# Patient Record
Sex: MALE | Race: BLACK OR AFRICAN AMERICAN | Employment: OTHER | URBAN - METROPOLITAN AREA
[De-identification: names, ages, dates, MRNs, and addresses within clinical notes are randomized per-mention and may not be internally consistent; named-entity substitution may affect disease eponyms.]

---

## 2017-03-06 ENCOUNTER — ALLSCRIPTS OFFICE VISIT (OUTPATIENT)
Dept: OTHER | Facility: OTHER | Age: 82
End: 2017-03-06

## 2017-03-06 DIAGNOSIS — E11.9 TYPE 2 DIABETES MELLITUS WITHOUT COMPLICATIONS (HCC): ICD-10-CM

## 2017-05-24 ENCOUNTER — ALLSCRIPTS OFFICE VISIT (OUTPATIENT)
Dept: OTHER | Facility: OTHER | Age: 82
End: 2017-05-24

## 2017-05-24 LAB
BILIRUB UR QL STRIP: NORMAL
CLARITY UR: NORMAL
COLOR UR: NORMAL
GLUCOSE (HISTORICAL): 1000
HGB UR QL STRIP.AUTO: NORMAL
KETONES UR STRIP-MCNC: NORMAL MG/DL
LEUKOCYTE ESTERASE UR QL STRIP: 70
NITRITE UR QL STRIP: NORMAL
PH UR STRIP.AUTO: 5 [PH]
PROT UR STRIP-MCNC: 100 MG/DL
SP GR UR STRIP.AUTO: 1.01
UROBILINOGEN UR QL STRIP.AUTO: NORMAL

## 2017-06-06 ENCOUNTER — LAB CONVERSION - ENCOUNTER (OUTPATIENT)
Dept: OTHER | Facility: OTHER | Age: 82
End: 2017-06-06

## 2017-06-06 LAB
BUN SERPL-MCNC: 32 MG/DL (ref 7–25)
BUN/CREA RATIO (HISTORICAL): 15 (CALC) (ref 6–22)
CALCIUM SERPL-MCNC: 8.9 MG/DL (ref 8.6–10.3)
CHLORIDE SERPL-SCNC: 109 MMOL/L (ref 98–110)
CO2 SERPL-SCNC: 24 MMOL/L (ref 20–31)
CREAT SERPL-MCNC: 2.2 MG/DL (ref 0.7–1.11)
EGFR AFRICAN AMERICAN (HISTORICAL): 31 ML/MIN/1.73M2
EGFR-AMERICAN CALC (HISTORICAL): 27 ML/MIN/1.73M2
GLUCOSE (HISTORICAL): 123 MG/DL (ref 65–99)
HBA1C MFR BLD HPLC: 6.7 % OF TOTAL HGB
POTASSIUM SERPL-SCNC: 3.9 MMOL/L (ref 3.5–5.3)
SODIUM SERPL-SCNC: 139 MMOL/L (ref 135–146)

## 2017-06-08 ENCOUNTER — ALLSCRIPTS OFFICE VISIT (OUTPATIENT)
Dept: OTHER | Facility: OTHER | Age: 82
End: 2017-06-08

## 2017-06-08 DIAGNOSIS — E11.22 TYPE 2 DIABETES MELLITUS WITH DIABETIC CHRONIC KIDNEY DISEASE (HCC): ICD-10-CM

## 2017-06-08 DIAGNOSIS — R31.0 GROSS HEMATURIA: ICD-10-CM

## 2017-06-08 LAB
BILIRUB UR QL STRIP: NORMAL
CLARITY UR: NORMAL
COLOR UR: YELLOW
GLUCOSE (HISTORICAL): 1000
HGB UR QL STRIP.AUTO: NORMAL
KETONES UR STRIP-MCNC: NORMAL MG/DL
LEUKOCYTE ESTERASE UR QL STRIP: NORMAL
NITRITE UR QL STRIP: NORMAL
PH UR STRIP.AUTO: 5 [PH]
PROT UR STRIP-MCNC: 30 MG/DL
SP GR UR STRIP.AUTO: 1.01
UROBILINOGEN UR QL STRIP.AUTO: NORMAL

## 2017-06-16 ENCOUNTER — HOSPITAL ENCOUNTER (OUTPATIENT)
Dept: CT IMAGING | Facility: HOSPITAL | Age: 82
Discharge: HOME | End: 2017-06-16
Payer: MEDICARE

## 2017-06-16 DIAGNOSIS — R31.0 GROSS HEMATURIA: ICD-10-CM

## 2017-06-16 PROCEDURE — 74176 CT ABD & PELVIS W/O CONTRAST: CPT

## 2017-07-05 ENCOUNTER — LAB REQUISITION (OUTPATIENT)
Dept: LAB | Facility: HOSPITAL | Age: 82
End: 2017-07-05
Payer: MEDICARE

## 2017-07-05 ENCOUNTER — ALLSCRIPTS OFFICE VISIT (OUTPATIENT)
Dept: OTHER | Facility: OTHER | Age: 82
End: 2017-07-05

## 2017-07-05 DIAGNOSIS — R31.0 GROSS HEMATURIA: ICD-10-CM

## 2017-07-05 LAB
CLARITY UR: NORMAL
COLOR UR: YELLOW
GLUCOSE (HISTORICAL): NORMAL
HGB UR QL STRIP.AUTO: NORMAL
KETONES UR STRIP-MCNC: NORMAL MG/DL
LEUKOCYTE ESTERASE UR QL STRIP: NORMAL
NITRITE UR QL STRIP: NORMAL
PH UR STRIP.AUTO: 6 [PH]
PROT UR STRIP-MCNC: NORMAL MG/DL
SP GR UR STRIP.AUTO: 1.02

## 2017-07-05 PROCEDURE — 87086 URINE CULTURE/COLONY COUNT: CPT | Performed by: UROLOGY

## 2017-07-07 LAB — BACTERIA UR CULT: NORMAL

## 2017-08-08 ENCOUNTER — GENERIC CONVERSION - ENCOUNTER (OUTPATIENT)
Dept: OTHER | Facility: OTHER | Age: 82
End: 2017-08-08

## 2017-08-14 ENCOUNTER — GENERIC CONVERSION - ENCOUNTER (OUTPATIENT)
Dept: OTHER | Facility: OTHER | Age: 82
End: 2017-08-14

## 2017-08-23 ENCOUNTER — ALLSCRIPTS OFFICE VISIT (OUTPATIENT)
Dept: OTHER | Facility: OTHER | Age: 82
End: 2017-08-23

## 2017-08-23 DIAGNOSIS — N32.89 OTHER SPECIFIED DISORDERS OF BLADDER: ICD-10-CM

## 2017-08-23 DIAGNOSIS — E11.22 TYPE 2 DIABETES MELLITUS WITH DIABETIC CHRONIC KIDNEY DISEASE (HCC): ICD-10-CM

## 2017-08-23 DIAGNOSIS — E11.9 TYPE 2 DIABETES MELLITUS WITHOUT COMPLICATIONS (HCC): ICD-10-CM

## 2017-08-23 DIAGNOSIS — J45.20 MILD INTERMITTENT ASTHMA, UNCOMPLICATED: ICD-10-CM

## 2017-09-13 ENCOUNTER — GENERIC CONVERSION - ENCOUNTER (OUTPATIENT)
Dept: OTHER | Facility: OTHER | Age: 82
End: 2017-09-13

## 2017-09-14 ENCOUNTER — ALLSCRIPTS OFFICE VISIT (OUTPATIENT)
Dept: OTHER | Facility: OTHER | Age: 82
End: 2017-09-14

## 2017-09-14 ENCOUNTER — APPOINTMENT (OUTPATIENT)
Dept: LAB | Facility: HOSPITAL | Age: 82
End: 2017-09-14
Attending: UROLOGY
Payer: MEDICARE

## 2017-09-14 DIAGNOSIS — N32.89 OTHER SPECIFIED DISORDERS OF BLADDER: ICD-10-CM

## 2017-09-14 LAB
BILIRUB UR QL STRIP: NORMAL
CLARITY UR: NORMAL
COLOR UR: YELLOW
GLUCOSE (HISTORICAL): 500
HGB UR QL STRIP.AUTO: NORMAL
KETONES UR STRIP-MCNC: NORMAL MG/DL
LEUKOCYTE ESTERASE UR QL STRIP: NORMAL
NITRITE UR QL STRIP: NORMAL
PH UR STRIP.AUTO: 5 [PH]
PROT UR STRIP-MCNC: NORMAL MG/DL
SP GR UR STRIP.AUTO: 1.01
UROBILINOGEN UR QL STRIP.AUTO: NORMAL

## 2017-09-14 PROCEDURE — 87086 URINE CULTURE/COLONY COUNT: CPT

## 2017-09-16 LAB — BACTERIA UR CULT: NORMAL

## 2017-10-13 ENCOUNTER — ANESTHESIA EVENT (OUTPATIENT)
Dept: PERIOP | Facility: AMBULARY SURGERY CENTER | Age: 82
End: 2017-10-13
Payer: MEDICARE

## 2017-10-18 ENCOUNTER — ALLSCRIPTS OFFICE VISIT (OUTPATIENT)
Dept: OTHER | Facility: OTHER | Age: 82
End: 2017-10-18

## 2017-10-19 ENCOUNTER — TRANSCRIBE ORDERS (OUTPATIENT)
Dept: LAB | Facility: CLINIC | Age: 82
End: 2017-10-19

## 2017-10-19 ENCOUNTER — APPOINTMENT (OUTPATIENT)
Dept: LAB | Facility: CLINIC | Age: 82
End: 2017-10-19
Payer: MEDICARE

## 2017-10-19 ENCOUNTER — OFFICE VISIT (OUTPATIENT)
Dept: LAB | Facility: CLINIC | Age: 82
End: 2017-10-19
Payer: MEDICARE

## 2017-10-19 DIAGNOSIS — N32.89 NONTRAUMATIC RUPTURE OF BLADDER: Primary | ICD-10-CM

## 2017-10-19 DIAGNOSIS — E11.9 TYPE 2 DIABETES MELLITUS WITHOUT COMPLICATIONS (HCC): ICD-10-CM

## 2017-10-19 DIAGNOSIS — N32.89 NONTRAUMATIC RUPTURE OF BLADDER: ICD-10-CM

## 2017-10-19 DIAGNOSIS — R39.9 UNSPECIFIED SYMPTOMS AND SIGNS INVOLVING THE GENITOURINARY SYSTEM: ICD-10-CM

## 2017-10-19 LAB
ANION GAP SERPL CALCULATED.3IONS-SCNC: 8 MMOL/L (ref 4–13)
APTT PPP: 26 SECONDS (ref 23–35)
ATRIAL RATE: 64 BPM
BASOPHILS # BLD AUTO: 0.02 THOUSANDS/ΜL (ref 0–0.1)
BASOPHILS NFR BLD AUTO: 0 % (ref 0–1)
BUN SERPL-MCNC: 33 MG/DL (ref 5–25)
CALCIUM SERPL-MCNC: 8.8 MG/DL (ref 8.3–10.1)
CHLORIDE SERPL-SCNC: 102 MMOL/L (ref 100–108)
CO2 SERPL-SCNC: 27 MMOL/L (ref 21–32)
CREAT SERPL-MCNC: 2.16 MG/DL (ref 0.6–1.3)
CREAT UR-MCNC: 57.1 MG/DL
EOSINOPHIL # BLD AUTO: 0.34 THOUSAND/ΜL (ref 0–0.61)
EOSINOPHIL NFR BLD AUTO: 5 % (ref 0–6)
ERYTHROCYTE [DISTWIDTH] IN BLOOD BY AUTOMATED COUNT: 12.6 % (ref 11.6–15.1)
GFR SERPL CREATININE-BSD FRML MDRD: 32 ML/MIN/1.73SQ M
GLUCOSE SERPL-MCNC: 212 MG/DL (ref 65–140)
HCT VFR BLD AUTO: 35.5 % (ref 36.5–49.3)
HGB BLD-MCNC: 11.4 G/DL (ref 12–17)
INR PPP: 0.97 (ref 0.86–1.16)
LYMPHOCYTES # BLD AUTO: 1.45 THOUSANDS/ΜL (ref 0.6–4.47)
LYMPHOCYTES NFR BLD AUTO: 19 % (ref 14–44)
MCH RBC QN AUTO: 30.3 PG (ref 26.8–34.3)
MCHC RBC AUTO-ENTMCNC: 32.1 G/DL (ref 31.4–37.4)
MCV RBC AUTO: 94 FL (ref 82–98)
MICROALBUMIN UR-MCNC: 50.8 MG/L (ref 0–20)
MICROALBUMIN/CREAT 24H UR: 89 MG/G CREATININE (ref 0–30)
MONOCYTES # BLD AUTO: 0.79 THOUSAND/ΜL (ref 0.17–1.22)
MONOCYTES NFR BLD AUTO: 11 % (ref 4–12)
NEUTROPHILS # BLD AUTO: 4.93 THOUSANDS/ΜL (ref 1.85–7.62)
NEUTS SEG NFR BLD AUTO: 65 % (ref 43–75)
P AXIS: 33 DEGREES
PLATELET # BLD AUTO: 180 THOUSANDS/UL (ref 149–390)
PMV BLD AUTO: 10.9 FL (ref 8.9–12.7)
POTASSIUM SERPL-SCNC: 4.4 MMOL/L (ref 3.5–5.3)
PR INTERVAL: 142 MS
PROTHROMBIN TIME: 13.2 SECONDS (ref 12.1–14.4)
QRS AXIS: -5 DEGREES
QRSD INTERVAL: 80 MS
QT INTERVAL: 410 MS
QTC INTERVAL: 422 MS
RBC # BLD AUTO: 3.76 MILLION/UL (ref 3.88–5.62)
SODIUM SERPL-SCNC: 137 MMOL/L (ref 136–145)
T WAVE AXIS: 19 DEGREES
VENTRICULAR RATE: 64 BPM
WBC # BLD AUTO: 7.53 THOUSAND/UL (ref 4.31–10.16)

## 2017-10-19 PROCEDURE — 93005 ELECTROCARDIOGRAM TRACING: CPT

## 2017-10-19 PROCEDURE — 85730 THROMBOPLASTIN TIME PARTIAL: CPT

## 2017-10-19 PROCEDURE — 36415 COLL VENOUS BLD VENIPUNCTURE: CPT

## 2017-10-19 PROCEDURE — 85025 COMPLETE CBC W/AUTO DIFF WBC: CPT

## 2017-10-19 PROCEDURE — 85610 PROTHROMBIN TIME: CPT

## 2017-10-19 PROCEDURE — 87086 URINE CULTURE/COLONY COUNT: CPT

## 2017-10-19 PROCEDURE — 82570 ASSAY OF URINE CREATININE: CPT

## 2017-10-19 PROCEDURE — 80048 BASIC METABOLIC PNL TOTAL CA: CPT

## 2017-10-19 PROCEDURE — 82043 UR ALBUMIN QUANTITATIVE: CPT

## 2017-10-19 NOTE — PROGRESS NOTES
Assessment  1  Preoperative clearance (V72 84) (Z01 818)   2  Bladder mass (596 89) (N32 89)   3  Diabetes mellitus, type II (250 00) (E11 9)   4  Chronic renal failure, stage 3 (moderate) (585 3) (N18 3)   5  Asthma, mild intermittent (493 90) (J45 20)   6  Benign essential hypertension (401 1) (I10)    Plan  Benign essential hypertension    · AmLODIPine Besylate 10 MG Oral Tablet; take 1 tablet by mouth  daily as  directed  Diabetes mellitus, type II    · (1) MICROALBUMIN CREATININE RATIO, RANDOM URINE; Status:Active; Requested  for:19Oct2017;     Discussion/Summary  Surgical Clearance: He is at a LOW risk from a cardiovascular standpoint at this time without any additional cardiac testing  Reevaluation needed, if he should present with symptoms prior to surgery/procedure  Comments:  keep Pt hydrated and monitor BS, NO METFORMIN  Possible side effects of new medications were reviewed with the patient/guardian today  The treatment plan was reviewed with the patient/guardian  The patient/guardian understands and agrees with the treatment plan      Chief Complaint  pre-op - having procedure done by Dr Efrain Solis of [facility] on 10/25/2017      History of Present Illness  Pre-Op Visit (Brief): The patient is being seen for a preoperative visit  The procedure is a(n) bladder biopey and resection scheduled for 10-25 with Efrain Solis  The indication for surgery is bladder mass  Surgical Risk Assessment:   Prior Anesthesia: He had prior anesthesia  Pertinent Past Medical History: diabetes,-- asthma-- and-- renal disease, but-- does not use insulin-- and-- no obesity  Exercise Capacity: able to walk four blocks without symptoms-- and-- able to walk two flights of stairs without symptoms  Lifestyle Factors: denies tobacco use and denies illegal drug use  Symptoms: no symptoms  Pertinent Family History: no pertinent family history     Living Situation: home is secure and supportive and no post-op concerns with his living situation  Asthma (Follow-Up): The patient is being seen for a routine clinic follow-up of asthma  The patient's long-term asthma pattern has been classified as intermittent  The patient states he has been doing well with his asthma control since the last visit  He has no comorbid illnesses  He has no significant interval events  Asthma Control: Well controlled  Interval Symptoms: The patient is currently asymptomatic  Home Monitoring: The patient is not checking peak flow at home  Normal rescue inhaler use  Interval Triggers: cold weather-- and-- upper respiratory tract infection  Medications: a short acting beta agonist agent, a long acting beta agonist agent and an inhaled steroid-- the patient is adherent with his medication regimen  -- He denies medication side effects  Disease Management: the patient is doing well with his asthma goals  The patient is due for influenza virus vaccine  Hypertension (Follow-Up): The patient presents for follow-up of essential hypertension  The patient states he has been doing well with his blood pressure control since the last visit  Comorbid Illnesses: nephropathy  Symptoms: The patient is currently asymptomatic  Home monitoring: The patient checks his blood pressure regularly  Blood pressure control has been good  Medications: the patient is adherent with his medication regimen  -- He denies medication side effects  Medication(s): a beta blocking agent-- and-- a calcium channel blocker  Disease Management: the patient is doing well with his blood pressure goals  Diabetes Type II (Follow-Up): The patient states he has been doing well with his Type II Diabetes control since the last visit  Comorbid Illnesses: hypertension-- and-- hyperlipidemia  Complications: nephropathy  He has no significant interval events  Symptoms: The patient is currently asymptomatic  Home monitoring: The patient checks his blood sugars regularly  -- Glycemic control has been good  -- the patient reports no symptomatic hypoglycemic episodes  Medications: Medication(s): Sulfonylurea-- and-- Aspirin  Review of Systems    Constitutional: No fever or chills, feels well, no tiredness, no recent weight gain or weight loss  Eyes: No complaints of eye pain, no red eyes, no discharge from eyes, no itchy eyes  ENT: no complaints of earache, no hearing loss, no nosebleeds, no nasal discharge, no sore throat, no hoarseness  Cardiovascular: as noted in HPI  Respiratory: as noted in HPI  Gastrointestinal: No complaints of abdominal pain, no constipation, no nausea or vomiting, no diarrhea or bloody stools  Genitourinary: as noted in HPI  Musculoskeletal: No complaints of arthralgia, no myalgias, no joint swelling or stiffness, no limb pain or swelling  Integumentary: No complaints of skin rash or skin lesions, no itching, no skin wound, no dry skin  Neurological: No compliants of headache, no confusion, no convulsions, no numbness or tingling, no dizziness or fainting, no limb weakness, no difficulty walking  Psychiatric: Is not suicidal, no sleep disturbances, no anxiety or depression, no change in personality, no emotional problems  Endocrine: as noted in HPI  Hematologic/Lymphatic: No complaints of swollen glands, no swollen glands in the neck, does not bleed easily, no easy bruising  Active Problems  1  Asthma, mild intermittent (493 90) (J45 20)   2  Benign essential hypertension (401 1) (I10)   3  Benign hypertensive kidney disease, stage 1 through stage 4 or unspecified chronic   kidney disease (403 10) (I12 9)   4  Bladder mass (596 89) (N32 89)   5  Chronic renal failure, stage 3 (moderate) (585 3) (N18 3)   6  Depression screening (V79 0) (Z13 89)   7  Diabetes mellitus, type II (250 00) (E11 9)   8  Encounter for screening for malignant neoplasm of colon (V76 51) (Z12 11)   9  Glaucoma (365 9) (H40 9)   10  Gross hematuria (599 71) (R31 0)   11  Hyperlipidemia (272 4) (E78 5)   12  Mild vitamin D deficiency (268 9) (E55 9)   13  Organic impotence (607 84) (N52 9)   14  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,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)    Past Medical History   · Flu vaccine need (V04 81) (Z23)    The active problems and past medical history were reviewed and updated today  Surgical History   · History of Cataract Surgery   · History of Diagnostic Cystoscopy    Family History  Mother    · Family history of diabetes mellitus (V18 0) (Z83 3)  Father    · Family history of Old age    Social History   · Former smoker (V15 82) (O21 694)   · No alcohol use  The social history was reviewed and updated today  The social history was reviewed and is unchanged  Current Meds   1  Advair Diskus 100-50 MCG/DOSE Inhalation Aerosol Powder Breath Activated; Inhale 1   puff every 12  hours; Therapy: 18Oxy2435 to (52-28-62-17)  Requested for: 19Amg4432; Last   Rx:85Njw9154 Ordered   2  AmLODIPine Besylate 10 MG Oral Tablet; take 1 tablet by mouth  daily as directed; Therapy: 21GKV1999 to (Evaluate:69Vhs1436)  Requested for: 33Mwg6521; Last   Rx:76Ybt3039 Ordered   3  Brimonidine Tartrate 0 2 % Ophthalmic Solution; Therapy: 67Krv8067 to Recorded   4  Dorzolamide HCl - 2 % Ophthalmic Solution; Therapy: 64Ynq6556 to Recorded   5  Glimepiride 2 MG Oral Tablet; TAKE 1 TABLET DAILY AS DIRECTED; Therapy: 42Yic7289 to (Evaluate:00Sbg1989)  Requested for: 70Zzb2022; Last   Rx:53Wfd6130 Ordered   6  Lotemax 0 5 % Ophthalmic Suspension; Therapy: 22NCJ0876 to (Last Rx:06Jan2012)  Requested for: 28HXM7241 Ordered   7  Lumigan 0 01 % Ophthalmic Solution; Therapy: 69QFR5805 to Recorded   8  Meloxicam 7 5 MG Oral Tablet Recorded   9  Metoprolol Succinate ER 50 MG Oral Tablet Extended Release 24 Hour; TAKE 1 TABLET   DAILY; Therapy: 79VRV2821 to (Evaluate:28Vpp2565)  Requested for: 16Lkj6341; Last   Rx:50Qgq9473 Ordered   10   OneTouch Ultra Blue In Vitro Strip; TEST ONCE DAILY; Therapy: 26Apr2012 to (Evaluate:27Apr2018)  Requested for: 75PRW8360; Last    Rx:51Wtc0767 Ordered   11  Simvastatin 40 MG Oral Tablet; TAKE 1 TABLET DAILY; Therapy: 41BGQ1835 to (Evaluate:03Mar2018)  Requested for: 57EAE0857; Last    Rx:08Mar2017 Ordered   12  Vitamin D 1000 UNIT CAPS; Take 1 capsule twice daily; Therapy: 02Qya7192 to (Last Arby Hobbs)  Requested for: 44Vqt6888 Ordered    The medication list was reviewed and updated today  Allergies  1  ACE Inhibitors   2  Penicillins    Vitals   Recorded: 03NFD7284 03:37PM   Temperature 98 6 F, Tympanic   Heart Rate 76   Systolic 893   Diastolic 70   Height 5 ft 5 in   Weight 150 lb    BMI Calculated 24 96   BSA Calculated 1 75     Physical Exam    Constitutional   General appearance: No acute distress, well appearing and well nourished  Head and Face   Head and face: Normal     Palpation of the face and sinuses: No sinus tenderness  Ears, Nose, Mouth, and Throat   External inspection of ears and nose: Normal     Otoscopic examination: Tympanic membranes translucent with normal light reflex  Canals patent without erythema  Nasal mucosa, septum, and turbinates: Normal without edema or erythema  Oropharynx: Normal with no erythema, edema, exudate or lesions  Neck   Neck: Supple, symmetric, trachea midline, no masses  Thyroid: Normal, no thyromegaly  Pulmonary   Respiratory effort: No increased work of breathing or signs of respiratory distress  Auscultation of lungs: Clear to auscultation  Cardiovascular   Auscultation of heart: Normal rate and rhythm, normal S1 and S2, no murmurs  Carotid pulses: 2+ bilaterally  Pedal pulses: 2+ bilaterally  Examination of extremities for edema and/or varicosities: Normal     Abdomen   Abdomen: Non-tender, no masses  Liver and spleen: No hepatomegaly or splenomegaly  Examination for hernias: No hernias appreciated      Musculoskeletal Gait and station: Normal     Skin   Skin and subcutaneous tissue: Normal without rashes or lesions  Neurologic   Cranial nerves: Cranial nerves 2-12 intact  Cortical function: Normal mental status  Coordination: Normal finger to nose and heel to shin  Psychiatric   Judgment and insight: Normal     Orientation to person, place and time: Normal     Recent and remote memory: Intact  Mood and affect: Normal        End of Encounter Meds  1  Advair Diskus 100-50 MCG/DOSE Inhalation Aerosol Powder Breath Activated; Inhale 1   puff every 12  hours; Therapy: 49Rom3183 to ([id number])  Requested for: 84Rdj5502; Last   Rx:79Bst6355 Ordered  2  AmLODIPine Besylate 10 MG Oral Tablet; take 1 tablet by mouth  daily as directed; Therapy: 32SSP1481 to (Evaluate:41Umr4742)  Requested for: 64FQK0519; Last   Rx:18Oct2017 Ordered  3  Metoprolol Succinate ER 50 MG Oral Tablet Extended Release 24 Hour (Toprol XL);   TAKE 1 TABLET DAILY; Therapy: 95FEA7022 to (Evaluate:62Trx6537)  Requested for: 53Rnn0274; Last   Rx:82Gwe5669 Ordered  4  Simvastatin 40 MG Oral Tablet; TAKE 1 TABLET DAILY; Therapy: 58KKC1253 to (Evaluate:03Mar2018)  Requested for: 90TNT2385; Last   Rx:08Mar2017 Ordered  5  Vitamin D 1000 UNIT CAPS; Take 1 capsule twice daily; Therapy: 89Pcy4953 to (Last Grahams Alder)  Requested for: 28Bez6107 Ordered  6  Glimepiride 2 MG Oral Tablet (Amaryl); TAKE 1 TABLET DAILY AS DIRECTED; Therapy: 58Hxf9551 to (Evaluate:55Ife5875)  Requested for: 91Nja3632; Last   Rx:80Noj3811 Ordered   7  OneTouch Ultra Blue In Vitro Strip; TEST ONCE DAILY; Therapy: 26Apr2012 to (Evaluate:27Apr2018)  Requested for: 96CEA5607; Last   Rx:26Lyi8435 Ordered  8  Brimonidine Tartrate 0 2 % Ophthalmic Solution; Therapy: 08Aug2014 to Recorded   9  Dorzolamide HCl - 2 % Ophthalmic Solution; Therapy: 25Hhn1896 to Recorded   10  Lotemax 0 5 % Ophthalmic Suspension;     Therapy: 30UJO6850 to (Last Rx:06Jan2012)  Requested for: 07IFK7752 Ordered   11  Lumigan 0 01 % Ophthalmic Solution; Therapy: 45DOW3084 to Recorded   12  Meloxicam 7 5 MG Oral Tablet Recorded    Future Appointments    Date/Time Provider Specialty Site   11/14/2017 01:30 PM GEE Urena  Internal Medicine [facility] [place]   10/25/2017 10:00 AM GEE Fuentes  Urology [address] [place] [address] [place]   11/07/2017 10:30 AM GEE Fuentes   Urology [facility]     Signatures   Electronically signed by : GEE Turner ; Oct 18 2017  9:08PM EST                       (Author)

## 2017-10-20 LAB — BACTERIA UR CULT: NORMAL

## 2017-10-25 ENCOUNTER — HOSPITAL ENCOUNTER (OUTPATIENT)
Facility: AMBULARY SURGERY CENTER | Age: 82
Setting detail: OUTPATIENT SURGERY
Discharge: HOME | End: 2017-10-25
Attending: UROLOGY | Admitting: UROLOGY
Payer: MEDICARE

## 2017-10-25 ENCOUNTER — ANESTHESIA (OUTPATIENT)
Dept: PERIOP | Facility: AMBULARY SURGERY CENTER | Age: 82
End: 2017-10-25
Payer: MEDICARE

## 2017-10-25 VITALS
DIASTOLIC BLOOD PRESSURE: 78 MMHG | OXYGEN SATURATION: 95 % | TEMPERATURE: 97.8 F | HEIGHT: 65 IN | SYSTOLIC BLOOD PRESSURE: 160 MMHG | HEART RATE: 68 BPM | BODY MASS INDEX: 24.66 KG/M2 | WEIGHT: 148 LBS | RESPIRATION RATE: 16 BRPM

## 2017-10-25 DIAGNOSIS — N32.89 BLADDER MASS: ICD-10-CM

## 2017-10-25 PROCEDURE — 88305 TISSUE EXAM BY PATHOLOGIST: CPT | Performed by: UROLOGY

## 2017-10-25 PROCEDURE — 82948 REAGENT STRIP/BLOOD GLUCOSE: CPT

## 2017-10-25 PROCEDURE — C1769 GUIDE WIRE: HCPCS | Performed by: UROLOGY

## 2017-10-25 PROCEDURE — 88307 TISSUE EXAM BY PATHOLOGIST: CPT | Performed by: UROLOGY

## 2017-10-25 RX ORDER — FENTANYL CITRATE/PF 50 MCG/ML
25 SYRINGE (ML) INJECTION
Status: COMPLETED | OUTPATIENT
Start: 2017-10-25 | End: 2017-10-25

## 2017-10-25 RX ORDER — SODIUM CHLORIDE, SODIUM LACTATE, POTASSIUM CHLORIDE, CALCIUM CHLORIDE 600; 310; 30; 20 MG/100ML; MG/100ML; MG/100ML; MG/100ML
100 INJECTION, SOLUTION INTRAVENOUS CONTINUOUS
Status: CANCELLED | OUTPATIENT
Start: 2017-10-25

## 2017-10-25 RX ORDER — SODIUM CHLORIDE, SODIUM LACTATE, POTASSIUM CHLORIDE, CALCIUM CHLORIDE 600; 310; 30; 20 MG/100ML; MG/100ML; MG/100ML; MG/100ML
INJECTION, SOLUTION INTRAVENOUS CONTINUOUS PRN
Status: DISCONTINUED | OUTPATIENT
Start: 2017-10-25 | End: 2017-10-25 | Stop reason: SURG

## 2017-10-25 RX ORDER — ONDANSETRON 2 MG/ML
4 INJECTION INTRAMUSCULAR; INTRAVENOUS ONCE AS NEEDED
Status: DISCONTINUED | OUTPATIENT
Start: 2017-10-25 | End: 2017-10-25 | Stop reason: HOSPADM

## 2017-10-25 RX ORDER — CIPROFLOXACIN 500 MG/1
500 TABLET, FILM COATED ORAL 2 TIMES DAILY
Qty: 6 TABLET | Refills: 0 | Status: SHIPPED | OUTPATIENT
Start: 2017-10-25 | End: 2017-10-28

## 2017-10-25 RX ORDER — CIPROFLOXACIN 2 MG/ML
400 INJECTION, SOLUTION INTRAVENOUS ONCE
Status: COMPLETED | OUTPATIENT
Start: 2017-10-25 | End: 2017-10-25

## 2017-10-25 RX ORDER — PROPOFOL 10 MG/ML
INJECTION, EMULSION INTRAVENOUS AS NEEDED
Status: DISCONTINUED | OUTPATIENT
Start: 2017-10-25 | End: 2017-10-25 | Stop reason: SURG

## 2017-10-25 RX ORDER — TRAMADOL HYDROCHLORIDE 50 MG/1
50 TABLET ORAL EVERY 6 HOURS PRN
Qty: 20 TABLET | Refills: 0 | Status: SHIPPED | OUTPATIENT
Start: 2017-10-25 | End: 2017-11-04

## 2017-10-25 RX ORDER — FENTANYL CITRATE 50 UG/ML
INJECTION, SOLUTION INTRAMUSCULAR; INTRAVENOUS AS NEEDED
Status: DISCONTINUED | OUTPATIENT
Start: 2017-10-25 | End: 2017-10-25 | Stop reason: SURG

## 2017-10-25 RX ORDER — ONDANSETRON 2 MG/ML
INJECTION INTRAMUSCULAR; INTRAVENOUS AS NEEDED
Status: DISCONTINUED | OUTPATIENT
Start: 2017-10-25 | End: 2017-10-25 | Stop reason: SURG

## 2017-10-25 RX ORDER — PHENAZOPYRIDINE HYDROCHLORIDE 100 MG/1
100 TABLET, FILM COATED ORAL 3 TIMES DAILY PRN
Qty: 20 TABLET | Refills: 0 | Status: SHIPPED | OUTPATIENT
Start: 2017-10-25 | End: 2018-02-15

## 2017-10-25 RX ORDER — LIDOCAINE HYDROCHLORIDE 10 MG/ML
INJECTION, SOLUTION INFILTRATION; PERINEURAL AS NEEDED
Status: DISCONTINUED | OUTPATIENT
Start: 2017-10-25 | End: 2017-10-25 | Stop reason: SURG

## 2017-10-25 RX ORDER — HYDROCODONE BITARTRATE AND ACETAMINOPHEN 5; 325 MG/1; MG/1
1-2 TABLET ORAL EVERY 4 HOURS PRN
Status: DISCONTINUED | OUTPATIENT
Start: 2017-10-25 | End: 2017-10-25 | Stop reason: HOSPADM

## 2017-10-25 RX ADMIN — FENTANYL CITRATE 25 MCG: 50 INJECTION INTRAMUSCULAR; INTRAVENOUS at 12:31

## 2017-10-25 RX ADMIN — FENTANYL CITRATE 25 MCG: 50 INJECTION, SOLUTION INTRAMUSCULAR; INTRAVENOUS at 11:47

## 2017-10-25 RX ADMIN — FENTANYL CITRATE 25 MCG: 50 INJECTION, SOLUTION INTRAMUSCULAR; INTRAVENOUS at 11:31

## 2017-10-25 RX ADMIN — PROPOFOL 150 MG: 10 INJECTION, EMULSION INTRAVENOUS at 11:29

## 2017-10-25 RX ADMIN — SODIUM CHLORIDE, SODIUM LACTATE, POTASSIUM CHLORIDE, AND CALCIUM CHLORIDE: .6; .31; .03; .02 INJECTION, SOLUTION INTRAVENOUS at 11:17

## 2017-10-25 RX ADMIN — FENTANYL CITRATE 25 MCG: 50 INJECTION INTRAMUSCULAR; INTRAVENOUS at 12:35

## 2017-10-25 RX ADMIN — FENTANYL CITRATE 25 MCG: 50 INJECTION INTRAMUSCULAR; INTRAVENOUS at 12:27

## 2017-10-25 RX ADMIN — FENTANYL CITRATE 25 MCG: 50 INJECTION, SOLUTION INTRAMUSCULAR; INTRAVENOUS at 11:35

## 2017-10-25 RX ADMIN — LIDOCAINE HYDROCHLORIDE 50 MG: 10 INJECTION, SOLUTION INFILTRATION; PERINEURAL at 11:29

## 2017-10-25 RX ADMIN — CIPROFLOXACIN: 2 INJECTION INTRAVENOUS at 11:33

## 2017-10-25 RX ADMIN — FENTANYL CITRATE 25 MCG: 50 INJECTION INTRAMUSCULAR; INTRAVENOUS at 12:24

## 2017-10-25 RX ADMIN — FENTANYL CITRATE 25 MCG: 50 INJECTION, SOLUTION INTRAMUSCULAR; INTRAVENOUS at 11:40

## 2017-10-25 RX ADMIN — ONDANSETRON 4 MG: 2 INJECTION INTRAMUSCULAR; INTRAVENOUS at 11:35

## 2017-10-25 NOTE — DISCHARGE INSTRUCTIONS
CYSTOSCOPY, TURBT, PROSTATE BIOPSY, BLADDER BIOPSY   DISCHARGE INSTRUCTIONS    Care after your surgery:  1  You may have burning or red colored urine the first 24 hours  Your burning should  stop in 24 hours and your urine should clear in 24-48 hours  2  You should drink more fluids unless Dr Shayna Metcalf advises you not to    3  You should return to normal activities when your urine is clear again  4  You may have a small amount of red drainage from your rectum if you had a prostate  biopsy performed  This should stop in 24 hours  5  Take pain medication as prescribed by your doctor  Call Dr Shayna Metcalf if you have any of the followin  You can not urinate or you have active or persistent bleeding, clots, back pain, or  pain when you urinate  2  You have chills, fever above 101 degrees, or feel sick  3  You have persistent nausea or vomiting, persistent dizziness, or lightheadedness  4  Call Dr Shayna Metcalf if you have any questions or concerns about your procedure at:  [phone]      Follow-up with Dr Shayna Metcalf in 1-2 weeks

## 2017-10-25 NOTE — PROGRESS NOTES
DrViviann Rising Nicholas Rabon called back  Irrigated with 60cc NS and had 100cc return clear urine which easily flushed

## 2017-10-25 NOTE — ANESTHESIA PREPROCEDURE EVALUATION
Review of Systems/Medical History  Patient summary reviewed  Chart reviewed      Cardiovascular  EKG reviewed, Hypertension controlled,   Comment: EKG SR PLUS OCCASIONAL PVC,  Pulmonary  Asthma: well controlled/ stable Last rescue: today , ,        GI/Hepatic    No GERD ,        Negative  ROS        Endo/Other  Diabetes ,      GYN       Hematology  Negative hematology ROS      Musculoskeletal  Negative musculoskeletal ROS        Neurology  Negative neurology ROS      Psychology   Negative psychology ROS            Physical Exam    Airway    Mallampati score: II  TM Distance: >3 FB  Neck ROM: full     Dental   upper dentures,     Cardiovascular  Cardiovascular exam normal    Pulmonary  Pulmonary exam normal     Other Findings        Anesthesia Plan  ASA Score- 2       Anesthesia Type- general with ASA Monitors  Additional Monitors:   Airway Plan: LMA  Induction- intravenous  Informed Consent- Anesthetic plan and risks discussed with patient

## 2017-10-25 NOTE — OP NOTE
OPERATIVE REPORT  PATIENT NAME: Kaushal Nicole    :  1934  MRN: [id number]  Pt Location: [facility] OR ROOM 06    SURGERY DATE: 10/25/2017    Surgeon(s) and Role:     Ricardo Gleason MD - Primary    Preop Diagnosis:  Bladder mass [N32 89]    Post-Op Diagnosis Codes: * Bladder mass [N32 89]    Procedure(s) (LRB):  CYSTOSCOPY; BLADDER BIOPSY WITH FULGERATION (N/A)  TUR BLADDER TUMOR (N/A)  INSTILLATION OF MITOMYCIN C (N/A)    Specimen(s):  ID Type Source Tests Collected by Time Destination   1 : bladder tumor Tissue Urinary Bladder TISSUE EXAM Jacquelin Faye MD 10/25/2017 1147    2 : bladder tumor deep Tissue Urinary Bladder TISSUE EXAM Jacquelin Faye MD 10/25/2017 1148    3 : bladder biopies Tissue Urinary Bladder TISSUE EXAM Jacquelin Faye MD 10/25/2017 1149        Estimated Blood Loss:   Minimal    Drains:       Anesthesia Type:   General    Operative Indications:  Bladder mass [N32 89]      Operative Findings:  3 cm bladder mass just above right ureteral orifice    Complications:   None    Procedure and Technique:  Informed consent was obtained the patient was brought to the operating room  Preoperative antibiotics were given  Bilateral sequential compressive devices were placed in the lower extremities for DVT prophylaxis  A timeout was performed to identify the patient and surgery to be performed  The patient was placed under general anesthesia prepped and draped in standard sterile fashion in dorsal lithotomy position  A 22 Thai rigid cystoscope was inserted per urethra into the bladder  A diagnostic cystoscopy was performed that showed a 3 cm bladder mass above the right ureteral orifice  Using loop electrocautery the bladder mass was resected and sent off as bladder tumor  Using a cold cup biopsy forceps biopsies of the base of the tumor were sent as bladder tumor deep  Electrocautery was used to obtain hemostasis    Next using cold cup biopsies random biopsies of the bladder were taken and sent off as bladder biopsies  Again electrocautery was used to obtain hemostasis at the sites  The bladder was inspected under low pressure conditions for any possible bleeding  The resectoscope was then removed and an 25 Swedish Mcgee catheter was placed  40 mg of mitomycin-C in 40 mL of normal saline were instilled into the bladder and the catheter was clamped  The patient was awoke and taken to the postanesthesia care unit stable condition     I was present for the entire procedure    Patient Disposition:  PACU     SIGNATURE: Ruslan Morton MD  DATE: October 25, 2017  TIME: 12:00 PM

## 2017-10-27 ENCOUNTER — ALLSCRIPTS OFFICE VISIT (OUTPATIENT)
Dept: OTHER | Facility: OTHER | Age: 82
End: 2017-10-27

## 2017-10-27 LAB — GLUCOSE SERPL-MCNC: 158 MG/DL (ref 65–140)

## 2017-11-07 ENCOUNTER — ALLSCRIPTS OFFICE VISIT (OUTPATIENT)
Dept: OTHER | Facility: OTHER | Age: 82
End: 2017-11-07

## 2017-11-08 NOTE — PROGRESS NOTES
Assessment  1  Bladder mass (882 22) (N32 89)    Discussion/Summary  Discussion Summary:   68-year-old male with low-grade bladder cancer  reviewed the pathology report with the patient  I was happy to report that his cancer was noninvasive and low-grade  We discussed that he does not need any further treatment  He will need to have continued surveillance cystoscopies  He will follow up in 3 months for his 1st cystoscopy  Counseling Documentation With Imm: total time of encounter was 25 minutes-- and-- 20 minutes was spent counseling  Chief Complaint  Chief Complaint Free Text Note Form: Patient presents for s/p Bladd biopsy; TURBT (10/25/17)      History of Present Illness  HPI: 68-year-old male presents for follow-up after transurethral resection of bladder tumor  He did well after the procedure and has no new complaints  Review of Systems  Complete-Male Urology:   Constitutional: No fever or chills, feels well, no tiredness, no recent weight gain or weight loss  Respiratory: No complaints of shortness of breath, no wheezing, no cough, no SOB on exertion, no orthopnea or PND  Cardiovascular: No complaints of slow heart rate, no fast heart rate, no chest pain, no palpitations, no leg claudication, no lower extremity  Gastrointestinal: No complaints of abdominal pain, no constipation, no nausea or vomiting, no diarrhea or bloody stools  Genitourinary: Empty sensation-- and-- stream quality good, but-- no dysuria,-- no urinary hesitancy,-- no hematuria,-- no incontinence-- and-- no feelings of urinary urgency--    The patient presents with complaints of nocturia (1-2x)  Musculoskeletal: No complaints of arthralgia, no myalgias, no joint swelling or stiffness, no limb pain or swelling  Integumentary: No complaints of skin rash or skin lesions, no itching, no skin wound, no dry skin     Hematologic/Lymphatic: No complaints of swollen glands, no swollen glands in the neck, does not bleed easily, no easy bruising  Neurological: No compliants of headache, no confusion, no convulsions, no numbness or tingling, no dizziness or fainting, no limb weakness, no difficulty walking  ROS Reviewed:   ROS reviewed  Active Problems  1  Acute urinary retention (788 29) (R33 8)   2  Asthma, mild intermittent (493 90) (J45 20)   3  Benign essential hypertension (401 1) (I10)   4  Benign hypertensive kidney disease, stage 1 through stage 4 or unspecified chronic   kidney disease (403 10) (I12 9)   5  Bladder mass (596 89) (N32 89)   6  Chronic renal failure, stage 3 (moderate) (585 3) (N18 3)   7  Depression screening (V79 0) (Z13 89)   8  Diabetes mellitus, type II (250 00) (E11 9)   9  Encounter for screening for malignant neoplasm of colon (V76 51) (Z12 11)   10  Glaucoma (365 9) (H40 9)   11  Gross hematuria (599 71) (R31 0)   12  Hyperlipidemia (272 4) (E78 5)   13  Mild vitamin D deficiency (268 9) (E55 9)   14  Organic impotence (607 84) (N52 9)   15  Preoperative clearance (V72 84) (Z01 818)   16  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,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)   17  UTI symptoms (788 99) (R39 9)    Past Medical History  1  Flu vaccine need (V04 81) (Z23)  Active Problems And Past Medical History Reviewed: The active problems and past medical history were reviewed and updated today  Surgical History  1  History of Cataract Surgery   2  History of Diagnostic Cystoscopy  Surgical History Reviewed: The surgical history was reviewed and updated today  Family History  Mother    1  Family history of diabetes mellitus (V18 0) (Z83 3)  Father    2  Family history of Old age  Family History Reviewed: The family history was reviewed and updated today  Social History   · Former smoker (E52 35) (F80 239)   · No alcohol use  Social History Reviewed: The social history was reviewed and updated today  Current Meds   1   Advair Diskus 100-50 MCG/DOSE Inhalation Aerosol Powder Breath Activated; Inhale 1   puff every 12  hours; Therapy: 58Mhp4768 to (Annabella Antony)  Requested for: 24Nbx5868; Last   Rx:94Qnz1881 Ordered   2  AmLODIPine Besylate 10 MG Oral Tablet; take 1 tablet by mouth  daily as directed; Therapy: 92WYM0027 to (Evaluate:24Rpx0726)  Requested for: 73VWS3811; Last   Rx:88Ivi6393 Ordered   3  Brimonidine Tartrate 0 2 % Ophthalmic Solution; Therapy: 64Kkw0563 to Recorded   4  Dorzolamide HCl - 2 % Ophthalmic Solution; Therapy: 83Nok9882 to Recorded   5  Glimepiride 2 MG Oral Tablet; TAKE 1 TABLET DAILY AS DIRECTED; Therapy: 05Ixy6190 to (Evaluate:97Ekj9003)  Requested for: 12Igr0172; Last   Rx:02Mkh9769 Ordered   6  Lotemax 0 5 % Ophthalmic Suspension; Therapy: 11KDN7916 to (Last Rx:2012)  Requested for: 06SFM0476 Ordered   7  Lumigan 0 01 % Ophthalmic Solution; Therapy: 73PQR1777 to Recorded   8  Meloxicam 7 5 MG Oral Tablet Recorded   9  Metoprolol Succinate ER 50 MG Oral Tablet Extended Release 24 Hour; TAKE 1 TABLET   DAILY; Therapy: 79ZIZ4512 to (Evaluate:09Pys0218)  Requested for: 87Rfq2140; Last   Rx:28Kqj3710 Ordered   10  OneTouch Ultra Blue In Vitro Strip; TEST ONCE DAILY; Therapy: 12Bxw1202 to (Evaluate:86Sfp1403)  Requested for: 54UFG2542; Last    Rx:59Sxa2981 Ordered   11  Simvastatin 40 MG Oral Tablet; TAKE 1 TABLET DAILY; Therapy: 06IGY8952 to (Evaluate:2018)  Requested for: 74UZQ0820; Last    Rx:2017 Ordered   12  Tamsulosin HCl - 0 4 MG Oral Capsule; TAKE 1 CAPSULE Bedtime; Therapy: 16JIH4242 to (Last R31FYW0800)  Requested for: 2017 Ordered   13  Vitamin D 1000 UNIT CAPS; Take 1 capsule twice daily; Therapy: 61Tvh2870 to (Last Consuello Mettle)  Requested for: 93Ciy8347 Ordered  Medication List Reviewed: The medication list was reviewed and updated today  Allergies  1  ACE Inhibitors   2   Penicillins    Vitals  Vital Signs    Recorded: 04YWT0154 10:42AM   Heart Rate 80   Systolic 933   Diastolic 68   Height 5 ft 5 in   Weight 145 lb 8 oz   BMI Calculated 24 21   BSA Calculated 1 73     Physical Exam    Constitutional   General appearance: No acute distress, well appearing and well nourished  Pulmonary   Respiratory effort: No increased work of breathing or signs of respiratory distress  Auscultation of lungs: Clear to auscultation  Cardiovascular   Auscultation of heart: Normal rate and rhythm, normal S1 and S2, without murmurs  Examination of extremities for edema and/or varicosities: Normal     Abdomen   Abdomen: Non-tender, no masses  Liver and spleen: No hepatomegaly or splenomegaly  Musculoskeletal   Gait and station: Normal     Skin   Skin and subcutaneous tissue: Normal without rashes or lesions  Additional Exam:  Neuro exam nonfocal       Results/Data  (1) TISSUE EXAM 25Oct2017 11:47AM Dick Aponte     Test Name Result Flag Reference   LAB AP CASE REPORT (Report)     Surgical Pathology Report             Case: [id number]                   Authorizing Provider: Garrison Quispe MD     Collected:      10/25/2017 1147        Ordering Location:   [facility]    Received:      10/26/2017 [facility]                            Pathologist:      Veronica Quiñones MD                              Specimens:  A) - Urinary Bladder, bladder tumor                                  B) - Urinary Bladder, bladder tumor deep                                C) - Urinary Bladder, bladder biopies   LAB AP FINAL DIAGNOSIS (Report)     A  Bladder tumor:    - Papillary urothelial carcinoma, low grade, non-invasive     - Detrusor muscle present; negative for maliganncy  B  Bladder tumor, deep:    - Rare microscopic, detached, and cauterized fragments of low grade   papillary urothelial carcinoma  - Portions of unremarkable detrusor muscle; negative for malignancy      C  Bladder, biopsies:    - Unremarkable urothelium; negative for malgnancy  - Portion of unremarkable detrusor muscle; negative for malignancy  Electronically signed by Richa Singh MD on 11/2/2017 at 11:28 AM   LAB AP SURGICAL ADDITIONAL INFORMATION (Report)     All controls performed with the immunohistochemical stains reported above   reacted appropriately  These tests were developed and their performance   characteristics determined by [facility] or   [address] [place] [address]  They may not be cleared or approved by the [place]  Food and Drug Administration  The FDA has determined that such clearance   or approval is not necessary  These tests are used for clinical purposes  They should not be regarded as investigational or for research  This   laboratory has been approved by [place] [address], designated as a high-complexity   laboratory and is qualified to perform these tests  - Interpretation performed at [facility]   LAB AP GROSS DESCRIPTION (Report)     A  The specimen is received in formalin, labeled with the patient's name   and hospital number, and is designated bladder tumor  The specimen   consists of multiple tan-pink irregularly shaped soft tissue fragments   measuring in loose aggregate 2 5 x 1 5 x 0 6 cm  Entirely submitted in 2   cassettes  B  The specimen is received in formalin, labeled with the patient's name   and hospital number, and is designated bladder tumor deep  The specimen   consists of 2 tan soft tissue fragments measuring 0 5 and 0 7 cm in   greatest dimension  Entirely submitted in one cassette  C  The specimen is received in formalin, labeled with the patient's name   and hospital number, and is designated bladder biopsies  The specimen   consists of 4 tan soft tissue fragments ranging from 0 2-0 3 cm in   greatest dimension  Entirely submitted in one cassette      Note: The estimated total formalin fixation time based upon information provided by the submitting clinician and the standard processing schedule   is less than 72 hours  AEK     Future Appointments    Date/Time Provider Specialty Site   11/14/2017 01:30 PM GEE Cardoso  Internal Medicine [facility] INTERNAL MEDICINE [place]   02/15/2018 10:30 AM GEE Jasso   Urology [address]     Signatures   Electronically signed by : GEE Bond ; Nov 7 2017 12:25PM EST                       (Author)

## 2017-11-14 ENCOUNTER — GENERIC CONVERSION - ENCOUNTER (OUTPATIENT)
Dept: OTHER | Facility: OTHER | Age: 82
End: 2017-11-14

## 2018-01-12 VITALS
BODY MASS INDEX: 25.49 KG/M2 | HEART RATE: 84 BPM | SYSTOLIC BLOOD PRESSURE: 120 MMHG | TEMPERATURE: 96.6 F | HEIGHT: 65 IN | DIASTOLIC BLOOD PRESSURE: 64 MMHG | WEIGHT: 153 LBS

## 2018-01-12 VITALS
WEIGHT: 150 LBS | SYSTOLIC BLOOD PRESSURE: 132 MMHG | DIASTOLIC BLOOD PRESSURE: 70 MMHG | BODY MASS INDEX: 24.99 KG/M2 | HEIGHT: 65 IN | TEMPERATURE: 98.6 F | HEART RATE: 76 BPM

## 2018-01-13 VITALS
HEIGHT: 65 IN | BODY MASS INDEX: 24.83 KG/M2 | HEART RATE: 68 BPM | TEMPERATURE: 97.7 F | SYSTOLIC BLOOD PRESSURE: 138 MMHG | DIASTOLIC BLOOD PRESSURE: 70 MMHG | WEIGHT: 149 LBS

## 2018-01-13 VITALS
TEMPERATURE: 97.3 F | HEART RATE: 69 BPM | OXYGEN SATURATION: 97 % | SYSTOLIC BLOOD PRESSURE: 126 MMHG | DIASTOLIC BLOOD PRESSURE: 76 MMHG | WEIGHT: 153.38 LBS | BODY MASS INDEX: 25.52 KG/M2

## 2018-01-13 VITALS
WEIGHT: 156 LBS | HEIGHT: 65 IN | SYSTOLIC BLOOD PRESSURE: 122 MMHG | DIASTOLIC BLOOD PRESSURE: 82 MMHG | BODY MASS INDEX: 25.99 KG/M2

## 2018-01-14 VITALS
WEIGHT: 149.38 LBS | HEART RATE: 72 BPM | SYSTOLIC BLOOD PRESSURE: 130 MMHG | DIASTOLIC BLOOD PRESSURE: 90 MMHG | BODY MASS INDEX: 24.89 KG/M2 | HEIGHT: 65 IN

## 2018-01-14 VITALS
BODY MASS INDEX: 24.24 KG/M2 | DIASTOLIC BLOOD PRESSURE: 68 MMHG | HEIGHT: 65 IN | HEART RATE: 80 BPM | SYSTOLIC BLOOD PRESSURE: 112 MMHG | WEIGHT: 145.5 LBS

## 2018-01-14 VITALS
DIASTOLIC BLOOD PRESSURE: 70 MMHG | SYSTOLIC BLOOD PRESSURE: 128 MMHG | HEART RATE: 72 BPM | HEIGHT: 65 IN | TEMPERATURE: 97.2 F | WEIGHT: 152.5 LBS | BODY MASS INDEX: 25.41 KG/M2

## 2018-01-16 NOTE — CONSULTS
Chief Complaint  pre-op - having procedure done by Dr Ata Holt of [facility] on 10/25/2017      History of Present Illness  Pre-Op Visit (Brief): The patient is being seen for a preoperative visit  The procedure is a(n) bladder biopey and resection scheduled for 10-25 with Ata Holt  The indication for surgery is bladder mass  Surgical Risk Assessment:   Prior Anesthesia: He had prior anesthesia  Pertinent Past Medical History: diabetes, asthma and renal disease, but does not use insulin and no obesity  Exercise Capacity: able to walk four blocks without symptoms and able to walk two flights of stairs without symptoms  Lifestyle Factors: denies tobacco use and denies illegal drug use  Symptoms: no symptoms  Pertinent Family History: no pertinent family history  Living Situation: home is secure and supportive and no post-op concerns with his living situation  Asthma (Follow-Up): The patient is being seen for a routine clinic follow-up of asthma  The patient's long-term asthma pattern has been classified as intermittent  The patient states he has been doing well with his asthma control since the last visit  He has no comorbid illnesses  He has no significant interval events  Asthma Control: Well controlled  Interval Symptoms: The patient is currently asymptomatic  Home Monitoring: The patient is not checking peak flow at home  Normal rescue inhaler use  Interval Triggers: cold weather and upper respiratory tract infection  Medications: a short acting beta agonist agent, a long acting beta agonist agent and an inhaled steroid the patient is adherent with his medication regimen  He denies medication side effects  Disease Management: the patient is doing well with his asthma goals  The patient is due for influenza virus vaccine  Hypertension (Follow-Up): The patient presents for follow-up of essential hypertension   The patient states he has been doing well with his blood pressure control since the last visit  Comorbid Illnesses: nephropathy  Symptoms: The patient is currently asymptomatic  Home monitoring: The patient checks his blood pressure regularly  Blood pressure control has been good  Medications: the patient is adherent with his medication regimen  He denies medication side effects  Medication(s): a beta blocking agent and a calcium channel blocker  Disease Management: the patient is doing well with his blood pressure goals  Diabetes Type II (Follow-Up): The patient states he has been doing well with his Type II Diabetes control since the last visit  Comorbid Illnesses: hypertension and hyperlipidemia  Complications: nephropathy  He has no significant interval events  Symptoms: The patient is currently asymptomatic  Home monitoring: The patient checks his blood sugars regularly  Glycemic control has been good  the patient reports no symptomatic hypoglycemic episodes  Medications: Medication(s): Sulfonylurea and Aspirin  Review of Systems    Constitutional: No fever or chills, feels well, no tiredness, no recent weight gain or weight loss  Eyes: No complaints of eye pain, no red eyes, no discharge from eyes, no itchy eyes  ENT: no complaints of earache, no hearing loss, no nosebleeds, no nasal discharge, no sore throat, no hoarseness  Cardiovascular: as noted in HPI  Respiratory: as noted in HPI  Gastrointestinal: No complaints of abdominal pain, no constipation, no nausea or vomiting, no diarrhea or bloody stools  Genitourinary: as noted in HPI  Musculoskeletal: No complaints of arthralgia, no myalgias, no joint swelling or stiffness, no limb pain or swelling  Integumentary: No complaints of skin rash or skin lesions, no itching, no skin wound, no dry skin  Neurological: No compliants of headache, no confusion, no convulsions, no numbness or tingling, no dizziness or fainting, no limb weakness, no difficulty walking     Psychiatric: Is not suicidal, no sleep disturbances, no anxiety or depression, no change in personality, no emotional problems  Endocrine: as noted in HPI  Hematologic/Lymphatic: No complaints of swollen glands, no swollen glands in the neck, does not bleed easily, no easy bruising  Active Problems    1  Asthma, mild intermittent (493 90) (J45 20)   2  Benign essential hypertension (401 1) (I10)   3  Benign hypertensive kidney disease, stage 1 through stage 4 or unspecified chronic   kidney disease (403 10) (I12 9)   4  Bladder mass (596 89) (N32 89)   5  Chronic renal failure, stage 3 (moderate) (585 3) (N18 3)   6  Depression screening (V79 0) (Z13 89)   7  Diabetes mellitus, type II (250 00) (E11 9)   8  Encounter for screening for malignant neoplasm of colon (V76 51) (Z12 11)   9  Glaucoma (365 9) (H40 9)   10  Gross hematuria (599 71) (R31 0)   11  Hyperlipidemia (272 4) (E78 5)   12  Mild vitamin D deficiency (268 9) (E55 9)   13  Organic impotence (607 84) (N52 9)   14  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,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)    Past Medical History    · Flu vaccine need (V04 81) (Z23)    The active problems and past medical history were reviewed and updated today  Surgical History    · History of Cataract Surgery   · History of Diagnostic Cystoscopy    Family History    · Family history of diabetes mellitus (V18 0) (Z83 3)    · Family history of Old age    Social History    · Former smoker (V15 82) (G59 501)   · No alcohol use  The social history was reviewed and updated today  The social history was reviewed and is unchanged  Current Meds   1  Advair Diskus 100-50 MCG/DOSE Inhalation Aerosol Powder Breath Activated; Inhale 1   puff every 12  hours; Therapy: 69Lny7086 to ([id number])  Requested for: 93Cnc7844; Last   Rx:03Ulu7011 Ordered   2  AmLODIPine Besylate 10 MG Oral Tablet; take 1 tablet by mouth  daily as directed;    Therapy: 12RWR8203 to (Evaluate:30Vju8526)  Requested for: 38VHE1190; Last   Rx:69Fld4676 Ordered   3  Brimonidine Tartrate 0 2 % Ophthalmic Solution; Therapy: 54Mzj3995 to Recorded   4  Dorzolamide HCl - 2 % Ophthalmic Solution; Therapy: 43Nyj9174 to Recorded   5  Glimepiride 2 MG Oral Tablet; TAKE 1 TABLET DAILY AS DIRECTED; Therapy: 84Znm8330 to (Evaluate:17Hfy4694)  Requested for: 11Fhd7328; Last   Rx:35Jbt1422 Ordered   6  Lotemax 0 5 % Ophthalmic Suspension; Therapy: 78DIP7763 to (Last Rx:06Jan2012)  Requested for: 96SFH6122 Ordered   7  Lumigan 0 01 % Ophthalmic Solution; Therapy: 66ZGM9636 to Recorded   8  Meloxicam 7 5 MG Oral Tablet Recorded   9  Metoprolol Succinate ER 50 MG Oral Tablet Extended Release 24 Hour; TAKE 1 TABLET   DAILY; Therapy: 84YBL2049 to (Evaluate:76Wow5146)  Requested for: 05Jpw5999; Last   Rx:75Neh7009 Ordered   10  OneTouch Ultra Blue In Vitro Strip; TEST ONCE DAILY; Therapy: 65Quz8780 to (Evaluate:09Knd9227)  Requested for: 31HLV0572; Last    Rx:50Dek2718 Ordered   11  Simvastatin 40 MG Oral Tablet; TAKE 1 TABLET DAILY; Therapy: 05QVC7347 to (Evaluate:03Mar2018)  Requested for: 28EUK6734; Last    Rx:08Mar2017 Ordered   12  Vitamin D 1000 UNIT CAPS; Take 1 capsule twice daily; Therapy: 03Lrn5640 to (Last [facility])  Requested for: 79Xhz8683 Ordered    The medication list was reviewed and updated today  Allergies    1  ACE Inhibitors   2  Penicillins    Vitals  Signs    Temperature: 98 6 F, Tympanic  Heart Rate: 76  Systolic: 096  Diastolic: 70  Height: 5 ft 5 in  Weight: 150 lb   BMI Calculated: 24 96  BSA Calculated: 1 75    Physical Exam    Constitutional   General appearance: No acute distress, well appearing and well nourished  Head and Face   Head and face: Normal     Palpation of the face and sinuses: No sinus tenderness  Ears, Nose, Mouth, and Throat   External inspection of ears and nose: Normal     Otoscopic examination: Tympanic membranes translucent with normal light reflex   Canals patent without erythema  Nasal mucosa, septum, and turbinates: Normal without edema or erythema  Oropharynx: Normal with no erythema, edema, exudate or lesions  Neck   Neck: Supple, symmetric, trachea midline, no masses  Thyroid: Normal, no thyromegaly  Pulmonary   Respiratory effort: No increased work of breathing or signs of respiratory distress  Auscultation of lungs: Clear to auscultation  Cardiovascular   Auscultation of heart: Normal rate and rhythm, normal S1 and S2, no murmurs  Carotid pulses: 2+ bilaterally  Pedal pulses: 2+ bilaterally  Examination of extremities for edema and/or varicosities: Normal     Abdomen   Abdomen: Non-tender, no masses  Liver and spleen: No hepatomegaly or splenomegaly  Examination for hernias: No hernias appreciated  Musculoskeletal   Gait and station: Normal     Skin   Skin and subcutaneous tissue: Normal without rashes or lesions  Neurologic   Cranial nerves: Cranial nerves 2-12 intact  Cortical function: Normal mental status  Coordination: Normal finger to nose and heel to shin  Psychiatric   Judgment and insight: Normal     Orientation to person, place and time: Normal     Recent and remote memory: Intact  Mood and affect: Normal        Assessment    1  Preoperative clearance (V72 84) (Z01 818)   2  Bladder mass (596 89) (N32 89)   3  Diabetes mellitus, type II (250 00) (E11 9)   4  Chronic renal failure, stage 3 (moderate) (585 3) (N18 3)   5  Asthma, mild intermittent (493 90) (J45 20)   6  Benign essential hypertension (401 1) (I10)    Plan  Benign essential hypertension    · AmLODIPine Besylate 10 MG Oral Tablet; take 1 tablet by mouth  daily as  directed  Diabetes mellitus, type II    · (1) MICROALBUMIN CREATININE RATIO, RANDOM URINE; Status:Active; Requested  for:19Oct2017;     Discussion/Summary  Surgical Clearance: He is at a LOW risk from a cardiovascular standpoint at this time without any additional cardiac testing  Reevaluation needed, if he should present with symptoms prior to surgery/procedure  Comments:  keep Pt hydrated and monitor BS, NO METFORMIN  Possible side effects of new medications were reviewed with the patient/guardian today  The treatment plan was reviewed with the patient/guardian  The patient/guardian understands and agrees with the treatment plan      End of Encounter Meds    1  Advair Diskus 100-50 MCG/DOSE Inhalation Aerosol Powder Breath Activated; Inhale 1   puff every 12  hours; Therapy: 60Ihc4964 to (Gwen Desir)  Requested for: 32Tux4736; Last   Rx:28Whz9944 Ordered    2  AmLODIPine Besylate 10 MG Oral Tablet; take 1 tablet by mouth  daily as directed; Therapy: 18PDO2505 to (Evaluate:26Kfn8259)  Requested for: 73HHQ4903; Last   Rx:45Oql3562 Ordered    3  Metoprolol Succinate ER 50 MG Oral Tablet Extended Release 24 Hour (Toprol XL); TAKE   1 TABLET DAILY; Therapy: 21AYA1524 to (Evaluate:53Vlc2798)  Requested for: 09Qyu0551; Last   Rx:88Xnu5555 Ordered    4  Simvastatin 40 MG Oral Tablet; TAKE 1 TABLET DAILY; Therapy: 66UWQ2295 to (Evaluate:03Mar2018)  Requested for: 68ZML0726; Last   Rx:08Mar2017 Ordered    5  Vitamin D 1000 UNIT CAPS; Take 1 capsule twice daily; Therapy: 44Imc6661 to (Last Vanessa Tesfaye)  Requested for: 78Rbu0953 Ordered    6  Glimepiride 2 MG Oral Tablet (Amaryl); TAKE 1 TABLET DAILY AS DIRECTED; Therapy: 13Vmb3178 to (Evaluate:27Mlp6374)  Requested for: 59Yow0166; Last   Rx:88Emv7349 Ordered   7  OneTouch Ultra Blue In Vitro Strip; TEST ONCE DAILY; Therapy: 16Amu7510 to (Evaluate:42Luw6183)  Requested for: 47NUM2544; Last   Rx:94Pig2007 Ordered    8  Brimonidine Tartrate 0 2 % Ophthalmic Solution; Therapy: 56Aan7849 to Recorded   9  Dorzolamide HCl - 2 % Ophthalmic Solution; Therapy: 45Bjx6752 to Recorded   10  Lotemax 0 5 % Ophthalmic Suspension; Therapy: 23IFR3411 to (Last Rx:06Jan2012)  Requested for: 96ECG7998 Ordered   11   Lumigan 0 01 % Ophthalmic Solution; Therapy: 63XWV4773 to Recorded   12   Meloxicam 7 5 MG Oral Tablet Recorded    Signatures   Electronically signed by : GEE Alatorre ; Oct 18 2017  9:08PM EST                       (Author)

## 2018-01-17 NOTE — PROGRESS NOTES
Chief Complaint  Patient here for Mcgee removal in the AM, and plan to return in PM for PVR  s/p TURP with Dr Kathleen Gutierrez    1  Acute urinary retention (788 29) (R33 8)   2  Asthma, mild intermittent (493 90) (J45 20)   3  Benign essential hypertension (401 1) (I10)   4  Benign hypertensive kidney disease, stage 1 through stage 4 or unspecified chronic   kidney disease (403 10) (I12 9)   5  Bladder mass (596 89) (N32 89)   6  Chronic renal failure, stage 3 (moderate) (585 3) (N18 3)   7  Depression screening (V79 0) (Z13 89)   8  Diabetes mellitus, type II (250 00) (E11 9)   9  Encounter for screening for malignant neoplasm of colon (V76 51) (Z12 11)   10  Glaucoma (365 9) (H40 9)   11  Gross hematuria (599 71) (R31 0)   12  Hyperlipidemia (272 4) (E78 5)   13  Mild vitamin D deficiency (268 9) (E55 9)   14  Organic impotence (607 84) (N52 9)   15  Preoperative clearance (V72 84) (Z01 818)   16  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,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)    Current Meds   1  Advair Diskus 100-50 MCG/DOSE Inhalation Aerosol Powder Breath Activated; Inhale 1   puff every 12  hours; Therapy: 94Zhl7529 to (655 098 108)  Requested for: 95Eyl0874; Last   Rx:58Cxp0310 Ordered   2  AmLODIPine Besylate 10 MG Oral Tablet; take 1 tablet by mouth  daily as directed; Therapy: 13DHB4143 to (Evaluate:31Pjx5557)  Requested for: 02RRP0603; Last   Rx:06Aoh0476 Ordered   3  Brimonidine Tartrate 0 2 % Ophthalmic Solution; Therapy: 47Ohx8088 to Recorded   4  Dorzolamide HCl - 2 % Ophthalmic Solution; Therapy: 86Ius0151 to Recorded   5  Glimepiride 2 MG Oral Tablet; TAKE 1 TABLET DAILY AS DIRECTED; Therapy: 80Sap9510 to (Evaluate:45Dyj7440)  Requested for: 75Rsx9985; Last   Rx:99Hio1690 Ordered   6  Lotemax 0 5 % Ophthalmic Suspension; Therapy: 71ZHL7079 to (Last Rx:06Jan2012)  Requested for: 37ZZD3014 Ordered   7  Lumigan 0 01 % Ophthalmic Solution;    Therapy: 10IZY9648 to Recorded   8  Meloxicam 7 5 MG Oral Tablet Recorded   9  Metoprolol Succinate ER 50 MG Oral Tablet Extended Release 24 Hour; TAKE 1 TABLET   DAILY; Therapy: 12UQN4211 to (Evaluate:58Knx8743)  Requested for: 99Zjh9107; Last   Rx:54Iyq6995 Ordered   10  OneTouch Ultra Blue In Vitro Strip; TEST ONCE DAILY; Therapy: 74Oqa0461 to (Evaluate:27Apr2018)  Requested for: 62IUE7533; Last    Rx:66Jys2980 Ordered   11  Simvastatin 40 MG Oral Tablet; TAKE 1 TABLET DAILY; Therapy: 35DKY2207 to (Evaluate:03Mar2018)  Requested for: 17UPI3292; Last    Rx:08Mar2017 Ordered   12  Tamsulosin HCl - 0 4 MG Oral Capsule; TAKE 1 CAPSULE Bedtime; Therapy: 71NRW6917 to (Last BH:24NDT8081)  Requested for: 26Oct2017 Ordered   13  Vitamin D 1000 UNIT CAPS; Take 1 capsule twice daily; Therapy: 60Zjb6501 to (Last Rx:65Bew8483)  Requested for: 94Cto3802 Ordered    Allergies    1  ACE Inhibitors   2  Penicillins    Procedure    Procedure: Mcgee Cath Removal   Mcgee catheter was removed without incident after the deflation of an intact balloon  Patient tolerated the procedure well  Leg bag had clear yellow urine  Procedure: Bladder Ultrasound Post Void Residual    Equipment And Procedure: The patient voided 100 ml dark yellow urine ml Patient states he voided at home 3 times for a total of approx 180 cc  U/S Findings: US of bladder demonstrated 43 61 mL urine after patient had voided 100 mL of urine  Patient states he is not having any difficulty voiding, although reports it "burns a little bit"  Advised patient to hydrate preferably with water to aid in flushing tract  Urine was darker yellow showing patient did not hydrate well  Assessment    1  Acute urinary retention (746 05) (R33 8)    Plan  Acute urinary retention    · Follow-up as previously scheduled Evaluation and Treatment  Follow-up  Status:  Complete - Retrospective By Protocol Authorization  Done: 05VUZ8015 03:07PM   Ordered;  For: Acute urinary retention; Ordered By: Sylvia Wright Performed:  Due: 10DUX9569    Discussion/Summary    Advised patient to hydrate aggressively with water as this will aid in flushing the urinary tract and decreasing amount of burning on urination  Patient is aware to f/u with Dr Amy Winkler on 11/7/17 for appt  He also understands to call office with any questions or problems in the meantime  Future Appointments    Date/Time Provider Specialty Site   11/14/2017 01:30 PM GEE Barr  Internal Medicine [address] INTERNAL MEDICINE [place]   11/07/2017 10:30 AM GEE Davis   Urology [address]     Signatures   Electronically signed by : Lorie Do RN; Oct 27 2017  3:09PM EST                       (Author)    Electronically signed by : GEE Fuentes ; Oct 27 2017  4:27PM EST

## 2018-01-22 VITALS
BODY MASS INDEX: 24.2 KG/M2 | WEIGHT: 145.25 LBS | SYSTOLIC BLOOD PRESSURE: 100 MMHG | RESPIRATION RATE: 16 BRPM | TEMPERATURE: 98.4 F | HEART RATE: 77 BPM | HEIGHT: 65 IN | DIASTOLIC BLOOD PRESSURE: 52 MMHG | OXYGEN SATURATION: 97 %

## 2018-01-24 ENCOUNTER — TELEPHONE (OUTPATIENT)
Dept: INTERNAL MEDICINE CLINIC | Age: 83
End: 2018-01-24

## 2018-01-24 ENCOUNTER — OFFICE VISIT (OUTPATIENT)
Dept: INTERNAL MEDICINE CLINIC | Age: 83
End: 2018-01-24
Payer: MEDICARE

## 2018-01-24 VITALS
RESPIRATION RATE: 16 BRPM | BODY MASS INDEX: 24.53 KG/M2 | SYSTOLIC BLOOD PRESSURE: 148 MMHG | DIASTOLIC BLOOD PRESSURE: 78 MMHG | OXYGEN SATURATION: 96 % | HEART RATE: 59 BPM | WEIGHT: 147.2 LBS | TEMPERATURE: 97.8 F | HEIGHT: 65 IN

## 2018-01-24 DIAGNOSIS — H93.8X3 BLOCKED EAR, BILATERAL: Primary | ICD-10-CM

## 2018-01-24 DIAGNOSIS — E11.9 TYPE 2 DIABETES MELLITUS WITHOUT COMPLICATION, UNSPECIFIED LONG TERM INSULIN USE STATUS: Primary | ICD-10-CM

## 2018-01-24 PROBLEM — J45.20 ASTHMA, MILD INTERMITTENT: Status: ACTIVE | Noted: 2017-08-23

## 2018-01-24 PROBLEM — N18.30 CHRONIC RENAL FAILURE, STAGE 3 (MODERATE) (HCC): Status: ACTIVE | Noted: 2017-06-08

## 2018-01-24 PROBLEM — N32.89 BLADDER MASS: Status: ACTIVE | Noted: 2017-07-05

## 2018-01-24 PROCEDURE — 99213 OFFICE O/P EST LOW 20 MIN: CPT | Performed by: INTERNAL MEDICINE

## 2018-01-24 RX ORDER — BIOTIN 1 MG
1 TABLET ORAL 2 TIMES DAILY
COMMUNITY
Start: 2014-08-25 | End: 2018-01-24

## 2018-01-24 RX ORDER — AMLODIPINE BESYLATE 10 MG/1
1 TABLET ORAL DAILY
COMMUNITY
Start: 2012-02-03 | End: 2018-01-24

## 2018-01-24 RX ORDER — BRIMONIDINE TARTRATE 2 MG/ML
SOLUTION/ DROPS OPHTHALMIC
COMMUNITY
Start: 2014-08-08 | End: 2018-01-24

## 2018-01-24 RX ORDER — METOPROLOL SUCCINATE 50 MG/1
1 TABLET, EXTENDED RELEASE ORAL DAILY
COMMUNITY
Start: 2014-01-10 | End: 2020-09-22 | Stop reason: SDUPTHER

## 2018-01-24 RX ORDER — TAMSULOSIN HYDROCHLORIDE 0.4 MG/1
1 CAPSULE ORAL
COMMUNITY
Start: 2017-10-25

## 2018-01-24 RX ORDER — SIMVASTATIN 40 MG
1 TABLET ORAL DAILY
COMMUNITY
Start: 2012-07-10 | End: 2022-02-21

## 2018-01-24 RX ORDER — PREDNISONE 10 MG/1
TABLET ORAL
Qty: 20 TABLET | Refills: 0 | Status: SHIPPED | OUTPATIENT
Start: 2018-01-24 | End: 2018-02-15

## 2018-01-24 RX ORDER — LOTEPREDNOL ETABONATE 5 MG/ML
SUSPENSION/ DROPS OPHTHALMIC
COMMUNITY
Start: 2012-01-06

## 2018-01-24 RX ORDER — DORZOLAMIDE HCL 20 MG/ML
SOLUTION/ DROPS OPHTHALMIC
COMMUNITY
Start: 2014-08-08 | End: 2018-01-24

## 2018-01-24 RX ORDER — MELOXICAM 7.5 MG/1
TABLET ORAL
COMMUNITY
End: 2018-03-15

## 2018-01-24 RX ORDER — GLIMEPIRIDE 2 MG/1
1 TABLET ORAL
COMMUNITY
Start: 2014-08-25 | End: 2018-09-13 | Stop reason: SDUPTHER

## 2018-01-25 NOTE — PROGRESS NOTES
Assessment/Plan:    No problem-specific Assessment & Plan notes found for this encounter  Diagnoses and all orders for this visit:    Blocked ear, bilateral  -     predniSONE 10 mg tablet; 1 tab QID x 2 days the 1 tab BID x 2 days the 1 BID x 2 days the 1 podaily  -     Ambulatory Referral to Otolaryngology; Future    Other orders  -     Discontinue: brimonidine tartrate 0 2 % ophthalmic solution; Apply to eye  -     glimepiride (AMARYL) 2 mg tablet; Take by mouth  -     loteprednol etabonate (LOTEMAX) 0 5 % ophthalmic suspension; Apply to eye  -     meloxicam (MOBIC) 7 5 mg tablet; Take by mouth  -     metoprolol succinate (TOPROL-XL) 50 mg 24 hr tablet; Take 1 tablet by mouth daily  -     glucose blood (ONE TOUCH ULTRA TEST) test strip; by In Vitro route daily  -     tamsulosin (FLOMAX) 0 4 mg; Take 1 capsule by mouth  -     Discontinue: Cholecalciferol (VITAMIN D3) 1000 units CAPS; Take 1 capsule by mouth 2 (two) times a day  -     Linagliptin (TRADJENTA) 5 MG TABS; Take by mouth  -     Discontinue: fluticasone-salmeterol (ADVAIR DISKUS) 100-50 mcg/dose; Inhale  -     Discontinue: amLODIPine (NORVASC) 10 mg tablet; Take 1 tablet by mouth daily  -     Discontinue: dorzolamide (TRUSOPT) 2 % ophthalmic solution; Apply to eye  -     bimatoprost (LUMIGAN) 0 01 % ophthalmic drops; Apply to eye  -     simvastatin (ZOCOR) 40 mg tablet; Take 1 tablet by mouth daily          Subjective:      Patient ID: Kati Almendarez is a 80 y o  male  Patient complains of blocked ears have been much worse over the last several weeks  He thinks he has wax in his ears  It is of note that the patient has been hard of hearing for the last several visits  But he is describing a sudden change  He denies any pain fever discharge sinus congestion  No vertigo or tinnitus  His right ear is worse than his left            Review of Systems   Constitutional: Negative  Negative for chills, fatigue, fever and unexpected weight change  HENT: Negative for congestion, ear pain, hearing loss, postnasal drip, sinus pressure, sore throat, tinnitus, trouble swallowing and voice change  Eyes: Negative for visual disturbance  Respiratory: Negative for cough, chest tightness, shortness of breath and wheezing  Cardiovascular: Negative for chest pain, palpitations and leg swelling  Gastrointestinal: Negative for abdominal distention, abdominal pain, anal bleeding, blood in stool, constipation, diarrhea and nausea  Endocrine: Negative for cold intolerance, polydipsia, polyphagia and polyuria  Genitourinary: Negative for dysuria, flank pain, frequency, hematuria and urgency  Musculoskeletal: Negative for arthralgias, back pain, gait problem, joint swelling, myalgias and neck pain  Skin: Negative for rash  Allergic/Immunologic: Negative for immunocompromised state  Neurological: Negative for dizziness, syncope, facial asymmetry, weakness, light-headedness, numbness and headaches  Hematological: Negative for adenopathy  Psychiatric/Behavioral: Negative for confusion, sleep disturbance and suicidal ideas  Objective:     Physical Exam   Constitutional: He is oriented to person, place, and time  He appears well-developed and well-nourished  HENT:   Head: Normocephalic  Right Ear: External ear normal    Left Ear: External ear normal    Nose: Nose normal    Mouth/Throat: Oropharynx is clear and moist  No oropharyngeal exudate  Tympanic membranes appear intact and there is no wax  Patient is hard of hearing   Eyes: Conjunctivae and EOM are normal  Pupils are equal, round, and reactive to light  Neck: Normal range of motion  Cardiovascular: Normal rate  Pulmonary/Chest: Effort normal and breath sounds normal    Musculoskeletal: Normal range of motion  Neurological: He is alert and oriented to person, place, and time

## 2018-01-25 NOTE — PATIENT INSTRUCTIONS
Patient has sudden increase in hard of hearing despite no trauma will treat with a course of steroids and sent him to [facility]

## 2018-02-15 ENCOUNTER — OFFICE VISIT (OUTPATIENT)
Dept: INTERNAL MEDICINE CLINIC | Age: 83
End: 2018-02-15
Payer: MEDICARE

## 2018-02-15 ENCOUNTER — PROCEDURE VISIT (OUTPATIENT)
Dept: UROLOGY | Facility: AMBULATORY SURGERY CENTER | Age: 83
End: 2018-02-15
Payer: MEDICARE

## 2018-02-15 VITALS
WEIGHT: 147.8 LBS | BODY MASS INDEX: 23.75 KG/M2 | SYSTOLIC BLOOD PRESSURE: 160 MMHG | HEART RATE: 80 BPM | HEIGHT: 66 IN | DIASTOLIC BLOOD PRESSURE: 90 MMHG

## 2018-02-15 VITALS
WEIGHT: 148 LBS | OXYGEN SATURATION: 96 % | HEART RATE: 96 BPM | TEMPERATURE: 98.2 F | RESPIRATION RATE: 16 BRPM | SYSTOLIC BLOOD PRESSURE: 135 MMHG | DIASTOLIC BLOOD PRESSURE: 80 MMHG | HEIGHT: 65 IN | BODY MASS INDEX: 24.66 KG/M2

## 2018-02-15 DIAGNOSIS — I12.9 BENIGN HYPERTENSIVE KIDNEY DISEASE WITHOUT CHRONIC KIDNEY DISEASE: ICD-10-CM

## 2018-02-15 DIAGNOSIS — N18.30 TYPE 2 DIABETES MELLITUS WITH STAGE 3 CHRONIC KIDNEY DISEASE, WITHOUT LONG-TERM CURRENT USE OF INSULIN (HCC): ICD-10-CM

## 2018-02-15 DIAGNOSIS — C67.9 MALIGNANT NEOPLASM OF URINARY BLADDER, UNSPECIFIED SITE (HCC): Primary | ICD-10-CM

## 2018-02-15 DIAGNOSIS — E11.22 TYPE 2 DIABETES MELLITUS WITH STAGE 3 CHRONIC KIDNEY DISEASE, WITHOUT LONG-TERM CURRENT USE OF INSULIN (HCC): ICD-10-CM

## 2018-02-15 DIAGNOSIS — E11.21 TYPE 2 DIABETES MELLITUS WITH DIABETIC NEPHROPATHY, WITHOUT LONG-TERM CURRENT USE OF INSULIN (HCC): Primary | ICD-10-CM

## 2018-02-15 DIAGNOSIS — I10 BENIGN ESSENTIAL HYPERTENSION: ICD-10-CM

## 2018-02-15 DIAGNOSIS — N18.30 CHRONIC RENAL FAILURE, STAGE 3 (MODERATE) (HCC): ICD-10-CM

## 2018-02-15 DIAGNOSIS — N32.89 BLADDER MASS: ICD-10-CM

## 2018-02-15 LAB
SL AMB  POCT GLUCOSE, UA: NORMAL
SL AMB LEUKOCYTE ESTERASE,UA: NORMAL
SL AMB POCT BILIRUBIN,UA: NORMAL
SL AMB POCT BLOOD,UA: NORMAL
SL AMB POCT CLARITY,UA: CLEAR
SL AMB POCT COLOR,UA: YELLOW
SL AMB POCT KETONES,UA: NORMAL
SL AMB POCT NITRITE,UA: NORMAL
SL AMB POCT PH,UA: 7
SL AMB POCT SPECIFIC GRAVITY,UA: 1.03
SL AMB POCT URINE PROTEIN: NORMAL
SL AMB POCT UROBILINOGEN: NORMAL

## 2018-02-15 PROCEDURE — 52000 CYSTOURETHROSCOPY: CPT | Performed by: UROLOGY

## 2018-02-15 PROCEDURE — 81002 URINALYSIS NONAUTO W/O SCOPE: CPT | Performed by: UROLOGY

## 2018-02-15 PROCEDURE — 99214 OFFICE O/P EST MOD 30 MIN: CPT | Performed by: INTERNAL MEDICINE

## 2018-02-15 NOTE — PATIENT INSTRUCTIONS

## 2018-02-15 NOTE — PROGRESS NOTES
Assessment/Plan:    Diabetes mellitus, type II ([facility] [address] )  Patient has chronic diabetes usually well controlled until recent use of steroids and diagnosis of bladder mass  Will recheck blood work now that things have calmed down  He also is taking metformin despite the fact is been told repeatedly not to and he was not taking the to [address]    Benign essential hypertension  Patient has a long history of hypertension that has been difficult to control over the years  He also has not always been the most compliant with his medicines  For instance he is not taking the metoprolol now  He will be recheck for both this and is diabetes in 1 month after blood work    Chronic renal failure, stage 3 (moderate)  Patient is chronic renal failure stage 3 probably mostly on the basis of hypertension  He is due to have it rechecked  I have also had again had a conversation about his medications with it    Bladder mass  Last fall he had gross hematuria and subsequent CT scan showed a bladder mass  He had recent resection of same and it luckily was not cancer    Asthma, mild intermittent  Patient generally has well controlled asthma but did have a recent flare for the winter requiring steroids       Diagnoses and all orders for this visit:    Type 2 diabetes mellitus with diabetic nephropathy, without long-term current use of insulin ([facility] )  -     Comprehensive metabolic panel; Future  -     Lipid panel; Future  -     Hemoglobin A1c; Future  -     Linagliptin (TRADJENTA) 5 MG TABS; Take 5 mg by mouth daily    Chronic renal failure, stage 3 (moderate)    Benign hypertensive kidney disease without chronic kidney disease    Benign essential hypertension    Bladder mass    Type 2 diabetes mellitus with stage 3 chronic kidney disease, without long-term current use of insulin ([facility])          Subjective:      Patient ID: Michael Pascual is a 80 y o  male  Patient is here for recheck of his chronic conditions    Zeroth his diabetes is well controlled type 2 on oral agents  Recently however due to the use of steroids and acute illness his control is been less than perfect  He also had appears to be taking his medications different then I realize he is not taking Tradjenta question why any is back taking metformin  Unfortunately his renal function is declining and currently his creatinine is around 2  His wife states he eats too much carbs  He does not have neuropathy or retinopathy  Patient has had rather difficult to control hypertension through the years partially 2 his not taking his meds correctly  Again he is not taking metoprolol but he is taking amlodipine his renal insufficiency is felt to be related to his hypertension rather than the diabetes  And lastly he does have mild intermittent asthma did have a Re flare needing prednisone but is back to his baseline without shortness of breath cough edema PND  He does not use inhalers on a regular basis      Diabetes   Pertinent negatives for hypoglycemia include no confusion, dizziness or headaches  Pertinent negatives for diabetes include no chest pain, no fatigue, no polydipsia, no polyphagia, no polyuria and no weakness  Hypertension   Pertinent negatives include no chest pain, headaches, neck pain, palpitations or shortness of breath  Review of Systems   Constitutional: Negative for chills, fatigue, fever and unexpected weight change  HENT: Positive for hearing loss  Negative for congestion, ear pain, postnasal drip, sinus pressure, sore throat, trouble swallowing and voice change  Eyes: Negative for visual disturbance  Respiratory: Negative for cough, chest tightness, shortness of breath and wheezing  Cardiovascular: Negative for chest pain, palpitations and leg swelling  Gastrointestinal: Negative for abdominal distention, abdominal pain, anal bleeding, blood in stool, constipation, diarrhea and nausea     Endocrine: Negative for cold intolerance, polydipsia, polyphagia and polyuria  Genitourinary: Negative for dysuria, flank pain, frequency, hematuria and urgency  Musculoskeletal: Negative for arthralgias, back pain, gait problem, joint swelling, myalgias and neck pain  Skin: Negative for rash  Allergic/Immunologic: Negative for immunocompromised state  Neurological: Negative for dizziness, syncope, facial asymmetry, weakness, light-headedness, numbness and headaches  Hematological: Negative for adenopathy  Psychiatric/Behavioral: Negative  Negative for confusion, sleep disturbance and suicidal ideas  Objective:    Vitals:    02/15/18 1500   BP: 135/80   Pulse:    Resp:    Temp:    SpO2:         Physical Exam   Constitutional: He appears well-developed and well-nourished  Appears younger than stated age   HENT:   Head: Normocephalic and atraumatic  Eyes: Conjunctivae and EOM are normal  Pupils are equal, round, and reactive to light  Neck: Normal range of motion  Neck supple  No JVD present  No thyromegaly present  Cardiovascular: Normal rate and regular rhythm  Pulses:       Dorsalis pedis pulses are 2+ on the right side, and 2+ on the left side  Pulmonary/Chest: Effort normal and breath sounds normal    Abdominal: Soft  Bowel sounds are normal    Musculoskeletal: Normal range of motion  Feet:   Right Foot:   Skin Integrity: Negative for ulcer, skin breakdown, erythema, warmth, callus or dry skin  Left Foot:   Skin Integrity: Negative for ulcer, skin breakdown, erythema, warmth, callus or dry skin  Lymphadenopathy:     He has no cervical adenopathy  Neurological: He is alert  Skin: Skin is warm  Psychiatric: He has a normal mood and affect  His behavior is normal  Judgment and thought content normal      Patient's shoes and socks removed  Right Foot/Ankle   Right Foot Inspection  Skin Exam: skin normal and skin intact no dry skin, no warmth, no callus, no erythema, no maceration, no abnormal color, no pre-ulcer, no ulcer and no callus                            Sensory   Vibration: intact    Monofilament testing: intact  Vascular    The right DP pulse is 2+  Left Foot/Ankle  Left Foot Inspection  Skin Exam: skin normal and skin intactno dry skin, no warmth, no erythema, no maceration, normal color, no pre-ulcer, no ulcer and no callus                                         Sensory   Vibration: intact    Monofilament: intact  Vascular    The left DP pulse is 2+     Assign Risk Category:  No deformity present; ;        Risk: 0

## 2018-02-15 NOTE — ASSESSMENT & PLAN NOTE
Last fall he had gross hematuria and subsequent CT scan showed a bladder mass    He had recent resection of same and it luckily was not cancer

## 2018-02-15 NOTE — ASSESSMENT & PLAN NOTE
Patient has chronic diabetes usually well controlled until recent use of steroids and diagnosis of bladder mass  Will recheck blood work now that things have calmed down    He also is taking metformin despite the fact is been told repeatedly not to and he was not taking the to [place]

## 2018-02-15 NOTE — PROGRESS NOTES
Written Consent Obtained      Indications for Procedure:   low-grade bladder cancer     Physical Exam     Constitutional   General appearance: No acute distress, well appearing and well nourished  Pulmonary   Respiratory effort: No increased work of breathing or signs of respiratory distress  Cardiovascular   Examination of extremities for edema and/or varicosities: Normal     Abdomen   Abdomen: Non-tender, no masses  Liver and spleen: No hepatomegaly or splenomegaly  Genitourinary   Normal phallus and meatus   Musculoskeletal   Gait and station: Normal     Skin   Skin and subcutaneous tissue: Normal without rashes or lesions  Lymphatic   Palpation of lymph nodes in groin: No lymphadenopathy  Additional Exam: Neuro exam nonfocal   The flexible cystoscope was introduced into the urethra and advanced into the bladder  URETHRA:  Normal,  without strictures or lesions  PROSTATE:  Moderate bilobar obstruction  TRIGONE & UOs:   Normal anatomy with efflux of clear urine  No mucosal lesions or subtrigonal masses  BLADDER MUCOSA:  Normal, without neoplasms or other lesions  DETRUSOR: Normal capacity, without flaccidity, without excessive compliance, without trabeculation or diverticula, without uninhibited bladder contractions on fillings  RETROFLEXED SCOPE VIEW: Normal bladder neck    Plan:  Cystoscopy does not demonstrate any evidence of malignancy  He will follow up in 3 months for repeat surveillance cystoscopy

## 2018-02-15 NOTE — ASSESSMENT & PLAN NOTE
Patient has a long history of hypertension that has been difficult to control over the years  He also has not always been the most compliant with his medicines  For instance he is not taking the metoprolol now    He will be recheck for both this and is diabetes in 1 month after blood work

## 2018-02-15 NOTE — ASSESSMENT & PLAN NOTE
Patient is chronic renal failure stage 3 probably mostly on the basis of hypertension  He is due to have it rechecked    I have also had again had a conversation about his medications with it

## 2018-02-16 NOTE — PATIENT INSTRUCTIONS
Chronic Kidney Disease Diet   WHAT YOU NEED TO KNOW:   What is a chronic kidney disease diet? A chronic kidney disease diet limits protein, phosphorus, sodium, and potassium  Liquids may also need to be limited in later stages of chronic kidney disease  This diet can help slow down the rate of damage to your kidneys  Your diet may change over time as your health condition changes  You may also need to make other diet changes if you have other health problems, such as diabetes  What kind of diet changes are needed? There are 5 stages of chronic kidney disease  The diet changes you need to make are based on your stage of kidney disease  Work with your dietitian or healthcare provider to plan meals that are right for you  You may need any of the following:  · Limit protein  in all stages of kidney disease  Limit the portion sizes of protein you eat to limit the amount of work your kidneys have to do  Foods that are high in protein are meat, poultry (chicken and turkey), fish, eggs, and dairy (milk, cheese, yogurt)  Your healthcare provider will tell you how much protein to eat each day  · Limit sodium  if you have high blood pressure  Limit your sodium intake to less than 2,300 milligrams (mg) each day  Ask your dietitian or healthcare provider how much sodium you can have each day  The amount of sodium you should have depends on your stage of kidney disease  Table salt, canned foods, soups, salted snacks, and processed meats, like deli meats and sausage, are high in sodium  · Limit the amount of phosphorus  you eat  Your kidneys cannot get rid of extra phosphorus that builds up in your blood  This may cause your bones to lose calcium and weaken  Foods that are high in phosphorus are dairy products, beans, peas, nuts, and whole grains  Phosphorus is also found in cocoa, beer, and cola drinks  Your healthcare provider will tell you how much phosphorus you can have each day      · Limit potassium  if your potassium blood levels are too high  Your dietitian or healthcare provider will tell you if you need to limit potassium  Potassium is found in fruits and vegetables  · Limit liquids  as directed  Your healthcare provider may recommend that you limit liquids in stages 4 and 5 of kidney disease  If your body retains fluids, you will have swelling and fluid may build up in your lungs  This can cause other health problems, such as shortness of breath  What foods can I include? Your dietitian will tell you how many servings you can have from each of the food groups below  The approximate amount of these nutrients is listed next to each food group  Read the food label to find the exact amount  · Bread, cereal, and grains: These foods contain about 80 calories, 2 grams (g) of protein, 150 mg of sodium, 50 mg of potassium, and 30 mg of phosphorus  ¨ 1 slice (1 ounce) of bread ([place], [place], raisin, light rye, or sourdough white), small dinner roll, or 6-inch tortilla    ¨ ½ of a hamburger bun, hot dog bun, or English muffin or ¼ of a bagel    ¨ 1 cup of unsweetened cereal or ½ cup of cooked cereal, such as cream of wheat    ¨ ? cup of cooked pasta (noodles, macaroni, or spaghetti) or rice    ¨ 4 (2-inch) unsalted crackers or 3 squares of bryon crackers    ¨ 3 cups of air-popped, unsalted popcorn    ¨ ¾ ounce of unsalted pretzels    · Vegetables:  A serving of these foods contains about 30 calories, 2 g of protein, 50 mg of sodium, and 50 mg of phosphorus       ¨ Low potassium (less than 150 mg):      ¨ ½ cup cooked green beans, cabbage, cauliflower, beets, or corn    ¨ 1 cup raw cucumber, endive, alfalfa sprouts, cabbage, cauliflower, or watercress    ¨ 1 cup of all types of lettuce    ¨ ¼ cup cooked or ½ cup raw mushrooms or onions    ¨ 1 cup cooked eggplant    ¨ Medium potassium (150 to 250 mg):      ¨ 1 cup raw broccoli, celery, or zucchini    ¨ ½ cup cooked broccoli, celery, green peas, summer squash, zucchini, or peppers    ¨ 1 cup cooked kale or turnips    · Fruits:  A serving of these foods contains about 60 calories, 0 g protein, 0 mg sodium, and 150 mg of phosphorus  Each serving is ½ cup, unless another amount is given  ¨ Low potassium (less than 150 mg): ¨ Apple juice, applesauce, or 1 small apple    ¨ Blueberries    ¨ Cranberries or cranberry juice cocktail    ¨ Fresh or canned pears (light syrup or packed in water)    ¨ Grapes or grape juice    ¨ Canned peaches (light syrup or packed in water)    ¨ Pineapple or strawberries    ¨ 1 tangerine     ¨ Watermelon    ¨ Medium potassium (150 to 250 mg):      ¨ Fresh peaches or pears    ¨ Cherries    ¨ Cantaloupe, april, or papaya    ¨ Grapefruit or grapefruit juice    · Meat, poultry, and fish: These foods have about 75 calories, 7 g of protein, an average of 65 mg of sodium, 115 mg of potassium, and 70 mg of phosphorus  Do not use salt to prepare these foods  ¨ 1 ounce of cooked beef, pork, or poultry    ¨ 1 ounce of any fresh or frozen fish, lobster, shrimp, crab, clams, tuna, unsalted canned salmon, or unsalted sardines    · Other protein foods: These foods have about 90 calories, 7 g of protein, an average of 100 mg of sodium, 100 mg of potassium, and 120 mg of phosphorus  ¨ 1 large whole egg or ¼ cup of low-cholesterol egg substitute    ¨ 1 ounce of cheese    ¨ ¼ cup of cottage cheese or tofu    ¨ 1 ounce of unsalted nuts or 2 tablespoons of peanut butter    · Fats: These foods have very little protein and about 45 calories, 55 milligrams of sodium, 10 milligrams of potassium, and 5 milligrams of phosphorus  Include healthy fats, such as unsaturated fats, which are listed below      ¨ 1 teaspoon margarine or mayonnaise     ¨ 1 teaspoon oil (safflower, sunflower, corn, soybean, olive, peanut, canola)    ¨ 1 tablespoon oil-based salad dressing (such as [place]) or 2 tablespoons mayonnaise-based salad dressing (such as ranch)  What foods should I limit or avoid? · Starches: The following foods have higher amounts of sodium, potassium, or phosphorus  ¨ Biscuits, muffins, pancakes, and waffles     ¨ Cake and cornbread from boxed mixes    ¨ Oatmeal and whole-wheat cereals    ¨ Salted pretzel sticks or rings and sandwich cookies    · Meat and protein foods: The following are high in sodium and phosphorus  ¨ Deli-style meat, such as roast beef, ham, and turkey    ¨ Canned salmon and sardines    ¨ Processed cheese, such as American cheese and cheese spreads    ¨ Smoked or cured meat, such as corned beef, alvarez, ham, hot dogs, and sausage    · Legumes: These foods have about 90 calories, 6 g of protein, less than 10 mg of sodium, 250 mg of potassium, and 100 mg of phosphorus  ¨ ? cup of black beans, red kidney beans, black-eye peas, garbanzos, and lentils    ¨ ¼ cup of green or mature soybeans    · Dairy: The following foods have about 8 g of protein, an average of 120 mg of sodium, 350 mg of potassium, and 220 mg of phosphorus  ¨ 1 cup of milk (fat-free, low-fat, whole, buttermilk, or chocolate milk)    ¨ 1 cup of low-fat plain or sugar-free yogurt or ice cream    ¨ ½ cup of pudding or custard    ¨ Nondairy milk substitutes: These foods have 75 calories, 1 gram of protein, and an average of 40 mg of sodium, 60 mg of potassium, and 60 mg of phosphorus  A serving is ½ cup of almond, rice, or soy milk, or nondairy creamer  · Vegetables: The following vegetables are high in potassium  Each serving has more than 250 mg of potassium  A serving is ½ cup, unless another amount is given  ¨ Artichoke or ¼ of a medium avocado    ¨ [place] sprouts, beets, chard, azar or mustard greens    ¨ Potatoes, sweet potatoes, pumpkin, and yams    ¨ ¾ cup of okra    ¨ Raw tomatoes and low-sodium tomato juice, or tomato sauce    ¨ Winter squash, cooked asparagus, and cooked spinach    · Fruit:  The following fruits are high in potassium   Each serving has more than 250 mg of potassium  ¨ 3 fresh apricots    ¨ 1 small nectarine (2 inches across)    ¨ 1 small orange and ½ cup of orange juice    ¨ ¼ cup of dates     ¨ ? of a small honeydew melon    ¨ 1 six-inch banana     ¨ ½ cup of prune juice or prunes and kiwifruit    · Fats:  Limit unhealthy fats, such as saturated fats, which are listed below  ¨ Butter, lard, cream cheese, whipped cream, and sour cream    ¨ Powdered coffee creamer    · Other: The following foods are high in sodium  ¨ Frozen dinners, soups, and fast foods, such as hamburgers and pizza (see the food label for serving sizes)    ¨ Table salt and seasoned salts, such as onion or garlic salt    ¨ Barbecue sauce, ketchup, mustard, soy sauce, steak sauce, and teriyaki sauce  When should I contact my healthcare provider? · You are gaining or losing weight very quickly  · You have shortness of breath  · You have nausea and are vomiting  · You feel very weak and tired  · You are having trouble following the chronic kidney disease diet  CARE AGREEMENT:   You have the right to help plan your care  Discuss treatment options with your caregivers to decide what care you want to receive  You always have the right to refuse treatment  The above information is an  only  It is not intended as medical advice for individual conditions or treatments  Talk to your doctor, nurse or pharmacist before following any medical regimen to see if it is safe and effective for you  © 2017 [employer] Information is for End User's use only and may not be sold, redistributed or otherwise used for commercial purposes  All illustrations and images included in CareNotes® are the copyrighted property of [employer]  or [employer]

## 2018-02-16 NOTE — ASSESSMENT & PLAN NOTE
Patient generally has well controlled asthma but did have a recent flare for the winter requiring steroids

## 2018-02-20 DIAGNOSIS — E11.21 TYPE 2 DIABETES MELLITUS WITH DIABETIC NEPHROPATHY, WITHOUT LONG-TERM CURRENT USE OF INSULIN (HCC): ICD-10-CM

## 2018-03-07 LAB
ALBUMIN SERPL-MCNC: 3.8 G/DL (ref 3.6–5.1)
ALBUMIN/GLOB SERPL: 1.2 (CALC) (ref 1–2.5)
ALP SERPL-CCNC: 69 U/L (ref 40–115)
ALT SERPL-CCNC: 12 U/L (ref 9–46)
AST SERPL-CCNC: 16 U/L (ref 10–35)
BILIRUB SERPL-MCNC: 0.4 MG/DL (ref 0.2–1.2)
BUN SERPL-MCNC: 44 MG/DL (ref 7–25)
BUN/CREAT SERPL: 19 (CALC) (ref 6–22)
CALCIUM SERPL-MCNC: 9.5 MG/DL (ref 8.6–10.3)
CHLORIDE SERPL-SCNC: 104 MMOL/L (ref 98–110)
CHOLEST SERPL-MCNC: 182 MG/DL
CHOLEST/HDLC SERPL: 3.3 (CALC)
CO2 SERPL-SCNC: 24 MMOL/L (ref 20–31)
CREAT SERPL-MCNC: 2.34 MG/DL (ref 0.7–1.11)
GLOBULIN SER CALC-MCNC: 3.1 G/DL (CALC) (ref 1.9–3.7)
GLUCOSE SERPL-MCNC: 220 MG/DL (ref 65–99)
HBA1C MFR BLD: 7 % OF TOTAL HGB
HDLC SERPL-MCNC: 56 MG/DL
LDLC SERPL CALC-MCNC: 110 MG/DL (CALC)
NONHDLC SERPL-MCNC: 126 MG/DL (CALC)
POTASSIUM SERPL-SCNC: 4.6 MMOL/L (ref 3.5–5.3)
PROT SERPL-MCNC: 6.9 G/DL (ref 6.1–8.1)
SL AMB EGFR AFRICAN AMERICAN: 29 ML/MIN/1.73M2
SL AMB EGFR NON AFRICAN AMERICAN: 25 ML/MIN/1.73M2
SODIUM SERPL-SCNC: 137 MMOL/L (ref 135–146)
TRIGL SERPL-MCNC: 70 MG/DL

## 2018-03-15 ENCOUNTER — OFFICE VISIT (OUTPATIENT)
Dept: INTERNAL MEDICINE CLINIC | Age: 83
End: 2018-03-15
Payer: MEDICARE

## 2018-03-15 VITALS
WEIGHT: 150.6 LBS | HEIGHT: 65 IN | HEART RATE: 72 BPM | DIASTOLIC BLOOD PRESSURE: 72 MMHG | TEMPERATURE: 97.8 F | SYSTOLIC BLOOD PRESSURE: 138 MMHG | BODY MASS INDEX: 25.09 KG/M2 | OXYGEN SATURATION: 96 %

## 2018-03-15 DIAGNOSIS — E11.22 TYPE 2 DIABETES MELLITUS WITH STAGE 3 CHRONIC KIDNEY DISEASE, WITHOUT LONG-TERM CURRENT USE OF INSULIN (HCC): ICD-10-CM

## 2018-03-15 DIAGNOSIS — I10 BENIGN ESSENTIAL HYPERTENSION: ICD-10-CM

## 2018-03-15 DIAGNOSIS — N18.30 TYPE 2 DIABETES MELLITUS WITH STAGE 3 CHRONIC KIDNEY DISEASE, WITHOUT LONG-TERM CURRENT USE OF INSULIN (HCC): ICD-10-CM

## 2018-03-15 DIAGNOSIS — E11.21 TYPE 2 DIABETES MELLITUS WITH DIABETIC NEPHROPATHY, WITHOUT LONG-TERM CURRENT USE OF INSULIN (HCC): Primary | ICD-10-CM

## 2018-03-15 DIAGNOSIS — M17.10 ARTHRITIS OF KNEE: ICD-10-CM

## 2018-03-15 DIAGNOSIS — E78.5 HYPERLIPIDEMIA, UNSPECIFIED HYPERLIPIDEMIA TYPE: ICD-10-CM

## 2018-03-15 PROBLEM — M17.0 PRIMARY OSTEOARTHRITIS OF BOTH KNEES: Status: ACTIVE | Noted: 2018-03-15

## 2018-03-15 PROCEDURE — 99214 OFFICE O/P EST MOD 30 MIN: CPT | Performed by: INTERNAL MEDICINE

## 2018-03-15 RX ORDER — LIDOCAINE 40 MG/G
CREAM TOPICAL AS NEEDED
Qty: 30 G | Refills: 0 | Status: SHIPPED | OUTPATIENT
Start: 2018-03-15

## 2018-03-15 NOTE — PROGRESS NOTES
Assessment/Plan:    Type 2 diabetes mellitus with diabetic chronic kidney disease ([facility] [address] )  He has had long-term diabetes for many years and over the past 1-2 years has had increasing creatinine levels  He finally stopped his metformin approximately 2 weeks ago but continued with his [facility] I believe he finally understands the need to stop both of these medications and we will recheck his kidney function in a month  On the other hand is A1c see was 7 0 which at his age is adequate    Benign essential hypertension  His blood pressure is well controlled present    Hyperlipidemia  He takes simvastatin for hyperlipidemia and last lipid profile showed LDL of 110  Primary osteoarthritis of both knees  He arthritis of both knees which she was taking meloxicam have stressed the importance of stopping this and have asked him to try Aspercreme with lidocaine instead       Diagnoses and all orders for this visit:    Type 2 diabetes mellitus with diabetic nephropathy, without long-term current use of insulin ([facility] )  -     Basic metabolic panel; Future    Benign essential hypertension    Hyperlipidemia, unspecified hyperlipidemia type    Arthritis of knee  -     lidocaine (LMX) 4 % cream; Apply topically as needed for mild pain    Type 2 diabetes mellitus with stage 3 chronic kidney disease, without long-term current use of insulin ([facility])          Subjective:      Patient ID: Joy Oswald is a 80 y o  male  Patient is here for a checkup of his chronic problems and 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 and diabetes  Compliance has been a major issue  He is not to take his metformin, Mobic and he did stop his Ace previously  Diabetes   He presents for his follow-up diabetic visit  He has type 2 diabetes mellitus  His disease course has been stable  There are no hypoglycemic associated symptoms  Pertinent negatives for hypoglycemia include no confusion, dizziness or headaches   There are no diabetic associated symptoms  Pertinent negatives for diabetes include no chest pain, no fatigue, no polydipsia, no polyphagia, no polyuria and no weakness  Symptoms are stable  Diabetic complications include nephropathy  Risk factors for coronary artery disease include diabetes mellitus, dyslipidemia, hypertension and male sex  Current diabetic treatment includes diet and oral agent (dual therapy)  Compliance with diabetes treatment: He finally stopped metformin  His weight is stable  He is following a generally healthy diet  Meal planning includes avoidance of concentrated sweets  He has not had a previous visit with a dietitian  He rarely participates in exercise  There is no change in his home blood glucose trend  An ACE inhibitor/angiotensin II receptor blocker is contraindicated (Due to elevated creatinine)  He sees a podiatrist Eye exam is current  Hypertension   This is a chronic problem  The current episode started more than 1 year ago  The problem is controlled  Pertinent negatives include no chest pain, headaches, neck pain, palpitations or shortness of breath  (None) Risk factors for coronary artery disease include diabetes mellitus, dyslipidemia and male gender  Past treatments include beta blockers  The current treatment provides moderate improvement  Compliance problems include diet (He frequently takes his meds wrong)  Hypertensive end-organ damage includes kidney disease  Knee Pain    Incident onset: Chronic  There was no injury mechanism  The pain is present in the left knee and right knee  The quality of the pain is described as aching  The pain is moderate  The pain has been fluctuating since onset  Pertinent negatives include no inability to bear weight, loss of motion or numbness  The symptoms are aggravated by weight bearing  He has tried ice, heat, acetaminophen, NSAIDs and rest for the symptoms  The treatment provided moderate relief             Review of Systems   Constitutional: Negative for chills, fatigue, fever and unexpected weight change  HENT: Negative for congestion, ear pain, hearing loss, postnasal drip, sinus pressure, sore throat, trouble swallowing and voice change  Eyes: Negative for visual disturbance  Respiratory: Negative for cough, chest tightness, shortness of breath and wheezing  Cardiovascular: Negative for chest pain, palpitations and leg swelling  Gastrointestinal: Negative for abdominal distention, abdominal pain, anal bleeding, blood in stool, constipation, diarrhea and nausea  Endocrine: Negative for cold intolerance, polydipsia, polyphagia and polyuria  Genitourinary: Negative for dysuria, flank pain, frequency, hematuria and urgency  Musculoskeletal: Negative for arthralgias, back pain, gait problem, joint swelling, myalgias and neck pain  Knee pain   Skin: Negative for rash  Allergic/Immunologic: Negative for immunocompromised state  Neurological: Negative for dizziness, syncope, facial asymmetry, weakness, light-headedness, numbness and headaches  Hematological: Negative for adenopathy  Psychiatric/Behavioral: Negative for confusion, sleep disturbance and suicidal ideas  Objective:      /72 (BP Location: Left arm, Patient Position: Sitting)   Pulse 72   Temp 97 8 °F (36 6 °C) (Tympanic)   Ht 5' 4 5" (1 638 m)   Wt 68 3 kg (150 lb 9 6 oz)   SpO2 96%   BMI 25 45 kg/m²          Physical Exam   Constitutional: He is oriented to person, place, and time  He appears well-developed and well-nourished  No distress  Looks younger than his stated age   HENT:   Right Ear: External ear normal    Left Ear: External ear normal    Nose: Nose normal    Mouth/Throat: Oropharynx is clear and moist  No oropharyngeal exudate  Eyes: EOM are normal  Pupils are equal, round, and reactive to light  Neck: Normal range of motion  Neck supple  No JVD present  No thyromegaly present     Cardiovascular: Normal rate, regular rhythm, normal heart sounds and intact distal pulses  Exam reveals no gallop  No murmur heard  Pulmonary/Chest: Effort normal and breath sounds normal  No respiratory distress  He has no wheezes  He has no rales  Abdominal: Soft  Bowel sounds are normal  He exhibits no distension and no mass  There is no tenderness  Musculoskeletal: Normal range of motion  He exhibits no tenderness  Crepitation both knees   Lymphadenopathy:     He has no cervical adenopathy  Neurological: He is alert and oriented to person, place, and time  No cranial nerve deficit  Coordination normal    Skin: No rash noted  Psychiatric: He has a normal mood and affect  His behavior is normal  Judgment and thought content normal    Vitals reviewed

## 2018-03-15 NOTE — PATIENT INSTRUCTIONS
Chronic Kidney Disease   AMBULATORY CARE:   Chronic kidney disease (CKD)  is the gradual and permanent loss of kidney function  Normally, the kidneys remove fluid, chemicals, and waste from your blood  These wastes are turned into urine by your kidneys  CKD may worsen over time and lead to kidney failure  Common symptoms include the following:   · Changes in how often you need to urinate    · Swelling in your arms, legs, or feet    · Shortness of breath    · Fatigue or weakness    · Bad or bitter taste in your mouth    · Nausea, vomiting, or loss of appetite  Seek care immediately if:   · You are confused and very drowsy  · You have a seizure  · You have shortness of breath  Contact your healthcare provider if:   · You suddenly gain or lose more weight than your healthcare provider has told you is okay  · You have itchy skin or a rash  · You urinate more or less than you normally do  · You have blood in your urine  · You have nausea and repeated vomiting  · You have fatigue or muscle weakness  · You have hiccups that will not stop  · You have questions or concerns about your condition or care  Treatment for CKD:  Medicines may be given to decrease blood pressure and get rid of extra fluid  You may also receive medicine to manage health conditions that may occur with CKD  Dialysis is a treatment to remove chemicals and waste from your blood when your kidneys can no longer do this  Surgery may be needed to create an arteriovenous fistula (AVF) in your arm or insert a catheter into your abdomen so that you can receive dialysis  A kidney transplant may be done if your CKD becomes severe  Manage CKD:   · Maintain a healthy weight  Ask your healthcare provider how much you should weigh  Ask him to help you create a weight loss plan if you are overweight  · Exercise 30 to 60 minutes a day, 4 to 7 times a week, or as directed  Ask about the best exercise plan for you   Regular exercise can help you manage CKD, high blood pressure, and diabetes  · Follow your healthcare provider's advice about what to eat and drink  He may tell you to eat food low in sodium (salt), potassium, phosphorus, or protein  You may need to see a dietitian if you need help planning meals  Ask how much liquid to drink each day and which liquids are best for you  · Limit alcohol  Ask how much alcohol is safe for you to drink  A drink of alcohol is 12 ounces of beer, 5 ounces of wine, or 1½ ounces of liquor  · Do not smoke  Nicotine and other chemicals in cigarettes and cigars can cause lung and kidney damage  Ask your healthcare provider for information if you currently smoke and need help to quit  E-cigarettes or smokeless tobacco still contain nicotine  Talk to your healthcare provider before you use these products  · Ask your healthcare provider if you need vaccines  Infections such as pneumonia, influenza, and hepatitis can be more harmful or more likely to occur in a person who has CKD  Vaccines reduce your risk of infection with these viruses  Follow up with your healthcare provider as directed:  Write down your questions so you remember to ask them during your visits  © 2017 [employer] Information is for End User's use only and may not be sold, redistributed or otherwise used for commercial purposes  All illustrations and images included in CareNotes® are the copyrighted property of [employer]  or [employer]  The above information is an  only  It is not intended as medical advice for individual conditions or treatments  Talk to your doctor, nurse or pharmacist before following any medical regimen to see if it is safe and effective for you

## 2018-03-16 NOTE — ASSESSMENT & PLAN NOTE
He has had long-term diabetes for many years and over the past 1-2 years has had increasing creatinine levels  He finally stopped his metformin approximately 2 weeks ago but continued with his [facility] I believe he finally understands the need to stop both of these medications and we will recheck his kidney function in a month    On the other hand is A1c see was 7 0 which at his age is adequate

## 2018-03-16 NOTE — ASSESSMENT & PLAN NOTE
He arthritis of both knees which she was taking meloxicam have stressed the importance of stopping this and have asked him to try Aspercreme with lidocaine instead

## 2018-04-24 LAB
LEFT EYE DIABETIC RETINOPATHY: NORMAL
RIGHT EYE DIABETIC RETINOPATHY: NORMAL
SEVERITY (EYE EXAM): NORMAL

## 2018-04-26 DIAGNOSIS — I10 ESSENTIAL HYPERTENSION: Primary | ICD-10-CM

## 2018-04-26 RX ORDER — AMLODIPINE BESYLATE 10 MG/1
10 TABLET ORAL DAILY
Qty: 30 TABLET | Refills: 5 | Status: SHIPPED | OUTPATIENT
Start: 2018-04-26 | End: 2018-05-24 | Stop reason: SDUPTHER

## 2018-05-15 ENCOUNTER — OFFICE VISIT (OUTPATIENT)
Dept: OBGYN CLINIC | Facility: HOSPITAL | Age: 83
End: 2018-05-15
Payer: MEDICARE

## 2018-05-15 VITALS
HEART RATE: 70 BPM | WEIGHT: 149 LBS | HEIGHT: 64 IN | RESPIRATION RATE: 18 BRPM | BODY MASS INDEX: 25.44 KG/M2 | SYSTOLIC BLOOD PRESSURE: 138 MMHG | DIASTOLIC BLOOD PRESSURE: 70 MMHG

## 2018-05-15 DIAGNOSIS — M76.31 ILIOTIBIAL BAND SYNDROME OF BOTH SIDES: Primary | ICD-10-CM

## 2018-05-15 DIAGNOSIS — M25.562 ACUTE PAIN OF LEFT KNEE: ICD-10-CM

## 2018-05-15 DIAGNOSIS — M76.32 ILIOTIBIAL BAND SYNDROME OF BOTH SIDES: Primary | ICD-10-CM

## 2018-05-15 PROCEDURE — 99203 OFFICE O/P NEW LOW 30 MIN: CPT | Performed by: ORTHOPAEDIC SURGERY

## 2018-05-15 PROCEDURE — 20610 DRAIN/INJ JOINT/BURSA W/O US: CPT | Performed by: ORTHOPAEDIC SURGERY

## 2018-05-15 RX ORDER — METHYLPREDNISOLONE ACETATE 40 MG/ML
2 INJECTION, SUSPENSION INTRA-ARTICULAR; INTRALESIONAL; INTRAMUSCULAR; SOFT TISSUE
Status: COMPLETED | OUTPATIENT
Start: 2018-05-15 | End: 2018-05-15

## 2018-05-15 RX ORDER — LIDOCAINE HYDROCHLORIDE 10 MG/ML
2 INJECTION, SOLUTION INFILTRATION; PERINEURAL
Status: COMPLETED | OUTPATIENT
Start: 2018-05-15 | End: 2018-05-15

## 2018-05-15 RX ADMIN — LIDOCAINE HYDROCHLORIDE 2 ML: 10 INJECTION, SOLUTION INFILTRATION; PERINEURAL at 11:53

## 2018-05-15 RX ADMIN — LIDOCAINE HYDROCHLORIDE 2 ML: 10 INJECTION, SOLUTION INFILTRATION; PERINEURAL at 12:03

## 2018-05-15 RX ADMIN — METHYLPREDNISOLONE ACETATE 2 ML: 40 INJECTION, SUSPENSION INTRA-ARTICULAR; INTRALESIONAL; INTRAMUSCULAR; SOFT TISSUE at 11:53

## 2018-05-15 RX ADMIN — METHYLPREDNISOLONE ACETATE 2 ML: 40 INJECTION, SUSPENSION INTRA-ARTICULAR; INTRALESIONAL; INTRAMUSCULAR; SOFT TISSUE at 12:03

## 2018-05-15 NOTE — PROGRESS NOTES
Orthopedics          Celina Mcardle 80 y o  male MRN: [id number]      Chief Complaint:   bilateral lateral thigh pain    HPI:   80 y o male complaining of bilateral thigh pain  Patient complains of lateral aspect thigh pain mostly over the distal aspect of the thigh  Patient states that he has been inactive for long time secondary to weather but now that he is doing more around the house and outside, he started to have these pains  Denies any groin pain or specific knee pain  States that he did receive injections many years ago and it did help his symptoms  Denies any numbness tingling fevers chills                Review Of Systems:   · Skin: Normal  · Neuro: See HPI  · Musculoskeletal: See HPI  · 14 point review of systems negative except as stated above     Past Medical History:   Past Medical History:   Diagnosis Date    Asthma     Cataracts, bilateral     Diabetes mellitus ([facility] [address] )     Hypertension        Past Surgical History:   Past Surgical History:   Procedure Laterality Date    CATARACT EXTRACTION Bilateral 07/15/2012    COLONOSCOPY      CYSTOSCOPY  01/15/2018    Last assessed 9/14/17, diagnostic    HERNIA REPAIR      INSTILLATION MYTOMYCIN N/A 10/25/2017    Procedure: INSTILLATION OF MITOMYCIN C;  Surgeon: Florence Pacheco MD;  [facility];  Service: Urology    [place] CYSTOURETHROSCOPY,FULGUR <0 5 CM LESN N/A 10/25/2017    Procedure: Rannie Hard; BLADDER BIOPSY WITH Sylvia Henriquez;  Surgeon: Florence Pacheco MD;  [facility];  Service: Urology    TONSILLECTOMY      TRANSURETHRAL RESECTION OF BLADDER TUMOR N/A 10/25/2017    Procedure: TUR BLADDER TUMOR;  Surgeon: Florence Pacheco MD;  [facility];  Service: Urology    WISDOM TOOTH EXTRACTION         Family History:  Family history reviewed and non-contributory  Family History   Problem Relation Age of Onset    Diabetes Mother        Social History:  Social History     Social History    Marital status: /Civil Union     Spouse name: N/A    Number of children: N/A    Years of education: N/A     Social History Main Topics    Smoking status: Former Smoker     Quit date: 1965    Smokeless tobacco: Never Used    Alcohol use No      Comment: quit 23 years ago    Drug use: No    Sexual activity: Not Asked     Other Topics Concern    None     Social History Narrative    None       Allergies:    Allergies   Allergen Reactions    Ace Inhibitors      Other reaction(s): hyperkalemia    Penicillins GI Intolerance       Labs:    0  Lab Value Date/Time   HCT 35 5 (L) 10/19/2017 1036   HGB 11 4 (L) 10/19/2017 1036   INR 0 97 10/19/2017 1036   WBC 7 53 10/19/2017 1036       Meds:    Current Outpatient Prescriptions:     amLODIPine (NORVASC) 10 mg tablet, Take 1 tablet (10 mg total) by mouth daily, Disp: 30 tablet, Rfl: 5    aspirin 325 mg tablet, Take 325 mg by mouth daily, Disp: , Rfl:     bimatoprost (LUMIGAN) 0 01 % ophthalmic drops, Apply to eye, Disp: , Rfl:     Cholecalciferol (VITAMIN D3) 1000 units CAPS, Take by mouth, Disp: , Rfl:     cyanocobalamin (VITAMIN B-12) 500 mcg tablet, Take 500 mcg by mouth daily, Disp: , Rfl:     fluticasone-salmeterol (ADVAIR DISKUS) 100-50 mcg/dose, Inhale, Disp: , Rfl:     glimepiride (AMARYL) 2 mg tablet, Take by mouth, Disp: , Rfl:     glucose blood (ONE TOUCH ULTRA TEST) test strip, by In Vitro route daily, Disp: , Rfl:     lidocaine (LMX) 4 % cream, Apply topically as needed for mild pain, Disp: 30 g, Rfl: 0    Linagliptin (TRADJENTA) 5 MG TABS, Take 5 mg by mouth daily, Disp: 90 tablet, Rfl: 0    loteprednol etabonate (LOTEMAX) 0 5 % ophthalmic suspension, Apply to eye, Disp: , Rfl:     metoprolol succinate (TOPROL-XL) 50 mg 24 hr tablet, Take 1 tablet by mouth daily, Disp: , Rfl:     simvastatin (ZOCOR) 40 mg tablet, Take 1 tablet by mouth daily, Disp: , Rfl:     tamsulosin (FLOMAX) 0 4 mg, Take 1 capsule by mouth, Disp: , Rfl:     timolol (TIMOPTIC) 0 5 % ophthalmic solution, Administer 1 drop to both eyes 2 (two) times a day, Disp: , Rfl:       Physical Exam:     General Appearance:    Alert, cooperative, no distress, appears stated age   Head:    Normocephalic, without obvious abnormality, atraumatic   Eyes:    conjunctiva/corneas clear, both eyes        Nose:   Nares normal, septum midline, no drainage    Throat:   Lips normal; teeth and gums normal   Neck:    symmetrical, trachea midline, ;     thyroid:  no enlargement/   Back:     Symmetric, no curvature, ROM normal   Lungs:   No audible wheezing or labored breathing   Chest Wall:    No tenderness or deformity    Heart:    Regular rate and rhythm               Pulses:   2+ and symmetric all extremities   Skin:   Skin color, texture, turgor normal, no rashes or lesions   Neurologic:   normal strength, sensation and reflexes     throughout       Musculoskeletal: bilateral lower extremity  · No abrasions, no open wounds  · Tenderness palpation over the distal ITB and region but no tenderness palpation over the Gerdy's tubercle bilaterally  · No tenderness palpation over the greater trochanteric region  · Negative Stinchfield test  · No medial lateral joint line tenderness of the knee  · Neurologically and vascularly intact distally    Radiology:   I personally reviewed the films       _*_*_*_*_*_*_*_*_*_*_*_*_*_*_*_*_*_*_*_*_*_*_*_*_*_*_*_*_*_*_*_*_*_*_*_*_*_*_*_*_*    Assessment:  83 y o male with bilateral id tibial band syndrome    Plan:   · Weight bearing as tolerated  bilateral lower extremity  · I did administered bilateral distal iliotibial band steroid injections  · Will contact patient in 2 months to assess progress or lack thereof    · Discussed treatment plan with patient and he is in agreement treatment plan    Thank you    Max Muniz MD

## 2018-05-15 NOTE — PROGRESS NOTES
Large joint arthrocentesis  Date/Time: 5/15/2018 11:53 AM  Consent given by: patient  Supporting Documentation  Indications: pain   Procedure Details  Location: right distal ITB  Needle size: 22 G  Approach: lateral  Medications administered: 2 mL lidocaine 1 %; 2 mL methylPREDNISolone acetate 40 mg/mL      Large joint arthrocentesis  Date/Time: 5/15/2018 12:03 PM  Consent given by: patient  Supporting Documentation  Indications: pain   Procedure Details  Location: knee - Knee joint: left ITB syndrome    Needle size: 22 G  Approach: lateral  Medications administered: 2 mL lidocaine 1 %; 2 mL methylPREDNISolone acetate 40 mg/mL

## 2018-05-24 DIAGNOSIS — M19.90 ARTHRITIS: Primary | ICD-10-CM

## 2018-05-24 DIAGNOSIS — I10 ESSENTIAL HYPERTENSION: ICD-10-CM

## 2018-05-24 DIAGNOSIS — E11.21 TYPE 2 DIABETES MELLITUS WITH DIABETIC NEPHROPATHY, WITHOUT LONG-TERM CURRENT USE OF INSULIN (HCC): ICD-10-CM

## 2018-05-24 RX ORDER — MELOXICAM 15 MG/1
TABLET ORAL
Qty: 90 TABLET | Refills: 3 | Status: SHIPPED | OUTPATIENT
Start: 2018-05-24 | End: 2018-06-07

## 2018-05-24 RX ORDER — LINAGLIPTIN 5 MG/1
TABLET, FILM COATED ORAL
Qty: 90 TABLET | Refills: 0 | Status: SHIPPED | OUTPATIENT
Start: 2018-05-24 | End: 2018-09-13

## 2018-05-24 RX ORDER — AMLODIPINE BESYLATE 10 MG/1
TABLET ORAL
Qty: 90 TABLET | Refills: 3 | Status: SHIPPED | OUTPATIENT
Start: 2018-05-24 | End: 2018-06-04 | Stop reason: SDUPTHER

## 2018-05-24 RX ORDER — AMLODIPINE BESYLATE 10 MG/1
10 TABLET ORAL DAILY
Qty: 30 TABLET | Refills: 3 | Status: SHIPPED | OUTPATIENT
Start: 2018-05-24 | End: 2018-06-04 | Stop reason: SDUPTHER

## 2018-05-31 ENCOUNTER — PROCEDURE VISIT (OUTPATIENT)
Dept: UROLOGY | Facility: AMBULATORY SURGERY CENTER | Age: 83
End: 2018-05-31
Payer: MEDICARE

## 2018-05-31 VITALS
SYSTOLIC BLOOD PRESSURE: 140 MMHG | DIASTOLIC BLOOD PRESSURE: 80 MMHG | BODY MASS INDEX: 24.86 KG/M2 | HEIGHT: 65 IN | WEIGHT: 149.2 LBS

## 2018-05-31 DIAGNOSIS — C67.9 MALIGNANT NEOPLASM OF URINARY BLADDER, UNSPECIFIED SITE (HCC): Primary | ICD-10-CM

## 2018-05-31 LAB
SL AMB  POCT GLUCOSE, UA: 250
SL AMB LEUKOCYTE ESTERASE,UA: NORMAL
SL AMB POCT BILIRUBIN,UA: NORMAL
SL AMB POCT BLOOD,UA: NORMAL
SL AMB POCT CLARITY,UA: NORMAL
SL AMB POCT COLOR,UA: YELLOW
SL AMB POCT KETONES,UA: NORMAL
SL AMB POCT NITRITE,UA: NORMAL
SL AMB POCT PH,UA: 5
SL AMB POCT SPECIFIC GRAVITY,UA: 1.01
SL AMB POCT URINE PROTEIN: NORMAL
SL AMB POCT UROBILINOGEN: NORMAL

## 2018-05-31 PROCEDURE — 81002 URINALYSIS NONAUTO W/O SCOPE: CPT | Performed by: UROLOGY

## 2018-05-31 PROCEDURE — 52000 CYSTOURETHROSCOPY: CPT | Performed by: UROLOGY

## 2018-05-31 NOTE — PROGRESS NOTES
Written Consent Obtained      Indications for Procedure:   history of low grade bladder cancer      Physical Exam     Constitutional   General appearance: No acute distress, well appearing and well nourished  Pulmonary   Respiratory effort: No increased work of breathing or signs of respiratory distress  Cardiovascular   Examination of extremities for edema and/or varicosities: Normal     Abdomen   Abdomen: Non-tender, no masses  Liver and spleen: No hepatomegaly or splenomegaly  Genitourinary   Normal phallus and meatus   Musculoskeletal   Gait and station: Normal     Skin   Skin and subcutaneous tissue: Normal without rashes or lesions  Lymphatic   Palpation of lymph nodes in groin: No lymphadenopathy  Additional Exam: Neuro exam nonfocal   The flexible cystoscope was introduced into the urethra and advanced into the bladder  URETHRA:  Normal,  without strictures or lesions  PROSTATE:  Moderate bilobar obstruction  TRIGONE & UOs:   Normal anatomy with efflux of clear urine  No mucosal lesions or subtrigonal masses  BLADDER MUCOSA:  Normal, without neoplasms or other lesions  DETRUSOR: Normal capacity, without flaccidity, without excessive compliance, without trabeculation or diverticula, without uninhibited bladder contractions on fillings  RETROFLEXED SCOPE VIEW: Normal bladder neck    Plan:  Cystoscopy was negative today  He will follow up in 3 months with repeat cystoscopy

## 2018-06-04 DIAGNOSIS — I10 ESSENTIAL HYPERTENSION: ICD-10-CM

## 2018-06-04 RX ORDER — AMLODIPINE BESYLATE 10 MG/1
10 TABLET ORAL DAILY
Qty: 90 TABLET | Refills: 1 | Status: SHIPPED | OUTPATIENT
Start: 2018-06-04 | End: 2019-03-07 | Stop reason: SDUPTHER

## 2018-06-04 RX ORDER — AMLODIPINE BESYLATE 10 MG/1
10 TABLET ORAL DAILY
Qty: 90 TABLET | Refills: 0 | Status: SHIPPED | OUTPATIENT
Start: 2018-06-04 | End: 2018-06-07

## 2018-06-06 LAB
BUN SERPL-MCNC: 45 MG/DL (ref 7–25)
BUN/CREAT SERPL: 17 (CALC) (ref 6–22)
CALCIUM SERPL-MCNC: 9 MG/DL (ref 8.6–10.3)
CHLORIDE SERPL-SCNC: 108 MMOL/L (ref 98–110)
CO2 SERPL-SCNC: 23 MMOL/L (ref 20–31)
CREAT SERPL-MCNC: 2.62 MG/DL (ref 0.7–1.11)
GLUCOSE SERPL-MCNC: 196 MG/DL (ref 65–99)
POTASSIUM SERPL-SCNC: 4.4 MMOL/L (ref 3.5–5.3)
SL AMB EGFR AFRICAN AMERICAN: 25 ML/MIN/1.73M2
SL AMB EGFR NON AFRICAN AMERICAN: 22 ML/MIN/1.73M2
SODIUM SERPL-SCNC: 139 MMOL/L (ref 135–146)

## 2018-06-07 ENCOUNTER — OFFICE VISIT (OUTPATIENT)
Dept: INTERNAL MEDICINE CLINIC | Age: 83
End: 2018-06-07
Payer: MEDICARE

## 2018-06-07 VITALS
HEART RATE: 72 BPM | DIASTOLIC BLOOD PRESSURE: 62 MMHG | TEMPERATURE: 98.2 F | HEIGHT: 64 IN | OXYGEN SATURATION: 98 % | WEIGHT: 147.4 LBS | BODY MASS INDEX: 25.16 KG/M2 | SYSTOLIC BLOOD PRESSURE: 104 MMHG

## 2018-06-07 DIAGNOSIS — M19.90 ARTHRITIS: ICD-10-CM

## 2018-06-07 DIAGNOSIS — E11.22 TYPE 2 DIABETES MELLITUS WITH STAGE 3 CHRONIC KIDNEY DISEASE, WITHOUT LONG-TERM CURRENT USE OF INSULIN (HCC): ICD-10-CM

## 2018-06-07 DIAGNOSIS — J45.20 MILD INTERMITTENT ASTHMA WITHOUT COMPLICATION: Primary | ICD-10-CM

## 2018-06-07 DIAGNOSIS — N18.30 TYPE 2 DIABETES MELLITUS WITH STAGE 3 CHRONIC KIDNEY DISEASE, WITHOUT LONG-TERM CURRENT USE OF INSULIN (HCC): ICD-10-CM

## 2018-06-07 DIAGNOSIS — M17.0 PRIMARY OSTEOARTHRITIS OF BOTH KNEES: ICD-10-CM

## 2018-06-07 DIAGNOSIS — I10 BENIGN ESSENTIAL HYPERTENSION: ICD-10-CM

## 2018-06-07 PROCEDURE — 99214 OFFICE O/P EST MOD 30 MIN: CPT | Performed by: INTERNAL MEDICINE

## 2018-06-07 RX ORDER — TRAMADOL HYDROCHLORIDE 50 MG/1
50 TABLET ORAL 2 TIMES DAILY PRN
Qty: 60 TABLET | Refills: 1 | Status: SHIPPED | OUTPATIENT
Start: 2018-06-07 | End: 2019-07-02

## 2018-06-07 NOTE — PATIENT INSTRUCTIONS
Diabetic Kidney Disease, Ambulatory Care   GENERAL INFORMATION:   Diabetic kidney disease  (DKD) is the gradual and permanent loss of kidney function  This occurs because of kidney damage caused by high blood sugar levels  Normally, the kidneys remove fluid, chemicals, and waste from your blood  These wastes are turned into urine by your kidneys  When you have DKD, your kidneys do not function properly  DKD may worsen over time and lead to kidney failure  Common signs and symptoms of DKD include the following: Your signs and symptoms will depend on how well your kidneys work  You may not have any symptoms, or you may have any of the following:  · Changes in how often you need to urinate    · Ankle and leg swelling    · Fatigue or weakness    · Itching    · Muscle cramps    · Nausea, vomiting, or loss of appetite  Seek immediate care for the following symptoms:   · Unusually fast heartbeat    · Confusion and drowsiness    · Seizure    · Sudden chest pain or shortness of breath  The goals of treatment for diabetic kidney disease  are to control your symptoms and prevent your DKD from getting worse  You may need any of the following:  · Medicines  may be given to decrease blood pressure and get rid of extra fluid  Blood pressure medicines can help to slow down the loss of kidney function  · Dialysis  is a treatment that may be needed to remove chemicals and waste from your blood when your kidneys can no longer do this  · Surgery  may be needed to create an arteriovenous fistula (AVF) in your arm or insert a catheter into your abdomen or chest  This is done so you can receive dialysis  · A kidney transplant  may be done if your DKD becomes severe  Manage diabetic kidney disease:   · Control your blood sugar levels  If you use insulin or take diabetes medicine, take these as directed  Follow the meal and exercise plan recommended by your healthcare provider   Check your blood sugar levels every day, as often as your healthcare provider has recommended  Your healthcare provider may want you to have your A1c checked every 3 to 6 months  Most people should keep their A1c at or below 7%  · Limit the amount of protein in your diet  Your healthcare provider may recommend that you limit the amount of protein you eat  You may need to make other diet changes over time if your DKD gets worse  Work with your dietitian to develop a meal plan that is right for you  · Control your blood pressure  Decrease sodium (salt) in your diet to help control your blood pressure  Weight loss and regular physical activity can also help to decrease your blood pressure  Other things you can do to help decrease blood pressure include avoiding alcohol and quitting smoking, if you smoke  · Talk to your healthcare provider about over-the-counter (OTC) medicines you should avoid  Some OTC medicines, such as ibuprofen, can damage your kidneys  Follow up with your healthcare provider as directed:  Write down your questions so you remember to ask them during your visits  CARE AGREEMENT:   You have the right to help plan your care  Learn about your health condition and how it may be treated  Discuss treatment options with your caregivers to decide what care you want to receive  You always have the right to refuse treatment  The above information is an  only  It is not intended as medical advice for individual conditions or treatments  Talk to your doctor, nurse or pharmacist before following any medical regimen to see if it is safe and effective for you  © 2014 [address] is for End User's use only and may not be sold, redistributed or otherwise used for commercial purposes  All illustrations and images included in CareNotes® are the copyrighted property of [employer]  or [employer]

## 2018-06-07 NOTE — ASSESSMENT & PLAN NOTE
Patient has longstanding diabetes type 2 with nephropathy  His creatinine is a fluctuated over the years but now are in the mid 2s    Admits to taking nonsteroidals due to bad knee pain and difficulty walking but was instructed to absolutely stop the now and drink more water

## 2018-06-07 NOTE — ASSESSMENT & PLAN NOTE
His biggest complaint is bilateral knee pain  He has been seen by Ortho and had injections of same    Will give him some tramadol for relief of pain

## 2018-06-07 NOTE — PROGRESS NOTES
Assessment/Plan:    Type 2 diabetes mellitus with diabetic chronic kidney disease ([facility] [address] )  Patient has longstanding diabetes type 2 with nephropathy  His creatinine is a fluctuated over the years but now are in the mid 2s  Admits to taking nonsteroidals due to bad knee pain and difficulty walking but was instructed to absolutely stop the now and drink more water    Asthma, mild intermittent  Patient has had no symptoms of his asthma recently but does have Advair at home for flare    Benign essential hypertension  Blood pressure at present is extremely well controlled even off and Ace  He currently only takes a beta-blocker    Primary osteoarthritis of both knees  His biggest complaint is bilateral knee pain  He has been seen by Ortho and had injections of same  Will give him some tramadol for relief of pain       Diagnoses and all orders for this visit:    Mild intermittent asthma without complication    Benign essential hypertension    Type 2 diabetes mellitus with stage 3 chronic kidney disease, without long-term current use of insulin ([facility])  -     Hemoglobin A1C; Future  -     Basic metabolic panel; Future    Primary osteoarthritis of both knees  -     traMADol (ULTRAM) 50 mg tablet; Take 1 tablet (50 mg total) by mouth 2 (two) times a day as needed for moderate pain    Arthritis          Subjective:      Patient ID: Patricia Lucero is a 80 y o  male  Patient has had longstanding type 2 diabetes and difficult to control blood pressure resulting in chronic renal disease  He finally is off medicines that are nephrotoxin except for nonsteroidals    He would not complain about anything if his knees in hurt  He is seen Ortho and had them injected but still complains of difficulty walking in his yd  Review of Systems   Constitutional: Negative for chills, fatigue, fever and unexpected weight change     HENT: Negative for congestion, ear pain, hearing loss, postnasal drip, sinus pressure, sore throat, trouble swallowing and voice change  Eyes: Negative for visual disturbance  Respiratory: Negative for cough, chest tightness, shortness of breath and wheezing  Cardiovascular: Negative for chest pain, palpitations and leg swelling  Gastrointestinal: Negative for abdominal distention, abdominal pain, anal bleeding, blood in stool, constipation, diarrhea and nausea  Endocrine: Negative for cold intolerance, polydipsia, polyphagia and polyuria  Genitourinary: Negative for dysuria, flank pain, frequency, hematuria and urgency  Musculoskeletal: Negative for arthralgias, back pain, gait problem, joint swelling, myalgias and neck pain  Knee pain   Skin: Negative for rash  Allergic/Immunologic: Negative for immunocompromised state  Neurological: Negative for dizziness, syncope, facial asymmetry, weakness, light-headedness, numbness and headaches  Hematological: Negative for adenopathy  Psychiatric/Behavioral: Negative for confusion, sleep disturbance and suicidal ideas  Objective:      /62 (BP Location: Left arm, Patient Position: Sitting)   Pulse 72   Temp 98 2 °F (36 8 °C) (Tympanic)   Ht 5' 4" (1 626 m)   Wt 66 9 kg (147 lb 6 4 oz)   SpO2 98%   BMI 25 30 kg/m²          Physical Exam   Constitutional: He is oriented to person, place, and time  He appears well-developed and well-nourished  No distress  Looks younger than his stated age   HENT:   Right Ear: External ear normal    Left Ear: External ear normal    Nose: Nose normal    Mouth/Throat: Oropharynx is clear and moist  No oropharyngeal exudate  Eyes: EOM are normal  Pupils are equal, round, and reactive to light  Neck: Normal range of motion  Neck supple  No JVD present  No thyromegaly present  Cardiovascular: Normal rate, regular rhythm, normal heart sounds and intact distal pulses  Exam reveals no gallop  No murmur heard    Pulmonary/Chest: Effort normal and breath sounds normal  No respiratory distress  He has no wheezes  He has no rales  Abdominal: Soft  Bowel sounds are normal  He exhibits no distension and no mass  There is no tenderness  Musculoskeletal: Normal range of motion  He exhibits no tenderness  Lymphadenopathy:     He has no cervical adenopathy  Neurological: He is alert and oriented to person, place, and time  No cranial nerve deficit  Coordination normal    Skin: No rash noted  Psychiatric: He has a normal mood and affect  His behavior is normal  Judgment and thought content normal    Vitals reviewed

## 2018-06-07 NOTE — ASSESSMENT & PLAN NOTE
Blood pressure at present is extremely well controlled even off and Ace    He currently only takes a beta-blocker

## 2018-06-29 DIAGNOSIS — R53.83 FATIGUE, UNSPECIFIED TYPE: Primary | ICD-10-CM

## 2018-07-13 LAB
BASOPHILS # BLD AUTO: 32 CELLS/UL (ref 0–200)
BASOPHILS NFR BLD AUTO: 0.4 %
EOSINOPHIL # BLD AUTO: 170 CELLS/UL (ref 15–500)
EOSINOPHIL NFR BLD AUTO: 2.1 %
ERYTHROCYTE [DISTWIDTH] IN BLOOD BY AUTOMATED COUNT: 11.8 % (ref 11–15)
HCT VFR BLD AUTO: 32.8 % (ref 38.5–50)
HGB BLD-MCNC: 10.4 G/DL (ref 13.2–17.1)
LYMPHOCYTES # BLD AUTO: 1118 CELLS/UL (ref 850–3900)
LYMPHOCYTES NFR BLD AUTO: 13.8 %
MCH RBC QN AUTO: 30.9 PG (ref 27–33)
MCHC RBC AUTO-ENTMCNC: 31.7 G/DL (ref 32–36)
MCV RBC AUTO: 97.3 FL (ref 80–100)
MONOCYTES # BLD AUTO: 826 CELLS/UL (ref 200–950)
MONOCYTES NFR BLD AUTO: 10.2 %
NEUTROPHILS # BLD AUTO: 5954 CELLS/UL (ref 1500–7800)
NEUTROPHILS NFR BLD AUTO: 73.5 %
PLATELET # BLD AUTO: 192 THOUSAND/UL (ref 140–400)
PMV BLD REES-ECKER: 10.6 FL (ref 7.5–12.5)
RBC # BLD AUTO: 3.37 MILLION/UL (ref 4.2–5.8)
WBC # BLD AUTO: 8.1 THOUSAND/UL (ref 3.8–10.8)

## 2018-08-17 ENCOUNTER — OFFICE VISIT (OUTPATIENT)
Dept: INTERNAL MEDICINE CLINIC | Age: 83
End: 2018-08-17
Payer: MEDICARE

## 2018-08-17 VITALS
WEIGHT: 144.4 LBS | SYSTOLIC BLOOD PRESSURE: 100 MMHG | BODY MASS INDEX: 24.65 KG/M2 | OXYGEN SATURATION: 99 % | TEMPERATURE: 95.4 F | DIASTOLIC BLOOD PRESSURE: 60 MMHG | HEART RATE: 75 BPM | HEIGHT: 64 IN

## 2018-08-17 DIAGNOSIS — D64.9 ANEMIA, UNSPECIFIED TYPE: ICD-10-CM

## 2018-08-17 DIAGNOSIS — N32.89 BLADDER MASS: ICD-10-CM

## 2018-08-17 DIAGNOSIS — N18.30 TYPE 2 DIABETES MELLITUS WITH STAGE 3 CHRONIC KIDNEY DISEASE, WITHOUT LONG-TERM CURRENT USE OF INSULIN (HCC): Primary | ICD-10-CM

## 2018-08-17 DIAGNOSIS — R63.4 WEIGHT LOSS: ICD-10-CM

## 2018-08-17 DIAGNOSIS — E11.22 TYPE 2 DIABETES MELLITUS WITH STAGE 3 CHRONIC KIDNEY DISEASE, WITHOUT LONG-TERM CURRENT USE OF INSULIN (HCC): Primary | ICD-10-CM

## 2018-08-17 PROCEDURE — 99214 OFFICE O/P EST MOD 30 MIN: CPT | Performed by: INTERNAL MEDICINE

## 2018-08-17 NOTE — ASSESSMENT & PLAN NOTE
After his bladder surgery he was anemic with a hemoglobin of 10 4  Will recheck if it has resolved    He did reportedly have a normal colonoscopy approximately 9 years ago

## 2018-08-17 NOTE — ASSESSMENT & PLAN NOTE
Patient has had a slow but steady weight loss over the last several visits  His wife states he is not eating as much and he states he is just not hungry    Will do a full blood panel along with his CT

## 2018-08-17 NOTE — ASSESSMENT & PLAN NOTE
He had his bladder mass removed earlier this year and is due for recheck of his CT per his wife so in view of his weight loss will reorder a CT without contrast

## 2018-08-17 NOTE — PROGRESS NOTES
Assessment/Plan:    Type 2 diabetes mellitus with [facility]  Lab Results   Component Value Date    HGBA1C 7 0 (H) 03/06/2018       No results for input(s): POCGLU in the last 72 hours  Blood Sugar Average: Last 72 hrs:   patient's creatinine clearance has been slowly decreasing over the last 2 years while his blood sugar and blood pressure has been remaining about same  Will recheck will probably need to see renal   He has finally stopped all his nephrotoxin drugs  Hopefully his numbers are better    Bladder mass  He had his bladder mass removed earlier this year and is due for recheck of his CT per his wife so in view of his weight loss will reorder a CT without contrast    Weight loss  Patient has had a slow but steady weight loss over the last several visits  His wife states he is not eating as much and he states he is just not hungry  Will do a full blood panel along with his CT    Anemia  After his bladder surgery he was anemic with a hemoglobin of 10 4  Will recheck if it has resolved  He did reportedly have a normal colonoscopy approximately 9 years ago       Diagnoses and all orders for this visit:    Type 2 diabetes mellitus with stage 3 chronic kidney disease, without long-term current use of insulin ([facility] )  -     Comprehensive metabolic panel; Future  -     Hemoglobin A1C; Future    Bladder mass  -     CT abdomen pelvis wo contrast; Future    Anemia, unspecified type  -     CBC and differential; Future  -     Ferritin; Future  -     Vitamin B12; Future  -     Iron Saturation %; Future    Weight loss  -     T4, free; Future          Subjective:      Patient ID: Estuardo Peterson is a 80 y o  male  Patient is here because of ongoing slow weight loss  He states he has no appetite but no nausea vomiting or change in bowel movements  He does have multiple medical problems that could be playing a role including his diabetes, chronic renal disease and recent bladder mass  His diabetes however has appeared stable  His renal disease is slowly gotten worse and his bladder mass reportedly was not that concerning  He was anemic after his procedure which was new but he had no symptoms GI wise  Review of Systems   Constitutional: Positive for appetite change and unexpected weight change  Negative for chills, fatigue and fever  HENT: Negative for congestion, ear pain, hearing loss, postnasal drip, sinus pressure, sore throat, trouble swallowing and voice change  Eyes: Negative for visual disturbance  Respiratory: Negative for cough, chest tightness, shortness of breath and wheezing  Cardiovascular: Negative for chest pain, palpitations and leg swelling  Gastrointestinal: Negative for abdominal distention, abdominal pain, anal bleeding, blood in stool, constipation, diarrhea and nausea  Decreased appetite   Endocrine: Negative for cold intolerance, polydipsia, polyphagia and polyuria  Genitourinary: Negative for dysuria, flank pain, frequency, hematuria and urgency  Musculoskeletal: Negative for arthralgias, back pain, gait problem, joint swelling, myalgias and neck pain  Skin: Negative for rash  Allergic/Immunologic: Negative for immunocompromised state  Neurological: Negative for dizziness, syncope, facial asymmetry, weakness, light-headedness, numbness and headaches  Hematological: Negative for adenopathy  Does not bruise/bleed easily  Psychiatric/Behavioral: Negative for confusion, sleep disturbance and suicidal ideas  Nervous/anxious: Not recently  Objective:      /60 (BP Location: Left arm, Patient Position: Sitting)   Pulse 75   Temp (!) 95 4 °F (35 2 °C) (Tympanic)   Ht 5' 4" (1 626 m)   Wt 65 5 kg (144 lb 6 4 oz)   SpO2 99%   BMI 24 79 kg/m²          Physical Exam   Constitutional: He is oriented to person, place, and time  He appears well-developed and well-nourished  No distress     HENT:   Right Ear: External ear normal    Left Ear: External ear normal    Nose: Nose normal    Mouth/Throat: Oropharynx is clear and moist  No oropharyngeal exudate  Eyes: EOM are normal  Pupils are equal, round, and reactive to light  Neck: Normal range of motion  Neck supple  No JVD present  No thyromegaly present  Cardiovascular: Normal rate, regular rhythm, normal heart sounds and intact distal pulses  Exam reveals no gallop  No murmur heard  Pulmonary/Chest: Effort normal and breath sounds normal  No respiratory distress  He has no wheezes  He has no rales  Abdominal: Soft  Bowel sounds are normal  He exhibits no distension and no mass  There is no tenderness  Musculoskeletal: Normal range of motion  He exhibits no tenderness  Lymphadenopathy:     He has no cervical adenopathy  Neurological: He is alert and oriented to person, place, and time  No cranial nerve deficit  Coordination normal    Skin: No rash noted  Psychiatric: He has a normal mood and affect  His behavior is normal  Judgment and thought content normal    Vitals reviewed

## 2018-08-17 NOTE — PATIENT INSTRUCTIONS
Anorexia in Older Adults   AMBULATORY CARE:   Anorexia  is a loss of appetite, decreased food intake, or both  Your appetite naturally decreases as you get older  You also get full faster than you used to  This occurs because your body needs less energy  Other body changes can also lead to a decreased appetite  Even though some appetite loss is normal, you still need to get enough calories and nutrients to keep you healthy  You can start to lose too much weight if you do not eat as much food as your body needs  Unwanted weight loss can cause health problems, or worsen health problems you already have  You can also become dehydrated if you do not drink enough liquid  Contact your healthcare provider if:   · You are losing weight  · You feel depressed, confused, tired, irritable, and you do not feel like eating  · You have signs of dehydration  Examples include dark yellow urine, dry mouth and lips, dry skin, fast heartbeat, and urinating less than usual     · You have questions or concerns about your condition or care  How to eat healthy and get enough nutrients:   · Choose healthy foods  Eat a variety of fruits, vegetables, whole grains, low-fat dairy foods, lean meats, and other protein foods  Limit foods high in fat, sugar, and salt  Limit or avoid alcohol as directed  Work with a dietitian to help you plan your meals if you need to follow a special diet  A dietitian can also teach you how to modify foods if you have trouble chewing or swallowing  · Snack on healthy foods between meals  if you only eat a small amount during meals  Snacks provide extra healthy nutrients and calories between meals  Examples include fruit, cheese, and whole grain crackers  · Drink liquids as directed  to avoid dehydration  Drink liquids between meals if they cause you to get full too quickly during meals  Ask how much liquid to drink each day and which liquids are best for you       · Use herbs, spices, and flavor enhancers to add flavor to foods  Avoid using herbs and spice blends that also contain sodium  Ask your healthcare provider or dietitian about flavor enhancers  Flavor enhancers with ham, natural alvarez, and roast beef flavors can also be sprinkled on food to add flavor  · Share meals with others as often as you can  Eating with others may help you to eat better during meal time  Ask family members, neighbors, or friends to join you for lunch  There are also senior centers where you can meet people, and share meals with them  · Ask family and friends for help  with shopping or preparing foods  Ask for a ride to the grocery store, if needed  How to work with your healthcare provider to stay healthy:   · Tell your healthcare provider about any illnesses or medicines that have decreased your appetite  He may be able to change your medicines  Your healthcare provider may also be able to prescribe medicines that can increase your appetite  · Ask your healthcare provider about nutrition supplements  you can have between meals  Nutrition supplements can provide extra calories and nutrients if you are not getting enough through food  Nutrition supplements are available in liquids, puddings, bars, and soups  · Talk to your healthcare provider about safe physical activities you can do  Physical activity may help to increase your appetite  It can also help to strengthen your muscles and bones  · Ask your healthcare provider about programs that can help you buy food or provide meals  There are programs that provide financial assistance for food if you have a low income  There are also programs in some areas that may be able to deliver healthy prepared meals to your home  © 2017 [employer] Information is for End User's use only and may not be sold, redistributed or otherwise used for commercial purposes   All illustrations and images included in CareNotes® are the copyrighted property of [employer]  or [employer]  The above information is an  only  It is not intended as medical advice for individual conditions or treatments  Talk to your doctor, nurse or pharmacist before following any medical regimen to see if it is safe and effective for you

## 2018-08-17 NOTE — ASSESSMENT & PLAN NOTE
Lab Results   Component Value Date    HGBA1C 7 0 (H) 03/06/2018       No results for input(s): POCGLU in the last 72 hours  Blood Sugar Average: Last 72 hrs:   patient's creatinine clearance has been slowly decreasing over the last 2 years while his blood sugar and blood pressure has been remaining about same  Will recheck will probably need to see renal   He has finally stopped all his nephrotoxin drugs    Hopefully his numbers are better

## 2018-08-27 ENCOUNTER — HOSPITAL ENCOUNTER (OUTPATIENT)
Dept: CT IMAGING | Facility: HOSPITAL | Age: 83
Discharge: HOME | End: 2018-08-27
Payer: MEDICARE

## 2018-08-27 ENCOUNTER — APPOINTMENT (OUTPATIENT)
Dept: LAB | Facility: CLINIC | Age: 83
End: 2018-08-27
Payer: MEDICARE

## 2018-08-27 DIAGNOSIS — D64.9 ANEMIA, UNSPECIFIED TYPE: ICD-10-CM

## 2018-08-27 DIAGNOSIS — E11.22 TYPE 2 DIABETES MELLITUS WITH STAGE 3 CHRONIC KIDNEY DISEASE, WITHOUT LONG-TERM CURRENT USE OF INSULIN (HCC): ICD-10-CM

## 2018-08-27 DIAGNOSIS — R63.4 WEIGHT LOSS: ICD-10-CM

## 2018-08-27 DIAGNOSIS — N18.30 TYPE 2 DIABETES MELLITUS WITH STAGE 3 CHRONIC KIDNEY DISEASE, WITHOUT LONG-TERM CURRENT USE OF INSULIN (HCC): ICD-10-CM

## 2018-08-27 DIAGNOSIS — N32.89 BLADDER MASS: ICD-10-CM

## 2018-08-27 LAB
ALBUMIN SERPL BCP-MCNC: 3.6 G/DL (ref 3.5–5)
ALP SERPL-CCNC: 59 U/L (ref 46–116)
ALT SERPL W P-5'-P-CCNC: 22 U/L (ref 12–78)
ANION GAP SERPL CALCULATED.3IONS-SCNC: 11 MMOL/L (ref 4–13)
AST SERPL W P-5'-P-CCNC: 17 U/L (ref 5–45)
BASOPHILS # BLD AUTO: 0.03 THOUSANDS/ΜL (ref 0–0.1)
BASOPHILS NFR BLD AUTO: 0 % (ref 0–1)
BILIRUB SERPL-MCNC: 0.5 MG/DL (ref 0.2–1)
BUN SERPL-MCNC: 35 MG/DL (ref 5–25)
CALCIUM SERPL-MCNC: 9.1 MG/DL (ref 8.3–10.1)
CHLORIDE SERPL-SCNC: 105 MMOL/L (ref 100–108)
CO2 SERPL-SCNC: 24 MMOL/L (ref 21–32)
CREAT SERPL-MCNC: 2.47 MG/DL (ref 0.6–1.3)
EOSINOPHIL # BLD AUTO: 0.2 THOUSAND/ΜL (ref 0–0.61)
EOSINOPHIL NFR BLD AUTO: 2 % (ref 0–6)
ERYTHROCYTE [DISTWIDTH] IN BLOOD BY AUTOMATED COUNT: 13.2 % (ref 11.6–15.1)
FERRITIN SERPL-MCNC: 409 NG/ML (ref 8–388)
GFR SERPL CREATININE-BSD FRML MDRD: 27 ML/MIN/1.73SQ M
GLUCOSE SERPL-MCNC: 181 MG/DL (ref 65–140)
HCT VFR BLD AUTO: 31.1 % (ref 36.5–49.3)
HGB BLD-MCNC: 10 G/DL (ref 12–17)
IMM GRANULOCYTES # BLD AUTO: 0.02 THOUSAND/UL (ref 0–0.2)
IMM GRANULOCYTES NFR BLD AUTO: 0 % (ref 0–2)
IRON SATN MFR SERPL: 28 %
IRON SERPL-MCNC: 73 UG/DL (ref 65–175)
LYMPHOCYTES # BLD AUTO: 1.51 THOUSANDS/ΜL (ref 0.6–4.47)
LYMPHOCYTES NFR BLD AUTO: 16 % (ref 14–44)
MCH RBC QN AUTO: 31.1 PG (ref 26.8–34.3)
MCHC RBC AUTO-ENTMCNC: 32.2 G/DL (ref 31.4–37.4)
MCV RBC AUTO: 97 FL (ref 82–98)
MONOCYTES # BLD AUTO: 0.79 THOUSAND/ΜL (ref 0.17–1.22)
MONOCYTES NFR BLD AUTO: 9 % (ref 4–12)
NEUTROPHILS # BLD AUTO: 6.66 THOUSANDS/ΜL (ref 1.85–7.62)
NEUTS SEG NFR BLD AUTO: 73 % (ref 43–75)
NRBC BLD AUTO-RTO: 0 /100 WBCS
PLATELET # BLD AUTO: 174 THOUSANDS/UL (ref 149–390)
PMV BLD AUTO: 10.5 FL (ref 8.9–12.7)
POTASSIUM SERPL-SCNC: 4.8 MMOL/L (ref 3.5–5.3)
PROT SERPL-MCNC: 7.8 G/DL (ref 6.4–8.2)
RBC # BLD AUTO: 3.22 MILLION/UL (ref 3.88–5.62)
SODIUM SERPL-SCNC: 140 MMOL/L (ref 136–145)
T4 FREE SERPL-MCNC: 0.93 NG/DL (ref 0.76–1.46)
TIBC SERPL-MCNC: 264 UG/DL (ref 250–450)
VIT B12 SERPL-MCNC: 1200 PG/ML (ref 100–900)
WBC # BLD AUTO: 9.21 THOUSAND/UL (ref 4.31–10.16)

## 2018-08-27 PROCEDURE — 82728 ASSAY OF FERRITIN: CPT

## 2018-08-27 PROCEDURE — 80053 COMPREHEN METABOLIC PANEL: CPT

## 2018-08-27 PROCEDURE — 36415 COLL VENOUS BLD VENIPUNCTURE: CPT

## 2018-08-27 PROCEDURE — 85025 COMPLETE CBC W/AUTO DIFF WBC: CPT

## 2018-08-27 PROCEDURE — 84439 ASSAY OF FREE THYROXINE: CPT

## 2018-08-27 PROCEDURE — 83540 ASSAY OF IRON: CPT

## 2018-08-27 PROCEDURE — 82607 VITAMIN B-12: CPT

## 2018-08-27 PROCEDURE — 83036 HEMOGLOBIN GLYCOSYLATED A1C: CPT

## 2018-08-27 PROCEDURE — 83550 IRON BINDING TEST: CPT

## 2018-08-27 PROCEDURE — 74176 CT ABD & PELVIS W/O CONTRAST: CPT

## 2018-08-28 LAB
EST. AVERAGE GLUCOSE BLD GHB EST-MCNC: 163 MG/DL
HBA1C MFR BLD: 7.3 % (ref 4.2–6.3)

## 2018-09-06 ENCOUNTER — PROCEDURE VISIT (OUTPATIENT)
Dept: UROLOGY | Facility: AMBULATORY SURGERY CENTER | Age: 83
End: 2018-09-06
Payer: MEDICARE

## 2018-09-06 VITALS
HEART RATE: 80 BPM | BODY MASS INDEX: 24.41 KG/M2 | DIASTOLIC BLOOD PRESSURE: 62 MMHG | WEIGHT: 143 LBS | HEIGHT: 64 IN | SYSTOLIC BLOOD PRESSURE: 126 MMHG

## 2018-09-06 DIAGNOSIS — C67.9 MALIGNANT NEOPLASM OF URINARY BLADDER, UNSPECIFIED SITE (HCC): Primary | ICD-10-CM

## 2018-09-06 LAB
SL AMB  POCT GLUCOSE, UA: 2000
SL AMB LEUKOCYTE ESTERASE,UA: NORMAL
SL AMB POCT BILIRUBIN,UA: NORMAL
SL AMB POCT BLOOD,UA: NORMAL
SL AMB POCT CLARITY,UA: CLEAR
SL AMB POCT COLOR,UA: YELLOW
SL AMB POCT KETONES,UA: NORMAL
SL AMB POCT NITRITE,UA: NORMAL
SL AMB POCT PH,UA: 6
SL AMB POCT SPECIFIC GRAVITY,UA: 1.02
SL AMB POCT URINE PROTEIN: NORMAL
SL AMB POCT UROBILINOGEN: 0.2

## 2018-09-06 PROCEDURE — 81002 URINALYSIS NONAUTO W/O SCOPE: CPT | Performed by: UROLOGY

## 2018-09-06 PROCEDURE — 52000 CYSTOURETHROSCOPY: CPT | Performed by: UROLOGY

## 2018-09-06 NOTE — PROGRESS NOTES
Cystoscopy  Date/Time: 9/6/2018 10:48 AM  Performed by: Rodrigo Duque  Authorized by: Rodrigo Duque     Procedure details: cystoscopy      Written Consent Obtained      Indications for Procedure:   history of low-grade bladder cancer     Physical Exam     Constitutional   General appearance: No acute distress, well appearing and well nourished  Pulmonary   Respiratory effort: No increased work of breathing or signs of respiratory distress  Cardiovascular   Examination of extremities for edema and/or varicosities: Normal     Abdomen   Abdomen: Non-tender, no masses  Liver and spleen: No hepatomegaly or splenomegaly  Genitourinary   Normal phallus and meatus   Musculoskeletal   Gait and station: Normal     Skin   Skin and subcutaneous tissue: Normal without rashes or lesions  Lymphatic   Palpation of lymph nodes in groin: No lymphadenopathy  Additional Exam: Neuro exam nonfocal   The flexible cystoscope was introduced into the urethra and advanced into the bladder  URETHRA:  Normal,  without strictures or lesions  PROSTATE:  Moderate to severe bilobar obstruction  TRIGONE & UOs:   Normal anatomy with efflux of clear urine  No mucosal lesions or subtrigonal masses  BLADDER MUCOSA:  Normal, without neoplasms or other lesions  DETRUSOR: Normal capacity, without flaccidity, without excessive compliance, without trabeculation or diverticula, without uninhibited bladder contractions on fillings  RETROFLEXED SCOPE VIEW: Normal bladder neck    Plan:  Cystoscopy was negative  We will plan on repeat cysto in 3 months  At that point he will be a year out and we can proceed with 6 month cystoscopy schedule

## 2018-09-13 ENCOUNTER — OFFICE VISIT (OUTPATIENT)
Dept: INTERNAL MEDICINE CLINIC | Age: 83
End: 2018-09-13
Payer: MEDICARE

## 2018-09-13 VITALS
OXYGEN SATURATION: 96 % | BODY MASS INDEX: 24.24 KG/M2 | SYSTOLIC BLOOD PRESSURE: 138 MMHG | HEIGHT: 64 IN | TEMPERATURE: 97.3 F | WEIGHT: 142 LBS | DIASTOLIC BLOOD PRESSURE: 60 MMHG | HEART RATE: 80 BPM

## 2018-09-13 DIAGNOSIS — Z91.81 RISK FOR FALLS: ICD-10-CM

## 2018-09-13 DIAGNOSIS — N18.4 ANEMIA IN STAGE 4 CHRONIC KIDNEY DISEASE (HCC): ICD-10-CM

## 2018-09-13 DIAGNOSIS — D63.1 ANEMIA IN STAGE 4 CHRONIC KIDNEY DISEASE (HCC): ICD-10-CM

## 2018-09-13 DIAGNOSIS — Z00.00 MEDICARE ANNUAL WELLNESS VISIT, SUBSEQUENT: ICD-10-CM

## 2018-09-13 DIAGNOSIS — E11.22 TYPE 2 DIABETES MELLITUS WITH STAGE 3 CHRONIC KIDNEY DISEASE, WITHOUT LONG-TERM CURRENT USE OF INSULIN (HCC): Primary | ICD-10-CM

## 2018-09-13 DIAGNOSIS — N18.30 TYPE 2 DIABETES MELLITUS WITH STAGE 3 CHRONIC KIDNEY DISEASE, WITHOUT LONG-TERM CURRENT USE OF INSULIN (HCC): Primary | ICD-10-CM

## 2018-09-13 PROCEDURE — G0439 PPPS, SUBSEQ VISIT: HCPCS | Performed by: INTERNAL MEDICINE

## 2018-09-13 RX ORDER — GLIMEPIRIDE 1 MG/1
1 TABLET ORAL
Qty: 30 TABLET | Refills: 3 | Status: SHIPPED | OUTPATIENT
Start: 2018-09-13 | End: 2018-11-05 | Stop reason: SDUPTHER

## 2018-09-13 NOTE — PROGRESS NOTES
Assessment and Plan:    Problem List Items Addressed This Visit     Type 2 diabetes mellitus with diabetic chronic kidney disease ([facility] ) - Primary    Relevant Medications    glimepiride (AMARYL) 1 mg tablet    Other Relevant Orders    Basic metabolic panel    Anemia    Relevant Orders    Hemoglobin and hematocrit, blood    Retic Count      Other Visit Diagnoses     Medicare annual wellness visit, subsequent        Risk for falls            Health Maintenance Due   Topic Date Due    DTaP,Tdap,and Td Vaccines (1 - Tdap) 12/07/1955    Fall Risk  12/07/1999    DM Eye Exam  08/08/2018    INFLUENZA VACCINE  09/01/2018         HPI:  Genet Dc is a 80 y o  male here for his Subsequent Wellness Visit      Patient Active Problem List   Diagnosis    Asthma, mild intermittent    Benign essential hypertension    Bladder mass    Diabetes mellitus, type II ([facility] )    Glaucoma    Hyperlipidemia    Mild vitamin D deficiency    Organic impotence    Type 2 diabetes mellitus with diabetic chronic kidney disease ([facility] )    Primary osteoarthritis of both knees    Iliotibial band syndrome of both sides    Weight loss    Anemia     Past Medical History:   Diagnosis Date    Asthma     Cataracts, bilateral     Diabetes mellitus ([facility] )     Hypertension      Past Surgical History:   Procedure Laterality Date    CATARACT EXTRACTION Bilateral 07/15/2012    COLONOSCOPY      CYSTOSCOPY  01/15/2018    Last assessed 9/14/17, diagnostic    HERNIA REPAIR      INSTILLATION MYTOMYCIN N/A 10/25/2017    Procedure: INSTILLATION OF MITOMYCIN C;  Surgeon: Cathie Neri MD;  [facility];  Service: Urology    VT CYSTOURETHROSCOPY,FULGUR <0 5 CM LESN N/A 10/25/2017    Procedure: Fam Serrano; BLADDER BIOPSY WITH Jonathan Cabrera;  Surgeon: Cathie Neri MD;  [facility];  Service: Urology    TONSILLECTOMY      TRANSURETHRAL RESECTION OF BLADDER TUMOR N/A 10/25/2017    Procedure: TUR BLADDER TUMOR;  Surgeon: Sanjay Espinoza Aixa Frye MD;  [facility];  Service: Urology    WISDOM TOOTH EXTRACTION       Family History   Problem Relation Age of Onset    Diabetes Mother      History   Smoking Status    Former Smoker    Quit date: 1965   Smokeless Tobacco    Never Used     History   Alcohol Use No     Comment: quit 23 years ago      History   Drug Use No       Current Outpatient Prescriptions   Medication Sig Dispense Refill    amLODIPine (NORVASC) 10 mg tablet Take 1 tablet (10 mg total) by mouth daily 90 tablet 1    aspirin 325 mg tablet Take 325 mg by mouth daily      bimatoprost (LUMIGAN) 0 01 % ophthalmic drops Apply to eye      Cholecalciferol (VITAMIN D3) 1000 units CAPS Take by mouth      cyanocobalamin (VITAMIN B-12) 500 mcg tablet Take 500 mcg by mouth daily      fluticasone-salmeterol (ADVAIR DISKUS) 100-50 mcg/dose Inhale      glimepiride (AMARYL) 1 mg tablet Take 1 tablet (1 mg total) by mouth daily with breakfast 30 tablet 3    glucose blood (ONE TOUCH ULTRA TEST) test strip by In Vitro route daily      lidocaine (LMX) 4 % cream Apply topically as needed for mild pain 30 g 0    loteprednol etabonate (LOTEMAX) 0 5 % ophthalmic suspension Apply to eye      metoprolol succinate (TOPROL-XL) 50 mg 24 hr tablet Take 1 tablet by mouth daily      simvastatin (ZOCOR) 40 mg tablet Take 1 tablet by mouth daily      tamsulosin (FLOMAX) 0 4 mg Take 1 capsule by mouth      timolol (TIMOPTIC) 0 5 % ophthalmic solution Administer 1 drop to both eyes 2 (two) times a day      traMADol (ULTRAM) 50 mg tablet Take 1 tablet (50 mg total) by mouth 2 (two) times a day as needed for moderate pain 60 tablet 1     No current facility-administered medications for this visit        Allergies   Allergen Reactions    Ace Inhibitors      Other reaction(s): hyperkalemia    Penicillins GI Intolerance     Immunization History   Administered Date(s) Administered    Influenza Split High Dose Preservative Free IM 10/15/2012, 09/26/2013, 11/06/2014, 10/20/2015, 11/07/2016, 11/14/2017    Pneumococcal Conjugate 13-Valent 11/07/2016    Pneumococcal Polysaccharide PPV23 11/14/2017       Patient Care Team:  Jersey Hubbard MD as PCP - General  MD David Reeder MD    Medicare Screening Tests and Risk Assessments:  Alyx Monzon is here for his Subsequent Wellness visit  Last Medicare Wellness visit information reviewed, patient interviewed and updates made to the record today  Health Risk Assessment:  Patient rates overall health as fair  Patient feels that their physical health rating is Same  Eyesight was rated as Same  Hearing was rated as Same  Patient feels that their emotional and mental health rating is Same  Pain experienced by patient in the last 7 days has been None  Patient states that he has experienced weight loss or gain in last 6 months  (Additional comments: No obvious cause of wt loss bu did have bladder tumor and chronic kidney disease)    Emotional/Mental Health:  Patient has been feeling nervous/anxious  PHQ-9 Depression Screening:    Frequency of the following problems over the past two weeks:      1  Little interest or pleasure in doing things: 0 - not at all      2  Feeling down, depressed, or hopeless: 0 - not at all  PHQ-2 Score: 0          Broken Bones/Falls: Fall Risk Assessment:    In the past year, patient has experienced: No history of falling in past year          Bladder/Bowel:  Patient has not leaked urine accidently in the last six months  Immunizations:  Patient has had a flu vaccination within the last year  Patient has received a pneumonia shot  Patient has not received a shingles shot  Patient has not received tetanus/diphtheria shot  Home Safety:  Patient does not have trouble with stairs inside or outside of their home  Patient currently reports that there are no safety hazards present in home, working smoke alarms, working carbon monoxide detectors        Preventative Screenings:   prostate cancer screen performed, no colon cancer screen completed, cholesterol screen completed, glaucoma eye exam completed,     Nutrition:  Current diet: Diabetic and No Added Salt with servings of the following:    Medications:  Patient is not currently taking any over-the-counter supplements  Patient is able to manage medications  Lifestyle Choices:  Patient reports no tobacco use  Patient has not smoked or used tobacco in the past   Patient reports no alcohol use  Patient drives a vehicle  Patient wears seat belt  Current level of exercise of physical activity described by patient as: walks  Activities of Daily Living:  Can get out of bed by his or her self, able to dress self, able to make own meals, able to do own shopping, able to bathe self, can do own laundry/housekeeping, can manage own money, pay bills and track expenses    Previous Hospitalizations:  Hospitalization or ED visit in past 12 months  Additional Comments: Bladder mass    Advanced Directives:  Patient has decided on a power of   Patient has spoken to designated power of   Patient has not completed advanced directive  Social Support: Family , friend s and Jainism    Preventative Screening/Counseling:      Cardiovascular:      General: Screening Current          Diabetes:      General: Screening Current          Colorectal Cancer:      General: Screening Not Indicated          Prostate Cancer:      General: Screening Current          Osteoporosis:      General: Risks and Benefits Discussed          AAA:      General: Risks and Benefits Discussed          Glaucoma:      General: Screening Current          HIV:      General: Screening Not Indicated          Hepatitis C:      General: Screening Not Indicated        Advanced Directives:   Patient has no living will for healthcare, does not have durable POA for healthcare, patient does not have an advanced directive   Information on ACP and/or AD provided  End of life assessment reviewed with patient  Provider does not agree with end of life deisions  Immunizations:      Influenza: Influenza Due Today      Pneumococcal: Lifetime Vaccine Completed      TDAP: Risks & Benefits Discussed      Other Preventative Counseling (Non-Medicare):   Fall Prevention, Increase physical activity and Nutrition Counseling

## 2018-09-13 NOTE — PATIENT INSTRUCTIONS
Obesity   AMBULATORY CARE:   Obesity  is when your body mass index (BMI) is greater than 30  Your healthcare provider will use your height and weight to measure your BMI  The risks of obesity include  many health problems, such as injuries or physical disability  You may need tests to check for the following:  · Diabetes     · High blood pressure or high cholesterol     · Heart disease     · Gallbladder or liver disease     · Cancer of the colon, breast, prostate, liver, or kidney     · Sleep apnea     · Arthritis or gout  Seek care immediately if:   · You have a severe headache, confusion, or difficulty speaking  · You have weakness on one side of your body  · You have chest pain, sweating, or shortness of breath  Contact your healthcare provider if:   · You have symptoms of gallbladder or liver disease, such as pain in your upper abdomen  · You have knee or hip pain and discomfort while walking  · You have symptoms of diabetes, such as intense hunger and thirst, and frequent urination  · You have symptoms of sleep apnea, such as snoring or daytime sleepiness  · You have questions or concerns about your condition or care  Treatment for obesity  focuses on helping you lose weight to improve your health  Even a small decrease in BMI can reduce the risk for many health problems  Your healthcare provider will help you set a weight-loss goal   · Lifestyle changes  are the first step in treating obesity  These include making healthy food choices and getting regular physical activity  Your healthcare provider may suggest a weight-loss program that involves coaching, education, and therapy  · Medicine  may help you lose weight when it is used with a healthy diet and physical activity  · Surgery  can help you lose weight if you are very obese and have other health problems  There are several types of weight-loss surgery  Ask your healthcare provider for more information    Be successful losing weight:   · Set small, realistic goals  An example of a small goal is to walk for 20 minutes 5 days a week  Anther goal is to lose 5% of your body weight  · Tell friends, family members, and coworkers about your goals  and ask for their support  Ask a friend to lose weight with you, or join a weight-loss support group  · Identify foods or triggers that may cause you to overeat , and find ways to avoid them  Remove tempting high-calorie foods from your home and workplace  Place a bowl of fresh fruit on your kitchen counter  If stress causes you to eat, then find other ways to cope with stress  · Keep a diary to track what you eat and drink  Also write down how many minutes of physical activity you do each day  Weigh yourself once a week and record it in your diary  Eating changes: You will need to eat 500 to 1,000 fewer calories each day than you currently eat to lose 1 to 2 pounds a week  The following changes will help you cut calories:  · Eat smaller portions  Use small plates, no larger than 9 inches in diameter  Fill your plate half full of fruits and vegetables  Measure your food using measuring cups until you know what a serving size looks like  · Eat 3 meals and 1 or 2 snacks each day  Plan your meals in advance  Elyce [employer] and eat at home most of the time  Eat slowly  · Eat fruits and vegetables at every meal   They are low in calories and high in fiber, which makes you feel full  Do not add butter, margarine, or cream sauce to vegetables  Use herbs to season steamed vegetables  · Eat less fat and fewer fried foods  Eat more baked or grilled chicken and fish  These protein sources are lower in calories and fat than red meat  Limit fast food  Dress your salads with olive oil and vinegar instead of bottled dressing  · Limit the amount of sugar you eat  Do not drink sugary beverages  Limit alcohol  Activity changes:  Physical activity is good for your body in many ways   It helps you burn calories and build strong muscles  It decreases stress and depression, and improves your mood  It can also help you sleep better  Talk to your healthcare provider before you begin an exercise program   · Exercise for at least 30 minutes 5 days a week  Start slowly  Set aside time each day for physical activity that you enjoy and that is convenient for you  It is best to do both weight training and an activity that increases your heart rate, such as walking, bicycling, or swimming  · Find ways to be more active  Do yard work and housecleaning  Walk up the stairs instead of using elevators  Spend your leisure time going to events that require walking, such as outdoor festivals or fairs  This extra physical activity can help you lose weight and keep it off  Follow up with your healthcare provider as directed: You may need to meet with a dietitian  Write down your questions so you remember to ask them during your visits  © 2017 [address] [employer] Information is for End User's use only and may not be sold, redistributed or otherwise used for commercial purposes  All illustrations and images included in CareNotes® are the copyrighted property of [employer]  or Lit Baldwin  The above information is an  only  It is not intended as medical advice for individual conditions or treatments  Talk to your doctor, nurse or pharmacist before following any medical regimen to see if it is safe and effective for you  Urinary Incontinence   WHAT YOU NEED TO KNOW:   What is urinary incontinence? Urinary incontinence (UI) is when you lose control of your bladder  What causes UI? UI occurs because your bladder cannot store or empty urine properly  The following are the most common types of UI:  · Stress incontinence  is when you leak urine due to increased bladder pressure  This may happen when you cough, sneeze, or exercise       · Urge incontinence  is when you feel the need to urinate right away and leak urine accidentally  · Mixed incontinence  is when you have both stress and urge UI  What are the signs and symptoms of UI?   · You feel like your bladder does not empty completely when you urinate  · You urinate often and need to urinate immediately  · You leak urine when you sleep, or you wake up with the urge to urinate  · You leak urine when you cough, sneeze, exercise, or laugh  How is UI diagnosed? Your healthcare provider will ask how often you leak urine and whether you have stress or urge symptoms  Tell him which medicines you take, how often you urinate, and how much liquid you drink each day  You may need any of the following tests:  · Urine tests  may show infection or kidney function  · A pelvic exam  may be done to check for blockages  A pelvic exam will also show if your bladder, uterus, or other organs have moved out of place  · An x-ray, ultrasound, or CT  may show problems with parts of your urinary system  You may be given contrast liquid to help your organs show up better in the pictures  Tell the healthcare provider if you have ever had an allergic reaction to contrast liquid  Do not enter the MRI room with anything metal  Metal can cause serious injury  Tell the healthcare provider if you have any metal in or on your body  · A bladder scan  will show how much urine is left in your bladder after you urinate  You will be asked to urinate and then healthcare providers will use a small ultrasound machine to check the urine left in your bladder  · Cystometry  is used to check the function of your urinary system  Your healthcare provider checks the pressure in your bladder while filling it with fluid  Your bladder pressure may also be tested when your bladder is full and while you urinate  How is UI treated? · Medicines  can help strengthen your bladder control      · Electrical stimulation  is used to send a small amount of electrical energy to your pelvic floor muscles  This helps control your bladder function  Electrodes may be placed outside your body or in your rectum  For women, the electrodes may be placed in the vagina  · A bulking agent  may be injected into the wall of your urethra to make it thicker  This helps keep your urethra closed and decreases urine leakage  · Devices  such as a clamp, pessary, or tampon may help stop urine leaks  Ask your healthcare provider for more information about these and other devices  · Surgery  may be needed if other treatments do not work  Several types of surgery can help improve your bladder control  Ask your healthcare provider for more information about the surgery you may need  How can I manage my symptoms? · Do pelvic muscle exercises often  Your pelvic muscles help you stop urinating  Squeeze these muscles tight for 5 seconds, then relax for 5 seconds  Gradually work up to squeezing for 10 seconds  Do 3 sets of 15 repetitions a day, or as directed  This will help strengthen your pelvic muscles and improve bladder control  · A catheter  may be used to help empty your bladder  A catheter is a tiny, plastic tube that is put into your bladder to drain your urine  Your healthcare provider may tell you to use a catheter to prevent your bladder from getting too full and leaking urine  · Keep a UI record  Write down how often you leak urine and how much you leak  Make a note of what you were doing when you leaked urine  · Train your bladder  Go to the bathroom at set times, such as every 2 hours, even if you do not feel the urge to go  You can also try to hold your urine when you feel the urge to go  For example, hold your urine for 5 minutes when you feel the urge to go  As that becomes easier, hold your urine for 10 minutes  · Drink liquids as directed  Ask your healthcare provider how much liquid to drink each day and which liquids are best for you   You may need to limit the amount of liquid you drink to help control your urine leakage  Limit or do not have drinks that contain caffeine or alcohol  Do not drink any liquid right before you go to bed  · Prevent constipation  Eat a variety of high-fiber foods  Good examples are high-fiber cereals, beans, vegetables, and whole-grain breads  Prune juice may help make your bowel movement softer  Walking is the best way to trigger your intestines to have a bowel movement  · Exercise regularly and maintain a healthy weight  Ask your healthcare provider how much you should weigh and about the best exercise plan for you  Weight loss and exercise will decrease pressure on your bladder and help you control your leakage  Ask him to help you create a weight loss plan if you are overweight  When should I seek immediate care? · You have severe pain  · You are confused or cannot think clearly  When should I contact my healthcare provider? · You have a fever  · You see blood in your urine  · You have pain when you urinate  · You have new or worse pain, even after treatment  · Your mouth feels dry or you have vision changes  · Your urine is cloudy or smells bad  · You have questions or concerns about your condition or care  CARE AGREEMENT:   You have the right to help plan your care  Learn about your health condition and how it may be treated  Discuss treatment options with your caregivers to decide what care you want to receive  You always have the right to refuse treatment  The above information is an  only  It is not intended as medical advice for individual conditions or treatments  Talk to your doctor, nurse or pharmacist before following any medical regimen to see if it is safe and effective for you  © 2017 [address] [employer] Information is for End User's use only and may not be sold, redistributed or otherwise used for commercial purposes   All illustrations and images included in CareNotes® are the copyrighted property of [employer]  or Lit Baldwin  Cigarette Smoking and Your Health   AMBULATORY CARE:   Risks to your health if you smoke:  Nicotine and other chemicals found in tobacco damage every cell in your body  Even if you are a light smoker, you have an increased risk for cancer, heart disease, and lung disease  If you are pregnant or have diabetes, smoking increases your risk for complications  Benefits to your health if you stop smoking:   · You decrease respiratory symptoms such as coughing, wheezing, and shortness of breath  · You reduce your risk for cancers of the lung, mouth, throat, kidney, bladder, pancreas, stomach, and cervix  If you already have cancer, you increase the benefits of chemotherapy  You also reduce your risk for cancer returning or a second cancer from developing  · You reduce your risk for heart disease, blood clots, heart attack, and stroke  · You reduce your risk for lung infections, and diseases such as pneumonia, asthma, chronic bronchitis, and emphysema  · Your circulation improves  More oxygen can be delivered to your body  If you have diabetes, you lower your risk for complications, such as kidney, artery, and eye diseases  You also lower your risk for nerve damage  Nerve damage can lead to amputations, poor vision, and blindness  · You improve your body's ability to heal and to fight infections  Benefits to the health of others if you stop smoking:  Tobacco is harmful to nonsmokers who breathe in your secondhand smoke  The following are ways the health of others around you may improve when you stop smoking:  · You lower the risks for lung cancer and heart disease in nonsmoking adults  · If you are pregnant, you lower the risk for miscarriage, early delivery, low birth weight, and stillbirth  You also lower your baby's risk for SIDS, obesity, developmental delay, and neurobehavioral problems, such as ADHD  · If you have children, you lower their risk for ear infections, colds, pneumonia, bronchitis, and asthma  For more information and support to stop smoking:   · Smokefree  gov  Phone: 9- 124 - 177-7208  Web Address: www smokefrYamsafer  Follow up with your healthcare provider as directed:  Write down your questions so you remember to ask them during your visits  © 2017 [address] [employer] Information is for End User's use only and may not be sold, redistributed or otherwise used for commercial purposes  All illustrations and images included in CareNotes® are the copyrighted property of [employer]  or Lit Baldwin  The above information is an  only  It is not intended as medical advice for individual conditions or treatments  Talk to your doctor, nurse or pharmacist before following any medical regimen to see if it is safe and effective for you  Fall Prevention   WHAT YOU NEED TO KNOW:   What is fall prevention? Fall prevention includes ways to make your home and other areas safer  It also includes ways you can move more carefully to prevent a fall  What increases my risk for falls? · Lack of vitamin D    · Not getting enough sleep each night    · Trouble walking or keeping your balance, or foot problems    · Health conditions that cause changes in your blood pressure, vision, or muscle strength and coordination    · Medicines that make you dizzy, weak, or sleepy    · Problems seeing clearly    · Shoes that have high heels or are not supportive    · Tripping hazards, such as items left on the floor, no handrails on the stairs, or broken steps  How can I help protect myself from falls? · Stand or sit up slowly  This may help you keep your balance and prevent falls  If you need to get up during the night, sit up first  Be sure you are fully awake before you stand  Turn on the light before you start walking  Go slowly in case you are still sleepy   Make sure you will not trip over any pets sleeping in the bedroom  · Use assistive devices as directed  Your healthcare provider may suggest that you use a cane or walker to help you keep your balance  You may need to have grab bars put in your bathroom near the toilet or in the shower  · Wear shoes that fit well and have soles that   Wear shoes both inside and outside  Use slippers with good   Do not wear shoes with high heels  · Wear a personal alarm  This is a device that allows you to call 911 if you fall and need help  Ask your healthcare provider for more information  · Stay active  Exercise can help strengthen your muscles and improve your balance  Your healthcare provider may recommend water aerobics or walking  He or she may also recommend physical therapy to improve your coordination  Never start an exercise program without talking to your healthcare provider first      · Manage medical conditions  Keep all appointments with your healthcare providers  Visit your eye doctor as directed  How can I make my home safer? · Add items to prevent falls in the bathroom  Put nonslip strips on your bath or shower floor to prevent you from slipping  Use a bath mat if you do not have carpet in the bathroom  This will prevent you from falling when you step out of the bath or shower  Use a shower seat so you do not need to stand while you shower  Sit on the toilet or a chair in your bathroom to dry yourself and put on clothing  This will prevent you from losing your balance from drying or dressing yourself while you are standing  · Keep paths clear  Remove books, shoes, and other objects from walkways and stairs  Place cords for telephones and lamps out of the way so that you do not need to walk over them  Tape them down if you cannot move them  Remove small rugs  If you cannot remove a rug, secure it with double-sided tape  This will prevent you from tripping  · Install bright lights in your home  Use night lights to help light paths to the bathroom or kitchen  Always turn on the light before you start walking  · Keep items you use often on shelves within reach  Do not use a step stool to help you reach an item  · Paint or place reflective tape on the edges of your stairs  This will help you see the stairs better  Call 911 or have someone else call if:   · You have fallen and are unconscious  · You have fallen and cannot move part of your body  Contact your healthcare provider if:   · You have fallen and have pain or a headache  · You have questions or concerns about your condition or care  CARE AGREEMENT:   You have the right to help plan your care  Learn about your health condition and how it may be treated  Discuss treatment options with your caregivers to decide what care you want to receive  You always have the right to refuse treatment  The above information is an  only  It is not intended as medical advice for individual conditions or treatments  Talk to your doctor, nurse or pharmacist before following any medical regimen to see if it is safe and effective for you  © 2017 [address] [facility] Information is for End User's use only and may not be sold, redistributed or otherwise used for commercial purposes  All illustrations and images included in CareNotes® are the copyrighted property of [employer]  or Lit Baldwin  Advance Directives   WHAT YOU NEED TO KNOW:   What are advance directives? Advance directives are legal documents that state your wishes and plans for medical care  These plans are made ahead of time in case you lose your ability to make decisions for yourself  Advance directives can apply to any medical decision, such as the treatments you want, and if you want to donate organs  What are the types of advance directives? There are many types of advance directives, and each state has rules about how to use them   You may choose a combination of any of the following:  · Living will: This is a written record of the treatment you want  You can also choose which treatments you do not want, which to limit, and which to stop at a certain time  This includes surgery, medicine, IV fluid, and tube feedings  · Durable power of  for healthcare [facility]): This is a written record that states who you want to make healthcare choices for you when you are unable to make them for yourself  This person, called a proxy, is usually a family member or a friend  You may choose more than 1 proxy  · Do not resuscitate (DNR) order:  A DNR order is used in case your heart stops beating or you stop breathing  It is a request not to have certain forms of treatment, such as CPR  A DNR order may be included in other types of advance directives  · Medical directive: This covers the care that you want if you are in a coma, near death, or unable to make decisions for yourself  You can list the treatments you want for each condition  Treatment may include pain medicine, surgery, blood transfusions, dialysis, IV or tube feedings, and a ventilator (breathing machine)  · Values history: This document has questions about your views, beliefs, and how you feel and think about life  This information can help others choose the care that you would choose  Why are advance directives important? An advance directive helps you control your care  Although spoken wishes may be used, it is better to have your wishes written down  Spoken wishes can be misunderstood, or not followed  Treatments may be given even if you do not want them  An advance directive may make it easier for your family to make difficult choices about your care  How do I decide what to put in my advance directives? · Make informed decisions:  Make sure you fully understand treatments or care you may receive   Think about the benefits and problems your decisions could cause for you or your family  Talk to healthcare providers if you have concerns or questions before you write down your wishes  You may also want to talk with your Protestant or , or a   Check your state laws to make sure that what you put in your advance directive is legal      · Sign all forms:  Sign and date your advance directive when you have finished  You may also need 2 witnesses to sign the forms  Witnesses cannot be your doctor or his staff, your spouse, heirs or beneficiaries, people you owe money to, or your chosen proxy  Talk to your family, proxy, and healthcare providers about your advance directive  Give each person a copy, and keep one for yourself in a place you can get to easily  Do not keep it hidden or locked away  · Review and revise your plans: You can revise your advance directive at any time, as long as you are able to make decisions  Review your plan every year, and when there are changes in your life, or your health  When you make changes, let your family, proxy, and healthcare providers know  Give each a new copy  Where can I find more information? · [employer]  [address] , [address]  Phone: 3- 487 - [phone]  Phone: 0- [phone] - [phone]  Web Address: http://www  aafp org  · [address] [facility])  [address] [facility], [address]  Phone: [phone]  Phone: [phone]  Web Address: Fatoumata mcmahon  [facility] AGREEMENT:   You have the right to help plan your care  To help with this plan, you must learn about your health condition and treatment options  You must also learn about advance directives and how they are used  Work with your healthcare providers to decide what care will be used to treat you  You always have the right to refuse treatment  The above information is an  only   It is not intended as medical advice for individual conditions or treatments  Talk to your doctor, nurse or pharmacist before following any medical regimen to see if it is safe and effective for you  © 2017 [facility] Information is for End User's use only and may not be sold, redistributed or otherwise used for commercial purposes  All illustrations and images included in CareNotes® are the copyrighted property of [employer] M [employer]  or Lit Baldwin

## 2018-09-13 NOTE — PROGRESS NOTES
Assessment/Plan:    No problem-specific Assessment & Plan notes found for this encounter  {Assess/PlanSmartLinks:51191}      Subjective:      Patient ID: Estuardo Peterson is a 80 y o  male      HPI    {Common ambulatory SmartLinks:27235}    Review of Systems      Objective:      /60 (BP Location: Left arm, Patient Position: Sitting)   Pulse 80   Temp (!) 97 3 °F (36 3 °C) (Tympanic)   Ht 5' 4" (1 626 m)   Wt 64 4 kg (142 lb)   SpO2 96%   BMI 24 37 kg/m²          Physical Exam      Assessment and Plan:    Problem List Items Addressed This Visit     Type 2 diabetes mellitus with diabetic chronic kidney disease ([facility] ) - Primary    Relevant Medications    glimepiride (AMARYL) 1 mg tablet    Other Relevant Orders    Basic metabolic panel    Anemia    Relevant Orders    Hemoglobin and hematocrit, blood    Retic Count      Other Visit Diagnoses     Medicare annual wellness visit, subsequent        Risk for falls            Health Maintenance Due   Topic Date Due    DTaP,Tdap,and Td Vaccines (1 - Tdap) 12/07/1955    Fall Risk  12/07/1999    DM Eye Exam  08/08/2018    INFLUENZA VACCINE  09/01/2018         HPI:  Estuardo Peterson is a 80 y o  male here for his {AWV Welcome/Initial/Subsequent:25297}    Patient Active Problem List   Diagnosis    Asthma, mild intermittent    Benign essential hypertension    Bladder mass    Diabetes mellitus, type II ([address] )    Glaucoma    Hyperlipidemia    Mild vitamin D deficiency    Organic impotence    Type 2 diabetes mellitus with diabetic chronic kidney disease ([facility] )    Primary osteoarthritis of both knees    Iliotibial band syndrome of both sides    Weight loss    Anemia     Past Medical History:   Diagnosis Date    Asthma     Cataracts, bilateral     Diabetes mellitus ([address] )     Hypertension      Past Surgical History:   Procedure Laterality Date    CATARACT EXTRACTION Bilateral 07/15/2012    COLONOSCOPY      CYSTOSCOPY  01/15/2018    Last assessed 9/14/17, diagnostic    HERNIA REPAIR      INSTILLATION MYTOMYCIN N/A 10/25/2017    Procedure: INSTILLATION OF MITOMYCIN C;  Surgeon: Jarvis Hay MD;  [facility];  Service: Urology    OR CYSTOURETHROSCOPY,FULGUR <0 5 CM LESN N/A 10/25/2017    Procedure: Ulanda Course; BLADDER BIOPSY WITH Kayode Jacinto;  Surgeon: Jarvis Hay MD;  [facility];  Service: Urology    TONSILLECTOMY      TRANSURETHRAL RESECTION OF BLADDER TUMOR N/A 10/25/2017    Procedure: TUR BLADDER TUMOR;  Surgeon: Jarvis Hay MD;  [facility];  Service: Urology    WISDOM TOOTH EXTRACTION       Family History   Problem Relation Age of Onset    Diabetes Mother      History   Smoking Status    Former Smoker    Quit date: 1965   Smokeless Tobacco    Never Used     History   Alcohol Use No     Comment: quit 23 years ago      History   Drug Use No       Current Outpatient Prescriptions   Medication Sig Dispense Refill    amLODIPine (NORVASC) 10 mg tablet Take 1 tablet (10 mg total) by mouth daily 90 tablet 1    aspirin 325 mg tablet Take 325 mg by mouth daily      bimatoprost (LUMIGAN) 0 01 % ophthalmic drops Apply to eye      Cholecalciferol (VITAMIN D3) 1000 units CAPS Take by mouth      cyanocobalamin (VITAMIN B-12) 500 mcg tablet Take 500 mcg by mouth daily      fluticasone-salmeterol (ADVAIR DISKUS) 100-50 mcg/dose Inhale      glimepiride (AMARYL) 1 mg tablet Take 1 tablet (1 mg total) by mouth daily with breakfast 30 tablet 3    glucose blood (ONE TOUCH ULTRA TEST) test strip by In Vitro route daily      lidocaine (LMX) 4 % cream Apply topically as needed for mild pain 30 g 0    loteprednol etabonate (LOTEMAX) 0 5 % ophthalmic suspension Apply to eye      metoprolol succinate (TOPROL-XL) 50 mg 24 hr tablet Take 1 tablet by mouth daily      simvastatin (ZOCOR) 40 mg tablet Take 1 tablet by mouth daily      tamsulosin (FLOMAX) 0 4 mg Take 1 capsule by mouth      timolol (TIMOPTIC) 0 5 % ophthalmic solution Administer 1 drop to both eyes 2 (two) times a day      traMADol (ULTRAM) 50 mg tablet Take 1 tablet (50 mg total) by mouth 2 (two) times a day as needed for moderate pain 60 tablet 1     No current facility-administered medications for this visit        Allergies   Allergen Reactions    Ace Inhibitors      Other reaction(s): hyperkalemia    Penicillins GI Intolerance     Immunization History   Administered Date(s) Administered    Influenza Split High Dose Preservative Free IM 10/15/2012, 09/26/2013, 11/06/2014, 10/20/2015, 11/07/2016, 11/14/2017    Pneumococcal Conjugate 13-Valent 11/07/2016    Pneumococcal Polysaccharide PPV23 11/14/2017       Patient Care Team:  Sukumar Brown MD as PCP - General  Noble Reding, MD Candance Netter, MD    Medicare Screening Tests and Risk Assessments:  Medicare Annual Wellness Visit

## 2018-09-14 NOTE — ASSESSMENT & PLAN NOTE
Patient is an kidney disease probably on the basis of his diabetes and the fact that his blood pressure has been difficult to control to most the life

## 2018-09-14 NOTE — ASSESSMENT & PLAN NOTE
Continues with slow weight loss despite good appetite at home    His thyroid level was normal at last check

## 2018-09-14 NOTE — ASSESSMENT & PLAN NOTE
Lab Results   Component Value Date    HGBA1C 7 3 (H) 08/27/2018       No results for input(s): POCGLU in the last 72 hours  Blood Sugar Average: Last 72 hrs:   patient's blood sugar is still adequately controlled but slowly his A1c is increased  He also really wants to gone up cheaper than Tradjenta and neck cannot take metformin due to renal insufficiency    Will try Amaryl 1 mg daily

## 2018-09-14 NOTE — ASSESSMENT & PLAN NOTE
He also has worsening anemia with a hemoglobin now at 9 4 normocytic normal indices  I  Believe it is probably either due to his stage IV renal disease versus myelodysplasia  Both he and his wife discussed how to  Proceed  He does not want to see a specialist and is not interested in extensive workup at present  His wife does have probable myelodysplasia so he understands fully    I will continue to monitor his blood work closely and he is all all nephrotoxin drugs finally

## 2018-09-14 NOTE — PROGRESS NOTES
Assessment/Plan:    Type 2 diabetes mellitus with diabetic chronic kidney disease ([place] [address] )  Lab Results   Component Value Date    HGBA1C 7 3 (H) 08/27/2018       No results for input(s): POCGLU in the last 72 hours  Blood Sugar Average: Last 72 hrs:   patient's blood sugar is still adequately controlled but slowly his A1c is increased  He also really wants to gone up cheaper than Tradjenta and neck cannot take metformin due to renal insufficiency  Will try Amaryl 1 mg daily    Benign essential hypertension   His blood pressure is well controlled with Norvasc and beta-blocker    Weight loss   Continues with slow weight loss despite good appetite at home  His thyroid level was normal at last check    Anemia   He also has worsening anemia with a hemoglobin now at 9 4 normocytic normal indices  I  Believe it is probably either due to his stage IV renal disease versus myelodysplasia  Both he and his wife discussed how to  Proceed  He does not want to see a specialist and is not interested in extensive workup at present  His wife does have probable myelodysplasia so he understands fully  I will continue to monitor his blood work closely and he is all all nephrotoxin drugs finally    Chronic renal impairment, stage 4 (severe) ([place] [address] )   Patient is an kidney disease probably on the basis of his diabetes and the fact that his blood pressure has been difficult to control to most the life       Diagnoses and all orders for this visit:    Type 2 diabetes mellitus with stage 3 chronic kidney disease, without long-term current use of insulin ([place] [address] )  -     Basic metabolic panel; Future  -     glimepiride (AMARYL) 1 mg tablet; Take 1 tablet (1 mg total) by mouth daily with breakfast    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)  -     Hemoglobin and hematocrit, blood; Future  -     Retic Count;  Future    Medicare annual wellness visit, subsequent    Risk for falls          Subjective:      Patient ID: Karyn Koehler is a 80 y o  male  He has multiple chronic medical diseases but feels fine  1  His type 2 diabetes which has been present for years and mostly controlled and he is no obvious complications from same  He even has minimal micro albuminuria    He has however also had longstanding hypertension which is not always been controlled partially due to compliance issues  He does not have chest pain shortness of breath claudication or headaches and has in the past had cardiac workup    Subsequently he does has stage IV renal disease with creatinine in the mid 2s  He is finally off nephrotoxin drugs and his numbers have improved slightly  Next he has developed normochromic normocytic anemia  Has had colonoscopy in the past and has normal iron studies    Finally he is slowly losing weight over the past year  His wife states that he is is active is ever and is eating as much as ever            Review of Systems   Constitutional: Negative for chills, fatigue, fever and unexpected weight change  HENT: Negative for congestion, ear pain, hearing loss, postnasal drip, sinus pressure, sore throat, trouble swallowing and voice change  Eyes: Negative for visual disturbance  Respiratory: Negative for cough, chest tightness, shortness of breath and wheezing  Cardiovascular: Negative for chest pain, palpitations and leg swelling  Gastrointestinal: Negative for abdominal distention, abdominal pain, anal bleeding, blood in stool, constipation, diarrhea and nausea  Endocrine: Negative for cold intolerance, polydipsia, polyphagia and polyuria  Genitourinary: Negative for dysuria, flank pain, frequency, hematuria and urgency  Musculoskeletal: Negative for arthralgias, back pain, gait problem, joint swelling, myalgias and neck pain  Skin: Negative for rash  Allergic/Immunologic: Negative for immunocompromised state     Neurological: Negative for dizziness, syncope, facial asymmetry, weakness, light-headedness, numbness and headaches  Hematological: Negative for adenopathy  Psychiatric/Behavioral: Negative for confusion, sleep disturbance and suicidal ideas  Objective:      /60 (BP Location: Left arm, Patient Position: Sitting)   Pulse 80   Temp (!) 97 3 °F (36 3 °C) (Tympanic)   Ht 5' 4" (1 626 m)   Wt 64 4 kg (142 lb)   SpO2 96%   BMI 24 37 kg/m²          Physical Exam   Constitutional: He is oriented to person, place, and time  He appears well-developed and well-nourished  No distress  HENT:   Right Ear: External ear normal    Left Ear: External ear normal    Nose: Nose normal    Mouth/Throat: Oropharynx is clear and moist  No oropharyngeal exudate  Eyes: EOM are normal  Pupils are equal, round, and reactive to light  Neck: Normal range of motion  Neck supple  No JVD present  No thyromegaly present  Cardiovascular: Normal rate, regular rhythm, normal heart sounds and intact distal pulses  Exam reveals no gallop  No murmur heard  Pulmonary/Chest: Effort normal and breath sounds normal  No respiratory distress  He has no wheezes  He has no rales  Abdominal: Soft  Bowel sounds are normal  He exhibits no distension and no mass  There is no tenderness  Musculoskeletal: Normal range of motion  He exhibits no tenderness  Lymphadenopathy:     He has no cervical adenopathy  Neurological: He is alert and oriented to person, place, and time  No cranial nerve deficit  Coordination normal    Skin: No rash noted  Psychiatric: He has a normal mood and affect  His behavior is normal  Judgment and thought content normal    Vitals reviewed

## 2018-11-05 DIAGNOSIS — N18.30 TYPE 2 DIABETES MELLITUS WITH STAGE 3 CHRONIC KIDNEY DISEASE, WITHOUT LONG-TERM CURRENT USE OF INSULIN (HCC): ICD-10-CM

## 2018-11-05 DIAGNOSIS — E11.22 TYPE 2 DIABETES MELLITUS WITH STAGE 3 CHRONIC KIDNEY DISEASE, WITHOUT LONG-TERM CURRENT USE OF INSULIN (HCC): ICD-10-CM

## 2018-11-05 RX ORDER — GLIMEPIRIDE 1 MG/1
1 TABLET ORAL
Qty: 90 TABLET | Refills: 1 | Status: SHIPPED | OUTPATIENT
Start: 2018-11-05 | End: 2019-10-28 | Stop reason: SDUPTHER

## 2018-11-28 LAB
BUN SERPL-MCNC: 31 MG/DL (ref 7–25)
BUN/CREAT SERPL: 16 (CALC) (ref 6–22)
CALCIUM SERPL-MCNC: 9.2 MG/DL (ref 8.6–10.3)
CHLORIDE SERPL-SCNC: 107 MMOL/L (ref 98–110)
CO2 SERPL-SCNC: 25 MMOL/L (ref 20–32)
CREAT SERPL-MCNC: 1.95 MG/DL (ref 0.7–1.11)
GLUCOSE SERPL-MCNC: 124 MG/DL (ref 65–99)
HCT VFR BLD AUTO: 31.2 % (ref 38.5–50)
HGB BLD-MCNC: 10.2 G/DL (ref 13.2–17.1)
POTASSIUM SERPL-SCNC: 4 MMOL/L (ref 3.5–5.3)
RETICS #: NORMAL CELLS/UL (ref 25000–90000)
RETICS/RBC NFR AUTO: 1.1 %
SL AMB EGFR AFRICAN AMERICAN: 36 ML/MIN/1.73M2
SL AMB EGFR NON AFRICAN AMERICAN: 31 ML/MIN/1.73M2
SODIUM SERPL-SCNC: 140 MMOL/L (ref 135–146)

## 2018-12-10 ENCOUNTER — OFFICE VISIT (OUTPATIENT)
Dept: INTERNAL MEDICINE CLINIC | Age: 83
End: 2018-12-10
Payer: MEDICARE

## 2018-12-10 VITALS
DIASTOLIC BLOOD PRESSURE: 60 MMHG | SYSTOLIC BLOOD PRESSURE: 122 MMHG | HEART RATE: 77 BPM | WEIGHT: 141 LBS | OXYGEN SATURATION: 98 % | BODY MASS INDEX: 24.07 KG/M2 | TEMPERATURE: 97.3 F | HEIGHT: 64 IN

## 2018-12-10 DIAGNOSIS — E78.5 HYPERLIPIDEMIA, UNSPECIFIED HYPERLIPIDEMIA TYPE: ICD-10-CM

## 2018-12-10 DIAGNOSIS — D64.9 ANEMIA, UNSPECIFIED TYPE: ICD-10-CM

## 2018-12-10 DIAGNOSIS — N18.30 TYPE 2 DIABETES MELLITUS WITH STAGE 3 CHRONIC KIDNEY DISEASE, WITHOUT LONG-TERM CURRENT USE OF INSULIN (HCC): Primary | ICD-10-CM

## 2018-12-10 DIAGNOSIS — E11.22 TYPE 2 DIABETES MELLITUS WITH STAGE 3 CHRONIC KIDNEY DISEASE, WITHOUT LONG-TERM CURRENT USE OF INSULIN (HCC): Primary | ICD-10-CM

## 2018-12-10 PROBLEM — N18.4 ANEMIA DUE TO STAGE 4 CHRONIC KIDNEY DISEASE (HCC): Status: ACTIVE | Noted: 2018-08-17

## 2018-12-10 PROBLEM — D63.1 ANEMIA DUE TO STAGE 4 CHRONIC KIDNEY DISEASE (HCC): Status: ACTIVE | Noted: 2018-08-17

## 2018-12-10 PROCEDURE — 99214 OFFICE O/P EST MOD 30 MIN: CPT | Performed by: INTERNAL MEDICINE

## 2018-12-11 NOTE — ASSESSMENT & PLAN NOTE
Lab Results   Component Value Date    HGBA1C 7 3 (H) 08/27/2018       No results for input(s): POCGLU in the last 72 hours  Blood Sugar Average: Last 72 hrs:   states his fasting blood sugars been lying mid to low 100s without hypoglycemia  He is due for an A1c  After being off Ace inhibitors metformin and diuretics    His creatinine has significantly declined and is now under to

## 2018-12-11 NOTE — PROGRESS NOTES
Assessment/Plan:    Type 2 diabetes mellitus with diabetic chronic kidney disease ([facility] )  Lab Results   Component Value Date    HGBA1C 7 3 (H) 08/27/2018       No results for input(s): POCGLU in the last 72 hours  Blood Sugar Average: Last 72 hrs:   states his fasting blood sugars been lying mid to low 100s without hypoglycemia  He is due for an A1c  After being off Ace inhibitors metformin and diuretics  His creatinine has significantly declined and is now under to    Benign essential hypertension  His blood pressure is also remained under control on metoprolol and Norvasc    Primary osteoarthritis of both knees  He does continue on office nonsteroidals and complains of joint pain but is managing with Tylenol    Weight loss  His weight appears to have stabilized    Anemia  Significant workup of his anemia with blood work is filled to show a cause for it and appears to be of chronic disease probably on the basis of his chronic renal disease  It appears relatively stable       Diagnoses and all orders for this visit:    Type 2 diabetes mellitus with stage 3 chronic kidney disease, without long-term current use of insulin ([facility])  -     Basic metabolic panel; Future  -     Hemoglobin A1C; Future  -     glucose blood (ONETOUCH VERIO) test strip; CHECK BLOOD SUGAR TWICE DAILY    DX CODE E11 9    Anemia, unspecified type  -     CBC and differential; Future    Hyperlipidemia, unspecified hyperlipidemia type  -     Lipid panel; Future          Subjective:      Patient ID: Teresa Varma is a 80 y o  male  Despite is numerous medical problems he appears to be doing well and has no new complaints  He continues to complain of his knees and has seen Ortho over the past year and come off nonsteroidals due to kidney function  His diabetes remains relatively stable on oral agents but no metformin or Ace  And his blood pressure which over the years has been difficult to control is now stable              Review of Systems Constitutional: Negative for chills, fatigue, fever and unexpected weight change  HENT: Negative for congestion, ear pain, hearing loss, postnasal drip, sinus pressure, sore throat, trouble swallowing and voice change  Eyes: Negative for visual disturbance  Respiratory: Negative for cough, chest tightness, shortness of breath and wheezing  Cardiovascular: Negative for chest pain, palpitations and leg swelling  Gastrointestinal: Negative for abdominal distention, abdominal pain, anal bleeding, blood in stool, constipation, diarrhea and nausea  Endocrine: Negative for cold intolerance, polydipsia, polyphagia and polyuria  Genitourinary: Negative for dysuria, flank pain, frequency, hematuria and urgency  Musculoskeletal: Negative for arthralgias, back pain, gait problem, joint swelling, myalgias and neck pain  Knee pain   Skin: Negative for rash  Allergic/Immunologic: Negative for immunocompromised state  Neurological: Negative for dizziness, syncope, facial asymmetry, weakness, light-headedness, numbness and headaches  Hematological: Negative for adenopathy  Psychiatric/Behavioral: Negative for confusion, sleep disturbance and suicidal ideas  Objective:      /60 (BP Location: Left arm, Patient Position: Sitting)   Pulse 77   Temp (!) 97 3 °F (36 3 °C) (Tympanic)   Ht 5' 4" (1 626 m)   Wt 64 kg (141 lb)   SpO2 98%   BMI 24 20 kg/m²          Physical Exam   Constitutional: He is oriented to person, place, and time  He appears well-developed and well-nourished  No distress  HENT:   Right Ear: External ear normal    Left Ear: External ear normal    Nose: Nose normal    Mouth/Throat: Oropharynx is clear and moist  No oropharyngeal exudate  Eyes: Pupils are equal, round, and reactive to light  EOM are normal    Neck: Normal range of motion  Neck supple  No JVD present  No thyromegaly present     Cardiovascular: Normal rate, regular rhythm, normal heart sounds and intact distal pulses  Exam reveals no gallop  No murmur heard  Pulmonary/Chest: Effort normal and breath sounds normal  No respiratory distress  He has no wheezes  He has no rales  Abdominal: Soft  Bowel sounds are normal  He exhibits no distension and no mass  There is no tenderness  Musculoskeletal: Normal range of motion  He exhibits no edema or tenderness  Crepitation knees   Lymphadenopathy:     He has no cervical adenopathy  Neurological: He is alert and oriented to person, place, and time  No cranial nerve deficit  Coordination normal    Skin: No rash noted  Psychiatric: He has a normal mood and affect   His behavior is normal  Judgment and thought content normal

## 2018-12-11 NOTE — PATIENT INSTRUCTIONS
Diabetic Kidney Disease   AMBULATORY CARE:   Diabetic kidney disease  (DKD) is the gradual and permanent loss of kidney function  This occurs because of kidney damage caused by high blood sugar levels  Normally, the kidneys remove fluid, chemicals, and waste from your blood  These wastes are turned into urine by your kidneys  When you have DKD, your kidneys do not function properly  DKD may worsen over time and lead to kidney failure  Common signs and symptoms of DKD include the following: Your signs and symptoms will depend on how well your kidneys work  You may not have any symptoms, or you may have any of the following:  · Changes in how often you need to urinate    · Ankle and leg swelling    · Fatigue or weakness    · Itching    · Muscle cramps    · Nausea, vomiting, or loss of appetite  Call 911 for any of the following:   · You have a seizure  · You have sudden chest pain or shortness of breath  Seek care immediately:   · Your heart is beating faster than normal for you  · You are confused and very drowsy  Contact your healthcare provider if:   · You suddenly gain or lose more weight than your healthcare provider has told you is okay  · You have itchy skin or a rash  · You have nausea and repeated vomiting  · You have fatigue or muscle weakness  · You have an increased need to urinate, burning or pain when you urinate, blood in your urine, or strong odor to your urine  · You have questions or concerns about your condition or care  The goals of treatment for diabetic kidney disease  are to control your symptoms and prevent your DKD from getting worse  You may need any of the following:  · Medicines  may be given to decrease blood pressure and get rid of extra fluid  Blood pressure medicines can help to slow down the loss of kidney function       · Dialysis  is a treatment that may be needed to remove chemicals and waste from your blood when your kidneys can no longer do this     · Surgery  may be needed to create an arteriovenous fistula (AVF) in your arm or insert a catheter into your abdomen or chest  This is done so you can receive dialysis  · A kidney transplant  may be done if your DKD becomes severe  Manage diabetic kidney disease:   · Control your blood sugar levels  If you use insulin or take diabetes medicine, take these as directed  Follow the meal and exercise plan recommended by your healthcare provider  Check your blood sugar levels every day, as often as your healthcare provider has recommended  Your healthcare provider may want you to have your A1c checked every 3 to 6 months  Most people should keep their A1c at or below 7%  · Follow your meal plan as directed  You may need to eat only a certain amount of protein at each meal  Work with your dietitian to develop a meal plan that is right for you  · Control your blood pressure  Decrease sodium (salt) in your diet to help control your blood pressure  Weight loss and regular physical activity can also help to decrease your blood pressure  Other things you can do to help decrease blood pressure include avoiding alcohol and quitting smoking, if you smoke  · Talk to your healthcare provider about over-the-counter (OTC) medicines you should avoid  Some OTC medicines, such as ibuprofen, can damage your kidneys  Follow up with your healthcare provider as directed:  Write down your questions so you remember to ask them during your visits  © 2017 [employer] Information is for End User's use only and may not be sold, redistributed or otherwise used for commercial purposes  All illustrations and images included in CareNotes® are the copyrighted property of [employer]  or [employer]  The above information is an  only  It is not intended as medical advice for individual conditions or treatments   Talk to your doctor, nurse or pharmacist before following any medical regimen to see if it is safe and effective for you

## 2018-12-11 NOTE — ASSESSMENT & PLAN NOTE
Significant workup of his anemia with blood work is filled to show a cause for it and appears to be of chronic disease probably on the basis of his chronic renal disease    It appears relatively stable

## 2018-12-13 ENCOUNTER — PROCEDURE VISIT (OUTPATIENT)
Dept: UROLOGY | Facility: AMBULATORY SURGERY CENTER | Age: 83
End: 2018-12-13
Payer: MEDICARE

## 2018-12-13 VITALS
BODY MASS INDEX: 24.38 KG/M2 | WEIGHT: 142.8 LBS | SYSTOLIC BLOOD PRESSURE: 130 MMHG | HEART RATE: 76 BPM | DIASTOLIC BLOOD PRESSURE: 82 MMHG | HEIGHT: 64 IN

## 2018-12-13 DIAGNOSIS — C67.9 MALIGNANT NEOPLASM OF URINARY BLADDER, UNSPECIFIED SITE (HCC): Primary | ICD-10-CM

## 2018-12-13 PROCEDURE — 52000 CYSTOURETHROSCOPY: CPT | Performed by: UROLOGY

## 2018-12-13 NOTE — PROGRESS NOTES
Cystoscopy  Date/Time: 12/13/2018 11:25 AM  Performed by: Serenity Lester  Authorized by: Serenity Lester     Procedure details: cystoscopy        Written Consent Obtained      Indications for Procedure:   history of low-grade bladder cancer     Physical Exam     Constitutional   General appearance: No acute distress, well appearing and well nourished     Pulmonary   Respiratory effort: No increased work of breathing or signs of respiratory distress     Cardiovascular   Examination of extremities for edema and/or varicosities: Normal     Abdomen   Abdomen: Non-tender, no masses     Liver and spleen: No hepatomegaly or splenomegaly     Genitourinary   Normal phallus and meatus   Musculoskeletal   Gait and station: Normal     Skin   Skin and subcutaneous tissue: Normal without rashes or lesions     Lymphatic   Palpation of lymph nodes in groin: No lymphadenopathy     Additional Exam: Neuro exam nonfocal   The flexible cystoscope was introduced into the urethra and advanced into the bladder      URETHRA:  Normal,  without strictures or lesions  PROSTATE:  Moderate to severe bilobar obstruction  TRIGONE & UOs:   Normal anatomy with efflux of clear urine  No mucosal lesions or subtrigonal masses  BLADDER MUCOSA:  Normal, without neoplasms or other lesions  DETRUSOR: Normal capacity, without flaccidity, without excessive compliance, moderate trabeculation without diverticula, without uninhibited bladder contractions on fillings  RETROFLEXED SCOPE VIEW: Normal bladder neck     Plan:  Cystoscopy was negative  We will plan on repeat cysto in 6 months

## 2019-02-22 DIAGNOSIS — E78.2 MIXED HYPERLIPIDEMIA: Primary | ICD-10-CM

## 2019-03-05 LAB
LEFT EYE DIABETIC RETINOPATHY: NORMAL
RIGHT EYE DIABETIC RETINOPATHY: NORMAL

## 2019-03-06 LAB
BASOPHILS # BLD AUTO: 46 CELLS/UL (ref 0–200)
BASOPHILS NFR BLD AUTO: 0.6 %
BUN SERPL-MCNC: 35 MG/DL (ref 7–25)
BUN/CREAT SERPL: 17 (CALC) (ref 6–22)
CALCIUM SERPL-MCNC: 9.2 MG/DL (ref 8.6–10.3)
CHLORIDE SERPL-SCNC: 108 MMOL/L (ref 98–110)
CHOLEST SERPL-MCNC: 176 MG/DL
CHOLEST/HDLC SERPL: 3.6 (CALC)
CO2 SERPL-SCNC: 24 MMOL/L (ref 20–32)
CREAT SERPL-MCNC: 2.08 MG/DL (ref 0.7–1.11)
EOSINOPHIL # BLD AUTO: 300 CELLS/UL (ref 15–500)
EOSINOPHIL NFR BLD AUTO: 3.9 %
ERYTHROCYTE [DISTWIDTH] IN BLOOD BY AUTOMATED COUNT: 12.2 % (ref 11–15)
GLUCOSE SERPL-MCNC: 140 MG/DL (ref 65–99)
HBA1C MFR BLD: 6.6 % OF TOTAL HGB
HCT VFR BLD AUTO: 32.1 % (ref 38.5–50)
HDLC SERPL-MCNC: 49 MG/DL
HGB BLD-MCNC: 10.5 G/DL (ref 13.2–17.1)
LDLC SERPL CALC-MCNC: 112 MG/DL (CALC)
LYMPHOCYTES # BLD AUTO: 1509 CELLS/UL (ref 850–3900)
LYMPHOCYTES NFR BLD AUTO: 19.6 %
MCH RBC QN AUTO: 31.2 PG (ref 27–33)
MCHC RBC AUTO-ENTMCNC: 32.7 G/DL (ref 32–36)
MCV RBC AUTO: 95.3 FL (ref 80–100)
MONOCYTES # BLD AUTO: 578 CELLS/UL (ref 200–950)
MONOCYTES NFR BLD AUTO: 7.5 %
NEUTROPHILS # BLD AUTO: 5267 CELLS/UL (ref 1500–7800)
NEUTROPHILS NFR BLD AUTO: 68.4 %
NONHDLC SERPL-MCNC: 127 MG/DL (CALC)
PLATELET # BLD AUTO: 181 THOUSAND/UL (ref 140–400)
PMV BLD REES-ECKER: 10.9 FL (ref 7.5–12.5)
POTASSIUM SERPL-SCNC: 4.5 MMOL/L (ref 3.5–5.3)
RBC # BLD AUTO: 3.37 MILLION/UL (ref 4.2–5.8)
RETICS #: NORMAL CELLS/UL (ref 25000–90000)
RETICS/RBC NFR AUTO: 1.3 %
SL AMB EGFR AFRICAN AMERICAN: 33 ML/MIN/1.73M2
SL AMB EGFR NON AFRICAN AMERICAN: 28 ML/MIN/1.73M2
SODIUM SERPL-SCNC: 140 MMOL/L (ref 135–146)
TRIGL SERPL-MCNC: 66 MG/DL
WBC # BLD AUTO: 7.7 THOUSAND/UL (ref 3.8–10.8)

## 2019-03-07 ENCOUNTER — OFFICE VISIT (OUTPATIENT)
Dept: INTERNAL MEDICINE CLINIC | Age: 84
End: 2019-03-07
Payer: MEDICARE

## 2019-03-07 VITALS
HEART RATE: 64 BPM | DIASTOLIC BLOOD PRESSURE: 80 MMHG | BODY MASS INDEX: 25 KG/M2 | WEIGHT: 146.4 LBS | OXYGEN SATURATION: 98 % | SYSTOLIC BLOOD PRESSURE: 135 MMHG | TEMPERATURE: 97.8 F | HEIGHT: 64 IN

## 2019-03-07 DIAGNOSIS — I10 ESSENTIAL HYPERTENSION: ICD-10-CM

## 2019-03-07 DIAGNOSIS — H54.62 VISION LOSS OF LEFT EYE: Primary | ICD-10-CM

## 2019-03-07 DIAGNOSIS — N18.4 ANEMIA DUE TO STAGE 4 CHRONIC KIDNEY DISEASE (HCC): ICD-10-CM

## 2019-03-07 DIAGNOSIS — N18.4 TYPE 2 DIABETES MELLITUS WITH STAGE 4 CHRONIC KIDNEY DISEASE, WITHOUT LONG-TERM CURRENT USE OF INSULIN (HCC): ICD-10-CM

## 2019-03-07 DIAGNOSIS — C67.9 MALIGNANT NEOPLASM OF URINARY BLADDER, UNSPECIFIED SITE (HCC): ICD-10-CM

## 2019-03-07 DIAGNOSIS — E11.22 TYPE 2 DIABETES MELLITUS WITH STAGE 4 CHRONIC KIDNEY DISEASE, WITHOUT LONG-TERM CURRENT USE OF INSULIN (HCC): ICD-10-CM

## 2019-03-07 DIAGNOSIS — H34.8122 RETINAL VEIN OCCLUSION OF LEFT EYE, UNSPECIFIED RETINAL VEIN: ICD-10-CM

## 2019-03-07 DIAGNOSIS — D63.1 ANEMIA DUE TO STAGE 4 CHRONIC KIDNEY DISEASE (HCC): ICD-10-CM

## 2019-03-07 PROBLEM — N32.89 BLADDER MASS: Status: RESOLVED | Noted: 2017-07-05 | Resolved: 2019-03-07

## 2019-03-07 PROCEDURE — 99214 OFFICE O/P EST MOD 30 MIN: CPT | Performed by: INTERNAL MEDICINE

## 2019-03-07 RX ORDER — AMLODIPINE BESYLATE 10 MG/1
10 TABLET ORAL DAILY
Qty: 90 TABLET | Refills: 1 | Status: SHIPPED | OUTPATIENT
Start: 2019-03-07 | End: 2020-04-17 | Stop reason: SDUPTHER

## 2019-03-07 NOTE — ASSESSMENT & PLAN NOTE
Patient was felt by Urology to have a benign mass of his bladder and no significant treatment was done

## 2019-03-07 NOTE — ASSESSMENT & PLAN NOTE
His blood pressure recently has been well controlled with combination of amlodipine 10 and metoprolol 50

## 2019-03-07 NOTE — ASSESSMENT & PLAN NOTE
Lab Results   Component Value Date    HGBA1C 6 6 (H) 03/05/2019       No results for input(s): POCGLU in the last 72 hours      Blood Sugar Average: Last 72 hrs:   patient actually has fairly well controlled diabetes unfortunately has developed a stage 3-4 kidney disease due to that and hypertension that was not usually well controlled

## 2019-03-07 NOTE — PATIENT INSTRUCTIONS
Blurred Vision   AMBULATORY CARE:   Blurred vision  is when you cannot see fine details  You may have blurred vision if you are nearsighted or farsighted and you need glasses  Common causes include the following:   · Scratch or open sore on your cornea    · Contact with a chemical    · A foreign object    · An infection    · Medical conditions, such as diabetes, cataracts, or glaucoma    · Trauma, such as a concussion  Seek care immediately if:   · You have weakness in an arm or leg, difficulty speaking or seeing, and a severe headache  · You have a fever, eye pain, or discharge  · You have a sudden loss of vision  Contact your healthcare provider if:   · Your blurred vision gets worse  · Your blurred vision is worse in the morning  · You have a sudden headache or eye pain  · Your eye has swelling, redness, or discharge  · You see floaters, flashes of light, fine dots, or cobweb shapes  · You have questions or concerns about your condition or care  Manage your blurred vision:  Your healthcare provider may ask you to do any of the following:  · Use artificial tears  to keep your eye moist or to soothe your irritated eye  · Apply a cool compress  to decrease any swelling or pain  Wet a clean washcloth with cool water and place it on your eye  Use the cool compress as often as directed  · Wear an eye patch as directed  to protect your eye  Follow up with your healthcare provider as directed: You may need other eye exams and medicines  Write down your questions so you remember to ask them during your visits  © 2017 [employer]  Information is for End User's use only and may not be sold, redistributed or otherwise used for commercial purposes  All illustrations and images included in CareNotes® are the copyrighted property of [employer]  or [employer]  The above information is an  only   It is not intended as medical advice for individual conditions or treatments  Talk to your doctor, nurse or pharmacist before following any medical regimen to see if it is safe and effective for you

## 2019-03-07 NOTE — PROGRESS NOTES
Assessment/Plan:    Type 2 diabetes mellitus with diabetic chronic kidney disease ([facility] )  Lab Results   Component Value Date    HGBA1C 6 6 (H) 03/05/2019       No results for input(s): POCGLU in the last 72 hours  Blood Sugar Average: Last 72 hrs:   patient actually has fairly well controlled diabetes unfortunately has developed a stage 3-4 kidney disease due to that and hypertension that was not usually well controlled    Essential hypertension  His blood pressure recently has been well controlled with combination of amlodipine 10 and metoprolol 50    Retinal vein occlusion of left eye  He recently saw his ophthalmologist for sudden loss of vision about a week ago was found to have a retinal vein occlusion with some macular edema     She requested that I check carotid Dopplers and a CT for evidence of strokes  Stage 4 chronic renal impairment associated with type 2 diabetes mellitus ([facility] )  Patient has chronic significant renal disease so I will not use contrast for any x-ray studies       Diagnoses and all orders for this visit:    Vision loss of left eye  -     VAS carotid complete study; Future  -     CT head wo contrast; Future    Type 2 diabetes mellitus with stage 4 chronic kidney disease, without long-term current use of insulin (HCC)  -     CT head wo contrast; Future    Malignant neoplasm of urinary bladder, unspecified site [facility])    Essential hypertension  -     amLODIPine (NORVASC) 10 mg tablet; Take 1 tablet (10 mg total) by mouth daily    Retinal vein occlusion of left eye, unspecified retinal vein    Anemia due to stage 4 chronic kidney disease (HCC)          Subjective:      Patient ID: Pb Huber is a 80 y o  male  Patient is here at the request of his ophthalmologist to some a week ago for sudden loss of vision in his left eye  On exam she found a central vein occlusion? And felt he should be checked for evidence of cerebral vascular disease    He continues to have a marked decrease in vision of that eye  His risk factors for cerebral vascular to disease do include hypertension, diabetes but no all hyperlipidemia   Family history is basically unknown  He does not have any evidence of weakness gait disorder, speech difficulty, facial droop or numbness  He does have significant renal disease and should not really use any dye studies  Review of Systems   Constitutional: Negative for chills, fatigue, fever and unexpected weight change  HENT: Negative for congestion, ear pain, hearing loss, postnasal drip, sinus pressure, sore throat, trouble swallowing and voice change  Eyes: Positive for visual disturbance  Respiratory: Negative for cough, chest tightness, shortness of breath and wheezing  Cardiovascular: Positive for leg swelling (Mild intermittent)  Negative for chest pain and palpitations  Gastrointestinal: Negative for abdominal distention, abdominal pain, anal bleeding, blood in stool, constipation, diarrhea and nausea  Endocrine: Negative for cold intolerance, polydipsia, polyphagia and polyuria  Genitourinary: Positive for hematuria  Negative for dysuria, flank pain, frequency and urgency  Musculoskeletal: Negative for arthralgias, back pain, gait problem, joint swelling, myalgias and neck pain  Skin: Negative for rash  Allergic/Immunologic: Negative for immunocompromised state  Neurological: Positive for numbness  Negative for dizziness, syncope, facial asymmetry, weakness, light-headedness and headaches  Hematological: Negative for adenopathy  Psychiatric/Behavioral: Negative for confusion, sleep disturbance and suicidal ideas  Objective:      /80   Pulse 64   Temp 97 8 °F (36 6 °C) (Tympanic)   Ht 5' 4" (1 626 m)   Wt 66 4 kg (146 lb 6 4 oz)   SpO2 98%   BMI 25 13 kg/m²          Physical Exam   Constitutional: He is oriented to person, place, and time  He appears well-developed and well-nourished  No distress     HENT:   Right Ear: External ear normal    Left Ear: External ear normal    Nose: Nose normal    Mouth/Throat: Oropharynx is clear and moist  No oropharyngeal exudate  Eyes: Pupils are equal, round, and reactive to light  EOM are normal    Neck: Normal range of motion  Neck supple  No JVD present  No thyromegaly present  Cardiovascular: Normal rate, regular rhythm, normal heart sounds and intact distal pulses  Exam reveals no gallop  Pulses are weak pulses  No murmur heard  Pulses:       Dorsalis pedis pulses are 0 on the right side, and 0 on the left side  Pulmonary/Chest: Effort normal and breath sounds normal  No respiratory distress  He has no wheezes  He has no rales  Abdominal: Soft  Bowel sounds are normal  He exhibits no distension and no mass  There is no tenderness  Musculoskeletal: Normal range of motion  He exhibits edema  He exhibits no tenderness  Feet:   Right Foot:   Skin Integrity: Negative for ulcer, skin breakdown, erythema, warmth, callus or dry skin  Left Foot:   Skin Integrity: Negative for ulcer, skin breakdown, erythema, warmth, callus or dry skin  Lymphadenopathy:     He has no cervical adenopathy  Neurological: He is alert and oriented to person, place, and time  A sensory deficit (Blood in stocking) is present  No cranial nerve deficit  Coordination normal    Skin: No rash noted  Psychiatric: He has a normal mood and affect  His behavior is normal  Judgment and thought content normal      Patient's shoes and socks removed  Right Foot/Ankle   Right Foot Inspection  Skin Exam: skin normal and skin intact no dry skin, no warmth, no callus, no erythema, no maceration, no abnormal color, no pre-ulcer, no ulcer and no callus                          Toe Exam: ROM and strength within normal limits  Sensory   Vibration: diminished    Monofilament testing: diminished  Vascular    The right DP pulse is 0       Left Foot/Ankle  Left Foot Inspection  Skin Exam: skin normal and skin intactno dry skin, no warmth, no erythema, no maceration, normal color, no pre-ulcer, no ulcer and no callus                         Toe Exam: ROM and strength within normal limits                   Sensory   Vibration: diminished    Monofilament: diminished  Vascular    The left DP pulse is 0  Assign Risk Category:  No deformity present;  Loss of protective sensation; Weak pulses       Risk: 3

## 2019-03-07 NOTE — ASSESSMENT & PLAN NOTE
He recently saw his ophthalmologist for sudden loss of vision about a week ago was found to have a retinal vein occlusion with some macular edema     She requested that I check carotid Dopplers and a CT for evidence of strokes

## 2019-03-13 LAB
LEFT EYE DIABETIC RETINOPATHY: NORMAL
RIGHT EYE DIABETIC RETINOPATHY: NORMAL

## 2019-03-23 ENCOUNTER — HOSPITAL ENCOUNTER (OUTPATIENT)
Dept: CT IMAGING | Facility: HOSPITAL | Age: 84
Discharge: HOME | End: 2019-03-23
Payer: MEDICARE

## 2019-03-23 DIAGNOSIS — H54.62 VISION LOSS OF LEFT EYE: ICD-10-CM

## 2019-03-23 DIAGNOSIS — E11.22 TYPE 2 DIABETES MELLITUS WITH STAGE 4 CHRONIC KIDNEY DISEASE, WITHOUT LONG-TERM CURRENT USE OF INSULIN (HCC): ICD-10-CM

## 2019-03-23 DIAGNOSIS — N18.4 TYPE 2 DIABETES MELLITUS WITH STAGE 4 CHRONIC KIDNEY DISEASE, WITHOUT LONG-TERM CURRENT USE OF INSULIN (HCC): ICD-10-CM

## 2019-03-23 PROCEDURE — 70450 CT HEAD/BRAIN W/O DYE: CPT

## 2019-04-11 ENCOUNTER — OFFICE VISIT (OUTPATIENT)
Dept: INTERNAL MEDICINE CLINIC | Age: 84
End: 2019-04-11
Payer: MEDICARE

## 2019-04-11 VITALS
SYSTOLIC BLOOD PRESSURE: 110 MMHG | TEMPERATURE: 98.7 F | DIASTOLIC BLOOD PRESSURE: 50 MMHG | HEIGHT: 65 IN | HEART RATE: 64 BPM | BODY MASS INDEX: 24.09 KG/M2 | WEIGHT: 144.6 LBS

## 2019-04-11 DIAGNOSIS — D63.1 ANEMIA IN STAGE 4 CHRONIC KIDNEY DISEASE (HCC): ICD-10-CM

## 2019-04-11 DIAGNOSIS — H40.9 GLAUCOMA OF BOTH EYES, UNSPECIFIED GLAUCOMA TYPE: ICD-10-CM

## 2019-04-11 DIAGNOSIS — H34.8122 RETINAL VEIN OCCLUSION OF LEFT EYE, UNSPECIFIED RETINAL VEIN: Primary | ICD-10-CM

## 2019-04-11 DIAGNOSIS — N18.4 ANEMIA IN STAGE 4 CHRONIC KIDNEY DISEASE (HCC): ICD-10-CM

## 2019-04-11 DIAGNOSIS — H54.62 VISION LOSS OF LEFT EYE: ICD-10-CM

## 2019-04-11 PROCEDURE — 99214 OFFICE O/P EST MOD 30 MIN: CPT | Performed by: INTERNAL MEDICINE

## 2019-04-12 ENCOUNTER — HOSPITAL ENCOUNTER (OUTPATIENT)
Dept: NON INVASIVE DIAGNOSTICS | Facility: CLINIC | Age: 84
Discharge: HOME | End: 2019-04-12
Payer: MEDICARE

## 2019-04-12 DIAGNOSIS — H54.62 VISION LOSS OF LEFT EYE: ICD-10-CM

## 2019-04-12 PROBLEM — H26.9 CATARACTS, BILATERAL: Status: RESOLVED | Noted: 2019-04-12 | Resolved: 2019-04-12

## 2019-04-12 PROCEDURE — 93880 EXTRACRANIAL BILAT STUDY: CPT

## 2019-04-12 PROCEDURE — 93880 EXTRACRANIAL BILAT STUDY: CPT | Performed by: SURGERY

## 2019-04-16 LAB
LEFT EYE DIABETIC RETINOPATHY: NORMAL
RIGHT EYE DIABETIC RETINOPATHY: NORMAL

## 2019-04-23 LAB
LEFT EYE DIABETIC RETINOPATHY: NORMAL
RIGHT EYE DIABETIC RETINOPATHY: NORMAL

## 2019-05-31 LAB
LEFT EYE DIABETIC RETINOPATHY: NORMAL
RIGHT EYE DIABETIC RETINOPATHY: NORMAL

## 2019-06-13 ENCOUNTER — TELEPHONE (OUTPATIENT)
Dept: UROLOGY | Facility: AMBULATORY SURGERY CENTER | Age: 84
End: 2019-06-13

## 2019-07-02 ENCOUNTER — HOSPITAL ENCOUNTER (OUTPATIENT)
Dept: RADIOLOGY | Facility: HOSPITAL | Age: 84
Discharge: HOME | End: 2019-07-02
Attending: ORTHOPAEDIC SURGERY
Payer: MEDICARE

## 2019-07-02 ENCOUNTER — OFFICE VISIT (OUTPATIENT)
Dept: OBGYN CLINIC | Facility: HOSPITAL | Age: 84
End: 2019-07-02
Payer: MEDICARE

## 2019-07-02 ENCOUNTER — HOSPITAL ENCOUNTER (OUTPATIENT)
Dept: RADIOLOGY | Facility: HOSPITAL | Age: 84
Discharge: HOME | End: 2019-07-02
Payer: MEDICARE

## 2019-07-02 VITALS
SYSTOLIC BLOOD PRESSURE: 119 MMHG | HEIGHT: 64 IN | WEIGHT: 143.2 LBS | DIASTOLIC BLOOD PRESSURE: 68 MMHG | HEART RATE: 62 BPM | BODY MASS INDEX: 24.45 KG/M2

## 2019-07-02 DIAGNOSIS — M54.5 LOW BACK PAIN, UNSPECIFIED BACK PAIN LATERALITY, UNSPECIFIED CHRONICITY, WITH SCIATICA PRESENCE UNSPECIFIED: ICD-10-CM

## 2019-07-02 DIAGNOSIS — M48.061 SPINAL STENOSIS OF LUMBAR REGION, UNSPECIFIED WHETHER NEUROGENIC CLAUDICATION PRESENT: Primary | ICD-10-CM

## 2019-07-02 DIAGNOSIS — M25.552 LEFT HIP PAIN: ICD-10-CM

## 2019-07-02 DIAGNOSIS — R26.9 ABNORMAL GAIT: ICD-10-CM

## 2019-07-02 DIAGNOSIS — R29.898 WEAKNESS OF RIGHT LEG: ICD-10-CM

## 2019-07-02 DIAGNOSIS — M76.32 ILIOTIBIAL BAND SYNDROME OF BOTH SIDES: ICD-10-CM

## 2019-07-02 DIAGNOSIS — M76.31 ILIOTIBIAL BAND SYNDROME OF BOTH SIDES: ICD-10-CM

## 2019-07-02 PROCEDURE — 72100 X-RAY EXAM L-S SPINE 2/3 VWS: CPT

## 2019-07-02 PROCEDURE — 73502 X-RAY EXAM HIP UNI 2-3 VIEWS: CPT

## 2019-07-02 PROCEDURE — 99213 OFFICE O/P EST LOW 20 MIN: CPT | Performed by: ORTHOPAEDIC SURGERY

## 2019-07-02 NOTE — PROGRESS NOTES
Assessment:    Right lower extremity weakness    Plan:     Weight Bearing  as Tolerated   MRI lumbar spine to rule out neurologic pathology as reason for right leg weakness  Patient was told of needing surgical intervention in the past   Patient to follow up with Dr Jessica Pastrana after MRI   Follow-up p r n  The above stated was discussed in layman's terms and the patient expressed understanding  All questions were answered to the patient's satisfaction  Subjective:   Hodan Whittaker is a 80 y o  male who presents left hip pain  Patient was treated in the past for bilateral ITB band syndrome and had bilateral IT steroid injection on 05/15/2018 and states he has no pain  Patient states he is having left hip pain  He notes he walks with a limp, left leg feels weak  and drags the right leg when walking    Patient also states he his having low back pain  Denies any recent falls or trauma  Patient notes was treated in the past for low back pain and had  lumbar jb with some relief in the past  Denies any numbness or tingling radiating down the legs       Review of systems negative unless otherwise specified in HPI    Past Medical History:   Diagnosis Date    Cataracts, bilateral        Past Surgical History:   Procedure Laterality Date    CATARACT EXTRACTION Bilateral 07/15/2012    COLONOSCOPY      CYSTOSCOPY  01/15/2018    Last assessed 9/14/17, diagnostic    HERNIA REPAIR      INSTILLATION MYTOMYCIN N/A 10/25/2017    Procedure: INSTILLATION OF MITOMYCIN C;  Surgeon: Rui Jung MD;  Location: [facility];  Service: Urology    UT CYSTOURETHROSCOPY,FULGUR <0 5 CM LESN N/A 10/25/2017    Procedure: Vinie Dancer; BLADDER BIOPSY WITH Arely Del Angel;  Surgeon: Rui Jung MD;  Location: [facility];  Service: Urology    TONSILLECTOMY      TRANSURETHRAL RESECTION OF BLADDER TUMOR N/A 10/25/2017    Procedure: TUR BLADDER TUMOR;  Surgeon: Rui Jung MD;  [facility]: [facility];  Service: Urology    WISDOM TOOTH EXTRACTION         Family History   Problem Relation Age of Onset    Diabetes Mother        Social History     Occupational History    Not on file   Tobacco Use    Smoking status: Former Smoker     Last attempt to quit: 1965     Years since quittin 5    Smokeless tobacco: Never Used   Substance and Sexual Activity    Alcohol use: No     Comment: quit 23 years ago    Drug use: No    Sexual activity: Not on file         Current Outpatient Medications:     amLODIPine (NORVASC) 10 mg tablet, Take 1 tablet (10 mg total) by mouth daily, Disp: 90 tablet, Rfl: 1    aspirin 325 mg tablet, Take 325 mg by mouth daily, Disp: , Rfl:     bimatoprost (LUMIGAN) 0 01 % ophthalmic drops, Apply to eye, Disp: , Rfl:     Cholecalciferol (VITAMIN D3) 1000 units CAPS, Take by mouth, Disp: , Rfl:     cyanocobalamin (VITAMIN B-12) 500 mcg tablet, Take 500 mcg by mouth daily, Disp: , Rfl:     fluticasone-salmeterol (ADVAIR DISKUS) 100-50 mcg/dose, Inhale, Disp: , Rfl:     glimepiride (AMARYL) 1 mg tablet, Take 1 tablet (1 mg total) by mouth daily with breakfast, Disp: 90 tablet, Rfl: 1    glucose blood (ONETOUCH VERIO) test strip, CHECK BLOOD SUGAR TWICE DAILY    DX CODE E11 9, Disp: 100 each, Rfl: 6    lidocaine (LMX) 4 % cream, Apply topically as needed for mild pain, Disp: 30 g, Rfl: 0    loteprednol etabonate (LOTEMAX) 0 5 % ophthalmic suspension, Apply to eye, Disp: , Rfl:     metoprolol succinate (TOPROL-XL) 50 mg 24 hr tablet, Take 1 tablet by mouth daily, Disp: , Rfl:     simvastatin (ZOCOR) 40 mg tablet, Take 1 tablet by mouth daily, Disp: , Rfl:     tamsulosin (FLOMAX) 0 4 mg, Take 1 capsule by mouth, Disp: , Rfl:     timolol (TIMOPTIC) 0 5 % ophthalmic solution, Administer 1 drop to both eyes 2 (two) times a day, Disp: , Rfl:     Allergies   Allergen Reactions    Ace Inhibitors      Other reaction(s): hyperkalemia    Penicillins GI Intolerance            Vitals:    19 1620 BP: 119/68   Pulse: 62       Objective:            Physical Exam  · General: Awake, Alert, Oriented  · Eyes: Pupils equal, round and reactive to light  · Heart: regular rate and rhythm  · Lungs: No audible wheezing  · Abdomen: soft                    Ortho Exam  Right LE  No lacerations, no abrasions,no open wounds   No erythema   Strength 4-/5 on hip flexors, extensors, abductors  Knee extensions and flexion 4-/5  Hip flexor 4-/5  Sensation intact over SPN/DPN  Brisk capillary refill    Left LE  No lacerations, no abrasions, no open wounds   No erythema   Strength 5/5  Knee flexion and extension 5/5   Hip flexor 5/5  Neurovascularly Intact Distally     Diagnostics, reviewed and taken today if performed as documented: The attending physician has personally reviewed the pertinent films in PACS and interpretation is as follows:  XR lumbar spine: severe DDD involving Lumbosacral regions    Procedures, if performed today:    Procedures    None performed      Scribe Attestation    I,:   Almanzar Jonny am acting as a scribe while in the presence of the attending physician :        I,:   Johana Farris MD personally performed the services described in this documentation    as scribed in my presence :              Portions of the record may have been created with voice recognition software  Occasional wrong word or "sound a like" substitutions may have occurred due to the inherent limitations of voice recognition software  Read the chart carefully and recognize, using context, where substitutions have occurred

## 2019-07-23 ENCOUNTER — PROCEDURE VISIT (OUTPATIENT)
Dept: UROLOGY | Facility: HOSPITAL | Age: 84
End: 2019-07-23
Payer: MEDICARE

## 2019-07-23 VITALS
DIASTOLIC BLOOD PRESSURE: 68 MMHG | HEIGHT: 64 IN | WEIGHT: 142 LBS | HEART RATE: 72 BPM | BODY MASS INDEX: 24.24 KG/M2 | SYSTOLIC BLOOD PRESSURE: 120 MMHG

## 2019-07-23 DIAGNOSIS — C67.9 MALIGNANT NEOPLASM OF URINARY BLADDER, UNSPECIFIED SITE (HCC): Primary | ICD-10-CM

## 2019-07-23 LAB
SL AMB  POCT GLUCOSE, UA: 500
SL AMB LEUKOCYTE ESTERASE,UA: 0
SL AMB POCT BILIRUBIN,UA: 0
SL AMB POCT BLOOD,UA: 0
SL AMB POCT CLARITY,UA: CLEAR
SL AMB POCT COLOR,UA: YELLOW
SL AMB POCT KETONES,UA: 0
SL AMB POCT NITRITE,UA: 0
SL AMB POCT PH,UA: 6.5
SL AMB POCT SPECIFIC GRAVITY,UA: 1.02
SL AMB POCT URINE PROTEIN: 0
SL AMB POCT UROBILINOGEN: 0

## 2019-07-23 PROCEDURE — 52000 CYSTOURETHROSCOPY: CPT | Performed by: UROLOGY

## 2019-07-23 PROCEDURE — 81002 URINALYSIS NONAUTO W/O SCOPE: CPT | Performed by: UROLOGY

## 2019-07-23 NOTE — PROGRESS NOTES
Cystoscopy  Date/Time: 7/23/2019 11:38 AM  Performed by: Satinder Salomon MD  Authorized by: Satinder Salomon MD     Procedure details: cystoscopy        Written Consent Obtained      Indications for Procedure:   history of low-grade bladder cancer     Physical Exam     Constitutional   General appearance: No acute distress, well appearing and well nourished     Pulmonary   Respiratory effort: No increased work of breathing or signs of respiratory distress     Cardiovascular   Examination of extremities for edema and/or varicosities: Normal     Abdomen   Abdomen: Non-tender, no masses     Liver and spleen: No hepatomegaly or splenomegaly     Genitourinary   Normal phallus and meatus   Musculoskeletal   Gait and station: Normal     Skin   Skin and subcutaneous tissue: Normal without rashes or lesions     Lymphatic   Palpation of lymph nodes in groin: No lymphadenopathy     Additional Exam: Neuro exam nonfocal   The flexible cystoscope was introduced into the urethra and advanced into the bladder      URETHRA:  Normal,  without strictures or lesions  PROSTATE:  Moderate to severe bilobar obstruction  TRIGONE & UOs:   Normal anatomy with efflux of clear urine   No mucosal lesions or subtrigonal masses  BLADDER MUCOSA:  Normal, without neoplasms or other lesions  DETRUSOR: Normal capacity, without flaccidity, without excessive compliance, moderate trabeculation without diverticula, without uninhibited bladder contractions on fillings  RETROFLEXED SCOPE VIEW: Normal bladder neck     Plan:  Cystoscopy was negative   We will plan on repeat cysto in 6 months 
28-Apr-2019 21:19

## 2019-08-07 LAB
LEFT EYE DIABETIC RETINOPATHY: NORMAL
RIGHT EYE DIABETIC RETINOPATHY: NORMAL

## 2019-08-12 ENCOUNTER — OFFICE VISIT (OUTPATIENT)
Dept: OBGYN CLINIC | Facility: HOSPITAL | Age: 84
End: 2019-08-12
Payer: MEDICARE

## 2019-08-12 VITALS
BODY MASS INDEX: 24.24 KG/M2 | HEIGHT: 64 IN | SYSTOLIC BLOOD PRESSURE: 134 MMHG | HEART RATE: 76 BPM | DIASTOLIC BLOOD PRESSURE: 74 MMHG | WEIGHT: 142 LBS

## 2019-08-12 DIAGNOSIS — G95.19 NEUROGENIC CLAUDICATION (HCC): ICD-10-CM

## 2019-08-12 DIAGNOSIS — M54.5 LOW BACK PAIN, UNSPECIFIED BACK PAIN LATERALITY, UNSPECIFIED CHRONICITY, WITH SCIATICA PRESENCE UNSPECIFIED: Primary | ICD-10-CM

## 2019-08-12 DIAGNOSIS — R29.898 WEAKNESS OF RIGHT LEG: ICD-10-CM

## 2019-08-12 PROBLEM — M54.50 LOW BACK PAIN: Status: ACTIVE | Noted: 2019-08-12

## 2019-08-12 PROBLEM — R29.818 NEUROGENIC CLAUDICATION: Status: ACTIVE | Noted: 2019-08-12

## 2019-08-12 PROCEDURE — 99213 OFFICE O/P EST LOW 20 MIN: CPT | Performed by: ORTHOPAEDIC SURGERY

## 2019-08-12 NOTE — ASSESSMENT & PLAN NOTE
Patient presents for evaluation of lower back and bilateral leg pains and with subjective weakness  He reports symptoms are worse with standing and walking  He feels the need to sit down when he stands and walks after short periods  He states his right lower extremity feels weaker than the left  Does not report incontinence of bowel or bladder  He is scheduled to receive an MRI of the lumbar spine in the very near future  He has been diagnosed with multilevel lumbar spondylosis  On physical exam he appears stated age  He does ambulate very slowly without assistive devices  He is motor and sensory intact L2-S1 bilaterally  Sensation is grossly intact  Negative straight leg raise  Imaging demonstrates advanced spondylotic changes throughout the entire lumbosacral spine with mild loss of lordosis    Assessment and plan    Symptoms consistent with neurogenic claudication  MRI lumbar spine is already ordered    Will follow up thereafter for re-evaluation and review of the MRI

## 2019-08-12 NOTE — PROGRESS NOTES
Assessment/Plan:    Neurogenic claudication  Patient presents for evaluation of lower back and bilateral leg pains and with subjective weakness  He reports symptoms are worse with standing and walking  He feels the need to sit down when he stands and walks after short periods  He states his right lower extremity feels weaker than the left  Does not report incontinence of bowel or bladder  He is scheduled to receive an MRI of the lumbar spine in the very near future  He has been diagnosed with multilevel lumbar spondylosis  On physical exam he appears stated age  He does ambulate very slowly without assistive devices  He is motor and sensory intact L2-S1 bilaterally  Sensation is grossly intact  Negative straight leg raise  Imaging demonstrates advanced spondylotic changes throughout the entire lumbosacral spine with mild loss of lordosis    Assessment and plan    Symptoms consistent with neurogenic claudication  MRI lumbar spine is already ordered  Will follow up thereafter for re-evaluation and review of the MRI        · Chronic low back pain with leg weakness  · Patient to attain lumbar MRI  · Follow up after lumbar MRI        Problem List Items Addressed This Visit        Other    Neurogenic claudication     Patient presents for evaluation of lower back and bilateral leg pains and with subjective weakness  He reports symptoms are worse with standing and walking  He feels the need to sit down when he stands and walks after short periods  He states his right lower extremity feels weaker than the left  Does not report incontinence of bowel or bladder  He is scheduled to receive an MRI of the lumbar spine in the very near future  He has been diagnosed with multilevel lumbar spondylosis  On physical exam he appears stated age  He does ambulate very slowly without assistive devices  He is motor and sensory intact L2-S1 bilaterally    Sensation is grossly intact  Negative straight leg raise     Imaging demonstrates advanced spondylotic changes throughout the entire lumbosacral spine with mild loss of lordosis    Assessment and plan    Symptoms consistent with neurogenic claudication  MRI lumbar spine is already ordered  Will follow up thereafter for re-evaluation and review of the MRI         Low back pain - Primary      Other Visit Diagnoses     Weakness of right leg                Subjective:      Patient ID: Gladys Alexander is a 80 y o  male  HPI   The patient presents for initial evaluation of bilateral leg weakness  He is here from Dr Nicholas and had been seen for left hip pain now resolved  He has had symptoms for about one year with no injury  Today he complains occasional low back pain with constant leg weakness  He rates his back pain at 0/10 and 5/10 at its worse  Any activity aggravates the low back  He does use tylenol with some benefit  He does wear a compression belt with benefit  He did have lumbar injections 10 years ago  He denies past lumbar surgery  He denies recent bowel or bladder changes  The following portions of the patient's history were reviewed and updated as appropriate: allergies, current medications, past family history, past medical history, past social history, past surgical history and problem list     Review of Systems   Constitutional: Negative for chills, fever and unexpected weight change  HENT: Negative for hearing loss, nosebleeds and sore throat  Eyes: Negative for pain, redness and visual disturbance  Respiratory: Negative for cough, shortness of breath and wheezing  Cardiovascular: Negative for chest pain, palpitations and leg swelling  Gastrointestinal: Negative for abdominal pain, nausea and vomiting  Endocrine: Negative for polydipsia and polyuria  Genitourinary: Negative for difficulty urinating and hematuria  Skin: Negative for rash and wound     Psychiatric/Behavioral: Negative for decreased concentration and suicidal ideas  The patient is not nervous/anxious  Objective:      /74   Pulse 76   Ht 5' 4" (1 626 m)   Wt 64 4 kg (142 lb)   BMI 24 37 kg/m²          Physical Exam   Constitutional: He is oriented to person, place, and time  He appears well-developed and well-nourished  HENT:   Right Ear: External ear normal    Left Ear: External ear normal    Nose: Nose normal    Eyes: Conjunctivae are normal    Neck: Normal range of motion  Pulmonary/Chest: Effort normal    Neurological: He is alert and oriented to person, place, and time  Skin: Skin is warm and dry  Psychiatric: He has a normal mood and affect  His behavior is normal  Judgment and thought content normal        Patient ambulates without assistance  Tender to palpation : none  Modified straight leg raise negative bilaterally  Strength L2-S1 5/5 bilaterally   Sensation L2-S1 intact bilaterally       Imaging:  Lumbar x-ray 7/2/19:  Moderate degenertive changes        Scribe Attestation    I,:   Selene Mata am acting as a scribe while in the presence of the attending physician :        I,:   Serafin Davis MD personally performed the services described in this documentation    as scribed in my presence :

## 2019-08-24 ENCOUNTER — HOSPITAL ENCOUNTER (OUTPATIENT)
Dept: MRI IMAGING | Facility: HOSPITAL | Age: 84
Discharge: HOME | End: 2019-08-24
Attending: ORTHOPAEDIC SURGERY
Payer: MEDICARE

## 2019-08-24 DIAGNOSIS — R29.898 WEAKNESS OF RIGHT LEG: ICD-10-CM

## 2019-08-24 DIAGNOSIS — M54.5 LOW BACK PAIN, UNSPECIFIED BACK PAIN LATERALITY, UNSPECIFIED CHRONICITY, WITH SCIATICA PRESENCE UNSPECIFIED: ICD-10-CM

## 2019-08-24 DIAGNOSIS — M48.061 SPINAL STENOSIS OF LUMBAR REGION, UNSPECIFIED WHETHER NEUROGENIC CLAUDICATION PRESENT: ICD-10-CM

## 2019-08-24 PROCEDURE — 72148 MRI LUMBAR SPINE W/O DYE: CPT

## 2019-08-26 ENCOUNTER — OFFICE VISIT (OUTPATIENT)
Dept: OBGYN CLINIC | Facility: HOSPITAL | Age: 84
End: 2019-08-26
Payer: MEDICARE

## 2019-08-26 VITALS
HEIGHT: 64 IN | WEIGHT: 141 LBS | SYSTOLIC BLOOD PRESSURE: 117 MMHG | BODY MASS INDEX: 24.07 KG/M2 | HEART RATE: 76 BPM | DIASTOLIC BLOOD PRESSURE: 67 MMHG

## 2019-08-26 DIAGNOSIS — M48.061 SPINAL STENOSIS OF LUMBAR REGION, UNSPECIFIED WHETHER NEUROGENIC CLAUDICATION PRESENT: Primary | ICD-10-CM

## 2019-08-26 DIAGNOSIS — M51.36 DDD (DEGENERATIVE DISC DISEASE), LUMBAR: ICD-10-CM

## 2019-08-26 DIAGNOSIS — G95.19 NEUROGENIC CLAUDICATION (HCC): ICD-10-CM

## 2019-08-26 PROCEDURE — 99213 OFFICE O/P EST LOW 20 MIN: CPT | Performed by: ORTHOPAEDIC SURGERY

## 2019-08-26 NOTE — PROGRESS NOTES
Assessment/Plan:    Multilevel lumbar DDD and stenosis  · Referral to pain management provided for lumbar epidural steroid injection  · Start physical therapy following injection  · Follow up 6 weeks       Problem List Items Addressed This Visit        Other    Neurogenic claudication     Patient presents for follow-up regarding lower back leg pains and weakness particularly with ambulation  He did receive the MRI of the lumbar spine and is here to review the study  On exam he is neurologically unchanged from L2-S1  Negative modified straight leg raise bilaterally  Sensation is grossly intact to light touch  Lumbar MRI is reviewed and this demonstrates multilevel spinal stenosis moderate to severe in nature from L1-S1  Assessment and plan    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  I recommend consideration for epidural steroid injections to help alleviate signs and symptoms consistent with neurogenic claudication  Surgical intervention may be fraught with complications given age and comorbidities  Other Visit Diagnoses     Spinal stenosis of lumbar region, unspecified whether neurogenic claudication present    -  Primary    Relevant Orders    Ambulatory referral to Pain Management    Ambulatory referral to Physical Therapy    DDD (degenerative disc disease), lumbar        Relevant Orders    Ambulatory referral to Pain Management    Ambulatory referral to Physical Therapy            Subjective:      Patient ID: Patricia Lucero is a 80 y o  male  HPI  Patient presents to the office for follow up of bilateral leg weakness  He is here today to review the results of his lumbar MRI  He reports continued weakness in his bilateral legs  His symptoms worsen with activities including ambulation and standing  He denies any pain, numbness or tingling  He does not take any medication for his symptoms  He is accompanied by his wife today in the office       The following portions of the patient's history were reviewed and updated as appropriate: current medications, past family history, past medical history, past social history, past surgical history and problem list     Review of Systems   Constitutional: Negative for fever and unexpected weight change  HENT: Negative for hearing loss, nosebleeds and sore throat  Eyes: Negative for pain, redness and visual disturbance  Respiratory: Negative for cough, shortness of breath and wheezing  Cardiovascular: Negative for chest pain, palpitations and leg swelling  Gastrointestinal: Negative for abdominal pain, nausea and vomiting  Endocrine: Negative for polydipsia and polyuria  Genitourinary: Negative for dysuria and hematuria  Skin: Negative for rash and wound  Neurological: Positive for weakness  Negative for dizziness and headaches  Psychiatric/Behavioral: Negative for agitation and suicidal ideas  Objective:      /67   Pulse 76   Ht 5' 4" (1 626 m)   Wt 64 kg (141 lb)   BMI 24 20 kg/m²          Physical Exam   Constitutional: He is oriented to person, place, and time  He appears well-developed and well-nourished  HENT:   Head: Normocephalic and atraumatic  Eyes: Pupils are equal, round, and reactive to light  Neck: Neck supple  Cardiovascular: Intact distal pulses  Pulmonary/Chest: Breath sounds normal    Neurological: He is alert and oriented to person, place, and time  Skin: Skin is warm and dry  Psychiatric: He has a normal mood and affect   His behavior is normal        Patient ambulates without assistance  Non-tender to palpation   Modified straight leg raise negative bilaterally  Strength L2-S1 5/5 bilaterally  Sensation L2-S1 intact bilaterally    Imaging  MRI of lumbar spine 08/24/2019 was reviewed and shows multilevel lumbar DDD and stenosis    Scribe Attestation    I,:   Irineo Seals am acting as a scribe while in the presence of the attending physician :        I,:   Charmayne Novel Cesar Matt MD personally performed the services described in this documentation    as scribed in my presence :

## 2019-08-26 NOTE — ASSESSMENT & PLAN NOTE
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

## 2019-09-17 ENCOUNTER — OFFICE VISIT (OUTPATIENT)
Dept: INTERNAL MEDICINE CLINIC | Age: 84
End: 2019-09-17
Payer: MEDICARE

## 2019-09-17 VITALS
HEART RATE: 64 BPM | WEIGHT: 143.4 LBS | SYSTOLIC BLOOD PRESSURE: 104 MMHG | OXYGEN SATURATION: 94 % | DIASTOLIC BLOOD PRESSURE: 50 MMHG | TEMPERATURE: 97.8 F | HEIGHT: 64 IN | BODY MASS INDEX: 24.48 KG/M2

## 2019-09-17 DIAGNOSIS — N18.4 TYPE 2 DIABETES MELLITUS WITH STAGE 4 CHRONIC KIDNEY DISEASE, WITHOUT LONG-TERM CURRENT USE OF INSULIN (HCC): ICD-10-CM

## 2019-09-17 DIAGNOSIS — Z23 NEED FOR IMMUNIZATION AGAINST INFLUENZA: ICD-10-CM

## 2019-09-17 DIAGNOSIS — M54.5 LOW BACK PAIN, UNSPECIFIED BACK PAIN LATERALITY, UNSPECIFIED CHRONICITY, WITH SCIATICA PRESENCE UNSPECIFIED: ICD-10-CM

## 2019-09-17 DIAGNOSIS — I10 ESSENTIAL HYPERTENSION: ICD-10-CM

## 2019-09-17 DIAGNOSIS — E11.21 TYPE 2 DIABETES MELLITUS WITH DIABETIC NEPHROPATHY, WITHOUT LONG-TERM CURRENT USE OF INSULIN (HCC): ICD-10-CM

## 2019-09-17 DIAGNOSIS — Z00.00 MEDICARE ANNUAL WELLNESS VISIT, SUBSEQUENT: Primary | ICD-10-CM

## 2019-09-17 DIAGNOSIS — E11.22 TYPE 2 DIABETES MELLITUS WITH STAGE 4 CHRONIC KIDNEY DISEASE, WITHOUT LONG-TERM CURRENT USE OF INSULIN (HCC): ICD-10-CM

## 2019-09-17 DIAGNOSIS — C67.9 MALIGNANT NEOPLASM OF URINARY BLADDER, UNSPECIFIED SITE (HCC): ICD-10-CM

## 2019-09-17 DIAGNOSIS — R63.4 WEIGHT LOSS: ICD-10-CM

## 2019-09-17 PROCEDURE — G0439 PPPS, SUBSEQ VISIT: HCPCS | Performed by: INTERNAL MEDICINE

## 2019-09-17 PROCEDURE — 90662 IIV NO PRSV INCREASED AG IM: CPT

## 2019-09-17 PROCEDURE — 99214 OFFICE O/P EST MOD 30 MIN: CPT | Performed by: INTERNAL MEDICINE

## 2019-09-17 PROCEDURE — G0008 ADMIN INFLUENZA VIRUS VAC: HCPCS

## 2019-09-17 NOTE — ASSESSMENT & PLAN NOTE
He has seen orthopedist for low back pain and is now been referred to Pain Management for possible injections

## 2019-09-17 NOTE — PROGRESS NOTES
Assessment/Plan:    Diabetes mellitus, type II ([facility] )  Lab Results   Component Value Date    HGBA1C 6 6 (H) 03/05/2019       No results for input(s): POCGLU in the last 72 hours  Blood Sugar Average: Last 72 hrs:   he states his fasting blood sugars at home running in the low 100s in general and his wife monitors his food intake  He will go and get blood work in the near future    Essential hypertension  His blood pressure continues to be read main well controlled with metoprol ol  He has no obvious symptoms of cardiovascular disease    Low back pain  He has seen orthopedist for low back pain and is now been referred to Pain Management for possible injections  Type 2 diabetes mellitus with diabetic chronic kidney disease ([place] [address] )  Lab Results   Component Value Date    HGBA1C 6 6 (H) 03/05/2019       No results for input(s): POCGLU in the last 72 hours  Blood Sugar Average: Last 72 hrs:   despite the fact that he is both diabetic and hypertensive renal disease his kidney function has been stable over the past year    He is not anxious to pursue this at present    Weight loss  His weight loss has stabilized over the past 9 months    Malignant neoplasm of urinary bladder ([facility] )  He continues to be monitored by [facility] for his bladder growth       Diagnoses and all orders for this visit:    Medicare annual wellness visit, subsequent    Type 2 diabetes mellitus with diabetic nephropathy, without long-term current use of insulin ([facility] )    Essential hypertension    Low back pain, unspecified back pain laterality, unspecified chronicity, with sciatica presence unspecified    Malignant neoplasm of urinary bladder, unspecified site [facility])    Type 2 diabetes mellitus with stage 4 chronic kidney disease, without long-term current use of insulin ([facility] )    Need for immunization against influenza  -     influenza vaccine, [id number], high-dose, PF 0 5 mL (FLUZONE HIGH-DOSE)    Weight loss          Subjective: Patient ID: Campos Bell is a 80 y o  male  Patient complains of very few things  At present his only complaint is chronic low back pain for which she has seen Ortho and now is being referred to pain management  And interferes with his ability to walk distances  Otherwise he feels well  He has no chest pain shortness of breath hypoglycemic episodes edema or indigestion  He and his wife both remain quite active        Review of Systems   Constitutional: Negative for chills, fatigue, fever and unexpected weight change  HENT: Negative for congestion, ear pain, hearing loss, postnasal drip, sinus pressure, sore throat, trouble swallowing and voice change  Eyes: Positive for visual disturbance (Chronic loss of vision left eye)  Respiratory: Negative for cough, chest tightness, shortness of breath and wheezing  Cardiovascular: Negative for chest pain, palpitations and leg swelling  Gastrointestinal: Negative for abdominal distention, abdominal pain, anal bleeding, blood in stool, constipation, diarrhea and nausea  Endocrine: Negative for cold intolerance, polydipsia, polyphagia and polyuria  Genitourinary: Negative for dysuria, flank pain, frequency, hematuria and urgency  Musculoskeletal: Positive for back pain  Negative for arthralgias, gait problem, joint swelling, myalgias and neck pain  Skin: Negative for rash  Allergic/Immunologic: Negative for immunocompromised state  Neurological: Negative for dizziness, syncope, facial asymmetry, weakness, light-headedness, numbness and headaches  Hematological: Negative for adenopathy  Psychiatric/Behavioral: Negative for confusion, sleep disturbance and suicidal ideas           Objective:      /50 (BP Location: Left arm, Patient Position: Sitting)   Pulse 64   Temp 97 8 °F (36 6 °C) (Tympanic)   Ht 5' 4" (1 626 m)   Wt 65 kg (143 lb 6 4 oz)   SpO2 94%   BMI 24 61 kg/m²          Physical Exam   Constitutional: He is oriented to person, place, and time  He appears well-developed and well-nourished  No distress  HENT:   Right Ear: External ear normal    Left Ear: External ear normal    Nose: Nose normal    Mouth/Throat: Oropharynx is clear and moist  No oropharyngeal exudate  Eyes: Pupils are equal, round, and reactive to light  EOM are normal    Neck: Normal range of motion  Neck supple  No JVD present  No thyromegaly present  Cardiovascular: Normal rate, regular rhythm, normal heart sounds and intact distal pulses  Exam reveals no gallop  Pulses are no weak pulses  No murmur heard  Pulses:       Dorsalis pedis pulses are 1+ on the left side  Posterior tibial pulses are 1+ on the left side  Pulmonary/Chest: Effort normal and breath sounds normal  No respiratory distress  He has no wheezes  He has no rales  Abdominal: Soft  Bowel sounds are normal  He exhibits no distension and no mass  There is no tenderness  Musculoskeletal: Normal range of motion  He exhibits no tenderness  Feet:   Right Foot:   Skin Integrity: Negative for ulcer, skin breakdown, erythema, warmth, callus or dry skin  Left Foot:   Skin Integrity: Negative for ulcer, skin breakdown, erythema, warmth, callus or dry skin  Lymphadenopathy:     He has no cervical adenopathy  Neurological: He is alert and oriented to person, place, and time  No cranial nerve deficit  Coordination normal    Skin: No rash noted  Psychiatric: He has a normal mood and affect   His behavior is normal  Judgment and thought content normal      Right Foot/Ankle   Right Foot Inspection  Skin Exam: skin normal and skin intact no dry skin, no warmth, no callus, no erythema, no maceration, no abnormal color, no pre-ulcer, no ulcer and no callus                          Toe Exam: ROM and strength within normal limits  Sensory   Vibration: diminished  Proprioception: intact   Monofilament testing: intact  Vascular  Capillary refills: < 3 seconds      Left Foot/Ankle  Left Foot Inspection  Skin Exam: skin normal and skin intactno dry skin, no warmth, no erythema, no maceration, normal color, no pre-ulcer, no ulcer and no callus                         Toe Exam: ROM and strength within normal limits                   Sensory   Vibration: diminished  Proprioception: intact  Monofilament: intact  Vascular  Capillary refills: < 3 seconds  The left DP pulse is 1+  The left PT pulse is 1+  Assign Risk Category:  No deformity present; Loss of protective sensation;  No weak pulses       Risk: 1

## 2019-09-17 NOTE — ASSESSMENT & PLAN NOTE
Lab Results   Component Value Date    HGBA1C 6 6 (H) 03/05/2019       No results for input(s): POCGLU in the last 72 hours  Blood Sugar Average: Last 72 hrs:   despite the fact that he is both diabetic and hypertensive renal disease his kidney function has been stable over the past year    He is not anxious to pursue this at present

## 2019-09-17 NOTE — PATIENT INSTRUCTIONS
Medicare Preventive Visit Patient Instructions  Thank you for completing your Welcome to Medicare Visit or Medicare Annual Wellness Visit today  Your next wellness visit will be due in one year (9/17/2020)  The screening/preventive services that you may require over the next 5-10 years are detailed below  Some tests may not apply to you based off risk factors and/or age  Screening tests ordered at today's visit but not completed yet may show as past due  Also, please note that scanned in results may not display below  Preventive Screenings:  Service Recommendations Previous Testing/Comments   Colorectal Cancer Screening  · Colonoscopy    · Fecal Occult Blood Test (FOBT)/Fecal Immunochemical Test (FIT)  · Fecal DNA/Cologuard Test  · Flexible Sigmoidoscopy Age: 54-65 years old   Colonoscopy: every 10 years (May be performed more frequently if at higher risk)  OR  FOBT/FIT: every 1 year  OR  Cologuard: every 3 years  OR  Sigmoidoscopy: every 5 years  Screening may be recommended earlier than age 48 if at higher risk for colorectal cancer  Also, an individualized decision between you and your healthcare provider will decide whether screening between the ages of 74-80 would be appropriate   Colonoscopy: Not on file  FOBT/FIT: Not on file  Cologuard: Not on file  Sigmoidoscopy: Not on file         Prostate Cancer Screening Individualized decision between patient and health care provider in men between ages of 53-78   Medicare will cover every 12 months beginning on the day after your 50th birthday PSA: No results in last 5 years          Hepatitis C Screening Once for adults born between [facility]  More frequently in patients at high risk for Hepatitis C Hep C Antibody: Not on file       Diabetes Screening 1-2 times per year if you're at risk for diabetes or have pre-diabetes Fasting glucose: No results in last 5 years   A1C: 6 6 % of total Hgb       Cholesterol Screening Once every 5 years if you don't have a lipid disorder  May order more often based on risk factors  Lipid panel: 03/05/2019          Other Preventive Screenings Covered by Medicare:  1  Abdominal Aortic Aneurysm (AAA) Screening: covered once if your at risk  You're considered to be at risk if you have a family history of AAA or a male between the age of 73-68 who smoking at least 100 cigarettes in your lifetime  2  Lung Cancer Screening: covers low dose CT scan once per year if you meet all of the following conditions: (1) Age 50-69; (2) No signs or symptoms of lung cancer; (3) Current smoker or have quit smoking within the last 15 years; (4) You have a tobacco smoking history of at least 30 pack years (packs per day x number of years you smoked); (5) You get a written order from a healthcare provider  3  Glaucoma Screening: covered annually if you're considered high risk: (1) You have diabetes OR (2) Family history of glaucoma OR (3)  aged 48 and older OR (3)  American aged 72 and older  3  Osteoporosis Screening: covered every 2 years if you meet one of the following conditions: (1) Have a vertebral abnormality; (2) On glucocorticoid therapy for more than 3 months; (3) Have primary hyperparathyroidism; (4) On osteoporosis medications and need to assess response to drug therapy  5  HIV Screening: covered annually if you're between the age of 12-76  Also covered annually if you are younger than 13 and older than 72 with risk factors for HIV infection  For pregnant patients, it is covered up to 3 times per pregnancy      Immunizations:  Immunization Recommendations   Influenza Vaccine Annual influenza vaccination during flu season is recommended for all persons aged >= 6 months who do not have contraindications   Pneumococcal Vaccine (Prevnar and Pneumovax)  * Prevnar = PCV13  * Pneumovax = PPSV23 Adults 25-60 years old: 1-3 doses may be recommended based on certain risk factors  Adults 72 years old: Prevnar (PCV13) vaccine recommended followed by Pneumovax (PPSV23) vaccine  If already received PPSV23 since turning 65, then PCV13 recommended at least one year after PPSV23 dose  Hepatitis B Vaccine 3 dose series if at intermediate or high risk (ex: diabetes, end stage renal disease, liver disease)   Tetanus (Td) Vaccine - COST NOT COVERED BY MEDICARE PART B Following completion of primary series, a booster dose should be given every 10 years to maintain immunity against tetanus  Td may also be given as tetanus wound prophylaxis  Tdap Vaccine - COST NOT COVERED BY MEDICARE PART B Recommended at least once for all adults  For pregnant patients, recommended with each pregnancy  Shingles Vaccine (Shingrix) - COST NOT COVERED BY MEDICARE PART B  2 shot series recommended in those aged 48 and above     Health Maintenance Due:  There are no preventive care reminders to display for this patient  Immunizations Due:      Topic Date Due    HEPATITIS B VACCINES (1 of 3 - Risk 3-dose series) 12/07/1953    DTaP,Tdap,and Td Vaccines (1 - Tdap) 12/07/1955    INFLUENZA VACCINE  07/01/2019     Advance Directives   What are advance directives? Advance directives are legal documents that state your wishes and plans for medical care  These plans are made ahead of time in case you lose your ability to make decisions for yourself  Advance directives can apply to any medical decision, such as the treatments you want, and if you want to donate organs  What are the types of advance directives? There are many types of advance directives, and each state has rules about how to use them  You may choose a combination of any of the following:  · Living will: This is a written record of the treatment you want  You can also choose which treatments you do not want, which to limit, and which to stop at a certain time  This includes surgery, medicine, IV fluid, and tube feedings  · Durable power of  for healthcare [facility]):   This is a written record that states who you want to make healthcare choices for you when you are unable to make them for yourself  This person, called a proxy, is usually a family member or a friend  You may choose more than 1 proxy  · Do not resuscitate (DNR) order:  A DNR order is used in case your heart stops beating or you stop breathing  It is a request not to have certain forms of treatment, such as CPR  A DNR order may be included in other types of advance directives  · Medical directive: This covers the care that you want if you are in a coma, near death, or unable to make decisions for yourself  You can list the treatments you want for each condition  Treatment may include pain medicine, surgery, blood transfusions, dialysis, IV or tube feedings, and a ventilator (breathing machine)  · Values history: This document has questions about your views, beliefs, and how you feel and think about life  This information can help others choose the care that you would choose  Why are advance directives important? An advance directive helps you control your care  Although spoken wishes may be used, it is better to have your wishes written down  Spoken wishes can be misunderstood, or not followed  Treatments may be given even if you do not want them  An advance directive may make it easier for your family to make difficult choices about your care  © Copyright [employer] 2018 Information is for End User's use only and may not be sold, redistributed or otherwise used for commercial purposes   All illustrations and images included in CareNotes® are the copyrighted property of [employer]  or [address] [place] [address] [place]

## 2019-09-17 NOTE — PROGRESS NOTES
Assessment and Plan:     Problem List Items Addressed This Visit     None      Visit Diagnoses     Medicare annual wellness visit, subsequent    -  Primary           Preventive health issues were discussed with patient, and age appropriate screening tests were ordered as noted in patient's After Visit Summary  Personalized health advice and appropriate referrals for health education or preventive services given if needed, as noted in patient's After Visit Summary       History of Present Illness:     Patient presents for Medicare Annual Wellness visit    Patient Care Team:  Ap Gutierrez MD as PCP - General  MD Ruslan Reina MD     Problem List:     Patient Active Problem List   Diagnosis    Asthma, mild intermittent    Essential hypertension    [facility] mellitus[facility] II [facility] [address] [facility]    Glaucoma    Hyperlipidemia    Mild vitamin D deficiency    Organic impotence    Type 2 diabetes mellitus with diabetic chronic kidney disease ([facility] [address] [facility]    Primary osteoarthritis of both knees    Iliotibial band syndrome of both sides    Weight loss    Anemia due to stage 4 chronic kidney disease ([facility] [address] [facility]    Retinal vein occlusion of left eye    Malignant neoplasm of urinary bladder ([facility] [address] [facility]    Vision loss of left eye    Neurogenic claudication    Low back pain      Past Medical and Surgical History:     Past Medical History:   Diagnosis Date    Cataracts, bilateral      Past Surgical History:   Procedure Laterality Date    CATARACT EXTRACTION Bilateral 07/15/2012    COLONOSCOPY      CYSTOSCOPY  01/15/2018    Last assessed 9/14/17, diagnostic    HERNIA REPAIR      INSTILLATION MYTOMYCIN N/A 10/25/2017    Procedure: INSTILLATION OF MITOMYCIN C;  Surgeon: Ruslan Morton MD;  [facility];  Service: Urology    IN Cirilo Leep <0 5 CM LESN N/A 10/25/2017    Procedure: Omer Carcamo; BLADDER BIOPSY WITH Rosie Hoffman;  Surgeon: Ruslan Morton MD;  [facility];  Service: Urology   [facility] TONSILLECTOMY      TRANSURETHRAL RESECTION OF BLADDER TUMOR N/A 10/25/2017    Procedure: TUR BLADDER TUMOR;  Surgeon: Cathie Neri MD;  [facility];  Service: Urology    WISDOM TOOTH EXTRACTION        Family History:     Family History   Problem Relation Age of Onset    Diabetes Mother       Social History:     Social History     Socioeconomic History    Marital status: /Civil Union     Spouse name: None    Number of children: None    Years of education: None    Highest education level: None   Occupational History    None   Social Needs    Financial resource strain: None    Food insecurity:     Worry: None     Inability: None    Transportation needs:     Medical: None     Non-medical: None   Tobacco Use    Smoking status: Former Smoker     Last attempt to quit: 1965     Years since quittin 7    Smokeless tobacco: Never Used   Substance and Sexual Activity    Alcohol use: No     Comment: quit 23 years ago    Drug use: No    Sexual activity: None   Lifestyle    Physical activity:     Days per week: None     Minutes per session: None    Stress: None   Relationships    Social connections:     Talks on phone: None     Gets together: None     Attends Christianity service: None     Active member of club or organization: None     Attends meetings of clubs or organizations: None     Relationship status: None    Intimate partner violence:     Fear of current or ex partner: None     Emotionally abused: None     Physically abused: None     Forced sexual activity: None   Other Topics Concern    None   Social History Narrative    None       Medications and Allergies:     Current Outpatient Medications   Medication Sig Dispense Refill    amLODIPine (NORVASC) 10 mg tablet Take 1 tablet (10 mg total) by mouth daily 90 tablet 1    aspirin 325 mg tablet Take 325 mg by mouth daily      bimatoprost (LUMIGAN) 0 01 % ophthalmic drops Apply to eye      Cholecalciferol (VITAMIN D3) 1000 units CAPS Take by mouth      cyanocobalamin (VITAMIN B-12) 500 mcg tablet Take 500 mcg by mouth daily      fluticasone-salmeterol (ADVAIR DISKUS) 100-50 mcg/dose Inhale      glimepiride (AMARYL) 1 mg tablet Take 1 tablet (1 mg total) by mouth daily with breakfast 90 tablet 1    glucose blood (ONETOUCH VERIO) test strip CHECK BLOOD SUGAR TWICE DAILY    DX CODE E11 9 100 each 6    lidocaine (LMX) 4 % cream Apply topically as needed for mild pain 30 g 0    loteprednol etabonate (LOTEMAX) 0 5 % ophthalmic suspension Apply to eye      metoprolol succinate (TOPROL-XL) 50 mg 24 hr tablet Take 1 tablet by mouth daily      simvastatin (ZOCOR) 40 mg tablet Take 1 tablet by mouth daily      tamsulosin (FLOMAX) 0 4 mg Take 1 capsule by mouth      timolol (TIMOPTIC) 0 5 % ophthalmic solution Administer 1 drop to both eyes 2 (two) times a day       No current facility-administered medications for this visit  Allergies   Allergen Reactions    Ace Inhibitors      Other reaction(s): hyperkalemia    Penicillins GI Intolerance      Immunizations:     Immunization History   Administered Date(s) Administered    INFLUENZA 10/27/2018    Influenza Split High Dose Preservative Free IM 10/15/2012, 09/26/2013, 11/06/2014, 10/20/2015, 11/07/2016, 11/14/2017    Pneumococcal Conjugate 13-Valent 11/07/2016    Pneumococcal Polysaccharide PPV23 11/28/2007, 11/14/2017      Health Maintenance: There are no preventive care reminders to display for this patient  Topic Date Due    HEPATITIS B VACCINES (1 of 3 - Risk 3-dose series) 12/07/1953    DTaP,Tdap,and Td Vaccines (1 - Tdap) 12/07/1955    INFLUENZA VACCINE  07/01/2019      Medicare Health Risk Assessment:     /50 (BP Location: Left arm, Patient Position: Sitting)   Pulse 64   Temp 97 8 °F (36 6 °C) (Tympanic)   Ht 5' 4" (1 626 m)   Wt 65 kg (143 lb 6 4 oz)   SpO2 94%   BMI 24 61 kg/m²      Nelia Borges is here for his Subsequent Wellness visit   Last Medicare Wellness visit information reviewed, patient interviewed and updates made to the record today  Health Risk Assessment:   Patient rates overall health as good  Patient feels that their physical health rating is slightly worse  Eyesight was rated as slightly worse  Hearing was rated as same  Patient feels that their emotional and mental health rating is same  Pain experienced in the last 7 days has been some  Patient's pain rating has been 5/10  Patient states that he has experienced no weight loss or gain in last 6 months  Depression Screening:   PHQ-2 Score: 0      Fall Risk Screening: In the past year, patient has experienced: no history of falling in past year      Home Safety:  Patient does not have trouble with stairs inside or outside of their home  Patient has working smoke alarms and has working carbon monoxide detector  Home safety hazards include: none  Nutrition:   Current diet is Low Carb, No Added Salt and Low Cholesterol  Medications:   Patient is not currently taking any over-the-counter supplements  Patient is able to manage medications  Activities of Daily Living (ADLs)/Instrumental Activities of Daily Living (IADLs):   Walk and transfer into and out of bed and chair?: Yes  Dress and groom yourself?: Yes    Bathe or shower yourself?: Yes    Feed yourself?  Yes  Do your laundry/housekeeping?: Yes  Manage your money, pay your bills and track your expenses?: Yes  Make your own meals?: Yes    Do your own shopping?: Yes    Previous Hospitalizations:   Any hospitalizations or ED visits within the last 12 months?: No      Advance Care Planning:   Living will: No    Durable POA for healthcare: No    Advanced directive: No    Advanced directive counseling given: Yes      Cognitive Screening:   Provider or family/friend/caregiver concerned regarding cognition?: No    PREVENTIVE SCREENINGS      Cardiovascular Screening:    General: Screening Not Indicated and History Lipid Disorder Diabetes Screening:     General: Screening Not Indicated and History Diabetes      Colorectal Cancer Screening:     General: Screening Not Indicated      Prostate Cancer Screening:    General: Screening Not Indicated      Osteoporosis Screening:    General: Screening Not Indicated      Abdominal Aortic Aneurysm (AAA) Screening:    Risk factors include: tobacco use        Lung Cancer Screening:     General: Screening Not Indicated      Hepatitis C Screening:    General: Screening Not Indicated    Other Counseling Topics:   Regular weightbearing exercise and calcium and vitamin D intake         Maycol Kwong MD

## 2019-09-17 NOTE — ASSESSMENT & PLAN NOTE
His blood pressure continues to be read main well controlled with metoprol ol    He has no obvious symptoms of cardiovascular disease

## 2019-09-17 NOTE — ASSESSMENT & PLAN NOTE
Lab Results   Component Value Date    HGBA1C 6 6 (H) 03/05/2019       No results for input(s): POCGLU in the last 72 hours  Blood Sugar Average: Last 72 hrs:   he states his fasting blood sugars at home running in the low 100s in general and his wife monitors his food intake    He will go and get blood work in the near future

## 2019-09-24 ENCOUNTER — CONSULT (OUTPATIENT)
Dept: PAIN MEDICINE | Facility: CLINIC | Age: 84
End: 2019-09-24
Payer: MEDICARE

## 2019-09-24 VITALS
SYSTOLIC BLOOD PRESSURE: 126 MMHG | BODY MASS INDEX: 24.75 KG/M2 | HEART RATE: 68 BPM | WEIGHT: 145 LBS | DIASTOLIC BLOOD PRESSURE: 64 MMHG | RESPIRATION RATE: 16 BRPM | HEIGHT: 64 IN

## 2019-09-24 DIAGNOSIS — M51.16 LUMBAR DISC DISEASE WITH RADICULOPATHY: Primary | ICD-10-CM

## 2019-09-24 DIAGNOSIS — M48.061 SPINAL STENOSIS OF LUMBAR REGION, UNSPECIFIED WHETHER NEUROGENIC CLAUDICATION PRESENT: ICD-10-CM

## 2019-09-24 DIAGNOSIS — M51.36 DDD (DEGENERATIVE DISC DISEASE), LUMBAR: ICD-10-CM

## 2019-09-24 PROCEDURE — 99204 OFFICE O/P NEW MOD 45 MIN: CPT | Performed by: PHYSICAL MEDICINE & REHABILITATION

## 2019-09-24 NOTE — PROGRESS NOTES
Assessment  1  Lumbar disc disease with radiculopathy    2  Spinal stenosis of lumbar region, unspecified whether neurogenic claudication present    3  DDD (degenerative disc disease), lumbar        Plan    Mr Diane Barnhart is a pleasant 77-year-old male with significant past medical history of chronic low back pain unrelieved with conservative measures presents with both clinical and diagnostic evidence of lumbar radiculopathy and degenerative disc disease  He is already enrolled in will be participated in physical therapy  At this time we will   1  Plan for right and left-sided L5-S1 transforaminal epidural steroid injections    2  Advised to continue with physical therapy and home exercise regimen  3  Risks benefits and procedure were discussed at length with patient and he wishes to proceed  4  Patient is aware he needs to hold his anticoagulants      My impressions and treatment recommendations were discussed in detail with the patient who verbalized understanding and had no further questions  Discharge instructions were provided  I personally saw and examined the patient and I agree with the above discussed plan of care  Orders Placed This Encounter   Procedures    FL spine and pain procedure     Standing Status:   Future     Standing Expiration Date:   9/24/2023     Order Specific Question:   Reason for Exam:     Answer:   Right and left sided L5-S1 TFESI     Order Specific Question:   Anticoagulant hold needed? Answer:   Hold ASA         History of Present Illness    Nasim Nielsen is a 80 y o  male presents to [facility]'s spinal pain associated with complaints of low back pain  He denies any significant inciting event or specific injury  Reports that the pain has been present for several months, reports to be moderate to severe and rated 5/10  This pain is interfering with his daily activities  Describes the pain as pressure-like and worse in the afternoon    Occurs intermittently 30-60% of the time   Admits to intermittent lower extremity weakness  Pain is worse with standing, bending, walking  Pain is improved with prior and lying down  Pain is mildly improved with the assistance of naproxen and Tylenol  He has not had any previous history of injections  He will be participating with physical therapy  I have personally reviewed and/or updated the patient's past medical history, past surgical history, family history, social history, current medications, allergies, and vital signs today  Review of Systems   Constitutional: Positive for chills  Negative for fever and unexpected weight change  HENT: Negative for trouble swallowing  Eyes: Positive for visual disturbance  Respiratory: Positive for shortness of breath  Negative for wheezing  Cardiovascular: Negative for chest pain and palpitations  Gastrointestinal: Negative for constipation, diarrhea, nausea and vomiting  Endocrine: Negative for cold intolerance, heat intolerance and polydipsia  Genitourinary: Negative for difficulty urinating and frequency  Musculoskeletal: Positive for myalgias  Negative for arthralgias, gait problem and joint swelling  Skin: Negative for rash  Neurological: Negative for dizziness, seizures, syncope, weakness and headaches  Hematological: Does not bruise/bleed easily  Psychiatric/Behavioral: Negative for dysphoric mood  All other systems reviewed and are negative        Patient Active Problem List   Diagnosis    Asthma, mild intermittent    Essential hypertension    Diabetes mellitus, type II ([facility] [address] [facility]    Glaucoma    Hyperlipidemia    Mild vitamin D deficiency    Organic impotence    Type 2 diabetes mellitus with diabetic chronic kidney disease ([facility] [address] [facility]    Primary osteoarthritis of both knees    Iliotibial band syndrome of both sides    Weight loss    Anemia due to stage 4 chronic kidney disease (HCC)    Retinal vein occlusion of left eye    Malignant neoplasm of urinary bladder ([address]    Vision loss of left eye    Neurogenic claudication    Low back pain       Past Medical History:   Diagnosis Date    Cataracts, bilateral        Past Surgical History:   Procedure Laterality Date    CATARACT EXTRACTION Bilateral 07/15/2012    COLONOSCOPY      CYSTOSCOPY  01/15/2018    Last assessed 17, diagnostic    HERNIA REPAIR      INSTILLATION MYTOMYCIN N/A 10/25/2017    Procedure: INSTILLATION OF MITOMYCIN C;  Surgeon: Wily Butt MD;  [facility];  Service: Urology    GA CYSTOURETHROSCOPY,FULGUR <0 5 CM LESN N/A 10/25/2017    Procedure: Arron Cordon; BLADDER BIOPSY WITH Juan Jose Wilkinson;  Surgeon: Wily Butt MD;  [facility];  Service: Urology    TONSILLECTOMY      TRANSURETHRAL RESECTION OF BLADDER TUMOR N/A 10/25/2017    Procedure: TUR BLADDER TUMOR;  Surgeon: Wily Butt MD;  [facility];  Service: Urology    WISDOM TOOTH EXTRACTION         Family History   Problem Relation Age of Onset    Diabetes Mother        Social History     Occupational History    Not on file   Tobacco Use    Smoking status: Former Smoker     Last attempt to quit: 1965     Years since quittin 7    Smokeless tobacco: Never Used   Substance and Sexual Activity    Alcohol use: No     Comment: quit 23 years ago    Drug use: No    Sexual activity: Not on file       Current Outpatient Medications on File Prior to Visit   Medication Sig    amLODIPine (NORVASC) 10 mg tablet Take 1 tablet (10 mg total) by mouth daily    aspirin 325 mg tablet Take 325 mg by mouth daily    bimatoprost (LUMIGAN) 0 01 % ophthalmic drops Apply to eye    Cholecalciferol (VITAMIN D3) 1000 units CAPS Take by mouth    cyanocobalamin (VITAMIN B-12) 500 mcg tablet Take 500 mcg by mouth daily    fluticasone-salmeterol (ADVAIR DISKUS) 100-50 mcg/dose Inhale    glimepiride (AMARYL) 1 mg tablet Take 1 tablet (1 mg total) by mouth daily with breakfast    glucose blood (ONETOUCH VERIO) test strip CHECK BLOOD SUGAR TWICE DAILY    DX CODE E11 9    loteprednol etabonate (LOTEMAX) 0 5 % ophthalmic suspension Apply to eye    timolol (TIMOPTIC) 0 5 % ophthalmic solution Administer 1 drop to both eyes 2 (two) times a day    lidocaine (LMX) 4 % cream Apply topically as needed for mild pain (Patient not taking: Reported on 9/24/2019)    metoprolol succinate (TOPROL-XL) 50 mg 24 hr tablet Take 1 tablet by mouth daily    simvastatin (ZOCOR) 40 mg tablet Take 1 tablet by mouth daily    tamsulosin (FLOMAX) 0 4 mg Take 1 capsule by mouth     No current facility-administered medications on file prior to visit  Allergies   Allergen Reactions    Ace Inhibitors      Other reaction(s): hyperkalemia    Penicillins GI Intolerance       Physical Exam    /64   Pulse 68   Resp 16   Ht 5' 4" (1 626 m)   Wt 65 8 kg (145 lb)   BMI 24 89 kg/m²   General: Well-developed, well-nourished individual in no acute distress  Mental: Appropriate mood and affect  Grossly oriented with coherent speech and thought processing  Neuro:  Cranial nerves: Cranial nerve function is grossly intact bilaterally  Strength: Bilateral lower extremity strength is normal and symmetric  No atrophy or tone abnormalities noted  Reflexes: Bilateral lower extremity muscle stretch reflexes are symmetrically hyporeflexic  No ankle clonus is noted  Sensation: No loss of sensation is noted  SLR/Foraminal Compression Maneuvers: Straight leg raising in the   supine position is  positive for radicular pain bilaterally  Gait:  Gait/gross motor: Gait is normal  Station is forward flexed posture  Musculoskeletal:  Palpation: Inspection and palpation of the spine and extremities are remarkable for tenderness to palpation of the bilateral low back paraspinals and glutes  Spine:  Limited active and passive range of motion worse with flexion  No gross axial skeletal deformities      Skin: Skin inspection grossly negative for erythema, breakdown, or concerning lesions in affected area  Lymph: No lymphadenopathy is appreciated in the involved extremity  Vessels: No lower extremity edema  Lungs: Breathing is comfortable and regular  No dyspnea noted during examination  Eyes: Visual field grossly intact to confrontation  No redness appreciated  ENT: No craniofacial deformities or asymmetry  No neck masses appreciated  Imaging Lumbar MRI 8/24/19: LUMBAR DISC SPACES:  Disc desiccation and loss of disc height at every level      L1-L2:  Moderate diffuse annular bulging  There is a small superiorly extruded central zone disc herniation  Mild to moderate canal stenosis with distortion of the thecal sac  Moderate bilateral foraminal narrowing, left greater than right      L2-L3:  There is marked disc desiccation and loss of disc height with extensive diffuse annular bulging  Left lateral subluxation of L2 in relation to L1 and L3  Endplate changes are more pronounced within the right extraforaminal zone  Mild facet   degenerative change and ligamentum flavum thickening  There is moderate canal stenosis with significant distortion of the thecal sac  Moderate left but severe right foraminal narrowing without clear compression of the exiting nerve      L3-L4:  Mild diffuse annular bulging and endplate hypertrophic change  Mild ligamentum flavum thickening  There is moderate canal stenosis with moderately severe left foraminal narrowing      L4-L5:  Diffuse annular bulging with asymmetric bulge extending from the central zone to the extraforaminal zone  Asymmetric endplate hypertrophic changes noted on the left with left greater than right facet arthropathy and ligamentum flavum thickening  Moderately severe canal stenosis and left foraminal narrowing      L5-S1:  Diffuse annular bulging with central disc protrusion and circumferential endplate hypertrophic change  Bilateral facet arthropathy    There is moderate canal stenosis and severe bilateral foraminal narrowing      IMPRESSION:     Mild scoliosis with diffuse lumbar spondylitic degenerative change  Annular bulges with disc herniations and endplate/facet hypertrophic changes are present  Multilevel moderate to severe canal stenosis  Canal stenosis is most pronounced at the L4-5   level    There is also significant canal stenosis at L1-2, L2-3 and L5-S1      There is severe foraminal narrowing noted on the right at L2-3, on the left at L4-5 and bilaterally at L5-S1

## 2019-09-24 NOTE — PATIENT INSTRUCTIONS
Lumbar Radiculopathy   WHAT YOU NEED TO KNOW:   What is lumbar radiculopathy? Lumbar radiculopathy is a painful condition that happens when a nerve in your lumbar spine (lower back) is pinched or irritated  Nerves control feeling and movement in your body  What causes lumbar radiculopathy? You may get a pinched nerve in your lumbar spine if you have disc damage  Discs are natural, spongy cushions between your vertebrae (back bones) that allow your spine to move  Your discs may move out of place and pinch the nerve in your spine  Your risk for a pinched nerve and lumbar radiculopathy increases if:  · You smoke  · You have diabetes, a spinal infection, or a growth in your spine  · You are overweight  · You are male  · You are elderly  What are the signs and symptoms of lumbar radiculopathy? You may have any of the following:  · Pain that moves from your lower back to your buttocks, groin, and the back of your leg  The pain is often felt below your knee  Your pain may worsen when you cough, sneeze, stand, or sit  · Numbness, weakness, or tingling in your back or legs  How is lumbar radiculopathy diagnosed? Your healthcare provider will examine you and ask about your family history of back and leg pain  He may also test you for weakness, numbness, or tingling in your back, buttocks, and legs  Your healthcare provider may ask you to lie on your back and lift your leg to locate your pain  You may have any of the following:  · Blood tests: You may need blood taken to give caregivers information about how your body is working  The blood may be taken from your hand, arm, or IV  · Magnetic resonance imaging (MRI): An MRI machine is used to take a picture of your lower back  Your healthcare provider will use this picture to check for problems and changes in your back bones, nerves, and discs  You will need to lie still during this test  The MRI machine contains a very powerful magnet    Never enter the MRI room with any metal objects  This can cause serious injury  Tell your healthcare provider if you have any metal implants in your body  · X-ray: An x-ray is a picture of your lower back  A lumbar x-ray may show signs of infection or other problems with your spine  · An electromyography (EMG)  test measures the electrical activity of your muscles at rest and with movement  · Computed tomography (CT) scan: A special x-ray machine uses a computer to take pictures of your lower back  It may be used to look at your bones, discs, and nerves  You may be given dye in your IV to help improve the pictures  Tell your healthcare provider if you are allergic to shellfish, or have other allergies or medical conditions  People who are allergic to iodine or shellfish (lobster, crab, or shrimp) may be allergic to some dyes  How is lumbar radiculopathy treated? Treatment of lumbar radiculopathy may reduce pain and swelling, and improve your ability to walk and do your normal activities  Ask your healthcare provider for more information about these and other treatments for lumbar radiculopathy:  · Medicines:     ¨ NSAIDs , such as ibuprofen, help decrease swelling, pain, and fever  This medicine is available with or without a doctor's order  NSAIDs can cause stomach bleeding or kidney problems in certain people  If you take blood thinner medicine, always ask your healthcare provider if NSAIDs are safe for you  Always read the medicine label and follow directions  ¨ Muscle relaxers  help decrease pain and muscle spasms  ¨ Opioids: This is a strong medicine given to reduce severe pain  It is also called narcotic pain medicine  Take this medicine exactly as directed by your healthcare provider  ¨ Oral steroids: Steroids may be given to reduce swelling and pain  ¨ Steroid injections: Healthcare providers may give you steroid medicine through a needle (shot) into your lumbar spine   This may help decrease your nerve pain and swelling  You may need more than 1 injection if your symptoms do not improve after the first treatment  · Physical therapy: Your healthcare provider may suggest physical therapy  Your physical therapist may teach you certain exercises to improve posture (the way you stand and sit), flexibility, and strength in your lower back  He may also teach you how to remain safely active and avoid further injury  Follow the exercise plan given to you by your physical therapist     · Transcutaneous electrical nerve stimulation: This treatment, called TENS, stimulates your nerves and may decrease your pain  Wires are attached to pads  The pads are attached to your skin  The wires send a mild current through your nerves  · Surgery: You may need surgery to relieve your pinched nerve if your condition has not improved within 4 to 6 weeks  You may also need it if you have lumbar radiculopathy more than once  What are the risks of treatment for lumbar radiculopathy? · Without treatment, your pain may worsen  The pinched and swollen nerve may lead to problems when you walk or move  In severe cases, you may lose control of your urine or bowel movements  Bedrest can make your symptoms worse  · Pain medicines can cause vomiting, upset stomach, constipation (large, hard bowel movements that are difficult to pass), or kidney or liver problems  Opioid medicine may be addictive (hard to quit taking once you start)  Oral steroids and steroid injections may have side effects, such as facial redness, fluid retention (water weight gain), and mood changes  Steroid injections may be painful and can cause severe headaches, infections, allergic reactions, or nerve damage  Surgery risks include delayed problems with healing, spinal or bladder infection, damage to the spinal cord or other nerves, and ongoing pain  In rare cases, you could become paralyzed (not able to move your arms or legs)    How can I care for myself when I have lumbar radiculopathy? · Stay active: It is best to be active when you have lumbar radiculopathy  Your healthcare provider may tell you to take walks to ease yourself back into your daily routine  Avoid long periods of bed rest  Bed rest could worsen your symptoms  Do not move in ways that increase your pain  Ask your healthcare provider for more information about the best ways to stay active  · Use ice or heat packs:  Use ice or heat packs on the sore area of your body to decrease the pain and swelling  Put ice in a plastic bag covered with a towel on your low back  Cover heated items with a towel to avoid burns  Use ice and heat as directed  · Avoid heavy lifting: Your condition may worsen if you lift heavy things  Avoid lifting if possible  · Maintain a healthy weight:  Excess body weight may strain your back  Talk with your healthcare provider about ways to lose excess weight if you are overweight  When should I contact my healthcare provider? · Your pain does not improve within 1 to 3 weeks after treatment  · Your pain and weakness keep you from your normal activities at work, home, or school  · You lose more than 10 pounds in 6 months without trying  · You become depressed or sad because of the pain  · You have questions or concerns about your condition or care  When should I seek immediate care or call 911? · You have a fever greater than 100 4°F for longer than 2 days  · You have new, severe back or leg pain, or your pain spreads to both legs  · You have any new signs of numbness or weakness, especially in your lower back, legs, arms, or genital area  · You have new trouble controlling your urine and bowel movements  · You do not feel like your bladder empties when you urinate  CARE AGREEMENT:   You have the right to help plan your care  Learn about your health condition and how it may be treated   Discuss treatment options with your caregivers to decide what care you want to receive  You always have the right to refuse treatment  The above information is an  only  It is not intended as medical advice for individual conditions or treatments  Talk to your doctor, nurse or pharmacist before following any medical regimen to see if it is safe and effective for you  © 2017 [employer] Information is for End User's use only and may not be sold, redistributed or otherwise used for commercial purposes  All illustrations and images included in CareNotes® are the copyrighted property of [employer]  or [employer]

## 2019-10-02 ENCOUNTER — HOSPITAL ENCOUNTER (OUTPATIENT)
Dept: RADIOLOGY | Facility: CLINIC | Age: 84
Discharge: HOME | End: 2019-10-02
Attending: PHYSICAL MEDICINE & REHABILITATION
Payer: MEDICARE

## 2019-10-02 VITALS
DIASTOLIC BLOOD PRESSURE: 83 MMHG | HEART RATE: 71 BPM | RESPIRATION RATE: 18 BRPM | TEMPERATURE: 97.8 F | SYSTOLIC BLOOD PRESSURE: 166 MMHG | OXYGEN SATURATION: 96 %

## 2019-10-02 DIAGNOSIS — M51.16 LUMBAR DISC DISEASE WITH RADICULOPATHY: ICD-10-CM

## 2019-10-02 DIAGNOSIS — M48.061 SPINAL STENOSIS OF LUMBAR REGION, UNSPECIFIED WHETHER NEUROGENIC CLAUDICATION PRESENT: ICD-10-CM

## 2019-10-02 DIAGNOSIS — M51.36 DDD (DEGENERATIVE DISC DISEASE), LUMBAR: ICD-10-CM

## 2019-10-02 PROCEDURE — 64483 NJX AA&/STRD TFRM EPI L/S 1: CPT | Performed by: PHYSICAL MEDICINE & REHABILITATION

## 2019-10-02 RX ORDER — PAPAVERINE HCL 150 MG
10 CAPSULE, EXTENDED RELEASE ORAL ONCE
Status: COMPLETED | OUTPATIENT
Start: 2019-10-02 | End: 2019-10-02

## 2019-10-02 RX ORDER — BUPIVACAINE HCL/PF 2.5 MG/ML
10 VIAL (ML) INJECTION ONCE
Status: COMPLETED | OUTPATIENT
Start: 2019-10-02 | End: 2019-10-02

## 2019-10-02 RX ORDER — PAPAVERINE HCL 150 MG
20 CAPSULE, EXTENDED RELEASE ORAL ONCE
Status: DISCONTINUED | OUTPATIENT
Start: 2019-10-02 | End: 2019-10-02

## 2019-10-02 RX ORDER — 0.9 % SODIUM CHLORIDE 0.9 %
10 VIAL (ML) INJECTION ONCE
Status: COMPLETED | OUTPATIENT
Start: 2019-10-02 | End: 2019-10-02

## 2019-10-02 RX ADMIN — BUPIVACAINE HYDROCHLORIDE 2 ML: 2.5 INJECTION, SOLUTION EPIDURAL; INFILTRATION; INTRACAUDAL at 11:17

## 2019-10-02 RX ADMIN — Medication 10 MG: at 11:17

## 2019-10-02 RX ADMIN — Medication 4 ML: at 11:06

## 2019-10-02 RX ADMIN — SODIUM CHLORIDE 4 ML: 9 INJECTION, SOLUTION INTRAMUSCULAR; INTRAVENOUS; SUBCUTANEOUS at 11:06

## 2019-10-02 RX ADMIN — IOHEXOL 3 ML: 300 INJECTION, SOLUTION INTRAVENOUS at 11:17

## 2019-10-02 NOTE — DISCHARGE INSTR - LAB
Epidural Steroid Injection   WHAT YOU NEED TO KNOW:   An epidural steroid injection (ANA) is a procedure to inject steroid medicine into the epidural space  The epidural space is between your spinal cord and vertebrae  Steroids reduce inflammation and fluid buildup in your spine that may be causing pain  You may be given pain medicine along with the steroids  ACTIVITY  · Do not drive or operate machinery today  · No strenuous activity today - bending, lifting, etc   · You may resume normal activites starting tomorrow - start slowly and as tolerated  · You may shower today, but no tub baths or hot tubs  · You may have numbness for several hours from the local anesthetic  Please use caution and common sense, especially with weight-bearing activities  CARE OF THE INJECTION SITE  · If you have soreness or pain, apply ice to the area today (20 minutes on/20 minutes off)  · Starting tomorrow, you may use warm, moist heat or ice if needed  · You may have an increase or change in your discomfort for 36-48 hours after your treatment  · Apply ice and continue with any pain medication you have been prescribed  · Notify [facility] if you have any of the following: redness, drainage, swelling, headache, stiff neck or fever above 100°F     SPECIAL INSTRUCTIONS  · Our office will contact you in approximately 7 days for a progress report  MEDICATIONS  · Continue to take all routine medications  · Our office may have instructed you to hold some medications  If you have a problem specifically related to your procedure, please call our office at [phone]  Problems not related to your procedure should be directed to your primary care physician

## 2019-10-02 NOTE — H&P
History of Present Illness:  The patient is a 80 y o  male who presents with complaints of bilateral low back pain    Patient Active Problem List   Diagnosis    Asthma, mild intermittent    Essential hypertension    Diabetes mellitus, type II ([address] )    Glaucoma    Hyperlipidemia    Mild vitamin D deficiency    Organic impotence    Type 2 diabetes mellitus with diabetic chronic kidney disease (HCC)    Primary osteoarthritis of both knees    Iliotibial band syndrome of both sides    Weight loss    Anemia due to stage 4 chronic kidney disease ([address] )    Retinal vein occlusion of left eye    Malignant neoplasm of urinary bladder (HCC)    Vision loss of left eye    Neurogenic claudication    Low back pain       Past Medical History:   Diagnosis Date    Cataracts, bilateral        Past Surgical History:   Procedure Laterality Date    CATARACT EXTRACTION Bilateral 07/15/2012    COLONOSCOPY      CYSTOSCOPY  01/15/2018    Last assessed 9/14/17, diagnostic    HERNIA REPAIR      INSTILLATION MYTOMYCIN N/A 10/25/2017    Procedure: INSTILLATION OF MITOMYCIN C;  Surgeon: Sherry Figueroa MD;  Location: [facility];  Service: Urology    LA CYSTOURETHROSCOPY,FULGUR <0 5 CM LESN N/A 10/25/2017    Procedure: Leroy Crew; BLADDER BIOPSY WITH Taniya Nelson;  Surgeon: Sherry Figueroa MD;  Location: [facility];  Service: Urology    TONSILLECTOMY      TRANSURETHRAL RESECTION OF BLADDER TUMOR N/A 10/25/2017    Procedure: TUR BLADDER TUMOR;  Surgeon: Sherry Figueroa MD;  [facility]: [facility];  Service: Urology    WISDOM TOOTH EXTRACTION           Current Outpatient Medications:     amLODIPine (NORVASC) 10 mg tablet, Take 1 tablet (10 mg total) by mouth daily, Disp: 90 tablet, Rfl: 1    aspirin 325 mg tablet, Take 325 mg by mouth daily, Disp: , Rfl:     bimatoprost (LUMIGAN) 0 01 % ophthalmic drops, Apply to eye, Disp: , Rfl:     Cholecalciferol (VITAMIN D3) 1000 units CAPS, Take by mouth, Disp: , Rfl:    cyanocobalamin (VITAMIN B-12) 500 mcg tablet, Take 500 mcg by mouth daily, Disp: , Rfl:     fluticasone-salmeterol (ADVAIR DISKUS) 100-50 mcg/dose, Inhale, Disp: , Rfl:     glimepiride (AMARYL) 1 mg tablet, Take 1 tablet (1 mg total) by mouth daily with breakfast, Disp: 90 tablet, Rfl: 1    glucose blood (ONETOUCH VERIO) test strip, CHECK BLOOD SUGAR TWICE DAILY    DX CODE E11 9, Disp: 100 each, Rfl: 6    lidocaine (LMX) 4 % cream, Apply topically as needed for mild pain (Patient not taking: Reported on 9/24/2019), Disp: 30 g, Rfl: 0    loteprednol etabonate (LOTEMAX) 0 5 % ophthalmic suspension, Apply to eye, Disp: , Rfl:     metoprolol succinate (TOPROL-XL) 50 mg 24 hr tablet, Take 1 tablet by mouth daily, Disp: , Rfl:     simvastatin (ZOCOR) 40 mg tablet, Take 1 tablet by mouth daily, Disp: , Rfl:     tamsulosin (FLOMAX) 0 4 mg, Take 1 capsule by mouth, Disp: , Rfl:     timolol (TIMOPTIC) 0 5 % ophthalmic solution, Administer 1 drop to both eyes 2 (two) times a day, Disp: , Rfl:     Current Facility-Administered Medications:     bupivacaine (PF) (MARCAINE) 0 25 % injection 10 mL, 10 mL, Epidural, Once, Bettylou Cowden, DO    dexamethasone (PF) (DECADRON) injection 20 mg, 20 mg, Epidural, Once, Lolita Mcginnis, DO    iohexol (OMNIPAQUE) 300 mg/mL injection 50 mL, 50 mL, Epidural, Once, Bettylou Cowden, DO    lidocaine (PF) (XYLOCAINE-MPF) 2 % injection 5 mL, 5 mL, Infiltration, Once, Bettylou Cowden, DO    sodium chloride (PF) 0 9 % injection 10 mL, 10 mL, Infiltration, Once, Bettylou Cowden, DO    Allergies   Allergen Reactions    Ace Inhibitors      Other reaction(s): hyperkalemia    Penicillins GI Intolerance       Physical Exam:   Vitals:    10/02/19 1043   BP: 159/69   Pulse: 73   Resp: 18   Temp: 97 8 °F (36 6 °C)   SpO2: 96%     General: Awake, Alert, Oriented x 3, Mood and affect appropriate  Respiratory: Respirations even and unlabored  Cardiovascular: Peripheral pulses intact; no edema  Musculoskeletal Exam:  Bilateral lumbar paraspinal tenderness    ASA Score:  2    Patient/Chart Verification  Patient ID Verified: Verbal  ID Band Applied: No  Consents Confirmed: Procedural, To be obtained in the Pre-Procedure area  H&P( within 30 days) Verified: To be obtained in the Pre-Procedure area  Interval H&P(within 24 hr) Complete (required for Outpatients and Surgery Admit only): To be obtained in the Pre-Procedure area  Allergies Reviewed: Yes  Anticoag/NSAID held?: NA  Currently on antibiotics?: No    Assessment:   1  Spinal stenosis of lumbar region, unspecified whether neurogenic claudication present    2  DDD (degenerative disc disease), lumbar    3   Lumbar disc disease with radiculopathy        Plan: Right and left sided L5-S1 TFESI

## 2019-10-09 ENCOUNTER — TELEPHONE (OUTPATIENT)
Dept: PAIN MEDICINE | Facility: CLINIC | Age: 84
End: 2019-10-09

## 2019-10-21 ENCOUNTER — OFFICE VISIT (OUTPATIENT)
Dept: OBGYN CLINIC | Facility: HOSPITAL | Age: 84
End: 2019-10-21
Payer: MEDICARE

## 2019-10-21 VITALS
HEIGHT: 64 IN | SYSTOLIC BLOOD PRESSURE: 159 MMHG | WEIGHT: 145 LBS | BODY MASS INDEX: 24.75 KG/M2 | HEART RATE: 56 BPM | DIASTOLIC BLOOD PRESSURE: 71 MMHG

## 2019-10-21 DIAGNOSIS — M48.061 SPINAL STENOSIS OF LUMBAR REGION, UNSPECIFIED WHETHER NEUROGENIC CLAUDICATION PRESENT: Primary | ICD-10-CM

## 2019-10-21 DIAGNOSIS — M76.32 ILIOTIBIAL BAND SYNDROME OF BOTH SIDES: ICD-10-CM

## 2019-10-21 DIAGNOSIS — R26.9 ABNORMAL GAIT: ICD-10-CM

## 2019-10-21 DIAGNOSIS — M51.36 DDD (DEGENERATIVE DISC DISEASE), LUMBAR: ICD-10-CM

## 2019-10-21 DIAGNOSIS — M76.31 ILIOTIBIAL BAND SYNDROME OF BOTH SIDES: ICD-10-CM

## 2019-10-21 PROCEDURE — 99213 OFFICE O/P EST LOW 20 MIN: CPT | Performed by: ORTHOPAEDIC SURGERY

## 2019-10-21 NOTE — PROGRESS NOTES
Assessment/Plan:    No problem-specific Assessment & Plan notes found for this encounter  · Chronic low back pain with right leg pain  · S/p right L5 and S1 ANA with Dr Cheek with benefit  · Continue physical therapy  · Follow up PRN       Problem List Items Addressed This Visit        Musculoskeletal and Integument    Iliotibial band syndrome of both sides    Relevant Orders    Ambulatory referral to Physical Therapy    DDD (degenerative disc disease), lumbar    Relevant Orders    Ambulatory referral to Physical Therapy       Other    Spinal stenosis of lumbar region - Primary    Relevant Orders    Ambulatory referral to Physical Therapy      Other Visit Diagnoses     Abnormal gait        Relevant Orders    Ambulatory referral to Physical Therapy            Subjective:      Patient ID: Radha Simmons is a 80 y o  male  HPI   The patient presents for follow up of low back and right leg pain  He is s/p Right L5 and S1 epidural with Dr Cheek, 10/2/19, with benefit  Today he complains of occasional low back pain with right leg pain  He rates his pain at 0/10 and greater while in bed or doing excess activity  The following portions of the patient's history were reviewed and updated as appropriate: allergies, current medications, past family history, past medical history, past social history, past surgical history and problem list     Review of Systems   Constitutional: Negative for chills, fever and unexpected weight change  HENT: Negative for hearing loss, nosebleeds and sore throat  Eyes: Negative for pain, redness and visual disturbance  Respiratory: Negative for cough, shortness of breath and wheezing  Cardiovascular: Negative for chest pain, palpitations and leg swelling  Gastrointestinal: Negative for abdominal pain, nausea and vomiting  Endocrine: Negative for polydipsia and polyuria  Genitourinary: Negative for difficulty urinating and hematuria  Skin: Negative for rash and wound  Psychiatric/Behavioral: Negative for decreased concentration and suicidal ideas  The patient is not nervous/anxious  Objective:      /71   Pulse 56   Ht 5' 4" (1 626 m)   Wt 65 8 kg (145 lb)   BMI 24 89 kg/m²          Physical Exam   Constitutional: He is oriented to person, place, and time  He appears well-developed and well-nourished  HENT:   Right Ear: External ear normal    Left Ear: External ear normal    Nose: Nose normal    Eyes: Conjunctivae are normal    Neck: Normal range of motion  Pulmonary/Chest: Effort normal    Neurological: He is alert and oriented to person, place, and time  Skin: Skin is warm and dry  Psychiatric: He has a normal mood and affect   His behavior is normal  Judgment and thought content normal        Patient ambulates without assistance  Tender to palpation over lumbosacral junction  Modified straight leg raise negative bilaterally  Strength L2-S1 5/5 bilaterally   Sensation L2-S1 intact bilaterally       Scribe Attestation    I,:   Johanna Pratt am acting as a scribe while in the presence of the attending physician :        I,:   Lisandra Sprague MD personally performed the services described in this documentation    as scribed in my presence :

## 2019-10-28 DIAGNOSIS — N18.30 TYPE 2 DIABETES MELLITUS WITH STAGE 3 CHRONIC KIDNEY DISEASE, WITHOUT LONG-TERM CURRENT USE OF INSULIN (HCC): ICD-10-CM

## 2019-10-28 DIAGNOSIS — E11.22 TYPE 2 DIABETES MELLITUS WITH STAGE 3 CHRONIC KIDNEY DISEASE, WITHOUT LONG-TERM CURRENT USE OF INSULIN (HCC): ICD-10-CM

## 2019-10-28 RX ORDER — GLIMEPIRIDE 1 MG/1
1 TABLET ORAL
Qty: 90 TABLET | Refills: 1 | Status: SHIPPED | OUTPATIENT
Start: 2019-10-28 | End: 2020-07-21 | Stop reason: SDUPTHER

## 2019-11-04 ENCOUNTER — EVALUATION (OUTPATIENT)
Dept: PHYSICAL THERAPY | Facility: REHABILITATION | Age: 84
End: 2019-11-04
Payer: MEDICARE

## 2019-11-04 DIAGNOSIS — M48.061 SPINAL STENOSIS OF LUMBAR REGION, UNSPECIFIED WHETHER NEUROGENIC CLAUDICATION PRESENT: Primary | ICD-10-CM

## 2019-11-04 DIAGNOSIS — R26.9 ABNORMALITY OF GAIT: ICD-10-CM

## 2019-11-04 DIAGNOSIS — M51.36 DEGENERATION OF LUMBAR INTERVERTEBRAL DISC: ICD-10-CM

## 2019-11-04 PROCEDURE — 97162 PT EVAL MOD COMPLEX 30 MIN: CPT | Performed by: PHYSICAL THERAPIST

## 2019-11-04 PROCEDURE — 97110 THERAPEUTIC EXERCISES: CPT | Performed by: PHYSICAL THERAPIST

## 2019-11-04 NOTE — PROGRESS NOTES
PT Evaluation     Today's date: 2019  Patient name: Esperanza Paz  : 1934  MRN: [id number]  Referring provider: Charbel Schultz MD  Dx:   Encounter Diagnosis     ICD-10-CM    1  Spinal stenosis of lumbar region, unspecified whether neurogenic claudication present M48 061    2  Degeneration of lumbar intervertebral disc M51 36    3  Abnormality of gait R26 9        Start Time: 1115  Stop Time: 1210  Total time in clinic (min): 55 minutes    Assessment  Assessment details: Esperanza Paz is a 80 y o  male present with:   Spinal stenosis of lumbar region, unspecified whether neurogenic claudication present  (primary encounter diagnosis)  Degeneration of lumbar intervertebral disc  Abnormality of gait    Toni Sanchez has the above listed impairments and will benefit from skilled Physical Therapist management to improve deficits to return to prior level of function     Impairments: abnormal muscle firing, abnormal or restricted ROM, activity intolerance, impaired physical strength, lacks appropriate home exercise program and pain with function  Understanding of Dx/Px/POC: good   Prognosis: good    Goals  Impairment Goals  - Decrease pain 0/10  - Improve ROM to 50 degrees lumbar spine rotation  - Increase strength to 5/5 throughout    Functional Goals  - Return to Prior Level of Function  - Increase Functional Status Measure to: 48  - Patient will be independent with HEP  -Patient will be able to return to activities of daily living without pain    Plan  Patient would benefit from: skilled PT  Planned therapy interventions: joint mobilization, manual therapy, patient education, postural training, activity modification, abdominal trunk stabilization, body mechanics training, flexibility, functional ROM exercises, graded exercise, home exercise program, neuromuscular re-education, strengthening, stretching, therapeutic activities and therapeutic exercise  Frequency: 2x week  Duration in weeks: 8  Treatment plan discussed with: patient        Subjective Evaluation    History of Present Illness  Mechanism of injury: Teddy Jones reports bilateral low back pain as well as bilateral leg pain "they feel weak when I stand a while"  Teddy Jones also notes bilateral lateral thigh pain when sitting for "too long" which goes away after a few minutes of standing  Notes his calves feel tired and heavy when ambulating for sometime requiring him to sit down or leaning on his shopping cart  Pain  Current pain ratin  At worst pain rating: 10  Location: low back  Quality: dull ache and throbbing  Aggravating factors: walking, standing, sitting and stair climbing  Progression: no change    Social Support    Employment status: not working  Treatments  Current treatment: physical therapy  Patient Goals  Patient goals for therapy: decreased pain, improved balance, increased motion, increased strength, return to sport/leisure activities and independence with ADLs/IADLs          Objective     Concurrent Complaints  Positive for night pain and disturbed sleep  Negative for bladder dysfunction, bowel dysfunction, saddle (S4) numbness and history of trauma    Palpation   Left   Tenderness of the erector spinae  Tenderness     Left Hip   No tenderness in the greater trochanter  Right Hip   No tenderness in the greater trochanter  Active Range of Motion     Lumbar   Flexion:  WFL  Left lateral flexion:  with pain Restriction level: moderate  Right lateral flexion:  WFL  Left rotation:  with pain Restriction level: moderate  Right rotation:  [facility]    Strength/Myotome Testing     Left Hip   Planes of Motion   Flexion: 3+  Abduction: 3    Right Hip   Planes of Motion   Flexion: 3+  Abduction: 3    Left Knee   Flexion: 4-  Extension: 4    Right Knee   Flexion: 4-  Extension: 4    Left Ankle/Foot   Dorsiflexion: 4  Plantar flexion: 4-    Right Ankle/Foot   Dorsiflexion: 4  Plantar flexion: 4-    Tests     Lumbar     Left   Negative passive SLR  Right   Negative passive SLR  Left Hip   Positive Ely's (115) and NIMA    90/90 SLR: Positive  Right Hip   Positive Ely's (115) and NIMA    90/90 SLR: Positive             Precautions:  Cataracts, HTN     Daily Treatment Diary      Manual 11/04/19                                       Exercise Diary                     bike nv                   ADIM* 10x5"                   ADIM marches                    ADIM heel taps                    clamshells nv            Bridges w PPT nv                   Ball Rolls lumbar spine flx* 10x5"x2                   LTR* nv            Low Rows w ppt nv                   TG nv                                                          Goblet squat w ppt                    [employer] [place] w ppt                                        [employer] walks w PPT                                                                                                                                                                                             Modalities                                                 *=on hep

## 2019-11-07 ENCOUNTER — OFFICE VISIT (OUTPATIENT)
Dept: PHYSICAL THERAPY | Facility: REHABILITATION | Age: 84
End: 2019-11-07
Payer: MEDICARE

## 2019-11-07 DIAGNOSIS — M48.061 SPINAL STENOSIS OF LUMBAR REGION, UNSPECIFIED WHETHER NEUROGENIC CLAUDICATION PRESENT: Primary | ICD-10-CM

## 2019-11-07 DIAGNOSIS — M51.36 DEGENERATION OF LUMBAR INTERVERTEBRAL DISC: ICD-10-CM

## 2019-11-07 DIAGNOSIS — R26.9 ABNORMALITY OF GAIT: ICD-10-CM

## 2019-11-07 PROCEDURE — 97530 THERAPEUTIC ACTIVITIES: CPT | Performed by: PHYSICAL THERAPIST

## 2019-11-07 PROCEDURE — 97110 THERAPEUTIC EXERCISES: CPT | Performed by: PHYSICAL THERAPIST

## 2019-11-07 PROCEDURE — 97140 MANUAL THERAPY 1/> REGIONS: CPT | Performed by: PHYSICAL THERAPIST

## 2019-11-07 NOTE — PROGRESS NOTES
Daily Note     Today's date: 2019  Patient name: Oscar Crocker  : 1934  MRN: [id number]  Referring provider: Bailey Nieto MD  Dx:   Encounter Diagnosis     ICD-10-CM    1  Spinal stenosis of lumbar region, unspecified whether neurogenic claudication present M48 061    2  Degeneration of lumbar intervertebral disc M51 36    3  Abnormality of gait R26 9        Start Time: 09  Stop Time: 1015  Total time in clinic (min): 45 minutes    Subjective: Fidel Jack reports that his back feels okay today  Objective: See treatment diary below      Assessment: Tolerated treatment well  Patient demonstrated fatigue post treatment, exhibited good technique with therapeutic exercises and would benefit from continued PT  Jeffry required cuing throughout for proper form as well as resumption of exercises and keeping him on task  Noted fatigue at end of session within his lower extremities  Plan: Continue per plan of care           Precautions:  Cataracts, HTN     Daily Treatment Diary      Manual 19             8'                        Exercise Diary                     bike nv 5' lvl 1                  ADIM* 10x5"                   ADIM marches  2x10                  ADIM heel taps                    clamshells nv 3x10           Bridges w PPT nv 3x10                  Ball Rolls lumbar spine flx* 10x5"x2 10x5"x2                  LTR* nv 10x5"           Low Rows w ppt nv                   TG nv                                                          Goblet squat w ppt                    Pallof Press w ppt                                        Win Bates walks w PPT                                                                                                                                                                                             Modalities                                                 *=on hep

## 2019-11-11 ENCOUNTER — OFFICE VISIT (OUTPATIENT)
Dept: PHYSICAL THERAPY | Facility: REHABILITATION | Age: 84
End: 2019-11-11
Payer: MEDICARE

## 2019-11-11 DIAGNOSIS — R26.9 ABNORMALITY OF GAIT: ICD-10-CM

## 2019-11-11 DIAGNOSIS — M48.061 SPINAL STENOSIS OF LUMBAR REGION, UNSPECIFIED WHETHER NEUROGENIC CLAUDICATION PRESENT: Primary | ICD-10-CM

## 2019-11-11 DIAGNOSIS — M51.36 DEGENERATION OF LUMBAR INTERVERTEBRAL DISC: ICD-10-CM

## 2019-11-11 PROCEDURE — 97530 THERAPEUTIC ACTIVITIES: CPT

## 2019-11-11 PROCEDURE — 97112 NEUROMUSCULAR REEDUCATION: CPT

## 2019-11-11 PROCEDURE — 97110 THERAPEUTIC EXERCISES: CPT

## 2019-11-11 NOTE — PROGRESS NOTES
Daily Note     Today's date: 2019  Patient name: Robert Turner  : 1934  MRN: [id number]  Referring provider: Martin Bustamante MD  Dx:   Encounter Diagnosis     ICD-10-CM    1  Spinal stenosis of lumbar region, unspecified whether neurogenic claudication present M48 061    2  Degeneration of lumbar intervertebral disc M51 36    3  Abnormality of gait R26 9        Start Time: 1100  Stop Time: 1142  Total time in clinic (min): 42 minutes    Subjective: Patient reports back as "alright" prior to session today, reporting no complaints after previous session  Objective: See treatment diary below       Assessment: Tolerated treatment fair  Patient demonstrated fatigue post treatment and would benefit from continued PT Consistent cues required for proper form as well as proper number of reps for all TE  No pain reported with any TE performed, evident fatigue  Plan: Continue per plan of care  Progress treatment as tolerated         Precautions:  Cataracts, HTN     Daily Treatment Diary      Manual 19            8'                        Exercise Diary                     bike nv 5' lvl 1 5' lvl 1                 ADIM* 10x5"  10x5''                 ADIM marches  2x10 2x10                 ADIM heel taps                    clamshells nv 3x10 3x12          Bridges w PPT nv 3x10 3x10                 Ball Rolls lumbar spine flx* 10x5"x2 10x5"x2 10x5''x2                 LTR* nv 10x5" 10x5''          Low Rows w ppt nv  OTB 2x10                 TG nv                                                          Goblet squat w ppt                    Pallof Press w ppt                                        Alhaji Lety walks w PPT                                                                                                                                                                                             Modalities                                                 *=on hep

## 2019-11-14 ENCOUNTER — OFFICE VISIT (OUTPATIENT)
Dept: PHYSICAL THERAPY | Facility: REHABILITATION | Age: 84
End: 2019-11-14
Payer: MEDICARE

## 2019-11-14 DIAGNOSIS — M48.061 SPINAL STENOSIS OF LUMBAR REGION, UNSPECIFIED WHETHER NEUROGENIC CLAUDICATION PRESENT: Primary | ICD-10-CM

## 2019-11-14 DIAGNOSIS — R26.9 ABNORMALITY OF GAIT: ICD-10-CM

## 2019-11-14 DIAGNOSIS — M51.36 DEGENERATION OF LUMBAR INTERVERTEBRAL DISC: ICD-10-CM

## 2019-11-14 PROCEDURE — 97110 THERAPEUTIC EXERCISES: CPT | Performed by: PHYSICAL THERAPIST

## 2019-11-14 PROCEDURE — 97530 THERAPEUTIC ACTIVITIES: CPT | Performed by: PHYSICAL THERAPIST

## 2019-11-14 PROCEDURE — 97112 NEUROMUSCULAR REEDUCATION: CPT | Performed by: PHYSICAL THERAPIST

## 2019-11-18 ENCOUNTER — OFFICE VISIT (OUTPATIENT)
Dept: PHYSICAL THERAPY | Facility: REHABILITATION | Age: 84
End: 2019-11-18
Payer: MEDICARE

## 2019-11-18 DIAGNOSIS — M48.061 SPINAL STENOSIS OF LUMBAR REGION, UNSPECIFIED WHETHER NEUROGENIC CLAUDICATION PRESENT: Primary | ICD-10-CM

## 2019-11-18 DIAGNOSIS — M51.36 DEGENERATION OF LUMBAR INTERVERTEBRAL DISC: ICD-10-CM

## 2019-11-18 DIAGNOSIS — R26.9 ABNORMALITY OF GAIT: ICD-10-CM

## 2019-11-18 PROCEDURE — 97530 THERAPEUTIC ACTIVITIES: CPT

## 2019-11-18 PROCEDURE — 97110 THERAPEUTIC EXERCISES: CPT

## 2019-11-18 PROCEDURE — 97112 NEUROMUSCULAR REEDUCATION: CPT

## 2019-11-18 NOTE — PROGRESS NOTES
Daily Note     Today's date: 2019  Patient name: Vickie Wilkerson  : 1934  MRN: [id number]  Referring provider: Christine Coker MD  Dx:   Encounter Diagnosis     ICD-10-CM    1  Spinal stenosis of lumbar region, unspecified whether neurogenic claudication present M48 061    2  Degeneration of lumbar intervertebral disc M51 36    3  Abnormality of gait R26 9        Start Time: 1030  Stop Time: 1115  Total time in clinic (min): 45 minutes    Subjective: Patient reports no complaints prior to session today stating "back feels alright, feels better "       Objective: See treatment diary below      Assessment: Tolerated treatment well  Patient demonstrated fatigue post treatment, exhibited good technique with therapeutic exercises and would benefit from continued PT Improvements noted this session with proper counting of reps  Patient reports less stiffness at end of session today  Plan: Continue per plan of care  Progress treatment as tolerated         Precautions:  Cataracts, HTN     Daily Treatment Diary      Manual 19        8'                    Exercise Diary                 bike nv 5' lvl 1 5' lvl 1   5' lvl 1  5' lvl 1         ADIM* 10x5"  10x5''             ADIM marches  2x10 2x10   2x10  2x10         ADIM heel taps                clamshells nv 3x10 3x12 3x8 PTB 3x10 PTB      Bridges w PPT nv 3x10 3x10   3x10  3x12          Ball Rolls lumbar spine flx* 10x5"x2 10x5"x2 10x5''x2   2x10x5"  2x10x5''         LTR* nv 10x5" 10x5'' 10x5"x2 10x5''x2       Low Rows w ppt nv  OTB 2x10   OTB 3x10  GTB 3x10         TG nv     3x10 L 15  L18 3x10                                          Goblet squat w ppt                Pallof Press w ppt                                Irine Hausen walks w PPT                                                                                                                                                             Modalities *=on hep

## 2019-11-21 ENCOUNTER — APPOINTMENT (OUTPATIENT)
Dept: PHYSICAL THERAPY | Facility: REHABILITATION | Age: 84
End: 2019-11-21
Payer: MEDICARE

## 2019-11-26 ENCOUNTER — OFFICE VISIT (OUTPATIENT)
Dept: PHYSICAL THERAPY | Facility: REHABILITATION | Age: 84
End: 2019-11-26
Payer: MEDICARE

## 2019-11-26 DIAGNOSIS — M51.36 DEGENERATION OF LUMBAR INTERVERTEBRAL DISC: ICD-10-CM

## 2019-11-26 DIAGNOSIS — R26.9 ABNORMALITY OF GAIT: ICD-10-CM

## 2019-11-26 DIAGNOSIS — M48.061 SPINAL STENOSIS OF LUMBAR REGION, UNSPECIFIED WHETHER NEUROGENIC CLAUDICATION PRESENT: Primary | ICD-10-CM

## 2019-11-26 PROCEDURE — 97110 THERAPEUTIC EXERCISES: CPT

## 2019-11-26 PROCEDURE — 97112 NEUROMUSCULAR REEDUCATION: CPT

## 2019-11-26 NOTE — PROGRESS NOTES
Daily Note     Today's date: 2019  Patient name: Doris King  : 1934  MRN: [id number]  Referring provider: Kailash Priest MD  Dx:   Encounter Diagnosis     ICD-10-CM    1  Spinal stenosis of lumbar region, unspecified whether neurogenic claudication present M48 061    2  Degeneration of lumbar intervertebral disc M51 36    3  Abnormality of gait R26 9        Start Time: 0800          Subjective:  Patient reports minimal back discomfort prior to session today  Objective: See treatment diary below      Assessment: Tolerated treatment well  Patient demonstrated fatigue post treatment, exhibited good technique with therapeutic exercises and would benefit from continued PT Improvements noted with proper counting of reps  Plan: Continue per plan of care  Progress treatment as tolerated         Precautions:  Cataracts, HTN     Daily Treatment Diary      Manual 19       8'                    Exercise Diary                 bike nv 5' lvl 1 5' lvl 1   5' lvl 1   5' lvl 1  5' lvl 1       ADIM* 10x5"  10x5''             ADIM marches  2x10 2x10   2x10   2x10 2x10       ADIM heel taps                clamshells nv 3x10 3x12 3x8 PTB 3x10 PTB 3x10 OTB     Bridges w PPT nv 3x10 3x10   3x10   3x12   3x12       Ball Rolls lumbar spine flx* 10x5"x2 10x5"x2 10x5''x2   2x10x5"   2x10x5''  2x10x5''       LTR* nv 10x5" 10x5'' 10x5"x2 10x5''x2  10x5''x2     Low Rows w ppt nv  OTB 2x10   OTB 3x10   GTB 3x10  GTB 3x10       TG nv     3x10 L 15   L18 3x10  L18 3x10                                        Goblet squat w ppt                Pallof Press w ppt                                Aurelia Julito walks w PPT                                                                                                                                                             Modalities                                         *=on hep

## 2019-12-03 ENCOUNTER — OFFICE VISIT (OUTPATIENT)
Dept: PHYSICAL THERAPY | Facility: REHABILITATION | Age: 84
End: 2019-12-03
Payer: MEDICARE

## 2019-12-03 DIAGNOSIS — R26.9 ABNORMALITY OF GAIT: ICD-10-CM

## 2019-12-03 DIAGNOSIS — M48.061 SPINAL STENOSIS OF LUMBAR REGION, UNSPECIFIED WHETHER NEUROGENIC CLAUDICATION PRESENT: Primary | ICD-10-CM

## 2019-12-03 DIAGNOSIS — M51.36 DEGENERATION OF LUMBAR INTERVERTEBRAL DISC: ICD-10-CM

## 2019-12-03 PROCEDURE — 97110 THERAPEUTIC EXERCISES: CPT | Performed by: PHYSICAL THERAPIST

## 2019-12-03 PROCEDURE — 97530 THERAPEUTIC ACTIVITIES: CPT | Performed by: PHYSICAL THERAPIST

## 2019-12-03 NOTE — PROGRESS NOTES
Daily Note     Today's date: 12/3/2019  Patient name: Angel Abraham  : 1934  MRN: [id number]  Referring provider: Kranthi Weaver MD  Dx:   Encounter Diagnosis     ICD-10-CM    1  Spinal stenosis of lumbar region, unspecified whether neurogenic claudication present M48 061    2  Degeneration of lumbar intervertebral disc M51 36    3  Abnormality of gait R26 9        Start Time: 1010  Stop Time: 1105  Total time in clinic (min): 55 minutes    Subjective: Karen Hudson reports that his  Back feels "good "      Objective: See treatment diary below      Assessment: Tolerated treatment well  Patient demonstrated fatigue post treatment, exhibited good technique with therapeutic exercises and would benefit from continued PT  Karen Hudson reported feeling tired with in his legs during the bike in a seated position as well as when walking on the treadmill, this points to a vascular claudication causation versus neurogenic, he did report after walking, some SOB which pt took vitals seated  63bpm, 98% O2 sat, /80  Will assess these during the ambulation NV  Plan: Continue per plan of care           Precautions:  Cataracts, HTN     Daily Treatment Diary      Manual 11/04/19  11/7 11/11 11/14 11/18 11/26 12/3        8'                        Exercise Diary                   bike nv 5' lvl 1 5' lvl 1   5' lvl 1   5' lvl 1   5' lvl 1  5' lvl 1       ADIM* 10x5"  10x5''               ADIM marches  2x10 2x10   2x10   2x10 2x10  2x10       ADIM heel taps                  clamshells nv 3x10 3x12 3x8 PTB 3x10 PTB 3x10 OTB 3x12 OTB      Bridges w PPT nv 3x10 3x10   3x10   3x12    3x12  3x12       Ball Rolls lumbar spine flx* 10x5"x2 10x5"x2 10x5''x2  2x10x5"  2x10x5''  2x10x5''  2x10x5"       LTR* nv 10x5" 10x5'' 10x5"x2 10x5''x2  10x5''x2 10x5"x2      Low Rows w ppt nv  OTB 2x10   OTB 3x10   GTB 3x10   GTB 3x10  nv       TG nv     3x10 L 15   L18 3x10   L18 3x10  L 20 3x12       treadmil ambulation       4' 0 5 mph Goblet squat w ppt                  Pallof Press w ppt                                    Win Bates walks w PPT                                                                                                                                                                             Modalities                                             *=on hep

## 2019-12-05 ENCOUNTER — OFFICE VISIT (OUTPATIENT)
Dept: PHYSICAL THERAPY | Facility: REHABILITATION | Age: 84
End: 2019-12-05
Payer: MEDICARE

## 2019-12-05 DIAGNOSIS — M51.36 DEGENERATION OF LUMBAR INTERVERTEBRAL DISC: ICD-10-CM

## 2019-12-05 DIAGNOSIS — R26.9 ABNORMALITY OF GAIT: ICD-10-CM

## 2019-12-05 DIAGNOSIS — M48.061 SPINAL STENOSIS OF LUMBAR REGION, UNSPECIFIED WHETHER NEUROGENIC CLAUDICATION PRESENT: Primary | ICD-10-CM

## 2019-12-05 PROCEDURE — 97110 THERAPEUTIC EXERCISES: CPT | Performed by: PHYSICAL THERAPIST

## 2019-12-05 PROCEDURE — 97530 THERAPEUTIC ACTIVITIES: CPT | Performed by: PHYSICAL THERAPIST

## 2019-12-05 NOTE — PROGRESS NOTES
Daily Note     Today's date: 2019  Patient name: Doris King  : 1934  MRN: [id number]  Referring provider: Kailash Priest MD  Dx:   Encounter Diagnosis     ICD-10-CM    1  Spinal stenosis of lumbar region, unspecified whether neurogenic claudication present M48 061    2  Degeneration of lumbar intervertebral disc M51 36    3  Abnormality of gait R26 9        Start Time: 1032  Stop Time: 1115  Total time in clinic (min): 43 minutes    Subjective: Jovita Blair reports that his back feels pretty good today  Objective: See treatment diary below      Assessment: Tolerated treatment well  Patient demonstrated fatigue post treatment, exhibited good technique with therapeutic exercises and would benefit from continued PT  Patient able to increase ambulation time without pain or increased fatigue  Plan: Continue per plan of care           Precautions:  Cataracts, HTN     Daily Treatment Diary      Manual 11/04/19  11/7 11/11 11/14 11/18 11/26 12/3 12/5       8'                        Exercise Diary                   bike nv 5' lvl 1 5' lvl 1   5' lvl 1   5' lvl 1   5' lvl 1   5' lvl 1  5' lvl 1     ADIM* 10x5"  10x5''               ADIM marches  2x10 2x10   2x10   2x10 2x10   2x10  nv     ADIM heel taps                  SLR flx        3x12     clamshells nv 3x10 3x12 3x8 PTB 3x10 PTB 3x10 OTB 3x12 OTB 3x12 OTB     Bridges w PPT nv 3x10 3x10   3x10   3x12    3x12   3x12  3x15     Ball Rolls lumbar spine flx* 10x5"x2 10x5"x2 10x5''x2  2x10x5"  2x10x5''  2x10x5''   2x10x5"       LTR* nv 10x5" 10x5'' 10x5"x2 10x5''x2  10x5''x2 10x5"x2 10x5"x2     Low Rows w ppt nv  OTB 2x10   OTB 3x10   GTB 3x10   GTB 3x10   nv  GTB 3x10     TG nv     3x10 L 15   L18 3x10   L18 3x10   L 20 3x12  L 20 3x12     treadmil ambulation       4' 0 5 mph 0 5mph                                                         Goblet squat w ppt                  Pallof Press w ppt                                    Aurelia Julito walks w PPT Modalities                                             *=on hep

## 2019-12-10 ENCOUNTER — OFFICE VISIT (OUTPATIENT)
Dept: PHYSICAL THERAPY | Facility: REHABILITATION | Age: 84
End: 2019-12-10
Payer: MEDICARE

## 2019-12-10 DIAGNOSIS — M51.36 DEGENERATION OF LUMBAR INTERVERTEBRAL DISC: ICD-10-CM

## 2019-12-10 DIAGNOSIS — M48.061 SPINAL STENOSIS OF LUMBAR REGION, UNSPECIFIED WHETHER NEUROGENIC CLAUDICATION PRESENT: Primary | ICD-10-CM

## 2019-12-10 DIAGNOSIS — R26.9 ABNORMALITY OF GAIT: ICD-10-CM

## 2019-12-10 PROCEDURE — 97112 NEUROMUSCULAR REEDUCATION: CPT

## 2019-12-10 PROCEDURE — 97530 THERAPEUTIC ACTIVITIES: CPT

## 2019-12-10 PROCEDURE — 97110 THERAPEUTIC EXERCISES: CPT

## 2019-12-10 NOTE — PROGRESS NOTES
Daily Note     Today's date: 12/10/2019  Patient name: Florentin English  : 1934  MRN: [id number]  Referring provider: Josee Lopez MD  Dx:   Encounter Diagnosis     ICD-10-CM    1  Spinal stenosis of lumbar region, unspecified whether neurogenic claudication present M48 061    2  Degeneration of lumbar intervertebral disc M51 36    3  Abnormality of gait R26 9        Start Time: 1025  Stop Time: 1115  Total time in clinic (min): 50 minutes    Subjective: Patient reports feeling "okay" prior to session today, denies any back pain stating "feels okay today "       Objective: See treatment diary below      Assessment: Tolerated treatment well  Patient demonstrated fatigue post treatment, exhibited good technique with therapeutic exercises and would benefit from continued PT Increased difficulty noted with RLE vs LLE with completion of SLR flexion  Trialed sit to stands this session from chair with foam pad under hips and step ups with patient tolerating well, no pain reported and overall good balance noted  Plan: Continue per plan of care  Progress treatment as tolerated         Precautions:  Cataracts, HTN     Daily Treatment Diary      Manual 11/04/19  11/7 11/11 11/14 11/18 11/26 12/3 12/5 12/10      8'                        Exercise Diary                   bike nv 5' lvl 1 5' lvl 1   5' lvl 1   5' lvl 1   5' lvl 1   5' lvl 1   5' lvl 1 5' lvl 1    ADIM* 10x5"  10x5''               ADIM marches  2x10 2x10   2x10   2x10 2x10   2x10   nv 2x10    ADIM heel taps                  SLR flx        3x12 3x12    clamshells nv 3x10 3x12 3x8 PTB 3x10 PTB 3x10 OTB 3x12 OTB 3x12 OTB 3x15 OTB    Bridges w PPT nv 3x10 3x10   3x10   3x12    3x12   3x12   3x15 3x15     Ball Rolls lumbar spine flx* 10x5"x2 10x5"x2 10x5''x2  2x10x5"  2x10x5''  2x10x5''   2x10x5"   2x10x5''    LTR* nv 10x5" 10x5'' 10x5"x2 10x5''x2  10x5''x2 10x5"x2 10x5"x2 10x5''x2    Low Rows w ppt nv  OTB 2x10   OTB 3x10   GTB 3x10   GTB 3x10   nv GTB 3x10 GTB 3x12    TG nv     3x10 L 15   L18 3x10   L18 3x10   L 20 3x12   L 20 3x12     treadmil ambulation       4' 0 5 mph 0 5mph nv    Sit to stands         3x6 foam under hips    Step ups/lateral step ups         4'' 10 ea                              Goblet squat w ppt                  Pallof Press w ppt                                    Dorrene Oxford walks w PPT                                                                                                                                                                             Modalities                                             *=on hep

## 2019-12-12 ENCOUNTER — OFFICE VISIT (OUTPATIENT)
Dept: PHYSICAL THERAPY | Facility: REHABILITATION | Age: 84
End: 2019-12-12
Payer: MEDICARE

## 2019-12-12 DIAGNOSIS — R26.9 ABNORMALITY OF GAIT: ICD-10-CM

## 2019-12-12 DIAGNOSIS — M51.36 DEGENERATION OF LUMBAR INTERVERTEBRAL DISC: ICD-10-CM

## 2019-12-12 DIAGNOSIS — M48.061 SPINAL STENOSIS OF LUMBAR REGION, UNSPECIFIED WHETHER NEUROGENIC CLAUDICATION PRESENT: Primary | ICD-10-CM

## 2019-12-12 PROCEDURE — 97112 NEUROMUSCULAR REEDUCATION: CPT

## 2019-12-12 PROCEDURE — 97530 THERAPEUTIC ACTIVITIES: CPT

## 2019-12-12 PROCEDURE — 97110 THERAPEUTIC EXERCISES: CPT

## 2019-12-12 NOTE — PROGRESS NOTES
Daily Note     Today's date: 2019  Patient name: Hannah Simmons  : 1934  MRN: [id number]  Referring provider: Keyanna Reed MD  Dx:   Encounter Diagnosis     ICD-10-CM    1  Spinal stenosis of lumbar region, unspecified whether neurogenic claudication present M48 061    2  Degeneration of lumbar intervertebral disc M51 36    3  Abnormality of gait R26 9          Subjective: Patient reports stiffness present in low back upon arrival  Denies lumbar pain upon arrival        Objective: See treatment diary below      Assessment: Tolerated treatment well  Pt continues to demonstrate Increased difficulty noted with RLE vs LLE with completion of SLR flexion  Pt able to increase reps with sit to stands, no UE support required  Good tolerance to all other TE, no complaints of increased lumbar sx's noted  Fatigue present post session with improved lumbar stiffness  Pt would benefit from continued PT  Plan: Continue per plan of care  Progress treatment as tolerated       Pt 1:1 with PTA JW 10:30-11:15     Precautions:  Cataracts, HTN     Daily Treatment Diary      Manual 11/04/19  11/7 11/11 11/14 11/18 11/26 12/3 12/5 12/10 12/12     8'                        Exercise Diary                   bike nv 5' lvl 1 5' lvl 1   5' lvl 1   5' lvl 1   5' lvl 1   5' lvl 1   5' lvl 1 5' lvl 14 5' lvl 1   ADIM* 10x5"  10x5''               ADIM marches  2x10 2x10   2x10   2x10 2x10   2x10   nv 2x10 2x10   ADIM heel taps                  SLR flx        3x12 3x12 3x12   clamshells nv 3x10 3x12 3x8 PTB 3x10 PTB 3x10 OTB 3x12 OTB 3x12 OTB 3x15 OTB 3x15  OTB   Bridges w PPT nv 3x10 3x10   3x10   3x12    3x12   3x12   3x15 3x15  3x15   Ball Rolls lumbar spine flx* 10x5"x2 10x5"x2 10x5''x2  2x10x5"  2x10x5''  2x10x5''   2x10x5"   2x10x5'' 2x10x5"     LTR* nv 10x5" 10x5'' 10x5"x2 10x5''x2  10x5''x2 10x5"x2 10x5"x2 10x5''x2 10x5"x2   Low Rows w ppt nv  OTB 2x10   OTB 3x10   GTB 3x10   GTB 3x10   nv   GTB 3x10 GTB 3x12 GTB  3x12   TG nv     3x10 L 15   L18 3x10   L18 3x10   L 20 3x12   L 20 3x12 L 20   3x12 L 20  3x12   treadmil ambulation       4' 0 5 mph 0 5mph nv 0 5 mph 4'   Sit to stands         3x6 foam under hips 3x8 foam under hips   Step ups/lateral step ups         4'' 10 ea 4" x10 ea                             Goblet squat w ppt                  Pallof Press w ppt                                    Creta Amend walks w PPT                                                                                                                                                                             Modalities                                             *=on hep

## 2019-12-17 ENCOUNTER — EVALUATION (OUTPATIENT)
Dept: PHYSICAL THERAPY | Facility: REHABILITATION | Age: 84
End: 2019-12-17
Payer: MEDICARE

## 2019-12-17 DIAGNOSIS — R26.9 ABNORMALITY OF GAIT: ICD-10-CM

## 2019-12-17 DIAGNOSIS — M48.061 SPINAL STENOSIS OF LUMBAR REGION, UNSPECIFIED WHETHER NEUROGENIC CLAUDICATION PRESENT: Primary | ICD-10-CM

## 2019-12-17 DIAGNOSIS — M51.36 DEGENERATION OF LUMBAR INTERVERTEBRAL DISC: ICD-10-CM

## 2019-12-17 PROCEDURE — 97530 THERAPEUTIC ACTIVITIES: CPT | Performed by: PHYSICAL THERAPIST

## 2019-12-17 PROCEDURE — 97110 THERAPEUTIC EXERCISES: CPT | Performed by: PHYSICAL THERAPIST

## 2019-12-17 PROCEDURE — 97112 NEUROMUSCULAR REEDUCATION: CPT | Performed by: PHYSICAL THERAPIST

## 2019-12-17 NOTE — PROGRESS NOTES
PT Re-Evaluation     Today's date: 2019  Patient name: Juan Luis Gray  : 1934  MRN: [id number]  Referring provider: Gladys Gillis MD  Dx:   Encounter Diagnosis     ICD-10-CM    1  Spinal stenosis of lumbar region, unspecified whether neurogenic claudication present M48 061    2  Degeneration of lumbar intervertebral disc M51 36    3  Abnormality of gait R26 9        Start Time: 1030  Stop Time: 1145  Total time in clinic (min): 75 minutes    Assessment  Assessment details: Juan Luis Gray has been compliant with attending PT and home exercise program since initial eval   James Vanegas  has made improvements in objective data since initial eval but is still limited compared to prior level of function  James Vanegas continues with above listed impairments and would benefit from additional skilled PT to address these deficits to return to prior level of function  Danias greatest limitation continues to be within his ability to ambulate extended distances and leg weakness, we will continue to progress this for an additional 6 weeks, at which time he will be transitioned to independent HEP     Impairments: abnormal muscle firing, abnormal or restricted ROM, activity intolerance, impaired physical strength, lacks appropriate home exercise program and pain with function    Symptom irritability: lowUnderstanding of Dx/Px/POC: good   Prognosis: good    Goals  Impairment Goals  - Decrease pain 0/10 partially met  - Improve ROM to 50 degrees lumbar spine rotation progressing  - Increase strength to 5/5 throughout progressing    Functional Goals  - Return to Prior Level of Function progressing  - Increase Functional Status Measure to: 50 met  - Patient will be independent with HEP progressing  -Patient will be able to return to activities of daily living without pain progressing    Plan  Patient would benefit from: skilled PT  Planned therapy interventions: joint mobilization, manual therapy, patient education, postural training, activity modification, abdominal trunk stabilization, body mechanics training, flexibility, functional ROM exercises, graded exercise, home exercise program, neuromuscular re-education, strengthening, stretching, therapeutic activities and therapeutic exercise  Frequency: 2x week  Duration in weeks: 6  Plan of Care expiration date: 2020  Treatment plan discussed with: patient        Subjective Evaluation    History of Present Illness  Mechanism of injury: Toni Sanchez has been seen for total of 10 visits for outpatient physical therapy  Patient rates overall improvement since beginning PT 90%  Patient's global rating of change is " Somewhat better (3) " Patient reports improvements with "the pain is much better " Patient reports most difficulty with sitting down for extended periods, "my legs are still weak too but they come back after I sit down"      Pain  Current pain ratin  At worst pain ratin  Location: low back  Quality: dull ache and throbbing  Aggravating factors: walking, standing, sitting and stair climbing  Progression: no change    Social Support    Employment status: not working  Treatments  Current treatment: physical therapy  Patient Goals  Patient goals for therapy: decreased pain, improved balance, increased motion, increased strength, return to sport/leisure activities and independence with ADLs/IADLs          Objective     Concurrent Complaints  Positive for night pain  Negative for disturbed sleep, bladder dysfunction, bowel dysfunction, saddle (S4) numbness and history of trauma    Palpation   Left   Tenderness of the erector spinae  Tenderness     Left Hip   No tenderness in the greater trochanter  Right Hip   No tenderness in the greater trochanter       Active Range of Motion     Lumbar   Flexion:  WFL  Left lateral flexion:  with pain Restriction level: moderate  Right lateral flexion:  WFL  Left rotation:  with pain Restriction level: moderate  Right rotation: [facility]    Strength/Myotome Testing     Left Hip   Planes of Motion   Flexion: 4  Abduction: 4    Right Hip   Planes of Motion   Flexion: 4  Abduction: 3+    Left Knee   Flexion: 4-  Extension: 4    Right Knee   Flexion: 4-  Extension: 4    Left Ankle/Foot   Dorsiflexion: 4  Plantar flexion: 4-    Right Ankle/Foot   Dorsiflexion: 4  Plantar flexion: 4-    Tests     Lumbar     Left   Negative passive SLR  Right   Negative passive SLR  Left Hip   Positive Ely's (115) and NIMA    90/90 SLR: Positive  Hip flexion: 90  SLR: Knee extension: 45  Right Hip   Positive Ely's (115) and NIMA    90/90 SLR: Positive  Hip flexion: 90     SLR: Knee extension: 60            Precautions:  Cataracts, HTN     Daily Treatment Diary      Manual 12/17  11/11 11/14 11/18 11/26 12/3 12/5 12/10 12/12                             Exercise Diary                   bike 5' lvl 1  5' lvl 1   5' lvl 1   5' lvl 1   5' lvl 1   5' lvl 1   5' lvl 1 5' lvl 14 5' lvl 1   ADIM*   10x5''               ADIM marches np  2x10   2x10   2x10 2x10   2x10   nv 2x10 2x10   ADIM heel taps                  SLR flx* 3x15       3x12 3x12 3x12   SLR abd* nv            Clamshells* 3x15 GTB  3x12 3x8 PTB 3x10 PTB 3x10 OTB 3x12 OTB 3x12 OTB 3x15 OTB 3x15  OTB   Bridges w PPT 3x15  3x10   3x10   3x12    3x12   3x12   3x15 3x15  3x15   Ball Rolls lumbar spine flx*   10x5''x2  2x10x5"  2x10x5''  2x10x5''   2x10x5"   2x10x5'' 2x10x5"     LTR*   10x5'' 10x5"x2 10x5''x2  10x5''x2 10x5"x2 10x5"x2 10x5''x2 10x5"x2   Low Rows w ppt GTB 3x12  OTB 2x10   OTB 3x10   GTB 3x10   GTB 3x10   nv   GTB 3x10 GTB 3x12 GTB  3x12   TG L20 3x12     3x10 L 15   L18 3x10   L18 3x10   L 20 3x12   L 20 3x12 L 20   3x12 L 20  3x12   treadmil ambulation 0 5 mph 4'      4' 0 5 mph 0 5mph nv 0 5 mph 4'   Sit to stands 3x8 foam under hips        3x6 foam under hips 3x8 foam under hips   Step ups/lateral step ups 4" x10ea        4'' 10 ea 4" x10 ea                             Goblet squat w ppt                  Pallof Press w ppt                                    Zeus Ortiz walks w PPT                                                                                                                                                               Modalities                                         *=on hep

## 2019-12-19 ENCOUNTER — OFFICE VISIT (OUTPATIENT)
Dept: PHYSICAL THERAPY | Facility: REHABILITATION | Age: 84
End: 2019-12-19
Payer: MEDICARE

## 2019-12-19 DIAGNOSIS — R26.9 ABNORMALITY OF GAIT: ICD-10-CM

## 2019-12-19 DIAGNOSIS — M51.36 DEGENERATION OF LUMBAR INTERVERTEBRAL DISC: ICD-10-CM

## 2019-12-19 DIAGNOSIS — M48.061 SPINAL STENOSIS OF LUMBAR REGION, UNSPECIFIED WHETHER NEUROGENIC CLAUDICATION PRESENT: Primary | ICD-10-CM

## 2019-12-19 PROCEDURE — 97112 NEUROMUSCULAR REEDUCATION: CPT

## 2019-12-19 PROCEDURE — 97110 THERAPEUTIC EXERCISES: CPT

## 2019-12-19 NOTE — PROGRESS NOTES
Daily Note     Today's date: 2019  Patient name: Genevia Homans  : 1934  MRN: [id number]  Referring provider: Clyde Kent MD  Dx:   Encounter Diagnosis     ICD-10-CM    1  Spinal stenosis of lumbar region, unspecified whether neurogenic claudication present M48 061    2  Degeneration of lumbar intervertebral disc M51 36    3  Abnormality of gait R26 9        Start Time: 1030  Stop Time: 1120  Total time in clinic (min): 50 minutes    Subjective: Patient reports some back stiffness prior to session today stating "it's alright, little stiff around the hips " Patient reports no complaints after previous session  Objective: See treatment diary below      Assessment: Tolerated treatment well  Patient demonstrated fatigue post treatment, exhibited good technique with therapeutic exercises and would benefit from continued PT SOB noted with step ups/later step ups, cues required for rest breaks when needed  Plan: Continue per plan of care  Progress treatment as tolerated         Precautions:  Cataracts, HTN     Daily Treatment Diary      Manual 12/17 12/19    11/26 12/3 12/5 12/10 12/12                             Exercise Diary                 bike 5' lvl 1       5' lvl 1   5' lvl 1   5' lvl 1 5' lvl 14 5' lvl 1   ADIM*                ADIM marches np     2x10   2x10   nv 2x10 2x10   ADIM heel taps                SLR flx* 3x15 3x15      3x12 3x12 3x12   SLR abd* nv 2x6           Clamshells* 3x15 GTB 3x15 GTB    3x10 OTB 3x12 OTB 3x12 OTB 3x15 OTB 3x15  OTB   Bridges w PPT 3x15 3x15       3x12   3x12   3x15 3x15  3x15   Ball Rolls lumbar spine flx*       2x10x5''   2x10x5"   2x10x5'' 2x10x5"     LTR*  10x5''    10x5''x2 10x5"x2 10x5"x2 10x5''x2 10x5"x2   Low Rows w ppt GTB 3x12 GTB 3x15      GTB 3x10   nv   GTB 3x10 GTB 3x12 GTB  3x12   TG L20 3x12 nv      L18 3x10   L 20 3x12   L 20 3x12 L 20   3x12 L 20  3x12   treadmil ambulation 0 5 mph 4' 0 5mph 5'     4' 0 5 mph 0 5mph nv 0 5 mph 4' Sit to stands 3x8 foam under hips 3x8 foam under hips       3x6 foam under hips 3x8 foam under hips   Step ups/lateral step ups 4" x10ea 4'' 15 ea       4'' 10 ea 4" x10 ea                             Goblet squat w ppt                  Pallof Press w ppt                                    Angela Beltran walks w PPT                                                                                                                                                               Modalities                                         *=on hep

## 2019-12-24 ENCOUNTER — OFFICE VISIT (OUTPATIENT)
Dept: PHYSICAL THERAPY | Facility: REHABILITATION | Age: 84
End: 2019-12-24
Payer: MEDICARE

## 2019-12-24 DIAGNOSIS — R26.9 ABNORMALITY OF GAIT: ICD-10-CM

## 2019-12-24 DIAGNOSIS — M51.36 DEGENERATION OF LUMBAR INTERVERTEBRAL DISC: ICD-10-CM

## 2019-12-24 DIAGNOSIS — M48.061 SPINAL STENOSIS OF LUMBAR REGION, UNSPECIFIED WHETHER NEUROGENIC CLAUDICATION PRESENT: Primary | ICD-10-CM

## 2019-12-24 PROCEDURE — 97110 THERAPEUTIC EXERCISES: CPT

## 2019-12-24 PROCEDURE — 97112 NEUROMUSCULAR REEDUCATION: CPT

## 2019-12-24 NOTE — PROGRESS NOTES
Daily Note     Today's date: 2019  Patient name: Justen Gavin  : 1934  MRN: [id number]  Referring provider: Karina Moore MD  Dx:   Encounter Diagnosis     ICD-10-CM    1  Spinal stenosis of lumbar region, unspecified whether neurogenic claudication present M48 061    2  Degeneration of lumbar intervertebral disc M51 36    3  Abnormality of gait R26 9        Start Time: 1000  Stop Time: 1045  Total time in clinic (min): 45 minutes    Subjective: Patient reports back as "okay" prior to session today stating "feeling okay " He reports no complaints after previous session  Objective: See treatment diary below      Assessment: Tolerated treatment well  Patient demonstrated fatigue post treatment, exhibited good technique with therapeutic exercises and would benefit from continued PT Patient able to tolerate increased weight/added reps for SLR flexion, no pain reported evident fatigue  Cues required for proper form with SLR abduction, patient able to correct once cues given  Plan: Continue per plan of care  Progress treatment as tolerated         Precautions:  Cataracts, HTN     Daily Treatment Diary      Manual 12/17 12/19 12/24   11/26 12/3 12/5 12/10 12/12                             Exercise Diary                 bike 5' lvl 1  5' lvl 1     5' lvl 1   5' lvl 1   5' lvl 1 5' lvl 14 5' lvl 1   ADIM*                ADIM marches np     2x10   2x10   nv 2x10 2x10   ADIM heel taps                SLR flx* 3x15 3x15 3x10 1#     3x12 3x12 3x12   SLR abd* nv 2x6 3x8 bw          Clamshells* 3x15 GTB 3x15 GTB 3x10 BTB   3x10 OTB 3x12 OTB 3x12 OTB 3x15 OTB 3x15  OTB   Bridges w PPT 3x15 3x15  3x15 Add tb/weight    3x12   3x12   3x15 3x15  3x15   Ball Rolls lumbar spine flx*       2x10x5''   2x10x5"   2x10x5'' 2x10x5"     LTR*  10x5'' 10x5''   10x5''x2 10x5"x2 10x5"x2 10x5''x2 10x5"x2   Low Rows w ppt GTB 3x12 GTB 3x15 GTB 3x15     GTB 3x10   nv   GTB 3x10 GTB 3x12 GTB  3x12   TG L20 3x12 nv L18 3x10   L 20 3x12   L 20 3x12 L 20   3x12 L 20  3x12   treadmil ambulation 0 5 mph 4' 0 5mph 5' nv    4' 0 5 mph 0 5mph nv 0 5 mph 4'   Sit to stands 3x8 foam under hips 3x8 foam under hips 3x10 foam under hips      3x6 foam under hips 3x8 foam under hips   Step ups/lateral step ups 4" x10ea 4'' 15 ea 4'' 20 ea      4'' 10 ea 4" x10 ea                             Goblet squat w ppt                  Pallof Press w ppt                                    Orbie Nab walks w PPT                                                                                                                                                               Modalities                                         *=on hep

## 2019-12-27 ENCOUNTER — OFFICE VISIT (OUTPATIENT)
Dept: PHYSICAL THERAPY | Facility: REHABILITATION | Age: 84
End: 2019-12-27
Payer: MEDICARE

## 2019-12-27 DIAGNOSIS — M48.061 SPINAL STENOSIS OF LUMBAR REGION, UNSPECIFIED WHETHER NEUROGENIC CLAUDICATION PRESENT: Primary | ICD-10-CM

## 2019-12-27 DIAGNOSIS — M51.36 DEGENERATION OF LUMBAR INTERVERTEBRAL DISC: ICD-10-CM

## 2019-12-27 DIAGNOSIS — R26.9 ABNORMALITY OF GAIT: ICD-10-CM

## 2019-12-27 PROCEDURE — 97110 THERAPEUTIC EXERCISES: CPT | Performed by: PHYSICAL THERAPIST

## 2019-12-27 PROCEDURE — 97530 THERAPEUTIC ACTIVITIES: CPT | Performed by: PHYSICAL THERAPIST

## 2019-12-27 NOTE — PROGRESS NOTES
Daily Note     Today's date: 2019  Patient name: Avel Cooney  : 1934  MRN: [id number]  Referring provider: Gayle Grubbs MD  Dx:   Encounter Diagnosis     ICD-10-CM    1  Spinal stenosis of lumbar region, unspecified whether neurogenic claudication present M48 061    2  Degeneration of lumbar intervertebral disc M51 36    3  Abnormality of gait R26 9        Start Time: 1000  Stop Time: 1050  Total time in clinic (min): 50 minutes    Subjective: Luwana Grief reports "back is okay" upon arrival to therapy today  Objective: See treatment diary below      Assessment: Tolerated treatment fair  Patient demonstrated fatigue post treatment and would benefit from continued PT  Patient able to perform sit to stands with no foam under hips today with fair tolerance, no pain reported  Progressed to TB with bridges with fair tolerance, progressed in resistance and repetitions with fair tolerance, evident fatigue  Plan: Continue per plan of care  Progress treatment as tolerated             Precautions:  Cataracts, HTN     Daily Treatment Diary      Manual 12/17 12/19 12/24 12/27  11/26 12/3 12/5 12/10 12/12                             Exercise Diary                 bike 5' lvl 1  5' lvl 1 5' L1    5' lvl 1   5' lvl 1   5' lvl 1 5' lvl 14 5' lvl 1   ADIM*                ADIM marches np     2x10   2x10   nv 2x10 2x10   ADIM heel taps                SLR flx* 3x15 3x15 3x10 1# 3x10 1#    3x12 3x12 3x12   SLR abd* nv 2x6 3x8 bw 3x10 ea         Clamshells* 3x15 GTB 3x15 GTB 3x10 BTB 3x12 ea  3x10 OTB 3x12 OTB 3x12 OTB 3x15 OTB 3x15  OTB   Bridges w PPT 3x15 3x15  3x15 OTB 3x10    3x12   3x12   3x15 3x15  3x15   Ball Rolls lumbar spine flx*       2x10x5''   2x10x5"   2x10x5'' 2x10x5"     LTR*  10x5'' 10x5'' :10x5  10x5''x2 10x5"x2 10x5"x2 10x5''x2 10x5"x2   Low Rows w ppt GTB 3x12 GTB 3x15 GTB 3x15 BTB 3x10    GTB 3x10   nv   GTB 3x10 GTB 3x12 GTB  3x12   TG L20 3x12 nv      L18 3x10   L 20 3x12   L 20 3x12 L 20   3x12 L 20  3x12   treadmil ambulation 0 5 mph 4' 0 5mph 5' nv 0 5 mph 5'    4' 0 5 mph 0 5mph nv 0 5 mph 4'   Sit to stands 3x8 foam under hips 3x8 foam under hips 3x10 foam under hips 3x12 no foam      3x6 foam under hips 3x8 foam under hips   Step ups/lateral step ups 4" x10ea 4'' 15 ea 4'' 20 ea 4" 20 ea     4'' 10 ea 4" x10 ea                             Goblet squat w ppt                  Pallof Press w ppt                                    Jose Pinch walks w PPT                                                                                                                                                               Modalities                                         *=on hep

## 2019-12-31 ENCOUNTER — OFFICE VISIT (OUTPATIENT)
Dept: PHYSICAL THERAPY | Facility: REHABILITATION | Age: 84
End: 2019-12-31
Payer: MEDICARE

## 2019-12-31 DIAGNOSIS — M51.36 DEGENERATION OF LUMBAR INTERVERTEBRAL DISC: ICD-10-CM

## 2019-12-31 DIAGNOSIS — M48.061 SPINAL STENOSIS OF LUMBAR REGION, UNSPECIFIED WHETHER NEUROGENIC CLAUDICATION PRESENT: Primary | ICD-10-CM

## 2019-12-31 DIAGNOSIS — R26.9 ABNORMALITY OF GAIT: ICD-10-CM

## 2019-12-31 PROCEDURE — 97112 NEUROMUSCULAR REEDUCATION: CPT

## 2019-12-31 PROCEDURE — 97530 THERAPEUTIC ACTIVITIES: CPT

## 2019-12-31 PROCEDURE — 97110 THERAPEUTIC EXERCISES: CPT

## 2019-12-31 NOTE — PROGRESS NOTES
Daily Note     Today's date: 2019  Patient name: Hannah Gillespie  : 1934  MRN: [id number]  Referring provider: Jenifer Vieyra MD  Dx:   Encounter Diagnosis     ICD-10-CM    1  Spinal stenosis of lumbar region, unspecified whether neurogenic claudication present M48 061    2  Degeneration of lumbar intervertebral disc M51 36    3  Abnormality of gait R26 9        Start Time: 955  Stop Time: 1050  Total time in clinic (min): 55 minutes    Subjective: Patient reports back as "alright" prior to session today and reports no complaints after previous session  Objective: See treatment diary below      Assessment: Tolerated treatment well  Patient demonstrated fatigue post treatment, exhibited good technique with therapeutic exercises and would benefit from continued PT Improvements noted with SLR abduction, less assistance from therapist required  Trialed paloff press this session with patient tolerating well, cues for proper form required  Plan: Continue per plan of care  Progress treatment as tolerated             Precautions:  Cataracts, HTN     Daily Treatment Diary      Manual 12/17 12/19 12/24 12/27 12/31   12/5 12/10 12/12                             Exercise Diary               bike 5' lvl 1  5' lvl 1 5' L1 5' L1     5' lvl 1 5' lvl 14 5' lvl 1   ADIM*                ADIM marches np         nv 2x10 2x10   ADIM heel taps              SLR flx* 3x15 3x15 3x10 1# 3x10 1# 3x12 1#   3x12 3x12 3x12   SLR abd* nv 2x6 3x8 bw 3x10 ea 3x12 ea        Clamshells* 3x15 GTB 3x15 GTB 3x10 BTB 3x12 ea nv   3x12 OTB 3x15 OTB 3x15  OTB   Bridges w PPT 3x15 3x15  3x15 OTB 3x10 OTB 3x12     3x15 3x15  3x15   Ball Rolls lumbar spine flx*          2x10x5'' 2x10x5"     LTR*  10x5'' 10x5'' :10x5 :10x5   10x5"x2 10x5''x2 10x5"x2   Low Rows w ppt GTB 3x12 GTB 3x15 GTB 3x15 BTB 3x10 BTB 3x12     GTB 3x10 GTB 3x12 GTB  3x12   TG L20 3x12 nv        L 20 3x12 L 20   3x12 L 20  3x12   treadmil ambulation 0 5 mph 4' 0 5mph 5' nv 0 5 mph 5'  0 5 mph 5'   0 5mph nv 0 5 mph 4'   Sit to stands 3x8 foam under hips 3x8 foam under hips 3x10 foam under hips 3x12 no foam  3x12     3x6 foam under hips 3x8 foam under hips   Step ups/lateral step ups 4" x10ea 4'' 15 ea 4'' 20 ea 4" 20 ea 4'' 30 ea    4'' 10 ea 4" x10 ea                             Goblet squat w ppt                  Pallof Press w ppt       OTB 2x10 ea                             Reid walks w PPT                                                                                                                                                               Modalities                                         *=on hep

## 2020-01-02 ENCOUNTER — OFFICE VISIT (OUTPATIENT)
Dept: PHYSICAL THERAPY | Facility: REHABILITATION | Age: 85
End: 2020-01-02
Payer: MEDICARE

## 2020-01-02 DIAGNOSIS — M48.061 SPINAL STENOSIS OF LUMBAR REGION, UNSPECIFIED WHETHER NEUROGENIC CLAUDICATION PRESENT: Primary | ICD-10-CM

## 2020-01-02 DIAGNOSIS — M51.36 DEGENERATION OF LUMBAR INTERVERTEBRAL DISC: ICD-10-CM

## 2020-01-02 DIAGNOSIS — R26.9 ABNORMALITY OF GAIT: ICD-10-CM

## 2020-01-02 PROCEDURE — 97110 THERAPEUTIC EXERCISES: CPT

## 2020-01-02 PROCEDURE — 97112 NEUROMUSCULAR REEDUCATION: CPT

## 2020-01-02 NOTE — PROGRESS NOTES
Daily Note     Today's date: 2020  Patient name: Kareen Cruz  : 1934  MRN: [id number]  Referring provider: Lyla Gerardo MD  Dx:   Encounter Diagnosis     ICD-10-CM    1  Spinal stenosis of lumbar region, unspecified whether neurogenic claudication present M48 061    2  Degeneration of lumbar intervertebral disc M51 36    3  Abnormality of gait R26 9        Start Time: 1030  Stop Time: 1115  Total time in clinic (min): 45 minutes    Subjective: Patient reports no back pain prior to session today stating "the legs just feel tired sometimes " He reports no complaints after previous session  Objective: See treatment diary below      Assessment: Tolerated treatment well  Patient demonstrated fatigue post treatment, exhibited good technique with therapeutic exercises and would benefit from continued PT Increased fatigue noted this session with steps and sit to stands, rest breaks required  Plan: Continue per plan of care  Progress treatment as tolerated             Precautions:  Cataracts, HTN     Daily Treatment Diary      Manual                                  Exercise Diary              bike 5' lvl 1  5' lvl 1 5' L1 5' L1 5' lvl 1       ADIM*               ADIM marches np            ADIM heel taps             SLR flx* 3x15 3x15 3x10 1# 3x10 1# 3x12 1# 3x15 1#       SLR abd* nv 2x6 3x8 bw 3x10 ea 3x12 ea 3x12 ea       Clamshells* 3x15 GTB 3x15 GTB 3x10 BTB 3x12 ea nv        Bridges w PPT 3x15 3x15  3x15 OTB 3x10 OTB 3x12 OTB 3x15 [employer] lumbar spine flx*             LTR*  10x5'' 10x5'' :10x5 :10x5 :10x5       Low Rows w ppt GTB 3x12 GTB 3x15 GTB 3x15 BTB 3x10 BTB 3x12 BTB 3x12       TG L20 3x12 nv           treadmil ambulation 0 5 mph 4' 0 5mph 5' nv 0 5 mph 5'  0 5 mph 5' nv       Sit to stands 3x8 foam under hips 3x8 foam under hips 3x10 foam under hips 3x12 no foam  3x12  3x12       Step ups/lateral step ups 4" x10ea 4'' 15 ea 4'' 20 ea 4" 20 ea 4'' 30 ea 4'' 30 ea                                 Goblet squat w ppt                  Pallof Press w ppt        OTB 2x10 ea  OTB 2x10 ea                           Reid walks w PPT                                                                                                                                                               Modalities                                         *=on hep

## 2020-01-06 ENCOUNTER — OFFICE VISIT (OUTPATIENT)
Dept: PHYSICAL THERAPY | Facility: REHABILITATION | Age: 85
End: 2020-01-06
Payer: MEDICARE

## 2020-01-06 DIAGNOSIS — M51.36 DEGENERATION OF LUMBAR INTERVERTEBRAL DISC: ICD-10-CM

## 2020-01-06 DIAGNOSIS — R26.9 ABNORMALITY OF GAIT: ICD-10-CM

## 2020-01-06 DIAGNOSIS — M48.061 SPINAL STENOSIS OF LUMBAR REGION, UNSPECIFIED WHETHER NEUROGENIC CLAUDICATION PRESENT: Primary | ICD-10-CM

## 2020-01-06 PROCEDURE — 97530 THERAPEUTIC ACTIVITIES: CPT | Performed by: PHYSICAL THERAPIST

## 2020-01-06 PROCEDURE — 97110 THERAPEUTIC EXERCISES: CPT | Performed by: PHYSICAL THERAPIST

## 2020-01-06 NOTE — PROGRESS NOTES
Daily Note     Today's date: 2020  Patient name: Esperanza Paz  : 1934  MRN: [id number]  Referring provider: Charbel Schultz MD  Dx:   Encounter Diagnosis     ICD-10-CM    1  Spinal stenosis of lumbar region, unspecified whether neurogenic claudication present M48 061    2  Degeneration of lumbar intervertebral disc M51 36    3  Abnormality of gait R26 9        Start Time: 1000  Stop Time: 1050  Total time in clinic (min): 50 minutes    Subjective: Toni Sanchez reports that his back feels a little stiff today  Objective: See treatment diary below      Assessment: Tolerated treatment well  Patient demonstrated fatigue post treatment, exhibited good technique with therapeutic exercises and would benefit from continued PT  Tolerated increased ambulation distance on treadmill without issue  Plan: Continue per plan of care               Precautions:  Cataracts, HTN     Daily Treatment Diary      Manual                                 Exercise Diary              bike 5' lvl 1  5' lvl 1 5' L1 5' L1 5' lvl 1 5' L 1      ADIM*               ADIM marches np            ADIM heel taps             SLR flx* 3x15 3x15 3x10 1# 3x10 1# 3x12 1# 3x15 1# 3x12 1 5#      SLR abd* nv 2x6 3x8 bw 3x10 ea 3x12 ea 3x12 ea 3x12 ea      Clamshells* 3x15 GTB 3x15 GTB 3x10 BTB 3x12 ea nv  nv      Bridges w PPT 3x15 3x15  3x15 OTB 3x10 OTB 3x12 OTB 3x15 GTB 3x12      Ball Rolls lumbar spine flx*             LTR*  10x5'' 10x5'' :10x5 :10x5 :10x5 10x5"      Low Rows w ppt GTB 3x12 GTB 3x15 GTB 3x15 BTB 3x10 BTB 3x12 BTB 3x12 BTB 3x12      TG L20 3x12 nv           treadmil ambulation 0 5 mph 4' 0 5mph 5' nv 0 5 mph 5'  0 5 mph 5' nv 6' 0 5 mph      Sit to stands 3x8 foam under hips 3x8 foam under hips 3x10 foam under hips 3x12 no foam  3x12  3x12 3x12  Inc rep     Step ups/lateral step ups 4" x10ea 4'' 15 ea 4'' 20 ea 4" 20 ea 4'' 30 ea 4'' 30 ea 4" 30ea 4" 30ea Goblet squat w ppt                  Pallof Press w ppt        OTB 2x10 ea   OTB 2x10 ea  np                         Reid walks w PPT                                                                                                                                                               Modalities                                         *=on hep

## 2020-01-09 ENCOUNTER — OFFICE VISIT (OUTPATIENT)
Dept: PHYSICAL THERAPY | Facility: REHABILITATION | Age: 85
End: 2020-01-09
Payer: MEDICARE

## 2020-01-09 DIAGNOSIS — M51.36 DEGENERATION OF LUMBAR INTERVERTEBRAL DISC: ICD-10-CM

## 2020-01-09 DIAGNOSIS — M48.061 SPINAL STENOSIS OF LUMBAR REGION, UNSPECIFIED WHETHER NEUROGENIC CLAUDICATION PRESENT: Primary | ICD-10-CM

## 2020-01-09 DIAGNOSIS — R26.9 ABNORMALITY OF GAIT: ICD-10-CM

## 2020-01-09 PROCEDURE — 97110 THERAPEUTIC EXERCISES: CPT

## 2020-01-09 PROCEDURE — 97112 NEUROMUSCULAR REEDUCATION: CPT

## 2020-01-09 NOTE — PROGRESS NOTES
Daily Note     Today's date: 2020  Patient name: Tam Boucher  : 1934  MRN: [id number]  Referring provider: Babak Oviedo MD  Dx:   Encounter Diagnosis     ICD-10-CM    1  Spinal stenosis of lumbar region, unspecified whether neurogenic claudication present M48 061    2  Degeneration of lumbar intervertebral disc M51 36    3  Abnormality of gait R26 9        Start Time: 1000  Stop Time: 1050  Total time in clinic (min): 50 minutes    Subjective: Patient reports "little stiff" prior to session today  Objective: See treatment diary below      Assessment: Tolerated treatment well  Patient demonstrated fatigue post treatment, exhibited good technique with therapeutic exercises and would benefit from continued PT cues continued to be required for proper form with SLR abduction, increased difficulty noted this session with right side vs left side  Trialed increased step height today with patient tolerating well, no pain reported, evident fatigue  Plan: Continue per plan of care  Progress treatment as tolerated             Precautions:  Cataracts, HTN     Daily Treatment Diary      Manual                                Exercise Diary              bike 5' lvl 1  5' lvl 1 5' L1 5' L1 5' lvl 1 5' L 1 5' lvl 1     ADIM*               ADIM marches np            ADIM heel taps             SLR flx* 3x15 3x15 3x10 1# 3x10 1# 3x12 1# 3x15 1# 3x12 1 5# 3x12 1 5#      SLR abd* nv 2x6 3x8 bw 3x10 ea 3x12 ea 3x12 ea 3x12 ea 3x15 ea     Clamshells* 3x15 GTB 3x15 GTB 3x10 BTB 3x12 ea nv  nv GTB 3x15     Bridges w PPT 3x15 3x15  3x15 OTB 3x10 OTB 3x12 OTB 3x15 GTB 3x12 GTB 3x15      Ball Rolls lumbar spine flx*             LTR*  10x5'' 10x5'' :10x5 :10x5 :10x5 10x5" 10x5''     Low Rows w ppt GTB 3x12 GTB 3x15 GTB 3x15 BTB 3x10 BTB 3x12 BTB 3x12 BTB 3x12 BTB 3x15      TG L20 3x12 nv           treadmil ambulation 0 5 mph 4' 0 5mph 5' nv 0 5 mph 5'  0 5 mph 5' nv 6' 0 5 mph 5' 0 6 mph      Sit to stands 3x8 foam under hips 3x8 foam under hips 3x10 foam under hips 3x12 no foam  3x12  3x12 3x12  3x15      Step ups/lateral step ups 4" x10ea 4'' 15 ea 4'' 20 ea 4" 20 ea 4'' 30 ea 4'' 30 ea 4" 30ea 6'' 20 ea                               Goblet squat w ppt                  Pallof Press w ppt        OTB 2x10 ea   OTB 2x10 ea   np                         Reid walks w PPT                                                                                                                                                               Modalities                                         *=on hep

## 2020-01-14 ENCOUNTER — OFFICE VISIT (OUTPATIENT)
Dept: PHYSICAL THERAPY | Facility: REHABILITATION | Age: 85
End: 2020-01-14
Payer: MEDICARE

## 2020-01-14 DIAGNOSIS — M48.061 SPINAL STENOSIS OF LUMBAR REGION, UNSPECIFIED WHETHER NEUROGENIC CLAUDICATION PRESENT: Primary | ICD-10-CM

## 2020-01-14 DIAGNOSIS — M51.36 DEGENERATION OF LUMBAR INTERVERTEBRAL DISC: ICD-10-CM

## 2020-01-14 DIAGNOSIS — R26.9 ABNORMALITY OF GAIT: ICD-10-CM

## 2020-01-14 PROCEDURE — 97110 THERAPEUTIC EXERCISES: CPT | Performed by: PHYSICAL THERAPIST

## 2020-01-14 PROCEDURE — 97110 THERAPEUTIC EXERCISES: CPT

## 2020-01-14 PROCEDURE — 97112 NEUROMUSCULAR REEDUCATION: CPT

## 2020-01-14 NOTE — PROGRESS NOTES
Daily Note     Today's date: 2020  Patient name: Tam Boucher  : 1934  MRN: [id number]  Referring provider: Babak Oviedo MD  Dx:   Encounter Diagnosis     ICD-10-CM    1  Spinal stenosis of lumbar region, unspecified whether neurogenic claudication present M48 061    2  Degeneration of lumbar intervertebral disc M51 36    3  Abnormality of gait R26 9        Start Time: 1020  Stop Time: 1110  Total time in clinic (min): 50 minutes    Subjective: Patient reports feeling alright, little stiff" prior to session today  He reports no complaints after previous session  Objective: See treatment diary below      Assessment: Tolerated treatment well  Patient demonstrated fatigue post treatment, exhibited good technique with therapeutic exercises and would benefit from continued PT      Plan: Continue per plan of care  Potential discharge next visit  Progress treatment as tolerated             Precautions:  Cataracts, HTN     Daily Treatment Diary      Manual                               Exercise Diary              bike 5' lvl 1  5' lvl 1 5' L1 5' L1 5' lvl 1 5' L 1 5' lvl 1 5' lvl 1    ADIM*               ADIM marches np            ADIM heel taps             SLR flx* 3x15 3x15 3x10 1# 3x10 1# 3x12 1# 3x15 1# 3x12 1 5# 3x12 1 5#  3x12 1 5#    SLR abd* nv 2x6 3x8 bw 3x10 ea 3x12 ea 3x12 ea 3x12 ea 3x15 ea 3x15 ea    Clamshells* 3x15 GTB 3x15 GTB 3x10 BTB 3x12 ea nv  nv GTB 3x15 GTB 3x15    Bridges w PPT 3x15 3x15  3x15 OTB 3x10 OTB 3x12 OTB 3x15 GTB 3x12 GTB 3x15  GTB 3x15    Ball Rolls lumbar spine flx*         2x10x5''    LTR*  10x5'' 10x5'' :10x5 :10x5 :10x5 10x5" 10x5'' 10x5"    Low Rows w ppt GTB 3x12 GTB 3x15 GTB 3x15 BTB 3x10 BTB 3x12 BTB 3x12 BTB 3x12 BTB 3x15  BTB 3x15    TG L20 3x12 nv           treadmil ambulation 0 5 mph 4' 0 5mph 5' nv 0 5 mph 5'  0 5 mph 5' nv 6' 0 5 mph 5' 0 6 mph  5' 0 6mph    Sit to stands 3x8 foam under hips 3x8 foam under hips 3x10 foam under hips 3x12 no foam  3x12  3x12 3x12  3x15      Step ups/lateral step ups 4" x10ea 4'' 15 ea 4'' 20 ea 4" 20 ea 4'' 30 ea 4'' 30 ea 4" 30ea 6'' 20 ea np                              Goblet squat w ppt                  Pallof Press w ppt        OTB 2x10 ea   OTB 2x10 ea   np                         Reid walks w PPT                   Seated hamstring stretch               :30x3 ea                                                                                                                             Modalities                                         *=on hep

## 2020-01-17 ENCOUNTER — EVALUATION (OUTPATIENT)
Dept: PHYSICAL THERAPY | Facility: REHABILITATION | Age: 85
End: 2020-01-17
Payer: MEDICARE

## 2020-01-17 DIAGNOSIS — R26.9 ABNORMALITY OF GAIT: ICD-10-CM

## 2020-01-17 DIAGNOSIS — M51.36 DEGENERATION OF LUMBAR INTERVERTEBRAL DISC: ICD-10-CM

## 2020-01-17 DIAGNOSIS — M48.061 SPINAL STENOSIS OF LUMBAR REGION, UNSPECIFIED WHETHER NEUROGENIC CLAUDICATION PRESENT: Primary | ICD-10-CM

## 2020-01-17 PROCEDURE — 97112 NEUROMUSCULAR REEDUCATION: CPT | Performed by: PHYSICAL THERAPIST

## 2020-01-17 PROCEDURE — 97110 THERAPEUTIC EXERCISES: CPT | Performed by: PHYSICAL THERAPIST

## 2020-01-17 NOTE — PROGRESS NOTES
PT Re-Evaluation  and PT Discharge    Today's date: 2020  Patient name: Zina Mota  : 1934  MRN: [id number]  Referring provider: Ary Toure MD  Dx:   Encounter Diagnosis     ICD-10-CM    1  Spinal stenosis of lumbar region, unspecified whether neurogenic claudication present M48 061    2  Degeneration of lumbar intervertebral disc M51 36    3  Abnormality of gait R26 9        Start Time: 1025  Stop Time: 1115  Total time in clinic (min): 50 minutes    Assessment  Assessment details: Zina Mota has been compliant with attending PT and home exercise program since initial eval   Devonraad Ovalle  has made improvements in objective data since initial evalulation and has achieved all goals  Patient reports having returned to 100% of their prior level or function  Patient provided with updated Home Exercise Program, all questions answered, verbalized understanding and agreement to plan of care   Thus it was mutually decided to discontinue this episode of care and transition to Home Exercise Program    Impairments: activity intolerance    Symptom irritability: lowUnderstanding of Dx/Px/POC: good   Prognosis: good    Goals  Impairment Goals  - Decrease pain 0/10 partially met  - Improve ROM to 50 degrees lumbar spine rotation progressing  - Increase strength to 5/5 throughout progressing    Functional Goals  - Return to Prior Level of Function progressing  - Increase Functional Status Measure to: 50 met  - Patient will be independent with HEP progressing  -Patient will be able to return to activities of daily living without pain progressing    Plan  Patient would benefit from: skilled PT  Planned therapy interventions: joint mobilization, manual therapy, patient education, postural training, activity modification, abdominal trunk stabilization, body mechanics training, flexibility, functional ROM exercises, graded exercise, home exercise program, neuromuscular re-education, strengthening, stretching, therapeutic activities and therapeutic exercise  Frequency: 2x week  Duration in weeks: 6  Plan of Care expiration date: 2020  Treatment plan discussed with: patient        Subjective Evaluation    History of Present Illness  Mechanism of injury: Cira Fernandez has been seen for total of 19 visits for outpatient physical therapy  Patient rates overall improvement since beginning %  Patient's global rating of change is " A great deal better (6) " Patient reports improvements with strength, walking endurance and pain  Patient reports most difficulty with "my legs get tired when I'm walking about a block"  Cira Fernandez has been seen for total of 10 visits for outpatient physical therapy  Patient rates overall improvement since beginning PT 90%  Patient's global rating of change is " Somewhat better (3) " Patient reports improvements with "the pain is much better " Patient reports most difficulty with sitting down for extended periods, "my legs are still weak too but they come back after I sit down"      Pain  Current pain ratin  At worst pain ratin  Location: low back  Quality: dull ache and throbbing  Aggravating factors: walking, standing, sitting and stair climbing  Progression: no change    Social Support    Employment status: not working  Treatments  Current treatment: physical therapy  Patient Goals  Patient goals for therapy: decreased pain, improved balance, increased motion, increased strength, return to sport/leisure activities and independence with ADLs/IADLs          Objective     Concurrent Complaints  Negative for night pain, disturbed sleep, bladder dysfunction, bowel dysfunction, saddle (S4) numbness and history of trauma    Palpation   Left   Tenderness of the erector spinae  Tenderness     Left Hip   No tenderness in the greater trochanter  Right Hip   No tenderness in the greater trochanter       Active Range of Motion     Lumbar   Flexion:  WFL  Left lateral flexion:  Restriction level: minimal  Right lateral flexion:  Restriction level: minimal  Left rotation:  Restriction level: minimal  Right rotation:  Restriction level: minimal    Strength/Myotome Testing     Left Hip   Planes of Motion   Flexion: 4+  Abduction: 4-    Right Hip   Planes of Motion   Flexion: 4-  Abduction: 4    Left Knee   Flexion: 4-  Extension: 4    Right Knee   Flexion: 4-  Extension: 4    Left Ankle/Foot   Dorsiflexion: 4  Plantar flexion: 5    Right Ankle/Foot   Dorsiflexion: 4  Plantar flexion: 5    Tests     Lumbar     Left   Negative passive SLR  Right   Negative passive SLR  Left Hip   Positive Ely's (115) and NIMA    90/90 SLR: Positive  Hip flexion: 90  SLR: Knee extension: 45  Right Hip   Positive Ely's (115) and NIMA    90/90 SLR: Positive  Hip flexion: 90  SLR: Knee extension: 60

## 2020-01-17 NOTE — PROGRESS NOTES
Daily Note     Today's date: 2020  Patient name: Ruslan Freeman  : 1934  MRN: [id number]  Referring provider: Aisha Alan MD  Dx:   Encounter Diagnosis     ICD-10-CM    1  Spinal stenosis of lumbar region, unspecified whether neurogenic claudication present M48 061    2  Degeneration of lumbar intervertebral disc M51 36    3  Abnormality of gait R26 9        Start Time: 1025  Stop Time: 1115  Total time in clinic (min): 50 minutes    Subjective: Ivette Mccann reports "doing alright" upon arrival to therapy today  Objective: See treatment diary below      Assessment: Tolerated treatment fair  Patient demonstrated fatigue post treatment and would benefit from continued PT  Patient demonstrating good technique with all exercises performed today, reviewed HEP with verbalized understanding, patient issued BTB       Plan: Patient discharged, see re-evaluation             Precautions:  Cataracts, HTN     Daily Treatment Diary      Manual                              Exercise Diary              bike 5' lvl 1  5' lvl 1 5' L1 5' L1 5' lvl 1 5' L 1 5' lvl 1 5' lvl 1 5' L1   ADIM*               ADIM marches np            ADIM heel taps             SLR flx* 3x15 3x15 3x10 1# 3x10 1# 3x12 1# 3x15 1# 3x12 1 5# 3x12 1 5#  3x12 1 5# 3x10 1 5#   SLR abd* nv 2x6 3x8 bw 3x10 ea 3x12 ea 3x12 ea 3x12 ea 3x15 ea 3x15 ea    Clamshells* 3x15 GTB 3x15 GTB 3x10 BTB 3x12 ea nv  nv GTB 3x15 GTB 3x15 GTB 3x10   Bridges w PPT 3x15 3x15  3x15 OTB 3x10 OTB 3x12 OTB 3x15 GTB 3x12 GTB 3x15  GTB 3x15 GTB 3x10   Ball Rolls lumbar spine flx*         2x10x5''    LTR*  10x5'' 10x5'' :10x5 :10x5 :10x5 10x5" 10x5'' 10x5" :05x10   Low Rows w ppt GTB 3x12 GTB 3x15 GTB 3x15 BTB 3x10 BTB 3x12 BTB 3x12 BTB 3x12 BTB 3x15  BTB 3x15    TG L20 3x12 nv           treadmil ambulation 0 5 mph 4' 0 5mph 5' nv 0 5 mph 5'  0 5 mph 5' nv 6' 0 5 mph 5' 0 6 mph  5' 0 6mph 6'   0 6 mph   Sit to stands 3x8 foam under hips 3x8 foam under hips 3x10 foam under hips 3x12 no foam  3x12  3x12 3x12  3x15      Step ups/lateral step ups 4" x10ea 4'' 15 ea 4'' 20 ea 4" 20 ea 4'' 30 ea 4'' 30 ea 4" 30ea 6'' 20 ea np                              Goblet squat w ppt                  Pallof Press w ppt        OTB 2x10 ea   OTB 2x10 ea   np                         Reid walks w PPT                    Seated hamstring stretch               :30x3 ea :30x3 ea                                                                                                                            Modalities                                         *=on hep

## 2020-01-30 ENCOUNTER — PROCEDURE VISIT (OUTPATIENT)
Dept: UROLOGY | Facility: AMBULATORY SURGERY CENTER | Age: 85
End: 2020-01-30
Payer: MEDICARE

## 2020-01-30 VITALS
WEIGHT: 145 LBS | BODY MASS INDEX: 24.75 KG/M2 | HEIGHT: 64 IN | SYSTOLIC BLOOD PRESSURE: 132 MMHG | DIASTOLIC BLOOD PRESSURE: 72 MMHG | HEART RATE: 82 BPM

## 2020-01-30 DIAGNOSIS — C67.9 MALIGNANT NEOPLASM OF URINARY BLADDER, UNSPECIFIED SITE (HCC): Primary | ICD-10-CM

## 2020-01-30 LAB
SL AMB  POCT GLUCOSE, UA: 250
SL AMB LEUKOCYTE ESTERASE,UA: NORMAL
SL AMB POCT BILIRUBIN,UA: NORMAL
SL AMB POCT BLOOD,UA: NORMAL
SL AMB POCT CLARITY,UA: CLEAR
SL AMB POCT COLOR,UA: YELLOW
SL AMB POCT KETONES,UA: NORMAL
SL AMB POCT NITRITE,UA: NORMAL
SL AMB POCT PH,UA: 6.5
SL AMB POCT SPECIFIC GRAVITY,UA: 1.01
SL AMB POCT URINE PROTEIN: NORMAL
SL AMB POCT UROBILINOGEN: 0.2

## 2020-01-30 PROCEDURE — 52000 CYSTOURETHROSCOPY: CPT | Performed by: UROLOGY

## 2020-01-30 PROCEDURE — 81002 URINALYSIS NONAUTO W/O SCOPE: CPT | Performed by: UROLOGY

## 2020-01-30 PROCEDURE — 88112 CYTOPATH CELL ENHANCE TECH: CPT | Performed by: PATHOLOGY

## 2020-01-30 NOTE — PROGRESS NOTES
Cystoscopy  Date/Time: 1/30/2020 11:21 AM  Performed by: Nupur Durham MD  Authorized by: Nupur Durham MD     Procedure details: cystoscopy        Written Consent Obtained      Indications for Procedure:   history of low-grade bladder cancer     Physical Exam     Constitutional   General appearance: No acute distress, well appearing and well nourished     Pulmonary   Respiratory effort: No increased work of breathing or signs of respiratory distress     Cardiovascular   Examination of extremities for edema and/or varicosities: Normal     Abdomen   Abdomen: Non-tender, no masses     Liver and spleen: No hepatomegaly or splenomegaly     Genitourinary   Normal phallus and meatus   Musculoskeletal   Gait and station: Normal     Skin   Skin and subcutaneous tissue: Normal without rashes or lesions     Lymphatic   Palpation of lymph nodes in groin: No lymphadenopathy     Additional Exam: Neuro exam nonfocal   The flexible cystoscope was introduced into the urethra and advanced into the bladder      URETHRA:  Normal,  without strictures or lesions  PROSTATE:  Moderate to severe bilobar obstruction  TRIGONE & UOs:   Normal anatomy with efflux of clear urine   No mucosal lesions or subtrigonal masses  BLADDER MUCOSA:  Normal, without neoplasms or other lesions  DETRUSOR: Normal capacity, without flaccidity, without excessive compliance, moderate trabeculation without diverticula, without uninhibited bladder contractions on fillings  RETROFLEXED SCOPE VIEW: Normal bladder neck     Plan:  Cystoscopy was negative   We will plan on repeat cysto in 6 months

## 2020-03-20 DIAGNOSIS — N18.30 TYPE 2 DIABETES MELLITUS WITH STAGE 3 CHRONIC KIDNEY DISEASE, WITHOUT LONG-TERM CURRENT USE OF INSULIN (HCC): ICD-10-CM

## 2020-03-20 DIAGNOSIS — E11.22 TYPE 2 DIABETES MELLITUS WITH STAGE 3 CHRONIC KIDNEY DISEASE, WITHOUT LONG-TERM CURRENT USE OF INSULIN (HCC): ICD-10-CM

## 2020-04-17 DIAGNOSIS — I10 ESSENTIAL HYPERTENSION: ICD-10-CM

## 2020-04-17 RX ORDER — AMLODIPINE BESYLATE 10 MG/1
10 TABLET ORAL DAILY
Qty: 90 TABLET | Refills: 0 | Status: SHIPPED | OUTPATIENT
Start: 2020-04-17 | End: 2020-07-21 | Stop reason: SDUPTHER

## 2020-05-27 DIAGNOSIS — J45.20 MILD INTERMITTENT ASTHMA WITHOUT COMPLICATION: Primary | ICD-10-CM

## 2020-07-21 DIAGNOSIS — N18.30 TYPE 2 DIABETES MELLITUS WITH STAGE 3 CHRONIC KIDNEY DISEASE, WITHOUT LONG-TERM CURRENT USE OF INSULIN (HCC): ICD-10-CM

## 2020-07-21 DIAGNOSIS — E11.22 TYPE 2 DIABETES MELLITUS WITH STAGE 3 CHRONIC KIDNEY DISEASE, WITHOUT LONG-TERM CURRENT USE OF INSULIN (HCC): ICD-10-CM

## 2020-07-21 DIAGNOSIS — I10 ESSENTIAL HYPERTENSION: ICD-10-CM

## 2020-07-21 RX ORDER — AMLODIPINE BESYLATE 10 MG/1
10 TABLET ORAL DAILY
Qty: 90 TABLET | Refills: 0 | Status: SHIPPED | OUTPATIENT
Start: 2020-07-21 | End: 2021-06-07 | Stop reason: SDUPTHER

## 2020-07-21 RX ORDER — GLIMEPIRIDE 1 MG/1
1 TABLET ORAL
Qty: 90 TABLET | Refills: 1 | Status: SHIPPED | OUTPATIENT
Start: 2020-07-21 | End: 2022-02-21

## 2020-07-30 ENCOUNTER — PROCEDURE VISIT (OUTPATIENT)
Dept: UROLOGY | Facility: AMBULATORY SURGERY CENTER | Age: 85
End: 2020-07-30
Payer: MEDICARE

## 2020-07-30 VITALS
SYSTOLIC BLOOD PRESSURE: 158 MMHG | WEIGHT: 145 LBS | DIASTOLIC BLOOD PRESSURE: 80 MMHG | TEMPERATURE: 98 F | BODY MASS INDEX: 24.75 KG/M2 | HEIGHT: 64 IN | HEART RATE: 69 BPM

## 2020-07-30 DIAGNOSIS — C67.9 MALIGNANT NEOPLASM OF URINARY BLADDER, UNSPECIFIED SITE (HCC): Primary | ICD-10-CM

## 2020-07-30 LAB
SL AMB  POCT GLUCOSE, UA: 250
SL AMB LEUKOCYTE ESTERASE,UA: NORMAL
SL AMB POCT BILIRUBIN,UA: NORMAL
SL AMB POCT BLOOD,UA: NORMAL
SL AMB POCT CLARITY,UA: CLEAR
SL AMB POCT COLOR,UA: YELLOW
SL AMB POCT KETONES,UA: NORMAL
SL AMB POCT NITRITE,UA: NORMAL
SL AMB POCT PH,UA: 6
SL AMB POCT SPECIFIC GRAVITY,UA: 1.01
SL AMB POCT URINE PROTEIN: NORMAL
SL AMB POCT UROBILINOGEN: 0.2

## 2020-07-30 PROCEDURE — 52000 CYSTOURETHROSCOPY: CPT | Performed by: UROLOGY

## 2020-07-30 PROCEDURE — 81002 URINALYSIS NONAUTO W/O SCOPE: CPT | Performed by: UROLOGY

## 2020-07-30 NOTE — PROGRESS NOTES
Cystoscopy  Date/Time: 7/30/2020 11:37 AM  Performed by: Abdirahman Yun MD  Authorized by: Abdirahman Yun MD     Procedure details: cystoscopy      Written Consent Obtained      Indications for Procedure:   history of low-grade bladder cancer     Physical Exam     Constitutional   General appearance: No acute distress, well appearing and well nourished     Pulmonary   Respiratory effort: No increased work of breathing or signs of respiratory distress     Cardiovascular   Examination of extremities for edema and/or varicosities: Normal     Abdomen   Abdomen: Non-tender, no masses     Liver and spleen: No hepatomegaly or splenomegaly     Genitourinary   Normal phallus and meatus   Musculoskeletal   Gait and station: Normal     Skin   Skin and subcutaneous tissue: Normal without rashes or lesions     Lymphatic   Palpation of lymph nodes in groin: No lymphadenopathy     Additional Exam: Neuro exam nonfocal   The flexible cystoscope was introduced into the urethra and advanced into the bladder      URETHRA:  Normal,  without strictures or lesions  PROSTATE:  Moderate to severe bilobar obstruction  TRIGONE & UOs:   Normal anatomy with efflux of clear urine   No mucosal lesions or subtrigonal masses  BLADDER MUCOSA:  Normal, without neoplasms or other lesions  DETRUSOR: Normal capacity, without flaccidity, without excessive compliance, moderate trabeculation without diverticula, without uninhibited bladder contractions on fillings  RETROFLEXED SCOPE VIEW: Normal bladder neck     Plan:  Cystoscopy was negative   We will plan on repeat cysto in 6 months

## 2020-07-31 DIAGNOSIS — N18.30 TYPE 2 DIABETES MELLITUS WITH STAGE 3 CHRONIC KIDNEY DISEASE, WITHOUT LONG-TERM CURRENT USE OF INSULIN (HCC): ICD-10-CM

## 2020-07-31 DIAGNOSIS — E11.22 TYPE 2 DIABETES MELLITUS WITH STAGE 3 CHRONIC KIDNEY DISEASE, WITHOUT LONG-TERM CURRENT USE OF INSULIN (HCC): ICD-10-CM

## 2020-08-14 DIAGNOSIS — E11.22 TYPE 2 DIABETES MELLITUS WITH STAGE 4 CHRONIC KIDNEY DISEASE, WITHOUT LONG-TERM CURRENT USE OF INSULIN (HCC): Primary | ICD-10-CM

## 2020-08-14 DIAGNOSIS — C67.9 MALIGNANT NEOPLASM OF URINARY BLADDER, UNSPECIFIED SITE (HCC): ICD-10-CM

## 2020-08-14 DIAGNOSIS — N18.4 ANEMIA DUE TO STAGE 4 CHRONIC KIDNEY DISEASE (HCC): ICD-10-CM

## 2020-08-14 DIAGNOSIS — I10 ESSENTIAL HYPERTENSION: ICD-10-CM

## 2020-08-14 DIAGNOSIS — D63.1 ANEMIA DUE TO STAGE 4 CHRONIC KIDNEY DISEASE (HCC): ICD-10-CM

## 2020-08-14 DIAGNOSIS — N18.4 TYPE 2 DIABETES MELLITUS WITH STAGE 4 CHRONIC KIDNEY DISEASE, WITHOUT LONG-TERM CURRENT USE OF INSULIN (HCC): Primary | ICD-10-CM

## 2020-09-18 LAB
ALBUMIN SERPL-MCNC: 3.8 G/DL (ref 3.6–5.1)
ALBUMIN/GLOB SERPL: 1.2 (CALC) (ref 1–2.5)
ALP SERPL-CCNC: 63 U/L (ref 35–144)
ALT SERPL-CCNC: 11 U/L (ref 9–46)
AST SERPL-CCNC: 15 U/L (ref 10–35)
BASOPHILS # BLD AUTO: 70 CELLS/UL (ref 0–200)
BASOPHILS NFR BLD AUTO: 0.8 %
BILIRUB SERPL-MCNC: 0.5 MG/DL (ref 0.2–1.2)
BUN SERPL-MCNC: 36 MG/DL (ref 7–25)
BUN/CREAT SERPL: 15 (CALC) (ref 6–22)
CALCIUM SERPL-MCNC: 9.1 MG/DL (ref 8.6–10.3)
CHLORIDE SERPL-SCNC: 107 MMOL/L (ref 98–110)
CHOLEST SERPL-MCNC: 189 MG/DL
CHOLEST/HDLC SERPL: 4 (CALC)
CO2 SERPL-SCNC: 25 MMOL/L (ref 20–32)
CREAT SERPL-MCNC: 2.42 MG/DL (ref 0.7–1.11)
EOSINOPHIL # BLD AUTO: 422 CELLS/UL (ref 15–500)
EOSINOPHIL NFR BLD AUTO: 4.8 %
ERYTHROCYTE [DISTWIDTH] IN BLOOD BY AUTOMATED COUNT: 11.8 % (ref 11–15)
GLOBULIN SER CALC-MCNC: 3.2 G/DL (CALC) (ref 1.9–3.7)
GLUCOSE SERPL-MCNC: 137 MG/DL (ref 65–99)
HBA1C MFR BLD: 6.8 % OF TOTAL HGB
HCT VFR BLD AUTO: 33.1 % (ref 38.5–50)
HDLC SERPL-MCNC: 47 MG/DL
HGB BLD-MCNC: 10.5 G/DL (ref 13.2–17.1)
LDLC SERPL CALC-MCNC: 127 MG/DL (CALC)
LYMPHOCYTES # BLD AUTO: 1857 CELLS/UL (ref 850–3900)
LYMPHOCYTES NFR BLD AUTO: 21.1 %
MCH RBC QN AUTO: 30.8 PG (ref 27–33)
MCHC RBC AUTO-ENTMCNC: 31.7 G/DL (ref 32–36)
MCV RBC AUTO: 97.1 FL (ref 80–100)
MONOCYTES # BLD AUTO: 827 CELLS/UL (ref 200–950)
MONOCYTES NFR BLD AUTO: 9.4 %
NEUTROPHILS # BLD AUTO: 5623 CELLS/UL (ref 1500–7800)
NEUTROPHILS NFR BLD AUTO: 63.9 %
NONHDLC SERPL-MCNC: 142 MG/DL (CALC)
PLATELET # BLD AUTO: 177 THOUSAND/UL (ref 140–400)
PMV BLD REES-ECKER: 11.2 FL (ref 7.5–12.5)
POTASSIUM SERPL-SCNC: 4.7 MMOL/L (ref 3.5–5.3)
PROT SERPL-MCNC: 7 G/DL (ref 6.1–8.1)
PSA SERPL-MCNC: 0.4 NG/ML
RBC # BLD AUTO: 3.41 MILLION/UL (ref 4.2–5.8)
SL AMB EGFR AFRICAN AMERICAN: 27 ML/MIN/1.73M2
SL AMB EGFR NON AFRICAN AMERICAN: 23 ML/MIN/1.73M2
SODIUM SERPL-SCNC: 138 MMOL/L (ref 135–146)
TRIGL SERPL-MCNC: 62 MG/DL
WBC # BLD AUTO: 8.8 THOUSAND/UL (ref 3.8–10.8)

## 2020-09-22 ENCOUNTER — OFFICE VISIT (OUTPATIENT)
Dept: INTERNAL MEDICINE CLINIC | Age: 85
End: 2020-09-22
Payer: MEDICARE

## 2020-09-22 VITALS
HEIGHT: 64 IN | TEMPERATURE: 97.7 F | WEIGHT: 144 LBS | BODY MASS INDEX: 24.59 KG/M2 | DIASTOLIC BLOOD PRESSURE: 78 MMHG | HEART RATE: 72 BPM | OXYGEN SATURATION: 96 % | SYSTOLIC BLOOD PRESSURE: 146 MMHG

## 2020-09-22 DIAGNOSIS — C67.9 MALIGNANT NEOPLASM OF URINARY BLADDER, UNSPECIFIED SITE (HCC): ICD-10-CM

## 2020-09-22 DIAGNOSIS — Z23 ENCOUNTER FOR IMMUNIZATION: Primary | ICD-10-CM

## 2020-09-22 DIAGNOSIS — N18.4 ANEMIA DUE TO STAGE 4 CHRONIC KIDNEY DISEASE (HCC): ICD-10-CM

## 2020-09-22 DIAGNOSIS — E11.22 TYPE 2 DIABETES MELLITUS WITH STAGE 4 CHRONIC KIDNEY DISEASE, WITHOUT LONG-TERM CURRENT USE OF INSULIN (HCC): ICD-10-CM

## 2020-09-22 DIAGNOSIS — H40.9 GLAUCOMA OF BOTH EYES, UNSPECIFIED GLAUCOMA TYPE: ICD-10-CM

## 2020-09-22 DIAGNOSIS — Z00.00 MEDICARE ANNUAL WELLNESS VISIT, SUBSEQUENT: ICD-10-CM

## 2020-09-22 DIAGNOSIS — H54.62 VISION LOSS OF LEFT EYE: ICD-10-CM

## 2020-09-22 DIAGNOSIS — E78.5 HYPERLIPIDEMIA, UNSPECIFIED HYPERLIPIDEMIA TYPE: ICD-10-CM

## 2020-09-22 DIAGNOSIS — J45.20 MILD INTERMITTENT ASTHMA WITHOUT COMPLICATION: ICD-10-CM

## 2020-09-22 DIAGNOSIS — D63.1 ANEMIA DUE TO STAGE 4 CHRONIC KIDNEY DISEASE (HCC): ICD-10-CM

## 2020-09-22 DIAGNOSIS — N18.4 TYPE 2 DIABETES MELLITUS WITH STAGE 4 CHRONIC KIDNEY DISEASE, WITHOUT LONG-TERM CURRENT USE OF INSULIN (HCC): ICD-10-CM

## 2020-09-22 DIAGNOSIS — H34.8122 RETINAL VEIN OCCLUSION OF LEFT EYE, UNSPECIFIED RETINAL VEIN: ICD-10-CM

## 2020-09-22 DIAGNOSIS — I10 ESSENTIAL HYPERTENSION: ICD-10-CM

## 2020-09-22 PROCEDURE — G0439 PPPS, SUBSEQ VISIT: HCPCS | Performed by: INTERNAL MEDICINE

## 2020-09-22 PROCEDURE — G0008 ADMIN INFLUENZA VIRUS VAC: HCPCS | Performed by: INTERNAL MEDICINE

## 2020-09-22 PROCEDURE — 99214 OFFICE O/P EST MOD 30 MIN: CPT | Performed by: INTERNAL MEDICINE

## 2020-09-22 PROCEDURE — 90662 IIV NO PRSV INCREASED AG IM: CPT | Performed by: INTERNAL MEDICINE

## 2020-09-22 RX ORDER — METOPROLOL SUCCINATE 50 MG/1
50 TABLET, EXTENDED RELEASE ORAL DAILY
Qty: 30 TABLET | Refills: 1 | Status: SHIPPED | OUTPATIENT
Start: 2020-09-22 | End: 2022-02-21

## 2020-09-22 RX ORDER — DORZOLAMIDE HYDROCHLORIDE AND TIMOLOL MALEATE PRESERVATIVE FREE 20; 5 MG/ML; MG/ML
SOLUTION/ DROPS OPHTHALMIC
COMMUNITY
Start: 2020-07-23

## 2020-09-22 NOTE — PATIENT INSTRUCTIONS
Medicare Preventive Visit Patient Instructions  Thank you for completing your Welcome to Medicare Visit or Medicare Annual Wellness Visit today  Your next wellness visit will be due in one year (9/22/2021)  The screening/preventive services that you may require over the next 5-10 years are detailed below  Some tests may not apply to you based off risk factors and/or age  Screening tests ordered at today's visit but not completed yet may show as past due  Also, please note that scanned in results may not display below  Preventive Screenings:  Service Recommendations Previous Testing/Comments   Colorectal Cancer Screening  · Colonoscopy    · Fecal Occult Blood Test (FOBT)/Fecal Immunochemical Test (FIT)  · Fecal DNA/Cologuard Test  · Flexible Sigmoidoscopy Age: 54-65 years old   Colonoscopy: every 10 years (May be performed more frequently if at higher risk)  OR  FOBT/FIT: every 1 year  OR  Cologuard: every 3 years  OR  Sigmoidoscopy: every 5 years  Screening may be recommended earlier than age 48 if at higher risk for colorectal cancer  Also, an individualized decision between you and your healthcare provider will decide whether screening between the ages of 74-80 would be appropriate  Colonoscopy: Not on file  FOBT/FIT: Not on file  Cologuard: Not on file  Sigmoidoscopy: Not on file         Prostate Cancer Screening Individualized decision between patient and health care provider in men between ages of 53-78   Medicare will cover every 12 months beginning on the day after your 50th birthday PSA: 0 4 ng/mL          Hepatitis C Screening Once for adults born between 1945 and 1965  More frequently in patients at high risk for Hepatitis C Hep C Antibody: Not on file       Diabetes Screening 1-2 times per year if you're at risk for diabetes or have pre-diabetes Fasting glucose: No results in last 5 years   A1C: 6 8 % of total Hgb       Cholesterol Screening Once every 5 years if you don't have a lipid disorder   May order more often based on risk factors  Lipid panel: 09/17/2020          Other Preventive Screenings Covered by Medicare:  1  Abdominal Aortic Aneurysm (AAA) Screening: covered once if your at risk  You're considered to be at risk if you have a family history of AAA or a male between the age of 73-68 who smoking at least 100 cigarettes in your lifetime  2  Lung Cancer Screening: covers low dose CT scan once per year if you meet all of the following conditions: (1) Age 50-69; (2) No signs or symptoms of lung cancer; (3) Current smoker or have quit smoking within the last 15 years; (4) You have a tobacco smoking history of at least 30 pack years (packs per day x number of years you smoked); (5) You get a written order from a healthcare provider  3  Glaucoma Screening: covered annually if you're considered high risk: (1) You have diabetes OR (2) Family history of glaucoma OR (3)  aged 48 and older OR (3)  American aged 72 and older  3  Osteoporosis Screening: covered every 2 years if you meet one of the following conditions: (1) Have a vertebral abnormality; (2) On glucocorticoid therapy for more than 3 months; (3) Have primary hyperparathyroidism; (4) On osteoporosis medications and need to assess response to drug therapy  5  HIV Screening: covered annually if you're between the age of 12-76  Also covered annually if you are younger than 13 and older than 72 with risk factors for HIV infection  For pregnant patients, it is covered up to 3 times per pregnancy      Immunizations:  Immunization Recommendations   Influenza Vaccine Annual influenza vaccination during flu season is recommended for all persons aged >= 6 months who do not have contraindications   Pneumococcal Vaccine (Prevnar and Pneumovax)  * Prevnar = PCV13  * Pneumovax = PPSV23 Adults 25-60 years old: 1-3 doses may be recommended based on certain risk factors  Adults 72 years old: Prevnar (PCV13) vaccine recommended followed by Pneumovax (PPSV23) vaccine  If already received PPSV23 since turning 65, then PCV13 recommended at least one year after PPSV23 dose  Hepatitis B Vaccine 3 dose series if at intermediate or high risk (ex: diabetes, end stage renal disease, liver disease)   Tetanus (Td) Vaccine - COST NOT COVERED BY MEDICARE PART B Following completion of primary series, a booster dose should be given every 10 years to maintain immunity against tetanus  Td may also be given as tetanus wound prophylaxis  Tdap Vaccine - COST NOT COVERED BY MEDICARE PART B Recommended at least once for all adults  For pregnant patients, recommended with each pregnancy  Shingles Vaccine (Shingrix) - COST NOT COVERED BY MEDICARE PART B  2 shot series recommended in those aged 48 and above     Health Maintenance Due:  There are no preventive care reminders to display for this patient  Immunizations Due:      Topic Date Due    DTaP,Tdap,and Td Vaccines (1 - Tdap) 12/07/1955    Influenza Vaccine  07/01/2020     Advance Directives   What are advance directives? Advance directives are legal documents that state your wishes and plans for medical care  These plans are made ahead of time in case you lose your ability to make decisions for yourself  Advance directives can apply to any medical decision, such as the treatments you want, and if you want to donate organs  What are the types of advance directives? There are many types of advance directives, and each state has rules about how to use them  You may choose a combination of any of the following:  · Living will: This is a written record of the treatment you want  You can also choose which treatments you do not want, which to limit, and which to stop at a certain time  This includes surgery, medicine, IV fluid, and tube feedings  · Durable power of  for healthcare [place] [facility]):   This is a written record that states who you want to make healthcare choices for you when you are unable to make them for yourself  This person, called a proxy, is usually a family member or a friend  You may choose more than 1 proxy  · Do not resuscitate (DNR) order:  A DNR order is used in case your heart stops beating or you stop breathing  It is a request not to have certain forms of treatment, such as CPR  A DNR order may be included in other types of advance directives  · Medical directive: This covers the care that you want if you are in a coma, near death, or unable to make decisions for yourself  You can list the treatments you want for each condition  Treatment may include pain medicine, surgery, blood transfusions, dialysis, IV or tube feedings, and a ventilator (breathing machine)  · Values history: This document has questions about your views, beliefs, and how you feel and think about life  This information can help others choose the care that you would choose  Why are advance directives important? An advance directive helps you control your care  Although spoken wishes may be used, it is better to have your wishes written down  Spoken wishes can be misunderstood, or not followed  Treatments may be given even if you do not want them  An advance directive may make it easier for your family to make difficult choices about your care  © Copyright [employer] 2018 Information is for End User's use only and may not be sold, redistributed or otherwise used for commercial purposes  All illustrations and images included in CareNotes® are the copyrighted property of [employer]  or [facility] Preventive Visit Patient Instructions  Thank you for completing your Welcome to Medicare Visit or Medicare Annual Wellness Visit today  Your next wellness visit will be due in one year (9/22/2021)  The screening/preventive services that you may require over the next 5-10 years are detailed below  Some tests may not apply to you based off risk factors and/or age   Screening tests ordered at today's visit but not completed yet may show as past due  Also, please note that scanned in results may not display below  Preventive Screenings:  Service Recommendations Previous Testing/Comments   Colorectal Cancer Screening  · Colonoscopy    · Fecal Occult Blood Test (FOBT)/Fecal Immunochemical Test (FIT)  · Fecal DNA/Cologuard Test  · Flexible Sigmoidoscopy Age: 54-65 years old   Colonoscopy: every 10 years (May be performed more frequently if at higher risk)  OR  FOBT/FIT: every 1 year  OR  Cologuard: every 3 years  OR  Sigmoidoscopy: every 5 years  Screening may be recommended earlier than age 48 if at higher risk for colorectal cancer  Also, an individualized decision between you and your healthcare provider will decide whether screening between the ages of 74-80 would be appropriate  Colonoscopy: Not on file  FOBT/FIT: Not on file  Cologuard: Not on file  Sigmoidoscopy: Not on file         Prostate Cancer Screening Individualized decision between patient and health care provider in men between ages of 53-78   Medicare will cover every 12 months beginning on the day after your 50th birthday PSA: 0 4 ng/mL          Hepatitis C Screening Once for adults born between 1945 and 1965  More frequently in patients at high risk for Hepatitis C Hep C Antibody: Not on file       Diabetes Screening 1-2 times per year if you're at risk for diabetes or have pre-diabetes Fasting glucose: No results in last 5 years   A1C: 6 8 % of total Hgb       Cholesterol Screening Once every 5 years if you don't have a lipid disorder  May order more often based on risk factors  Lipid panel: 09/17/2020          Other Preventive Screenings Covered by Medicare:  6  Abdominal Aortic Aneurysm (AAA) Screening: covered once if your at risk  You're considered to be at risk if you have a family history of AAA or a male between the age of 73-68 who smoking at least 100 cigarettes in your lifetime    7  Lung Cancer Screening: covers low dose CT scan once per year if you meet all of the following conditions: (1) Age 50-69; (2) No signs or symptoms of lung cancer; (3) Current smoker or have quit smoking within the last 15 years; (4) You have a tobacco smoking history of at least 30 pack years (packs per day x number of years you smoked); (5) You get a written order from a healthcare provider  8  Glaucoma Screening: covered annually if you're considered high risk: (1) You have diabetes OR (2) Family history of glaucoma OR (3)  aged 48 and older OR (3)  American aged 72 and older  5  Osteoporosis Screening: covered every 2 years if you meet one of the following conditions: (1) Have a vertebral abnormality; (2) On glucocorticoid therapy for more than 3 months; (3) Have primary hyperparathyroidism; (4) On osteoporosis medications and need to assess response to drug therapy  10  HIV Screening: covered annually if you're between the age of 12-76  Also covered annually if you are younger than 13 and older than 72 with risk factors for HIV infection  For pregnant patients, it is covered up to 3 times per pregnancy  Immunizations:  Immunization Recommendations   Influenza Vaccine Annual influenza vaccination during flu season is recommended for all persons aged >= 6 months who do not have contraindications   Pneumococcal Vaccine (Prevnar and Pneumovax)  * Prevnar = PCV13  * Pneumovax = PPSV23 Adults 25-60 years old: 1-3 doses may be recommended based on certain risk factors  Adults 72 years old: Prevnar (PCV13) vaccine recommended followed by Pneumovax (PPSV23) vaccine  If already received PPSV23 since turning 65, then PCV13 recommended at least one year after PPSV23 dose     Hepatitis B Vaccine 3 dose series if at intermediate or high risk (ex: diabetes, end stage renal disease, liver disease)   Tetanus (Td) Vaccine - COST NOT COVERED BY MEDICARE PART B Following completion of primary series, a booster dose should be given every 10 years to maintain immunity against tetanus  Td may also be given as tetanus wound prophylaxis  Tdap Vaccine - COST NOT COVERED BY MEDICARE PART B Recommended at least once for all adults  For pregnant patients, recommended with each pregnancy  Shingles Vaccine (Shingrix) - COST NOT COVERED BY MEDICARE PART B  2 shot series recommended in those aged 48 and above     Health Maintenance Due:  There are no preventive care reminders to display for this patient  Immunizations Due:      Topic Date Due    DTaP,Tdap,and Td Vaccines (1 - Tdap) 12/07/1955    Influenza Vaccine  07/01/2020     Advance Directives   What are advance directives? Advance directives are legal documents that state your wishes and plans for medical care  These plans are made ahead of time in case you lose your ability to make decisions for yourself  Advance directives can apply to any medical decision, such as the treatments you want, and if you want to donate organs  What are the types of advance directives? There are many types of advance directives, and each state has rules about how to use them  You may choose a combination of any of the following:  · Living will: This is a written record of the treatment you want  You can also choose which treatments you do not want, which to limit, and which to stop at a certain time  This includes surgery, medicine, IV fluid, and tube feedings  · Durable power of  for healthcare [place] [facility]): This is a written record that states who you want to make healthcare choices for you when you are unable to make them for yourself  This person, called a proxy, is usually a family member or a friend  You may choose more than 1 proxy  · Do not resuscitate (DNR) order:  A DNR order is used in case your heart stops beating or you stop breathing  It is a request not to have certain forms of treatment, such as CPR  A DNR order may be included in other types of advance directives  · Medical directive:   This covers the care that you want if you are in a coma, near death, or unable to make decisions for yourself  You can list the treatments you want for each condition  Treatment may include pain medicine, surgery, blood transfusions, dialysis, IV or tube feedings, and a ventilator (breathing machine)  · Values history: This document has questions about your views, beliefs, and how you feel and think about life  This information can help others choose the care that you would choose  Why are advance directives important? An advance directive helps you control your care  Although spoken wishes may be used, it is better to have your wishes written down  Spoken wishes can be misunderstood, or not followed  Treatments may be given even if you do not want them  An advance directive may make it easier for your family to make difficult choices about your care  © Copyright [employer] 2018 Information is for End User's use only and may not be sold, redistributed or otherwise used for commercial purposes   All illustrations and images included in CareNotes® are the copyrighted property of [employer]  or [address] [place] [address]

## 2020-09-22 NOTE — PROGRESS NOTES
Assessment and Plan:     Problem List Items Addressed This Visit     None      Visit Diagnoses     Encounter for immunization    -  Primary    Relevant Orders    influenza vaccine, high-dose, PF 0 7 mL (FLUZONE HIGH-DOSE) (Completed)    Medicare annual wellness visit, subsequent               Preventive health issues were discussed with patient, and age appropriate screening tests were ordered as noted in patient's After Visit Summary  Personalized health advice and appropriate referrals for health education or preventive services given if needed, as noted in patient's After Visit Summary       History of Present Illness:     Patient presents for Medicare Annual Wellness visit    Patient Care Team:  Delmy Dale MD as PCP - General  MD Gustavo Dick MD     Problem List:     Patient Active Problem List   Diagnosis    Asthma, mild intermittent    Essential hypertension    Diabetes mellitus, type II ([facility] [address] [facility]    Glaucoma    Hyperlipidemia    Mild vitamin D deficiency    Organic impotence    Type 2 diabetes mellitus with diabetic chronic kidney disease ([facility] [address] )    Primary osteoarthritis of both knees    Iliotibial band syndrome of both sides    Weight loss    Anemia due to stage 4 chronic kidney disease ([address] )    Retinal vein occlusion of left eye    Malignant neoplasm of urinary bladder ([facility] [address] )    Vision loss of left eye    Neurogenic claudication    Low back pain    Spinal stenosis of lumbar region    DDD (degenerative disc disease), lumbar    Lumbar disc disease with radiculopathy      Past Medical and Surgical History:     Past Medical History:   Diagnosis Date    Cataracts, bilateral      Past Surgical History:   Procedure Laterality Date    CATARACT EXTRACTION Bilateral 07/15/2012    COLONOSCOPY      CYSTOSCOPY  01/15/2018    Last assessed 9/14/17, diagnostic    HERNIA REPAIR      INSTILLATION MYTOMYCIN N/A 10/25/2017    Procedure: INSTILLATION OF MITOMYCIN C;  Surgeon: Royce Stevens Maryam Jenkins MD;  [facility];  Service: Urology    FL CYSTOURETHROSCOPY,FULGUR <0 5 CM LESN N/A 10/25/2017    Procedure: Evelio Bobbysanto; [address] [place]  Eliseo [place];  Surgeon: Yolanda Adams MD;  [facility];  Service: Urology    TONSILLECTOMY      TRANSURETHRAL RESECTION OF BLADDER TUMOR N/A 10/25/2017    Procedure: TUR BLADDER TUMOR;  Surgeon: Yolanda Adams MD;  [facility];  Service: Urology    WISDOM TOOTH EXTRACTION        Family History:     Family History   Problem Relation Age of Onset    Diabetes Mother       Social History:        Social History     Socioeconomic History    Marital status: /Civil Union     Spouse name: None    Number of children: None    Years of education: None    Highest education level: None   Occupational History    None   Social Needs    Financial resource strain: None    Food insecurity     Worry: None     Inability: None    Transportation needs     Medical: None     Non-medical: None   Tobacco Use    Smoking status: Former Smoker     Last attempt to quit: 1965     Years since quittin 7    Smokeless tobacco: Never Used   Substance and Sexual Activity    Alcohol use: No     Comment: quit 23 years ago    Drug use: No    Sexual activity: None   Lifestyle    Physical activity     Days per week: None     Minutes per session: None    Stress: None   Relationships    Social connections     Talks on phone: None     Gets together: None     Attends [employer] service: None     Active member of club or organization: None     Attends meetings of clubs or organizations: None     Relationship status: None    Intimate partner violence     Fear of current or ex partner: None     Emotionally abused: None     Physically abused: None     Forced sexual activity: None   Other Topics Concern    None   Social History Narrative    None      Medications and Allergies:     Current Outpatient Medications   Medication Sig Dispense Refill    amLODIPine (NORVASC) 10 mg tablet Take 1 tablet (10 mg total) by mouth daily 90 tablet 0    aspirin 325 mg tablet Take 325 mg by mouth daily      bimatoprost (LUMIGAN) 0 01 % ophthalmic drops Apply to eye      Cholecalciferol (VITAMIN D3) 1000 units CAPS Take by mouth      cyanocobalamin (VITAMIN B-12) 500 mcg tablet Take 500 mcg by mouth daily      Dorzolamide HCl-Timolol Mal PF 2-0 5 % SOLN INSTILL 1 DROP INTO EACH EYE TWICE DAILY      fluticasone-salmeterol (Advair Diskus) 100-50 mcg/dose inhaler Inhale 1 puff 2 (two) times a day 1 Inhaler 3    glimepiride (AMARYL) 1 mg tablet Take 1 tablet (1 mg total) by mouth daily with breakfast 90 tablet 1    glucose blood (OneTouch Verio) test strip CHECK BLOOD SUGAR TWICE DAILY    DX CODE E11 9 100 each 6    lidocaine (LMX) 4 % cream Apply topically as needed for mild pain 30 g 0    loteprednol etabonate (LOTEMAX) 0 5 % ophthalmic suspension Apply to eye      metoprolol succinate (TOPROL-XL) 50 mg 24 hr tablet Take 1 tablet by mouth daily      simvastatin (ZOCOR) 40 mg tablet Take 1 tablet by mouth daily      tamsulosin (FLOMAX) 0 4 mg Take 1 capsule by mouth      timolol (TIMOPTIC) 0 5 % ophthalmic solution Administer 1 drop to both eyes 2 (two) times a day       No current facility-administered medications for this visit  Allergies   Allergen Reactions    Ace Inhibitors      Other reaction(s): hyperkalemia    Penicillins GI Intolerance      Immunizations:     Immunization History   Administered Date(s) Administered    INFLUENZA 10/27/2018    Influenza Split High Dose Preservative Free IM 10/15/2012, 09/26/2013, 11/06/2014, 10/20/2015, 11/07/2016, 11/14/2017    Influenza, high dose seasonal 0 7 mL 09/17/2019, 09/22/2020    Pneumococcal Conjugate 13-Valent 11/07/2016    Pneumococcal Polysaccharide PPV23 11/28/2007, 11/14/2017      Health Maintenance: There are no preventive care reminders to display for this patient        Topic Date Due    DTaP,Tdap,and Td Vaccines (1 - Tdap) 12/07/1955    Influenza Vaccine  07/01/2020      Medicare Health Risk Assessment:     /78 (BP Location: Left arm, Patient Position: Sitting)   Pulse 72   Temp 97 7 °F (36 5 °C) (Tympanic)   Ht 5' 4" (1 626 m)   Wt 65 3 kg (144 lb)   SpO2 96%   BMI 24 72 kg/m²      Luli Seaman is here for his Subsequent Wellness visit  Health Risk Assessment:   Patient rates overall health as fair  Patient feels that their physical health rating is same  Eyesight was rated as slightly worse  Hearing was rated as slightly worse  Patient feels that their emotional and mental health rating is same  Pain experienced in the last 7 days has been none  Patient states that he has experienced no weight loss or gain in last 6 months  Depression Screening:   PHQ-2 Score: 0      Fall Risk Screening: In the past year, patient has experienced: no history of falling in past year      Home Safety:  Patient does not have trouble with stairs inside or outside of their home  Patient has working smoke alarms and has working carbon monoxide detector  Home safety hazards include: none  Nutrition:   Current diet is Low Carb and No Added Salt  Medications:   Patient is currently taking over-the-counter supplements  OTC medications include: see medication list  Patient is able to manage medications  Activities of Daily Living (ADLs)/Instrumental Activities of Daily Living (IADLs):   Walk and transfer into and out of bed and chair?: Yes  Dress and groom yourself?: Yes    Bathe or shower yourself?: Yes    Feed yourself? Yes  Do your laundry/housekeeping?: Yes  Manage your money, pay your bills and track your expenses?: Yes  Make your own meals?: Yes    Do your own shopping?: Yes    Previous Hospitalizations:   Any hospitalizations or ED visits within the last 12 months?: No      Advance Care Planning:   Living will: Yes    Advanced directive: Yes    Advanced directive counseling given:  Yes End of Life Decisions reviewed with patient: Yes    Provider agrees with end of life decisions: Yes      Cognitive Screening:   Provider or family/friend/caregiver concerned regarding cognition?: No    PREVENTIVE SCREENINGS      Cardiovascular Screening:    General: Screening Not Indicated and History Lipid Disorder      Diabetes Screening:     General: Screening Not Indicated and History Diabetes      Colorectal Cancer Screening:     General: Screening Not Indicated      Prostate Cancer Screening:    General: Screening Not Indicated      Osteoporosis Screening:    General: Screening Not Indicated      Abdominal Aortic Aneurysm (AAA) Screening:    Risk factors include: tobacco use        Lung Cancer Screening:     General: Screening Not Indicated      Hepatitis C Screening:    General: Patient Declines    Other Counseling Topics:   Regular weightbearing exercise and calcium and vitamin D intake         Mike Villaseñor MD

## 2020-09-22 NOTE — ASSESSMENT & PLAN NOTE
He continues to have marked vision loss in his left eye due to retinal vein occlusion that was thoroughly worked up    He currently takes medications for glaucoma

## 2020-09-22 NOTE — PROGRESS NOTES
Assessment/Plan:    Type 2 diabetes mellitus with diabetic chronic kidney disease ([facility] 75 )    Lab Results   Component Value Date    HGBA1C 6 8 (H) 09/17/2020   He continues to have good control of his diabetes with diet and Amaryl 1 mg    Asthma, mild intermittent  He continues to have no exacerbations of his asthma but did get a flu shot today    Essential hypertension  His blood pressure is not adequately controlled and I believe he is not taking both of his blood pressure medicines  I encouraged him in front of his wife that he needs to go back on his metoprolol and Norvasc    Malignant neoplasm of urinary bladder ([facility] 75 )  He is to follow with Urology for his bladder cancer    Vision loss of left eye  He continues to have marked vision loss in his left eye due to retinal vein occlusion that was thoroughly worked up  He currently takes medications for glaucoma    Hyperlipidemia  He continues to have hyperlipidemia with a high LDL but is not interested in taking more medicines  He is currently on simvastatin 40 mg  He has had no obvious cardiac issues    Glaucoma  He continues to follow with Ophthalmology for his glaucoma and uses 2 drops    Anemia due to stage 4 chronic kidney disease ([place] [address] )  His CBC remained stable but abnormal       Diagnoses and all orders for this visit:    Encounter for immunization  -     influenza vaccine, high-dose, PF 0 7 mL (FLUZONE HIGH-DOSE)    Medicare annual wellness visit, subsequent    Type 2 diabetes mellitus with stage 4 chronic kidney disease, without long-term current use of insulin ([place] [address] )    Retinal vein occlusion of left eye, unspecified retinal vein    Essential hypertension  -     metoprolol succinate (TOPROL-XL) 50 mg 24 hr tablet;  Take 1 tablet (50 mg total) by mouth daily    Vision loss of left eye    Hyperlipidemia, unspecified hyperlipidemia type    Glaucoma of both eyes, unspecified glaucoma type    Anemia due to stage 4 chronic kidney disease (HCC)    Malignant neoplasm of urinary bladder, unspecified site ([facility] )    Mild intermittent asthma without complication    Other orders  -     Dorzolamide HCl-Timolol Mal PF 2-0 5 % SOLN; INSTILL 1 DROP INTO EACH EYE TWICE DAILY          Subjective:      Patient ID: Amish Ortiz is a 80 y o  male  Diabetes   He presents for his follow-up diabetic visit  He has type 2 diabetes mellitus  No MedicAlert identification noted  His disease course has been stable  There are no hypoglycemic associated symptoms  Pertinent negatives for hypoglycemia include no confusion, dizziness or headaches  Associated symptoms include visual change  Pertinent negatives for diabetes include no chest pain, no fatigue, no polydipsia, no polyphagia, no polyuria and no weakness  Symptoms are stable  Diabetic complications include nephropathy and retinopathy  Risk factors for coronary artery disease include diabetes mellitus, dyslipidemia, family history, hypertension, male sex, sedentary lifestyle and tobacco exposure  Current diabetic treatment includes diet and oral agent (monotherapy)  His weight is stable  He is following a low salt, low fat/cholesterol and diabetic diet  Meal planning includes avoidance of concentrated sweets  He has not had a previous visit with a dietitian  He rarely participates in exercise  There is no change in his home blood glucose trend  An ACE inhibitor/angiotensin II receptor blocker is contraindicated  He does not see a podiatrist Eye exam is current  Review of Systems   Constitutional: Negative for chills, fatigue, fever and unexpected weight change  HENT: Negative for congestion, ear pain, hearing loss, postnasal drip, sinus pressure, sore throat, trouble swallowing and voice change  Eyes: Negative for visual disturbance  Respiratory: Negative for cough, chest tightness, shortness of breath and wheezing  Cardiovascular: Negative for chest pain, palpitations and leg swelling     Gastrointestinal: Negative for abdominal distention, abdominal pain, anal bleeding, blood in stool, constipation, diarrhea and nausea  Endocrine: Negative for cold intolerance, polydipsia, polyphagia and polyuria  Genitourinary: Negative for dysuria, flank pain, frequency, hematuria and urgency  Musculoskeletal: Negative for arthralgias, back pain, gait problem, joint swelling, myalgias and neck pain  Skin: Negative for rash  Allergic/Immunologic: Negative for immunocompromised state  Neurological: Negative for dizziness, syncope, facial asymmetry, weakness, light-headedness, numbness and headaches  Hematological: Negative for adenopathy  Psychiatric/Behavioral: Negative for confusion, sleep disturbance and suicidal ideas  Objective:      /78 (BP Location: Left arm, Patient Position: Sitting)   Pulse 72   Temp 97 7 °F (36 5 °C) (Tympanic)   Ht 5' 4" (1 626 m)   Wt 65 3 kg (144 lb)   SpO2 96%   BMI 24 72 kg/m²          Physical Exam  Constitutional:       General: He is not in acute distress  Appearance: He is well-developed  HENT:      Right Ear: External ear normal       Left Ear: External ear normal       Nose: Nose normal       Mouth/Throat:      Pharynx: No oropharyngeal exudate  Eyes:      Pupils: Pupils are equal, round, and reactive to light  Neck:      Musculoskeletal: Normal range of motion and neck supple  Thyroid: No thyromegaly  Vascular: No JVD  Cardiovascular:      Rate and Rhythm: Normal rate and regular rhythm  Pulses: no weak pulses          Dorsalis pedis pulses are 1+ on the right side and 1+ on the left side  Heart sounds: Normal heart sounds  No murmur  No gallop  Pulmonary:      Effort: Pulmonary effort is normal  No respiratory distress  Breath sounds: Normal breath sounds  No wheezing or rales  Abdominal:      General: Bowel sounds are normal  There is no distension  Palpations: Abdomen is soft  There is no mass  Tenderness:  There is no abdominal tenderness  Musculoskeletal: Normal range of motion  General: No tenderness  Feet:      Right foot:      Skin integrity: No ulcer, skin breakdown, erythema, warmth, callus or dry skin  Left foot:      Skin integrity: No ulcer, skin breakdown, erythema, warmth, callus or dry skin  Lymphadenopathy:      Cervical: No cervical adenopathy  Skin:     Findings: No rash  Neurological:      Mental Status: He is alert and oriented to person, place, and time  Cranial Nerves: No cranial nerve deficit  Coordination: Coordination normal    Psychiatric:         Behavior: Behavior normal          Thought Content: Thought content normal          Judgment: Judgment normal        Patient's shoes and socks removed  Right Foot/Ankle   Right Foot Inspection  Skin Exam: skin normal and skin intact no dry skin, no warmth, no callus, no erythema, no maceration, no abnormal color, no pre-ulcer, no ulcer and no callus                          Toe Exam: ROM and strength within normal limits  Sensory   Vibration: intact    Monofilament testing: intact  Vascular  Capillary refills: < 3 seconds  The right DP pulse is 1+  Left Foot/Ankle  Left Foot Inspection  Skin Exam: skin normal and skin intactno dry skin, no warmth, no erythema, no maceration, normal color, no pre-ulcer, no ulcer and no callus                         Toe Exam: ROM and strength within normal limits                   Sensory   Vibration: intact  Proprioception: intact  Monofilament: intact  Vascular  Capillary refills: < 3 seconds  The left DP pulse is 1+  Assign Risk Category:  No deformity present; No loss of protective sensation;  No weak pulses       Risk: 0

## 2020-09-22 NOTE — ASSESSMENT & PLAN NOTE
His blood pressure is not adequately controlled and I believe he is not taking both of his blood pressure medicines    I encouraged him in front of his wife that he needs to go back on his metoprolol and Norvasc

## 2020-09-22 NOTE — ASSESSMENT & PLAN NOTE
Lab Results   Component Value Date    HGBA1C 6 8 (H) 09/17/2020   He continues to have good control of his diabetes with diet and Amaryl 1 mg

## 2020-09-22 NOTE — ASSESSMENT & PLAN NOTE
He continues to have hyperlipidemia with a high LDL but is not interested in taking more medicines  He is currently on simvastatin 40 mg    He has had no obvious cardiac issues

## 2020-12-22 DIAGNOSIS — Z20.822 ENCOUNTER FOR LABORATORY TESTING FOR COVID-19 VIRUS: Primary | ICD-10-CM

## 2020-12-23 DIAGNOSIS — Z20.822 ENCOUNTER FOR LABORATORY TESTING FOR COVID-19 VIRUS: ICD-10-CM

## 2020-12-23 PROCEDURE — 87637 SARSCOV2&INF A&B&RSV AMP PRB: CPT | Performed by: INTERNAL MEDICINE

## 2020-12-24 LAB
FLUAV RNA NPH QL NAA+PROBE: NOT DETECTED
FLUBV RNA NPH QL NAA+PROBE: NOT DETECTED
RSV RNA NPH QL NAA+PROBE: NOT DETECTED
SARS-COV-2 RNA NPH QL NAA+PROBE: NOT DETECTED

## 2021-01-18 ENCOUNTER — OFFICE VISIT (OUTPATIENT)
Dept: INTERNAL MEDICINE CLINIC | Age: 86
End: 2021-01-18
Payer: MEDICARE

## 2021-01-18 VITALS
HEART RATE: 60 BPM | WEIGHT: 146.2 LBS | HEIGHT: 64 IN | DIASTOLIC BLOOD PRESSURE: 62 MMHG | SYSTOLIC BLOOD PRESSURE: 132 MMHG | OXYGEN SATURATION: 96 % | TEMPERATURE: 97.6 F | BODY MASS INDEX: 24.96 KG/M2

## 2021-01-18 DIAGNOSIS — E11.22 TYPE 2 DIABETES MELLITUS WITH STAGE 4 CHRONIC KIDNEY DISEASE, WITHOUT LONG-TERM CURRENT USE OF INSULIN (HCC): Primary | ICD-10-CM

## 2021-01-18 DIAGNOSIS — H91.90 HEARING LOSS, UNSPECIFIED HEARING LOSS TYPE, UNSPECIFIED LATERALITY: Primary | ICD-10-CM

## 2021-01-18 DIAGNOSIS — C67.9 MALIGNANT NEOPLASM OF URINARY BLADDER, UNSPECIFIED SITE (HCC): ICD-10-CM

## 2021-01-18 DIAGNOSIS — N18.4 TYPE 2 DIABETES MELLITUS WITH STAGE 4 CHRONIC KIDNEY DISEASE, WITHOUT LONG-TERM CURRENT USE OF INSULIN (HCC): Primary | ICD-10-CM

## 2021-01-18 DIAGNOSIS — N18.30 TYPE 2 DIABETES MELLITUS WITH STAGE 3 CHRONIC KIDNEY DISEASE, WITHOUT LONG-TERM CURRENT USE OF INSULIN (HCC): ICD-10-CM

## 2021-01-18 DIAGNOSIS — I10 ESSENTIAL HYPERTENSION: ICD-10-CM

## 2021-01-18 DIAGNOSIS — E11.22 TYPE 2 DIABETES MELLITUS WITH STAGE 3 CHRONIC KIDNEY DISEASE, WITHOUT LONG-TERM CURRENT USE OF INSULIN (HCC): ICD-10-CM

## 2021-01-18 DIAGNOSIS — R63.4 WEIGHT LOSS: ICD-10-CM

## 2021-01-18 PROCEDURE — 99214 OFFICE O/P EST MOD 30 MIN: CPT | Performed by: INTERNAL MEDICINE

## 2021-01-18 RX ORDER — BLOOD SUGAR DIAGNOSTIC
STRIP MISCELLANEOUS
Qty: 100 EACH | Refills: 6 | Status: SHIPPED | OUTPATIENT
Start: 2021-01-18 | End: 2021-01-25

## 2021-01-18 RX ORDER — BRIMONIDINE TARTRATE 2 MG/ML
SOLUTION/ DROPS OPHTHALMIC
COMMUNITY
Start: 2020-10-20

## 2021-01-18 NOTE — ASSESSMENT & PLAN NOTE
Lab Results   Component Value Date    HGBA1C 6 8 (H) 09/17/2020   He continues to have well-controlled diabetes but with stage III renal disease    But he remains relatively stable since I got him to finally stop all his nephrotoxin drugs

## 2021-01-18 NOTE — PROGRESS NOTES
Assessment/Plan:    Type 2 diabetes mellitus with diabetic chronic kidney disease ([facility] )    Lab Results   Component Value Date    HGBA1C 6 8 (H) 09/17/2020   He continues to have well-controlled diabetes but with stage III renal disease  But he remains relatively stable since I got him to finally stop all his nephrotoxin drugs    Essential hypertension  His blood pressure also is under good control with amlodipine 10 and with out obvious complaints of cardiovascular disease and no edema    Malignant neoplasm of urinary bladder ([facility] 75 )  He has follow with Urology in the past for his bladder tumor but has not really followed since COVID    Weight loss  He no longer is losing weight and appears to have stabilized and even had some mild weight gain    Anemia due to stage 4 chronic kidney disease ([facility] 75 )  He continues to be mildly anemic but stable       Diagnoses and all orders for this visit:    Type 2 diabetes mellitus with stage 4 chronic kidney disease, without long-term current use of insulin ([facility])  -     Hemoglobin A1C; Future    Essential hypertension    Weight loss    Malignant neoplasm of urinary bladder, unspecified site [facility])  -     Comprehensive metabolic panel; Future    Type 2 diabetes mellitus with stage 3 chronic kidney disease, without long-term current use of insulin ([facility])  -     glucose blood (OneTouch Verio) test strip; CHECK BLOOD SUGAR TWICE DAILY    DX CODE E11 [id number]    Other orders  -     brimonidine tartrate 0 2 % ophthalmic solution; INSTILL 1 DROP INTO EACH EYE THREE TIMES DAILY          Subjective:      Patient ID: Thad Diallo is a 80 y o  male  Diabetes  He presents for his follow-up diabetic visit  He has type 2 diabetes mellitus  His disease course has been stable  There are no hypoglycemic associated symptoms  Pertinent negatives for hypoglycemia include no confusion, dizziness or headaches  Associated symptoms include blurred vision   Pertinent negatives for diabetes include no chest pain, no fatigue, no foot ulcerations, no polydipsia, no polyphagia, no polyuria and no weakness  Symptoms are stable  Diabetic complications include impotence, nephropathy and retinopathy  Risk factors for coronary artery disease include diabetes mellitus, dyslipidemia, hypertension, male sex, sedentary lifestyle and family history  Current diabetic treatment includes diet and oral agent (monotherapy)  He is compliant with treatment most of the time  His weight is stable  He is following a diabetic and low salt diet  Meal planning includes avoidance of concentrated sweets  He has had a previous visit with a dietitian  He rarely participates in exercise  There is no change in his home blood glucose trend  His breakfast blood glucose range is generally 110-130 mg/dl  An ACE inhibitor/angiotensin II receptor blocker is contraindicated  He does not see a podiatrist Eye exam is current  Review of Systems   Constitutional: Negative for chills, fatigue, fever and unexpected weight change  HENT: Negative for congestion, ear pain, hearing loss, postnasal drip, sinus pressure, sore throat, trouble swallowing and voice change  Eyes: Positive for blurred vision and visual disturbance  Respiratory: Negative for cough, chest tightness, shortness of breath and wheezing  Cardiovascular: Negative for chest pain, palpitations and leg swelling  Gastrointestinal: Negative for abdominal distention, abdominal pain, anal bleeding, blood in stool, constipation, diarrhea and nausea  Endocrine: Negative for cold intolerance, polydipsia, polyphagia and polyuria  Genitourinary: Positive for impotence  Negative for dysuria, flank pain, frequency, hematuria and urgency  Musculoskeletal: Positive for arthralgias, back pain and gait problem  Negative for joint swelling, myalgias and neck pain  Skin: Negative for rash  Allergic/Immunologic: Negative for immunocompromised state     Neurological: Negative for dizziness, syncope, facial asymmetry, weakness, light-headedness, numbness and headaches  Hematological: Negative for adenopathy  Psychiatric/Behavioral: Negative for confusion, sleep disturbance and suicidal ideas  Objective:      /62 (BP Location: Left arm, Patient Position: Sitting, Cuff Size: Standard)   Pulse 60   Temp 97 6 °F (36 4 °C) (Tympanic)   Ht 5' 4" (1 626 m)   Wt 66 3 kg (146 lb 3 2 oz)   SpO2 96%   BMI 25 10 kg/m²          Physical Exam  Vitals signs reviewed  Constitutional:       General: He is not in acute distress  Appearance: He is well-developed  HENT:      Right Ear: External ear normal       Left Ear: External ear normal       Nose: Nose normal       Mouth/Throat:      Pharynx: No oropharyngeal exudate  Eyes:      Pupils: Pupils are equal, round, and reactive to light  Neck:      Musculoskeletal: Normal range of motion and neck supple  Thyroid: No thyromegaly  Vascular: No JVD  Cardiovascular:      Rate and Rhythm: Normal rate and regular rhythm  Heart sounds: Normal heart sounds  No murmur  No gallop  Pulmonary:      Effort: Pulmonary effort is normal  No respiratory distress  Breath sounds: Normal breath sounds  No wheezing or rales  Abdominal:      General: Bowel sounds are normal  There is no distension  Palpations: Abdomen is soft  There is no mass  Tenderness: There is no abdominal tenderness  Musculoskeletal: Normal range of motion  General: No tenderness  Lymphadenopathy:      Cervical: No cervical adenopathy  Skin:     Findings: No rash  Neurological:      Mental Status: He is alert and oriented to person, place, and time  Cranial Nerves: No cranial nerve deficit  Coordination: Coordination normal       Gait: Gait abnormal (Mildly unstable)  Psychiatric:         Behavior: Behavior normal          Thought Content:  Thought content normal          Judgment: Judgment normal

## 2021-01-18 NOTE — ASSESSMENT & PLAN NOTE
His blood pressure also is under good control with amlodipine 10 and with out obvious complaints of cardiovascular disease and no edema

## 2021-01-18 NOTE — ASSESSMENT & PLAN NOTE
He has follow with Urology in the past for his bladder tumor but has not really followed since COVID

## 2021-01-18 NOTE — PATIENT INSTRUCTIONS
Diabetic Kidney Disease   AMBULATORY CARE:   Diabetic kidney disease  (DKD) is the gradual and permanent loss of kidney function  This occurs because of kidney damage caused by high blood sugar levels  Normally, the kidneys remove fluid, chemicals, and waste from your blood  These wastes are turned into urine by your kidneys  When you have DKD, your kidneys do not function properly  DKD may worsen over time and lead to kidney failure  Common signs and symptoms of DKD include the following: Your signs and symptoms will depend on how well your kidneys work  You may not have any symptoms, or you may have any of the following:  · Changes in how often you need to urinate    · Ankle and leg swelling    · Fatigue or weakness    · Itching    · Muscle cramps    · Nausea, vomiting, or loss of appetite    Call 911 for any of the following:   · You have a seizure  · You have sudden chest pain or shortness of breath  Seek care immediately:   · Your heart is beating faster than normal for you  · You are confused and very drowsy  Call your care team provider if:   · You suddenly gain or lose more weight than your care team provider has told you is okay  · You have itchy skin or a rash  · You have nausea and repeated vomiting  · You have fatigue or muscle weakness  · You have an increased need to urinate, burning or pain when you urinate, blood in your urine, or strong odor to your urine  · You have questions or concerns about your condition or care  The goals of treatment  are to control your symptoms and prevent your DKD from getting worse  You may need any of the following:  · Medicines  may be given to decrease blood pressure and get rid of extra fluid  Blood pressure medicines can help to slow down the loss of kidney function  · Dialysis  is a treatment that may be needed to remove chemicals and waste from your blood when your kidneys can no longer do this      · Surgery  may be needed to create an arteriovenous fistula (AVF) in your arm or insert a catheter into your abdomen or chest  This is done so you can receive dialysis  · A kidney transplant  may be done if your DKD becomes severe  Manage DKD:   · Control your blood sugar levels  If you use insulin or take diabetes medicine, take these as directed  Follow the meal and activity plan recommended by your care team provider  Check your blood sugar levels every day, as often as your care team provider has recommended  Your care team provider may want you to have your A1c checked every 3 to 6 months  Most people should keep their A1c at or below 7%  · Follow your meal plan as directed  You may need to eat only a certain amount of protein at each meal  You may also need to track your sodium (salt) and potassium intake  Work with your dietitian to develop a meal plan that is right for you  · Check your blood pressure as directed  High blood pressure can damage the blood vessels in your kidneys  This prevents your kidneys from working correctly and increases your risk for DKD  A normal blood pressure is 119/79 or lower  Talk to your care team provider about your blood pressure goals  Together you can create a plan to lower your blood pressure if needed and keep it in a healthy range  The plan may include lifestyle changes or medicines to lower your blood pressure  · Talk to your care team provider about over-the-counter (OTC) medicines you should avoid  Some OTC medicines, such as ibuprofen, can damage your kidneys  Follow up with your care team provider as directed: You will need to return for tests to monitor your kidney function  You may also be referred to a kidney specialist  Write down your questions so you remember to ask them during your visits  © Copyright [address] Information is for End User's use only and may not be sold, redistributed or otherwise used for commercial purposes   All illustrations and images included in CareNotes® are the copyrighted property of A [employer] M [employer]  or [employer]  The above information is an  only  It is not intended as medical advice for individual conditions or treatments  Talk to your doctor, nurse or pharmacist before following any medical regimen to see if it is safe and effective for you

## 2021-01-25 DIAGNOSIS — N18.30 TYPE 2 DIABETES MELLITUS WITH STAGE 3 CHRONIC KIDNEY DISEASE, WITHOUT LONG-TERM CURRENT USE OF INSULIN (HCC): ICD-10-CM

## 2021-01-25 DIAGNOSIS — E11.22 TYPE 2 DIABETES MELLITUS WITH STAGE 3 CHRONIC KIDNEY DISEASE, WITHOUT LONG-TERM CURRENT USE OF INSULIN (HCC): ICD-10-CM

## 2021-01-25 RX ORDER — BLOOD SUGAR DIAGNOSTIC
STRIP MISCELLANEOUS
Qty: 100 EACH | Refills: 6 | Status: SHIPPED | OUTPATIENT
Start: 2021-01-25 | End: 2021-01-25 | Stop reason: SDUPTHER

## 2021-01-25 RX ORDER — BLOOD SUGAR DIAGNOSTIC
STRIP MISCELLANEOUS
Qty: 50 EACH | Refills: 6 | Status: SHIPPED | OUTPATIENT
Start: 2021-01-25 | End: 2021-06-07 | Stop reason: SDUPTHER

## 2021-01-27 LAB
LEFT EYE DIABETIC RETINOPATHY: NORMAL
RIGHT EYE DIABETIC RETINOPATHY: NORMAL

## 2021-02-13 ENCOUNTER — IMMUNIZATIONS (OUTPATIENT)
Dept: FAMILY MEDICINE CLINIC | Facility: HOSPITAL | Age: 86
End: 2021-02-13

## 2021-02-13 DIAGNOSIS — Z23 ENCOUNTER FOR IMMUNIZATION: Primary | ICD-10-CM

## 2021-02-13 PROCEDURE — 91300 SARS-COV-2 / COVID-19 MRNA VACCINE (PFIZER-BIONTECH) 30 MCG: CPT

## 2021-02-13 PROCEDURE — 0001A SARS-COV-2 / COVID-19 MRNA VACCINE (PFIZER-BIONTECH) 30 MCG: CPT

## 2021-03-29 ENCOUNTER — IMMUNIZATIONS (OUTPATIENT)
Dept: FAMILY MEDICINE CLINIC | Facility: HOSPITAL | Age: 86
End: 2021-03-29

## 2021-03-29 DIAGNOSIS — Z23 ENCOUNTER FOR IMMUNIZATION: Primary | ICD-10-CM

## 2021-03-29 PROCEDURE — 0002A SARS-COV-2 / COVID-19 MRNA VACCINE (PFIZER-BIONTECH) 30 MCG: CPT

## 2021-03-29 PROCEDURE — 91300 SARS-COV-2 / COVID-19 MRNA VACCINE (PFIZER-BIONTECH) 30 MCG: CPT

## 2021-04-07 LAB
LEFT EYE DIABETIC RETINOPATHY: NORMAL
RIGHT EYE DIABETIC RETINOPATHY: NORMAL

## 2021-04-27 ENCOUNTER — PROCEDURE VISIT (OUTPATIENT)
Dept: UROLOGY | Facility: AMBULATORY SURGERY CENTER | Age: 86
End: 2021-04-27
Payer: MEDICARE

## 2021-04-27 VITALS
HEIGHT: 64 IN | BODY MASS INDEX: 25.1 KG/M2 | DIASTOLIC BLOOD PRESSURE: 78 MMHG | HEART RATE: 69 BPM | WEIGHT: 147 LBS | SYSTOLIC BLOOD PRESSURE: 132 MMHG

## 2021-04-27 DIAGNOSIS — C67.9 MALIGNANT NEOPLASM OF URINARY BLADDER, UNSPECIFIED SITE (HCC): Primary | ICD-10-CM

## 2021-04-27 LAB
SL AMB  POCT GLUCOSE, UA: 500
SL AMB LEUKOCYTE ESTERASE,UA: NORMAL
SL AMB POCT BILIRUBIN,UA: NORMAL
SL AMB POCT BLOOD,UA: NORMAL
SL AMB POCT CLARITY,UA: CLEAR
SL AMB POCT COLOR,UA: YELLOW
SL AMB POCT KETONES,UA: NORMAL
SL AMB POCT NITRITE,UA: NORMAL
SL AMB POCT PH,UA: NORMAL
SL AMB POCT SPECIFIC GRAVITY,UA: 1.01
SL AMB POCT URINE PROTEIN: NORMAL
SL AMB POCT UROBILINOGEN: 0.2

## 2021-04-27 PROCEDURE — 81002 URINALYSIS NONAUTO W/O SCOPE: CPT | Performed by: UROLOGY

## 2021-04-27 PROCEDURE — 52000 CYSTOURETHROSCOPY: CPT | Performed by: UROLOGY

## 2021-04-27 NOTE — PROGRESS NOTES
Cystoscopy     Date/Time 4/27/2021 11:24 AM     Performed by  Mike Roland MD     Authorized by Mike Roland MD            Petrina Lombard is an 51-year-old male with history of low-grade superficial bladder cancer previously known to Dr Bladimir Dean  His only documented bladder tumors from October 2017 which reveals low-grade noninvasive bladder cancer  Cytology was not performed prior to today's office visit  He denies any gross hematuria  His wife is present with him in the office today  His last upper tract imaging was a CT scan of the abdomen and pelvis without contrast from October 2018  He denies any lower urinary tract symptoms  A PSA from September 2020 is 0 4  Male cystoscopy procedure note:    Risk and benefits of flexible cystoscopy were discussed  Informed consent was obtained  The patient was placed in the supine position  His genitalia was prepped and draped in a sterile fashion  Viscous lidocaine jelly was instilled into the urethra and flexible cystoscopy was then performed  The urethra, prostatic urethra, and bladder were all thoroughly inspected there was no evidence of mucosal abnormalities or lesions  Both ureteral orifices were visualized with clear efflux of urine  The right ureteral orifice was widely patulous and appears to have previously been resected  Retroflexion was normal  Overall this was a negative cystoscopy   Without evidence of recurrent urothelial carcinoma the bladder  Impression: 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

## 2021-05-25 ENCOUNTER — OFFICE VISIT (OUTPATIENT)
Dept: INTERNAL MEDICINE CLINIC | Age: 86
End: 2021-05-25
Payer: MEDICARE

## 2021-05-25 VITALS
OXYGEN SATURATION: 97 % | TEMPERATURE: 97.6 F | HEART RATE: 68 BPM | WEIGHT: 143.2 LBS | DIASTOLIC BLOOD PRESSURE: 68 MMHG | SYSTOLIC BLOOD PRESSURE: 118 MMHG | BODY MASS INDEX: 24.45 KG/M2 | HEIGHT: 64 IN

## 2021-05-25 DIAGNOSIS — N18.4 TYPE 2 DIABETES MELLITUS WITH STAGE 4 CHRONIC KIDNEY DISEASE, WITHOUT LONG-TERM CURRENT USE OF INSULIN (HCC): Primary | ICD-10-CM

## 2021-05-25 DIAGNOSIS — R06.02 SOB (SHORTNESS OF BREATH) ON EXERTION: ICD-10-CM

## 2021-05-25 DIAGNOSIS — Z13.220 SCREENING, LIPID: ICD-10-CM

## 2021-05-25 DIAGNOSIS — I10 ESSENTIAL HYPERTENSION: ICD-10-CM

## 2021-05-25 DIAGNOSIS — Z12.5 SCREENING FOR PROSTATE CANCER: ICD-10-CM

## 2021-05-25 DIAGNOSIS — E11.22 TYPE 2 DIABETES MELLITUS WITH STAGE 4 CHRONIC KIDNEY DISEASE, WITHOUT LONG-TERM CURRENT USE OF INSULIN (HCC): Primary | ICD-10-CM

## 2021-05-25 LAB — SL AMB POCT HEMOGLOBIN AIC: 7.6 (ref ?–6.5)

## 2021-05-25 PROCEDURE — 83036 HEMOGLOBIN GLYCOSYLATED A1C: CPT | Performed by: INTERNAL MEDICINE

## 2021-05-25 PROCEDURE — 99214 OFFICE O/P EST MOD 30 MIN: CPT | Performed by: INTERNAL MEDICINE

## 2021-05-25 PROCEDURE — 93000 ELECTROCARDIOGRAM COMPLETE: CPT | Performed by: INTERNAL MEDICINE

## 2021-05-25 NOTE — ASSESSMENT & PLAN NOTE
His blood sugar is stable but he still has not gone for repeat studies for his kidney disease since I have gotten him off of all of his nephrotoxic drugs I again reprinted them and encouraged him to go for them  Lab Results   Component Value Date    HGBA1C 7 6 (A) 05/25/2021

## 2021-05-26 NOTE — PATIENT INSTRUCTIONS
Chronic Kidney Disease Diet   WHAT YOU NEED TO KNOW:   What is a chronic kidney disease (CKD) diet? A CKD diet limits protein, phosphorus, sodium, and potassium  Liquids may also need to be limited in later stages of CKD  This diet can help slow down the rate of damage to your kidneys  Your diet may change over time as your health condition changes  You may also need to make other diet changes if you have other health problems, such as diabetes  What kind of diet changes are needed? There are 5 stages of CKD  The diet changes you need to make are based on your stage of kidney disease  Work with your dietitian or healthcare provider to plan meals that are right for you  You may need any of the following:  · Limit protein  in all stages of kidney disease  Limit the portion sizes of protein you eat to limit the amount of work your kidneys have to do  Foods that are high in protein are meat, poultry (chicken and turkey), fish, eggs, and dairy (milk, cheese, yogurt)  Your healthcare provider will tell you how much protein to eat each day  · Limit sodium  if you have high blood pressure  Limit your sodium intake to less than 2,300 milligrams (mg) each day  Ask your dietitian or healthcare provider how much sodium you can have each day  The amount of sodium you should have depends on your stage of kidney disease  Table salt, canned foods, soups, salted snacks, and processed meats, like deli meats and sausage, are high in sodium  · Limit the amount of phosphorus  you eat  Your kidneys cannot get rid of extra phosphorus that builds up in your blood  This may cause your bones to lose calcium and weaken  Foods that are high in phosphorus are dairy products, beans, peas, nuts, and whole grains  Phosphorus is also found in cocoa, beer, and cola drinks  Your healthcare provider will tell you how much phosphorus you can have each day  · Limit potassium  if your potassium blood levels are too high   Your dietitian or healthcare provider will tell you if you need to limit potassium  Potassium is found in fruits and vegetables  · Limit liquids  as directed  Your healthcare provider may recommend that you limit liquids in stages 4 and 5 of kidney disease  If your body retains fluids, you will have swelling and fluid may build up in your lungs  This can cause other health problems, such as shortness of breath  What foods can I include? Your dietitian will tell you how many servings you can have from each of the food groups below  The approximate amount of these nutrients is listed next to each food group  Read the food label to find the exact amount  · Bread, cereal, and grains: These foods contain about 80 calories, 2 grams (g) of protein, 150 mg of sodium, 50 mg of potassium, and 30 mg of phosphorus  ? 1 slice (1 ounce) of bread ([place], [place], raisin, light rye, or sourdough white), small dinner roll, or 6-inch tortilla    ? ½ of a hamburger bun, hot dog bun, or English muffin or ¼ of a bagel    ? 1 cup of unsweetened cereal or ½ cup of cooked cereal, such as cream of wheat    ? ? cup of cooked pasta (noodles, macaroni, or spaghetti) or rice    ? 4 (2-inch) unsalted crackers or 3 squares of bryon crackers    ? 3 cups of air-popped, unsalted popcorn    ? ¾ ounce of unsalted pretzels    · Vegetables:  A serving of these foods contains about 30 calories, 2 g of protein, 50 mg of sodium, and 50 mg of phosphorus  ? Low potassium (less than 150 mg):      ? ½ cup cooked green beans, cabbage, cauliflower, beets, or corn    ? 1 cup raw cucumber, endive, alfalfa sprouts, cabbage, cauliflower, or watercress    ? 1 cup of all types of lettuce    ? ¼ cup cooked or ½ cup raw mushrooms or onions    ? 1 cup cooked eggplant    ? Medium potassium (150 to 250 mg):      ? 1 cup raw broccoli, celery, or zucchini    ? ½ cup cooked broccoli, celery, green peas, summer squash, zucchini, or peppers    ?  1 cup cooked kale or turnips    · Fruits:  A serving of these foods contains about 60 calories, 0 g protein, 0 mg sodium, and 150 mg of phosphorus  Each serving is ½ cup, unless another amount is given  ? Low potassium (less than 150 mg): ? Apple juice, applesauce, or 1 small apple    ? Blueberries    ? Cranberries or cranberry juice cocktail    ? Fresh or canned pears (light syrup or packed in water)    ? Grapes or grape juice    ? Canned peaches (light syrup or packed in water)    ? Pineapple or strawberries    ? 1 tangerine     ? Watermelon    ? Medium potassium (150 to 250 mg):      ? Fresh peaches or pears    ? Cherries    ? Cantaloupe, april, or papaya    ? Grapefruit or grapefruit juice    · Meat, poultry, and fish: These foods have about 75 calories, 7 g of protein, an average of 65 mg of sodium, 115 mg of potassium, and 70 mg of phosphorus  Do not use salt to prepare these foods  ? 1 ounce of cooked beef, pork, or poultry    ? 1 ounce of any fresh or frozen fish, lobster, shrimp, crab, clams, tuna, unsalted canned salmon, or unsalted sardines    · Other protein foods: These foods have about 90 calories, 7 g of protein, an average of 100 mg of sodium, 100 mg of potassium, and 120 mg of phosphorus  ? 1 large whole egg or ¼ cup of low-cholesterol egg substitute    ? 1 ounce of cheese    ? ¼ cup of cottage cheese or tofu    ? 1 ounce of unsalted nuts or 2 tablespoons of peanut butter    · Fats: These foods have very little protein and about 45 calories, 55 mg of sodium, 10 mg of potassium, and 5 mg of phosphorus  Include healthy fats, such as unsaturated fats, which are listed below  ? 1 teaspoon margarine or mayonnaise     ? 1 teaspoon oil (safflower, sunflower, corn, soybean, olive, peanut, canola)    ? 1 tablespoon oil-based salad dressing (such as [place]) or 2 tablespoons mayonnaise-based salad dressing (such as ranch)    What foods should I limit or avoid? · Starches:   The following foods have higher amounts of sodium, potassium, or phosphorus  ? Biscuits, muffins, pancakes, and waffles     ? Cake and cornbread from boxed mixes    ? Oatmeal and whole-wheat cereals    ? Salted pretzel sticks or rings and sandwich cookies    · Meat and protein foods: The following are high in sodium and phosphorus  ? Deli-style meat, such as roast beef, ham, and turkey    ? Canned salmon and sardines    ? Processed cheese, such as American cheese and cheese spreads    ? Smoked or cured meat, such as corned beef, alvarez, ham, hot dogs, and sausage    · Legumes: These foods have about 90 calories, 6 g of protein, less than 10 mg of sodium, 250 mg of potassium, and 100 mg of phosphorus  ? ? cup of black beans, red kidney beans, black-eye peas, garbanzos, and lentils    ? ¼ cup of green or mature soybeans    · Dairy: The following foods have about 8 g of protein, an average of 120 mg of sodium, 350 mg of potassium, and 220 mg of phosphorus  ? 1 cup of milk (fat-free, low-fat, whole, buttermilk, or chocolate milk)    ? 1 cup of low-fat plain or sugar-free yogurt or ice cream    ? ½ cup of pudding or custard    ? Nondairy milk substitutes: These foods have 75 calories, 1 gram of protein, and an average of 40 mg of sodium, 60 mg of potassium, and 60 mg of phosphorus  A serving is ½ cup of almond, rice, or soy milk, or nondairy creamer  · Vegetables: The following vegetables are high in potassium  Each serving has more than 250 mg of potassium  A serving is ½ cup, unless another amount is given  ? Artichoke or ¼ of a medium avocado    ? [place] sprouts, beets, chard, azar or mustard greens    ? Potatoes, sweet potatoes, pumpkin, and yams    ? ¾ cup of okra    ? Raw tomatoes and low-sodium tomato juice, or tomato sauce    ? Winter squash, cooked asparagus, and cooked spinach    · Fruit:  The following fruits are high in potassium  Each serving has more than 250 mg of potassium  ? 3 fresh apricots    ?  1 small nectarine (2 inches across)    ? 1 small orange and ½ cup of orange juice    ? ¼ cup of dates     ? ? of a small honeydew melon    ? 1 six-inch banana     ? ½ cup of prune juice or prunes and kiwifruit    · Fats:  Limit unhealthy fats, such as saturated fats, which are listed below  ? Butter, lard, cream cheese, whipped cream, and sour cream    ? Powdered coffee creamer    · Other: The following foods are high in sodium  ? Frozen dinners, soups, and fast foods, such as hamburgers and pizza (see the food label for serving sizes)    ? Table salt and seasoned salts, such as onion or garlic salt    ? Barbecue sauce, ketchup, mustard, soy sauce, steak sauce, and teriyaki sauce    When should I contact my healthcare provider? · You are gaining or losing weight very quickly  · You have shortness of breath  · You have nausea and are vomiting  · You feel very weak and tired  · You are having trouble following the CKD diet  CARE AGREEMENT:   You have the right to help plan your care  Discuss treatment options with your healthcare provider to decide what care you want to receive  You always have the right to refuse treatment  The above information is an  only  It is not intended as medical advice for individual conditions or treatments  Talk to your doctor, nurse or pharmacist before following any medical regimen to see if it is safe and effective for you  © Copyright [address] Information is for End User's use only and may not be sold, redistributed or otherwise used for commercial purposes   All illustrations and images included in CareNotes® are the copyrighted property of A D A M , [employer]  or [address] [place] [address]

## 2021-05-26 NOTE — PROGRESS NOTES
Assessment/Plan:    Type 2 diabetes mellitus with diabetic chronic kidney disease ([facility] [address] )   His blood sugar is stable but he still has not gone for repeat studies for his kidney disease since I have gotten him off of all of his nephrotoxic drugs I again reprinted them and encouraged him to go for them  Lab Results   Component Value Date    HGBA1C 7 6 (A) 05/25/2021       Essential hypertension   His blood pressure however has been under great control  Malignant neoplasm of urinary bladder (HCC)   He continues to follow with urology    Weight loss   At present he only has minimal fluctuation in his weight  Hyperlipidemia    He continues to take simvastatin but also has not gone for repeat studies on it    Glaucoma   He is overdue go with his eye doctor due to COVID       Diagnoses and all orders for this visit:    Type 2 diabetes mellitus with stage 4 chronic kidney disease, without long-term current use of [facility]  -     POCT hemoglobin A1c  -     Ambulatory referral to Podiatry; Future    Essential hypertension  -     POCT ECG    SOB (shortness of breath) on exertion  -     CBC and differential; Future  -     XR chest pa & lateral; Future  -     POCT ECG    Screening, lipid  -     Lipid panel; Future    Screening for prostate cancer  -     PSA, Total Screen; Future          Subjective:      Patient ID: Rashawn Muro is a 80 y o  male  Patient is behind on his follow-up for most of his problems due to [place]  He did have an COVID earlier this year and has fully recovered  He actually complains of very little and is compliant with his medications  Usually is so much so that I can hardly get him to stop medicine  His biggest complaint is low back pain but at present he is not interested in interventions  Review of Systems   Constitutional: Positive for fatigue  Negative for chills, fever and unexpected weight change     HENT: Negative for congestion, ear pain, hearing loss, postnasal drip, sinus pressure, sore throat, trouble swallowing and voice change  Eyes: Negative for visual disturbance  Respiratory: Negative for cough, chest tightness, shortness of breath and wheezing  Cardiovascular: Negative for chest pain, palpitations and leg swelling  Gastrointestinal: Negative for abdominal distention, abdominal pain, anal bleeding, blood in stool, constipation, diarrhea and nausea  Endocrine: Negative for cold intolerance, polydipsia, polyphagia and polyuria  Genitourinary: Negative for dysuria, flank pain, frequency, hematuria and urgency  Musculoskeletal: Positive for back pain  Negative for arthralgias, gait problem, joint swelling, myalgias and neck pain  Skin: Negative for rash  Allergic/Immunologic: Negative for immunocompromised state  Neurological: Negative for dizziness, syncope, facial asymmetry, weakness, light-headedness, numbness and headaches  Hematological: Negative for adenopathy  Psychiatric/Behavioral: Negative for confusion, sleep disturbance and suicidal ideas  Objective:      /68 (BP Location: Left arm, Patient Position: Sitting, Cuff Size: Standard)   Pulse 68   Temp 97 6 °F (36 4 °C)   Ht 5' 4" (1 626 m)   Wt 65 kg (143 lb 3 2 oz)   SpO2 97%   BMI 24 58 kg/m²          Physical Exam  Vitals signs reviewed  Constitutional:       General: He is not in acute distress  Appearance: Normal appearance  He is well-developed  HENT:      Right Ear: External ear normal       Left Ear: External ear normal       Nose: Nose normal       Mouth/Throat:      Pharynx: No oropharyngeal exudate  Eyes:      Extraocular Movements: Extraocular movements intact  Conjunctiva/sclera: Conjunctivae normal       Pupils: Pupils are equal, round, and reactive to light  Neck:      Musculoskeletal: Normal range of motion and neck supple  Thyroid: No thyromegaly  Vascular: No JVD     Cardiovascular:      Rate and Rhythm: Normal rate and regular rhythm  Heart sounds: Normal heart sounds  No murmur  No gallop  Pulmonary:      Effort: Pulmonary effort is normal  No respiratory distress  Breath sounds: Normal breath sounds  No wheezing or rales  Abdominal:      General: Bowel sounds are normal  There is no distension  Palpations: Abdomen is soft  There is no mass  Tenderness: There is no abdominal tenderness  Musculoskeletal: Normal range of motion  General: No swelling, tenderness or deformity  Right lower leg: No edema  Left lower leg: No edema  Lymphadenopathy:      Cervical: No cervical adenopathy  Skin:     Findings: No rash  Neurological:      General: No focal deficit present  Mental Status: He is alert and oriented to person, place, and time  Mental status is at baseline  Cranial Nerves: No cranial nerve deficit  Coordination: Coordination normal       Gait: Gait normal    Psychiatric:         Mood and Affect: Mood normal          Behavior: Behavior normal          Thought Content:  Thought content normal          Judgment: Judgment normal

## 2021-06-07 DIAGNOSIS — N18.30 TYPE 2 DIABETES MELLITUS WITH STAGE 3 CHRONIC KIDNEY DISEASE, WITHOUT LONG-TERM CURRENT USE OF INSULIN (HCC): ICD-10-CM

## 2021-06-07 DIAGNOSIS — I10 ESSENTIAL HYPERTENSION: ICD-10-CM

## 2021-06-07 DIAGNOSIS — E11.22 TYPE 2 DIABETES MELLITUS WITH STAGE 3 CHRONIC KIDNEY DISEASE, WITHOUT LONG-TERM CURRENT USE OF INSULIN (HCC): ICD-10-CM

## 2021-06-07 RX ORDER — BLOOD SUGAR DIAGNOSTIC
STRIP MISCELLANEOUS
Qty: 50 EACH | Refills: 6 | Status: SHIPPED | OUTPATIENT
Start: 2021-06-07

## 2021-06-07 RX ORDER — AMLODIPINE BESYLATE 10 MG/1
10 TABLET ORAL DAILY
Qty: 90 TABLET | Refills: 1 | Status: SHIPPED | OUTPATIENT
Start: 2021-06-07 | End: 2021-11-19

## 2021-09-28 ENCOUNTER — OFFICE VISIT (OUTPATIENT)
Dept: INTERNAL MEDICINE CLINIC | Age: 86
End: 2021-09-28
Payer: MEDICARE

## 2021-09-28 VITALS
DIASTOLIC BLOOD PRESSURE: 64 MMHG | WEIGHT: 144.4 LBS | HEIGHT: 64 IN | TEMPERATURE: 97.2 F | SYSTOLIC BLOOD PRESSURE: 118 MMHG | OXYGEN SATURATION: 100 % | HEART RATE: 68 BPM | BODY MASS INDEX: 24.65 KG/M2

## 2021-09-28 DIAGNOSIS — D63.1 ANEMIA DUE TO STAGE 4 CHRONIC KIDNEY DISEASE (HCC): ICD-10-CM

## 2021-09-28 DIAGNOSIS — Z23 NEED FOR IMMUNIZATION AGAINST INFLUENZA: Primary | ICD-10-CM

## 2021-09-28 DIAGNOSIS — Z12.5 SCREENING FOR PROSTATE CANCER: ICD-10-CM

## 2021-09-28 DIAGNOSIS — Z00.00 MEDICARE ANNUAL WELLNESS VISIT, SUBSEQUENT: ICD-10-CM

## 2021-09-28 DIAGNOSIS — D64.9 ANEMIA, UNSPECIFIED TYPE: ICD-10-CM

## 2021-09-28 DIAGNOSIS — N18.4 TYPE 2 DIABETES MELLITUS WITH STAGE 4 CHRONIC KIDNEY DISEASE, WITHOUT LONG-TERM CURRENT USE OF INSULIN (HCC): ICD-10-CM

## 2021-09-28 DIAGNOSIS — N18.4 ANEMIA DUE TO STAGE 4 CHRONIC KIDNEY DISEASE (HCC): ICD-10-CM

## 2021-09-28 DIAGNOSIS — E11.22 TYPE 2 DIABETES MELLITUS WITH STAGE 4 CHRONIC KIDNEY DISEASE, WITHOUT LONG-TERM CURRENT USE OF INSULIN (HCC): ICD-10-CM

## 2021-09-28 DIAGNOSIS — H40.9 GLAUCOMA OF BOTH EYES, UNSPECIFIED GLAUCOMA TYPE: ICD-10-CM

## 2021-09-28 DIAGNOSIS — E78.5 HYPERLIPIDEMIA, UNSPECIFIED HYPERLIPIDEMIA TYPE: ICD-10-CM

## 2021-09-28 LAB
ALBUMIN SERPL-MCNC: 3.7 G/DL (ref 3.6–5.1)
ALBUMIN/GLOB SERPL: 1.1 (CALC) (ref 1–2.5)
ALP SERPL-CCNC: 56 U/L (ref 35–144)
ALT SERPL-CCNC: 9 U/L (ref 9–46)
AST SERPL-CCNC: 16 U/L (ref 10–35)
BILIRUB SERPL-MCNC: 0.6 MG/DL (ref 0.2–1.2)
BUN SERPL-MCNC: 41 MG/DL (ref 7–25)
BUN/CREAT SERPL: 16 (CALC) (ref 6–22)
CALCIUM SERPL-MCNC: 9.2 MG/DL (ref 8.6–10.3)
CHLORIDE SERPL-SCNC: 105 MMOL/L (ref 98–110)
CO2 SERPL-SCNC: 26 MMOL/L (ref 20–32)
CREAT SERPL-MCNC: 2.56 MG/DL (ref 0.7–1.11)
GLOBULIN SER CALC-MCNC: 3.4 G/DL (CALC) (ref 1.9–3.7)
GLUCOSE SERPL-MCNC: 156 MG/DL (ref 65–99)
HBA1C MFR BLD: 6.8 % OF TOTAL HGB
POTASSIUM SERPL-SCNC: 4.3 MMOL/L (ref 3.5–5.3)
PROT SERPL-MCNC: 7.1 G/DL (ref 6.1–8.1)
SL AMB EGFR AFRICAN AMERICAN: 25 ML/MIN/1.73M2
SL AMB EGFR NON AFRICAN AMERICAN: 22 ML/MIN/1.73M2
SODIUM SERPL-SCNC: 137 MMOL/L (ref 135–146)

## 2021-09-28 PROCEDURE — 99214 OFFICE O/P EST MOD 30 MIN: CPT | Performed by: INTERNAL MEDICINE

## 2021-09-28 PROCEDURE — G0439 PPPS, SUBSEQ VISIT: HCPCS | Performed by: INTERNAL MEDICINE

## 2021-09-28 PROCEDURE — G0008 ADMIN INFLUENZA VIRUS VAC: HCPCS

## 2021-09-28 PROCEDURE — 90662 IIV NO PRSV INCREASED AG IM: CPT

## 2021-09-28 PROCEDURE — 1123F ACP DISCUSS/DSCN MKR DOCD: CPT | Performed by: INTERNAL MEDICINE

## 2021-09-28 NOTE — ASSESSMENT & PLAN NOTE
He had a CBC order to recheck his anemia but he went to a [facility] which did not pick it up    I gave him new blood work slip to take to [facility]

## 2021-09-28 NOTE — PROGRESS NOTES
Assessment/Plan:    Diabetes mellitus, type II ([place] [address] )    Lab Results   Component Value Date    HGBA1C 6 8 (H) 09/27/2021    he has great control of his diabetes with only Amaryl 1 mg a day    Type 2 diabetes mellitus with diabetic chronic kidney disease ([facility] )    Lab Results   Component Value Date    HGBA1C 6 8 (H) 09/27/2021    her his kidney disease seems to be getting worse so will refer him to Nephrology for an opinion    Asthma, mild intermittent   He currently takes no medications for asthma    Anemia due to stage 4 chronic kidney disease ([place] [address] )   He had a CBC order to recheck his anemia but he went to a [facility] which did not pick it up  I gave him new blood work slip to take to [facility]    Weight loss   His weight appears to have stabilized    Vision loss of left eye   He continues to have no vision in his left eye but follows with ophthalmology was due to        Diagnoses and all orders for this visit:    Need for immunization against influenza  -     influenza vaccine, high-dose, PF 0 7 mL (FLUZONE HIGH-DOSE)    Type 2 diabetes mellitus with stage 4 chronic kidney disease, without long-term current use of insulin ([place] [address] )  -     Ambulatory referral to Nephrology; Future    Anemia, unspecified type  -     CBC and differential; Future  -     Iron Panel (Includes Ferritin, Iron Sat%, Iron, and TIBC); Future    Screening for prostate cancer  -     PSA, Total Screen; Future    Anemia due to stage 4 chronic kidney disease (HCC)    Glaucoma of both eyes, unspecified glaucoma type    Hyperlipidemia, unspecified hyperlipidemia type  -     Lipid panel; Future    Medicare annual wellness visit, subsequent          Subjective:      Patient ID: Oneida Shea is a 80 y o  male  Patient is hard of hearing and has diffuse arthritis so does not exercise much  However his weight is stable and he does not complain about much    Diabetes  He presents for his follow-up diabetic visit  He has type 2 diabetes mellitus   His Positive for frequency  Negative for dysuria, flank pain, hematuria and urgency  Musculoskeletal: Positive for arthralgias and back pain  Negative for gait problem, joint swelling, myalgias and neck pain  Skin: Negative for color change and rash  Allergic/Immunologic: Negative for immunocompromised state  Neurological: Negative for dizziness, seizures, syncope, facial asymmetry, weakness, light-headedness, numbness and headaches  Hematological: Negative for adenopathy  Psychiatric/Behavioral: Negative for confusion, sleep disturbance and suicidal ideas  All other systems reviewed and are negative  Objective:      /64 (BP Location: Left arm, Patient Position: Sitting, Cuff Size: Standard)   Pulse 68   Temp (!) 97 2 °F (36 2 °C)   Ht 5' 4" (1 626 m)   Wt 65 5 kg (144 lb 6 4 oz)   SpO2 100%   BMI 24 79 kg/m²          Physical Exam  Constitutional:       General: He is not in acute distress  Appearance: He is well-developed  HENT:      Right Ear: External ear normal       Left Ear: External ear normal       Ears:      Comments: Hard of hearing     Nose: Nose normal       Mouth/Throat:      Pharynx: No oropharyngeal exudate  Eyes:      Pupils: Pupils are equal, round, and reactive to light  Neck:      Thyroid: No thyromegaly  Vascular: No JVD  Cardiovascular:      Rate and Rhythm: Normal rate and regular rhythm  Pulses: Pulses are weak  Dorsalis pedis pulses are 1+ on the right side and 1+ on the left side  Heart sounds: Normal heart sounds  No murmur heard  No gallop  Pulmonary:      Effort: Pulmonary effort is normal  No respiratory distress  Breath sounds: Normal breath sounds  No wheezing or rales  Abdominal:      General: Bowel sounds are normal  There is no distension  Palpations: Abdomen is soft  There is no mass  Tenderness: There is no abdominal tenderness  Musculoskeletal:         General: No tenderness   Normal range of motion  Cervical back: Normal range of motion and neck supple  Right lower leg: No edema  Left lower leg: No edema  Feet:      Right foot:      Skin integrity: No ulcer, skin breakdown, erythema, warmth, callus or dry skin  Left foot:      Skin integrity: No ulcer, skin breakdown, erythema, warmth, callus or dry skin  Lymphadenopathy:      Cervical: No cervical adenopathy  Skin:     General: Skin is warm and dry  Findings: No rash  Neurological:      Mental Status: He is alert and oriented to person, place, and time  Cranial Nerves: No cranial nerve deficit  Sensory: No sensory deficit  Motor: No weakness  Coordination: Coordination normal       Gait: Gait normal    Psychiatric:         Behavior: Behavior normal          Thought Content: Thought content normal          Judgment: Judgment normal        Patient's shoes and socks removed  Right Foot/Ankle   Right Foot Inspection  Skin Exam: skin normal and skin intact no dry skin, no warmth, no callus, no erythema, no maceration, no abnormal color, no pre-ulcer, no ulcer and no callus                          Toe Exam: ROM and strength within normal limits  Sensory   Vibration: intact    Monofilament testing: intact  Vascular  Capillary refills: < 3 seconds  The right DP pulse is 1+  Left Foot/Ankle  Left Foot Inspection  Skin Exam: skin normal and skin intactno dry skin, no warmth, no erythema, no maceration, normal color, no pre-ulcer, no ulcer and no callus                         Toe Exam: ROM and strength within normal limits                   Sensory   Vibration: intact    Monofilament: intact  Vascular  Capillary refills: < 3 seconds  The left DP pulse is 1+  Assign Risk Category:  No deformity present;  No loss of protective sensation; Weak pulses       Risk: 0

## 2021-09-28 NOTE — ASSESSMENT & PLAN NOTE
Lab Results   Component Value Date    HGBA1C 6 8 (H) 09/27/2021    her his kidney disease seems to be getting worse so will refer him to Nephrology for an opinion

## 2021-09-28 NOTE — ASSESSMENT & PLAN NOTE
He continues to have no vision in his left eye but follows with ophthalmology was due to Subjective   Patient ID: Yung is a 16 month old male who is accompanied by:mother     Chief Complaint   Patient presents with   • Cough     WET/ WHEEZING/SINCE 11/22/19. tAKING Albuterol every 4 hrs. poorr appetit       16 month old  Still coughing  Low energy  Less appetite  Drinking and voiding less  Treated with albuterol for bronchiolitis a few weeks ago. Still using inhaler Q4  Using Tylenol with temp relief.      Review of Systems   Constitutional: Negative for fever.   HENT: Positive for congestion and rhinorrhea.    Eyes: Negative.    Respiratory: Positive for cough.    Cardiovascular: Negative.    Gastrointestinal: Positive for diarrhea. Negative for vomiting.   Skin: Negative.        Yung has a past medical history of Acute suppurative otitis media of both ears without spontaneous rupture of tympanic membranes (6/3/2019), Cough (6/24/2019), Fever in pediatric patient (6/24/2019), Fussiness in infant (6/3/2019), Viral pharyngitis (6/3/2019), and Viral URI (6/3/2019).  Yung has Acute suppurative otitis media of both ears without spontaneous rupture of tympanic membranes; Viral URI; Cough; and Bronchiolitis on their problem list.  Yung has no past surgical history on file.  His family history includes Asthma in his paternal grandmother; COPD in his paternal grandmother; Depression in his maternal grandmother, paternal grandfather, and paternal grandmother; Hypertension in his paternal grandfather and paternal grandmother.  Yung reports that he has never smoked. He has never used smokeless tobacco.  Yung has a current medication list which includes the following prescription(s): amoxicillin, albuterol, spacer/aero-holding chambers, and ibuprofen.  Yung   Current Outpatient Medications   Medication Sig Dispense Refill   • amoxicillin (AMOXIL) 400 MG/5ML suspension Take 7.1 mLs by mouth 2 times daily for 10 days. 150 mL 0   • albuterol 108 (90 Base) MCG/ACT inhaler Inhale 2 puffs into the lungs every 4 hours as needed  for Shortness of Breath or Wheezing (or persistent cough). Use with spacer device 1 Inhaler 3   • Spacer/Aero-Holding Chambers (AEROCHAMBER) Use with HFA inhaler device 1 each 3   • IBUPROFEN PO Take 1.875 mLs by mouth every 4 to 6 hours as needed.       No current facility-administered medications for this visit.      Yung has No Known Allergies.    Objective   Vitals:Temp 97.8 °F (36.6 °C) (Temporal)   Wt 12.5 kg (27 lb 10.7 oz)   Physical Exam  Vitals signs and nursing note reviewed.   Constitutional:       General: He is active.      Appearance: Normal appearance. He is well-developed and normal weight.   HENT:      Head: Atraumatic.      Right Ear: Ear canal and external ear normal. Tympanic membrane is erythematous and bulging (purulent fluid R>L).      Left Ear: Ear canal and external ear normal. Tympanic membrane is erythematous and bulging (purulent fluid R>L).      Nose: Congestion (bilaterally) and rhinorrhea (clear) present.      Mouth/Throat:      Mouth: Mucous membranes are moist.      Pharynx: Oropharynx is clear.   Eyes:      General: Red reflex is present bilaterally.      Extraocular Movements: Extraocular movements intact.      Conjunctiva/sclera: Conjunctivae normal.      Pupils: Pupils are equal, round, and reactive to light.   Neck:      Musculoskeletal: Normal range of motion and neck supple.   Cardiovascular:      Rate and Rhythm: Normal rate and regular rhythm.      Pulses: Normal pulses.      Heart sounds: Normal heart sounds, S1 normal and S2 normal.   Pulmonary:      Effort: Pulmonary effort is normal. No respiratory distress, nasal flaring or retractions.      Breath sounds: Normal breath sounds. No stridor. No wheezing, rhonchi or rales.   Abdominal:      General: Bowel sounds are normal.      Palpations: Abdomen is soft.   Skin:     General: Skin is warm and dry.      Capillary Refill: Capillary refill takes less than 2 seconds.   Neurological:      Mental Status: He is alert.        Assessment   Problem List Items Addressed This Visit        Otologic    Acute suppurative otitis media of both ears without spontaneous rupture of tympanic membranes - Primary    Relevant Medications    amoxicillin (AMOXIL) 400 MG/5ML suspension       Respiratory    Viral URI    Cough        RTC in 2-3 weeks to recheck ears and for second dose of flu vaccine.     Instructed to call if problem worsens or does not improve within the next 24 hours otherwise follow-up in 3 weeks

## 2021-09-28 NOTE — PROGRESS NOTES
Assessment and Plan:     Problem List Items Addressed This Visit        Endocrine    Diabetes mellitus, type II ([facility] [address] )    Relevant Orders    Ambulatory referral to Nephrology       Other    Glaucoma    Hyperlipidemia    Relevant Orders    Lipid panel    Anemia due to stage 4 chronic kidney disease ([facility] [address] )      Other Visit Diagnoses     Need for immunization against influenza    -  Primary    Relevant Orders    influenza vaccine, high-dose, PF 0 7 mL (FLUZONE HIGH-DOSE) (Completed)    Anemia, unspecified type        Relevant Orders    CBC and differential    Iron Panel (Includes Ferritin, Iron Sat%, Iron, and TIBC)    Screening for prostate cancer        Relevant Orders    PSA, Total Screen    Medicare annual wellness visit, subsequent              Depression Screening and Follow-up Plan:   Patient was screened for depression during today's encounter  They screened negative with a PHQ-2 score of 0  Preventive health issues were discussed with patient, and age appropriate screening tests were ordered as noted in patient's After Visit Summary  Personalized health advice and appropriate referrals for health education or preventive services given if needed, as noted in patient's After Visit Summary       History of Present Illness:     Patient presents for Medicare Annual Wellness visit    Patient Care Team:  Viv Garcia MD as PCP - General  MD Jarvis Ring MD     Problem List:     Patient Active Problem List   Diagnosis    Asthma, mild intermittent    Essential hypertension    Diabetes mellitus, type II ([facility] [address] )    Glaucoma    Hyperlipidemia    Mild vitamin D deficiency    Organic impotence    Type 2 diabetes mellitus with diabetic chronic kidney disease ([facility] [address] )    Primary osteoarthritis of both knees    Iliotibial band syndrome of both sides    Weight loss    Anemia due to stage 4 chronic kidney disease ([facility] [address] )    Retinal vein occlusion of left eye    Malignant neoplasm of urinary bladder [facility])    Vision loss of left eye    Neurogenic claudication    Low back pain    Spinal stenosis of lumbar region    DDD (degenerative disc disease), lumbar    Lumbar disc disease with radiculopathy      Past Medical and Surgical History:     Past Medical History:   Diagnosis Date    Cataracts, bilateral      Past Surgical History:   Procedure Laterality Date    CATARACT EXTRACTION Bilateral 07/15/2012    COLONOSCOPY      CYSTOSCOPY  01/15/2018    Last assessed 17, diagnostic    HERNIA REPAIR      INSTILLATION MYTOMYCIN N/A 10/25/2017    Procedure: INSTILLATION OF MITOMYCIN C;  Surgeon: Herman Santana MD;  [facility];  Service: Urology    ME CYSTOURETHROSCOPY,FULGUR <0 5 CM LESN N/A 10/25/2017    Procedure: Pasquale Dannie; BLADDER BIOPSY WITH Amanda Henson;  Surgeon: Herman Santana MD;  [facility];  Service: Urology    TONSILLECTOMY      TRANSURETHRAL RESECTION OF BLADDER TUMOR N/A 10/25/2017    Procedure: TUR BLADDER TUMOR;  Surgeon: Herman Santana MD;  [facility];  Service: Urology    WISDOM TOOTH EXTRACTION        Family History:     Family History   Problem Relation Age of Onset    Diabetes Mother       Social History:     Social History     Socioeconomic History    Marital status: /Civil Union     Spouse name: None    Number of children: None    Years of education: None    Highest education level: None   Occupational History    None   Tobacco Use    Smoking status: Former Smoker     Quit date: 1965     Years since quittin 7    Smokeless tobacco: Never Used   Substance and Sexual Activity    Alcohol use: No     Alcohol/week: 0 0 standard drinks     Comment: quit 23 years ago    Drug use: No    Sexual activity: None   Other Topics Concern    None   Social History Narrative    None     Social Determinants of Health     Financial Resource Strain:     Difficulty of Paying Living Expenses:    Food Insecurity:     Worried About Running Out of Food in the Last Year:    [address] in the Last Year:    Transportation Needs:     Lack of Transportation (Medical):      Lack of Transportation (Non-Medical):    Physical Activity:     Days of Exercise per Week:     Minutes of Exercise per Session:    Stress:     Feeling of Stress :    Social Connections:     Frequency of Communication with Friends and Family:     Frequency of Social Gatherings with Friends and Family:     Attends Judaism Services:     Active Member of Clubs or Organizations:     Attends Club or Organization Meetings:     Marital Status:    Intimate Partner Violence:     Fear of Current or Ex-Partner:     Emotionally Abused:     Physically Abused:     Sexually Abused:       Medications and Allergies:     Current Outpatient Medications   Medication Sig Dispense Refill    amLODIPine (NORVASC) 10 mg tablet Take 1 tablet (10 mg total) by mouth daily 90 tablet 1    aspirin 325 mg tablet Take 325 mg by mouth daily      bimatoprost (LUMIGAN) 0 01 % ophthalmic drops Apply to eye      brimonidine tartrate 0 2 % ophthalmic solution INSTILL 1 DROP INTO EACH EYE THREE TIMES DAILY      Cholecalciferol (VITAMIN D3) 1000 units CAPS Take by mouth      cyanocobalamin (VITAMIN B-12) 500 mcg tablet Take 500 mcg by mouth daily      Dorzolamide HCl-Timolol Mal PF 2-0 5 % SOLN INSTILL 1 DROP INTO EACH EYE TWICE DAILY      fluticasone-salmeterol (Advair Diskus) 100-50 mcg/dose inhaler Inhale 1 puff 2 (two) times a day 1 Inhaler 3    glimepiride (AMARYL) 1 mg tablet Take 1 tablet (1 mg total) by mouth daily with breakfast 90 tablet 1    glucose blood (OneTouch Verio) test strip USE  STRIP TO CHECK GLUCOSE ONCE DAILY  DX: E11 9 50 each 6    lidocaine (LMX) 4 % cream Apply topically as needed for mild pain 30 g 0    loteprednol etabonate (LOTEMAX) 0 5 % ophthalmic suspension Apply to eye      metoprolol succinate (TOPROL-XL) 50 mg 24 hr tablet Take 1 tablet (50 mg total) by mouth daily 30 tablet 1    simvastatin (ZOCOR) 40 mg tablet Take 1 tablet by mouth daily      tamsulosin (FLOMAX) 0 4 mg Take 1 capsule by mouth      timolol (TIMOPTIC) 0 5 % ophthalmic solution Administer 1 drop to both eyes 2 (two) times a day       No current facility-administered medications for this visit  Allergies   Allergen Reactions    Ace Inhibitors Cough     Other reaction(s): hyperkalemia    Penicillins GI Intolerance      Immunizations:     Immunization History   Administered Date(s) Administered    INFLUENZA 10/27/2018    Influenza Split High Dose Preservative Free IM 10/15/2012, 09/26/2013, 11/06/2014, 10/20/2015, 11/07/2016, 11/14/2017    Influenza, high dose seasonal 0 7 mL 09/17/2019, 09/22/2020, 09/28/2021    Pneumococcal Conjugate 13-Valent 11/07/2016    Pneumococcal Polysaccharide PPV23 11/28/2007, 11/14/2017    SARS-CoV-2 / COVID-19 mRNA IM ([facility]) 02/13/2021, 03/29/2021      Health Maintenance: There are no preventive care reminders to display for this patient  Topic Date Due    DTaP,Tdap,and Td Vaccines (1 - Tdap) Never done      Medicare Health Risk Assessment:     /64 (BP Location: Left arm, Patient Position: Sitting, Cuff Size: Standard)   Pulse 68   Temp (!) 97 2 °F (36 2 °C)   Ht 5' 4" (1 626 m)   Wt 65 5 kg (144 lb 6 4 oz)   SpO2 100%   BMI 24 79 kg/m²      Deb Spring is here for his Subsequent Wellness visit  Last Medicare Wellness visit information reviewed, patient interviewed and updates made to the record today  Health Risk Assessment:   Patient rates overall health as fair  Patient feels that their physical health rating is slightly worse  Patient is satisfied with their life  Eyesight was rated as same  Hearing was rated as same  Patient feels that their emotional and mental health rating is same  Patients states they are never, rarely angry  Patient states they are sometimes unusually tired/fatigued   Pain experienced in the last 7 days has been some  Patient's pain rating has been 3/10  Patient states that he has experienced no weight loss or gain in last 6 months  Depression Screening:   PHQ-2 Score: 0      Fall Risk Screening: In the past year, patient has experienced: no history of falling in past year      Home Safety:  Patient does not have trouble with stairs inside or outside of their home  Patient has working smoke alarms and has working carbon monoxide detector  Home safety hazards include: none  Nutrition:   Current diet is Diabetic and No Added Salt  Medications:   Patient is currently taking over-the-counter supplements  OTC medications include: see medication list  Patient is able to manage medications       Activities of Daily Living (ADLs)/Instrumental Activities of Daily Living (IADLs):   Walk and transfer into and out of bed and chair?: Yes  Dress and groom yourself?: Yes    Bathe or shower yourself?: Yes    Do your laundry/housekeeping?: Yes  Manage your money, pay your bills and track your expenses?: Yes  Make your own meals?: Yes    Do your own shopping?: Yes    Previous Hospitalizations:   Any hospitalizations or ED visits within the last 12 months?: No      Advance Care Planning:   Living will: No    Advanced directive: No    Advanced directive counseling given: Yes      Cognitive Screening:   Provider or family/friend/caregiver concerned regarding cognition?: No    PREVENTIVE SCREENINGS      Cardiovascular Screening:    General: Screening Not Indicated and History Lipid Disorder      Diabetes Screening:     General: Screening Not Indicated and History Diabetes      Colorectal Cancer Screening:     General: Screening Not Indicated      Prostate Cancer Screening:    General: Screening Not Indicated      Osteoporosis Screening:    General: Patient Declines      Abdominal Aortic Aneurysm (AAA) Screening:    Risk factors include: tobacco use        Lung Cancer Screening:     General: Screening Not Indicated Hepatitis C Screening:    General: Patient Declines    Screening, Brief Intervention, and Referral to Treatment (SBIRT)    Screening    Typical number of drinks in a week: 0    Single Item Drug Screening:  How often have you used an illegal drug (including marijuana) or a prescription medication for non-medical reasons in the past year? never    Single Item Drug Screen Score: 0  Interpretation: Negative screen for possible drug use disorder    Other Counseling Topics:   Regular weightbearing exercise and calcium and vitamin D intake         Ap Gutierrez MD

## 2021-09-28 NOTE — ASSESSMENT & PLAN NOTE
Lab Results   Component Value Date    HGBA1C 6 8 (H) 09/27/2021    he has great control of his diabetes with only Amaryl 1 mg a day

## 2021-09-28 NOTE — PATIENT INSTRUCTIONS
Medicare Preventive Visit Patient Instructions  Thank you for completing your Welcome to Medicare Visit or Medicare Annual Wellness Visit today  Your next wellness visit will be due in one year (9/29/2022)  The screening/preventive services that you may require over the next 5-10 years are detailed below  Some tests may not apply to you based off risk factors and/or age  Screening tests ordered at today's visit but not completed yet may show as past due  Also, please note that scanned in results may not display below  Preventive Screenings:  Service Recommendations Previous Testing/Comments   Colorectal Cancer Screening  · Colonoscopy    · Fecal Occult Blood Test (FOBT)/Fecal Immunochemical Test (FIT)  · Fecal DNA/Cologuard Test  · Flexible Sigmoidoscopy Age: 54-65 years old   Colonoscopy: every 10 years (May be performed more frequently if at higher risk)  OR  FOBT/FIT: every 1 year  OR  Cologuard: every 3 years  OR  Sigmoidoscopy: every 5 years  Screening may be recommended earlier than age 48 if at higher risk for colorectal cancer  Also, an individualized decision between you and your healthcare provider will decide whether screening between the ages of 74-80 would be appropriate  Colonoscopy: Not on file  FOBT/FIT: Not on file  Cologuard: Not on file  Sigmoidoscopy: Not on file          Prostate Cancer Screening Individualized decision between patient and health care provider in men between ages of 53-78   Medicare will cover every 12 months beginning on the day after your 50th birthday PSA: 0 4 ng/mL           Hepatitis C Screening Once for adults born between 1945 and 1965  More frequently in patients at high risk for Hepatitis C Hep C Antibody: Not on file        Diabetes Screening 1-2 times per year if you're at risk for diabetes or have pre-diabetes Fasting glucose: No results in last 5 years   A1C: 6 8 % of total Hgb        Cholesterol Screening Once every 5 years if you don't have a lipid disorder  May order more often based on risk factors  Lipid panel: 09/17/2020           Other Preventive Screenings Covered by Medicare:  1  Abdominal Aortic Aneurysm (AAA) Screening: covered once if your at risk  You're considered to be at risk if you have a family history of AAA or a male between the age of 73-68 who smoking at least 100 cigarettes in your lifetime  2  Lung Cancer Screening: covers low dose CT scan once per year if you meet all of the following conditions: (1) Age 50-69; (2) No signs or symptoms of lung cancer; (3) Current smoker or have quit smoking within the last 15 years; (4) You have a tobacco smoking history of at least 30 pack years (packs per day x number of years you smoked); (5) You get a written order from a healthcare provider  3  Glaucoma Screening: covered annually if you're considered high risk: (1) You have diabetes OR (2) Family history of glaucoma OR (3)  aged 48 and older OR (3)  American aged 72 and older  3  Osteoporosis Screening: covered every 2 years if you meet one of the following conditions: (1) Have a vertebral abnormality; (2) On glucocorticoid therapy for more than 3 months; (3) Have primary hyperparathyroidism; (4) On osteoporosis medications and need to assess response to drug therapy  5  HIV Screening: covered annually if you're between the age of 12-76  Also covered annually if you are younger than 13 and older than 72 with risk factors for HIV infection  For pregnant patients, it is covered up to 3 times per pregnancy      Immunizations:  Immunization Recommendations   Influenza Vaccine Annual influenza vaccination during flu season is recommended for all persons aged >= 6 months who do not have contraindications   Pneumococcal Vaccine (Prevnar and Pneumovax)  * Prevnar = PCV13  * Pneumovax = PPSV23 Adults 25-60 years old: 1-3 doses may be recommended based on certain risk factors  Adults 72 years old: Prevnar (PCV13) vaccine recommended followed by Pneumovax (PPSV23) vaccine  If already received PPSV23 since turning 65, then PCV13 recommended at least one year after PPSV23 dose  Hepatitis B Vaccine 3 dose series if at intermediate or high risk (ex: diabetes, end stage renal disease, liver disease)   Tetanus (Td) Vaccine - COST NOT COVERED BY MEDICARE PART B Following completion of primary series, a booster dose should be given every 10 years to maintain immunity against tetanus  Td may also be given as tetanus wound prophylaxis  Tdap Vaccine - COST NOT COVERED BY MEDICARE PART B Recommended at least once for all adults  For pregnant patients, recommended with each pregnancy  Shingles Vaccine (Shingrix) - COST NOT COVERED BY MEDICARE PART B  2 shot series recommended in those aged 48 and above     Health Maintenance Due:  There are no preventive care reminders to display for this patient  Immunizations Due:      Topic Date Due    DTaP,Tdap,and Td Vaccines (1 - Tdap) Never done     Advance Directives   What are advance directives? Advance directives are legal documents that state your wishes and plans for medical care  These plans are made ahead of time in case you lose your ability to make decisions for yourself  Advance directives can apply to any medical decision, such as the treatments you want, and if you want to donate organs  What are the types of advance directives? There are many types of advance directives, and each state has rules about how to use them  You may choose a combination of any of the following:  · Living will: This is a written record of the treatment you want  You can also choose which treatments you do not want, which to limit, and which to stop at a certain time  This includes surgery, medicine, IV fluid, and tube feedings  · Durable power of  for healthcare [place] [facility]): This is a written record that states who you want to make healthcare choices for you when you are unable to make them for yourself   This person, called a proxy, is usually a family member or a friend  You may choose more than 1 proxy  · Do not resuscitate (DNR) order:  A DNR order is used in case your heart stops beating or you stop breathing  It is a request not to have certain forms of treatment, such as CPR  A DNR order may be included in other types of advance directives  · Medical directive: This covers the care that you want if you are in a coma, near death, or unable to make decisions for yourself  You can list the treatments you want for each condition  Treatment may include pain medicine, surgery, blood transfusions, dialysis, IV or tube feedings, and a ventilator (breathing machine)  · Values history: This document has questions about your views, beliefs, and how you feel and think about life  This information can help others choose the care that you would choose  Why are advance directives important? An advance directive helps you control your care  Although spoken wishes may be used, it is better to have your wishes written down  Spoken wishes can be misunderstood, or not followed  Treatments may be given even if you do not want them  An advance directive may make it easier for your family to make difficult choices about your care  © Copyright [employer] 2018 Information is for End User's use only and may not be sold, redistributed or otherwise used for commercial purposes  All illustrations and images included in CareNotes® are the copyrighted property of [employer] , [employer]  or [facility] Preventive Visit Patient Instructions  Thank you for completing your Welcome to Medicare Visit or Medicare Annual Wellness Visit today  Your next wellness visit will be due in one year (9/29/2022)  The screening/preventive services that you may require over the next 5-10 years are detailed below  Some tests may not apply to you based off risk factors and/or age   Screening tests ordered at today's visit but not completed yet may show as past due  Also, please note that scanned in results may not display below  Preventive Screenings:  Service Recommendations Previous Testing/Comments   Colorectal Cancer Screening  · Colonoscopy    · Fecal Occult Blood Test (FOBT)/Fecal Immunochemical Test (FIT)  · Fecal DNA/Cologuard Test  · Flexible Sigmoidoscopy Age: 54-65 years old   Colonoscopy: every 10 years (May be performed more frequently if at higher risk)  OR  FOBT/FIT: every 1 year  OR  Cologuard: every 3 years  OR  Sigmoidoscopy: every 5 years  Screening may be recommended earlier than age 48 if at higher risk for colorectal cancer  Also, an individualized decision between you and your healthcare provider will decide whether screening between the ages of 74-80 would be appropriate  Colonoscopy: Not on file  FOBT/FIT: Not on file  Cologuard: Not on file  Sigmoidoscopy: Not on file          Prostate Cancer Screening Individualized decision between patient and health care provider in men between ages of 53-78   Medicare will cover every 12 months beginning on the day after your 50th birthday PSA: 0 4 ng/mL           Hepatitis C Screening Once for adults born between 1945 and 1965  More frequently in patients at high risk for Hepatitis C Hep C Antibody: Not on file        Diabetes Screening 1-2 times per year if you're at risk for diabetes or have pre-diabetes Fasting glucose: No results in last 5 years   A1C: 6 8 % of total Hgb        Cholesterol Screening Once every 5 years if you don't have a lipid disorder  May order more often based on risk factors  Lipid panel: 09/17/2020           Other Preventive Screenings Covered by Medicare:  6  Abdominal Aortic Aneurysm (AAA) Screening: covered once if your at risk  You're considered to be at risk if you have a family history of AAA or a male between the age of 73-68 who smoking at least 100 cigarettes in your lifetime    7  Lung Cancer Screening: covers low dose CT scan once per year if you meet all of the following conditions: (1) Age 50-69; (2) No signs or symptoms of lung cancer; (3) Current smoker or have quit smoking within the last 15 years; (4) You have a tobacco smoking history of at least 30 pack years (packs per day x number of years you smoked); (5) You get a written order from a healthcare provider  8  Glaucoma Screening: covered annually if you're considered high risk: (1) You have diabetes OR (2) Family history of glaucoma OR (3)  aged 48 and older OR (3)  American aged 72 and older  5  Osteoporosis Screening: covered every 2 years if you meet one of the following conditions: (1) Have a vertebral abnormality; (2) On glucocorticoid therapy for more than 3 months; (3) Have primary hyperparathyroidism; (4) On osteoporosis medications and need to assess response to drug therapy  10  HIV Screening: covered annually if you're between the age of 12-76  Also covered annually if you are younger than 13 and older than 72 with risk factors for HIV infection  For pregnant patients, it is covered up to 3 times per pregnancy  Immunizations:  Immunization Recommendations   Influenza Vaccine Annual influenza vaccination during flu season is recommended for all persons aged >= 6 months who do not have contraindications   Pneumococcal Vaccine (Prevnar and Pneumovax)  * Prevnar = PCV13  * Pneumovax = PPSV23 Adults 25-60 years old: 1-3 doses may be recommended based on certain risk factors  Adults 72 years old: Prevnar (PCV13) vaccine recommended followed by Pneumovax (PPSV23) vaccine  If already received PPSV23 since turning 65, then PCV13 recommended at least one year after PPSV23 dose  Hepatitis B Vaccine 3 dose series if at intermediate or high risk (ex: diabetes, end stage renal disease, liver disease)   Tetanus (Td) Vaccine - COST NOT COVERED BY MEDICARE PART B Following completion of primary series, a booster dose should be given every 10 years to maintain immunity against tetanus  Td may also be given as tetanus wound prophylaxis  Tdap Vaccine - COST NOT COVERED BY MEDICARE PART B Recommended at least once for all adults  For pregnant patients, recommended with each pregnancy  Shingles Vaccine (Shingrix) - COST NOT COVERED BY MEDICARE PART B  2 shot series recommended in those aged 48 and above     Health Maintenance Due:  There are no preventive care reminders to display for this patient  Immunizations Due:      Topic Date Due    DTaP,Tdap,and Td Vaccines (1 - Tdap) Never done     Advance Directives   What are advance directives? Advance directives are legal documents that state your wishes and plans for medical care  These plans are made ahead of time in case you lose your ability to make decisions for yourself  Advance directives can apply to any medical decision, such as the treatments you want, and if you want to donate organs  What are the types of advance directives? There are many types of advance directives, and each state has rules about how to use them  You may choose a combination of any of the following:  · Living will: This is a written record of the treatment you want  You can also choose which treatments you do not want, which to limit, and which to stop at a certain time  This includes surgery, medicine, IV fluid, and tube feedings  · Durable power of  for healthcare [place] [facility]): This is a written record that states who you want to make healthcare choices for you when you are unable to make them for yourself  This person, called a proxy, is usually a family member or a friend  You may choose more than 1 proxy  · Do not resuscitate (DNR) order:  A DNR order is used in case your heart stops beating or you stop breathing  It is a request not to have certain forms of treatment, such as CPR  A DNR order may be included in other types of advance directives  · Medical directive:   This covers the care that you want if you are in a coma, near death, or unable to make decisions for yourself  You can list the treatments you want for each condition  Treatment may include pain medicine, surgery, blood transfusions, dialysis, IV or tube feedings, and a ventilator (breathing machine)  · Values history: This document has questions about your views, beliefs, and how you feel and think about life  This information can help others choose the care that you would choose  Why are advance directives important? An advance directive helps you control your care  Although spoken wishes may be used, it is better to have your wishes written down  Spoken wishes can be misunderstood, or not followed  Treatments may be given even if you do not want them  An advance directive may make it easier for your family to make difficult choices about your care  © Copyright [employer] 2018 Information is for End User's use only and may not be sold, redistributed or otherwise used for commercial purposes   All illustrations and images included in CareNotes® are the copyrighted property of [employer]  or [address] [place] [address]

## 2021-09-30 ENCOUNTER — TELEPHONE (OUTPATIENT)
Dept: ADMINISTRATIVE | Facility: OTHER | Age: 86
End: 2021-09-30

## 2021-10-12 LAB
BASOPHILS # BLD AUTO: 43 CELLS/UL (ref 0–200)
BASOPHILS NFR BLD AUTO: 0.6 %
CHOLEST SERPL-MCNC: 193 MG/DL
CHOLEST/HDLC SERPL: 3.6 (CALC)
EOSINOPHIL # BLD AUTO: 331 CELLS/UL (ref 15–500)
EOSINOPHIL NFR BLD AUTO: 4.6 %
ERYTHROCYTE [DISTWIDTH] IN BLOOD BY AUTOMATED COUNT: 11.6 % (ref 11–15)
FERRITIN SERPL-MCNC: 373 NG/ML (ref 24–380)
HCT VFR BLD AUTO: 31.3 % (ref 38.5–50)
HDLC SERPL-MCNC: 53 MG/DL
HGB BLD-MCNC: 10.2 G/DL (ref 13.2–17.1)
IRON SATN MFR SERPL: 32 % (CALC) (ref 20–48)
IRON SERPL-MCNC: 76 MCG/DL (ref 50–180)
LDLC SERPL CALC-MCNC: 125 MG/DL (CALC)
LYMPHOCYTES # BLD AUTO: 1793 CELLS/UL (ref 850–3900)
LYMPHOCYTES NFR BLD AUTO: 24.9 %
MCH RBC QN AUTO: 31.5 PG (ref 27–33)
MCHC RBC AUTO-ENTMCNC: 32.6 G/DL (ref 32–36)
MCV RBC AUTO: 96.6 FL (ref 80–100)
MONOCYTES # BLD AUTO: 626 CELLS/UL (ref 200–950)
MONOCYTES NFR BLD AUTO: 8.7 %
NEUTROPHILS # BLD AUTO: 4406 CELLS/UL (ref 1500–7800)
NEUTROPHILS NFR BLD AUTO: 61.2 %
NONHDLC SERPL-MCNC: 140 MG/DL (CALC)
PLATELET # BLD AUTO: 187 THOUSAND/UL (ref 140–400)
PMV BLD REES-ECKER: 10.9 FL (ref 7.5–12.5)
PSA SERPL-MCNC: 0.36 NG/ML
RBC # BLD AUTO: 3.24 MILLION/UL (ref 4.2–5.8)
TIBC SERPL-MCNC: 238 MCG/DL (CALC) (ref 250–425)
TRIGL SERPL-MCNC: 63 MG/DL
WBC # BLD AUTO: 7.2 THOUSAND/UL (ref 3.8–10.8)

## 2021-11-09 PROBLEM — I12.9 PARENCHYMAL RENAL HYPERTENSION: Status: ACTIVE | Noted: 2021-11-09

## 2021-11-19 ENCOUNTER — CONSULT (OUTPATIENT)
Dept: NEPHROLOGY | Facility: CLINIC | Age: 86
End: 2021-11-19
Payer: MEDICARE

## 2021-11-19 VITALS — WEIGHT: 147 LBS | BODY MASS INDEX: 25.1 KG/M2 | HEIGHT: 64 IN

## 2021-11-19 DIAGNOSIS — N18.4 STAGE 4 CHRONIC KIDNEY DISEASE (HCC): ICD-10-CM

## 2021-11-19 DIAGNOSIS — I12.9 PARENCHYMAL RENAL HYPERTENSION, STAGE 1 THROUGH STAGE 4 OR UNSPECIFIED CHRONIC KIDNEY DISEASE: Primary | ICD-10-CM

## 2021-11-19 DIAGNOSIS — N18.4 TYPE 2 DIABETES MELLITUS WITH STAGE 4 CHRONIC KIDNEY DISEASE, WITHOUT LONG-TERM CURRENT USE OF INSULIN (HCC): ICD-10-CM

## 2021-11-19 DIAGNOSIS — E11.22 TYPE 2 DIABETES MELLITUS WITH STAGE 1 CHRONIC KIDNEY DISEASE, UNSPECIFIED WHETHER LONG TERM INSULIN USE (HCC): ICD-10-CM

## 2021-11-19 DIAGNOSIS — E78.5 DYSLIPIDEMIA: ICD-10-CM

## 2021-11-19 DIAGNOSIS — E11.22 TYPE 2 DIABETES MELLITUS WITH STAGE 4 CHRONIC KIDNEY DISEASE, WITHOUT LONG-TERM CURRENT USE OF INSULIN (HCC): ICD-10-CM

## 2021-11-19 DIAGNOSIS — N18.4 ANEMIA IN STAGE 4 CHRONIC KIDNEY DISEASE (HCC): ICD-10-CM

## 2021-11-19 DIAGNOSIS — N18.1 TYPE 2 DIABETES MELLITUS WITH STAGE 1 CHRONIC KIDNEY DISEASE, UNSPECIFIED WHETHER LONG TERM INSULIN USE (HCC): ICD-10-CM

## 2021-11-19 DIAGNOSIS — D63.1 ANEMIA IN STAGE 4 CHRONIC KIDNEY DISEASE (HCC): ICD-10-CM

## 2021-11-19 PROCEDURE — 99204 OFFICE O/P NEW MOD 45 MIN: CPT | Performed by: INTERNAL MEDICINE

## 2021-11-19 RX ORDER — AMLODIPINE BESYLATE 5 MG/1
5 TABLET ORAL DAILY
Qty: 30 TABLET | Refills: 5 | Status: SHIPPED | OUTPATIENT
Start: 2021-11-19 | End: 2022-07-21

## 2021-11-19 RX ORDER — EAR PLUGS
EACH OTIC (EAR) DAILY
COMMUNITY

## 2021-11-26 ENCOUNTER — HOSPITAL ENCOUNTER (OUTPATIENT)
Dept: ULTRASOUND IMAGING | Facility: HOSPITAL | Age: 86
Discharge: HOME | End: 2021-11-26
Attending: INTERNAL MEDICINE
Payer: MEDICARE

## 2021-11-26 DIAGNOSIS — N18.4 ANEMIA IN STAGE 4 CHRONIC KIDNEY DISEASE (HCC): ICD-10-CM

## 2021-11-26 DIAGNOSIS — E11.22 TYPE 2 DIABETES MELLITUS WITH STAGE 1 CHRONIC KIDNEY DISEASE, UNSPECIFIED WHETHER LONG TERM INSULIN USE (HCC): ICD-10-CM

## 2021-11-26 DIAGNOSIS — N18.4 TYPE 2 DIABETES MELLITUS WITH STAGE 4 CHRONIC KIDNEY DISEASE, WITHOUT LONG-TERM CURRENT USE OF INSULIN (HCC): ICD-10-CM

## 2021-11-26 DIAGNOSIS — D63.1 ANEMIA IN STAGE 4 CHRONIC KIDNEY DISEASE (HCC): ICD-10-CM

## 2021-11-26 DIAGNOSIS — E11.22 TYPE 2 DIABETES MELLITUS WITH STAGE 4 CHRONIC KIDNEY DISEASE, WITHOUT LONG-TERM CURRENT USE OF INSULIN (HCC): ICD-10-CM

## 2021-11-26 DIAGNOSIS — N18.4 STAGE 4 CHRONIC KIDNEY DISEASE (HCC): ICD-10-CM

## 2021-11-26 DIAGNOSIS — N18.1 TYPE 2 DIABETES MELLITUS WITH STAGE 1 CHRONIC KIDNEY DISEASE, UNSPECIFIED WHETHER LONG TERM INSULIN USE (HCC): ICD-10-CM

## 2021-11-26 DIAGNOSIS — E78.5 DYSLIPIDEMIA: ICD-10-CM

## 2021-11-26 DIAGNOSIS — I12.9 PARENCHYMAL RENAL HYPERTENSION, STAGE 1 THROUGH STAGE 4 OR UNSPECIFIED CHRONIC KIDNEY DISEASE: ICD-10-CM

## 2021-11-26 PROCEDURE — 76770 US EXAM ABDO BACK WALL COMP: CPT

## 2021-11-29 ENCOUNTER — TELEPHONE (OUTPATIENT)
Dept: NEPHROLOGY | Facility: CLINIC | Age: 86
End: 2021-11-29

## 2021-12-01 ENCOUNTER — TELEPHONE (OUTPATIENT)
Dept: NEPHROLOGY | Facility: CLINIC | Age: 86
End: 2021-12-01

## 2021-12-03 LAB
25(OH)D3 SERPL-MCNC: 56 NG/ML (ref 30–100)
ALBUMIN SERPL ELPH-MCNC: 4 G/DL (ref 3.8–4.8)
ALBUMIN SERPL-MCNC: 3.9 G/DL (ref 3.6–5.1)
ALBUMIN/GLOB SERPL: 1.1 (CALC) (ref 1–2.5)
ALDOST SERPL-MCNC: 4 NG/DL
ALP SERPL-CCNC: 55 U/L (ref 35–144)
ALPHA1 GLOB SERPL ELPH-MCNC: 0.3 G/DL (ref 0.2–0.3)
ALPHA2 GLOB SERPL ELPH-MCNC: 0.8 G/DL (ref 0.5–0.9)
ALT SERPL-CCNC: 11 U/L (ref 9–46)
APPEARANCE UR: CLEAR
AST SERPL-CCNC: 15 U/L (ref 10–35)
BACTERIA UR QL AUTO: ABNORMAL /HPF
BASOPHILS # BLD AUTO: 60 CELLS/UL (ref 0–200)
BASOPHILS NFR BLD AUTO: 0.7 %
BETA1 GLOB SERPL ELPH-MCNC: 0.4 G/DL (ref 0.4–0.6)
BETA2 GLOB SERPL ELPH-MCNC: 0.4 G/DL (ref 0.2–0.5)
BILIRUB SERPL-MCNC: 0.6 MG/DL (ref 0.2–1.2)
BILIRUB UR QL STRIP: NEGATIVE
BUN SERPL-MCNC: 34 MG/DL (ref 7–25)
BUN/CREAT SERPL: 16 (CALC) (ref 6–22)
CALCIUM SERPL-MCNC: 9.4 MG/DL (ref 8.6–10.3)
CALCIUM SERPL-MCNC: 9.4 MG/DL (ref 8.6–10.3)
CHLORIDE SERPL-SCNC: 107 MMOL/L (ref 98–110)
CHOLEST SERPL-MCNC: 185 MG/DL
CHOLEST/HDLC SERPL: 3.6 (CALC)
CO2 SERPL-SCNC: 24 MMOL/L (ref 20–32)
COLOR UR: YELLOW
CREAT SERPL-MCNC: 2.14 MG/DL (ref 0.7–1.11)
CREAT UR-MCNC: 23 MG/DL (ref 20–320)
EOSINOPHIL # BLD AUTO: 493 CELLS/UL (ref 15–500)
EOSINOPHIL NFR BLD AUTO: 5.8 %
ERYTHROCYTE [DISTWIDTH] IN BLOOD BY AUTOMATED COUNT: 11.6 % (ref 11–15)
FERRITIN SERPL-MCNC: 360 NG/ML (ref 24–380)
GAMMA GLOB SERPL ELPH-MCNC: 1.6 G/DL (ref 0.8–1.7)
GLOBULIN SER CALC-MCNC: 3.6 G/DL (CALC) (ref 1.9–3.7)
GLUCOSE SERPL-MCNC: 102 MG/DL (ref 65–99)
GLUCOSE UR QL STRIP: ABNORMAL
HCT VFR BLD AUTO: 32.3 % (ref 38.5–50)
HDLC SERPL-MCNC: 51 MG/DL
HGB BLD-MCNC: 10.7 G/DL (ref 13.2–17.1)
HGB UR QL STRIP: NEGATIVE
HYALINE CASTS #/AREA URNS LPF: ABNORMAL /LPF
IGA SERPL-MCNC: 407 MG/DL (ref 70–320)
IGG SERPL-MCNC: 1434 MG/DL (ref 600–1540)
IGM SERPL-MCNC: 527 MG/DL (ref 50–300)
IRON SATN MFR SERPL: 39 % (CALC) (ref 20–48)
IRON SERPL-MCNC: 91 MCG/DL (ref 50–180)
KAPPA LC FREE SER-MCNC: 62.9 MG/L (ref 3.3–19.4)
KAPPA LC FREE/LAMBDA FREE SER: 1.17 {RATIO} (ref 0.26–1.65)
KETONES UR QL STRIP: NEGATIVE
LAMBDA LC FREE SERPL-MCNC: 53.6 MG/L (ref 5.7–26.3)
LDLC SERPL CALC-MCNC: 115 MG/DL (CALC)
LEUKOCYTE ESTERASE UR QL STRIP: NEGATIVE
LYMPHOCYTES # BLD AUTO: 1292 CELLS/UL (ref 850–3900)
LYMPHOCYTES NFR BLD AUTO: 15.2 %
MAGNESIUM SERPL-MCNC: 2.2 MG/DL (ref 1.5–2.5)
MCH RBC QN AUTO: 31.1 PG (ref 27–33)
MCHC RBC AUTO-ENTMCNC: 33.1 G/DL (ref 32–36)
MCV RBC AUTO: 93.9 FL (ref 80–100)
MONOCYTES # BLD AUTO: 842 CELLS/UL (ref 200–950)
MONOCYTES NFR BLD AUTO: 9.9 %
NEUTROPHILS # BLD AUTO: 5814 CELLS/UL (ref 1500–7800)
NEUTROPHILS NFR BLD AUTO: 68.4 %
NITRITE UR QL STRIP: NEGATIVE
NONHDLC SERPL-MCNC: 134 MG/DL (CALC)
PH UR STRIP: 7 [PH] (ref 5–8)
PHOSPHATE SERPL-MCNC: 3.1 MG/DL (ref 2.1–4.3)
PLATELET # BLD AUTO: 183 THOUSAND/UL (ref 140–400)
PMV BLD REES-ECKER: 11.1 FL (ref 7.5–12.5)
POTASSIUM SERPL-SCNC: 4 MMOL/L (ref 3.5–5.3)
PROT PATTERN SERPL ELPH-IMP: NORMAL
PROT SERPL-MCNC: 7.5 G/DL (ref 6.1–8.1)
PROT SERPL-MCNC: 7.5 G/DL (ref 6.1–8.1)
PROT UR QL STRIP: NEGATIVE
PROT UR-MCNC: 15 MG/DL (ref 5–25)
PROT/CREAT UR: 0.65 MG/MG CREAT (ref 0.02–0.13)
PROT/CREAT UR: 652 MG/G CREAT (ref 22–128)
PTH-INTACT SERPL-MCNC: 53 PG/ML (ref 14–64)
RBC # BLD AUTO: 3.44 MILLION/UL (ref 4.2–5.8)
RBC #/AREA URNS HPF: ABNORMAL /HPF
RENIN PLAS-CCNC: 0.35 NG/ML/H (ref 0.25–5.82)
SL AMB EGFR AFRICAN AMERICAN: 31 ML/MIN/1.73M2
SL AMB EGFR NON AFRICAN AMERICAN: 27 ML/MIN/1.73M2
SODIUM SERPL-SCNC: 139 MMOL/L (ref 135–146)
SP GR UR STRIP: 1.01 (ref 1–1.03)
SQUAMOUS #/AREA URNS HPF: ABNORMAL /HPF
TIBC SERPL-MCNC: 235 MCG/DL (CALC) (ref 250–425)
TRIGL SERPL-MCNC: 89 MG/DL
TSH SERPL-ACNC: 1.79 MIU/L (ref 0.4–4.5)
WBC # BLD AUTO: 8.5 THOUSAND/UL (ref 3.8–10.8)
WBC #/AREA URNS HPF: ABNORMAL /HPF

## 2021-12-06 ENCOUNTER — TELEPHONE (OUTPATIENT)
Dept: NEPHROLOGY | Facility: CLINIC | Age: 86
End: 2021-12-06

## 2021-12-06 DIAGNOSIS — I12.9 PARENCHYMAL RENAL HYPERTENSION, STAGE 1 THROUGH STAGE 4 OR UNSPECIFIED CHRONIC KIDNEY DISEASE: Primary | ICD-10-CM

## 2021-12-06 DIAGNOSIS — D63.1 ANEMIA IN STAGE 4 CHRONIC KIDNEY DISEASE (HCC): ICD-10-CM

## 2021-12-06 DIAGNOSIS — N18.4 ANEMIA IN STAGE 4 CHRONIC KIDNEY DISEASE (HCC): ICD-10-CM

## 2021-12-06 DIAGNOSIS — N18.4 STAGE 4 CHRONIC KIDNEY DISEASE (HCC): ICD-10-CM

## 2021-12-06 DIAGNOSIS — I10 ESSENTIAL HYPERTENSION: ICD-10-CM

## 2021-12-10 ENCOUNTER — TELEPHONE (OUTPATIENT)
Dept: NEPHROLOGY | Facility: CLINIC | Age: 86
End: 2021-12-10

## 2021-12-11 ENCOUNTER — IMMUNIZATIONS (OUTPATIENT)
Dept: FAMILY MEDICINE CLINIC | Facility: HOSPITAL | Age: 86
End: 2021-12-11

## 2021-12-11 DIAGNOSIS — Z23 ENCOUNTER FOR IMMUNIZATION: Primary | ICD-10-CM

## 2021-12-11 PROCEDURE — 0001A COVID-19 PFIZER VACC 0.3 ML: CPT

## 2021-12-11 PROCEDURE — 91300 COVID-19 PFIZER VACC 0.3 ML: CPT

## 2021-12-30 LAB
IGA SERPL-MCNC: 399 MG/DL (ref 70–320)
IGG SERPL-MCNC: 1379 MG/DL (ref 600–1540)
IGM SERPL-MCNC: 504 MG/DL (ref 50–300)
INTERPRETATION UR IFE-IMP: NORMAL

## 2022-02-14 ENCOUNTER — TELEPHONE (OUTPATIENT)
Dept: NEPHROLOGY | Facility: CLINIC | Age: 87
End: 2022-02-14

## 2022-02-14 DIAGNOSIS — N18.4 STAGE 4 CHRONIC KIDNEY DISEASE (HCC): Primary | ICD-10-CM

## 2022-02-14 NOTE — TELEPHONE ENCOUNTER
BARB for patient reminding him to go for fasting blood work and do his blood pressure readings for his appt on 2/21

## 2022-02-15 PROBLEM — E11.21 DIABETIC NEPHROPATHY ASSOCIATED WITH TYPE 2 DIABETES MELLITUS (HCC): Status: ACTIVE | Noted: 2022-02-15

## 2022-02-15 PROBLEM — N18.4 STAGE 4 CHRONIC KIDNEY DISEASE (HCC): Status: ACTIVE | Noted: 2022-02-15

## 2022-02-15 NOTE — PROGRESS NOTES
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  Of great concern is he is very noncompliant with medications and diet    · Baseline creatinine:  2 5-2 6  · Current creatinine:  2 08 actually below typical value  · Urine protein creatinine ratio:  0 40 g at goal  · UA:  No proteinuria and no hematuria  · Renal ultrasound:  11/26/2021:  Small echogenic kidneys no hydronephrosis:  7 3 x 7 2  Recommendations:  · Treat hypertension-please see below  · Treat dyslipidemia-please see below  · Maintain proteinuria less than 1 g or as low as possible  · Avoid nephrotoxic agents such as NSAIDs, and proton pump inhibitors if possible; patient counseled as such    2   Volume:  Euvolemic    3  Hypertension:       Current blood pressure averages:       None at this time    · Goal blood pressure:  Less than 130/80 given CKD    Recommendations:  · Push nonmedical regimen including weight loss, isotonic exercise and a low sodium diet  Patient has been counseled the such  · Can be very noncompliant  Workup:  · Aldosterone/renin:  Negative  · Thyroid profile:  · Renal workup as outlined above  · Hold on renal artery duplex unless cannot control blood pressure  · MedicationChanges today:    · No changes as patient seems to be under reasonably good control  Unfortunately he can not take:  Readings at this time    4  Electrolytes:   All normal    5  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:  · Calcium/magnesium/phosphorus:  · All normal  · PTH intact:  63 at goal  · Vitamin-D:  Will obtain next visit    6  Dyslipidemia:  · Goal LDL:  Less than 100  · Current lipid profile:  /HDL 56/triglycerides 58  Recommendations:    Low-cholesterol/low-fat diet / weight loss as appropriate and isotonic exercise    Medication changes today: To consider statin therapy    7  Anemia:  Most likely from CKD   Current hemoglobin:  10 7 at usual level   Iron studies:  Iron saturation and ferritin both acceptable   Multiple myeloma evaluation:  Faint band in IgM and lambda:  Probably reactive/inflammatory recommend follow-up in 3-6 months from December 27, 2021:  I will obtain in 3 months   Stool for occult blood study:   Last colonoscopy some years ago:  Encouraged formal GI evaluation:  I will refer to GI this time he is agreeable    8  Other problems:  · Diabetes mellitus type 2 per primary medical physician  · Dyslipidemia  · 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  · DDD of lumbar spine  · Osteoarthritis  · Bladder cancer  · Asthma  · Noncompliance with medication           GI HEALTH MAINTENANCE: LAST COLONOSCOPY:  Many years ago needs follow-up at this time if agreeable given anemia:  He is agreeable  PATIENT INSTRUCTIONS:    Patient Instructions   1  Medication changes today:   Please begin atorvastatin 10 mg daily for your cholesterol  Please call if you developed any body aches or joint pains  2  Please go for non fasting  lab work 6 weeks after making the above medication change      3  Please take 1 week a blood pressure readings  at this time using your wrist blood pressure cuff     AS FOLLOWS  MORNING AND EVENING, SITTING  as follows:  · TAKE THE MORNING READINGS BEFORE ANY MEDICATIONS AND WHEN YOU ARE RELAXED FOR SEVERAL MINUTES  · TAKE THE EVENING READINGS:  BETWEEN 7-10 P M ; PRIOR TO ANY MEDICATIONS; AT LEAST IN OUR  FROM DINNER; AND CERTAINLY AFTER RELAXING FOR A FEW MINUTES  · PLEASE INCLUDE HEART RATE WITH YOUR BLOOD PRESSURE READINGS  · When taking standing readings, keep your arm supported at heart level and not dangling  · Make sure you are sitting with your back supported and feet on the ground and do not cross your legs or feet  · Make sure you have not taken any coffee or caffeine products or exercised or smoke cigarettes at least 30 minutes before taking your blood pressure  Then please mail these readings into the office    4  Follow-up in 4  months:  But please go for lab work in 3 months   Please bring in 1 week a blood pressure readings morning evening, sitting and standing is outlined above   PLEASE BRING AN YOUR BLOOD PRESSURE MACHINE TO CORRELATE WITH THE OFFICE MACHINE AT THIS NEXT SCHEDULED VISIT       5  General instructions:   AVOID SALT BUT NOT ADDING AN READING LABELS TO MAKE SURE THERE IS LOW-SALT IN THE FOOD THAT YOU ARE EATING  o Goal is less than 2 g of sodium intake or less than 5 g of sodium chloride intake per day     Avoid nonsteroidal anti-inflammatory drugs such as Naprosyn, ibuprofen, Aleve, Advil, Celebrex, Meloxicam (Mobic) etc   You can use Tylenol as needed if you do not have any liver condition to be concerned about     Avoid medications such as Sudafed or decongestants and antihistamines that contained the D component which is the decongestant  You can take antihistamines without the decongestant or D component   Try to avoid medications such as pantoprazole or  Protonix/Nexium or Esomeprazole)/Prilosec or omeprazole/Prevacid or lansoprazole/AcipHex or Rabeprazole  If you are able to, use Pepcid as this is safer for your kidneys   Try to stay as active as possible     Please do not drink more than 2 glasses of alcohol/wine on a daily basis as this may contribute to your high blood pressure  6  Please make an appointment to see the gastroenterologist we will help to set this up for you to obtain a colonoscopy given your anemia           Subjective: There has been no hospitalizations or acute illnesses since last visit    The patient overall is feeling well  No fevers, chills, or cough or colds  Good appetite and good energy  No hematuria, dysuria, voiding symptoms or foamy urine  No gastrointestinal symptoms  No cardiovascular symptoms including swelling of the legs  No headaches, dizziness or lightheadedness  Blood pressure medications:   Amlodipine 5 mg daily      ROS:  See HPI, otherwise review of systems as completely reviewed with the patient are negative    Past Medical History:   Diagnosis Date    Cataracts, bilateral      Past Surgical History:   Procedure Laterality Date    CATARACT EXTRACTION Bilateral 07/15/2012    COLONOSCOPY      CYSTOSCOPY  01/15/2018    Last assessed 9/14/17, diagnostic    HERNIA REPAIR      INSTILLATION MYTOMYCIN N/A 10/25/2017    Procedure: INSTILLATION OF MITOMYCIN C;  Surgeon: Ramos Bill MD;  Location: [facility];  Service: Urology    FL CYSTOURETHROSCOPY,FULGUR <0 5 CM LESN N/A 10/25/2017    Procedure: Dhara Lime; BLADDER BIOPSY WITH Treasure Peeling;  Surgeon: Ramos Bill MD;  Location: [facility];  Service: Urology    TONSILLECTOMY      TRANSURETHRAL RESECTION OF BLADDER TUMOR N/A 10/25/2017    Procedure: TUR BLADDER TUMOR;  Surgeon: Ramos Bill MD;  Location: [facility];  Service: Urology    WISDOM TOOTH EXTRACTION       Family History   Problem Relation Age of Onset    Diabetes Mother       reports that he quit smoking about 57 years ago  He has never used smokeless tobacco  He reports that he does not drink alcohol and does not use drugs      I COMPLETELY REVIEWED THE PAST MEDICAL HISTORY/PAST SURGICAL HISTORY/SOCIAL HISTORY/FAMILY HISTORY/AND MEDICATIONS  AND UPDATED ALL    Objective:     Vitals:   BP sitting on left:  136/70 with a heart rate 72 and regular  BP standing on left:  146/70 with a heart rate of 80 and regular    Weight (last 2 days)     Date/Time Weight    02/21/22 1258 64 4 (142)        Wt Readings from Last 3 Encounters:   02/21/22 64 4 kg (142 lb) 11/19/21 66 7 kg (147 lb)   09/28/21 65 5 kg (144 lb 6 4 oz)       Body mass index is 24 37 kg/m²      Physical Exam: General:  No acute distress  Skin:  No acute rash  Eyes:  No scleral icterus, noninjected, no discharge from eyes  ENT:  Moist mucous membranes  Neck:  Supple, no jugular venous distention, trachea is midline, no lymphadenopathy and no thyromegaly  Back   No CVAT  Chest:  Clear to auscultation and percussion, good respiratory effort  CVS:  Regular rate and rhythm without a rub, or gallops or murmurs  Abdomen:  Soft and nontender with normal bowel sounds  Extremities:  No cyanosis and no edema, no arthritic changes, normal range of motion  Neuro:  Grossly intact  Psych:  Alert, oriented x3 and appropriate      Medications:    Current Outpatient Medications:     amLODIPine (NORVASC) 5 mg tablet, Take 1 tablet (5 mg total) by mouth daily, Disp: 30 tablet, Rfl: 5    aspirin 325 mg tablet, Take 325 mg by mouth daily, Disp: , Rfl:     brimonidine tartrate 0 2 % ophthalmic solution, INSTILL 1 DROP INTO EACH EYE THREE TIMES DAILY, Disp: , Rfl:     Cholecalciferol (VITAMIN D3) 1000 units CAPS, Take by mouth in the morning  , Disp: , Rfl:     Cyanocobalamin (Vitamin B-12) 1000 MCG/15ML LIQD, Take by mouth daily, Disp: , Rfl:     Dorzolamide HCl-Timolol Mal PF 2-0 5 % SOLN, INSTILL 1 DROP INTO EACH EYE TWICE DAILY, Disp: , Rfl:     fluticasone-salmeterol (Advair Diskus) 100-50 mcg/dose inhaler, Inhale 1 puff 2 (two) times a day (Patient taking differently: Inhale 1 puff if needed  ), Disp: 1 Inhaler, Rfl: 3    atorvastatin (LIPITOR) 10 mg tablet, Take 1 tablet (10 mg total) by mouth daily, Disp: 30 tablet, Rfl: 5    bimatoprost (LUMIGAN) 0 01 % ophthalmic drops, Apply to eye (Patient not taking: Reported on 11/19/2021 ), Disp: , Rfl:     cyanocobalamin (VITAMIN B-12) 500 mcg tablet, Take 500 mcg by mouth daily (Patient not taking: Reported on 11/19/2021 ), Disp: , Rfl:     glucose blood (OneTouch Verio) test strip, USE  STRIP TO CHECK GLUCOSE ONCE DAILY  DX: E11 9, Disp: 50 each, Rfl: 6    lidocaine (LMX) 4 % cream, Apply topically as needed for mild pain (Patient not taking: Reported on 11/19/2021 ), Disp: 30 g, Rfl: 0    loteprednol etabonate (LOTEMAX) 0 5 % ophthalmic suspension, Apply to eye (Patient not taking: Reported on 11/19/2021 ), Disp: , Rfl:     tamsulosin (FLOMAX) 0 4 mg, Take 1 capsule by mouth (Patient not taking: Reported on 11/19/2021 ), Disp: , Rfl:     timolol (TIMOPTIC) 0 5 % ophthalmic solution, Administer 1 drop to both eyes 2 (two) times a day (Patient not taking: Reported on 11/19/2021 ), Disp: , Rfl:     Lab, Imaging and other studies: I have personally reviewed pertinent labs    Laboratory Results:  Results for orders placed or performed in visit on 02/18/22   Lipid Panel with Direct LDL reflex   Result Value Ref Range    Total Cholesterol 191 <200 mg/dL    HDL 56 > OR = 40 mg/dL    Triglycerides 58 <150 mg/dL    LDL Calculated 121 (H) mg/dL (calc)    Chol HDLC Ratio 3 4 <5 0 (calc)    Non-HDL Cholesterol 135 (H) <130 mg/dL (calc)   PTH, Intact and Calcium   Result Value Ref Range    PTH, Intact 63 14 - 64 pg/mL    Calcium 9 3 8 6 - 10 3 mg/dL   Magnesium   Result Value Ref Range    Magnesium, Serum 2 3 1 5 - 2 5 mg/dL   Phosphorus   Result Value Ref Range    Phosphorus, Serum 3 1 2 1 - 4 3 mg/dL   TIBC   Result Value Ref Range    Iron, Serum 96 50 - 180 mcg/dL    Total Iron Binding Capacity (TIBC) 253 250 - 425 mcg/dL (calc)    Iron Saturation 38 20 - 48 % (calc)   Comprehensive metabolic panel   Result Value Ref Range    Glucose, Random 134 (H) 65 - 99 mg/dL    BUN 33 (H) 7 - 25 mg/dL    Creatinine 2 08 (H) 0 70 - 1 11 mg/dL    eGFR Non  28 (L) > OR = 60 mL/min/1 73m2    eGFR  32 (L) > OR = 60 mL/min/1 73m2    SL AMB BUN/CREATININE RATIO 16 6 - 22 (calc)    Sodium 140 135 - 146 mmol/L    Potassium 4 1 3 5 - 5 3 mmol/L    Chloride 108 98 - 110 mmol/L    CO2 24 20 - 32 mmol/L    Calcium 9 3 8 6 - 10 3 mg/dL    Protein, Total 7 8 6 1 - 8 1 g/dL    Albumin 4 1 3 6 - 5 1 g/dL    Globulin 3 7 1 9 - 3 7 g/dL (calc)    Albumin/Globulin Ratio 1 1 1 0 - 2 5 (calc)    TOTAL BILIRUBIN 0 7 0 2 - 1 2 mg/dL    Alkaline Phosphatase 58 35 - 144 U/L    AST 18 10 - 35 U/L    ALT 11 9 - 46 U/L   CBC and differential   Result Value Ref Range    White Blood Cell Count 7 9 3 8 - 10 8 Thousand/uL    Red Blood Cell Count 3 37 (L) 4 20 - 5 80 Million/uL    Hemoglobin 10 7 (L) 13 2 - 17 1 g/dL    HCT 32 7 (L) 38 5 - 50 0 %    MCV 97 0 80 0 - 100 0 fL    MCH 31 8 27 0 - 33 0 pg    MCHC 32 7 32 0 - 36 0 g/dL    RDW 11 8 11 0 - 15 0 %    Platelet Count 812 563 - 400 Thousand/uL    SL AMB MPV 11 3 7 5 - 12 5 fL    Neutrophils (Absolute) 5,514 1,500 - 7,800 cells/uL    Lymphocytes (Absolute) 1,406 850 - 3,900 cells/uL    Monocytes (Absolute) 648 200 - 950 cells/uL    Eosinophils (Absolute) 292 15 - 500 cells/uL    Basophils ABS 40 0 - 200 cells/uL    Neutrophils 69 8 %    Lymphocytes 17 8 %    Monocytes 8 2 %    Eosinophils 3 7 %    Basophils PCT 0 5 %    Always Message     Ferritin   Result Value Ref Range    Ferritin 378 24 - 380 ng/mL   Protein, Total w/Creat, Random Urine   Result Value Ref Range    Creatinine, Urine 95 20 - 320 mg/dL    Protein/Creat Ratio 400 (H) 22 - 128 mg/g creat    Protein/Creat Ratio 0 400 (H) 0 022 - 0 128 mg/mg creat    Total Protein, Urine 38 (H) 5 - 25 mg/dL       Results from last 7 days   Lab Units 02/18/22  1342   WHITE BLOOD CELL COUNT  Thousand/uL 7 9   HEMOGLOBIN  g/dL 10 7*   HEMATOCRIT  % 32 7*   PLATELETS  Thousand/uL 183   POTASSIUM mmol/L 4 1   CHLORIDE mmol/L 108   CO2 mmol/L 24   BUN mg/dL 33*   CREATININE mg/dL 2 08*   CALCIUM mg/dL 9 3  9 3   MAGNESIUM mg/dL 2 3         Radiology review:   chest X-ray    Ultrasound      Portions of the record may have been created with voice recognition software    Occasional wrong word or "sound a like" substitutions may have occurred due to the inherent limitations of voice recognition software  Read the chart carefully and recognize, using context, where substitutions have occurred

## 2022-02-16 NOTE — TELEPHONE ENCOUNTER
Farrah Figueroa MD  P Nephrology [facility] [place] [facility] Clinical  The patient needs lab work as ordered prior to the appointment   Please add the following labs to the ones that I did order already   SPEP/UPEP/light chain ratio at this time   Thank you

## 2022-02-18 ENCOUNTER — TELEPHONE (OUTPATIENT)
Dept: NEPHROLOGY | Facility: CLINIC | Age: 87
End: 2022-02-18

## 2022-02-18 NOTE — TELEPHONE ENCOUNTER
COVID Screening   When was screening done? During Appt Reminder Call    Does patient have any symptoms? no    Was patient told to let us know if they develop symptoms? yes    Patient voiced understanding   Appointment Confirmation   Person confirmed appointment with  If not patient, name of the person Spouse   Date and time of appointment 2/21 1:00     Patient acknowledged and will be at appointment?  yes   Did you advise the patient to bring their current medications for verification? (including any OTC) yes    Additional Information

## 2022-02-19 LAB
ALBUMIN SERPL-MCNC: 4.1 G/DL (ref 3.6–5.1)
ALBUMIN/GLOB SERPL: 1.1 (CALC) (ref 1–2.5)
ALP SERPL-CCNC: 58 U/L (ref 35–144)
ALT SERPL-CCNC: 11 U/L (ref 9–46)
AST SERPL-CCNC: 18 U/L (ref 10–35)
BASOPHILS # BLD AUTO: 40 CELLS/UL (ref 0–200)
BASOPHILS NFR BLD AUTO: 0.5 %
BILIRUB SERPL-MCNC: 0.7 MG/DL (ref 0.2–1.2)
BUN SERPL-MCNC: 33 MG/DL (ref 7–25)
BUN/CREAT SERPL: 16 (CALC) (ref 6–22)
CALCIUM SERPL-MCNC: 9.3 MG/DL (ref 8.6–10.3)
CALCIUM SERPL-MCNC: 9.3 MG/DL (ref 8.6–10.3)
CHLORIDE SERPL-SCNC: 108 MMOL/L (ref 98–110)
CHOLEST SERPL-MCNC: 191 MG/DL
CHOLEST/HDLC SERPL: 3.4 (CALC)
CO2 SERPL-SCNC: 24 MMOL/L (ref 20–32)
CREAT SERPL-MCNC: 2.08 MG/DL (ref 0.7–1.11)
CREAT UR-MCNC: 95 MG/DL (ref 20–320)
EOSINOPHIL # BLD AUTO: 292 CELLS/UL (ref 15–500)
EOSINOPHIL NFR BLD AUTO: 3.7 %
ERYTHROCYTE [DISTWIDTH] IN BLOOD BY AUTOMATED COUNT: 11.8 % (ref 11–15)
FERRITIN SERPL-MCNC: 378 NG/ML (ref 24–380)
GLOBULIN SER CALC-MCNC: 3.7 G/DL (CALC) (ref 1.9–3.7)
GLUCOSE SERPL-MCNC: 134 MG/DL (ref 65–99)
HCT VFR BLD AUTO: 32.7 % (ref 38.5–50)
HDLC SERPL-MCNC: 56 MG/DL
HGB BLD-MCNC: 10.7 G/DL (ref 13.2–17.1)
IRON SATN MFR SERPL: 38 % (CALC) (ref 20–48)
IRON SERPL-MCNC: 96 MCG/DL (ref 50–180)
LDLC SERPL CALC-MCNC: 121 MG/DL (CALC)
LYMPHOCYTES # BLD AUTO: 1406 CELLS/UL (ref 850–3900)
LYMPHOCYTES NFR BLD AUTO: 17.8 %
MAGNESIUM SERPL-MCNC: 2.3 MG/DL (ref 1.5–2.5)
MCH RBC QN AUTO: 31.8 PG (ref 27–33)
MCHC RBC AUTO-ENTMCNC: 32.7 G/DL (ref 32–36)
MCV RBC AUTO: 97 FL (ref 80–100)
MONOCYTES # BLD AUTO: 648 CELLS/UL (ref 200–950)
MONOCYTES NFR BLD AUTO: 8.2 %
NEUTROPHILS # BLD AUTO: 5514 CELLS/UL (ref 1500–7800)
NEUTROPHILS NFR BLD AUTO: 69.8 %
NONHDLC SERPL-MCNC: 135 MG/DL (CALC)
PHOSPHATE SERPL-MCNC: 3.1 MG/DL (ref 2.1–4.3)
PLATELET # BLD AUTO: 183 THOUSAND/UL (ref 140–400)
PMV BLD REES-ECKER: 11.3 FL (ref 7.5–12.5)
POTASSIUM SERPL-SCNC: 4.1 MMOL/L (ref 3.5–5.3)
PROT SERPL-MCNC: 7.8 G/DL (ref 6.1–8.1)
PROT UR-MCNC: 38 MG/DL (ref 5–25)
PROT/CREAT UR: 0.4 MG/MG CREAT (ref 0.02–0.13)
PROT/CREAT UR: 400 MG/G CREAT (ref 22–128)
PTH-INTACT SERPL-MCNC: 63 PG/ML (ref 14–64)
RBC # BLD AUTO: 3.37 MILLION/UL (ref 4.2–5.8)
SL AMB EGFR AFRICAN AMERICAN: 32 ML/MIN/1.73M2
SL AMB EGFR NON AFRICAN AMERICAN: 28 ML/MIN/1.73M2
SODIUM SERPL-SCNC: 140 MMOL/L (ref 135–146)
TIBC SERPL-MCNC: 253 MCG/DL (CALC) (ref 250–425)
TRIGL SERPL-MCNC: 58 MG/DL
WBC # BLD AUTO: 7.9 THOUSAND/UL (ref 3.8–10.8)

## 2022-02-21 ENCOUNTER — OFFICE VISIT (OUTPATIENT)
Dept: NEPHROLOGY | Facility: CLINIC | Age: 87
End: 2022-02-21
Payer: MEDICARE

## 2022-02-21 VITALS — HEIGHT: 64 IN | WEIGHT: 142 LBS | BODY MASS INDEX: 24.24 KG/M2

## 2022-02-21 DIAGNOSIS — D63.1 ANEMIA IN STAGE 4 CHRONIC KIDNEY DISEASE (HCC): ICD-10-CM

## 2022-02-21 DIAGNOSIS — E78.5 DYSLIPIDEMIA: ICD-10-CM

## 2022-02-21 DIAGNOSIS — I12.9 PARENCHYMAL RENAL HYPERTENSION, STAGE 1 THROUGH STAGE 4 OR UNSPECIFIED CHRONIC KIDNEY DISEASE: Primary | ICD-10-CM

## 2022-02-21 DIAGNOSIS — N18.4 STAGE 4 CHRONIC KIDNEY DISEASE (HCC): ICD-10-CM

## 2022-02-21 DIAGNOSIS — E55.9 MILD VITAMIN D DEFICIENCY: ICD-10-CM

## 2022-02-21 DIAGNOSIS — E11.21 DIABETIC NEPHROPATHY ASSOCIATED WITH TYPE 2 DIABETES MELLITUS (HCC): ICD-10-CM

## 2022-02-21 DIAGNOSIS — N18.4 ANEMIA IN STAGE 4 CHRONIC KIDNEY DISEASE (HCC): ICD-10-CM

## 2022-02-21 PROCEDURE — 99214 OFFICE O/P EST MOD 30 MIN: CPT | Performed by: INTERNAL MEDICINE

## 2022-02-21 RX ORDER — ATORVASTATIN CALCIUM 10 MG/1
10 TABLET, FILM COATED ORAL DAILY
Qty: 30 TABLET | Refills: 5 | Status: SHIPPED | OUTPATIENT
Start: 2022-02-21 | End: 2022-04-08 | Stop reason: SDUPTHER

## 2022-02-21 NOTE — PATIENT INSTRUCTIONS
1  Medication changes today:   Please begin atorvastatin 10 mg daily for your cholesterol  Please call if you developed any body aches or joint pains  2  Please go for non fasting  lab work 6 weeks after making the above medication change  3  Please take 1 week a blood pressure readings  at this time using your wrist blood pressure cuff     AS FOLLOWS  MORNING AND EVENING, SITTING  as follows:  · TAKE THE MORNING READINGS BEFORE ANY MEDICATIONS AND WHEN YOU ARE RELAXED FOR SEVERAL MINUTES  · TAKE THE EVENING READINGS:  BETWEEN 7-10 P M ; PRIOR TO ANY MEDICATIONS; AT LEAST IN OUR  FROM DINNER; AND CERTAINLY AFTER RELAXING FOR A FEW MINUTES  · PLEASE INCLUDE HEART RATE WITH YOUR BLOOD PRESSURE READINGS  · When taking standing readings, keep your arm supported at heart level and not dangling  · Make sure you are sitting with your back supported and feet on the ground and do not cross your legs or feet  · Make sure you have not taken any coffee or caffeine products or exercised or smoke cigarettes at least 30 minutes before taking your blood pressure  Then please mail these readings into the office    4  Follow-up in 4  months:  But please go for lab work in 3 months   Please bring in 1 week a blood pressure readings morning evening, sitting and standing is outlined above   PLEASE BRING AN YOUR BLOOD PRESSURE MACHINE TO CORRELATE WITH THE OFFICE MACHINE AT THIS NEXT SCHEDULED VISIT       5   General instructions:   AVOID SALT BUT NOT ADDING AN READING LABELS TO MAKE SURE THERE IS LOW-SALT IN THE FOOD THAT YOU ARE EATING  o Goal is less than 2 g of sodium intake or less than 5 g of sodium chloride intake per day     Avoid nonsteroidal anti-inflammatory drugs such as Naprosyn, ibuprofen, Aleve, Advil, Celebrex, Meloxicam (Mobic) etc   You can use Tylenol as needed if you do not have any liver condition to be concerned about     Avoid medications such as Sudafed or decongestants and antihistamines that contained the D component which is the decongestant  You can take antihistamines without the decongestant or D component   Try to avoid medications such as pantoprazole or  Protonix/Nexium or Esomeprazole)/Prilosec or omeprazole/Prevacid or lansoprazole/AcipHex or Rabeprazole  If you are able to, use Pepcid as this is safer for your kidneys   Try to stay as active as possible     Please do not drink more than 2 glasses of alcohol/wine on a daily basis as this may contribute to your high blood pressure        6  Please make an appointment to see the gastroenterologist we will help to set this up for you to obtain a colonoscopy given your anemia

## 2022-02-21 NOTE — LETTER
February 21, 2022     Dena Goodman MD  [facility]    Patient: Chato Land   YOB: 1934   Date of Visit: 2/21/2022       Dear Dr Ruby Alexander: Thank you for referring Anita Leigh to me for evaluation  Below are my notes for this consultation  If you have questions, please do not hesitate to call me  I look forward to following your patient along with you  Sincerely,        Farrah Figueroa MD        CC: No Recipients  Farrah Figueroa MD  2/21/2022  1:31 PM  Sign when Signing Visit  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  Of great concern is he is very noncompliant with medications and diet    · Baseline creatinine:  2 5-2 6  · Current creatinine:  2 08 actually below typical value  · Urine protein creatinine ratio:  0 40 g at goal  · UA:  No proteinuria and no hematuria  · Renal ultrasound:  11/26/2021:  Small echogenic kidneys no hydronephrosis:  7 3 x 7 2  Recommendations:  · Treat hypertension-please see below  · Treat dyslipidemia-please see below  · Maintain proteinuria less than 1 g or as low as possible  · Avoid nephrotoxic agents such as NSAIDs, and proton pump inhibitors if possible; patient counseled as such    2   Volume:  Euvolemic    3  Hypertension:       Current blood pressure averages:       None at this time    · Goal blood pressure:  Less than 130/80 given CKD    Recommendations:  · Push nonmedical regimen including weight loss, isotonic exercise and a low sodium diet  Patient has been counseled the such    · Can be very noncompliant  Workup:  · Aldosterone/renin:  Negative  · Thyroid profile:  · Renal workup as outlined above  · Hold on renal artery duplex unless cannot control blood pressure  · MedicationChanges today:    · No changes as patient seems to be under reasonably good control  Unfortunately he can not take:  Readings at this time    4  Electrolytes:   All normal    5  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:  · Calcium/magnesium/phosphorus:  · All normal  · PTH intact:  63 at goal  · Vitamin-D:  Will obtain next visit    6  Dyslipidemia:  · Goal LDL:  Less than 100  · Current lipid profile:  /HDL 56/triglycerides 58  Recommendations:    Low-cholesterol/low-fat diet / weight loss as appropriate and isotonic exercise    Medication changes today: To consider statin therapy    7  Anemia:  Most likely from CKD   Current hemoglobin:  10 7 at usual level   Iron studies:  Iron saturation and ferritin both acceptable   Multiple myeloma evaluation:  Faint band in IgM and lambda:  Probably reactive/inflammatory recommend follow-up in 3-6 months from December 27, 2021:  I will obtain in 3 months   Stool for occult blood study:   Last colonoscopy some years ago:  Encouraged formal GI evaluation:  I will refer to GI this time he is agreeable    8  Other problems:  · Diabetes mellitus type 2 per primary medical physician  · Dyslipidemia  · 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  · DDD of lumbar spine  · Osteoarthritis  · Bladder cancer  · Asthma  · Noncompliance with medication           GI HEALTH MAINTENANCE: LAST COLONOSCOPY:  Many years ago needs follow-up at this time if agreeable given anemia:  He is agreeable  PATIENT INSTRUCTIONS:    Patient Instructions   1  Medication changes today:   Please begin atorvastatin 10 mg daily for your cholesterol  Please call if you developed any body aches or joint pains      2  Please go for non fasting  lab work 6 weeks after making the above medication change  3  Please take 1 week a blood pressure readings  at this time using your wrist blood pressure cuff     AS FOLLOWS  MORNING AND EVENING, SITTING  as follows:  · TAKE THE MORNING READINGS BEFORE ANY MEDICATIONS AND WHEN YOU ARE RELAXED FOR SEVERAL MINUTES  · TAKE THE EVENING READINGS:  BETWEEN 7-10 P M ; PRIOR TO ANY MEDICATIONS; AT LEAST IN OUR  FROM DINNER; AND CERTAINLY AFTER RELAXING FOR A FEW MINUTES  · PLEASE INCLUDE HEART RATE WITH YOUR BLOOD PRESSURE READINGS  · When taking standing readings, keep your arm supported at heart level and not dangling  · Make sure you are sitting with your back supported and feet on the ground and do not cross your legs or feet  · Make sure you have not taken any coffee or caffeine products or exercised or smoke cigarettes at least 30 minutes before taking your blood pressure  Then please mail these readings into the office    4  Follow-up in 4  months:  But please go for lab work in 3 months   Please bring in 1 week a blood pressure readings morning evening, sitting and standing is outlined above   PLEASE BRING AN YOUR BLOOD PRESSURE MACHINE TO CORRELATE WITH THE OFFICE MACHINE AT THIS NEXT SCHEDULED VISIT       5  General instructions:   AVOID SALT BUT NOT ADDING AN READING LABELS TO MAKE SURE THERE IS LOW-SALT IN THE FOOD THAT YOU ARE EATING  o Goal is less than 2 g of sodium intake or less than 5 g of sodium chloride intake per day     Avoid nonsteroidal anti-inflammatory drugs such as Naprosyn, ibuprofen, Aleve, Advil, Celebrex, Meloxicam (Mobic) etc   You can use Tylenol as needed if you do not have any liver condition to be concerned about     Avoid medications such as Sudafed or decongestants and antihistamines that contained the D component which is the decongestant  You can take antihistamines without the decongestant or D component       Try to avoid medications such as pantoprazole or  Protonix/Nexium or Esomeprazole)/Prilosec or omeprazole/Prevacid or lansoprazole/AcipHex or Rabeprazole  If you are able to, use Pepcid as this is safer for your kidneys   Try to stay as active as possible     Please do not drink more than 2 glasses of alcohol/wine on a daily basis as this may contribute to your high blood pressure  6  Please make an appointment to see the gastroenterologist we will help to set this up for you to obtain a colonoscopy given your anemia           Subjective: There has been no hospitalizations or acute illnesses since last visit  The patient overall is feeling well  No fevers, chills, or cough or colds  Good appetite and good energy  No hematuria, dysuria, voiding symptoms or foamy urine  No gastrointestinal symptoms  No cardiovascular symptoms including swelling of the legs  No headaches, dizziness or lightheadedness  Blood pressure medications:   Amlodipine 5 mg daily      ROS:  See HPI, otherwise review of systems as completely reviewed with the patient are negative    Past Medical History:   Diagnosis Date    Cataracts, bilateral      Past Surgical History:   Procedure Laterality Date    CATARACT EXTRACTION Bilateral 07/15/2012    COLONOSCOPY      CYSTOSCOPY  01/15/2018    Last assessed 9/14/17, diagnostic    HERNIA REPAIR      INSTILLATION MYTOMYCIN N/A 10/25/2017    Procedure: INSTILLATION OF MITOMYCIN C;  Surgeon: Ramya Rizzo MD;  [facility];  Service: Urology    [place] CYSTOURETHROSCOPY,FULGUR <0 5 CM NED N/A 10/25/2017    Procedure: Rasheed Morales; BLADDER BIOPSY WITH Anette Cornejo;  Surgeon: Ramya Rizzo MD;  [facility];  Service: Urology    TONSILLECTOMY      TRANSURETHRAL RESECTION OF BLADDER TUMOR N/A 10/25/2017    Procedure: TUR BLADDER TUMOR;  Surgeon: Ramya Rizzo MD;  [facility];  Service: Urology    WISDOM TOOTH EXTRACTION       Family History   Problem Relation Age of Onset    Diabetes Mother       reports that he quit smoking about 57 years ago  He has never used smokeless tobacco  He reports that he does not drink alcohol and does not use drugs  I COMPLETELY REVIEWED THE PAST MEDICAL HISTORY/PAST SURGICAL HISTORY/SOCIAL HISTORY/FAMILY HISTORY/AND MEDICATIONS  AND UPDATED ALL    Objective:     Vitals:   BP sitting on left:  136/70 with a heart rate 72 and regular  BP standing on left:  146/70 with a heart rate of 80 and regular    Weight (last 2 days)     Date/Time Weight    02/21/22 1258 64 4 (142)        Wt Readings from Last 3 Encounters:   02/21/22 64 4 kg (142 lb)   11/19/21 66 7 kg (147 lb)   09/28/21 65 5 kg (144 lb 6 4 oz)       Body mass index is 24 37 kg/m²      Physical Exam: General:  No acute distress  Skin:  No acute rash  Eyes:  No scleral icterus, noninjected, no discharge from eyes  ENT:  Moist mucous membranes  Neck:  Supple, no jugular venous distention, trachea is midline, no lymphadenopathy and no thyromegaly  Back   No CVAT  Chest:  Clear to auscultation and percussion, good respiratory effort  CVS:  Regular rate and rhythm without a rub, or gallops or murmurs  Abdomen:  Soft and nontender with normal bowel sounds  Extremities:  No cyanosis and no edema, no arthritic changes, normal range of motion  Neuro:  Grossly intact  Psych:  Alert, oriented x3 and appropriate      Medications:    Current Outpatient Medications:     amLODIPine (NORVASC) 5 mg tablet, Take 1 tablet (5 mg total) by mouth daily, Disp: 30 tablet, Rfl: 5    aspirin 325 mg tablet, Take 325 mg by mouth daily, Disp: , Rfl:     brimonidine tartrate 0 2 % ophthalmic solution, INSTILL 1 DROP INTO EACH EYE THREE TIMES DAILY, Disp: , Rfl:     Cholecalciferol (VITAMIN D3) 1000 units CAPS, Take by mouth in the morning  , Disp: , Rfl:     Cyanocobalamin (Vitamin B-12) 1000 MCG/15ML LIQD, Take by mouth daily, Disp: , Rfl:     Dorzolamide HCl-Timolol Mal PF 2-0 5 % SOLN, INSTILL 1 DROP INTO EACH EYE TWICE DAILY, Disp: , Rfl:     fluticasone-salmeterol (Advair Diskus) 100-50 mcg/dose inhaler, Inhale 1 puff 2 (two) times a day (Patient taking differently: Inhale 1 puff if needed  ), Disp: 1 Inhaler, Rfl: 3    atorvastatin (LIPITOR) 10 mg tablet, Take 1 tablet (10 mg total) by mouth daily, Disp: 30 tablet, Rfl: 5    bimatoprost (LUMIGAN) 0 01 % ophthalmic drops, Apply to eye (Patient not taking: Reported on 11/19/2021 ), Disp: , Rfl:     cyanocobalamin (VITAMIN B-12) 500 mcg tablet, Take 500 mcg by mouth daily (Patient not taking: Reported on 11/19/2021 ), Disp: , Rfl:     glucose blood (OneTouch Verio) test strip, USE  STRIP TO CHECK GLUCOSE ONCE DAILY  DX: E11 9, Disp: 50 each, Rfl: 6    lidocaine (LMX) 4 % cream, Apply topically as needed for mild pain (Patient not taking: Reported on 11/19/2021 ), Disp: 30 g, Rfl: 0    loteprednol etabonate (LOTEMAX) 0 5 % ophthalmic suspension, Apply to eye (Patient not taking: Reported on 11/19/2021 ), Disp: , Rfl:     tamsulosin (FLOMAX) 0 4 mg, Take 1 capsule by mouth (Patient not taking: Reported on 11/19/2021 ), Disp: , Rfl:     timolol (TIMOPTIC) 0 5 % ophthalmic solution, Administer 1 drop to both eyes 2 (two) times a day (Patient not taking: Reported on 11/19/2021 ), Disp: , Rfl:     Lab, Imaging and other studies: I have personally reviewed pertinent labs    Laboratory Results:  Results for orders placed or performed in visit on 02/18/22   Lipid Panel with Direct LDL reflex   Result Value Ref Range    Total Cholesterol 191 <200 mg/dL    HDL 56 > OR = 40 mg/dL    Triglycerides 58 <150 mg/dL    LDL Calculated 121 (H) mg/dL (calc)    Chol HDLC Ratio 3 4 <5 0 (calc)    Non-HDL Cholesterol 135 (H) <130 mg/dL (calc)   PTH, Intact and Calcium   Result Value Ref Range    PTH, Intact 63 14 - 64 pg/mL    Calcium 9 3 8 6 - 10 3 mg/dL   Magnesium   Result Value Ref Range    Magnesium, Serum 2 3 1 5 - 2 5 mg/dL   Phosphorus   Result Value Ref Range    Phosphorus, Serum 3 1 2 1 - 4 3 mg/dL   TIBC   Result Value Ref Range    Iron, Serum 96 50 - 180 mcg/dL    Total Iron Binding Capacity (TIBC) 253 250 - 425 mcg/dL (calc)    Iron Saturation 38 20 - 48 % (calc)   Comprehensive metabolic panel   Result Value Ref Range    Glucose, Random 134 (H) 65 - 99 mg/dL    BUN 33 (H) 7 - 25 mg/dL    Creatinine 2 08 (H) 0 70 - 1 11 mg/dL    eGFR Non  28 (L) > OR = 60 mL/min/1 73m2    eGFR  32 (L) > OR = 60 mL/min/1 73m2    SL AMB BUN/CREATININE RATIO 16 6 - 22 (calc)    Sodium 140 135 - 146 mmol/L    Potassium 4 1 3 5 - 5 3 mmol/L    Chloride 108 98 - 110 mmol/L    CO2 24 20 - 32 mmol/L    Calcium 9 3 8 6 - 10 3 mg/dL    Protein, Total 7 8 6 1 - 8 1 g/dL    Albumin 4 1 3 6 - 5 1 g/dL    Globulin 3 7 1 9 - 3 7 g/dL (calc)    Albumin/Globulin Ratio 1 1 1 0 - 2 5 (calc)    TOTAL BILIRUBIN 0 7 0 2 - 1 2 mg/dL    Alkaline Phosphatase 58 35 - 144 U/L    AST 18 10 - 35 U/L    ALT 11 9 - 46 U/L   CBC and differential   Result Value Ref Range    White Blood Cell Count 7 9 3 8 - 10 8 Thousand/uL    Red Blood Cell Count 3 37 (L) 4 20 - 5 80 Million/uL    Hemoglobin 10 7 (L) 13 2 - 17 1 g/dL    HCT 32 7 (L) 38 5 - 50 0 %    MCV 97 0 80 0 - 100 0 fL    MCH 31 8 27 0 - 33 0 pg    MCHC 32 7 32 0 - 36 0 g/dL    RDW 11 8 11 0 - 15 0 %    Platelet Count 671 154 - 400 Thousand/uL    SL AMB MPV 11 3 7 5 - 12 5 fL    Neutrophils (Absolute) 5,514 1,500 - 7,800 cells/uL    Lymphocytes (Absolute) 1,406 850 - 3,900 cells/uL    Monocytes (Absolute) 648 200 - 950 cells/uL    Eosinophils (Absolute) 292 15 - 500 cells/uL    Basophils ABS 40 0 - 200 cells/uL    Neutrophils 69 8 %    Lymphocytes 17 8 %    Monocytes 8 2 %    Eosinophils 3 7 %    Basophils PCT 0 5 %    Always Message     Ferritin   Result Value Ref Range    Ferritin 378 24 - 380 ng/mL   Protein, Total w/Creat, Random Urine   Result Value Ref Range    Creatinine, Urine 95 20 - 320 mg/dL    Protein/Creat Ratio 400 (H) 22 - 128 mg/g creat    Protein/Creat Ratio 0 400 (H) 0 022 - 0 128 mg/mg creat    Total Protein, Urine 38 (H) 5 - 25 mg/dL       Results from last 7 days   Lab Units 02/18/22  1342   WHITE BLOOD CELL COUNT  Thousand/uL 7 9   HEMOGLOBIN  g/dL 10 7*   HEMATOCRIT  % 32 7*   PLATELETS  Thousand/uL 183   POTASSIUM mmol/L 4 1   CHLORIDE mmol/L 108   CO2 mmol/L 24   BUN mg/dL 33*   CREATININE mg/dL 2 08*   CALCIUM mg/dL 9 3  9 3   MAGNESIUM mg/dL 2 3         Radiology review:   chest X-ray    Ultrasound      Portions of the record may have been created with voice recognition software  Occasional wrong word or "sound a like" substitutions may have occurred due to the inherent limitations of voice recognition software  Read the chart carefully and recognize, using context, where substitutions have occurred

## 2022-02-22 ENCOUNTER — TELEPHONE (OUTPATIENT)
Dept: GASTROENTEROLOGY | Facility: CLINIC | Age: 87
End: 2022-02-22

## 2022-02-22 NOTE — TELEPHONE ENCOUNTER
----- Message from Fred Angel MD sent at 2/22/2022  8:27 AM EST -----  Can you get this patient in as soon as possible    Thank you  ----- Message -----  From: Jim Herrera MD  Sent: 2/22/2022   7:03 AM EST  To: Fred Angel MD    YW!  ----- Message -----  From: Fred Angel MD  Sent: 2/21/2022   9:42 PM EST  To: Jim Herrera MD    Thanks Lee Pedraza!  ----- Message -----  From: Jim Herrera MD  Sent: 2/21/2022   1:31 PM EST  To: Fred Angel MD    Good afternoon my friend  I believe this patient had a colonoscopy with you guys many years ago  He has chronic anemia possibly related to chronic disease/chronic kidney disease  He may potentially need a colonoscopy possible EGD  Thanks so much  We will make the referral  Lee Pedraza

## 2022-02-22 NOTE — TELEPHONE ENCOUNTER
I also put a message back to Dr Parag Bowens asking if he wants pt to be scheduled for FLIP or ov  Will watch for response from him

## 2022-02-22 NOTE — TELEPHONE ENCOUNTER
Received message from [address] [place] [address] ok to add pt for an office visit on Monday at 3pm on 2/28/22  However, waiting to hear back from Dr Eloina Hernández to see if he wants pt scheduled for procedures instead

## 2022-02-22 NOTE — TELEPHONE ENCOUNTER
I have a message to Rajat Thao asking if I can add pt to Dr Luiz hSah schedule on Monday 2/28/22 at 3pm   As soon as I have a response, I will call pt to schedule

## 2022-02-28 ENCOUNTER — OFFICE VISIT (OUTPATIENT)
Dept: GASTROENTEROLOGY | Facility: CLINIC | Age: 87
End: 2022-02-28
Payer: MEDICARE

## 2022-02-28 VITALS
BODY MASS INDEX: 25.1 KG/M2 | SYSTOLIC BLOOD PRESSURE: 174 MMHG | DIASTOLIC BLOOD PRESSURE: 86 MMHG | HEART RATE: 76 BPM | HEIGHT: 64 IN | WEIGHT: 147 LBS

## 2022-02-28 DIAGNOSIS — D61.89 ANEMIA DUE TO OTHER BONE MARROW FAILURE (HCC): Primary | ICD-10-CM

## 2022-02-28 DIAGNOSIS — N18.4 STAGE 4 CHRONIC KIDNEY DISEASE (HCC): ICD-10-CM

## 2022-02-28 DIAGNOSIS — C67.9 MALIGNANT NEOPLASM OF URINARY BLADDER, UNSPECIFIED SITE (HCC): ICD-10-CM

## 2022-02-28 PROCEDURE — 99204 OFFICE O/P NEW MOD 45 MIN: CPT | Performed by: INTERNAL MEDICINE

## 2022-02-28 NOTE — PROGRESS NOTES
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)  Patient is presented with drop in hemoglobin  Patient has a hemoglobin of 10  He has chronic kidney disease stage 4  There is a concern regarding possible GI blood loss  Patient is completely asymptomatic and denies any upper GI symptoms or change in bowel habits  There is no rectal bleeding or stool discolorations  He is having workup done regarding bladder cancer  He has had multiple cystoscopies performed  His bladder cancer appears to be stable and there is no active problem ongoing  Last cystoscopy did not show any recurrence of urothelial lesion  Long discussion with patient regarding endoscopy and colonoscopy regarding possible GI bleeding  Patient is completely asymptomatic  He is reluctant to undergo colonoscopy unless he absolutely needs to get it done  We have decided to get stool Hemoccult performed x3  And then will discuss again regarding GI workup  Patient is fully agreeable to this  He will be seen again in 3-4 weeks  - Occult Blood, Fecal Immunochemical; Future  - Occult Blood, Fecal Immunochemical; Future  - Occult Blood, Fecal Immunochemical; Future    2  Malignant neoplasm of urinary bladder, unspecified site [facility])  Patient is following with Urology  According to patient's wife his cancer is stable and under control  3  Stage 4 chronic kidney disease ([address] )  Patient has stage 4 chronic kidney disease  Possibility of anemia from this is highly likely  Consider erythropoietin level     ______________________________________________________________________    HPI:  Patient denies any abdominal pain or discomfort  He denies any heartburn indigestion  He does not have any significant GI symptoms  There is no dysphagia or odynophagia    He does not have any bloating or gassiness  He does not have any difficulty eating  There is no nausea or vomiting  He denies any change in bowel habits or rectal bleeding  He has not noticed any stool discoloration  He denies any melena  He denies any chest pain, cough or wheezing  He has overall decrease in activity from chronic kidney disease and anemia  There is no acute episodes of shortness of breath, cough or wheezing  REVIEW OF SYSTEMS:    CONSTITUTIONAL: Denies any fever, chills, rigors, and weight loss  HEENT: No earache or tinnitus  Denies hearing loss or visual disturbances  CARDIOVASCULAR: No chest pain or palpitations  RESPIRATORY: Denies any cough, hemoptysis, shortness of breath or dyspnea on exertion  GASTROINTESTINAL: As noted in the History of Present Illness  GENITOURINARY: No problems with urination  Denies any hematuria or dysuria  NEUROLOGIC: No dizziness or vertigo, denies headaches  MUSCULOSKELETAL: Denies any muscle or joint pain  SKIN: Denies skin rashes or itching  ENDOCRINE: Denies excessive thirst  Denies intolerance to heat or cold  PSYCHOSOCIAL: Denies depression or anxiety  Denies any recent memory loss         Historical Information   Past Medical History:   Diagnosis Date    Cataracts, bilateral      Past Surgical History:   Procedure Laterality Date    CATARACT EXTRACTION Bilateral 07/15/2012    COLONOSCOPY      CYSTOSCOPY  01/15/2018    Last assessed 9/14/17, diagnostic    HERNIA REPAIR      INSTILLATION MYTOMYCIN N/A 10/25/2017    Procedure: INSTILLATION OF MITOMYCIN C;  Surgeon: Ivet Driver MD;  [facility];  Service: Urology    [place] CYSTOURETHROSCOPY,FULGUR <0 5 CM LESN N/A 10/25/2017    Procedure: Deidra Pope; BLADDER BIOPSY WITH [place];  Surgeon: Ivet Driver MD;  [facility];  Service: Urology    TONSILLECTOMY      TRANSURETHRAL RESECTION OF BLADDER TUMOR N/A 10/25/2017    Procedure: TUR BLADDER TUMOR;  Surgeon: Ivet Driver MD;  [facility];  Service: Urology    WISDOM TOOTH EXTRACTION       Social History   Social History     Substance and Sexual Activity   Alcohol Use No    Alcohol/week: 0 0 standard drinks    Comment: quit 23 years ago     Social History     Substance and Sexual Activity   Drug Use No     Social History     Tobacco Use   Smoking Status Former Smoker    Quit date: 1965    Years since quittin 4   Smokeless Tobacco Never Used     Family History   Problem Relation Age of Onset    Diabetes Mother        Meds/Allergies       Current Outpatient Medications:     amLODIPine (NORVASC) 5 mg tablet    aspirin 325 mg tablet    atorvastatin (LIPITOR) 10 mg tablet    bimatoprost (LUMIGAN) 0 01 % ophthalmic drops    brimonidine tartrate 0 2 % ophthalmic solution    Cholecalciferol (VITAMIN D3) 1000 units CAPS    Cyanocobalamin (Vitamin B-12) 1000 MCG/15ML LIQD    cyanocobalamin (VITAMIN B-12) 500 mcg tablet    Dorzolamide HCl-Timolol Mal PF 2-0 5 % SOLN    fluticasone-salmeterol (Advair Diskus) 100-50 mcg/dose inhaler    glucose blood (OneTouch Verio) test strip    lidocaine (LMX) 4 % cream    loteprednol etabonate (LOTEMAX) 0 5 % ophthalmic suspension    tamsulosin (FLOMAX) 0 4 mg    timolol (TIMOPTIC) 0 5 % ophthalmic solution    Allergies   Allergen Reactions    Ace Inhibitors Cough     Other reaction(s): hyperkalemia    Penicillins GI Intolerance           Objective     Blood pressure (!) 174/86, pulse 76, height 5' 4" (1 626 m), weight 66 7 kg (147 lb)  Body mass index is 25 23 kg/m²  PHYSICAL EXAM:      General Appearance:   Alert, cooperative, no distress   HEENT:   Normocephalic, atraumatic, anicteric      Neck:  Supple, symmetrical, trachea midline   Lungs:   Clear to auscultation bilaterally; no rales, rhonchi or wheezing; respirations unlabored    Heart[de-identified]   Regular rate and rhythm; no murmur, rub, or gallop     Abdomen:   Soft, non-tender, non-distended; normal bowel sounds; no masses, no organomegaly    Genitalia:   Deferred    Rectal:   Deferred    Extremities:  No cyanosis, clubbing or edema    Pulses:  2+ and symmetric    Skin:  No jaundice, rashes, or lesions    Lymph nodes:  No palpable cervical lymphadenopathy        Lab Results:   No visits with results within 1 Day(s) from this visit     Latest known visit with results is:   Orders Only on 02/18/2022   Component Date Value    Total Cholesterol 02/18/2022 191     HDL 02/18/2022 56     Triglycerides 02/18/2022 58     LDL Calculated 02/18/2022 121*    Chol HDLC Ratio 02/18/2022 3 4     Non-HDL Cholesterol 02/18/2022 135*    PTH, Intact 02/18/2022 63     Calcium 02/18/2022 9 3     Magnesium, Serum 02/18/2022 2 3     Phosphorus, Serum 02/18/2022 3 1     Iron, Serum 02/18/2022 96     Total Iron Binding [place]* 02/18/2022 253     Iron Saturation 02/18/2022 38     Glucose, Random 02/18/2022 134*    BUN 02/18/2022 33*    Creatinine 02/18/2022 2 08*    eGFR Non  02/18/2022 28*    eGFR  02/18/2022 32*    SL AMB BUN/CREATININE RA* 02/18/2022 16     Sodium 02/18/2022 140     Potassium 02/18/2022 4 1     Chloride 02/18/2022 108     CO2 02/18/2022 24     Calcium 02/18/2022 9 3     Protein, Total 02/18/2022 7 8     Albumin 02/18/2022 4 1     Globulin 02/18/2022 3 7     Albumin/Globulin Ratio 02/18/2022 1 1     TOTAL BILIRUBIN 02/18/2022 0 7     Alkaline Phosphatase 02/18/2022 58     AST 02/18/2022 18     ALT 02/18/2022 11     White Blood Cell Count 02/18/2022 7 9     Red Blood Cell Count 02/18/2022 3 37*    Hemoglobin 02/18/2022 10 7*    HCT 02/18/2022 32 7*    MCV 02/18/2022 97 0     MCH 02/18/2022 31 8     MCHC 02/18/2022 32 7     RDW 02/18/2022 11 8     Platelet Count 87/29/2228 183     SL AMB MPV 02/18/2022 11 3     Neutrophils (Absolute) 02/18/2022 5,514     Lymphocytes (Absolute) 02/18/2022 1,406     Monocytes (Absolute) 02/18/2022 648     Eosinophils (Absolute) 02/18/2022 292     Basophils ABS 02/18/2022 40     Neutrophils 02/18/2022 69 8     Lymphocytes 02/18/2022 17 8     Monocytes 02/18/2022 8 2     Eosinophils 02/18/2022 3 7     Basophils PCT 02/18/2022 0 5     Always Message 02/18/2022      Ferritin 02/18/2022 378     Creatinine, Urine 02/18/2022 95     Protein/Creat Ratio 02/18/2022 400*    Protein/Creat Ratio 02/18/2022 0 400*    Total Protein, Urine 02/18/2022 38*         Radiology Results:   No results found

## 2022-03-08 ENCOUNTER — OFFICE VISIT (OUTPATIENT)
Dept: INTERNAL MEDICINE CLINIC | Age: 87
End: 2022-03-08
Payer: MEDICARE

## 2022-03-08 VITALS
BODY MASS INDEX: 24.75 KG/M2 | OXYGEN SATURATION: 97 % | WEIGHT: 145 LBS | TEMPERATURE: 98.1 F | HEIGHT: 64 IN | SYSTOLIC BLOOD PRESSURE: 130 MMHG | DIASTOLIC BLOOD PRESSURE: 74 MMHG | HEART RATE: 60 BPM

## 2022-03-08 DIAGNOSIS — E11.22 TYPE 2 DIABETES MELLITUS WITH STAGE 1 CHRONIC KIDNEY DISEASE, UNSPECIFIED WHETHER LONG TERM INSULIN USE (HCC): Primary | ICD-10-CM

## 2022-03-08 DIAGNOSIS — E78.5 DYSLIPIDEMIA: ICD-10-CM

## 2022-03-08 DIAGNOSIS — H34.8122 RETINAL VEIN OCCLUSION OF LEFT EYE, UNSPECIFIED RETINAL VEIN: ICD-10-CM

## 2022-03-08 DIAGNOSIS — N18.1 TYPE 2 DIABETES MELLITUS WITH STAGE 1 CHRONIC KIDNEY DISEASE, UNSPECIFIED WHETHER LONG TERM INSULIN USE (HCC): Primary | ICD-10-CM

## 2022-03-08 DIAGNOSIS — C67.9 MALIGNANT NEOPLASM OF URINARY BLADDER, UNSPECIFIED SITE (HCC): ICD-10-CM

## 2022-03-08 DIAGNOSIS — J45.20 MILD INTERMITTENT ASTHMA WITHOUT COMPLICATION: ICD-10-CM

## 2022-03-08 DIAGNOSIS — H40.9 GLAUCOMA OF BOTH EYES, UNSPECIFIED GLAUCOMA TYPE: ICD-10-CM

## 2022-03-08 LAB — SL AMB POCT HEMOGLOBIN AIC: 6.7 (ref ?–6.5)

## 2022-03-08 PROCEDURE — 83036 HEMOGLOBIN GLYCOSYLATED A1C: CPT | Performed by: INTERNAL MEDICINE

## 2022-03-08 PROCEDURE — 99214 OFFICE O/P EST MOD 30 MIN: CPT | Performed by: INTERNAL MEDICINE

## 2022-03-08 RX ORDER — LATANOPROSTENE BUNOD 0.24 MG/ML
1 SOLUTION/ DROPS OPHTHALMIC
COMMUNITY
Start: 2022-03-01 | End: 2022-03-08

## 2022-03-08 NOTE — PATIENT INSTRUCTIONS
Diabetic Kidney Disease   AMBULATORY CARE:   Diabetic kidney disease  (DKD) is the gradual and permanent loss of kidney function  This occurs because of kidney damage caused by high blood sugar levels  Normally, the kidneys remove fluid, chemicals, and waste from your blood  These wastes are turned into urine by your kidneys  When you have DKD, your kidneys do not function properly  DKD may worsen over time and lead to kidney failure  Common signs and symptoms of DKD include the following: Your signs and symptoms will depend on how well your kidneys work  You may not have any symptoms, or you may have any of the following:  · Changes in how often you need to urinate    · Ankle and leg swelling    · Fatigue or weakness    · Itching    · Muscle cramps    · Nausea, vomiting, or loss of appetite    Call 911 for any of the following:   · You have a seizure  · You have sudden chest pain or shortness of breath  Seek care immediately:   · Your heart is beating faster than normal for you  · You are confused and very drowsy  Call your care team provider if:   · You suddenly gain or lose more weight than your care team provider has told you is okay  · You have itchy skin or a rash  · You have nausea and repeated vomiting  · You have fatigue or muscle weakness  · You have an increased need to urinate, burning or pain when you urinate, blood in your urine, or strong odor to your urine  · You have questions or concerns about your condition or care  The goals of treatment  are to control your symptoms and prevent your DKD from getting worse  You may need any of the following:  · Medicines  may be given to decrease blood pressure and get rid of extra fluid  Blood pressure medicines can help to slow down the loss of kidney function  · Dialysis  is a treatment that may be needed to remove chemicals and waste from your blood when your kidneys can no longer do this      · Surgery  may be needed to create an arteriovenous fistula (AVF) in your arm or insert a catheter into your abdomen or chest  This is done so you can receive dialysis  · A kidney transplant  may be done if your DKD becomes severe  Manage DKD:   · Control your blood sugar levels  If you use insulin or take diabetes medicine, take these as directed  Follow the meal and activity plan recommended by your care team provider  Check your blood sugar levels every day, as often as your care team provider has recommended  Your care team provider may want you to have your A1c checked every 3 to 6 months  Most people should keep their A1c at or below 7%  · Follow your meal plan as directed  You may need to eat only a certain amount of protein at each meal  You may also need to track your sodium (salt) and potassium intake  Work with your dietitian to develop a meal plan that is right for you  · Check your blood pressure as directed  High blood pressure can damage the blood vessels in your kidneys  This prevents your kidneys from working correctly and increases your risk for DKD  A normal blood pressure is 119/79 or lower  Talk to your care team provider about your blood pressure goals  Together you can create a plan to lower your blood pressure if needed and keep it in a healthy range  The plan may include lifestyle changes or medicines to lower your blood pressure  · Talk to your care team provider about over-the-counter (OTC) medicines you should avoid  Some OTC medicines, such as ibuprofen, can damage your kidneys  Follow up with your care team provider as directed: You will need to return for tests to monitor your kidney function  You may also be referred to a kidney specialist  Write down your questions so you remember to ask them during your visits  © Copyright [employer] 2022 Information is for End User's use only and may not be sold, redistributed or otherwise used for commercial purposes   All illustrations and images included in CareNotes® are the copyrighted property of A D [employer] M [employer]  or [employer]  The above information is an  only  It is not intended as medical advice for individual conditions or treatments  Talk to your doctor, nurse or pharmacist before following any medical regimen to see if it is safe and effective for you

## 2022-03-08 NOTE — PROGRESS NOTES
Assessment/Plan:    Type 2 diabetes mellitus with diabetic chronic kidney disease ([facility] )    Lab Results   Component Value Date    HGBA1C 6 7 (A) 03/08/2022   He has good control of his diabetes at present and his renal status remained stable  He does follow with renal    Diabetes mellitus, type II ([facility] )    Lab Results   Component Value Date    HGBA1C 6 7 (A) 03/08/2022   He currently is diet controlled    Glaucoma  He continues with Ophthalmology in his 3 drops for his glaucoma    Dyslipidemia  Patient continues with the simvastatin finally went for a lipid profile about a month ago  His LDL remains high at 121 but the rest of profile is relatively normal    Vision loss of left eye  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 ([facility] )  Lab Results   Component Value Date    EGFR 27 08/27/2018    EGFR 32 10/19/2017    CREATININE 2 08 (H) 02/18/2022    CREATININE 2 14 (H) 11/26/2021    CREATININE 2 56 (H) 09/27/2021   He remains anemic but not symptomatic probably on the basis of his kidney disease    Malignant neoplasm of urinary bladder ([facility] )  He follows with Urology    Asthma, mild intermittent  He rarely if ever uses his inhaler       Diagnoses and all orders for this visit:    Type 2 diabetes mellitus with stage 1 chronic kidney disease, unspecified whether long term insulin use ([facility] )  -     POCT hemoglobin A1c    Dyslipidemia    Glaucoma of both eyes, unspecified glaucoma type    Malignant neoplasm of urinary bladder, unspecified site ([place] [address] )    Retinal vein occlusion of left eye, unspecified retinal vein    Mild intermittent asthma without complication    Other orders  -     Discontinue: Vyzulta 0 024 % SOLN; Administer 1 drop to both eyes daily at bedtime          Subjective:      Patient ID: Manuel Mendoza is a 80 y o  male  Diabetes  He presents for his follow-up diabetic visit  He has type 2 diabetes mellitus   No MedicAlert identification noted  His disease course has been stable  There are no hypoglycemic associated symptoms  Pertinent negatives for hypoglycemia include no confusion, dizziness or headaches  Associated symptoms include blurred vision  Pertinent negatives for diabetes include no chest pain, no fatigue, no foot paresthesias, no foot ulcerations, no polydipsia, no polyphagia, no polyuria and no weakness  Symptoms are stable  Diabetic complications include nephropathy  Risk factors for coronary artery disease include diabetes mellitus, dyslipidemia, hypertension, male sex and sedentary lifestyle  Current diabetic treatment includes diet  His weight is stable  He is following a low fat/cholesterol and diabetic diet  Meal planning includes avoidance of concentrated sweets  He has not had a previous visit with a dietitian  He rarely participates in exercise  His home blood glucose trend is fluctuating minimally  An ACE inhibitor/angiotensin II receptor blocker is contraindicated  Eye exam is current  Review of Systems   Constitutional: Negative for chills, fatigue, fever and unexpected weight change  HENT: Positive for hearing loss  Negative for congestion, ear pain, postnasal drip, sinus pressure, sore throat, trouble swallowing and voice change  Eyes: Positive for blurred vision and visual disturbance  Respiratory: Negative for cough, chest tightness, shortness of breath and wheezing  Cardiovascular: Negative for chest pain, palpitations and leg swelling  Gastrointestinal: Negative for abdominal distention, abdominal pain, anal bleeding, blood in stool, constipation, diarrhea and nausea  Endocrine: Negative for cold intolerance, polydipsia, polyphagia and polyuria  Genitourinary: Negative for dysuria, flank pain, frequency, hematuria and urgency  Musculoskeletal: Negative for arthralgias, back pain, gait problem, joint swelling, myalgias and neck pain  Skin: Negative for rash     Allergic/Immunologic: Negative for immunocompromised state  Neurological: Negative for dizziness, syncope, facial asymmetry, weakness, light-headedness, numbness and headaches  Hematological: Negative for adenopathy  Psychiatric/Behavioral: Negative for confusion, sleep disturbance and suicidal ideas  Objective:      /74 (BP Location: Left arm, Patient Position: Sitting)   Pulse 60   Temp 98 1 °F (36 7 °C) (Tympanic)   Ht 5' 4" (1 626 m)   Wt 65 8 kg (145 lb)   SpO2 97%   BMI 24 89 kg/m²          Physical Exam  Constitutional:       General: He is not in acute distress  Appearance: Normal appearance  He is well-developed  HENT:      Right Ear: External ear normal       Left Ear: External ear normal       Nose: Nose normal       Mouth/Throat:      Pharynx: No oropharyngeal exudate  Eyes:      Pupils: Pupils are equal, round, and reactive to light  Neck:      Thyroid: No thyromegaly  Vascular: No carotid bruit or JVD  Cardiovascular:      Rate and Rhythm: Normal rate and regular rhythm  Heart sounds: Normal heart sounds  No murmur heard  No gallop  Pulmonary:      Effort: Pulmonary effort is normal  No respiratory distress  Breath sounds: Normal breath sounds  No wheezing or rales  Abdominal:      General: Bowel sounds are normal  There is no distension  Palpations: Abdomen is soft  There is no mass  Tenderness: There is no abdominal tenderness  Musculoskeletal:         General: No tenderness  Normal range of motion  Cervical back: Normal range of motion and neck supple  Right lower leg: No edema  Left lower leg: No edema  Lymphadenopathy:      Cervical: No cervical adenopathy  Skin:     General: Skin is warm and dry  Capillary Refill: Capillary refill takes less than 2 seconds  Findings: No rash  Neurological:      General: No focal deficit present  Mental Status: He is alert and oriented to person, place, and time   Mental status is at baseline  Cranial Nerves: No cranial nerve deficit  Coordination: Coordination normal       Gait: Gait normal    Psychiatric:         Behavior: Behavior normal          Thought Content:  Thought content normal          Judgment: Judgment normal

## 2022-03-08 NOTE — ASSESSMENT & PLAN NOTE
Patient continues with the simvastatin finally went for a lipid profile about a month ago    His LDL remains high at 121 but the rest of profile is relatively normal

## 2022-03-08 NOTE — ASSESSMENT & PLAN NOTE
Lab Results   Component Value Date    HGBA1C 6 7 (A) 03/08/2022   He has good control of his diabetes at present and his renal status remained stable    He does follow with renal

## 2022-03-16 ENCOUNTER — APPOINTMENT (OUTPATIENT)
Dept: LAB | Facility: HOSPITAL | Age: 87
End: 2022-03-16
Attending: INTERNAL MEDICINE
Payer: MEDICARE

## 2022-03-16 DIAGNOSIS — D61.89 ANEMIA DUE TO OTHER BONE MARROW FAILURE (HCC): ICD-10-CM

## 2022-03-16 LAB — HEMOCCULT STL QL IA: NEGATIVE

## 2022-03-16 PROCEDURE — G0328 FECAL BLOOD SCRN IMMUNOASSAY: HCPCS

## 2022-03-24 ENCOUNTER — OFFICE VISIT (OUTPATIENT)
Dept: GASTROENTEROLOGY | Facility: CLINIC | Age: 87
End: 2022-03-24
Payer: MEDICARE

## 2022-03-24 VITALS
HEIGHT: 64 IN | WEIGHT: 143 LBS | BODY MASS INDEX: 24.41 KG/M2 | DIASTOLIC BLOOD PRESSURE: 84 MMHG | HEART RATE: 76 BPM | SYSTOLIC BLOOD PRESSURE: 178 MMHG

## 2022-03-24 DIAGNOSIS — C67.9 MALIGNANT NEOPLASM OF URINARY BLADDER, UNSPECIFIED SITE (HCC): ICD-10-CM

## 2022-03-24 DIAGNOSIS — D61.89 ANEMIA DUE TO OTHER BONE MARROW FAILURE (HCC): Primary | ICD-10-CM

## 2022-03-24 DIAGNOSIS — N18.4 STAGE 4 CHRONIC KIDNEY DISEASE (HCC): ICD-10-CM

## 2022-03-24 PROCEDURE — 99214 OFFICE O/P EST MOD 30 MIN: CPT | Performed by: INTERNAL MEDICINE

## 2022-03-24 NOTE — PROGRESS NOTES
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)  Patient has low hemoglobin  This is suspected to be from chronic disease and likely from bone marrow suppression  This could also be a result of low erythropoietin levels  Patient had evaluation with stool Hemoccult which was negative  His serum ferritin and TIBC do not reflect iron deficiency  Patient has no GI symptoms  He denies any rectal bleeding or mucus per rectum  His stool Hemoccult was negative  Currently he is completely asymptomatic  I have told patient that considering his age and multiple other comorbidities there is no need for a colonoscopy  He also has history of bladder cancer  He follows with urologist   I have told him to contact me if there is any other changes in his GI symptoms  I had a long discussion with the patient and his wife regarding current clinical situation  He will be monitored via Nephrology regarding future blood works  2  Stage 4 chronic kidney disease ([address] )  See above discussion  Patient is following with Dr Gela Vincent  3  Malignant neoplasm of urinary bladder, unspecified site [facility])  History of bladder cancer  No recent hematuria     ______________________________________________________________________    SUBJECTIVE:  Denies any abdominal pain or discomfort  Denies any nausea or vomiting  Denies any heartburn indigestion  Denies any change in bowel habits or rectal bleeding  There is no other GI specific symptoms at this time  REVIEW OF SYSTEMS IS OTHERWISE NEGATIVE        Historical Information   Past Medical History:   Diagnosis Date    Cataracts, bilateral      Past Surgical History:   Procedure Laterality Date    CATARACT EXTRACTION Bilateral 07/15/2012    COLONOSCOPY      CYSTOSCOPY  01/15/2018    Last assessed 9/14/17, diagnostic    HERNIA REPAIR      INSTILLATION MYTOMYCIN N/A 10/25/2017    Procedure: INSTILLATION OF MITOMYCIN C;  Surgeon: Beverly [facility] MD;  [facility];  Service: Urology    MO CYSTOURETHROSCOPY,FULGUR <0 5 CM LESN N/A 10/25/2017    Procedure: Marcey Prader; [address] [place]  [place];  Surgeon: Beverly Richards MD;  [facility];  Service: Urology    TONSILLECTOMY      TRANSURETHRAL RESECTION OF BLADDER TUMOR N/A 10/25/2017    Procedure: TUR BLADDER TUMOR;  Surgeon: Beverly Richards MD;  [facility];  Service: Urology    WISDOM TOOTH EXTRACTION       Social History   Social History     Substance and Sexual Activity   Alcohol Use No    Alcohol/week: 0 0 standard drinks    Comment: quit 23 years ago     Social History     Substance and Sexual Activity   Drug Use No     Social History     Tobacco Use   Smoking Status Former Smoker    Quit date: 1965    Years since quittin 2   Smokeless Tobacco Never Used     Family History   Problem Relation Age of Onset    Diabetes Mother        Meds/Allergies       Current Outpatient Medications:     amLODIPine (NORVASC) 5 mg tablet    aspirin 325 mg tablet    atorvastatin (LIPITOR) 10 mg tablet    brimonidine tartrate 0 2 % ophthalmic solution    Cholecalciferol (VITAMIN D3) 1000 units CAPS    Cyanocobalamin (Vitamin B-12) 1000 MCG/15ML LIQD    cyanocobalamin (VITAMIN B-12) 500 mcg tablet    Dorzolamide HCl-Timolol Mal PF 2-0 5 % SOLN    glucose blood (OneTouch Verio) test strip    lidocaine (LMX) 4 % cream    loteprednol etabonate (LOTEMAX) 0 5 % ophthalmic suspension    tamsulosin (FLOMAX) 0 4 mg    Allergies   Allergen Reactions    Ace Inhibitors Cough     Other reaction(s): hyperkalemia    Penicillins GI Intolerance           Objective     Blood pressure (!) 178/84, pulse 76, height 5' 4" (1 626 m), weight 64 9 kg (143 lb)  Body mass index is 24 55 kg/m²        PHYSICAL EXAM:      General Appearance:   Alert, cooperative, no distress   HEENT:   Normocephalic, atraumatic, anicteric      Neck:  Supple, symmetrical, trachea midline   Lungs:   Clear to auscultation bilaterally; no rales, rhonchi or wheezing; respirations unlabored    Heart[de-identified]   Regular rate and rhythm; no murmur, rub, or gallop  Abdomen:   Soft, non-tender, non-distended; normal bowel sounds; no masses, no organomegaly    Genitalia:   Deferred    Rectal:   Deferred    Extremities:  No cyanosis, clubbing or edema    Pulses:  2+ and symmetric    Skin:  No jaundice, rashes, or lesions    Lymph nodes:  No palpable cervical lymphadenopathy        Lab Results:   No visits with results within 1 Day(s) from this visit     Latest known visit with results is:   Orders Only on 03/22/2022   Component Date Value    Total Cholesterol 03/22/2022 133     HDL 03/22/2022 50     Triglycerides 03/22/2022 59     LDL Calculated 03/22/2022 69     Chol HDLC Ratio 03/22/2022 2 7     Non-HDL Cholesterol 03/22/2022 83     Protein, Total 03/22/2022 7 5     Albumin, Electrophoresis 03/22/2022 4 0     Alpha-1 Globulin 03/22/2022 0 3     Alpha-2 Globulin 03/22/2022 0 8     Beta 1 Globulin 03/22/2022 0 4     Beta 2 Globulin 03/22/2022 0 4     Gamma Globulin 03/22/2022 1 6     Interpretation 03/22/2022 *     Creatinine, Urine 03/22/2022 156     Protein/Creat Ratio 03/22/2022 481*    Protein/Creat Ratio 03/22/2022 0 481*    Total Protein, Urine 03/22/2022 75*    PTH, Intact 03/22/2022 78*    Calcium 03/22/2022 9 4     Magnesium, Serum 03/22/2022 2 4     Phosphorus, Serum 03/22/2022 3 7     Iron, Serum 03/22/2022 50     Total Iron Binding [place]* 03/22/2022 264     Iron Saturation 03/22/2022 19*    Glucose, Random 03/22/2022 169*    BUN 03/22/2022 38*    Creatinine 03/22/2022 2 55*    eGFR Non African American 03/22/2022 22*    eGFR  03/22/2022 25*    SL AMB BUN/CREATININE RA* 03/22/2022 15     Sodium 03/22/2022 139     Potassium 03/22/2022 4 6  Chloride 03/22/2022 108     CO2 03/22/2022 24     Calcium 03/22/2022 9 4     Protein, Total 03/22/2022 7 5     Albumin 03/22/2022 4 1     Globulin 03/22/2022 3 4     Albumin/Globulin Ratio 03/22/2022 1 2     TOTAL BILIRUBIN 03/22/2022 0 5     Alkaline Phosphatase 03/22/2022 61     AST 03/22/2022 14     ALT 03/22/2022 10     Creatine Kinase, Total 03/22/2022 87     White Blood Cell Count 03/22/2022 8 2     Red Blood Cell Count 03/22/2022 3 48*    Hemoglobin 03/22/2022 11 0*    HCT 03/22/2022 33 6*    MCV 03/22/2022 96 6     MCH 03/22/2022 31 6     MCHC 03/22/2022 32 7     RDW 03/22/2022 11 7     Platelet Count 28/17/4793 177     SL AMB MPV 03/22/2022 11 4     Neutrophils (Absolute) 03/22/2022 5,617     Lymphocytes (Absolute) 03/22/2022 1,533     Monocytes (Absolute) 03/22/2022 681     Eosinophils (Absolute) 03/22/2022 328     Basophils ABS 03/22/2022 41     Neutrophils 03/22/2022 68 5     Lymphocytes 03/22/2022 18 7     Monocytes 03/22/2022 8 3     Eosinophils 03/22/2022 4 0     Basophils PCT 03/22/2022 0 5     Ig [place] Free Light Chain 03/22/2022 97 6*    Ig Lambda Free Light [place]* 03/22/2022 50 8*    Kappa/Lambda FluidC Ratio 03/22/2022 1 92*    Ferritin 03/22/2022 387*    Vitamin D, 25-Hydroxy, S* 03/22/2022 48          Radiology Results:   No results found

## 2022-03-25 ENCOUNTER — TELEPHONE (OUTPATIENT)
Dept: NEPHROLOGY | Facility: CLINIC | Age: 87
End: 2022-03-25

## 2022-03-25 DIAGNOSIS — N18.4 STAGE 4 CHRONIC KIDNEY DISEASE (HCC): Primary | ICD-10-CM

## 2022-03-25 NOTE — TELEPHONE ENCOUNTER
LM for patient letting them know overall labs are good  Just to repeat BMP nonfasting w/good hydration  Then iron OTC 3 times a week and watch for constipation  Asked patient to call back if they have any questions

## 2022-03-25 NOTE — TELEPHONE ENCOUNTER
----- Message from Mailnda Ross MD sent at 3/24/2022 10:53 AM EDT -----  Overall patient's labs are relatively good  Recommendations:  1  Repeat basic metabolic profile nonfasting with good hydration  2  When obtaining the repeat BMP, please order immunofixation of the urine and serum    3   Have the patient take iron over-the-counter 3 days a week watch for constipation    Thank you

## 2022-03-28 LAB
25(OH)D3 SERPL-MCNC: 48 NG/ML (ref 30–100)
ALBUMIN ?TM MFR UR ELPH: 42 %
ALBUMIN SERPL ELPH-MCNC: 4 G/DL (ref 3.8–4.8)
ALBUMIN SERPL-MCNC: 4.1 G/DL (ref 3.6–5.1)
ALBUMIN/GLOB SERPL: 1.2 (CALC) (ref 1–2.5)
ALP SERPL-CCNC: 61 U/L (ref 35–144)
ALPHA1 GLOB ?TM MFR UR ELPH: 3 %
ALPHA1 GLOB SERPL ELPH-MCNC: 0.3 G/DL (ref 0.2–0.3)
ALPHA2 GLOB ?TM MFR UR ELPH: 10 %
ALPHA2 GLOB SERPL ELPH-MCNC: 0.8 G/DL (ref 0.5–0.9)
ALT SERPL-CCNC: 10 U/L (ref 9–46)
AST SERPL-CCNC: 14 U/L (ref 10–35)
B-GLOBULIN ?TM MFR UR ELPH: 14 %
BASOPHILS # BLD AUTO: 41 CELLS/UL (ref 0–200)
BASOPHILS NFR BLD AUTO: 0.5 %
BETA1 GLOB SERPL ELPH-MCNC: 0.4 G/DL (ref 0.4–0.6)
BETA2 GLOB SERPL ELPH-MCNC: 0.4 G/DL (ref 0.2–0.5)
BILIRUB SERPL-MCNC: 0.5 MG/DL (ref 0.2–1.2)
BUN SERPL-MCNC: 38 MG/DL (ref 7–25)
BUN/CREAT SERPL: 15 (CALC) (ref 6–22)
CALCIUM SERPL-MCNC: 9.4 MG/DL (ref 8.6–10.3)
CALCIUM SERPL-MCNC: 9.4 MG/DL (ref 8.6–10.3)
CHLORIDE SERPL-SCNC: 108 MMOL/L (ref 98–110)
CHOLEST SERPL-MCNC: 133 MG/DL
CHOLEST/HDLC SERPL: 2.7 (CALC)
CK SERPL-CCNC: 87 U/L (ref 44–196)
CO2 SERPL-SCNC: 24 MMOL/L (ref 20–32)
CREAT SERPL-MCNC: 2.55 MG/DL (ref 0.7–1.11)
CREAT UR-MCNC: 156 MG/DL (ref 20–320)
EOSINOPHIL # BLD AUTO: 328 CELLS/UL (ref 15–500)
EOSINOPHIL NFR BLD AUTO: 4 %
ERYTHROCYTE [DISTWIDTH] IN BLOOD BY AUTOMATED COUNT: 11.7 % (ref 11–15)
FERRITIN SERPL-MCNC: 387 NG/ML (ref 24–380)
GAMMA GLOB ?TM MFR UR ELPH: 31 %
GAMMA GLOB SERPL ELPH-MCNC: 1.6 G/DL (ref 0.8–1.7)
GLOBULIN SER CALC-MCNC: 3.4 G/DL (CALC) (ref 1.9–3.7)
GLUCOSE SERPL-MCNC: 169 MG/DL (ref 65–99)
HCT VFR BLD AUTO: 33.6 % (ref 38.5–50)
HDLC SERPL-MCNC: 50 MG/DL
HGB BLD-MCNC: 11 G/DL (ref 13.2–17.1)
IRON SATN MFR SERPL: 19 % (CALC) (ref 20–48)
IRON SERPL-MCNC: 50 MCG/DL (ref 50–180)
KAPPA LC FREE SER-MCNC: 97.6 MG/L (ref 3.3–19.4)
KAPPA LC FREE/LAMBDA FREE SER: 1.92 {RATIO} (ref 0.26–1.65)
LAMBDA LC FREE SERPL-MCNC: 50.8 MG/L (ref 5.7–26.3)
LDLC SERPL CALC-MCNC: 69 MG/DL (CALC)
LYMPHOCYTES # BLD AUTO: 1533 CELLS/UL (ref 850–3900)
LYMPHOCYTES NFR BLD AUTO: 18.7 %
MAGNESIUM SERPL-MCNC: 2.4 MG/DL (ref 1.5–2.5)
MCH RBC QN AUTO: 31.6 PG (ref 27–33)
MCHC RBC AUTO-ENTMCNC: 32.7 G/DL (ref 32–36)
MCV RBC AUTO: 96.6 FL (ref 80–100)
MONOCYTES # BLD AUTO: 681 CELLS/UL (ref 200–950)
MONOCYTES NFR BLD AUTO: 8.3 %
NEUTROPHILS # BLD AUTO: 5617 CELLS/UL (ref 1500–7800)
NEUTROPHILS NFR BLD AUTO: 68.5 %
NONHDLC SERPL-MCNC: 83 MG/DL (CALC)
PHOSPHATE SERPL-MCNC: 3.7 MG/DL (ref 2.1–4.3)
PLATELET # BLD AUTO: 177 THOUSAND/UL (ref 140–400)
PMV BLD REES-ECKER: 11.4 FL (ref 7.5–12.5)
POTASSIUM SERPL-SCNC: 4.6 MMOL/L (ref 3.5–5.3)
PROT PATTERN SERPL ELPH-IMP: NORMAL
PROT SERPL-MCNC: 7.5 G/DL (ref 6.1–8.1)
PROT SERPL-MCNC: 7.5 G/DL (ref 6.1–8.1)
PROT UR-MCNC: 75 MG/DL (ref 5–25)
PROT/CREAT UR: 0.48 MG/MG CREAT (ref 0.02–0.13)
PROT/CREAT UR: 481 MG/G CREAT (ref 22–128)
PTH-INTACT SERPL-MCNC: 78 PG/ML (ref 16–77)
RBC # BLD AUTO: 3.48 MILLION/UL (ref 4.2–5.8)
SL AMB EGFR AFRICAN AMERICAN: 25 ML/MIN/1.73M2
SL AMB EGFR NON AFRICAN AMERICAN: 22 ML/MIN/1.73M2
SL AMB INTERPRETATION: ABNORMAL
SODIUM SERPL-SCNC: 139 MMOL/L (ref 135–146)
TIBC SERPL-MCNC: 264 MCG/DL (CALC) (ref 250–425)
TRIGL SERPL-MCNC: 59 MG/DL
WBC # BLD AUTO: 8.2 THOUSAND/UL (ref 3.8–10.8)

## 2022-04-08 DIAGNOSIS — E55.9 MILD VITAMIN D DEFICIENCY: ICD-10-CM

## 2022-04-08 DIAGNOSIS — N18.4 STAGE 4 CHRONIC KIDNEY DISEASE (HCC): ICD-10-CM

## 2022-04-08 DIAGNOSIS — E11.21 DIABETIC NEPHROPATHY ASSOCIATED WITH TYPE 2 DIABETES MELLITUS (HCC): ICD-10-CM

## 2022-04-08 DIAGNOSIS — D63.1 ANEMIA IN STAGE 4 CHRONIC KIDNEY DISEASE (HCC): ICD-10-CM

## 2022-04-08 DIAGNOSIS — N18.4 ANEMIA IN STAGE 4 CHRONIC KIDNEY DISEASE (HCC): ICD-10-CM

## 2022-04-08 DIAGNOSIS — E78.5 DYSLIPIDEMIA: ICD-10-CM

## 2022-04-08 DIAGNOSIS — I12.9 PARENCHYMAL RENAL HYPERTENSION, STAGE 1 THROUGH STAGE 4 OR UNSPECIFIED CHRONIC KIDNEY DISEASE: ICD-10-CM

## 2022-04-08 RX ORDER — ATORVASTATIN CALCIUM 10 MG/1
10 TABLET, FILM COATED ORAL DAILY
Qty: 90 TABLET | Refills: 3 | Status: SHIPPED | OUTPATIENT
Start: 2022-04-08 | End: 2022-04-27 | Stop reason: SDUPTHER

## 2022-04-27 DIAGNOSIS — I12.9 PARENCHYMAL RENAL HYPERTENSION, STAGE 1 THROUGH STAGE 4 OR UNSPECIFIED CHRONIC KIDNEY DISEASE: ICD-10-CM

## 2022-04-27 DIAGNOSIS — E11.21 DIABETIC NEPHROPATHY ASSOCIATED WITH TYPE 2 DIABETES MELLITUS (HCC): ICD-10-CM

## 2022-04-27 DIAGNOSIS — D63.1 ANEMIA IN STAGE 4 CHRONIC KIDNEY DISEASE (HCC): ICD-10-CM

## 2022-04-27 DIAGNOSIS — E78.5 DYSLIPIDEMIA: ICD-10-CM

## 2022-04-27 DIAGNOSIS — N18.4 STAGE 4 CHRONIC KIDNEY DISEASE (HCC): ICD-10-CM

## 2022-04-27 DIAGNOSIS — N18.4 ANEMIA IN STAGE 4 CHRONIC KIDNEY DISEASE (HCC): ICD-10-CM

## 2022-04-27 DIAGNOSIS — E55.9 MILD VITAMIN D DEFICIENCY: ICD-10-CM

## 2022-04-27 RX ORDER — ATORVASTATIN CALCIUM 10 MG/1
10 TABLET, FILM COATED ORAL DAILY
Qty: 90 TABLET | Refills: 3 | Status: SHIPPED | OUTPATIENT
Start: 2022-04-27

## 2022-06-01 PROBLEM — R80.1 PERSISTENT PROTEINURIA: Status: ACTIVE | Noted: 2022-06-01

## 2022-06-01 NOTE — PROGRESS NOTES
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   Of great concern is he is very noncompliant with medications and diet     · Baseline creatinine:  2 5-2 6  · Current creatinine:  2 28 at baseline  · Urine protein creatinine ratio:  0 347 g at goal  · UA:  No proteinuria and no hematuria  · Renal ultrasound:  11/26/2021:  Small echogenic kidneys no hydronephrosis:  7 3 x 7 2  Recommendations:  · Treat hypertension-please see below  · Treat dyslipidemia-please see below  · Maintain proteinuria less than 1 g or as low as possible  · Avoid nephrotoxic agents such as NSAIDs, and proton pump inhibitors if possible; patient counseled as such     2   Volume:  Euvolemic     3  Hypertension:       Current blood pressure averages:  None today     · Goal blood pressure:  Less than 130/80 given CKD     Recommendations:  · Push nonmedical regimen including weight loss, isotonic exercise and a low sodium diet  Patient has been counseled the such  · Can be very noncompliant  Workup:  · Aldosterone/renin:  Negative  · Thyroid profile: Normal  · Renal workup as outlined above  · Hold on renal artery duplex unless cannot control blood pressure  ?  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:  · Calcium/magnesium/phosphorus:  · All normal  · PTH intact:   80 slightly above  goal but just monitor and now  · Vitamin-D:  39 at goal     6  Dyslipidemia:  · Goal LDL:  Less than 100  · Current lipid profile:  LDL 80/HDL 52/triglycerides 63  Recommendations:  ·  Low-cholesterol/low-fat diet / weight loss as appropriate and isotonic exercise  ·  Medication changes today at goal so no changes     7  Anemia:  Most likely from CKD  · Current hemoglobin:  9 9 slightly lower than usual  · Iron studies:  Iron saturation and ferritin both acceptable  · Multiple myeloma evaluation:  Faint band in IgM and lambda:  Probably reactive/inflammatory recommend follow-up in 3-6 months from December 27, 2021:  I will obtain in 3 months:  It is persistent, therefore the patient needs immunoelectrophoresis at this time!!!  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  · DDD of lumbar spine  · Osteoarthritis  · Bladder cancer  · Asthma  · Noncompliance with medication               GI HEALTH MAINTENANCE: LAST COLONOSCOPY:  seen by Dr Hubert Sanford with heme-negative stools he did not feel there is any need for a colonoscopy at this time  PATIENT INSTRUCTIONS:    Patient Instructions   1  Medication changes today:   Please begin HCTZ/HydroDIURIL 25 mg daily in the morning   Please continue amlodipine 5 mg daily in the morning as well   Please follow a low-salt/low-sodium diet this is very important    2   Please go for non fasting  lab work 1-2 weeks after making the above medication change  3  Please purchase an [employer] blood pressure machine for the upper arm at this time   Please take 1 week a blood pressure readings  4 weeks after making the above medication change     AS FOLLOWS  MORNING AND EVENING, SITTING  as follows:  · TAKE THE MORNING READINGS BEFORE ANY MEDICATIONS AND WHEN YOU ARE RELAXED FOR SEVERAL MINUTES  · TAKE THE EVENING READINGS:  BETWEEN 7-10 P M ; PRIOR TO ANY MEDICATIONS; AT LEAST IN OUR  FROM DINNER; AND CERTAINLY AFTER RELAXING FOR A FEW MINUTES  · PLEASE INCLUDE HEART RATE WITH YOUR BLOOD PRESSURE READINGS  · When taking standing readings, keep your arm supported at heart level and not dangling  · Make sure you are sitting with your back supported and feet on the ground and do not cross your legs or feet  · Make sure you have not taken any coffee or caffeine products or exercised or smoke cigarettes at least 30 minutes before taking your blood pressure  Then please mail these readings into the office    WE WILL MAKE ARRANGEMENTS FOR YOU TO SEE 1 OF [id number] 6-8 WEEKS TO [address]  4  Follow-up in 4  months   Please bring in 1 week a blood pressure readings morning evening, sitting and standing is outlined above     Please go for fasting lab work 1-2 weeks prior to your appointment      5   General instructions:   AVOID SALT BUT NOT ADDING AN READING LABELS TO MAKE SURE THERE IS LOW-SALT IN THE FOOD THAT YOU ARE EATING  o Goal is less than 2 g of sodium intake or less than 5 g of sodium chloride intake per day     Avoid nonsteroidal anti-inflammatory drugs such as Naprosyn, ibuprofen, Aleve, Advil, Celebrex, Meloxicam (Mobic) etc   You can use Tylenol as needed if you do not have any liver condition to be concerned about     Avoid medications such as Sudafed or decongestants and antihistamines that contained the D component which is the decongestant  You can take antihistamines without the decongestant or D component   Try to avoid medications such as pantoprazole or  Protonix/Nexium or Esomeprazole)/Prilosec or omeprazole/Prevacid or lansoprazole/AcipHex or Rabeprazole  If you are able to, use Pepcid as this is safer for your kidneys   Try to exercise at least 30 minutes 3 days a week to begin with with an ultimate goal of 5 days a week for at least 30 minutes     Please do not drink more than 2 glasses of alcohol/wine on a daily basis as this may contribute to your high blood pressure  Subjective: There has been no hospitalizations or acute illnesses since last visit  The patient overall is feeling well  No fevers, chills, or cough or colds    Good appetite and good energy  No hematuria, dysuria, voiding symptoms or foamy urine  No gastrointestinal symptoms  No cardiovascular symptoms including swelling of the legs  No headaches, dizziness or lightheadedness  Blood pressure medications:   Amlodipine 5 mg daily in the morning      ROS:  See HPI, otherwise review of systems as completely reviewed with the patient are negative    Past Medical History:   Diagnosis Date    Cataracts, bilateral      Past Surgical History:   Procedure Laterality Date    CATARACT EXTRACTION Bilateral 07/15/2012    COLONOSCOPY      CYSTOSCOPY  01/15/2018    Last assessed 9/14/17, diagnostic    HERNIA REPAIR      INSTILLATION MYTOMYCIN N/A 10/25/2017    Procedure: INSTILLATION OF MITOMYCIN C;  Surgeon: Veena Wilkinson MD;  [facility];  Service: Urology    ND CYSTOURETHROSCOPY,FULGUR <0 5 CM LESN N/A 10/25/2017    Procedure: Zaina Kang; BLADDER BIOPSY WITH [facility];  Surgeon: Veena Wilkinson MD;  Location[facility];  Service: Urology    TONSILLECTOMY      TRANSURETHRAL RESECTION OF BLADDER TUMOR N/A 10/25/2017    Procedure: TUR BLADDER TUMOR;  Surgeon: Veena Wilkinson MD; [facility];  Service: Urology    WISDOM TOOTH EXTRACTION       Family History   Problem Relation Age of Onset    Diabetes Mother       reports that he quit smoking about 57 years ago  He has never used smokeless tobacco  He reports that he does not drink alcohol and does not use drugs  I COMPLETELY REVIEWED THE PAST MEDICAL HISTORY/PAST SURGICAL HISTORY/SOCIAL HISTORY/FAMILY HISTORY/AND MEDICATIONS  AND UPDATED ALL    Objective:     Vitals:   BP sitting on left:  182/82 with a heart rate of 68 and regular, same on right:  Negative Osler maneuver  BP standing on left:  180/88 with a heart rate 76 and regular    Weight (last 2 days)     Date/Time Weight    06/08/22 1101 65 (143 4)        Wt Readings from Last 3 Encounters:   06/08/22 65 kg (143 lb 6 4 oz)   03/24/22 64 9 kg (143 lb)   03/08/22 65 8 kg (145 lb)       Body mass index is 24 61 kg/m²      Physical Exam: General:  No acute distress  Skin:  No acute rash  Eyes:  No scleral icterus, noninjected, no discharge from eyes  ENT:  Moist mucous membranes  Neck:  Supple, no jugular venous distention, trachea is midline, no lymphadenopathy and no thyromegaly  Back   No CVAT  Chest:  Clear to auscultation and percussion, good respiratory effort  CVS:  Regular rate and rhythm without a rub, or gallops or murmurs  Abdomen:  Soft and nontender with normal bowel sounds  Extremities:  No cyanosis and no edema, no arthritic changes, normal range of motion  Neuro:  Grossly intact  Psych:  Alert, oriented x3 and appropriate      Medications:    Current Outpatient Medications:     amLODIPine (NORVASC) 5 mg tablet, Take 1 tablet (5 mg total) by mouth daily, Disp: 30 tablet, Rfl: 5    aspirin 325 mg tablet, Take 325 mg by mouth daily, Disp: , Rfl:     atorvastatin (LIPITOR) 10 mg tablet, Take 1 tablet (10 mg total) by mouth daily, Disp: 90 tablet, Rfl: 3    Cholecalciferol (VITAMIN D3) 1000 units CAPS, Take by mouth in the morning  , Disp: , Rfl:    Cyanocobalamin (Vitamin B-12) 1000 MCG/15ML LIQD, Take by mouth daily, Disp: , Rfl:     cyanocobalamin (VITAMIN B-12) 500 mcg tablet, Take 500 mcg by mouth daily  , Disp: , Rfl:     hydrochlorothiazide (HYDRODIURIL) 25 mg tablet, Take 1 tablet (25 mg total) by mouth daily, Disp: 90 tablet, Rfl: 3    brimonidine tartrate 0 2 % ophthalmic solution, INSTILL 1 DROP INTO EACH EYE THREE TIMES DAILY, Disp: , Rfl:     Dorzolamide HCl-Timolol Mal PF 2-0 5 % SOLN, INSTILL 1 DROP INTO EACH EYE TWICE DAILY, Disp: , Rfl:     glucose blood (OneTouch Verio) test strip, USE  STRIP TO CHECK GLUCOSE ONCE DAILY  DX: E11 9, Disp: 50 each, Rfl: 6    lidocaine (LMX) 4 % cream, Apply topically as needed for mild pain, Disp: 30 g, Rfl: 0    loteprednol etabonate (LOTEMAX) 0 5 % ophthalmic suspension, Apply to eye  , Disp: , Rfl:     tamsulosin (FLOMAX) 0 4 mg, Take 1 capsule by mouth   (Patient not taking: Reported on 6/8/2022), Disp: , Rfl:     Lab, Imaging and other studies: I have personally reviewed pertinent labs    Laboratory Results:  Results for orders placed or performed in visit on 06/07/22   Lipid Panel with Direct LDL reflex   Result Value Ref Range    Total Cholesterol 146 <200 mg/dL    HDL 52 > OR = 40 mg/dL    Triglycerides 63 <150 mg/dL    LDL Calculated 80 mg/dL (calc)    Chol HDLC Ratio 2 8 <5 0 (calc)    Non-HDL Cholesterol 94 <130 mg/dL (calc)   Protein electrophoresis, serum   Result Value Ref Range    Protein, Total 7 0 6 1 - 8 1 g/dL    Albumin, Electrophoresis      Alpha-1 Globulin      Alpha-2 Globulin      Beta 1 Globulin      Beta 2 Globulin      Gamma Globulin      Abnormal Protein Band 1      Abnormal Protein Band 2      Abnormal Protein Band 3      Interpretation     PROTEIN, TOTAL AND PROTEIN ELECTROPHORESIS, RANDOM URINE   Result Value Ref Range    Creatinine, Urine 126 20 - 320 mg/dL    Protein/Creat Ratio 317 (H) 22 - 128 mg/g creat    Protein/Creat Ratio 0 317 (H) 0 022 - 0 128 mg/mg creat    Total Protein, Urine 40 (H) 5 - 25 mg/dL    Albumin      Alpha-1-Globulins      Alpha-2-Globulins      Beta Globulins      Gamma Globulins      Abnormal Protein Band 1      Abnormal Protein Band 2      Abnormal Protein Band 3      Interpretation:     PTH, Intact and Calcium   Result Value Ref Range    PTH, Intact 80 (H) 16 - 77 pg/mL    Calcium 9 3 8 6 - 10 3 mg/dL   Magnesium   Result Value Ref Range    Magnesium, Serum 2 2 1 5 - 2 5 mg/dL   Phosphorus   Result Value Ref Range    Phosphorus, Serum 3 3 2 1 - 4 3 mg/dL   TIBC   Result Value Ref Range    Iron, Serum 67 50 - 180 mcg/dL    Total Iron Binding Capacity (TIBC) 236 (L) 250 - 425 mcg/dL (calc)    Iron Saturation 28 20 - 48 % (calc)   Comprehensive metabolic panel   Result Value Ref Range    Glucose, Random 180 (H) 65 - 99 mg/dL    BUN 42 (H) 7 - 25 mg/dL    Creatinine 2 28 (H) 0 70 - 1 11 mg/dL    eGFR Non  25 (L) > OR = 60 mL/min/1 73m2    eGFR  29 (L) > OR = 60 mL/min/1 73m2    SL AMB BUN/CREATININE RATIO 18 6 - 22 (calc)    Sodium 139 135 - 146 mmol/L    Potassium 4 2 3 5 - 5 3 mmol/L    Chloride 108 98 - 110 mmol/L    CO2 25 20 - 32 mmol/L    Calcium 9 3 8 6 - 10 3 mg/dL    Protein, Total 7 0 6 1 - 8 1 g/dL    Albumin 3 8 3 6 - 5 1 g/dL    Globulin 3 2 1 9 - 3 7 g/dL (calc)    Albumin/Globulin Ratio 1 2 1 0 - 2 5 (calc)    TOTAL BILIRUBIN 0 5 0 2 - 1 2 mg/dL    Alkaline Phosphatase 56 35 - 144 U/L    AST 16 10 - 35 U/L    ALT 10 9 - 46 U/L   Creatine Kinase, Total   Result Value Ref Range    Creatine Kinase, Total 103 44 - 196 U/L   CBC and differential   Result Value Ref Range    White Blood Cell Count 7 3 3 8 - 10 8 Thousand/uL    Red Blood Cell Count 3 17 (L) 4 20 - 5 80 Million/uL    Hemoglobin 9 9 (L) 13 2 - 17 1 g/dL    HCT 30 7 (L) 38 5 - 50 0 %    MCV 96 8 80 0 - 100 0 fL    MCH 31 2 27 0 - 33 0 pg    MCHC 32 2 32 0 - 36 0 g/dL    RDW 11 5 11 0 - 15 0 %    Platelet Count 703 789 - 400 Thousand/uL    SL AMB MPV 11 8 7 5 - 12 5 fL    Neutrophils (Absolute) 5,154 1,500 - 7,800 cells/uL    Lymphocytes (Absolute) 1,175 850 - 3,900 cells/uL    Monocytes (Absolute) 657 200 - 950 cells/uL    Eosinophils (Absolute) 277 15 - 500 cells/uL    Basophils ABS 37 0 - 200 cells/uL    Neutrophils 70 6 %    Lymphocytes 16 1 %    Monocytes 9 0 %    Eosinophils 3 8 %    Basophils PCT 0 5 %   Immunoglobulins   Result Value Ref Range    IgA 386 (H) 70 - 320 mg/dL    IgG 1,392 600 - 1,540 mg/dL     (H) 50 - 300 mg/dL   Kappa/Lambda Light Chains Free With Ratio, Serum   Result Value Ref Range    Ig Kappa Free Light Chain      Ig Lambda Free Light Chain      Kappa/Lambda FluidC Ratio     Ferritin   Result Value Ref Range    Ferritin 364 24 - 380 ng/mL   Vitamin D 25 hydroxy   Result Value Ref Range    Vitamin D, 25-Hydroxy, Serum 39 30 - 100 ng/mL       Results from last 7 days   Lab Units 06/07/22  0000   WHITE BLOOD CELL COUNT  Thousand/uL 7 3   HEMOGLOBIN  g/dL 9 9*   HEMATOCRIT  % 30 7*   PLATELETS  Thousand/uL 168   POTASSIUM mmol/L 4 2   CHLORIDE mmol/L 108   CO2 mmol/L 25   BUN mg/dL 42*   CREATININE mg/dL 2 28*   CALCIUM mg/dL 9 3  9 3   MAGNESIUM mg/dL 2 2         Radiology review:   chest X-ray    Ultrasound      Portions of the record may have been created with voice recognition software  Occasional wrong word or "sound a like" substitutions may have occurred due to the inherent limitations of voice recognition software  Read the chart carefully and recognize, using context, where substitutions have occurred

## 2022-06-02 ENCOUNTER — TELEPHONE (OUTPATIENT)
Dept: NEPHROLOGY | Facility: CLINIC | Age: 87
End: 2022-06-02

## 2022-06-02 NOTE — TELEPHONE ENCOUNTER
Spoke with patient's wife, Tereso Colvin, reminding him to go for lab work and to bring his BP readings to his appt on 6/8  She expressed understanding and thanked us for the reminder call

## 2022-06-08 ENCOUNTER — OFFICE VISIT (OUTPATIENT)
Dept: NEPHROLOGY | Facility: CLINIC | Age: 87
End: 2022-06-08
Payer: MEDICARE

## 2022-06-08 VITALS — HEIGHT: 64 IN | BODY MASS INDEX: 24.48 KG/M2 | WEIGHT: 143.4 LBS

## 2022-06-08 DIAGNOSIS — N18.4 ANEMIA IN STAGE 4 CHRONIC KIDNEY DISEASE (HCC): ICD-10-CM

## 2022-06-08 DIAGNOSIS — E78.5 DYSLIPIDEMIA: ICD-10-CM

## 2022-06-08 DIAGNOSIS — R80.1 PERSISTENT PROTEINURIA: ICD-10-CM

## 2022-06-08 DIAGNOSIS — E11.21 DIABETIC NEPHROPATHY ASSOCIATED WITH TYPE 2 DIABETES MELLITUS (HCC): ICD-10-CM

## 2022-06-08 DIAGNOSIS — N18.4 STAGE 4 CHRONIC KIDNEY DISEASE (HCC): ICD-10-CM

## 2022-06-08 DIAGNOSIS — E55.9 MILD VITAMIN D DEFICIENCY: ICD-10-CM

## 2022-06-08 DIAGNOSIS — D63.1 ANEMIA IN STAGE 4 CHRONIC KIDNEY DISEASE (HCC): ICD-10-CM

## 2022-06-08 DIAGNOSIS — I12.9 PARENCHYMAL RENAL HYPERTENSION, STAGE 1 THROUGH STAGE 4 OR UNSPECIFIED CHRONIC KIDNEY DISEASE: Primary | ICD-10-CM

## 2022-06-08 PROCEDURE — 99214 OFFICE O/P EST MOD 30 MIN: CPT | Performed by: INTERNAL MEDICINE

## 2022-06-08 RX ORDER — HYDROCHLOROTHIAZIDE 25 MG/1
25 TABLET ORAL DAILY
Qty: 90 TABLET | Refills: 3 | Status: SHIPPED | OUTPATIENT
Start: 2022-06-08

## 2022-06-08 NOTE — LETTER
June 8, 2022     Manan Mukherjee MD  [address] [facility]    Patient: Galen Vincent   YOB: 1934   Date of Visit: 6/8/2022       Dear Dr Jeremi Espino: Thank you for referring Akanksha Milan to me for evaluation  Below are my notes for this consultation  If you have questions, please do not hesitate to call me  I look forward to following your patient along with you  Sincerely,        Jahaira Mcdonald MD        CC: No Recipients  Jahaira Mcdonald MD  6/8/2022 11:21 AM  Sign when Signing Visit  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   Of great concern is he is very noncompliant with medications and diet     · Baseline creatinine:  2 5-2 6  · Current creatinine:  2 28 at baseline  · Urine protein creatinine ratio:  0 347 g at goal  · UA:  No proteinuria and no hematuria  · Renal ultrasound:  11/26/2021:  Small echogenic kidneys no hydronephrosis:  7 3 x 7 2  Recommendations:  · Treat hypertension-please see below  · Treat dyslipidemia-please see below  · Maintain proteinuria less than 1 g or as low as possible  · Avoid nephrotoxic agents such as NSAIDs, and proton pump inhibitors if possible; patient counseled as such     2   Volume:  Euvolemic     3  Hypertension:       Current blood pressure averages:  None today     · Goal blood pressure:  Less than 130/80 given CKD     Recommendations:  · Push nonmedical regimen including weight loss, isotonic exercise and a low sodium diet  Patient has been counseled the such    · Can be very noncompliant  Workup:  · Aldosterone/renin: Negative  · Thyroid profile: Normal  · Renal workup as outlined above  · Hold on renal artery duplex unless cannot control blood pressure  ? 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:  · Calcium/magnesium/phosphorus:  · All normal  · PTH intact:   80 slightly above  goal but just monitor and now  · Vitamin-D:  39 at goal     6  Dyslipidemia:  · Goal LDL:  Less than 100  · Current lipid profile:  LDL 80/HDL 52/triglycerides 63  Recommendations:  ·  Low-cholesterol/low-fat diet / weight loss as appropriate and isotonic exercise  ·  Medication changes today at goal so no changes     7  Anemia:  Most likely from CKD  · Current hemoglobin:  9 9 slightly lower than usual  · Iron studies:  Iron saturation and ferritin both acceptable  · Multiple myeloma evaluation:  Faint band in IgM and lambda:  Probably reactive/inflammatory recommend follow-up in 3-6 months from December 27, 2021:  I will obtain in 3 months:  It is persistent, therefore the patient needs immunoelectrophoresis at this time!!!  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  · DDD of lumbar spine  · Osteoarthritis  · Bladder cancer  · Asthma  · Noncompliance with medication               GI HEALTH MAINTENANCE: LAST COLONOSCOPY:  seen by Dr Maricruz Ho with heme-negative stools he did not feel there is any need for a colonoscopy at this time  PATIENT INSTRUCTIONS:    Patient Instructions   1  Medication changes today:   Please begin HCTZ/HydroDIURIL 25 mg daily in the morning   Please continue amlodipine 5 mg daily in the morning as well   Please follow a low-salt/low-sodium diet this is very important    2  Please go for non fasting  lab work 1-2 weeks after making the above medication change  3  Please purchase an [employer] blood pressure machine for the upper arm at this time   Please take 1 week a blood pressure readings  4 weeks after making the above medication change     AS FOLLOWS  MORNING AND EVENING, SITTING  as follows:  · TAKE THE MORNING READINGS BEFORE ANY MEDICATIONS AND WHEN YOU ARE RELAXED FOR SEVERAL MINUTES  · TAKE THE EVENING READINGS:  BETWEEN 7-10 P M ; PRIOR TO ANY MEDICATIONS; AT LEAST IN OUR  FROM DINNER; AND CERTAINLY AFTER RELAXING FOR A FEW MINUTES  · PLEASE INCLUDE HEART RATE WITH YOUR BLOOD PRESSURE READINGS  · When taking standing readings, keep your arm supported at heart level and not dangling  · Make sure you are sitting with your back supported and feet on the ground and do not cross your legs or feet  · Make sure you have not taken any coffee or caffeine products or exercised or smoke cigarettes at least 30 minutes before taking your blood pressure  Then please mail these readings into the office    WE WILL MAKE ARRANGEMENTS FOR YOU TO SEE 1 OF [id number] 6-8 WEEKS TO [address]      4  Follow-up in 4  months   Please bring in 1 week a blood pressure readings morning evening, sitting and standing is outlined above     Please go for fasting lab work 1-2 weeks prior to your appointment      5  General instructions:   AVOID SALT BUT NOT ADDING AN READING LABELS TO MAKE SURE THERE IS LOW-SALT IN THE FOOD THAT YOU ARE EATING  o Goal is less than 2 g of sodium intake or less than 5 g of sodium chloride intake per day     Avoid nonsteroidal anti-inflammatory drugs such as Naprosyn, ibuprofen, Aleve, Advil, Celebrex, Meloxicam (Mobic) etc   You can use Tylenol as needed if you do not have any liver condition to be concerned about     Avoid medications such as Sudafed or decongestants and antihistamines that contained the D component which is the decongestant  You can take antihistamines without the decongestant or D component   Try to avoid medications such as pantoprazole or  Protonix/Nexium or Esomeprazole)/Prilosec or omeprazole/Prevacid or lansoprazole/AcipHex or Rabeprazole  If you are able to, use Pepcid as this is safer for your kidneys   Try to exercise at least 30 minutes 3 days a week to begin with with an ultimate goal of 5 days a week for at least 30 minutes     Please do not drink more than 2 glasses of alcohol/wine on a daily basis as this may contribute to your high blood pressure  Subjective: There has been no hospitalizations or acute illnesses since last visit  The patient overall is feeling well  No fevers, chills, or cough or colds    Good appetite and good energy  No hematuria, dysuria, voiding symptoms or foamy urine  No gastrointestinal symptoms  No cardiovascular symptoms including swelling of the legs  No headaches, dizziness or lightheadedness  Blood pressure medications:   Amlodipine 5 mg daily in the morning      ROS:  See HPI, otherwise review of systems as completely reviewed with the patient are negative    Past Medical History:   Diagnosis Date    Cataracts, bilateral      Past Surgical History:   Procedure Laterality Date    CATARACT EXTRACTION Bilateral 07/15/2012    COLONOSCOPY      CYSTOSCOPY  01/15/2018    Last assessed 9/14/17, diagnostic    HERNIA REPAIR      INSTILLATION MYTOMYCIN N/A 10/25/2017    Procedure: INSTILLATION OF MITOMYCIN C;  Surgeon: Kristi Kuo MD;  Location: [facility];  Service: Urology    MT CYSTOURETHROSCOPY,FULGUR <0 5 CM LESN N/A 10/25/2017    Procedure: Inessa Delacruz; BLADDER BIOPSY WITH Miguel Brown;  Surgeon: Kristi Kuo MD;  [facility];  Service: Urology    TONSILLECTOMY      TRANSURETHRAL RESECTION OF BLADDER TUMOR N/A 10/25/2017    Procedure: TUR BLADDER TUMOR;  Surgeon: Kristi Kuo MD;  [facility];  Service: Urology    WISDOM TOOTH EXTRACTION       Family History   Problem Relation Age of Onset    Diabetes Mother       reports that he quit smoking about 57 years ago  He has never used smokeless tobacco  He reports that he does not drink alcohol and does not use drugs  I COMPLETELY REVIEWED THE PAST MEDICAL HISTORY/PAST SURGICAL HISTORY/SOCIAL HISTORY/FAMILY HISTORY/AND MEDICATIONS  AND UPDATED ALL    Objective:     Vitals:   BP sitting on left:  182/82 with a heart rate of 68 and regular, same on right:  Negative Osler maneuver  BP standing on left:  180/88 with a heart rate 76 and regular    Weight (last 2 days)     Date/Time Weight    06/08/22 1101 65 (143 4)        Wt Readings from Last 3 Encounters:   06/08/22 65 kg (143 lb 6 4 oz)   03/24/22 64 9 kg (143 lb)   03/08/22 65 8 kg (145 lb)       Body mass index is 24 61 kg/m²      Physical Exam: General:  No acute distress  Skin:  No acute rash  Eyes:  No scleral icterus, noninjected, no discharge from eyes  ENT:  Moist mucous membranes  Neck:  Supple, no jugular venous distention, trachea is midline, no lymphadenopathy and no thyromegaly  Back   No CVAT  Chest:  Clear to auscultation and percussion, good respiratory effort  CVS:  Regular rate and rhythm without a rub, or gallops or murmurs  Abdomen:  Soft and nontender with normal bowel sounds  Extremities:  No cyanosis and no edema, no arthritic changes, normal range of motion  Neuro:  Grossly intact  Psych:  Alert, oriented x3 and appropriate      Medications:    Current Outpatient Medications:     amLODIPine (NORVASC) 5 mg tablet, Take 1 tablet (5 mg total) by mouth daily, Disp: 30 tablet, Rfl: 5    aspirin 325 mg tablet, Take 325 mg by mouth daily, Disp: , Rfl:     atorvastatin (LIPITOR) 10 mg tablet, Take 1 tablet (10 mg total) by mouth daily, Disp: 90 tablet, Rfl: 3    Cholecalciferol (VITAMIN D3) 1000 units CAPS, Take by mouth in the morning  , Disp: , Rfl:     Cyanocobalamin (Vitamin B-12) 1000 MCG/15ML LIQD, Take by mouth daily, Disp: , Rfl:     cyanocobalamin (VITAMIN B-12) 500 mcg tablet, Take 500 mcg by mouth daily  , Disp: , Rfl:     hydrochlorothiazide (HYDRODIURIL) 25 mg tablet, Take 1 tablet (25 mg total) by mouth daily, Disp: 90 tablet, Rfl: 3    brimonidine tartrate 0 2 % ophthalmic solution, INSTILL 1 DROP INTO EACH EYE THREE TIMES DAILY, Disp: , Rfl:     Dorzolamide HCl-Timolol Mal PF 2-0 5 % SOLN, INSTILL 1 DROP INTO EACH EYE TWICE DAILY, Disp: , Rfl:     glucose blood (OneTouch Verio) test strip, USE  STRIP TO CHECK GLUCOSE ONCE DAILY  DX: E11 9, Disp: 50 each, Rfl: 6    lidocaine (LMX) 4 % cream, Apply topically as needed for mild pain, Disp: 30 g, Rfl: 0    loteprednol etabonate (LOTEMAX) 0 5 % ophthalmic suspension, Apply to eye  , Disp: , Rfl:     tamsulosin (FLOMAX) 0 4 mg, Take 1 capsule by mouth   (Patient not taking: Reported on 6/8/2022), Disp: , Rfl:     Lab, Imaging and other studies: I have personally reviewed pertinent labs    Laboratory Results:  Results for orders placed or performed in visit on 06/07/22   Lipid Panel with Direct LDL reflex   Result Value Ref Range    Total Cholesterol 146 <200 mg/dL    HDL 52 > OR = 40 mg/dL    Triglycerides 63 <150 mg/dL    LDL Calculated 80 mg/dL (calc)    Chol HDLC Ratio 2 8 <5 0 (calc)    Non-HDL Cholesterol 94 <130 mg/dL (calc)   Protein electrophoresis, serum   Result Value Ref Range    Protein, Total 7 0 6 1 - 8 1 g/dL    Albumin, Electrophoresis      Alpha-1 Globulin      Alpha-2 Globulin      Beta 1 Globulin      Beta 2 Globulin      Gamma Globulin      Abnormal Protein Band 1      Abnormal Protein Band 2      Abnormal Protein Band 3      Interpretation     PROTEIN, TOTAL AND PROTEIN ELECTROPHORESIS, RANDOM URINE   Result Value Ref Range    Creatinine, Urine 126 20 - 320 mg/dL    Protein/Creat Ratio 317 (H) 22 - 128 mg/g creat    Protein/Creat Ratio 0 317 (H) 0 022 - 0 128 mg/mg creat    Total Protein, Urine 40 (H) 5 - 25 mg/dL    Albumin      Alpha-1-Globulins      Alpha-2-Globulins      Beta Globulins      Gamma Globulins      Abnormal Protein Band 1      Abnormal Protein Band 2      Abnormal Protein Band 3      Interpretation:     PTH, Intact and Calcium   Result Value Ref Range    PTH, Intact 80 (H) 16 - 77 pg/mL    Calcium 9 3 8 6 - 10 3 mg/dL   Magnesium   Result Value Ref Range    Magnesium, Serum 2 2 1 5 - 2 5 mg/dL   Phosphorus   Result Value Ref Range    Phosphorus, Serum 3 3 2 1 - 4 3 mg/dL   TIBC   Result Value Ref Range    Iron, Serum 67 50 - 180 mcg/dL    Total Iron Binding Capacity (TIBC) 236 (L) 250 - 425 mcg/dL (calc)    Iron Saturation 28 20 - 48 % (calc)   Comprehensive metabolic panel   Result Value Ref Range    Glucose, Random 180 (H) 65 - 99 mg/dL    BUN 42 (H) 7 - 25 mg/dL    Creatinine 2 28 (H) 0 70 - 1 11 mg/dL    eGFR Non  25 (L) > OR = 60 mL/min/1 73m2    eGFR  29 (L) > OR = 60 mL/min/1 73m2    SL AMB BUN/CREATININE RATIO 18 6 - 22 (calc)    Sodium 139 135 - 146 mmol/L    Potassium 4 2 3 5 - 5 3 mmol/L    Chloride 108 98 - 110 mmol/L    CO2 25 20 - 32 mmol/L    Calcium 9 3 8 6 - 10 3 mg/dL    Protein, Total 7 0 6 1 - 8 1 g/dL    Albumin 3 8 3 6 - 5 1 g/dL    Globulin 3 2 1 9 - 3 7 g/dL (calc)    Albumin/Globulin Ratio 1 2 1 0 - 2 5 (calc)    TOTAL BILIRUBIN 0 5 0 2 - 1 2 mg/dL    Alkaline Phosphatase 56 35 - 144 U/L AST 16 10 - 35 U/L    ALT 10 9 - 46 U/L   Creatine Kinase, Total   Result Value Ref Range    Creatine Kinase, Total 103 44 - 196 U/L   CBC and differential   Result Value Ref Range    White Blood Cell Count 7 3 3 8 - 10 8 Thousand/uL    Red Blood Cell Count 3 17 (L) 4 20 - 5 80 Million/uL    Hemoglobin 9 9 (L) 13 2 - 17 1 g/dL    HCT 30 7 (L) 38 5 - 50 0 %    MCV 96 8 80 0 - 100 0 fL    MCH 31 2 27 0 - 33 0 pg    MCHC 32 2 32 0 - 36 0 g/dL    RDW 11 5 11 0 - 15 0 %    Platelet Count 905 316 - 400 Thousand/uL    SL AMB MPV 11 8 7 5 - 12 5 fL    Neutrophils (Absolute) 5,154 1,500 - 7,800 cells/uL    Lymphocytes (Absolute) 1,175 850 - 3,900 cells/uL    Monocytes (Absolute) 657 200 - 950 cells/uL    Eosinophils (Absolute) 277 15 - 500 cells/uL    Basophils ABS 37 0 - 200 cells/uL    Neutrophils 70 6 %    Lymphocytes 16 1 %    Monocytes 9 0 %    Eosinophils 3 8 %    Basophils PCT 0 5 %   Immunoglobulins   Result Value Ref Range    IgA 386 (H) 70 - 320 mg/dL    IgG 1,392 600 - 1,540 mg/dL     (H) 50 - 300 mg/dL   Kappa/Lambda Light Chains Free With Ratio, Serum   Result Value Ref Range    Ig Kappa Free Light Chain      Ig Lambda Free Light Chain      Kappa/Lambda FluidC Ratio     Ferritin   Result Value Ref Range    Ferritin 364 24 - 380 ng/mL   Vitamin D 25 hydroxy   Result Value Ref Range    Vitamin D, 25-Hydroxy, Serum 39 30 - 100 ng/mL       Results from last 7 days   Lab Units 06/07/22  0000   WHITE BLOOD CELL COUNT  Thousand/uL 7 3   HEMOGLOBIN  g/dL 9 9*   HEMATOCRIT  % 30 7*   PLATELETS  Thousand/uL 168   POTASSIUM mmol/L 4 2   CHLORIDE mmol/L 108   CO2 mmol/L 25   BUN mg/dL 42*   CREATININE mg/dL 2 28*   CALCIUM mg/dL 9 3  9 3   MAGNESIUM mg/dL 2 2         Radiology review:   chest X-ray    Ultrasound      Portions of the record may have been created with voice recognition software    Occasional wrong word or "sound a like" substitutions may have occurred due to the inherent limitations of voice recognition software  Read the chart carefully and recognize, using context, where substitutions have occurred

## 2022-06-08 NOTE — PATIENT INSTRUCTIONS
1  Medication changes today:  Please begin HCTZ/HydroDIURIL 25 mg daily in the morning  Please continue amlodipine 5 mg daily in the morning as well  Please follow a low-salt/low-sodium diet this is very important    2  Please go for non fasting  lab work 1-2 weeks after making the above medication change  3  Please purchase an [employer] blood pressure machine for the upper arm at this time   Please take 1 week a blood pressure readings  4 weeks after making the above medication change     AS FOLLOWS  MORNING AND EVENING, SITTING  as follows:  TAKE THE MORNING READINGS BEFORE ANY MEDICATIONS AND WHEN YOU ARE RELAXED FOR SEVERAL MINUTES  TAKE THE EVENING READINGS:  BETWEEN 7-10 P M ; PRIOR TO ANY MEDICATIONS; AT LEAST IN OUR  FROM DINNER; AND CERTAINLY AFTER RELAXING FOR A FEW MINUTES  PLEASE INCLUDE HEART RATE WITH YOUR BLOOD PRESSURE READINGS  When taking standing readings, keep your arm supported at heart level and not dangling  Make sure you are sitting with your back supported and feet on the ground and do not cross your legs or feet  Make sure you have not taken any coffee or caffeine products or exercised or smoke cigarettes at least 30 minutes before taking your blood pressure  Then please mail these readings into the office    WE WILL MAKE ARRANGEMENTS FOR YOU TO SEE 1 OF [address] 6-8 WEEKS TO [address] [place] [address]  4  Follow-up in 4  months  Please bring in 1 week a blood pressure readings morning evening, sitting and standing is outlined above    Please go for fasting lab work 1-2 weeks prior to your appointment      5   General instructions:  AVOID SALT BUT NOT ADDING AN READING LABELS TO MAKE SURE THERE IS LOW-SALT IN THE FOOD THAT YOU ARE EATING  Goal is less than 2 g of sodium intake or less than 5 g of sodium chloride intake per day    Avoid nonsteroidal anti-inflammatory drugs such as Naprosyn, ibuprofen, Aleve, Advil, Celebrex, Meloxicam (Mobic) etc   You can use Tylenol as needed if you do not have any liver condition to be concerned about    Avoid medications such as Sudafed or decongestants and antihistamines that contained the D component which is the decongestant  You can take antihistamines without the decongestant or D component  Try to avoid medications such as pantoprazole or  Protonix/Nexium or Esomeprazole)/Prilosec or omeprazole/Prevacid or lansoprazole/AcipHex or Rabeprazole  If you are able to, use Pepcid as this is safer for your kidneys  Try to exercise at least 30 minutes 3 days a week to begin with with an ultimate goal of 5 days a week for at least 30 minutes    Please do not drink more than 2 glasses of alcohol/wine on a daily basis as this may contribute to your high blood pressure  7  Please make an appointment to see a blood/hematology specialist to see whether not you would be a candidate for a medication/shot once a month to maintain a normal blood count and avoid anemia and keep you feeling well and healthy  We will help to facilitate this appointment

## 2022-06-09 ENCOUNTER — OFFICE VISIT (OUTPATIENT)
Dept: UROLOGY | Facility: AMBULATORY SURGERY CENTER | Age: 87
End: 2022-06-09
Payer: MEDICARE

## 2022-06-09 VITALS
SYSTOLIC BLOOD PRESSURE: 134 MMHG | HEART RATE: 78 BPM | HEIGHT: 64 IN | BODY MASS INDEX: 24.41 KG/M2 | DIASTOLIC BLOOD PRESSURE: 78 MMHG | WEIGHT: 143 LBS

## 2022-06-09 DIAGNOSIS — C67.9 MALIGNANT NEOPLASM OF URINARY BLADDER, UNSPECIFIED SITE (HCC): Primary | ICD-10-CM

## 2022-06-09 LAB
SL AMB  POCT GLUCOSE, UA: 500
SL AMB LEUKOCYTE ESTERASE,UA: NORMAL
SL AMB POCT BILIRUBIN,UA: NORMAL
SL AMB POCT BLOOD,UA: NORMAL
SL AMB POCT CLARITY,UA: CLEAR
SL AMB POCT COLOR,UA: YELLOW
SL AMB POCT KETONES,UA: NORMAL
SL AMB POCT NITRITE,UA: NORMAL
SL AMB POCT PH,UA: 5
SL AMB POCT SPECIFIC GRAVITY,UA: 1.01
SL AMB POCT URINE PROTEIN: NORMAL
SL AMB POCT UROBILINOGEN: 0.2

## 2022-06-09 PROCEDURE — 88112 CYTOPATH CELL ENHANCE TECH: CPT | Performed by: PATHOLOGY

## 2022-06-09 PROCEDURE — 81002 URINALYSIS NONAUTO W/O SCOPE: CPT | Performed by: UROLOGY

## 2022-06-09 PROCEDURE — 52000 CYSTOURETHROSCOPY: CPT | Performed by: UROLOGY

## 2022-06-09 NOTE — PROGRESS NOTES
Cystoscopy     Date/Time 6/9/2022 10:08 AM     Performed by  Letty Jones MD     Authorized by MD Rehan Bowser 71-year-old male previously known to Dr Kvng De La Rosa with a history of low-grade superficial bladder cancer from 2017  He returns for surveillance cystoscopy  His last upper tract imaging was a renal bladder ultrasound performed in the fall of 2021  Other than medical renal disease no other pathology was identified  Male cystoscopy procedure note:    Risk and benefits of flexible cystoscopy were discussed  Informed consent was obtained  The patient was placed in the supine position  His genitalia was prepped and draped in a sterile fashion  Viscous lidocaine jelly was instilled into the urethra and flexible cystoscopy was then performed  The urethra, prostatic urethra, and bladder were all thoroughly inspected there was no evidence of mucosal abnormalities or lesions  Both ureteral orifices were visualized with clear efflux of urine  The right ureteral orifice was widely patulous Retroflexion was normal  Overall this was a negative cystoscopy  Impression:  History of 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  Plan:  I provided the patient and his wife with reassurance that there is no sign of recurrent bladder cancer  Urine cytology will be sent from the office today  We discussed that 80years of age that routine prostate cancer screening is not indicated as he has no lower urinary tract symptoms  The patient is amenable with this and defers PSA or digital rectal examination  We discussed that he is now 5 years out from his last tumor  They feel most comfortable returning in 1 year with cystoscopy  Assuming that his cystoscopy in 1 year shows no evidence of disease recurrence follow-up can then be on an as-needed basis

## 2022-06-10 ENCOUNTER — TELEPHONE (OUTPATIENT)
Dept: HEMATOLOGY ONCOLOGY | Facility: CLINIC | Age: 87
End: 2022-06-10

## 2022-06-10 DIAGNOSIS — N18.4 ANEMIA IN STAGE 4 CHRONIC KIDNEY DISEASE: ICD-10-CM

## 2022-06-10 DIAGNOSIS — D63.1 ANEMIA IN STAGE 4 CHRONIC KIDNEY DISEASE: ICD-10-CM

## 2022-06-10 DIAGNOSIS — N18.4 STAGE 4 CHRONIC KIDNEY DISEASE (HCC): Primary | ICD-10-CM

## 2022-06-16 LAB
25(OH)D3 SERPL-MCNC: 39 NG/ML (ref 30–100)
ALBUMIN ?TM MFR UR ELPH: 38 %
ALBUMIN SERPL ELPH-MCNC: 3.7 G/DL (ref 3.8–4.8)
ALBUMIN SERPL-MCNC: 3.8 G/DL (ref 3.6–5.1)
ALBUMIN/GLOB SERPL: 1.2 (CALC) (ref 1–2.5)
ALP SERPL-CCNC: 56 U/L (ref 35–144)
ALPHA1 GLOB ?TM MFR UR ELPH: 4 %
ALPHA1 GLOB SERPL ELPH-MCNC: 0.3 G/DL (ref 0.2–0.3)
ALPHA2 GLOB ?TM MFR UR ELPH: 13 %
ALPHA2 GLOB SERPL ELPH-MCNC: 0.7 G/DL (ref 0.5–0.9)
ALT SERPL-CCNC: 10 U/L (ref 9–46)
AST SERPL-CCNC: 16 U/L (ref 10–35)
B-GLOBULIN ?TM MFR UR ELPH: 24 %
BASOPHILS # BLD AUTO: 37 CELLS/UL (ref 0–200)
BASOPHILS NFR BLD AUTO: 0.5 %
BETA1 GLOB SERPL ELPH-MCNC: 0.4 G/DL (ref 0.4–0.6)
BETA2 GLOB SERPL ELPH-MCNC: 0.4 G/DL (ref 0.2–0.5)
BILIRUB SERPL-MCNC: 0.5 MG/DL (ref 0.2–1.2)
BUN SERPL-MCNC: 42 MG/DL (ref 7–25)
BUN/CREAT SERPL: 18 (CALC) (ref 6–22)
CALCIUM SERPL-MCNC: 9.3 MG/DL (ref 8.6–10.3)
CALCIUM SERPL-MCNC: 9.3 MG/DL (ref 8.6–10.3)
CHLORIDE SERPL-SCNC: 108 MMOL/L (ref 98–110)
CHOLEST SERPL-MCNC: 146 MG/DL
CHOLEST/HDLC SERPL: 2.8 (CALC)
CK SERPL-CCNC: 103 U/L (ref 44–196)
CO2 SERPL-SCNC: 25 MMOL/L (ref 20–32)
CREAT SERPL-MCNC: 2.28 MG/DL (ref 0.7–1.11)
CREAT UR-MCNC: 126 MG/DL (ref 20–320)
EOSINOPHIL # BLD AUTO: 277 CELLS/UL (ref 15–500)
EOSINOPHIL NFR BLD AUTO: 3.8 %
ERYTHROCYTE [DISTWIDTH] IN BLOOD BY AUTOMATED COUNT: 11.5 % (ref 11–15)
FERRITIN SERPL-MCNC: 364 NG/ML (ref 24–380)
GAMMA GLOB ?TM MFR UR ELPH: 21 %
GAMMA GLOB SERPL ELPH-MCNC: 1.5 G/DL (ref 0.8–1.7)
GLOBULIN SER CALC-MCNC: 3.2 G/DL (CALC) (ref 1.9–3.7)
GLUCOSE SERPL-MCNC: 180 MG/DL (ref 65–99)
HCT VFR BLD AUTO: 30.7 % (ref 38.5–50)
HDLC SERPL-MCNC: 52 MG/DL
HGB BLD-MCNC: 9.9 G/DL (ref 13.2–17.1)
IGA SERPL-MCNC: 386 MG/DL (ref 70–320)
IGG SERPL-MCNC: 1392 MG/DL (ref 600–1540)
IGM SERPL-MCNC: 482 MG/DL (ref 50–300)
INTERPRETATION UR IFE-IMP: NORMAL
IRON SATN MFR SERPL: 28 % (CALC) (ref 20–48)
IRON SERPL-MCNC: 67 MCG/DL (ref 50–180)
KAPPA LC FREE SER-MCNC: 61.9 MG/L (ref 3.3–19.4)
KAPPA LC FREE/LAMBDA FREE SER: 1.17 {RATIO} (ref 0.26–1.65)
LAMBDA LC FREE SERPL-MCNC: 52.8 MG/L (ref 5.7–26.3)
LDLC SERPL CALC-MCNC: 80 MG/DL (CALC)
LYMPHOCYTES # BLD AUTO: 1175 CELLS/UL (ref 850–3900)
LYMPHOCYTES NFR BLD AUTO: 16.1 %
MAGNESIUM SERPL-MCNC: 2.2 MG/DL (ref 1.5–2.5)
MCH RBC QN AUTO: 31.2 PG (ref 27–33)
MCHC RBC AUTO-ENTMCNC: 32.2 G/DL (ref 32–36)
MCV RBC AUTO: 96.8 FL (ref 80–100)
MONOCYTES # BLD AUTO: 657 CELLS/UL (ref 200–950)
MONOCYTES NFR BLD AUTO: 9 %
NEUTROPHILS # BLD AUTO: 5154 CELLS/UL (ref 1500–7800)
NEUTROPHILS NFR BLD AUTO: 70.6 %
NONHDLC SERPL-MCNC: 94 MG/DL (CALC)
PHOSPHATE SERPL-MCNC: 3.3 MG/DL (ref 2.1–4.3)
PLATELET # BLD AUTO: 168 THOUSAND/UL (ref 140–400)
PMV BLD REES-ECKER: 11.8 FL (ref 7.5–12.5)
POTASSIUM SERPL-SCNC: 4.2 MMOL/L (ref 3.5–5.3)
PROT SERPL-MCNC: 7 G/DL (ref 6.1–8.1)
PROT SERPL-MCNC: 7 G/DL (ref 6.1–8.1)
PROT UR-MCNC: 40 MG/DL (ref 5–25)
PROT/CREAT UR: 0.32 MG/MG CREAT (ref 0.02–0.13)
PROT/CREAT UR: 317 MG/G CREAT (ref 22–128)
PTH-INTACT SERPL-MCNC: 80 PG/ML (ref 16–77)
RBC # BLD AUTO: 3.17 MILLION/UL (ref 4.2–5.8)
REF LAB TEST NAME: NORMAL
REF LAB TEST: NORMAL
SL AMB CLIENT CONTACT: NORMAL
SL AMB EGFR AFRICAN AMERICAN: 29 ML/MIN/1.73M2
SL AMB EGFR NON AFRICAN AMERICAN: 25 ML/MIN/1.73M2
SL AMB INTERPRETATION: ABNORMAL
SODIUM SERPL-SCNC: 139 MMOL/L (ref 135–146)
TIBC SERPL-MCNC: 236 MCG/DL (CALC) (ref 250–425)
TRIGL SERPL-MCNC: 63 MG/DL
WBC # BLD AUTO: 7.3 THOUSAND/UL (ref 3.8–10.8)

## 2022-07-06 ENCOUNTER — TELEPHONE (OUTPATIENT)
Dept: HEMATOLOGY ONCOLOGY | Facility: CLINIC | Age: 87
End: 2022-07-06

## 2022-07-06 NOTE — TELEPHONE ENCOUNTER
Attempt to schedule consult - Left Voicemail with [facility] number instructing patient to call back and schedule

## 2022-07-08 ENCOUNTER — TELEPHONE (OUTPATIENT)
Dept: HEMATOLOGY ONCOLOGY | Facility: CLINIC | Age: 87
End: 2022-07-08

## 2022-07-08 NOTE — TELEPHONE ENCOUNTER
Attempt to schedule consult - Left Voicemail with [facility] number instructing patient to call back and schedule  Referral closed

## 2022-07-20 DIAGNOSIS — N18.1 TYPE 2 DIABETES MELLITUS WITH STAGE 1 CHRONIC KIDNEY DISEASE, UNSPECIFIED WHETHER LONG TERM INSULIN USE (HCC): ICD-10-CM

## 2022-07-20 DIAGNOSIS — I12.9 PARENCHYMAL RENAL HYPERTENSION, STAGE 1 THROUGH STAGE 4 OR UNSPECIFIED CHRONIC KIDNEY DISEASE: ICD-10-CM

## 2022-07-20 DIAGNOSIS — E11.22 TYPE 2 DIABETES MELLITUS WITH STAGE 1 CHRONIC KIDNEY DISEASE, UNSPECIFIED WHETHER LONG TERM INSULIN USE (HCC): ICD-10-CM

## 2022-07-20 DIAGNOSIS — D63.1 ANEMIA IN STAGE 4 CHRONIC KIDNEY DISEASE (HCC): ICD-10-CM

## 2022-07-20 DIAGNOSIS — N18.4 TYPE 2 DIABETES MELLITUS WITH STAGE 4 CHRONIC KIDNEY DISEASE, WITHOUT LONG-TERM CURRENT USE OF INSULIN (HCC): ICD-10-CM

## 2022-07-20 DIAGNOSIS — N18.4 ANEMIA IN STAGE 4 CHRONIC KIDNEY DISEASE (HCC): ICD-10-CM

## 2022-07-20 DIAGNOSIS — N18.4 STAGE 4 CHRONIC KIDNEY DISEASE (HCC): ICD-10-CM

## 2022-07-20 DIAGNOSIS — E11.22 TYPE 2 DIABETES MELLITUS WITH STAGE 4 CHRONIC KIDNEY DISEASE, WITHOUT LONG-TERM CURRENT USE OF INSULIN (HCC): ICD-10-CM

## 2022-07-20 DIAGNOSIS — E78.5 DYSLIPIDEMIA: ICD-10-CM

## 2022-07-21 RX ORDER — AMLODIPINE BESYLATE 5 MG/1
TABLET ORAL
Qty: 30 TABLET | Refills: 0 | Status: SHIPPED | OUTPATIENT
Start: 2022-07-21 | End: 2022-09-08 | Stop reason: SDUPTHER

## 2022-09-08 DIAGNOSIS — E11.22 TYPE 2 DIABETES MELLITUS WITH STAGE 4 CHRONIC KIDNEY DISEASE, WITHOUT LONG-TERM CURRENT USE OF INSULIN (HCC): ICD-10-CM

## 2022-09-08 DIAGNOSIS — E11.22 TYPE 2 DIABETES MELLITUS WITH STAGE 1 CHRONIC KIDNEY DISEASE, UNSPECIFIED WHETHER LONG TERM INSULIN USE (HCC): ICD-10-CM

## 2022-09-08 DIAGNOSIS — D63.1 ANEMIA IN STAGE 4 CHRONIC KIDNEY DISEASE (HCC): ICD-10-CM

## 2022-09-08 DIAGNOSIS — I12.9 PARENCHYMAL RENAL HYPERTENSION, STAGE 1 THROUGH STAGE 4 OR UNSPECIFIED CHRONIC KIDNEY DISEASE: ICD-10-CM

## 2022-09-08 DIAGNOSIS — N18.1 TYPE 2 DIABETES MELLITUS WITH STAGE 1 CHRONIC KIDNEY DISEASE, UNSPECIFIED WHETHER LONG TERM INSULIN USE (HCC): ICD-10-CM

## 2022-09-08 DIAGNOSIS — E78.5 DYSLIPIDEMIA: ICD-10-CM

## 2022-09-08 DIAGNOSIS — N18.4 TYPE 2 DIABETES MELLITUS WITH STAGE 4 CHRONIC KIDNEY DISEASE, WITHOUT LONG-TERM CURRENT USE OF INSULIN (HCC): ICD-10-CM

## 2022-09-08 DIAGNOSIS — N18.4 ANEMIA IN STAGE 4 CHRONIC KIDNEY DISEASE (HCC): ICD-10-CM

## 2022-09-08 DIAGNOSIS — N18.4 STAGE 4 CHRONIC KIDNEY DISEASE (HCC): ICD-10-CM

## 2022-09-08 RX ORDER — AMLODIPINE BESYLATE 5 MG/1
5 TABLET ORAL DAILY
Qty: 90 TABLET | Refills: 0 | Status: SHIPPED | OUTPATIENT
Start: 2022-09-08

## 2022-09-09 ENCOUNTER — OFFICE VISIT (OUTPATIENT)
Dept: NEPHROLOGY | Facility: CLINIC | Age: 87
End: 2022-09-09
Payer: MEDICARE

## 2022-09-09 VITALS
HEART RATE: 71 BPM | HEIGHT: 64 IN | SYSTOLIC BLOOD PRESSURE: 134 MMHG | WEIGHT: 145 LBS | BODY MASS INDEX: 24.75 KG/M2 | DIASTOLIC BLOOD PRESSURE: 79 MMHG

## 2022-09-09 DIAGNOSIS — N18.4 BENIGN HYPERTENSION WITH CKD (CHRONIC KIDNEY DISEASE) STAGE IV (HCC): Primary | ICD-10-CM

## 2022-09-09 DIAGNOSIS — N18.4 STAGE 4 CHRONIC KIDNEY DISEASE (HCC): ICD-10-CM

## 2022-09-09 DIAGNOSIS — I12.9 BENIGN HYPERTENSION WITH CKD (CHRONIC KIDNEY DISEASE) STAGE IV (HCC): Primary | ICD-10-CM

## 2022-09-09 PROCEDURE — 99213 OFFICE O/P EST LOW 20 MIN: CPT | Performed by: PHYSICIAN ASSISTANT

## 2022-09-09 NOTE — PATIENT INSTRUCTIONS
Hypertension- Goal blood pressure is <130/80  Antihypertensive regimen includes amlodipine 5mg daily, hydrochlorothiazide 25mg daily, tamsulosin 0 4mg daily  Avoid salt  Avoid NSAIDs  Stay active and exercise  Secondary workup negative but never had a renal artery duplex  BP acceptable on office readings  Home cuff reads higher than office ascultation: +8 / +6    Chronic Kidney Disease stage IV- Baseline creatinine is in the mid 2s  Follow up with Dr Yanet Teran in the next 1-2 months with blood work  Please call the office with any questions or concerns

## 2022-09-09 NOTE — PROGRESS NOTES
Assessment and Plan:    Roger Honeycutt was seen today for follow-up and hypertension  Diagnoses and all orders for this visit:    Benign 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 ([facility])    [facility] ([facility]      Hypertension- Goal blood pressure is <130/80  Antihypertensive regimen includes amlodipine 5mg daily, hydrochlorothiazide 25mg daily, tamsulosin 0 4mg daily  Avoid salt  Avoid NSAIDs  Stay active and exercise  Secondary workup negative but never had a renal artery duplex  BP acceptable on office readings  Home cuff reads higher than office ascultation: +8 / +6    Chronic Kidney Disease stage IV- Baseline creatinine is in the mid 2s  Follow up with Dr Daisy Pham in the next 1-2 months with blood work  Please call the office with any questions or concerns  Reason for Visit: Follow-up and Hypertension (CKD 4/)    HPI: Susana Rey is a 80 y o  male who is here for follow up of hypertension  Unfortunately, he had a flat tire on his way to the office today  He presents with his wife as she is his primary caretaker since he is not able to see well  They did not start taking his blood pressure at home with the new cuff yet  He denies SOB, LE edema, N/V/D, changes in urination, dizziness  ROS: A complete review of systems was performed and was negative unless otherwise noted in the history of present illness      Allergies:   Ace inhibitors and Penicillins    Medications:     Current Outpatient Medications:     amLODIPine (NORVASC) 5 mg tablet, Take 1 tablet (5 mg total) by mouth daily, Disp: 90 tablet, Rfl: 0    aspirin 325 mg tablet, Take 325 mg by mouth daily, Disp: , Rfl:     atorvastatin (LIPITOR) 10 mg tablet, Take 1 tablet (10 mg total) by mouth daily, Disp: 90 tablet, Rfl: 3    Cholecalciferol (VITAMIN D3) 1000 units CAPS, Take by mouth in the morning  , Disp: , Rfl:     Cyanocobalamin (Vitamin B-12) 1000 MCG/15ML LIQD, Take by mouth daily, Disp: , Rfl:   hydrochlorothiazide (HYDRODIURIL) 25 mg tablet, Take 1 tablet (25 mg total) by mouth daily, Disp: 90 tablet, Rfl: 3    brimonidine tartrate 0 2 % ophthalmic solution, INSTILL 1 DROP INTO EACH EYE THREE TIMES DAILY, Disp: , Rfl:     cyanocobalamin (VITAMIN B-12) 500 mcg tablet, Take 500 mcg by mouth daily  , Disp: , Rfl:     Dorzolamide HCl-Timolol Mal PF 2-0 5 % SOLN, INSTILL 1 DROP INTO EACH EYE TWICE DAILY, Disp: , Rfl:     glucose blood (OneTouch Verio) test strip, USE  STRIP TO CHECK GLUCOSE ONCE DAILY  DX: E11 9, Disp: 50 each, Rfl: 6    lidocaine (LMX) 4 % cream, Apply topically as needed for mild pain, Disp: 30 g, Rfl: 0    loteprednol etabonate (LOTEMAX) 0 5 % ophthalmic suspension, Apply to eye  , Disp: , Rfl:     tamsulosin (FLOMAX) 0 4 mg, Take 1 capsule by mouth   (Patient not taking: No sig reported), Disp: , Rfl:     Past Medical History:   Diagnosis Date    Cataracts, bilateral      Past Surgical History:   Procedure Laterality Date    CATARACT EXTRACTION Bilateral 07/15/2012    COLONOSCOPY      CYSTOSCOPY  01/15/2018    Last assessed 9/14/17, diagnostic    HERNIA REPAIR      INSTILLATION MYTOMYCIN N/A 10/25/2017    Procedure: INSTILLATION OF MITOMYCIN C;  Surgeon: Kayden Banks MD;  [facility];  Service: Urology    MT CYSTOURETHROSCOPY,FULGUR <0 5 CM LESN N/A 10/25/2017    Procedure: Abhay Bryant; BLADDER BIOPSY WITH Debo Oliver;  Surgeon: Kayden Banks MD;  [facility];  Service: Urology    TONSILLECTOMY      TRANSURETHRAL RESECTION OF BLADDER TUMOR N/A 10/25/2017    Procedure: TUR BLADDER TUMOR;  Surgeon: Kayden Banks MD;  [facility];  Service: Urology    WISDOM TOOTH EXTRACTION       Family History   Problem Relation Age of Onset    Diabetes Mother       reports that he quit smoking about 57 years ago  He has never used smokeless tobacco  He reports that he does not drink alcohol and does not use drugs      Physical Exam:   Vitals: 09/09/22 1348 09/09/22 1349 09/09/22 1350 09/09/22 1351   BP: 130/74 140/78 130/70 134/79   BP Location: Right arm Left arm Left arm Right arm   Patient Position: Sitting Sitting Sitting Sitting   Cuff Size: Standard Standard Standard Standard   Pulse: 72 71     Weight:       Height:         Body mass index is 24 89 kg/m²  General: NAd  Neuro: aao  Skin: no rash  Eyes: anicteric  ENMT: mm moist  Neck: no masses  Respiratory: CTAB  Cardiovascular: RRR  Extremities: no edema  Gastrointestinal: soft nt nd    Procedure:  No results found for this or any previous visit  Lab Results   Component Value Date    CALCIUM 9 3 06/07/2022    CALCIUM 9 3 06/07/2022     06/05/2017    K 4 2 06/07/2022    CO2 25 06/07/2022     06/07/2022    BUN 42 (H) 06/07/2022    CREATININE 2 28 (H) 06/07/2022     I have personally reviewed the blood work as stated above and in my note  I have personally reviewed Dr Alma Rosa Morrison note

## 2022-09-29 ENCOUNTER — OFFICE VISIT (OUTPATIENT)
Dept: INTERNAL MEDICINE CLINIC | Age: 87
End: 2022-09-29
Payer: MEDICARE

## 2022-09-29 VITALS
SYSTOLIC BLOOD PRESSURE: 114 MMHG | HEART RATE: 68 BPM | DIASTOLIC BLOOD PRESSURE: 76 MMHG | OXYGEN SATURATION: 98 % | HEIGHT: 64 IN | BODY MASS INDEX: 24.65 KG/M2 | WEIGHT: 144.4 LBS | TEMPERATURE: 97.6 F

## 2022-09-29 DIAGNOSIS — Z23 NEED FOR IMMUNIZATION AGAINST INFLUENZA: Primary | ICD-10-CM

## 2022-09-29 DIAGNOSIS — D63.1 ANEMIA IN STAGE 4 CHRONIC KIDNEY DISEASE (HCC): ICD-10-CM

## 2022-09-29 DIAGNOSIS — E11.22 TYPE 2 DIABETES MELLITUS WITH STAGE 1 CHRONIC KIDNEY DISEASE, UNSPECIFIED WHETHER LONG TERM INSULIN USE (HCC): ICD-10-CM

## 2022-09-29 DIAGNOSIS — N18.4 ANEMIA IN STAGE 4 CHRONIC KIDNEY DISEASE (HCC): ICD-10-CM

## 2022-09-29 DIAGNOSIS — Z00.00 ENCOUNTER FOR WELL ADULT EXAM WITHOUT ABNORMAL FINDINGS: ICD-10-CM

## 2022-09-29 DIAGNOSIS — N18.1 TYPE 2 DIABETES MELLITUS WITH STAGE 1 CHRONIC KIDNEY DISEASE, UNSPECIFIED WHETHER LONG TERM INSULIN USE (HCC): ICD-10-CM

## 2022-09-29 PROBLEM — E11.21 DIABETIC NEPHROPATHY ASSOCIATED WITH TYPE 2 DIABETES MELLITUS (HCC): Status: RESOLVED | Noted: 2022-02-15 | Resolved: 2022-09-29

## 2022-09-29 LAB — SL AMB POCT HEMOGLOBIN AIC: 7.4 (ref ?–6.5)

## 2022-09-29 PROCEDURE — G0439 PPPS, SUBSEQ VISIT: HCPCS | Performed by: INTERNAL MEDICINE

## 2022-09-29 PROCEDURE — 83036 HEMOGLOBIN GLYCOSYLATED A1C: CPT | Performed by: INTERNAL MEDICINE

## 2022-09-29 PROCEDURE — 90662 IIV NO PRSV INCREASED AG IM: CPT | Performed by: INTERNAL MEDICINE

## 2022-09-29 PROCEDURE — G0008 ADMIN INFLUENZA VIRUS VAC: HCPCS | Performed by: INTERNAL MEDICINE

## 2022-09-29 PROCEDURE — 99214 OFFICE O/P EST MOD 30 MIN: CPT | Performed by: INTERNAL MEDICINE

## 2022-09-29 RX ORDER — PREDNISOLONE ACETATE 10 MG/ML
SUSPENSION/ DROPS OPHTHALMIC
COMMUNITY
Start: 2022-09-07

## 2022-09-29 NOTE — ASSESSMENT & PLAN NOTE
Lab Results   Component Value Date    HGBA1C 7 4 (A) 09/29/2022   Patient has had long-term diabetes with decent control but they have not been coming in very much during the [place] pandemic  At present it is higher than it usually a is we again discussed diet  The both get confused with medications fairly easily so will hold off on adding medicines until they get it recheck    His kidney function is stage III and relatively stable

## 2022-09-29 NOTE — PROGRESS NOTES
Assessment and Plan:     Problem List Items Addressed This Visit        Endocrine    Diabetes mellitus, type II ([facility] )    Relevant Orders    POCT hemoglobin A1c (Completed)       Genitourinary    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 [facility]     Lab Results   Component Value Date    EGFR 27 08/27/2018    EGFR 32 10/19/2017    CREATININE 2 28 (H) 06/07/2022    CREATININE 2 55 (H) 03/22/2022    CREATININE 2 08 (H) 02/18/2022   He has been moderately anemic with hemoglobin in the 10s for the last several checks felt to be at least partially related to renal disease           Other Visit Diagnoses     Need for immunization against influenza    -  Primary    Relevant Orders    influenza vaccine, high-dose, PF 0 7 mL (FLUZONE HIGH-DOSE) (Completed)          Depression Screening and Follow-up Plan: Patient was screened for depression during today's encounter  They screened negative with a PHQ-2 score of 0  Preventive health issues were discussed with patient, and age appropriate screening tests were ordered as noted in patient's After Visit Summary  Personalized health advice and appropriate referrals for health education or preventive services given if needed, as noted in patient's After Visit Summary  History of Present Illness:     Patient presents for a Medicare Wellness Visit    Patient is here today for his wellness and checkup as usual he really has no complaints but he is not as well controlled with his diabetes is it previously was  He does continue to follow with Hematology-Oncology though because of chronic anemia    Diabetes  He presents for his follow-up diabetic visit  He has type 2 diabetes mellitus  No MedicAlert identification noted  His disease course has been worsening  There are no hypoglycemic associated symptoms  Associated symptoms include blurred vision, fatigue and foot paresthesias  Pertinent negatives for diabetes include no foot ulcerations and no polyuria  Symptoms are stable  Diabetic complications include nephropathy, peripheral neuropathy and retinopathy  Risk factors for coronary artery disease include diabetes mellitus, dyslipidemia, hypertension, male sex and sedentary lifestyle  Current diabetic treatment includes diet  He is compliant with treatment some of the time  His weight is stable  He is following a generally healthy and low salt diet  Meal planning includes avoidance of concentrated sweets  He has had a previous visit with a dietitian  He rarely participates in exercise  His home blood glucose trend is increasing steadily  An ACE inhibitor/angiotensin II receptor blocker is contraindicated  He does not see a podiatrist Eye exam is not current (He was seen a podiatrist on a regular basis but during [place] just did not go)  Patient Care Team:  Dominik Shaw MD as PCP - General  MD Yessica Lehman Asa, MD     Review of Systems:     Review of Systems   Constitutional: Positive for fatigue  Negative for chills, fever and unexpected weight change  HENT: Negative for congestion, ear pain, hearing loss, postnasal drip, sinus pressure, sore throat, trouble swallowing and voice change  Eyes: Positive for blurred vision and visual disturbance  Respiratory: Negative for cough, chest tightness, shortness of breath and wheezing  Gastrointestinal: Negative for abdominal distention, abdominal pain, anal bleeding, blood in stool, constipation, diarrhea and nausea  Endocrine: Negative for polyuria  Genitourinary: Positive for difficulty urinating  Negative for dysuria, flank pain, frequency, hematuria and urgency  Musculoskeletal: Positive for back pain  Negative for arthralgias, joint swelling, myalgias and neck pain  Skin: Negative for rash  Allergic/Immunologic: Negative for immunocompromised state  Neurological: Negative  Hematological: Negative  Psychiatric/Behavioral: Negative           Problem List:     Patient Active Problem List   Diagnosis    Asthma, mild intermittent    Essential hypertension    [facility]    Glaucoma    Dyslipidemia    Mild vitamin D deficiency    Organic impotence    Type 2 diabetes mellitus with diabetic chronic kidney disease (HCC)    Primary osteoarthritis of both knees    Iliotibial band syndrome of both sides    Weight loss    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)    Retinal vein occlusion of left eye    Malignant neoplasm of urinary bladder (HCC)    Vision loss of left eye    Neurogenic claudication (HCC)    Low back pain    Spinal stenosis of lumbar region    DDD (degenerative disc disease), lumbar    Lumbar disc disease with radiculopathy    Parenchymal renal hypertension    Benign 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 (HCC)    Persistent proteinuria    Stage 4 chronic kidney disease (HCC)      Past Medical and Surgical History:     Past Medical History:   Diagnosis Date    Cataracts, bilateral      Past Surgical History:   Procedure Laterality Date    CATARACT EXTRACTION Bilateral 07/15/2012    COLONOSCOPY      CYSTOSCOPY  01/15/2018    Last assessed 9/14/17, diagnostic    HERNIA REPAIR      INSTILLATION MYTOMYCIN N/A 10/25/2017    Procedure: INSTILLATION OF MITOMYCIN C;  Surgeon: Ny Paige MD;  [facility];  Service: Urology    SC CYSTOURETHROSCOPY,FULGUR <0 5 CM LESN N/A 10/25/2017    Procedure: Haskel Distad; BLADDER BIOPSY WITH Gina Speed;  Surgeon: Ny Paige MD;  [facility];  Service: Urology    TONSILLECTOMY      TRANSURETHRAL RESECTION OF BLADDER TUMOR N/A 10/25/2017    Procedure: TUR BLADDER TUMOR;  Surgeon: Ny Paige MD;  [facility];  Service: Urology    WISDOM TOOTH EXTRACTION        Family History:     Family History   Problem Relation Age of Onset    Diabetes Mother       Social History:     Social History     Socioeconomic History    Marital status: /Civil Union     Spouse name: Not on file    Number of children: Not on file    Years of education: Not on file    Highest education level: Not on file   Occupational History    Occupation: RETIRED   Tobacco Use    Smoking status: Former Smoker     Quit date: 1965     Years since quittin 7    Smokeless tobacco: Never Used   Vaping Use    Vaping Use: Never used   Substance and Sexual Activity    Alcohol use: No     Alcohol/week: 0 0 standard drinks     Comment: quit 23 years ago    Drug use: No    Sexual activity: Not on file   Other Topics Concern    Not on file   Social History Narrative    Not on file     Social Determinants of Health     Financial Resource Strain: Not on file   Food Insecurity: Not on file   Transportation Needs: Not on file   Physical Activity: Not on file   Stress: Not on file   Social Connections: Not on file   Intimate Partner Violence: Not on file   Housing Stability: Not on file      Medications and Allergies:     Current Outpatient Medications   Medication Sig Dispense Refill    amLODIPine (NORVASC) 5 mg tablet Take 1 tablet (5 mg total) by mouth daily 90 tablet 0    aspirin 325 mg tablet Take 325 mg by mouth daily      atorvastatin (LIPITOR) 10 mg tablet Take 1 tablet (10 mg total) by mouth daily 90 tablet 3    brimonidine tartrate 0 2 % ophthalmic solution INSTILL 1 DROP INTO EACH EYE THREE TIMES DAILY      Cholecalciferol (VITAMIN D3) 1000 units CAPS Take by mouth in the morning        Cyanocobalamin (Vitamin B-12) 1000 MCG/15ML LIQD Take by mouth daily      cyanocobalamin (VITAMIN B-12) 500 mcg tablet Take 500 mcg by mouth daily        Dorzolamide HCl-Timolol Mal PF 2-0 5 % SOLN INSTILL 1 DROP INTO EACH EYE TWICE DAILY      glucose blood (OneTouch Verio) test strip USE  STRIP TO CHECK GLUCOSE ONCE DAILY  DX: E11 9 50 each 6    hydrochlorothiazide (HYDRODIURIL) 25 mg tablet Take 1 tablet (25 mg total) by mouth daily 90 tablet 3    lidocaine (LMX) 4 % cream Apply topically as needed for mild pain 30 g 0    loteprednol etabonate (LOTEMAX) 0 5 % ophthalmic suspension Apply to eye        prednisoLONE acetate (PRED FORTE) 1 % ophthalmic suspension INSTILL 1 DROP INTO LEFT EYE TWICE DAILY      tamsulosin (FLOMAX) 0 4 mg Take 1 capsule by mouth   (Patient not taking: No sig reported)       No current facility-administered medications for this visit  Allergies   Allergen Reactions    Ace Inhibitors Cough     Other reaction(s): hyperkalemia    Penicillins GI Intolerance      Immunizations:     Immunization History   Administered Date(s) Administered    COVID-19 PFIZER VACCINE 0 3 ML IM 02/13/2021, 03/29/2021, 12/11/2021    INFLUENZA 10/27/2018    Influenza Split High Dose Preservative Free IM 10/15/2012, 09/26/2013, 11/06/2014, 10/20/2015, 11/07/2016, 11/14/2017    Influenza, high dose seasonal 0 7 mL 09/17/2019, 09/22/2020, 09/28/2021, 09/29/2022    Pneumococcal Conjugate 13-Valent 11/07/2016    Pneumococcal Polysaccharide PPV23 11/28/2007, 11/14/2017      Health Maintenance: There are no preventive care reminders to display for this patient  Topic Date Due    COVID-19 Vaccine (4 - Booster for Pfizer series) 04/11/2022      Medicare Screening Tests and Risk Assessments:     Petrina Lombard is here for his Subsequent Wellness visit  Last Medicare Wellness visit information reviewed, patient interviewed and updates made to the record today  Health Risk Assessment:   Patient rates overall health as good  Patient feels that their physical health rating is same  Patient is satisfied with their life  Eyesight was rated as same  Hearing was rated as slightly worse  Patient feels that their emotional and mental health rating is same  Patients states they are never, rarely angry  Patient states they are sometimes unusually tired/fatigued  Pain experienced in the last 7 days has been none  Patient states that he has experienced no weight loss or gain in last 6 months       Depression Screening:   PHQ-2 Score: 0      Fall Risk Screening: In the past year, patient has experienced: no history of falling in past year      Home Safety:  Patient does not have trouble with stairs inside or outside of their home  Patient has working smoke alarms and has working carbon monoxide detector  Home safety hazards include: none  Nutrition:   Current diet is No Added Salt and Diabetic  Medications:   Patient is currently taking over-the-counter supplements  OTC medications include: see medication list  Patient is able to manage medications  Activities of Daily Living (ADLs)/Instrumental Activities of Daily Living (IADLs):   Walk and transfer into and out of bed and chair?: Yes  Dress and groom yourself?: Yes    Bathe or shower yourself?: Yes    Feed yourself? Yes  Do your laundry/housekeeping?: Yes  Manage your money, pay your bills and track your expenses?: Yes  Make your own meals?: Yes    Do your own shopping?: Yes    Previous Hospitalizations:   Any hospitalizations or ED visits within the last 12 months?: No      Advance Care Planning:   Living will: No    Advanced directive: No      Cognitive Screening:   Provider or family/friend/caregiver concerned regarding cognition?: No    PREVENTIVE SCREENINGS      Cardiovascular Screening:    General: Screening Current      Diabetes Screening:     General: Screening Not Indicated and History Diabetes      Colorectal Cancer Screening:     General: Screening Not Indicated      Prostate Cancer Screening:    General: Screening Not Indicated      Osteoporosis Screening:    General: Patient Declines      Abdominal Aortic Aneurysm (AAA) Screening:    Risk factors include: tobacco use        Lung Cancer Screening:     General: Screening Not Indicated      Hepatitis C Screening:    General: Patient Declines    Screening, Brief Intervention, and Referral to Treatment (SBIRT)    Screening  Typical number of drinks in a day: 0    Single Item Drug Screening:   How often have you used an illegal drug (including marijuana) or a prescription medication for non-medical reasons in the past year? never    Single Item Drug Screen Score: 0  Interpretation: Negative screen for possible drug use disorder    Other Counseling Topics:   Calcium and vitamin D intake and regular weightbearing exercise  No exam data present     Physical Exam:     /76 (BP Location: Left arm, Patient Position: Sitting, Cuff Size: Standard)   Pulse 68   Temp 97 6 °F (36 4 °C)   Ht 5' 4" (1 626 m)   Wt 65 5 kg (144 lb 6 4 oz)   SpO2 98%   BMI 24 79 kg/m²     Physical Exam  Vitals and nursing note reviewed  Constitutional:       Appearance: He is well-developed  HENT:      Head: Normocephalic and atraumatic  Eyes:      Conjunctiva/sclera: Conjunctivae normal    Cardiovascular:      Rate and Rhythm: Normal rate and regular rhythm  Heart sounds: No murmur heard  Pulmonary:      Effort: Pulmonary effort is normal  No respiratory distress  Breath sounds: Normal breath sounds  Abdominal:      Palpations: Abdomen is soft  Tenderness: There is no abdominal tenderness  Musculoskeletal:      Cervical back: Neck supple  Right lower leg: No edema  Left lower leg: No edema  Skin:     General: Skin is warm and dry  Neurological:      General: No focal deficit present  Mental Status: He is alert  Mental status is at baseline  Cranial Nerves: No cranial nerve deficit  Gait: Gait normal    Psychiatric:         Mood and Affect: Mood normal          Behavior: Behavior normal          Thought Content:  Thought content normal          Judgment: Judgment normal           Pedro Yang MD

## 2022-09-29 NOTE — ASSESSMENT & PLAN NOTE
Lab Results   Component Value Date    EGFR 27 08/27/2018    EGFR 32 10/19/2017    CREATININE 2 28 (H) 06/07/2022    CREATININE 2 55 (H) 03/22/2022    CREATININE 2 08 (H) 02/18/2022   He has been moderately anemic with hemoglobin in the 10s for the last several checks felt to be at least partially related to renal disease

## 2022-09-29 NOTE — PROGRESS NOTES
Assessment/Plan:    No problem-specific Assessment & Plan notes found for this encounter  {Assess/PlanSmartLinks:23376}      Subjective:      Patient ID: Rosemary Wheeler is a 35 y o  female      HPI    {Common ambulatory SmartLinks:06398}    Review of Systems      Objective:      /82 (BP Location: Left arm, Patient Position: Sitting, Cuff Size: Standard)   Pulse 76   Temp 98 6 °F (37 °C) (Tympanic)   Ht 5' (1 524 m)   Wt 112 kg (247 lb)   BMI 48 24 kg/m²          Physical Exam      [unfilled]    [unfilled]

## 2022-10-05 ENCOUNTER — TELEPHONE (OUTPATIENT)
Dept: NEPHROLOGY | Facility: CLINIC | Age: 87
End: 2022-10-05

## 2022-10-05 NOTE — TELEPHONE ENCOUNTER
BARB for patient reminding him to go for lab work before his appt on 10/11  Asked him to call back if he has any questions

## 2022-10-06 NOTE — PROGRESS NOTES
NEPHROLOGY OFFICE VISIT   Shaun Mota 80 y o  male MRN: [id number]  10/11/2022    Reason for Visit:  Follow-up    INTERVAL HISTORY and SUBJECTIVE:    I had the pleasure of seeing Neetu Carolina today in the renal clinic  He is a 51-year-old male who was recently seen in the nephrology clinic on 09/09/2022 for management of CKD stage 4 and hypertension  He also has a history of diabetes mellitus, osteoarthritis, bladder cancer, asthma and anemia  Last blood work obtained on 10/06/2022 reviewed with [place]:  Chronic kidney disease, stage IV:  · Baseline creatinine:  2 1-2 6 mg/dL  · Current creatinine 2 22, renal function stable  · GFR 28  · Urine protein creatinine ratio 0 389, (0 317 in June 2022, 0 481 in March 2022)  · UA no proteinuria, no hematuria  · Prior imaging 11/2021:  Renal ultrasound mild to moderate bilateral renal atrophy  Right kidney 7 3 cm, left kidney 7 2 cm  Diffuse echogenicity consistent with medical renal disease  No hydronephrosis  · CT the abdomen and pelvis August 2018 without contrast:  No significant vascular findings  Kidneys unremarkable  · Etiology:  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, hypertensive nephrosclerosis, arteriolar nephrosclerosis, age-related nephron loss  · Follow-up with Dr Leandro Oneil in approximately 3 months    Blood pressure/volume status:  · Essential hypertension  · Secondary workup negative  · Did not have renal artery duplex-no indication since blood pressure is adequately controlled on less than 5 agents  · Volume status:  · Medications:  Amlodipine 5 mg daily, hydrochlorothiazide 25 mg daily, tamsulosin 0 4 mg daily  · Goal blood pressure less than 130/80  Blood pressure at last office visit 134/79  · Recent PCP visit blood pressure 114/76  · He was seen by Dr Papa Atkinson in June blood pressure systolic was 078 therefore he was put on HCTZ although his wife is saying that he never started the medication    · Pressure readings low although asymptomatic  Multiple readings taken on both arms with home devices which correlated adequately  · Plan:    · Wife will call to update medical record regarding HCTZ use  · Instructed to hold amlodipine, continue to monitor blood pressure and give a dose if systolic blood pressure greater than 140  · Collected to monitor blood pressure twice a day for 1 week and call in readings  · Instructed to call with any questions or concerns in the meantime  · Considering advanced age goal blood pressure less than 130/80    CKD MBD:  · Phosphorus acceptable 3 6  · Calcium normal  · Vitamin D level normal   Continue supplement    Dyslipidemia:  Controlled    Anemia:  · Etiology:  Likely anemia of chronic disease  · Prior iron studies acceptable  · Current iron levels:  Iron saturation 27%, ferritin 404  · Current hemoglobin 10 4, stable  · Multiple myeloma evaluation:  · Studies June 2022:  Faint band IgM and lambda which could indicate a developing plasma cell disorder  · Hematology follow-up  · Plan:  Continue to monitor  Hemoglobin at goal    Other:  · Diabetes mellitus type 2 per PCP  Hemoglobin A1c 7 4%  · 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  · Degenerative joint disease of lumbar spine  · Follows with Dr Renny Genao for colonoscopy/monitoring    PATIENT INSTRUCTIONS:    Patient Instructions   Your seen today for continued monitoring of kidney function  Kidney function is stable at this time  Last blood work shows that creatinine is at baseline  Blood pressure is too low at this time  Will adjust medications  Plan:  • Hold HCTZ/hydrochlorothiazide  • If you are not taking hydrochlorothiazide then please hold amlodipine but give a dose if blood pressure is above 140    • Please call if blood pressure remains consistently greater than 140/80, continue amlodipine   • Monitor blood pressure readings for approximately 1 week and call the office with the results  • Follow-up in approximately 3 months with Dr Leandro Oneil  • Blood work and urine studies prior to the appointment  • Bring in 1 week's worth of blood pressure readings to the appointment        Orders Placed This Encounter   Procedures   • CBC     Standing Status:   Future     Standing Expiration Date:   3/1/2023   • Magnesium     Standing Status:   Future     Standing Expiration Date:   3/1/2023   • Renal function panel     Standing Status:   Future     Standing Expiration Date:   3/1/2023   • PTH, intact     Standing Status:   Future     Standing Expiration Date:   3/1/2023   • Urinalysis with microscopic     Standing Status:   Future     Standing Expiration Date:   3/1/2023   • Vitamin D 25 hydroxy     Standing Status:   Future     Standing Expiration Date:   3/1/2023   • Protein / creatinine ratio, urine     Standing Status:   Future     Standing Expiration Date:   3/1/2023       OBJECTIVE:  Current Weight: Weight - Scale: 65 8 kg (145 lb)  Vitals:    10/11/22 1449   Weight: 65 8 kg (145 lb)   Height: 5' 4" (1 626 m)    Body mass index is 24 89 kg/m²  REVIEW OF SYSTEMS:    Review of Systems   Constitutional: Negative for activity change, chills, fever and unexpected weight change  HENT: Negative for congestion, ear pain and sore throat  Eyes: Negative for pain and visual disturbance  Respiratory: Negative for cough and shortness of breath  Cardiovascular: Positive for leg swelling (reports mild foot swelling right foot)  Negative for chest pain and palpitations  Gastrointestinal: Negative for abdominal pain and vomiting  Genitourinary: Negative for dysuria and hematuria  Musculoskeletal: Negative for arthralgias and back pain  Skin: Negative for color change and rash  Neurological: Negative for seizures, syncope and headaches  Hematological: Does not bruise/bleed easily  Psychiatric/Behavioral: The patient is not nervous/anxious  All other systems reviewed and are negative        PHYSICAL EXAM:      Physical Exam  Constitutional:       General: He is not in acute distress  Appearance: Normal appearance  He is well-developed  He is not ill-appearing or toxic-appearing  HENT:      Head: Normocephalic and atraumatic  Nose: Nose normal  No congestion  Eyes:      General: No scleral icterus  Right eye: No discharge  Left eye: No discharge  Extraocular Movements: Extraocular movements intact  Conjunctiva/sclera: Conjunctivae normal       Pupils: Pupils are equal, round, and reactive to light  Neck:      Thyroid: No thyromegaly  Vascular: No carotid bruit or JVD  Trachea: No tracheal deviation  Cardiovascular:      Rate and Rhythm: Normal rate and regular rhythm  Heart sounds: No murmur heard  No friction rub  No gallop  Pulmonary:      Effort: Pulmonary effort is normal       Breath sounds: Normal breath sounds  Abdominal:      General: Bowel sounds are normal  There is no distension  Palpations: Abdomen is soft  There is no mass  Tenderness: There is no abdominal tenderness  There is no guarding or rebound  Musculoskeletal:         General: No swelling, tenderness or signs of injury  Normal range of motion  Cervical back: Normal range of motion and neck supple  No rigidity or tenderness  Right lower leg: No edema  Left lower leg: No edema  Skin:     General: Skin is warm and dry  Coloration: Skin is not jaundiced or pale  Findings: No erythema or rash  Neurological:      Mental Status: He is alert and oriented to person, place, and time  Psychiatric:         Mood and Affect: Mood normal          Behavior: Behavior normal          Thought Content:  Thought content normal          Judgment: Judgment normal          Medications:    Current Outpatient Medications:   •  amLODIPine (NORVASC) 5 mg tablet, Take 1 tablet (5 mg total) by mouth daily, Disp: 90 tablet, Rfl: 0  •  aspirin 325 mg tablet, Take 325 mg by mouth daily, Disp: , Rfl:   •  atorvastatin (LIPITOR) 10 mg tablet, Take 1 tablet (10 mg total) by mouth daily, Disp: 90 tablet, Rfl: 3  •  brimonidine tartrate 0 2 % ophthalmic solution, INSTILL 1 DROP INTO EACH EYE THREE TIMES DAILY, Disp: , Rfl:   •  Cholecalciferol (VITAMIN D3) 1000 units CAPS, Take by mouth in the morning  , Disp: , Rfl:   •  Cyanocobalamin (Vitamin B-12) 1000 MCG/15ML LIQD, Take by mouth daily, Disp: , Rfl:   •  cyanocobalamin (VITAMIN B-12) 500 mcg tablet, Take 500 mcg by mouth daily  , Disp: , Rfl:   •  Dorzolamide HCl-Timolol Mal PF 2-0 5 % SOLN, INSTILL 1 DROP INTO EACH EYE TWICE DAILY, Disp: , Rfl:   •  glucose blood (OneTouch Verio) test strip, USE  STRIP TO CHECK GLUCOSE ONCE DAILY  DX: E11 9, Disp: 50 each, Rfl: 6  •  hydrochlorothiazide (HYDRODIURIL) 25 mg tablet, Take 1 tablet (25 mg total) by mouth daily, Disp: 90 tablet, Rfl: 3  •  lidocaine (LMX) 4 % cream, Apply topically as needed for mild pain, Disp: 30 g, Rfl: 0  •  loteprednol etabonate (LOTEMAX) 0 5 % ophthalmic suspension, Apply to eye  , Disp: , Rfl:   •  prednisoLONE acetate (PRED FORTE) 1 % ophthalmic suspension, INSTILL 1 DROP INTO LEFT EYE TWICE DAILY, Disp: , Rfl:     Laboratory Results:  Results from last 7 days   Lab Units 10/06/22  1231   WHITE BLOOD CELL COUNT  Thousand/uL 7 1   HEMOGLOBIN  g/dL 10 4*   HEMATOCRIT  % 31 0*   PLATELETS   Thousand/uL 155   POTASSIUM mmol/L 4 5   CHLORIDE mmol/L 109   CO2 mmol/L 27   BUN mg/dL 37*   CREATININE mg/dL 2 22*   CALCIUM mg/dL 9 2  9 2   MAGNESIUM mg/dL 2 4       Results for orders placed or performed in visit on 10/06/22   Lipid Panel with Direct LDL reflex   Result Value Ref Range    Total Cholesterol 144 <200 mg/dL    HDL 51 > OR = 40 mg/dL    Triglycerides 55 <150 mg/dL    LDL Calculated 79 mg/dL (calc)    Chol HDLC Ratio 2 8 <5 0 (calc)    Non-HDL Cholesterol 93 <130 mg/dL (calc)   PTH, Intact and Calcium   Result Value Ref Range    PTH, Intact 102 (H) 16 - 77 pg/mL    Calcium 9 2 8 6 - 10 3 mg/dL   Magnesium   Result Value Ref Range    Magnesium, Serum 2 4 1 5 - 2 5 mg/dL   Phosphorus   Result Value Ref Range    Phosphorus, Serum 3 6 2 1 - 4 3 mg/dL   TIBC   Result Value Ref Range    Iron, Serum 69 50 - 180 mcg/dL    Total Iron Binding Capacity (TIBC) 252 250 - 425 mcg/dL (calc)    Iron Saturation 27 20 - 48 % (calc)   Basic metabolic panel   Result Value Ref Range    Glucose, Random 150 (H) 65 - 99 mg/dL    BUN 37 (H) 7 - 25 mg/dL    Creatinine 2 22 (H) 0 70 - 1 22 mg/dL    eGFR 28 (L) > OR = 60 mL/min/1 73m2    SL AMB BUN/CREATININE RATIO 17 6 - 22 (calc)    Sodium 141 135 - 146 mmol/L    Potassium 4 5 3 5 - 5 3 mmol/L    Chloride 109 98 - 110 mmol/L    CO2 27 20 - 32 mmol/L    Calcium 9 2 8 6 - 10 3 mg/dL   CBC and differential   Result Value Ref Range    White Blood Cell Count 7 1 3 8 - 10 8 Thousand/uL    Red Blood Cell Count 3 29 (L) 4 20 - 5 80 Million/uL    Hemoglobin 10 4 (L) 13 2 - 17 1 g/dL    HCT 31 0 (L) 38 5 - 50 0 %    MCV 94 2 80 0 - 100 0 fL    MCH 31 6 27 0 - 33 0 pg    MCHC 33 5 32 0 - 36 0 g/dL    RDW 11 6 11 0 - 15 0 %    Platelet Count 735 297 - 400 Thousand/uL    SL AMB MPV 11 2 7 5 - 12 5 fL    Neutrophils (Absolute) 4,828 1,500 - 7,800 cells/uL    Lymphocytes (Absolute) 1,306 850 - 3,900 cells/uL    Monocytes (Absolute) 625 200 - 950 cells/uL    Eosinophils (Absolute) 298 15 - 500 cells/uL    Basophils ABS 43 0 - 200 cells/uL    Neutrophils 68 %    Lymphocytes 18 4 %    Monocytes 8 8 %    Eosinophils 4 2 %    Basophils PCT 0 6 %    Always Message     Ferritin   Result Value Ref Range    Ferritin 404 (H) 24 - 380 ng/mL   Protein, Total w/Creat, Random Urine   Result Value Ref Range    Creatinine, Urine 113 20 - 320 mg/dL    Protein/Creat Ratio 389 (H) 25 - 148 mg/g creat    Protein/Creat Ratio 0 389 (H) 0 025 - 0 148 mg/mg creat    Total Protein, Urine 44 (H) 5 - 25 mg/dL

## 2022-10-07 LAB
BASOPHILS # BLD AUTO: 43 CELLS/UL (ref 0–200)
BASOPHILS NFR BLD AUTO: 0.6 %
BUN SERPL-MCNC: 37 MG/DL (ref 7–25)
BUN/CREAT SERPL: 17 (CALC) (ref 6–22)
CALCIUM SERPL-MCNC: 9.2 MG/DL (ref 8.6–10.3)
CALCIUM SERPL-MCNC: 9.2 MG/DL (ref 8.6–10.3)
CHLORIDE SERPL-SCNC: 109 MMOL/L (ref 98–110)
CHOLEST SERPL-MCNC: 144 MG/DL
CHOLEST/HDLC SERPL: 2.8 (CALC)
CO2 SERPL-SCNC: 27 MMOL/L (ref 20–32)
CREAT SERPL-MCNC: 2.22 MG/DL (ref 0.7–1.22)
CREAT UR-MCNC: 113 MG/DL (ref 20–320)
EOSINOPHIL # BLD AUTO: 298 CELLS/UL (ref 15–500)
EOSINOPHIL NFR BLD AUTO: 4.2 %
ERYTHROCYTE [DISTWIDTH] IN BLOOD BY AUTOMATED COUNT: 11.6 % (ref 11–15)
FERRITIN SERPL-MCNC: 404 NG/ML (ref 24–380)
GFR/BSA.PRED SERPLBLD CYS-BASED-ARV: 28 ML/MIN/1.73M2
GLUCOSE SERPL-MCNC: 150 MG/DL (ref 65–99)
HCT VFR BLD AUTO: 31 % (ref 38.5–50)
HDLC SERPL-MCNC: 51 MG/DL
HGB BLD-MCNC: 10.4 G/DL (ref 13.2–17.1)
IRON SATN MFR SERPL: 27 % (CALC) (ref 20–48)
IRON SERPL-MCNC: 69 MCG/DL (ref 50–180)
LDLC SERPL CALC-MCNC: 79 MG/DL (CALC)
LYMPHOCYTES # BLD AUTO: 1306 CELLS/UL (ref 850–3900)
LYMPHOCYTES NFR BLD AUTO: 18.4 %
MAGNESIUM SERPL-MCNC: 2.4 MG/DL (ref 1.5–2.5)
MCH RBC QN AUTO: 31.6 PG (ref 27–33)
MCHC RBC AUTO-ENTMCNC: 33.5 G/DL (ref 32–36)
MCV RBC AUTO: 94.2 FL (ref 80–100)
MONOCYTES # BLD AUTO: 625 CELLS/UL (ref 200–950)
MONOCYTES NFR BLD AUTO: 8.8 %
NEUTROPHILS # BLD AUTO: 4828 CELLS/UL (ref 1500–7800)
NEUTROPHILS NFR BLD AUTO: 68 %
NONHDLC SERPL-MCNC: 93 MG/DL (CALC)
PHOSPHATE SERPL-MCNC: 3.6 MG/DL (ref 2.1–4.3)
PLATELET # BLD AUTO: 155 THOUSAND/UL (ref 140–400)
PMV BLD REES-ECKER: 11.2 FL (ref 7.5–12.5)
POTASSIUM SERPL-SCNC: 4.5 MMOL/L (ref 3.5–5.3)
PROT UR-MCNC: 44 MG/DL (ref 5–25)
PROT/CREAT UR: 0.39 MG/MG CREAT (ref 0.03–0.15)
PROT/CREAT UR: 389 MG/G CREAT (ref 25–148)
PTH-INTACT SERPL-MCNC: 102 PG/ML (ref 16–77)
RBC # BLD AUTO: 3.29 MILLION/UL (ref 4.2–5.8)
SODIUM SERPL-SCNC: 141 MMOL/L (ref 135–146)
TIBC SERPL-MCNC: 252 MCG/DL (CALC) (ref 250–425)
TRIGL SERPL-MCNC: 55 MG/DL
WBC # BLD AUTO: 7.1 THOUSAND/UL (ref 3.8–10.8)

## 2022-10-11 ENCOUNTER — TELEPHONE (OUTPATIENT)
Dept: OTHER | Facility: OTHER | Age: 87
End: 2022-10-11

## 2022-10-11 ENCOUNTER — OFFICE VISIT (OUTPATIENT)
Dept: NEPHROLOGY | Facility: CLINIC | Age: 87
End: 2022-10-11
Payer: MEDICARE

## 2022-10-11 VITALS — WEIGHT: 145 LBS | HEIGHT: 64 IN | BODY MASS INDEX: 24.75 KG/M2

## 2022-10-11 DIAGNOSIS — N18.4 STAGE 4 CHRONIC KIDNEY DISEASE (HCC): ICD-10-CM

## 2022-10-11 DIAGNOSIS — N18.1 TYPE 2 DIABETES MELLITUS WITH STAGE 1 CHRONIC KIDNEY DISEASE, UNSPECIFIED WHETHER LONG TERM INSULIN USE (HCC): ICD-10-CM

## 2022-10-11 DIAGNOSIS — I12.9 BENIGN HYPERTENSION WITH CKD (CHRONIC KIDNEY DISEASE) STAGE IV (HCC): ICD-10-CM

## 2022-10-11 DIAGNOSIS — D63.1 ANEMIA IN STAGE 4 CHRONIC KIDNEY DISEASE (HCC): ICD-10-CM

## 2022-10-11 DIAGNOSIS — I10 ESSENTIAL HYPERTENSION: Primary | ICD-10-CM

## 2022-10-11 DIAGNOSIS — N18.4 ANEMIA IN STAGE 4 CHRONIC KIDNEY DISEASE (HCC): ICD-10-CM

## 2022-10-11 DIAGNOSIS — N18.4 BENIGN HYPERTENSION WITH CKD (CHRONIC KIDNEY DISEASE) STAGE IV (HCC): ICD-10-CM

## 2022-10-11 DIAGNOSIS — E11.22 TYPE 2 DIABETES MELLITUS WITH STAGE 1 CHRONIC KIDNEY DISEASE, UNSPECIFIED WHETHER LONG TERM INSULIN USE (HCC): ICD-10-CM

## 2022-10-11 DIAGNOSIS — E55.9 MILD VITAMIN D DEFICIENCY: ICD-10-CM

## 2022-10-11 DIAGNOSIS — E78.5 DYSLIPIDEMIA: ICD-10-CM

## 2022-10-11 DIAGNOSIS — R80.1 PERSISTENT PROTEINURIA: ICD-10-CM

## 2022-10-11 PROCEDURE — 99214 OFFICE O/P EST MOD 30 MIN: CPT | Performed by: NURSE PRACTITIONER

## 2022-10-11 NOTE — TELEPHONE ENCOUNTER
PT 's wife called in stating PT does have medication dicussed earlier at appointment  Please call her back

## 2022-10-12 NOTE — TELEPHONE ENCOUNTER
Spoke to pt's wife, she states that they don't have the HCTZ at home  Called pharmacy and was told that they have a prescription on hold for that but pt hasn't received that med from them in awhile

## 2023-01-13 ENCOUNTER — RA CDI HCC (OUTPATIENT)
Dept: OTHER | Facility: HOSPITAL | Age: 88
End: 2023-01-13

## 2023-01-13 NOTE — PROGRESS NOTES
[address]  coding opportunities          Chart Reviewed number of suggestions sent to Provider: 1  E11 65    Patients Insurance     Medicare Insurance: [employer]

## 2023-01-19 ENCOUNTER — OFFICE VISIT (OUTPATIENT)
Dept: INTERNAL MEDICINE CLINIC | Age: 88
End: 2023-01-19

## 2023-01-19 VITALS
BODY MASS INDEX: 24.07 KG/M2 | SYSTOLIC BLOOD PRESSURE: 138 MMHG | DIASTOLIC BLOOD PRESSURE: 58 MMHG | TEMPERATURE: 97.8 F | HEIGHT: 64 IN | WEIGHT: 141 LBS | HEART RATE: 60 BPM | OXYGEN SATURATION: 97 %

## 2023-01-19 DIAGNOSIS — N18.4 BENIGN HYPERTENSION WITH CKD (CHRONIC KIDNEY DISEASE) STAGE IV (HCC): ICD-10-CM

## 2023-01-19 DIAGNOSIS — N18.30 TYPE 2 DIABETES MELLITUS WITH STAGE 3 CHRONIC KIDNEY DISEASE, WITHOUT LONG-TERM CURRENT USE OF INSULIN (HCC): ICD-10-CM

## 2023-01-19 DIAGNOSIS — N18.1 TYPE 2 DIABETES MELLITUS WITH STAGE 1 CHRONIC KIDNEY DISEASE, UNSPECIFIED WHETHER LONG TERM INSULIN USE (HCC): Primary | ICD-10-CM

## 2023-01-19 DIAGNOSIS — D61.89 ANEMIA DUE TO OTHER BONE MARROW FAILURE (HCC): ICD-10-CM

## 2023-01-19 DIAGNOSIS — I12.9 BENIGN HYPERTENSION WITH CKD (CHRONIC KIDNEY DISEASE) STAGE IV (HCC): ICD-10-CM

## 2023-01-19 DIAGNOSIS — E11.22 TYPE 2 DIABETES MELLITUS WITH STAGE 1 CHRONIC KIDNEY DISEASE, UNSPECIFIED WHETHER LONG TERM INSULIN USE (HCC): Primary | ICD-10-CM

## 2023-01-19 DIAGNOSIS — E11.22 TYPE 2 DIABETES MELLITUS WITH STAGE 3 CHRONIC KIDNEY DISEASE, WITHOUT LONG-TERM CURRENT USE OF INSULIN (HCC): ICD-10-CM

## 2023-01-19 DIAGNOSIS — C67.9 MALIGNANT NEOPLASM OF URINARY BLADDER, UNSPECIFIED SITE (HCC): ICD-10-CM

## 2023-01-19 LAB — SL AMB POCT HEMOGLOBIN AIC: 8.2 (ref ?–6.5)

## 2023-01-19 RX ORDER — BLOOD SUGAR DIAGNOSTIC
STRIP MISCELLANEOUS
Qty: 50 EACH | Refills: 6 | Status: SHIPPED | OUTPATIENT
Start: 2023-01-19

## 2023-01-19 RX ORDER — GLIMEPIRIDE 1 MG/1
1 TABLET ORAL
Qty: 30 TABLET | Refills: 5 | Status: SHIPPED | OUTPATIENT
Start: 2023-01-19

## 2023-01-19 NOTE — ASSESSMENT & PLAN NOTE
Lab Results   Component Value Date    EGFR 28 (L) 10/06/2022    EGFR 27 08/27/2018    EGFR 32 10/19/2017    CREATININE 2 22 (H) 10/06/2022    CREATININE 2 28 (H) 06/07/2022    CREATININE 2 55 (H) 03/22/2022   His kidney function remains relatively stable as does his blood pressure despite the fact he is on no medication for same    He continues to follow occasionally with renal

## 2023-01-19 NOTE — ASSESSMENT & PLAN NOTE
His A1c today was much higher than usual he was told that one of his other doctors offices that if he has a low episode of blood sugar he should not take his diabetes pill anymore  So he has not been however he has not been watching his diet either and if wife states that its been a lot of carbs  Try talking to him about his diet    And I gave him back the low-dose of Amaryl again  Lab Results   Component Value Date    HGBA1C 8 2 (A) 01/19/2023

## 2023-01-19 NOTE — PATIENT INSTRUCTIONS
Basic Carbohydrate Counting   AMBULATORY CARE:   Carbohydrate counting  is a way to plan your meals by counting the amount of carbohydrate in foods  Carbohydrates are the sugars, starches, and fiber found in fruit, grains, vegetables, and milk products  Carbohydrates increase your blood sugar levels  Carbohydrate counting can help you eat the right amount of carbohydrate to keep your blood sugar levels under control  What you need to know about planning meals using carbohydrate counting:  A dietitian or healthcare provider will help you develop a healthy meal plan that works best for you  You will be taught how much carbohydrate to eat or drink for each meal and snack  Your meal plan will be based on your age, weight, usual food intake, and physical activity level  If you have diabetes, it will also include your blood sugar levels and diabetes medicine  Once you know how much carbohydrate you should eat, you can decide what type of food you want to eat  You will need to know what foods contain carbohydrate and how much they contain  Keep track of the amount of carbohydrate in meals and snacks in order to follow your meal plan  Do not avoid carbohydrates or skip meals  Your blood sugar may fall too low if you do not eat enough carbohydrate or you skip meals  Foods that contain carbohydrate:   Breads:  Each serving of food listed below contains about 15 g of carbohydrate   1 slice of bread (1 ounce) or 1 flour or corn tortilla (6 inch)    ½ of a hamburger bun or ¼ of a large bagel (about 1 ounce)    1 pancake (about 4 inches across and ¼ inch thick)    Cereals and grains:  Serving sizes of ready-to-eat cereals vary  Look at the serving size and the total carbohydrate amount listed on the food label  Each serving of food listed below contains about 15 g of carbohydrate       ¾ cup of dry, unsweetened, ready-to-eat cereal or ¼ cup of low-fat granola     ½ cup of oatmeal or other cooked cereal     ? cup of cooked rice or pasta    Starchy vegetables and beans:  Each serving of food listed below contains about 15 g of carbohydrate   ½ cup of corn, green peas, sweet potatoes, or mashed potatoes    ¼ of a large baked potato    ½ cup of beans, lentils, and peas (garbanzo, galicia, kidney, white, split, black-eyed)    Crackers and snacks:  Each serving of food listed below contains about 15 g of carbohydrate   3 bryon cracker squares or 8 animal crackers     6 saltine-type crackers    3 cups of popcorn or ¾ ounce of pretzels, potato chips, or tortilla chips    Fruit:  Each serving of food listed below contains about 15 g of carbohydrate   1 small (4 ounce) piece of fresh fruit or ¾ to 1 cup of fresh fruit    ½ cup of canned or frozen fruit, packed in natural juice    ½ cup (4 ounces) of unsweetened fruit juice    2 tablespoons of dried fruit    Desserts or sugary foods:  Each serving of food listed below contains about 15 g of carbohydrate   2-inch square unfrosted cake or brownie     2 small cookies    ½ cup of ice cream, frozen yogurt, or nondairy frozen yogurt    ¼ cup of sherbet or sorbet    1 tablespoon of regular syrup, jam, or jelly    2 tablespoons of light syrup    Milk and yogurt:  Foods from the milk group contain about 12 g of carbohydrate per serving  1 cup of fat-free or low-fat milk    1 cup of soy milk    ? cup of fat-free, yogurt sweetened with artificial sweetener    Non-starchy vegetables:  Each serving contains about 5 g of carbohydrate   Three servings of non-starch vegetables count as 1 carbohydrate serving  ½ cup of cooked vegetables or 1 cup of raw vegetables  This includes beets, broccoli, cabbage, cauliflower, cucumber, mushrooms, tomatoes, and zucchini    ½ cup of vegetable juice    How to use carbohydrate counting to plan meals:   Count carbohydrate amounts using serving sizes:      Pasta dinner example: You plan to have pasta, tossed salad, and an 8-ounce glass of milk   Your healthcare provider tells you that you may have 4 carbohydrate servings for dinner  One carbohydrate serving of pasta is ? cup  One cup of pasta will equal 3 carbohydrate servings  An 8-ounce glass of milk will count as 1 carbohydrate serving  These amounts of food would equal 4 carbohydrate servings  One cup of tossed salad does not count toward your carbohydrate servings  Count carbohydrate amounts using food labels:  Find the total amount of carbohydrate in a packaged food by reading the food label  Food labels tell you the serving size of the food and the total carbohydrate amount in each serving  Find the serving size on the food label and then decide how many servings you will eat  Multiply the number of servings you plan to eat by the carbohydrate amount per serving  Granola bar snack example: Your meal plan allows you to have 2 carbohydrate servings (30 grams) of carbohydrate for a snack  You plan to eat 1 package of granola bars, which contains 2 bars  According to the food label, the serving size of food in this package is 1 bar  Each serving (1 bar) contains 25 grams of carbohydrate  The total amount of carbohydrate in this package of granola bars would be 50 g  Based on your meal plan, you should eat only 1 bar  Follow up with your doctor as directed:  Write down your questions so you remember to ask them during your visits  © Copyright [employer] 2022 Information is for End User's use only and may not be sold, redistributed or otherwise used for commercial purposes  All illustrations and images included in CareNotes® are the copyrighted property of [employer]  or [employer]   The above information is an  only  It is not intended as medical advice for individual conditions or treatments  Talk to your doctor, nurse or pharmacist before following any medical regimen to see if it is safe and effective for you

## 2023-01-19 NOTE — PROGRESS NOTES
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, unspecified whether long term insulin use ([facility] )  Assessment & Plan:  His A1c today was much higher than usual he was told that one of his other doctors offices that if he has a low episode of blood sugar he should not take his diabetes pill anymore  So he has not been however he has not been watching his diet either and if wife states that its been a lot of carbs  Try talking to him about his diet  And I gave him back the low-dose of Amaryl again  Lab Results   Component Value Date    HGBA1C 8 2 (A) 2023       Orders:  -     POCT hemoglobin A1c  -     glimepiride (AMARYL) 1 mg tablet; Take 1 tablet (1 mg total) by mouth daily with breakfast    2  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[place] [address] )    3  Malignant neoplasm of urinary bladder, unspecified site [facility])  Assessment & Plan:  Continues to follow with urology      4  Benign 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 [facility])  Assessment & Plan:  Lab Results   Component Value Date    EGFR 28 (L) 10/06/2022    EGFR 27 2018    EGFR 32 10/19/2017    CREATININE 2 22 (H) 10/06/2022    CREATININE 2 28 (H) 2022    CREATININE 2 55 (H) 2022   His kidney function remains relatively stable as does his blood pressure despite the fact he is on no medication for same  He continues to follow occasionally with renal      5  Type 2 diabetes mellitus with stage 3 chronic kidney disease, without long-term current use of insulin ([facility])  -     glucose blood (OneTouch Verio) test strip; USE  STRIP TO CHECK GLUCOSE ONCE DAILY  DX: E11 9        Depression Screening and Follow-up Plan: Patient was screened for depression during today's encounter   They screened negative with a PHQ-2 score of 0  Subjective      Patient is here today for checkup he continues to follow with a specialist less occasionally denies any complaints  However his diabetes is poorly controlled he currently is not taking any medication for hyperglycemia because he had several episodes of hypoglycemia but now his wife states he is eating anything and everything and his sugar is high majority of the time    Diabetes  He presents for his follow-up diabetic visit  He has type 2 diabetes mellitus  His disease course has been worsening  Hypoglycemia symptoms include dizziness  Pertinent negatives for hypoglycemia include no confusion or headaches  Associated symptoms include blurred vision, fatigue, foot paresthesias and weakness  Pertinent negatives for diabetes include no chest pain, no polydipsia, no polyphagia and no polyuria  There are no hypoglycemic complications  Symptoms are stable  Diabetic complications include heart disease, nephropathy, peripheral neuropathy and retinopathy  Risk factors for coronary artery disease include diabetes mellitus, dyslipidemia, hypertension and sedentary lifestyle  Current diabetic treatment includes diet  He is compliant with treatment some of the time  His weight is stable  He is following a generally unhealthy diet  Meal planning includes avoidance of concentrated sweets  He has had a previous visit with a dietitian  He rarely participates in exercise  His home blood glucose trend is fluctuating dramatically  An ACE inhibitor/angiotensin II receptor blocker is contraindicated  He does not see a podiatrist Eye exam is current  Review of Systems   Constitutional: Positive for fatigue  Negative for chills, fever and unexpected weight change  HENT: Negative for congestion, ear pain, hearing loss, postnasal drip, sinus pressure, sore throat, trouble swallowing and voice change  Eyes: Positive for blurred vision and visual disturbance     Respiratory: Negative for cough, chest tightness, shortness of breath and wheezing  Cardiovascular: Negative for chest pain, palpitations and leg swelling  Gastrointestinal: Negative for abdominal distention, abdominal pain, anal bleeding, blood in stool, constipation, diarrhea and nausea  Endocrine: Negative for cold intolerance, polydipsia, polyphagia and polyuria  Genitourinary: Negative for dysuria, flank pain, frequency, hematuria and urgency  Musculoskeletal: Positive for back pain  Negative for arthralgias, gait problem, joint swelling, myalgias and neck pain  Skin: Negative for rash  Allergic/Immunologic: Negative for immunocompromised state  Neurological: Positive for dizziness and weakness  Negative for syncope, facial asymmetry, light-headedness, numbness and headaches  Hematological: Negative for adenopathy  Psychiatric/Behavioral: Negative for confusion, sleep disturbance and suicidal ideas         Current Outpatient Medications on File Prior to Visit   Medication Sig   • amLODIPine (NORVASC) 5 mg tablet Take 1 tablet (5 mg total) by mouth daily   • aspirin 325 mg tablet Take 325 mg by mouth daily   • atorvastatin (LIPITOR) 10 mg tablet Take 1 tablet (10 mg total) by mouth daily   • brimonidine tartrate 0 2 % ophthalmic solution INSTILL 1 DROP INTO EACH EYE THREE TIMES DAILY   • Cholecalciferol (VITAMIN D3) 1000 units CAPS Take by mouth in the morning     • Cyanocobalamin (Vitamin B-12) 1000 MCG/15ML LIQD Take by mouth daily   • cyanocobalamin (VITAMIN B-12) 500 mcg tablet Take 500 mcg by mouth daily     • Dorzolamide HCl-Timolol Mal PF 2-0 5 % SOLN INSTILL 1 DROP INTO EACH EYE TWICE DAILY   • lidocaine (LMX) 4 % cream Apply topically as needed for mild pain   • loteprednol etabonate (LOTEMAX) 0 5 % ophthalmic suspension Apply to eye     • prednisoLONE acetate (PRED FORTE) 1 % ophthalmic suspension INSTILL 1 DROP INTO LEFT EYE TWICE DAILY   • [DISCONTINUED] glucose blood (OneTouch Verio) test strip USE  STRIP TO CHECK GLUCOSE ONCE DAILY  DX: E11 9   • [DISCONTINUED] hydrochlorothiazide (HYDRODIURIL) 25 mg tablet Take 1 tablet (25 mg total) by mouth daily       Objective     /58 (BP Location: Left arm, Patient Position: Sitting, Cuff Size: Standard)   Pulse 60   Temp 97 8 °F (36 6 °C)   Ht 5' 4" (1 626 m)   Wt 64 kg (141 lb)   SpO2 97%   BMI 24 20 kg/m²     Physical Exam  Constitutional:       General: He is not in acute distress  Appearance: He is well-developed  HENT:      Right Ear: External ear normal       Left Ear: External ear normal       Nose: Nose normal       Mouth/Throat:      Pharynx: No oropharyngeal exudate  Eyes:      Pupils: Pupils are equal, round, and reactive to light  Neck:      Thyroid: No thyromegaly  Vascular: No JVD  Cardiovascular:      Rate and Rhythm: Normal rate and regular rhythm  Heart sounds: Normal heart sounds  No murmur heard  No gallop  Pulmonary:      Effort: Pulmonary effort is normal  No respiratory distress  Breath sounds: Normal breath sounds  No wheezing or rales  Abdominal:      General: Bowel sounds are normal  There is no distension  Palpations: Abdomen is soft  There is no mass  Tenderness: There is no abdominal tenderness  Musculoskeletal:         General: No tenderness  Normal range of motion  Cervical back: Normal range of motion and neck supple  Right lower leg: No edema  Left lower leg: No edema  Lymphadenopathy:      Cervical: No cervical adenopathy  Skin:     Findings: No rash  Neurological:      Mental Status: He is alert and oriented to person, place, and time  Cranial Nerves: No cranial nerve deficit  Coordination: Coordination normal    Psychiatric:         Behavior: Behavior normal          Thought Content:  Thought content normal          Judgment: Judgment normal      Procedures  Janet Méndez MD

## 2023-01-22 PROBLEM — E11.21 DIABETIC NEPHROPATHY ASSOCIATED WITH TYPE 2 DIABETES MELLITUS (HCC): Status: ACTIVE | Noted: 2023-01-22

## 2023-01-22 NOTE — PROGRESS NOTES
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   Of great concern is he is very noncompliant with medications and diet    Labs from last appointment     • Baseline creatinine:  2 5-2 6  • Current creatinine:  2 28 at baseline  • Urine protein creatinine ratio:  0 347 g at goal  • UA:  No proteinuria and no hematuria  • Renal ultrasound:  11/26/2021:  Small echogenic kidneys no hydronephrosis:  7 3 x 7 2  Recommendations:  • Treat hypertension-please see below  • Treat dyslipidemia-please see below  • Maintain proteinuria less than 1 g or as low as possible  • Avoid nephrotoxic agents such as NSAIDs, and proton pump inhibitors if possible; patient counseled as such  • I would refer to kidney smart education  • I would refer to ACP for goals of care     2   Volume:  Euvolemic     3   Hypertension:       Current blood pressure averages:  None today     • Goal blood pressure:  Less than 130/80 given CKD     Recommendations:  • Push nonmedical regimen including weight loss, isotonic exercise and a low sodium diet   Patient has been counseled the such  • Can be very noncompliant  Workup:  • Aldosterone/renin:  Negative  • Thyroid profile: Normal  • Renal workup as outlined above  • Hold on renal artery duplex unless cannot control blood pressure  ?  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:  • Calcium/magnesium/phosphorus:  • All normal  • PTH intact:   80 slightly above  goal but just monitor and now  • Vitamin-D:  39 at goal     6   Dyslipidemia:  • Goal LDL:  Less than 100  • Current lipid profile:  LDL 80/HDL 52/triglycerides 63  Recommendations:  •  Low-cholesterol/low-fat diet / weight loss as appropriate and isotonic exercise  •  Medication changes today at goal so no changes     7   Anemia:  Most likely from CKD  • Current hemoglobin:  9 9 slightly lower than usual  • Iron studies:  Iron saturation and ferritin both acceptable  • Multiple myeloma evaluation:  Faint band in IgM and lambda:  Probably reactive/inflammatory recommend follow-up in 3-6 months from December 27, 2021: Margieian Youngers will obtain in 3 months:  It is persistent, therefore the patient needs immunoelectrophoresis at this time!!!  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  • DDD of lumbar spine  • Osteoarthritis  • Bladder cancer  • Asthma  • Noncompliance with medication               GI HEALTH MAINTENANCE: LAST COLONOSCOPY:  seen by Dr Megan Mendiola with heme-negative stools he did not feel there is any need for a colonoscopy at this time  PATIENT INSTRUCTIONS:    Patient Instructions   1  Medication changes today:  • No medication changes today pending your follow-up labs    2  Please go for non-fasting  lab work over the next couple of weeks    3    Please make an appointment to see our nurse educator regarding chronic kidney disease education  4  Please make an appointment to see my advanced practitioner in about 3 to 4 months for advance care planning meeting you can see her at the same time you do the follow-up appointment    5  Please take 1 week a blood pressure readings if possible prior to the next appointment    AS FOLLOWS  MORNING AND EVENING, SITTING AND STANDING as follows:  · TAKE THE MORNING READINGS BEFORE ANY MEDICATIONS AND WHEN YOU ARE RELAXED FOR SEVERAL MINUTES  · TAKE THE EVENING READINGS:  BETWEEN 7-10 P M ; PRIOR TO ANY MEDICATIONS; AT LEAST IN OUR  FROM DINNER; AND CERTAINLY AFTER RELAXING FOR A FEW MINUTES  · PLEASE INCLUDE HEART RATE WITH YOUR BLOOD PRESSURE READINGS  · When taking standing readings, keep your arm supported at heart level and not dangling  · Make sure you are sitting with your back supported and feet on the ground and do not cross your legs or feet  · Make sure you have not taken any coffee or caffeine products or exercised or smoke cigarettes at least 30 minutes before taking your blood pressure  Then please mail these readings into the office    6  Follow-up in 3-4 months  • Please bring in 1 week a blood pressure readings morning evening, sitting and standing is outlined above  • PLEASE BRING AN YOUR BLOOD PRESSURE MACHINE TO CORRELATE WITH THE OFFICE MACHINE AT THIS NEXT SCHEDULED VISIT  • Please go for fasting lab work 1-2 weeks prior to your appointment  • We will also arrange for the advance care plan meeting at that same time      7  Follow-up appointment with myself in about 6-7 months please go for lab work prior to the appointment and bring in a week of blood pressure readings if possible    8    General instructions:  • AVOID SALT BUT NOT ADDING AN READING LABELS TO MAKE SURE THERE IS LOW-SALT IN THE FOOD THAT YOU ARE EATING  o Goal is less than 2 g of sodium intake or less than 5 g of sodium chloride intake per day    • Avoid nonsteroidal anti-inflammatory drugs such as Naprosyn, ibuprofen, Aleve, Advil, Celebrex, Meloxicam (Mobic) etc   You can use Tylenol as needed if you do not have any liver condition to be concerned about    • Avoid medications such as Sudafed or decongestants and antihistamines that contained the D component which is the decongestant  You can take antihistamines without the decongestant or D component  • Try to avoid medications such as pantoprazole or  Protonix/Nexium or Esomeprazole)/Prilosec or omeprazole/Prevacid or lansoprazole/AcipHex or Rabeprazole  If you are able to, use Pepcid as this is safer for your kidneys  • Try to exercise at least 30 minutes 3 days a week to begin with or keep as active as possible    • Please do not drink more than 2 glasses of alcohol/wine on a daily basis as this may contribute to your high blood pressure  Subjective: There has been no hospitalizations or acute illnesses since last visit  The patient overall is feeling well  No fevers, chills, or cough or colds    Good appetite and good energy  No hematuria, dysuria, voiding symptoms or foamy urine  No gastrointestinal symptoms  No cardiovascular symptoms including swelling of the legs  No headaches, dizziness or lightheadedness  Blood pressure medications:  • Amlodipine 5 mg daily usually in the morning occasionally in the afternoon      ROS:  See HPI, otherwise review of systems as completely reviewed with the patient are negative    Past Medical History:   Diagnosis Date   • Cataracts, bilateral      Past Surgical History:   Procedure Laterality Date   • CATARACT EXTRACTION Bilateral 07/15/2012   • COLONOSCOPY     • CYSTOSCOPY  01/15/2018    Last assessed 9/14/17, diagnostic   • HERNIA REPAIR     • INSTILLATION MYTOMYCIN N/A 10/25/2017    Procedure: INSTILLATION OF MITOMYCIN C;  Surgeon: Fred Gomez MD;  Location: [facility];  Service: Urology   • UT CYSTO W/REMOVAL OF LESIONS SMALL N/A 10/25/2017    Procedure: CYSTOSCOPY; BLADDER BIOPSY WITH [employer];  Surgeon: Alfreda Luna MD;  [facility];  Service: Urology   • TONSILLECTOMY     • TRANSURETHRAL RESECTION OF BLADDER TUMOR N/A 10/25/2017    Procedure: TUR BLADDER TUMOR;  Surgeon: Alfreda Luna MD;  [facility];  Service: Urology   • WISDOM TOOTH EXTRACTION       Family History   Problem Relation Age of Onset   • Diabetes Mother       reports that he quit smoking about 59 years ago  His smoking use included cigarettes  He has never used smokeless tobacco  He reports that he does not drink alcohol and does not use drugs  I COMPLETELY REVIEWED THE PAST MEDICAL HISTORY/PAST SURGICAL HISTORY/SOCIAL HISTORY/FAMILY HISTORY/AND MEDICATIONS  AND UPDATED ALL    Objective:     Vitals:   BP sitting on left 120/68 with a heart rate of 80 and just slightly irregular  BP standing on left: 130/66 heart rate of 80 and just slightly irregular    Ambulatory oscillometric blood pressure readin/57    Weight (last 2 days)     Date/Time Weight    23 1414 66 7 (147)        Wt Readings from Last 3 Encounters:   23 66 7 kg (147 lb)   23 64 kg (141 lb)   10/11/22 65 8 kg (145 lb)       Body mass index is 25 23 kg/m²      Physical Exam: General:  No acute distress  Skin:  No acute rash  Eyes:  No scleral icterus, noninjected, no discharge from eyes  ENT:  Moist mucous membranes  Neck:  Supple, no jugular venous distention, trachea is midline, no lymphadenopathy and no thyromegaly  Back   No CVAT  Chest:  Clear to auscultation and percussion, good respiratory effort  CVS:  Regular rate and rhythm without a rub, or gallops or murmurs  Abdomen:  Soft and nontender with normal bowel sounds  Extremities:  No cyanosis and no edema, no arthritic changes, normal range of motion  Neuro:  Grossly intact  Psych:  Alert, oriented x3 and appropriate      Medications:    Current Outpatient Medications:   •  amLODIPine (NORVASC) 5 mg tablet, Take 1 tablet (5 mg total) by mouth daily, Disp: 90 tablet, Rfl: 0  •  aspirin 325 mg tablet, Take 325 mg by mouth daily, Disp: , Rfl:   •  atorvastatin (LIPITOR) 10 mg tablet, Take 1 tablet (10 mg total) by mouth daily, Disp: 90 tablet, Rfl: 3  •  brimonidine tartrate 0 2 % ophthalmic solution, INSTILL 1 DROP INTO EACH EYE THREE TIMES DAILY, Disp: , Rfl:   •  Cholecalciferol (VITAMIN D3) 1000 units CAPS, Take by mouth in the morning  , Disp: , Rfl:   •  Cyanocobalamin (Vitamin B-12) 1000 MCG/15ML LIQD, Take by mouth daily, Disp: , Rfl:   •  Dorzolamide HCl-Timolol Mal PF 2-0 5 % SOLN, INSTILL 1 DROP INTO EACH EYE TWICE DAILY, Disp: , Rfl:   •  glimepiride (AMARYL) 1 mg tablet, Take 1 tablet (1 mg total) by mouth daily with breakfast (Patient taking differently: Take 1 mg by mouth if needed), Disp: 30 tablet, Rfl: 5  •  glucose blood (OneTouch Verio) test strip, USE  STRIP TO CHECK GLUCOSE ONCE DAILY  DX: E11 9, Disp: 50 each, Rfl: 6  •  Latanoprostene Bunod (Vyzulta) 0 024 % SOLN, Apply to eye, Disp: , Rfl:   •  loteprednol etabonate (LOTEMAX) 0 5 % ophthalmic suspension, Apply to eye  , Disp: , Rfl:   •  prednisoLONE acetate (PRED FORTE) 1 % ophthalmic suspension, INSTILL 1 DROP INTO LEFT EYE TWICE DAILY, Disp: , Rfl:   •  cyanocobalamin (VITAMIN B-12) 500 mcg tablet, Take 500 mcg by mouth daily   (Patient not taking: Reported on 1/31/2023), Disp: , Rfl:   •  lidocaine (LMX) 4 % cream, Apply topically as needed for mild pain (Patient not taking: Reported on 1/31/2023), Disp: 30 g, Rfl: 0    Lab, Imaging and other studies: I have personally reviewed pertinent labs  Laboratory Results:  Results for orders placed or performed in visit on 01/19/23   POCT hemoglobin A1c   Result Value Ref Range    Hemoglobin A1C 8 2 (A) 6 5             Invalid input(s): ALBUMIN      Radiology review:   chest X-ray    Ultrasound      Portions of the record may have been created with voice recognition software    Occasional wrong word or "sound a like" substitutions may have occurred due to the inherent limitations of voice recognition software  Read the chart carefully and recognize, using context, where substitutions have occurred

## 2023-01-25 ENCOUNTER — TELEPHONE (OUTPATIENT)
Dept: NEPHROLOGY | Facility: CLINIC | Age: 88
End: 2023-01-25

## 2023-01-25 NOTE — TELEPHONE ENCOUNTER
BARB for patient reminding him to go for lab work before his appt on 1/31  Asked him to call back if he has any questions

## 2023-01-31 ENCOUNTER — OFFICE VISIT (OUTPATIENT)
Dept: NEPHROLOGY | Facility: CLINIC | Age: 88
End: 2023-01-31

## 2023-01-31 VITALS
HEART RATE: 71 BPM | WEIGHT: 147 LBS | SYSTOLIC BLOOD PRESSURE: 115 MMHG | HEIGHT: 64 IN | BODY MASS INDEX: 25.1 KG/M2 | DIASTOLIC BLOOD PRESSURE: 57 MMHG

## 2023-01-31 DIAGNOSIS — R80.1 PERSISTENT PROTEINURIA: ICD-10-CM

## 2023-01-31 DIAGNOSIS — E55.9 MILD VITAMIN D DEFICIENCY: ICD-10-CM

## 2023-01-31 DIAGNOSIS — I12.9 PARENCHYMAL RENAL HYPERTENSION, STAGE 1 THROUGH STAGE 4 OR UNSPECIFIED CHRONIC KIDNEY DISEASE: Primary | ICD-10-CM

## 2023-01-31 DIAGNOSIS — E11.21 DIABETIC NEPHROPATHY ASSOCIATED WITH TYPE 2 DIABETES MELLITUS (HCC): ICD-10-CM

## 2023-01-31 DIAGNOSIS — D63.1 ANEMIA IN STAGE 4 CHRONIC KIDNEY DISEASE (HCC): ICD-10-CM

## 2023-01-31 DIAGNOSIS — N18.4 STAGE 4 CHRONIC KIDNEY DISEASE (HCC): ICD-10-CM

## 2023-01-31 DIAGNOSIS — E78.5 DYSLIPIDEMIA: ICD-10-CM

## 2023-01-31 DIAGNOSIS — N18.4 ANEMIA IN STAGE 4 CHRONIC KIDNEY DISEASE (HCC): ICD-10-CM

## 2023-01-31 RX ORDER — LATANOPROSTENE BUNOD 0.24 MG/ML
SOLUTION/ DROPS OPHTHALMIC
COMMUNITY

## 2023-01-31 NOTE — PATIENT INSTRUCTIONS
1  Medication changes today:  No medication changes today pending your follow-up labs    2  Please go for non-fasting  lab work over the next couple of weeks    3  Please make an appointment to see our nurse educator regarding chronic kidney disease education  4  Please make an appointment to see my advanced practitioner in about 3 to 4 months for advance care planning meeting you can see her at the same time you do the follow-up appointment    5  Please take 1 week a blood pressure readings if possible prior to the next appointment    AS FOLLOWS  MORNING AND EVENING, SITTING AND STANDING as follows:  TAKE THE MORNING READINGS BEFORE ANY MEDICATIONS AND WHEN YOU ARE RELAXED FOR SEVERAL MINUTES  TAKE THE EVENING READINGS:  BETWEEN 7-10 P M ; PRIOR TO ANY MEDICATIONS; AT LEAST IN OUR  FROM DINNER; AND CERTAINLY AFTER RELAXING FOR A FEW MINUTES  PLEASE INCLUDE HEART RATE WITH YOUR BLOOD PRESSURE READINGS  When taking standing readings, keep your arm supported at heart level and not dangling  Make sure you are sitting with your back supported and feet on the ground and do not cross your legs or feet  Make sure you have not taken any coffee or caffeine products or exercised or smoke cigarettes at least 30 minutes before taking your blood pressure  Then please mail these readings into the office    6  Follow-up in 3-4 months  Please bring in 1 week a blood pressure readings morning evening, sitting and standing is outlined above  PLEASE BRING AN YOUR BLOOD PRESSURE MACHINE TO CORRELATE WITH THE OFFICE MACHINE AT THIS NEXT SCHEDULED VISIT  Please go for fasting lab work 1-2 weeks prior to your appointment  We will also arrange for the advance care plan meeting at that same time      7  Follow-up appointment with myself in about 6-7 months please go for lab work prior to the appointment and bring in a week of blood pressure readings if possible    8    General instructions:  AVOID SALT BUT NOT ADDING AN READING LABELS TO MAKE SURE THERE IS LOW-SALT IN THE FOOD THAT YOU ARE EATING  Goal is less than 2 g of sodium intake or less than 5 g of sodium chloride intake per day    Avoid nonsteroidal anti-inflammatory drugs such as Naprosyn, ibuprofen, Aleve, Advil, Celebrex, Meloxicam (Mobic) etc   You can use Tylenol as needed if you do not have any liver condition to be concerned about    Avoid medications such as Sudafed or decongestants and antihistamines that contained the D component which is the decongestant  You can take antihistamines without the decongestant or D component  Try to avoid medications such as pantoprazole or  Protonix/Nexium or Esomeprazole)/Prilosec or omeprazole/Prevacid or lansoprazole/AcipHex or Rabeprazole  If you are able to, use Pepcid as this is safer for your kidneys  Try to exercise at least 30 minutes 3 days a week to begin with or keep as active as possible    Please do not drink more than 2 glasses of alcohol/wine on a daily basis as this may contribute to your high blood pressure

## 2023-01-31 NOTE — LETTER
January 31, 2023     Pily Faye MD  [facility]    Patient: Elizabeth Askew   YOB: 1934   Date of Visit: 1/31/2023       Dear Dr Ivette Mark: Thank you for referring Ubaldo Libman to me for evaluation  Below are my notes for this consultation  If you have questions, please do not hesitate to call me  I look forward to following your patient along with you  Sincerely,        Nacho Pitts MD        CC: MD Nacho Hanna MD  1/31/2023  2:41 PM  Sign when Signing Visit  RENAL FOLLOW UP NOTE: td    • ASSESSMENT AND PLAN:   89 I  o  year old male with a history of diabetes mellitus/hypertension/osteoarthritis/bladder CA/asthma/anemia who we are asked to see for chronic kidney disease in the setting of diabetes mellitus:        1   CKD stage 4    Labs are pending from today  [address] DATA/ LABS FOR THIS VISIT: DISCUSSION AS FOLLOWS  • 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   Of great concern is he is very noncompliant with medications and diet    Labs from last appointment     • Baseline creatinine:  2 5-2 6  • Current creatinine:  2 28 at baseline  • Urine protein creatinine ratio:  0 347 g at goal  • UA:  No proteinuria and no hematuria  • Renal ultrasound:  11/26/2021:  Small echogenic kidneys no hydronephrosis:  7 3 x 7 2  Recommendations:  • Treat hypertension-please see below  • Treat dyslipidemia-please see below  • Maintain proteinuria less than 1 g or as low as possible  • Avoid nephrotoxic agents such as NSAIDs, and proton pump inhibitors if possible; patient counseled as such  • I would refer to kidney smart education  • I would refer to ACP for goals of care     2   Volume:  Euvolemic     3   Hypertension:       Current blood pressure averages:  None today     • Goal blood pressure:  Less than 130/80 given CKD     Recommendations:  • Push nonmedical regimen including weight loss, isotonic exercise and a low sodium diet   Patient has been counseled the such  • Can be very noncompliant  Workup:  • Aldosterone/renin:  Negative  • Thyroid profile: Normal  • Renal workup as outlined above  • Hold on renal artery duplex unless cannot control blood pressure  ?  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:  • Calcium/magnesium/phosphorus:  • All normal  • PTH intact:   80 slightly above  goal but just monitor and now  • Vitamin-D:  39 at goal     6   Dyslipidemia:  • Goal LDL:  Less than 100  • Current lipid profile:  LDL 80/HDL 52/triglycerides 63  Recommendations:  •  Low-cholesterol/low-fat diet / weight loss as appropriate and isotonic exercise  •  Medication changes today at goal so no changes     7   Anemia:  Most likely from CKD  • Current hemoglobin:  9 9 slightly lower than usual  • Iron studies:  Iron saturation and ferritin both acceptable  • Multiple myeloma evaluation:  Faint band in IgM and lambda:  Probably reactive/inflammatory recommend follow-up in 3-6 months from December 27, 2021: Gisselle Jain will obtain in 3 months:  It is persistent, therefore the patient needs immunoelectrophoresis at this time!!!  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  • DDD of lumbar spine  • Osteoarthritis  • Bladder cancer  • Asthma  • Noncompliance with medication               GI HEALTH MAINTENANCE: LAST COLONOSCOPY:  seen by Dr Rashawn Fulton with heme-negative stools he did not feel there is any need for a colonoscopy at this time  PATIENT INSTRUCTIONS:    Patient Instructions   1  Medication changes today:  • No medication changes today pending your follow-up labs    2  Please go for non-fasting  lab work over the next couple of weeks    3  Please make an appointment to see our nurse educator regarding chronic kidney disease education  4  Please make an appointment to see my advanced practitioner in about 3 to 4 months for advance care planning meeting you can see her at the same time you do the follow-up appointment    5  Please take 1 week a blood pressure readings if possible prior to the next appointment    AS FOLLOWS  MORNING AND EVENING, SITTING AND STANDING as follows:  · TAKE THE MORNING READINGS BEFORE ANY MEDICATIONS AND WHEN YOU ARE RELAXED FOR SEVERAL MINUTES  · TAKE THE EVENING READINGS:  BETWEEN 7-10 P M ; PRIOR TO ANY MEDICATIONS; AT LEAST IN OUR  FROM DINNER; AND CERTAINLY AFTER RELAXING FOR A FEW MINUTES  · PLEASE INCLUDE HEART RATE WITH YOUR BLOOD PRESSURE READINGS  · When taking standing readings, keep your arm supported at heart level and not dangling  · Make sure you are sitting with your back supported and feet on the ground and do not cross your legs or feet  · Make sure you have not taken any coffee or caffeine products or exercised or smoke cigarettes at least 30 minutes before taking your blood pressure  Then please mail these readings into the office    6    Follow-up in 3-4 months  • Please bring in 1 week a blood pressure readings morning evening, sitting and standing is outlined above  • PLEASE BRING AN YOUR BLOOD PRESSURE MACHINE TO CORRELATE WITH THE OFFICE MACHINE AT THIS NEXT SCHEDULED VISIT  • Please go for fasting lab work 1-2 weeks prior to your appointment  • We will also arrange for the advance care plan meeting at that same time      7  Follow-up appointment with myself in about 6-7 months please go for lab work prior to the appointment and bring in a week of blood pressure readings if possible    8  General instructions:  • AVOID SALT BUT NOT ADDING AN READING LABELS TO MAKE SURE THERE IS LOW-SALT IN THE FOOD THAT YOU ARE EATING  o Goal is less than 2 g of sodium intake or less than 5 g of sodium chloride intake per day    • Avoid nonsteroidal anti-inflammatory drugs such as Naprosyn, ibuprofen, Aleve, Advil, Celebrex, Meloxicam (Mobic) etc   You can use Tylenol as needed if you do not have any liver condition to be concerned about    • Avoid medications such as Sudafed or decongestants and antihistamines that contained the D component which is the decongestant  You can take antihistamines without the decongestant or D component  • Try to avoid medications such as pantoprazole or  Protonix/Nexium or Esomeprazole)/Prilosec or omeprazole/Prevacid or lansoprazole/AcipHex or Rabeprazole  If you are able to, use Pepcid as this is safer for your kidneys  • Try to exercise at least 30 minutes 3 days a week to begin with or keep as active as possible    • Please do not drink more than 2 glasses of alcohol/wine on a daily basis as this may contribute to your high blood pressure  Subjective: There has been no hospitalizations or acute illnesses since last visit  The patient overall is feeling well  No fevers, chills, or cough or colds    Good appetite and good energy  No hematuria, dysuria, voiding symptoms or foamy urine  No gastrointestinal symptoms  No cardiovascular symptoms including swelling of the legs  No headaches, dizziness or lightheadedness  Blood pressure medications:  • Amlodipine 5 mg daily usually in the morning occasionally in the afternoon      ROS:  See HPI, otherwise review of systems as completely reviewed with the patient are negative    Past Medical History:   Diagnosis Date   • Cataracts, bilateral      Past Surgical History:   Procedure Laterality Date   • CATARACT EXTRACTION Bilateral 07/15/2012   • COLONOSCOPY     • CYSTOSCOPY  01/15/2018    Last assessed 9/14/17, diagnostic   • HERNIA REPAIR     • INSTILLATION MYTOMYCIN N/A 10/25/2017    Procedure: INSTILLATION OF MITOMYCIN C;  Surgeon: Leanne Gallego MD;  Location: [facility];  Service: Urology   • KS CYSTO W/REMOVAL OF LESIONS SMALL N/A 10/25/2017    Procedure: Charmaine Kauai; BLADDER BIOPSY WITH Marci Gardiner;  Surgeon: Leanne Gallego MD;  Location: [facility];  Service: Urology   • TONSILLECTOMY     • TRANSURETHRAL RESECTION OF BLADDER TUMOR N/A 10/25/2017    Procedure: TUR BLADDER TUMOR;  Surgeon: Leanne Gallego MD;  Location: [facility];  Service: Urology   • WISDOM TOOTH EXTRACTION       Family History   Problem Relation Age of Onset   • Diabetes Mother       reports that he quit smoking about 59 years ago  His smoking use included cigarettes  He has never used smokeless tobacco  He reports that he does not drink alcohol and does not use drugs  I COMPLETELY REVIEWED THE PAST MEDICAL HISTORY/PAST SURGICAL HISTORY/SOCIAL HISTORY/FAMILY HISTORY/AND MEDICATIONS  AND UPDATED ALL    Objective:     Vitals:   BP sitting on left 120/68 with a heart rate of 80 and just slightly irregular  BP standing on left: 130/66 heart rate of 80 and just slightly irregular    Weight (last 2 days)     Date/Time Weight    01/31/23 1414 66 7 (147)        Wt Readings from Last 3 Encounters:   01/31/23 66 7 kg (147 lb)   01/19/23 64 kg (141 lb)   10/11/22 65 8 kg (145 lb)       Body mass index is 25 23 kg/m²      Physical Exam: General:  No acute distress  Skin:  No acute rash  Eyes:  No scleral icterus, noninjected, no discharge from eyes  ENT:  Moist mucous membranes  Neck:  Supple, no jugular venous distention, trachea is midline, no lymphadenopathy and no thyromegaly  Back   No CVAT  Chest:  Clear to auscultation and percussion, good respiratory effort  CVS:  Regular rate and rhythm without a rub, or gallops or murmurs  Abdomen:  Soft and nontender with normal bowel sounds  Extremities:  No cyanosis and no edema, no arthritic changes, normal range of motion  Neuro:  Grossly intact  Psych:  Alert, oriented x3 and appropriate      Medications:    Current Outpatient Medications:   •  amLODIPine (NORVASC) 5 mg tablet, Take 1 tablet (5 mg total) by mouth daily, Disp: 90 tablet, Rfl: 0  •  aspirin 325 mg tablet, Take 325 mg by mouth daily, Disp: , Rfl:   •  atorvastatin (LIPITOR) 10 mg tablet, Take 1 tablet (10 mg total) by mouth daily, Disp: 90 tablet, Rfl: 3  •  brimonidine tartrate 0 2 % ophthalmic solution, INSTILL 1 DROP INTO EACH EYE THREE TIMES DAILY, Disp: , Rfl:   •  Cholecalciferol (VITAMIN D3) 1000 units CAPS, Take by mouth in the morning  , Disp: , Rfl:   •  Cyanocobalamin (Vitamin B-12) 1000 MCG/15ML LIQD, Take by mouth daily, Disp: , Rfl:   •  Dorzolamide HCl-Timolol Mal PF 2-0 5 % SOLN, INSTILL 1 DROP INTO EACH EYE TWICE DAILY, Disp: , Rfl:   •  glimepiride (AMARYL) 1 mg tablet, Take 1 tablet (1 mg total) by mouth daily with breakfast (Patient taking differently: Take 1 mg by mouth if needed), Disp: 30 tablet, Rfl: 5  •  glucose blood (OneTouch Verio) test strip, USE  STRIP TO CHECK GLUCOSE ONCE DAILY  DX: E11 9, Disp: 50 each, Rfl: 6  •  Latanoprostene Bunod (Vyzulta) 0 024 % SOLN, Apply to eye, Disp: , Rfl:   •  loteprednol etabonate (LOTEMAX) 0 5 % ophthalmic suspension, Apply to eye  , Disp: , Rfl:   •  prednisoLONE acetate (PRED FORTE) 1 % ophthalmic suspension, INSTILL 1 DROP INTO LEFT EYE TWICE DAILY, Disp: , Rfl:   •  cyanocobalamin (VITAMIN B-12) 500 mcg tablet, Take 500 mcg by mouth daily   (Patient not taking: Reported on 1/31/2023), Disp: , Rfl:   •  lidocaine (LMX) 4 % cream, Apply topically as needed for mild pain (Patient not taking: Reported on 1/31/2023), Disp: 30 g, Rfl: 0    Lab, Imaging and other studies: I have personally reviewed pertinent labs  Laboratory Results:  Results for orders placed or performed in visit on 01/19/23   POCT hemoglobin A1c   Result Value Ref Range    Hemoglobin A1C 8 2 (A) 6 5             Invalid input(s): ALBUMIN      Radiology review:   chest X-ray    Ultrasound      Portions of the record may have been created with voice recognition software  Occasional wrong word or "sound a like" substitutions may have occurred due to the inherent limitations of voice recognition software  Read the chart carefully and recognize, using context, where substitutions have occurred

## 2023-02-01 ENCOUNTER — DOCUMENTATION (OUTPATIENT)
Dept: NEPHROLOGY | Facility: CLINIC | Age: 88
End: 2023-02-01

## 2023-02-01 LAB
25(OH)D3 SERPL-MCNC: 40 NG/ML (ref 30–100)
ALBUMIN SERPL-MCNC: 3.7 G/DL (ref 3.6–5.1)
APPEARANCE UR: CLEAR
BACTERIA UR QL AUTO: ABNORMAL /HPF
BASOPHILS # BLD AUTO: 43 CELLS/UL (ref 0–200)
BASOPHILS NFR BLD AUTO: 0.6 %
BILIRUB UR QL STRIP: NEGATIVE
BUN SERPL-MCNC: 32 MG/DL (ref 7–25)
BUN/CREAT SERPL: 14 (CALC) (ref 6–22)
CALCIUM SERPL-MCNC: 9.2 MG/DL (ref 8.6–10.3)
CALCIUM SERPL-MCNC: 9.2 MG/DL (ref 8.6–10.3)
CHLORIDE SERPL-SCNC: 109 MMOL/L (ref 98–110)
CO2 SERPL-SCNC: 24 MMOL/L (ref 20–32)
COLOR UR: YELLOW
CREAT ?TM UR-SCNC: 142 UMOL/L
CREAT SERPL-MCNC: 2.36 MG/DL (ref 0.7–1.22)
CREAT UR-MCNC: 142 MG/DL (ref 20–320)
EOSINOPHIL # BLD AUTO: 256 CELLS/UL (ref 15–500)
EOSINOPHIL NFR BLD AUTO: 3.6 %
ERYTHROCYTE [DISTWIDTH] IN BLOOD BY AUTOMATED COUNT: 11.5 % (ref 11–15)
EXT BILIRUBIN, UA: ABNORMAL
EXT BLOOD, UA: ABNORMAL
EXT COLOR, UA: YELLOW
EXT GLUCOSE BLD: 144
EXT GLUCOSE, UA: ABNORMAL
EXT KETONES: ABNORMAL
EXT NITRITE, UA: ABNORMAL
EXT PH, UA: 5.5
EXT PROTEIN URINE: 55
EXT PROTEIN, UA: ABNORMAL
EXT SPECIFIC GRAVITY, UA: 1.02
EXT UROBILINOGEN: ABNORMAL
EXTERNAL ALBUMIN: 3.7
EXTERNAL BACTERIA (UA): ABNORMAL
EXTERNAL BUN: 32
EXTERNAL CALCIUM: 9.2
EXTERNAL CASTS (UA): ABNORMAL
EXTERNAL CHLORIDE: 109
EXTERNAL CO2: 24
EXTERNAL CREATININE: 2.36
EXTERNAL EGFR: 26
EXTERNAL HEMATOCRIT: 30 %
EXTERNAL HEMOGLOBIN: 10.2 G/DL
EXTERNAL MAGNESIUM: 2.3
EXTERNAL PHOSPHORUS: 3.6
EXTERNAL PLATELET COUNT: 200 K/ÂΜL
EXTERNAL POTASSIUM: 4.1
EXTERNAL PTH: 95
EXTERNAL RBC (UA): ABNORMAL
EXTERNAL SODIUM: 142
EXTERNAL VITAMIN D,25: 40
EXTERNAL WBC (UA): ABNORMAL
EXTERNAL WBC: 7.1
FERRITIN SERPL-MCNC: 396 NG/ML (ref 24–380)
GFR/BSA.PRED SERPLBLD CYS-BASED-ARV: 26 ML/MIN/1.73M2
GLUCOSE SERPL-MCNC: 144 MG/DL (ref 65–99)
GLUCOSE UR QL STRIP: ABNORMAL
HCT VFR BLD AUTO: 30.4 % (ref 38.5–50)
HGB BLD-MCNC: 10.2 G/DL (ref 13.2–17.1)
HGB UR QL STRIP: NEGATIVE
HYALINE CASTS #/AREA URNS LPF: ABNORMAL /LPF
HYALINE CASTS #/AREA URNS LPF: ABNORMAL /LPF
IRON SATN MFR SERPL: 22 % (CALC) (ref 20–48)
IRON SERPL-MCNC: 49 MCG/DL (ref 50–180)
KETONES UR QL STRIP: NEGATIVE
LEUKOCYTE ESTERASE UR QL STRIP: NEGATIVE
LYMPHOCYTES # BLD AUTO: 1470 CELLS/UL (ref 850–3900)
LYMPHOCYTES NFR BLD AUTO: 20.7 %
MAGNESIUM SERPL-MCNC: 2.3 MG/DL (ref 1.5–2.5)
MCH RBC QN AUTO: 31.3 PG (ref 27–33)
MCHC RBC AUTO-ENTMCNC: 33.6 G/DL (ref 32–36)
MCV RBC AUTO: 93.3 FL (ref 80–100)
MONOCYTES # BLD AUTO: 696 CELLS/UL (ref 200–950)
MONOCYTES NFR BLD AUTO: 9.8 %
NEUTROPHILS # BLD AUTO: 4636 CELLS/UL (ref 1500–7800)
NEUTROPHILS NFR BLD AUTO: 65.3 %
NITRITE UR QL STRIP: NEGATIVE
PH UR STRIP: 5.5 [PH] (ref 5–8)
PHOSPHATE SERPL-MCNC: 3.6 MG/DL (ref 2.1–4.3)
PLATELET # BLD AUTO: 200 THOUSAND/UL (ref 140–400)
PMV BLD REES-ECKER: 11.7 FL (ref 7.5–12.5)
POTASSIUM SERPL-SCNC: 4.1 MMOL/L (ref 3.5–5.3)
PROT UR QL STRIP: ABNORMAL
PROT UR-MCNC: 55 MG/DL (ref 5–25)
PROT/CREAT UR: 0.39 MG/G{CREAT}
PROT/CREAT UR: 0.39 MG/MG CREAT (ref 0.03–0.15)
PROT/CREAT UR: 387 MG/G CREAT (ref 25–148)
PTH-INTACT SERPL-MCNC: 95 PG/ML (ref 16–77)
RBC # BLD AUTO: 3.26 MILLION/UL (ref 4.2–5.8)
RBC #/AREA URNS HPF: ABNORMAL /HPF
REF LAB TEST NAME: NORMAL
REF LAB TEST: NORMAL
SL AMB CLIENT CONTACT: NORMAL
SODIUM SERPL-SCNC: 142 MMOL/L (ref 135–146)
SP GR UR STRIP: 1.02 (ref 1–1.03)
SQUAMOUS #/AREA URNS HPF: ABNORMAL /HPF
TIBC SERPL-MCNC: 226 MCG/DL (CALC) (ref 250–425)
WBC # BLD AUTO: 7.1 THOUSAND/UL (ref 3.8–10.8)
WBC # BLD EST: ABNORMAL 10*3/UL
WBC #/AREA URNS HPF: ABNORMAL /HPF

## 2023-03-08 DIAGNOSIS — E11.22 TYPE 2 DIABETES MELLITUS WITH STAGE 4 CHRONIC KIDNEY DISEASE, WITHOUT LONG-TERM CURRENT USE OF INSULIN (HCC): ICD-10-CM

## 2023-03-08 DIAGNOSIS — D63.1 ANEMIA IN STAGE 4 CHRONIC KIDNEY DISEASE (HCC): ICD-10-CM

## 2023-03-08 DIAGNOSIS — N18.4 STAGE 4 CHRONIC KIDNEY DISEASE (HCC): ICD-10-CM

## 2023-03-08 DIAGNOSIS — E78.5 DYSLIPIDEMIA: ICD-10-CM

## 2023-03-08 DIAGNOSIS — E11.22 TYPE 2 DIABETES MELLITUS WITH STAGE 1 CHRONIC KIDNEY DISEASE, UNSPECIFIED WHETHER LONG TERM INSULIN USE (HCC): ICD-10-CM

## 2023-03-08 DIAGNOSIS — I12.9 PARENCHYMAL RENAL HYPERTENSION, STAGE 1 THROUGH STAGE 4 OR UNSPECIFIED CHRONIC KIDNEY DISEASE: ICD-10-CM

## 2023-03-08 DIAGNOSIS — N18.1 TYPE 2 DIABETES MELLITUS WITH STAGE 1 CHRONIC KIDNEY DISEASE, UNSPECIFIED WHETHER LONG TERM INSULIN USE (HCC): ICD-10-CM

## 2023-03-08 DIAGNOSIS — N18.4 TYPE 2 DIABETES MELLITUS WITH STAGE 4 CHRONIC KIDNEY DISEASE, WITHOUT LONG-TERM CURRENT USE OF INSULIN (HCC): ICD-10-CM

## 2023-03-08 DIAGNOSIS — N18.4 ANEMIA IN STAGE 4 CHRONIC KIDNEY DISEASE (HCC): ICD-10-CM

## 2023-03-08 RX ORDER — AMLODIPINE BESYLATE 5 MG/1
TABLET ORAL
Qty: 90 TABLET | Refills: 0 | Status: SHIPPED | OUTPATIENT
Start: 2023-03-08

## 2023-06-15 ENCOUNTER — TELEPHONE (OUTPATIENT)
Dept: NEPHROLOGY | Facility: CLINIC | Age: 88
End: 2023-06-15

## 2023-06-15 NOTE — TELEPHONE ENCOUNTER
Spoke with patient's wife, Huong Meraz, about the following, she expressed understanding and thanked us for the call:    ----- Message from Tarun Soria MD sent at 6/15/2023  8:22 AM EDT -----  Labs are all stable!   No changes  ----- Message -----  From: Jonas Babb Lab Results In  Sent: 6/15/2023   1:45 AM EDT  To: Tarun Soria MD

## 2023-06-15 NOTE — ACP (ADVANCE CARE PLANNING)
Advanced Care Planning Progress Note  NEPHROLOGY ADVANCED GOALS OF CARE MEETING  Rashaad Aaron 80 y o  male MRN: [id number]  2023        ASSESSMENT and PLAN:    I had the pleasure of seeing Rashaad Aaron today in the renal clinic for discussion of the patient's goals of care  Our discussion today will focus on helping Claudetta Olp achieve their desired goals, long term goals of care and how best to achieve those goals  The participants during this visit include Ulysses Lozano (wife) and myself   Kidney Smart Class: Completed Kidney Smart Class  Would Pursue Dialysis if Needed: Unsure  Dialysis Access: No Access   Advance Directive: Not Completed  Five Wishes Packet: Not Completed  Code Status: Level 1: Full Code  POA: daughterCatrachita  Do they need to be referred to Palliative Care: No  Do they need to be referred to Hospice: No  Length/Time of Meetin Minutes  Outpatient Nephrologist: Guevara Osborn MD    Discussion and Plan:    Rashaad Aaron is a 80 y o  male with CKD 4, HTN, HLD, DM 2, anemia chronic disease, DDD, hx of bladder cancer who presents for follow up of CKD  Patient followed by Dr India Pallas, last seen 2023  Most recent creatinine 2 25, at baseline  Patient denies recent hospitalizations or ER visits  Patient denies NSAID use  Patient denies nausea, vomiting, diarrhea, dyspnea, orthopnea, edema, hematuria or foamy urine  Patient's renal function is currently stable  Patient is currently unsure if he would be agreeable to dialysis in the future and is undecided regarding treatment modality  Patient has difficulty expressing his goals and fears  No uremic symptoms or urgent indication for initiation of renal placement therapy  REVIEW OF SYSTEMS:    Review of Systems   Constitutional: Negative  Negative for appetite change and diaphoresis  HENT: Negative  Negative for facial swelling and trouble swallowing  Eyes: Positive for visual disturbance  Respiratory: Negative  Negative for cough, choking, chest tightness, shortness of breath and wheezing  Cardiovascular: Negative  Negative for chest pain, palpitations and leg swelling  Gastrointestinal: Negative for abdominal pain, blood in stool, diarrhea, nausea and vomiting  Endocrine: Negative  Genitourinary: Negative for decreased urine volume, difficulty urinating, dysuria, frequency, hematuria and urgency  Musculoskeletal: Positive for arthralgias  Negative for myalgias  Allergic/Immunologic: Negative  Neurological: Negative for dizziness, tremors, weakness, light-headedness, numbness and headaches  Hematological: Negative  Psychiatric/Behavioral: Negative  Negative for confusion           Medications:    Current Outpatient Medications:   •  aspirin 325 mg tablet, Take 325 mg by mouth daily, Disp: , Rfl:   •  brimonidine tartrate 0 2 % ophthalmic solution, INSTILL 1 DROP INTO EACH EYE THREE TIMES DAILY, Disp: , Rfl:   •  Cholecalciferol (VITAMIN D3) 1000 units CAPS, Take by mouth in the morning  , Disp: , Rfl:   •  cyanocobalamin (VITAMIN B-12) 500 mcg tablet, Take 500 mcg by mouth daily, Disp: , Rfl:   •  Dorzolamide HCl-Timolol Mal PF 2-0 5 % SOLN, INSTILL 1 DROP INTO EACH EYE TWICE DAILY, Disp: , Rfl:   •  glimepiride (AMARYL) 1 mg tablet, Take 1 tablet (1 mg total) by mouth daily with breakfast (Patient taking differently: Take 1 mg by mouth if needed), Disp: 30 tablet, Rfl: 5  •  glucose blood (OneTouch Verio) test strip, USE  STRIP TO CHECK GLUCOSE ONCE DAILY  DX: E11 9, Disp: 50 each, Rfl: 6  •  Latanoprostene Bunod (Vyzulta) 0 024 % SOLN, Apply to eye, Disp: , Rfl:   •  lidocaine (LMX) 4 % cream, Apply topically as needed for mild pain, Disp: 30 g, Rfl: 0  •  loteprednol etabonate (LOTEMAX) 0 5 % ophthalmic suspension, Apply to eye  , Disp: , Rfl:   •  prednisoLONE acetate (PRED FORTE) 1 % ophthalmic suspension, INSTILL 1 DROP INTO LEFT EYE TWICE DAILY, Disp: , Rfl:   • amLODIPine (NORVASC) 5 mg tablet, Take 2 tablets (10 mg total) by mouth daily, Disp: 30 tablet, Rfl: 0  •  atorvastatin (LIPITOR) 10 mg tablet, Take 1 tablet (10 mg total) by mouth daily, Disp: 90 tablet, Rfl: 3  •  Cyanocobalamin (Vitamin B-12) 1000 MCG/15ML LIQD, Take by mouth daily (Patient not taking: Reported on 6/16/2023), Disp: , Rfl:             Serious Illness Conversation    No data filed       Serious Illness Conversation    1  What is your understanding now of where you are with your illness? Prognostic Understanding: appropriate understanding of prognosis     2  How much information about what is likely to be ahead with your illness would you like to have? Information: patient wants to be fully informed     3  What did you (clinician) communicate to the patient? Prognostic Communication: Uncertain - It can be difficult to predict what will happen with your illness  I hope you will continue to live well for a long time but I’m worried that you could get sick quickly, and I think it is important to prepare for that possibility  4  If your health situation worsens, what are your most important goals? Goals: be at home, be physically comfortable, have my medical decisions respected, be independent, live as long as possible, no matter what     5  What are the biggest fears and worries about the future and your health? Fears/Worries: getting treatments I do not want     6  What abilities are so critical to your life that you cannot imagine living without them?  unsure     7  What gives you strength as you think about the future with your illness? Trust in [address] [place] [address] in [facility]     8  If you become sicker, how much are you willing to go through for the possibility of gaining more time? Be in the hospital: Yes Have a feeding tube: No   Be in the ICU: Yes Live in a nursing home: No   Be on a ventilator: Yes Be uncomfortable: No    Undergo aggressive test and/or procedures: No   Unsure about dialysis  9  How much does your proxy and family know about your priorities and wishes? Discussion Discussion: no discussion but plans to address these issues     Laureano Ventura heard you say that time is really important to you  Keeping that in mind, and what we know about your illness, I recommend that we continue our current management of your CKD and continue to discuss thoughts regarding future dialysis  This will help us make sure that your treatment plans reflect what’s important to you  How does this plan sound to you? I will do everything I can to help you through this    Patient verbalized understanding of the plan     I have spent 30minutes speaking with my patient on advanced care planning today or during this visit     Advanced directives  Five Wishes: Patient does not have Five Wishes- would like information           Ady Burroughs PA-C

## 2023-06-15 NOTE — PATIENT INSTRUCTIONS
Your kidney function remains stable  Most recent creatinine 2 25, at and actually below baseline  We will continue routine surveillance as outlined below  Blood pressure is elevated in the office today  Recommend increasing amlodipine to 10 mg daily  [facility] prescription sent to [facility] pharmacy, [facility]  Avoid all NSAIDs to include ibuprofen, Motrin, Aleve, Advil, Naproxen, Celebrex, Indomethacin, Toradol  Stay well hydrated  Continue all prescribed medications  Call if blood pressure consistently more than 140/90 or less than 110/50s  High and low blood pressures may affect your kidney function  Recommend low salt diet (2 gm sodium diet)  Please let us know if you are scheduled for any studies with IV contrast (ex: CT scan, arteriogram or cardiac catheterization)   Follow up on 9/19/2023 with Dr Leia Wooten as previously scheduled with repeat labs prior to appointment  Please contact the office with new symptoms or concerns

## 2023-06-15 NOTE — PROGRESS NOTES
Assessment and Plan:  Chronic kidney disease IV:    -Etiology:  Suspect diabetic nephropathy, hypertensive nephrosclerosis, arteriolar nephrosclerosis  -Baseline creatinine 2 5-2 6  -Renal ultrasound: Small echogenic kidneys without hydronephrosis  -Follows with Dr Juwan Mohan  -recent creatinine 2 25, GFR 27, at baseline  -UPCr 0 55 g, at goal  -electrolytes and acid-base stable  -Renal function stable  -Treat modifiable risk factors  -avoid nephrotoxins, NSAIDs, hypotension and IV contrast if possible  -stay well hydrated  -Kidney Smart/CKD education: completed 3/24/2023  -Modality of choice: undecided  -Access: none  -ACP: completed today, 6/16/23  -follow-up with Dr Juwan Mohan on 9/19/2023 as scheduled with repeat blood and urine studies  Hypertension/Volume status:  -Aldosterone/renin ratio normal  -BP elevated above goal, even on repeat readings  Not taking BP at home regularly  -volume status euvolemic  -current medications: Amlodipine 5 mg daily  -changes: increase amlodipine to 10 mg daily  -repeat BMP in 1 week after increasing amlodipine  -Avoid NSAIDs  -Avoid hypotension or fluctuations in blood pressure    -low sodium (2 gm) diet  Encourage regular exercise  -continue to monitor BP at home    Proteinuria:  -In the setting of diabetic kidney disease  -UPCR 0 55 g at goal  -Optimize glycemic and BP control  -Consider ARB and/or Farxiga if proteinuria worsens  -continue to monitor    Anemia of CKD:  -Hgb 10 3, at goal   Goal >10  -Iron studies: Acceptable  Iron saturation 40%, ferritin 367  -SPEP with faint band in the gamma region but immunofixation with no monoclonal bands  Kappa/lambda ratio normal  -continue to monitor    Bone Mineral Disease of CKD:  -Calcium and magnesium at goal  -Phosphorus 3 8, at goal  -PTH 85, elevated but acceptable  -Vitamin D deficiency: Vitamin D 25 40, at goal on 1/31/2023    Continue cholecalciferol 1000 units daily  -continue to monitor    DM II:  -stable  -HgbA1C 7 4  -continue to optimize glycemic control to slow progression of chronic kidney disease  -management per primary team    Dyslipidemia:  -stable  -continue statin  -Refill for Lipitor provided  -low-cholesterol and low-fat diet, aerobic exercise  -management per PCP    Hx bladder cancer:  -Management per urology  -yearly follow-up    Diagnoses and all orders for this visit:    Stage 4 chronic kidney disease ([facility] [address] )  -     Comprehensive metabolic panel; Future  -     CBC; Future  -     Magnesium; Future  -     Phosphorus; Future  -     Protein / creatinine ratio, urine; Future  -     PTH, intact; Future  -     amLODIPine (NORVASC) 5 mg tablet; Take 2 tablets (10 mg total) by mouth daily  -     Basic metabolic panel; Future  -     atorvastatin (LIPITOR) 10 mg tablet; Take 1 tablet (10 mg total) by mouth daily    Benign 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 (HCC)  -     Basic metabolic panel; Future    Parenchymal renal hypertension, stage 1 through stage 4 or unspecified chronic kidney disease  -     amLODIPine (NORVASC) 5 mg tablet; Take 2 tablets (10 mg total) by mouth daily  -     atorvastatin (LIPITOR) 10 mg tablet; Take 1 tablet (10 mg total) by mouth daily    Type 2 diabetes mellitus with stage 3b chronic kidney disease, unspecified whether long term insulin use (HCC)    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)  -     CBC; Future  -     amLODIPine (NORVASC) 5 mg tablet; Take 2 tablets (10 mg total) by mouth daily  -     atorvastatin (LIPITOR) 10 mg tablet; Take 1 tablet (10 mg total) by mouth daily    Dyslipidemia  -     amLODIPine (NORVASC) 5 mg tablet; Take 2 tablets (10 mg total) by mouth daily  -     atorvastatin (LIPITOR) 10 mg tablet; Take 1 tablet (10 mg total) by mouth daily    Persistent proteinuria  -     Protein / creatinine ratio, urine; Future    Mild vitamin D deficiency  -     atorvastatin (LIPITOR) 10 mg tablet;  Take 1 tablet (10 mg total) by mouth daily    Type 2 diabetes mellitus with stage 1 chronic kidney disease, unspecified whether long term insulin use (HCC)  -     amLODIPine (NORVASC) 5 mg tablet; Take 2 tablets (10 mg total) by mouth daily    Type 2 diabetes mellitus with stage 4 chronic kidney disease, without long-term current use of insulin (HCC)  -     amLODIPine (NORVASC) 5 mg tablet; Take 2 tablets (10 mg total) by mouth daily    Diabetic nephropathy associated with type 2 diabetes mellitus (HCC)  -     atorvastatin (LIPITOR) 10 mg tablet; Take 1 tablet (10 mg total) by mouth daily        BMP in 1 week after increasing amlodipine  Follow up with Dr Bacilio Tipton on 9/19/23 as scheduled with repeat blood and urine studies  Please call the office with any questions or concerns  Reason for Visit: No chief complaint on file  HPI: Simón Douglass is a 80 y o  male with CKD 4, HTN, HLD, DM 2, anemia chronic disease, DDD, hx of bladder cancer who presents for follow up of CKD and ACP discussion  Patient followed by Dr Bacilio Tipton, last seen 1/31/2023  Most recent creatinine 2 25, at baseline  Patient denies recent hospitalizations or ER visits  Patient denies NSAID use  Patient denies nausea, vomiting, diarrhea, dyspnea, orthopnea, edema, hematuria or foamy urine  ROS: A complete review of systems was performed and was negative unless otherwise noted in the history of present illness      Allergies:   Ace inhibitors and Penicillins    Medications:     Current Outpatient Medications:   •  amLODIPine (NORVASC) 5 mg tablet, Take 2 tablets (10 mg total) by mouth daily, Disp: 30 tablet, Rfl: 0  •  atorvastatin (LIPITOR) 10 mg tablet, Take 1 tablet (10 mg total) by mouth daily, Disp: 90 tablet, Rfl: 3  •  aspirin 325 mg tablet, Take 325 mg by mouth daily, Disp: , Rfl:   •  brimonidine tartrate 0 2 % ophthalmic solution, INSTILL 1 DROP INTO EACH EYE THREE TIMES DAILY, Disp: , Rfl:   •  Cholecalciferol (VITAMIN D3) 1000 units CAPS, Take by mouth in the morning  , Disp: , Rfl: •  Cyanocobalamin (Vitamin B-12) 1000 MCG/15ML LIQD, Take by mouth daily (Patient not taking: Reported on 6/16/2023), Disp: , Rfl:   •  cyanocobalamin (VITAMIN B-12) 500 mcg tablet, Take 500 mcg by mouth daily, Disp: , Rfl:   •  Dorzolamide HCl-Timolol Mal PF 2-0 5 % SOLN, INSTILL 1 DROP INTO EACH EYE TWICE DAILY, Disp: , Rfl:   •  glimepiride (AMARYL) 1 mg tablet, Take 1 tablet (1 mg total) by mouth daily with breakfast (Patient taking differently: Take 1 mg by mouth if needed), Disp: 30 tablet, Rfl: 5  •  glucose blood (OneTouch Verio) test strip, USE  STRIP TO CHECK GLUCOSE ONCE DAILY  DX: E11 9, Disp: 50 each, Rfl: 6  •  Latanoprostene Bunod (Vyzulta) 0 024 % SOLN, Apply to eye, Disp: , Rfl:   •  lidocaine (LMX) 4 % cream, Apply topically as needed for mild pain, Disp: 30 g, Rfl: 0  •  loteprednol etabonate (LOTEMAX) 0 5 % ophthalmic suspension, Apply to eye  , Disp: , Rfl:   •  prednisoLONE acetate (PRED FORTE) 1 % ophthalmic suspension, INSTILL 1 DROP INTO LEFT EYE TWICE DAILY, Disp: , Rfl:     Past Medical History:   Diagnosis Date   • Cataracts, bilateral      Past Surgical History:   Procedure Laterality Date   • CATARACT EXTRACTION Bilateral 07/15/2012   • COLONOSCOPY     • CYSTOSCOPY  01/15/2018    Last assessed 9/14/17, diagnostic   • HERNIA REPAIR     • INSTILLATION MYTOMYCIN N/A 10/25/2017    Procedure: INSTILLATION OF MITOMYCIN C;  Surgeon: Elizabet Valladares MD;  Location: [facility];  Service: Urology   • IA CYSTO W/REMOVAL OF LESIONS SMALL N/A 10/25/2017    Procedure: Rayna Boxer; BLADDER BIOPSY WITH Arlin Sizer;  Surgeon: Elizabet Valladares MD;  Location: [facility];  Service: Urology   • TONSILLECTOMY     • TRANSURETHRAL RESECTION OF BLADDER TUMOR N/A 10/25/2017    Procedure: TUR BLADDER TUMOR;  Surgeon: Elizabet Valladares MD;  Location: [facility];  Service: Urology   • WISDOM TOOTH EXTRACTION       Family History   Problem Relation Age of Onset   • Diabetes Mother       reports that he quit smoking about 59 years ago  His smoking use included cigarettes  He has never used smokeless tobacco  He reports that he does not drink alcohol and does not use drugs  Physical Exam:   There were no vitals filed for this visit  There is no height or weight on file to calculate BMI  General:  Awake, alert, appears comfortable and in no acute distress  Nontoxic  Skin:  No rash, warm, good skin turgor   Eyes:  PERRL, EOMI, sclerae nonicteric   no periorbital edema   ENT:  Moist mucous membranes  Neck:  Trachea midline, symmetric  No JVD  No carotid bruits  Chest:  Clear to auscultation bilaterally without wheezes, crackles or rhonchi  CVS:  Regular rate and rhythm without murmur, gallop or rub  S1 and S2 identified and normal   No S3, S4    Abdomen:  Soft, nontender, nondistended without masses  Normal bowel sounds x 4 quadrants  No bruit  Extremities:  Warm, pink, motor and sensory intact and well perfused  No cyanosis, pallor  No BLE edema  Neuro:  Awake, alert, oriented x3  Grossly intact  Psych:  Appropriate affect  Mentating appropriately  Normal mental status exam      Procedure:  No results found for this or any previous visit  Lab Results   Component Value Date    CALCIUM 9 1 06/14/2023    CALCIUM 9 1 06/14/2023     06/05/2017    K 4 0 06/14/2023    CO2 24 06/14/2023     06/14/2023    BUN 42 (H) 06/14/2023    CREATININE 2 25 (H) 06/14/2023       Results from last 7 days   Lab Units 06/14/23  1445   WHITE BLOOD CELL COUNT  Thousand/uL 7 4   HEMOGLOBIN  g/dL 10 3*   HEMATOCRIT  % 31 5*   PLATELETS  Thousand/uL 165   POTASSIUM mmol/L 4 0   CHLORIDE mmol/L 109   CO2 mmol/L 24   BUN mg/dL 42*   CREATININE mg/dL 2 25*   CALCIUM mg/dL 9 1  9 1   MAGNESIUM mg/dL 2 4       EMR, including Epic, [employer] Everywhere and outside scanned documents reviewed  I have personally reviewed the blood work as stated above and in my note    I have personally reviewed PCP, consultants and prior nephrology notes

## 2023-06-16 ENCOUNTER — OFFICE VISIT (OUTPATIENT)
Dept: NEPHROLOGY | Facility: CLINIC | Age: 88
End: 2023-06-16
Payer: MEDICARE

## 2023-06-16 VITALS
BODY MASS INDEX: 24.07 KG/M2 | SYSTOLIC BLOOD PRESSURE: 168 MMHG | WEIGHT: 141 LBS | DIASTOLIC BLOOD PRESSURE: 82 MMHG | HEART RATE: 68 BPM | HEIGHT: 64 IN

## 2023-06-16 DIAGNOSIS — E55.9 MILD VITAMIN D DEFICIENCY: ICD-10-CM

## 2023-06-16 DIAGNOSIS — Z71.89 ADVANCED CARE PLANNING/COUNSELING DISCUSSION: Primary | ICD-10-CM

## 2023-06-16 DIAGNOSIS — E11.22 TYPE 2 DIABETES MELLITUS WITH STAGE 1 CHRONIC KIDNEY DISEASE, UNSPECIFIED WHETHER LONG TERM INSULIN USE (HCC): ICD-10-CM

## 2023-06-16 DIAGNOSIS — E78.5 DYSLIPIDEMIA: ICD-10-CM

## 2023-06-16 DIAGNOSIS — R80.1 PERSISTENT PROTEINURIA: ICD-10-CM

## 2023-06-16 DIAGNOSIS — N18.4 ANEMIA IN STAGE 4 CHRONIC KIDNEY DISEASE (HCC): ICD-10-CM

## 2023-06-16 DIAGNOSIS — I12.9 BENIGN HYPERTENSION WITH CKD (CHRONIC KIDNEY DISEASE) STAGE IV (HCC): ICD-10-CM

## 2023-06-16 DIAGNOSIS — I12.9 PARENCHYMAL RENAL HYPERTENSION, STAGE 1 THROUGH STAGE 4 OR UNSPECIFIED CHRONIC KIDNEY DISEASE: ICD-10-CM

## 2023-06-16 DIAGNOSIS — E11.21 DIABETIC NEPHROPATHY ASSOCIATED WITH TYPE 2 DIABETES MELLITUS (HCC): ICD-10-CM

## 2023-06-16 DIAGNOSIS — D63.1 ANEMIA IN STAGE 4 CHRONIC KIDNEY DISEASE (HCC): ICD-10-CM

## 2023-06-16 DIAGNOSIS — E11.22 TYPE 2 DIABETES MELLITUS WITH STAGE 3B CHRONIC KIDNEY DISEASE, UNSPECIFIED WHETHER LONG TERM INSULIN USE (HCC): ICD-10-CM

## 2023-06-16 DIAGNOSIS — E11.22 TYPE 2 DIABETES MELLITUS WITH STAGE 4 CHRONIC KIDNEY DISEASE, WITHOUT LONG-TERM CURRENT USE OF INSULIN (HCC): ICD-10-CM

## 2023-06-16 DIAGNOSIS — N18.4 BENIGN HYPERTENSION WITH CKD (CHRONIC KIDNEY DISEASE) STAGE IV (HCC): ICD-10-CM

## 2023-06-16 DIAGNOSIS — N18.1 TYPE 2 DIABETES MELLITUS WITH STAGE 1 CHRONIC KIDNEY DISEASE, UNSPECIFIED WHETHER LONG TERM INSULIN USE (HCC): ICD-10-CM

## 2023-06-16 DIAGNOSIS — N18.4 TYPE 2 DIABETES MELLITUS WITH STAGE 4 CHRONIC KIDNEY DISEASE, WITHOUT LONG-TERM CURRENT USE OF INSULIN (HCC): ICD-10-CM

## 2023-06-16 DIAGNOSIS — N18.4 STAGE 4 CHRONIC KIDNEY DISEASE (HCC): Primary | ICD-10-CM

## 2023-06-16 DIAGNOSIS — N18.32 TYPE 2 DIABETES MELLITUS WITH STAGE 3B CHRONIC KIDNEY DISEASE, UNSPECIFIED WHETHER LONG TERM INSULIN USE (HCC): ICD-10-CM

## 2023-06-16 PROCEDURE — 99213 OFFICE O/P EST LOW 20 MIN: CPT | Performed by: PHYSICIAN ASSISTANT

## 2023-06-16 PROCEDURE — 99497 ADVNCD CARE PLAN 30 MIN: CPT | Performed by: PHYSICIAN ASSISTANT

## 2023-06-16 RX ORDER — AMLODIPINE BESYLATE 5 MG/1
10 TABLET ORAL DAILY
Qty: 30 TABLET | Refills: 0 | Status: SHIPPED | OUTPATIENT
Start: 2023-06-16

## 2023-06-16 RX ORDER — ATORVASTATIN CALCIUM 10 MG/1
10 TABLET, FILM COATED ORAL DAILY
Qty: 90 TABLET | Refills: 3 | Status: SHIPPED | OUTPATIENT
Start: 2023-06-16

## 2023-06-18 LAB
ALBUMIN SERPL ELPH-MCNC: 3.7 G/DL (ref 3.8–4.8)
ALBUMIN SERPL-MCNC: 3.8 G/DL (ref 3.6–5.1)
ALBUMIN/GLOB SERPL: 1.1 (CALC) (ref 1–2.5)
ALP SERPL-CCNC: 64 U/L (ref 35–144)
ALPHA1 GLOB SERPL ELPH-MCNC: 0.3 G/DL (ref 0.2–0.3)
ALPHA2 GLOB SERPL ELPH-MCNC: 0.8 G/DL (ref 0.5–0.9)
ALT SERPL-CCNC: 9 U/L (ref 9–46)
AST SERPL-CCNC: 13 U/L (ref 10–35)
BASOPHILS # BLD AUTO: 37 CELLS/UL (ref 0–200)
BASOPHILS NFR BLD AUTO: 0.5 %
BETA1 GLOB SERPL ELPH-MCNC: 0.4 G/DL (ref 0.4–0.6)
BETA2 GLOB SERPL ELPH-MCNC: 0.4 G/DL (ref 0.2–0.5)
BILIRUB SERPL-MCNC: 0.6 MG/DL (ref 0.2–1.2)
BUN SERPL-MCNC: 42 MG/DL (ref 7–25)
BUN/CREAT SERPL: 19 (CALC) (ref 6–22)
CALCIUM SERPL-MCNC: 9.1 MG/DL (ref 8.6–10.3)
CALCIUM SERPL-MCNC: 9.1 MG/DL (ref 8.6–10.3)
CHLORIDE SERPL-SCNC: 109 MMOL/L (ref 98–110)
CK SERPL-CCNC: 91 U/L (ref 44–196)
CO2 SERPL-SCNC: 24 MMOL/L (ref 20–32)
CREAT SERPL-MCNC: 2.25 MG/DL (ref 0.7–1.22)
CREAT UR-MCNC: 104 MG/DL (ref 20–320)
EOSINOPHIL # BLD AUTO: 318 CELLS/UL (ref 15–500)
EOSINOPHIL NFR BLD AUTO: 4.3 %
ERYTHROCYTE [DISTWIDTH] IN BLOOD BY AUTOMATED COUNT: 11.8 % (ref 11–15)
FERRITIN SERPL-MCNC: 367 NG/ML (ref 24–380)
GAMMA GLOB SERPL ELPH-MCNC: 1.6 G/DL (ref 0.8–1.7)
GFR/BSA.PRED SERPLBLD CYS-BASED-ARV: 27 ML/MIN/1.73M2
GLOBULIN SER CALC-MCNC: 3.4 G/DL (CALC) (ref 1.9–3.7)
GLUCOSE SERPL-MCNC: 143 MG/DL (ref 65–139)
HCT VFR BLD AUTO: 31.5 % (ref 38.5–50)
HGB BLD-MCNC: 10.3 G/DL (ref 13.2–17.1)
IGA SERPL-MCNC: 416 MG/DL (ref 70–320)
IGG SERPL-MCNC: 1479 MG/DL (ref 600–1540)
IGM SERPL-MCNC: 465 MG/DL (ref 50–300)
IRON SATN MFR SERPL: 40 % (CALC) (ref 20–48)
IRON SERPL-MCNC: 99 MCG/DL (ref 50–180)
KAPPA LC FREE SER-MCNC: 67.4 MG/L (ref 3.3–19.4)
KAPPA LC FREE/LAMBDA FREE SER: 1.29 {RATIO} (ref 0.26–1.65)
LAMBDA LC FREE SERPL-MCNC: 52.3 MG/L (ref 5.7–26.3)
LYMPHOCYTES # BLD AUTO: 1458 CELLS/UL (ref 850–3900)
LYMPHOCYTES NFR BLD AUTO: 19.7 %
MAGNESIUM SERPL-MCNC: 2.4 MG/DL (ref 1.5–2.5)
MCH RBC QN AUTO: 31.4 PG (ref 27–33)
MCHC RBC AUTO-ENTMCNC: 32.7 G/DL (ref 32–36)
MCV RBC AUTO: 96 FL (ref 80–100)
MONOCYTES # BLD AUTO: 807 CELLS/UL (ref 200–950)
MONOCYTES NFR BLD AUTO: 10.9 %
NEUTROPHILS # BLD AUTO: 4780 CELLS/UL (ref 1500–7800)
NEUTROPHILS NFR BLD AUTO: 64.6 %
PHOSPHATE SERPL-MCNC: 3.8 MG/DL (ref 2.1–4.3)
PLATELET # BLD AUTO: 165 THOUSAND/UL (ref 140–400)
PMV BLD REES-ECKER: 11.3 FL (ref 7.5–12.5)
POTASSIUM SERPL-SCNC: 4 MMOL/L (ref 3.5–5.3)
PROT SERPL-MCNC: 7.2 G/DL (ref 6.1–8.1)
PROT SERPL-MCNC: 7.2 G/DL (ref 6.1–8.1)
PROT UR-MCNC: 54 MG/DL (ref 5–25)
PROT/CREAT UR: 0.52 MG/MG CREAT (ref 0.03–0.15)
PROT/CREAT UR: 519 MG/G CREAT (ref 25–148)
PTH-INTACT SERPL-MCNC: 85 PG/ML (ref 16–77)
RBC # BLD AUTO: 3.28 MILLION/UL (ref 4.2–5.8)
SODIUM SERPL-SCNC: 140 MMOL/L (ref 135–146)
TIBC SERPL-MCNC: 250 MCG/DL (CALC) (ref 250–425)
WBC # BLD AUTO: 7.4 THOUSAND/UL (ref 3.8–10.8)

## 2023-06-19 ENCOUNTER — TELEPHONE (OUTPATIENT)
Dept: NEPHROLOGY | Facility: CLINIC | Age: 88
End: 2023-06-19

## 2023-06-19 DIAGNOSIS — D47.2 IGM LAMBDA PARAPROTEINEMIA: Primary | ICD-10-CM

## 2023-06-19 NOTE — TELEPHONE ENCOUNTER
I called the patient regarding slightly abnormal immunofixation of the serum IgM and lambda  Although this may be the active inflammatory could be developing plasma cell abnormality      Left a message she was not available on his cell phone

## 2023-06-27 ENCOUNTER — OFFICE VISIT (OUTPATIENT)
Dept: FAMILY MEDICINE CLINIC | Facility: CLINIC | Age: 88
End: 2023-06-27
Payer: MEDICARE

## 2023-06-27 VITALS
BODY MASS INDEX: 24.07 KG/M2 | SYSTOLIC BLOOD PRESSURE: 164 MMHG | HEART RATE: 68 BPM | DIASTOLIC BLOOD PRESSURE: 80 MMHG | RESPIRATION RATE: 14 BRPM | WEIGHT: 141 LBS | TEMPERATURE: 96.8 F | HEIGHT: 64 IN | OXYGEN SATURATION: 97 %

## 2023-06-27 DIAGNOSIS — E78.5 DYSLIPIDEMIA: ICD-10-CM

## 2023-06-27 DIAGNOSIS — N18.4 CKD STAGE 4 DUE TO TYPE 2 DIABETES MELLITUS (HCC): Primary | ICD-10-CM

## 2023-06-27 DIAGNOSIS — D63.1 ANEMIA IN STAGE 4 CHRONIC KIDNEY DISEASE (HCC): ICD-10-CM

## 2023-06-27 DIAGNOSIS — I12.9 PARENCHYMAL RENAL HYPERTENSION, STAGE 1 THROUGH STAGE 4 OR UNSPECIFIED CHRONIC KIDNEY DISEASE: ICD-10-CM

## 2023-06-27 DIAGNOSIS — D47.2 IGM LAMBDA PARAPROTEINEMIA: ICD-10-CM

## 2023-06-27 DIAGNOSIS — E55.9 MILD VITAMIN D DEFICIENCY: ICD-10-CM

## 2023-06-27 DIAGNOSIS — N18.4 STAGE 4 CHRONIC KIDNEY DISEASE (HCC): ICD-10-CM

## 2023-06-27 DIAGNOSIS — E11.21 DIABETIC NEPHROPATHY ASSOCIATED WITH TYPE 2 DIABETES MELLITUS (HCC): ICD-10-CM

## 2023-06-27 DIAGNOSIS — N18.4 ANEMIA IN STAGE 4 CHRONIC KIDNEY DISEASE (HCC): ICD-10-CM

## 2023-06-27 DIAGNOSIS — E11.22 CKD STAGE 4 DUE TO TYPE 2 DIABETES MELLITUS (HCC): Primary | ICD-10-CM

## 2023-06-27 DIAGNOSIS — R53.81 PHYSICAL DECONDITIONING: ICD-10-CM

## 2023-06-27 LAB
CREAT UR-MCNC: 108 MG/DL
MICROALBUMIN UR-MCNC: 79.6 MG/L (ref 0–20)
MICROALBUMIN/CREAT 24H UR: 74 MG/G CREATININE (ref 0–30)
SL AMB POCT HEMOGLOBIN AIC: 6.5 (ref ?–6.5)

## 2023-06-27 PROCEDURE — 82570 ASSAY OF URINE CREATININE: CPT | Performed by: FAMILY MEDICINE

## 2023-06-27 PROCEDURE — 82043 UR ALBUMIN QUANTITATIVE: CPT | Performed by: FAMILY MEDICINE

## 2023-06-27 PROCEDURE — 99214 OFFICE O/P EST MOD 30 MIN: CPT | Performed by: FAMILY MEDICINE

## 2023-06-27 PROCEDURE — 83036 HEMOGLOBIN GLYCOSYLATED A1C: CPT | Performed by: FAMILY MEDICINE

## 2023-06-27 NOTE — PROGRESS NOTES
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 and follows with ophthalmologist regularly  Wife present and states that Merlyn Neves has become very slow when he walks and Merlyn Neves states he does not want to fall so he is careful, he uses one point cane to walk  He did not increase amlodipine to 10 mg as the dose was increased from 5 to 10 mg but he was suppose to take 2 tablets (instead of one 10 mg tablet) due to prescription error, wife does give him his medications and will verify the dose  Past Medical History:   Diagnosis Date   • Cataracts, bilateral        Family History   Problem Relation Age of Onset   • Diabetes Mother        Past Surgical History:   Procedure Laterality Date   • CATARACT EXTRACTION Bilateral 07/15/2012   • COLONOSCOPY     • CYSTOSCOPY  01/15/2018    Last assessed 9/14/17, diagnostic   • HERNIA REPAIR     • INSTILLATION MYTOMYCIN N/A 10/25/2017    Procedure: INSTILLATION OF MITOMYCIN C;  Surgeon: Jorge Aaron MD;  Location: [facility];  Service: Urology   • CT CYSTO W/REMOVAL OF LESIONS SMALL N/A 10/25/2017    Procedure: Arminda Boys; BLADDER BIOPSY WITH Luc Mcghee;  Surgeon: Jorge Aaron MD;  Location: [facility];  Service: Urology   • TONSILLECTOMY     • TRANSURETHRAL RESECTION OF BLADDER TUMOR N/A 10/25/2017    Procedure: TUR BLADDER TUMOR;  Surgeon: Jorge Aaron MD;  Location: [facility];  Service: Urology   • WISDOM TOOTH EXTRACTION          reports that he quit smoking about 59 years ago  His smoking use included cigarettes  He has never used smokeless tobacco  He reports that he does not drink alcohol and does not use drugs        Current Outpatient Medications:   •  amLODIPine (NORVASC) 5 mg tablet, Take 2 tablets (10 mg total) by mouth daily, Disp: 30 tablet, Rfl: 0  •  aspirin 325 mg tablet, Take 325 mg by mouth daily, Disp: , Rfl:   •  atorvastatin "(LIPITOR) 10 mg tablet, Take 1 tablet (10 mg total) by mouth daily, Disp: 90 tablet, Rfl: 3  •  brimonidine tartrate 0 2 % ophthalmic solution, INSTILL 1 DROP INTO EACH EYE THREE TIMES DAILY, Disp: , Rfl:   •  Cyanocobalamin (Vitamin B-12) 1000 MCG/15ML LIQD, Take by mouth daily, Disp: , Rfl:   •  dapagliflozin 5 MG TABS, Take 1 tablet (5 mg total) by mouth daily, Disp: 30 tablet, Rfl: 2  •  Dorzolamide HCl-Timolol Mal PF 2-0 5 % SOLN, INSTILL 1 DROP INTO EACH EYE TWICE DAILY, Disp: , Rfl:   •  prednisoLONE acetate (PRED FORTE) 1 % ophthalmic suspension, INSTILL 1 DROP INTO LEFT EYE TWICE DAILY, Disp: , Rfl:     The following portions of the patient's history were reviewed and updated as appropriate: allergies, current medications, past family history, past medical history, past social history, past surgical history and problem list     Review of Systems   Constitutional: Negative for chills and fever  HENT: Negative for congestion, rhinorrhea and sore throat  Eyes: Negative for discharge, redness and itching  Respiratory: Negative for chest tightness, shortness of breath and wheezing  Cardiovascular: Negative for chest pain and palpitations  Gastrointestinal: Negative for abdominal pain, constipation and diarrhea  Genitourinary: Negative for dysuria  Musculoskeletal: Positive for gait problem  Skin: Negative for pallor and rash  Neurological: Positive for weakness  Negative for dizziness, numbness and headaches           PHQ-2/9 Depression Screening    Little interest or pleasure in doing things: 0 - not at all  Feeling down, depressed, or hopeless: 0 - not at all  PHQ-2 Score: 0  PHQ-2 Interpretation: Negative depression screen             Objective:    /80 (BP Location: Right arm, Patient Position: Sitting, Cuff Size: Adult)   Pulse 68   Temp (!) 96 8 °F (36 °C) (Tympanic)   Resp 14   Ht 5' 4\" (1 626 m)   Wt 64 kg (141 lb)   SpO2 97%   BMI 24 20 kg/m²      Physical Exam  Vitals and " nursing note reviewed  Constitutional:       Appearance: Normal appearance  HENT:      Right Ear: Tympanic membrane, ear canal and external ear normal  Decreased hearing noted  Left Ear: Tympanic membrane, ear canal and external ear normal  Decreased hearing noted  Eyes:      General:         Right eye: No discharge  Left eye: No discharge  Conjunctiva/sclera: Conjunctivae normal    Cardiovascular:      Rate and Rhythm: Normal rate and regular rhythm  Pulses: no weak pulses          Dorsalis pedis pulses are 2+ on the right side and 2+ on the left side  Heart sounds: No murmur heard  Pulmonary:      Effort: Pulmonary effort is normal       Breath sounds: Normal breath sounds  No wheezing  Abdominal:      General: There is no distension  Palpations: Abdomen is soft  Tenderness: There is no abdominal tenderness  Musculoskeletal:      Right lower leg: No edema  Left lower leg: No edema  Feet:    Feet:      Right foot:      Skin integrity: No ulcer, skin breakdown, erythema, warmth, callus or dry skin  Left foot:      Skin integrity: No ulcer, skin breakdown, erythema, warmth, callus or dry skin  Skin:     Findings: No lesion or rash  Neurological:      Mental Status: He is alert  Mental status is at baseline  Psychiatric:         Mood and Affect: Mood normal          Thought Content: Thought content normal        Patient's shoes and socks removed  Right Foot/Ankle   Right Foot Inspection  Skin Exam: skin normal  Skin not intact, no dry skin, no warmth, no callus, no erythema, no maceration, no abnormal color, no pre-ulcer, no ulcer and no callus  Toe Exam: ROM and strength within normal limits  Sensory   Monofilament testing: diminished    Vascular  Capillary refills: < 3 seconds  The right DP pulse is 2+       Right Toe  - Comprehensive Exam  Ecchymosis: none  Swelling: none   Tenderness: none         Left Foot/Ankle  Left Foot Inspection  Skin Exam: skin normal  Skin not intact, no dry skin, no warmth, no erythema, no maceration, normal color, no pre-ulcer, no ulcer and no callus  Toe Exam: ROM and strength within normal limits  Sensory   Monofilament testing: intact    Vascular  Capillary refills: < 3 seconds  The left DP pulse is 2+       Left Toe  - Comprehensive Exam  Ecchymosis: none  Swelling: none   Tenderness: none           Assign Risk Category  No deformity present  Loss of protective sensation  No weak pulses  Risk: 1              Recent Results (from the past 8736 hour(s))   POCT hemoglobin A1c    Collection Time: 09/29/22  1:50 PM   Result Value Ref Range    Hemoglobin A1C 7 4 (A) 6 5   Lipid Panel with Direct LDL reflex    Collection Time: 10/06/22 12:31 PM   Result Value Ref Range    Total Cholesterol 144 <200 mg/dL    HDL 51 > OR = 40 mg/dL    Triglycerides 55 <150 mg/dL    LDL Calculated 79 mg/dL (calc)    Chol HDLC Ratio 2 8 <5 0 (calc)    Non-HDL Cholesterol 93 <130 mg/dL (calc)   PTH, Intact and Calcium    Collection Time: 10/06/22 12:31 PM   Result Value Ref Range    PTH, Intact 102 (H) 16 - 77 pg/mL    Calcium 9 2 8 6 - 10 3 mg/dL   Magnesium    Collection Time: 10/06/22 12:31 PM   Result Value Ref Range    Magnesium, Serum 2 4 1 5 - 2 5 mg/dL   Phosphorus    Collection Time: 10/06/22 12:31 PM   Result Value Ref Range    Phosphorus, Serum 3 6 2 1 - 4 3 mg/dL   TIBC    Collection Time: 10/06/22 12:31 PM   Result Value Ref Range    Iron, Serum 69 50 - 180 mcg/dL    Total Iron Binding Capacity (TIBC) 252 250 - 425 mcg/dL (calc)    Iron Saturation 27 20 - 48 % (calc)   Basic metabolic panel    Collection Time: 10/06/22 12:31 PM   Result Value Ref Range    Glucose, Random 150 (H) 65 - 99 mg/dL    BUN 37 (H) 7 - 25 mg/dL    Creatinine 2 22 (H) 0 70 - 1 22 mg/dL    eGFR 28 (L) > OR = 60 mL/min/1 73m2    SL AMB BUN/CREATININE RATIO 17 6 - 22 (calc)    Sodium 141 135 - 146 mmol/L    Potassium 4 5 3 5 - 5 3 mmol/L Chloride 109 98 - 110 mmol/L    CO2 27 20 - 32 mmol/L    Calcium 9 2 8 6 - 10 3 mg/dL   CBC and differential    Collection Time: 10/06/22 12:31 PM   Result Value Ref Range    White Blood Cell Count 7 1 3 8 - 10 8 Thousand/uL    Red Blood Cell Count 3 29 (L) 4 20 - 5 80 Million/uL    Hemoglobin 10 4 (L) 13 2 - 17 1 g/dL    HCT 31 0 (L) 38 5 - 50 0 %    MCV 94 2 80 0 - 100 0 fL    MCH 31 6 27 0 - 33 0 pg    MCHC 33 5 32 0 - 36 0 g/dL    RDW 11 6 11 0 - 15 0 %    Platelet Count 113 841 - 400 Thousand/uL    SL AMB MPV 11 2 7 5 - 12 5 fL    Neutrophils (Absolute) 4,828 1,500 - 7,800 cells/uL    Lymphocytes (Absolute) 1,306 850 - 3,900 cells/uL    Monocytes (Absolute) 625 200 - 950 cells/uL    Eosinophils (Absolute) 298 15 - 500 cells/uL    Basophils ABS 43 0 - 200 cells/uL    Neutrophils 68 %    Lymphocytes 18 4 %    Monocytes 8 8 %    Eosinophils 4 2 %    Basophils PCT 0 6 %    Always Message     Ferritin    Collection Time: 10/06/22 12:31 PM   Result Value Ref Range    Ferritin 404 (H) 24 - 380 ng/mL   Protein, Total w/Creat, Random Urine    Collection Time: 10/06/22 12:31 PM   Result Value Ref Range    Creatinine, Urine 113 20 - 320 mg/dL    Protein/Creat Ratio 389 (H) 25 - 148 mg/g creat    Protein/Creat Ratio 0 389 (H) 0 025 - 0 148 mg/mg creat    Total Protein, Urine 44 (H) 5 - 25 mg/dL   POCT hemoglobin A1c    Collection Time: 01/19/23  1:13 PM   Result Value Ref Range    Hemoglobin A1C 8 2 (A) 6 5   PTH, Intact and Calcium    Collection Time: 01/31/23 10:36 AM   Result Value Ref Range    PTH, Intact 95 (H) 16 - 77 pg/mL    Calcium 9 2 8 6 - 10 3 mg/dL   Magnesium    Collection Time: 01/31/23 10:36 AM   Result Value Ref Range    Magnesium, Serum 2 3 1 5 - 2 5 mg/dL   Renal function panel    Collection Time: 01/31/23 10:36 AM   Result Value Ref Range    Glucose, Random 144 (H) 65 - 99 mg/dL    BUN 32 (H) 7 - 25 mg/dL    Creatinine 2 36 (H) 0 70 - 1 22 mg/dL    eGFR 26 (L) > OR = 60 mL/min/1 73m2    SL AMB BUN/CREATININE RATIO 14 6 - 22 (calc)    Sodium 142 135 - 146 mmol/L    Potassium 4 1 3 5 - 5 3 mmol/L    Chloride 109 98 - 110 mmol/L    CO2 24 20 - 32 mmol/L    Calcium 9 2 8 6 - 10 3 mg/dL    Phosphorus, Serum 3 6 2 1 - 4 3 mg/dL    Albumin 3 7 3 6 - 5 1 g/dL   CBC and differential    Collection Time: 01/31/23 10:36 AM   Result Value Ref Range    White Blood Cell Count 7 1 3 8 - 10 8 Thousand/uL    Red Blood Cell Count 3 26 (L) 4 20 - 5 80 Million/uL    Hemoglobin 10 2 (L) 13 2 - 17 1 g/dL    HCT 30 4 (L) 38 5 - 50 0 %    MCV 93 3 80 0 - 100 0 fL    MCH 31 3 27 0 - 33 0 pg    MCHC 33 6 32 0 - 36 0 g/dL    RDW 11 5 11 0 - 15 0 %    Platelet Count 293 070 - 400 Thousand/uL    SL AMB MPV 11 7 7 5 - 12 5 fL    Neutrophils (Absolute) 4,636 1,500 - 7,800 cells/uL    Lymphocytes (Absolute) 1,470 850 - 3,900 cells/uL    Monocytes (Absolute) 696 200 - 950 cells/uL    Eosinophils (Absolute) 256 15 - 500 cells/uL    Basophils ABS 43 0 - 200 cells/uL    Neutrophils 65 3 %    Lymphocytes 20 7 %    Monocytes 9 8 %    Eosinophils 3 6 %    Basophils PCT 0 6 %   Vitamin D 25 hydroxy    Collection Time: 01/31/23 10:36 AM   Result Value Ref Range    Vitamin D, 25-Hydroxy, Serum 40 30 - 100 ng/mL   Protein, Total w/Creat, Random Urine    Collection Time: 01/31/23 10:36 AM   Result Value Ref Range    Creatinine, Urine 142 20 - 320 mg/dL    Protein/Creat Ratio 387 (H) 25 - 148 mg/g creat    Protein/Creat Ratio 0 387 (H) 0 025 - 0 148 mg/mg creat    Total Protein, Urine 55 (H) 5 - 25 mg/dL   UA (URINE) with reflex to Scope    Collection Time: 01/31/23 10:36 AM   Result Value Ref Range    Color UA YELLOW YELLOW    Urine Appearance CLEAR CLEAR    Specific Gravity 1 019 1 001 - 1 035    Ph 5 5 5 0 - 8 0    Glucose, Urine 2+ (A) NEGATIVE    Bilirubin, Urine NEGATIVE NEGATIVE    Ketone, Urine NEGATIVE NEGATIVE    Blood, Urine NEGATIVE NEGATIVE    Protein, Urine 1+ (A) NEGATIVE    SL AMB NITRITES URINE, QUAL   NEGATIVE NEGATIVE    Leukocyte Esterase NEGATIVE NEGATIVE    SL AMB WBC, URINE NONE SEEN < OR = 5 /HPF    RBC, Urine NONE SEEN < OR = 2 /HPF    Squamous Epithelial Cells NONE SEEN < OR = 5 /HPF    Bacteria, UA NONE SEEN NONE SEEN /HPF    Hyaline Casts 0-5 (A) NONE SEEN /LPF    Comment(s)     Renal function panel    Collection Time: 01/31/23 10:36 AM   Result Value Ref Range    ALBUMIN 3 7     EXTERNAL CALCIUM 9 2     EXTERNAL PHOSPHORUS 3 6     Glucose 144     BUN 32     CREATININE 2 36     SODIUM 142     POTASSIUM 4 1     CHLORIDE 109     CO2 24     EXTERNAL EGFR 26    PTH, intact    Collection Time: 01/31/23 10:36 AM   Result Value Ref Range    PTH 95    Magnesium    Collection Time: 01/31/23 10:36 AM   Result Value Ref Range    EXTERNAL MAGNESIUM 2 3    Protein / creatinine ratio, urine    Collection Time: 01/31/23 10:36 AM   Result Value Ref Range    PROTEIN UA 55     EXT Creatinine Urine 142     EXTERNAL Ur Prot/Creat Ratio 0 387    CBC and differential    Collection Time: 01/31/23 10:36 AM   Result Value Ref Range    WBC 7 1     External Hemoglobin 10 2 g/dL    Hematocrit 30 %    External Platelets 911 K/µL   Urinalysis with microscopic    Collection Time: 01/31/23 10:36 AM   Result Value Ref Range    EXTERNAL COLOR,UA yellow     Spec Grav, UA (Ref:1 003-1 030) 1 019     Glucose, UA (Ref: Negative) 2+     Ketones, UA (Ref: Negative) neg     Blood, UA neg     Nitrite, UA (Ref: Negative) neg      Leukocytes, UA (Ref: Negative) neg     pH, UA (Ref: 4 5-8 0) 5 5     Protein, UA (Ref: Negative) 1+     Bilirubin, UA (Ref: Negative) neg     Urobilinogen, UA (Ref: 0 2- 1 0) neg     RBC, UA none seen     EXTERNAL WBC, UA none seen     EXTERNAL BACTERIA, UA none seen     CASTS, UA none seen     Hyaline Casts, UA 2-4 (A) (none) /lpf   Vitamin D 25 hydroxy    Collection Time: 01/31/23 10:36 AM   Result Value Ref Range    EXTERNAL VITAMIN D,25 40    Iron, TIBC and Ferritin Panel    Collection Time: 01/31/23 10:36 AM   Result Value Ref Range    Iron, Serum 49 (L) 50 - 180 mcg/dL    Total Iron Binding Capacity (TIBC) 226 (L) 250 - 425 mcg/dL (calc)    Iron Saturation 22 20 - 48 % (calc)    Ferritin 396 (H) 24 - 380 ng/mL   Test Authorization    Collection Time: 01/31/23 10:36 AM   Result Value Ref Range    Test Name  IRON, TIBC AND FERRITIN PANEL     Test Code  0422CG     Client Contact [phone]     Report Always Message Signature      COMMENT     Protein electrophoresis, serum    Collection Time: 06/14/23  2:45 PM   Result Value Ref Range    Protein, Total 7 2 6 1 - 8 1 g/dL    Albumin, Electrophoresis 3 7 (L) 3 8 - 4 8 g/dL    Alpha-1 Globulin 0 3 0 2 - 0 3 g/dL    Alpha-2 Globulin 0 8 0 5 - 0 9 g/dL    Beta 1 Globulin 0 4 0 4 - 0 6 g/dL    Beta 2 Globulin 0 4 0 2 - 0 5 g/dL    Gamma Globulin 1 6 0 8 - 1 7 g/dL    Interpretation     PTH, Intact and Calcium    Collection Time: 06/14/23  2:45 PM   Result Value Ref Range    PTH, Intact 85 (H) 16 - 77 pg/mL    Calcium 9 1 8 6 - 10 3 mg/dL   Magnesium    Collection Time: 06/14/23  2:45 PM   Result Value Ref Range    Magnesium, Serum 2 4 1 5 - 2 5 mg/dL   Phosphorus    Collection Time: 06/14/23  2:45 PM   Result Value Ref Range    Phosphorus, Serum 3 8 2 1 - 4 3 mg/dL   TIBC    Collection Time: 06/14/23  2:45 PM   Result Value Ref Range    Iron, Serum 99 50 - 180 mcg/dL    Total Iron Binding Capacity (TIBC) 250 250 - 425 mcg/dL (calc)    Iron Saturation 40 20 - 48 % (calc)   Comprehensive metabolic panel    Collection Time: 06/14/23  2:45 PM   Result Value Ref Range    Glucose, Random 143 (H) 65 - 139 mg/dL    BUN 42 (H) 7 - 25 mg/dL    Creatinine 2 25 (H) 0 70 - 1 22 mg/dL    eGFR 27 (L) > OR = 60 mL/min/1 73m2    SL AMB BUN/CREATININE RATIO 19 6 - 22 (calc)    Sodium 140 135 - 146 mmol/L    Potassium 4 0 3 5 - 5 3 mmol/L    Chloride 109 98 - 110 mmol/L    CO2 24 20 - 32 mmol/L    Calcium 9 1 8 6 - 10 3 mg/dL    Protein, Total 7 2 6 1 - 8 1 g/dL    Albumin 3 8 3 6 - 5 1 g/dL    Globulin 3 4 1 9 - 3 7 g/dL (calc)    Albumin/Globulin Ratio 1 1 1 0 - 2 5 (calc)    TOTAL BILIRUBIN 0 6 0 2 - 1 2 mg/dL    Alkaline Phosphatase 64 35 - 144 U/L    AST 13 10 - 35 U/L    ALT 9 9 - 46 U/L   Creatine Kinase, Total    Collection Time: 06/14/23  2:45 PM   Result Value Ref Range    Creatine Kinase, Total 91 44 - 196 U/L   CBC and differential    Collection Time: 06/14/23  2:45 PM   Result Value Ref Range    White Blood Cell Count 7 4 3 8 - 10 8 Thousand/uL    Red Blood Cell Count 3 28 (L) 4 20 - 5 80 Million/uL    Hemoglobin 10 3 (L) 13 2 - 17 1 g/dL    HCT 31 5 (L) 38 5 - 50 0 %    MCV 96 0 80 0 - 100 0 fL    MCH 31 4 27 0 - 33 0 pg    MCHC 32 7 32 0 - 36 0 g/dL    RDW 11 8 11 0 - 15 0 %    Platelet Count 027 565 - 400 Thousand/uL    SL AMB MPV 11 3 7 5 - 12 5 fL    Neutrophils (Absolute) 4,780 1,500 - 7,800 cells/uL    Lymphocytes (Absolute) 1,458 850 - 3,900 cells/uL    Monocytes (Absolute) 807 200 - 950 cells/uL    Eosinophils (Absolute) 318 15 - 500 cells/uL    Basophils ABS 37 0 - 200 cells/uL    Neutrophils 64 6 %    Lymphocytes 19 7 %    Monocytes 10 9 %    Eosinophils 4 3 %    Basophils PCT 0 5 %    Always Message     Immunofixation, Serum(Reflex Only-Do Not Order)    Collection Time: 06/14/23  2:45 PM   Result Value Ref Range    Interpretation:     Immunoglobulins    Collection Time: 06/14/23  2:45 PM   Result Value Ref Range    IgA 416 (H) 70 - 320 mg/dL    IgG 1,479 600 - 1,540 mg/dL     (H) 50 - 300 mg/dL   Kappa/Lambda Light Chains Free With Ratio, Serum    Collection Time: 06/14/23  2:45 PM   Result Value Ref Range    Ig Kappa Free Light Chain 67 4 (H) 3 3 - 19 4 mg/L    Ig Lambda Free Light Chain 52 3 (H) 5 7 - 26 3 mg/L    Kappa/Lambda FluidC Ratio 1 29 0 26 - 1 65   Ferritin    Collection Time: 06/14/23  2:45 PM   Result Value Ref Range    Ferritin 367 24 - 380 ng/mL   Protein, Total w/Creat, Random Urine    Collection Time: 06/14/23  2:45 PM   Result Value Ref Range    Creatinine, Urine 104 20 - 320 mg/dL Protein/Creat Ratio 519 (H) 25 - 148 mg/g creat    Protein/Creat Ratio 0 519 (H) 0 025 - 0 148 mg/mg creat    Total Protein, Urine 54 (H) 5 - 25 mg/dL   Albumin / creatinine urine ratio    Collection Time: 06/27/23 12:08 PM   Result Value Ref Range    Creatinine, Ur 108 0 mg/dL    Albumin,U,Random 79 6 (H) 0 0 - 20 0 mg/L    Albumin Creat Ratio 74 (H) 0 - 30 mg/g creatinine   POCT hemoglobin A1c    Collection Time: 06/27/23  2:14 PM   Result Value Ref Range    Hemoglobin A1C 6 5 6 5       Laboratory Results: I have personally reviewed the pertinent laboratory results/reports     Radiology/Other Diagnostic Testing Results: I have personally reviewed pertinent reports  US kidney and bladder with pvr    Result Date: 11/30/2021  RENAL ULTRASOUND INDICATION:   I12 9: Hypertensive chronic kidney disease with stage 1 through stage 4 chronic kidney disease, or unspecified chronic kidney disease N18 4: Chronic kidney disease, stage 4 (severe) E11 22: Type 2 diabetes mellitus with diabetic chronic kidney disease N18 1: Chronic kidney disease, stage 1 E78 5: Hyperlipidemia, unspecified N18 4: Chronic kidney disease, stage 4 (severe) D63 1: Anemia in chr  COMPARISON: CT 8/20/1718 TECHNIQUE:   Ultrasound of the retroperitoneum was performed with a curvilinear transducer utilizing volumetric sweeps and still imaging techniques  FINDINGS: KIDNEYS: There is mild to moderate bilateral renal atrophy with measurements below  Right kidney:  7 3 x 5 1 x 4 9 cm  Left kidney:  7 2 x 5 5 x 4 4 cm  Right kidney The renal parenchyma is diffusely echogenic consistent with medical renal disease  No suspicious masses detected  No hydronephrosis  No shadowing calculi  No perinephric fluid collections  Left kidney The renal parenchyma is diffusely echogenic consistent with medical renal disease  No suspicious masses detected  No hydronephrosis  No shadowing calculi  No perinephric fluid collections  URETERS: Nonvisualized   BLADDER: Normally distended  No focal thickening or mass lesions  Bilateral ureteral jets detected  Prevoid bladder volume 143 cc's  Postvoid bladder volume 47 cc      1  Small echogenic kidneys bilaterally compatible with medical renal disease  2  Small postvoid residual  Workstation performed: [id number]        Assessment/Plan:  Problem List Items Addressed This Visit        Endocrine    Diabetic nephropathy associated with type 2 diabetes mellitus ([facility] [address] )    Relevant Medications    dapagliflozin 5 MG TABS       Cardiovascular and Mediastinum    Parenchymal renal hypertension       Genitourinary    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)    Stage 4 chronic kidney disease (HCC)       Other    Dyslipidemia    Mild vitamin D deficiency    IgM lambda paraproteinemia   Other Visit Diagnoses     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)    -  Primary    Relevant Medications    dapagliflozin 5 MG TABS    Other Relevant Orders    Comprehensive metabolic panel    Albumin / creatinine urine ratio (Completed)    Lipid Panel with Direct LDL reflex    CBC and differential    POCT hemoglobin A1c (Completed)    Physical deconditioning        Relevant Orders    Ambulatory Referral to Physical Therapy        a1c is 6 5, he is off medications, diet controlled, recommend farxiga daily for CKD-4 and type 2 diabetes  Continue other medications  He did not increase amlodipine to 10 mg as the dose was increased from 5 to 10 mg but he was suppose to take 2 tablets (instead of one 10 mg tablet) due to prescription error, wife does give him his medications and will verify the dose  Would continue 5 mg amlodipine as FARXIGA WILL also drop his BP  Labs as above  Obtain records from Ophthalmologist             Depression Screening and Follow-up Plan: Patient was screened for depression during today's encounter  They screened negative with a PHQ-2 score of 0            Read package inserts for all medications before starting a new "medications, call me if you have any questions  Patient was given opportunity to ask questions and all questions were answered  Disclaimer: Portions of the record may have been created with voice recognition software  Occasional wrong word or \"sound a like\" substitutions may have occurred due to the inherent limitations of voice recognition software  Read the chart carefully and recognize, using context, where substitutions have occurred  I have used the Epic copy/forward function to compose this note  I have reviewed my current note to ensure it reflects the current patient status, exam, assessment and plan      "

## 2023-06-28 PROBLEM — D47.2 IGM LAMBDA PARAPROTEINEMIA: Status: ACTIVE | Noted: 2023-06-28

## 2023-07-14 ENCOUNTER — TELEPHONE (OUTPATIENT)
Dept: ADMINISTRATIVE | Facility: OTHER | Age: 88
End: 2023-07-14

## 2023-07-14 NOTE — LETTER
Diabetic Eye Exam Form    Date Requested: 23  Patient: Sina Rush  Patient : [id number]   Referring Provider: Shankar Hdez MD      DIABETIC Eye Exam Date _______________________________      Type of Exam MUST be documented for Diabetic Eye Exams. Please CHECK ONE. Retinal Exam       Dilated Retinal Exam       OCT       Optomap-Iris Exam      Fundus Photography       Left Eye - Please check Retinopathy or No Retinopathy        Exam did show retinopathy    Exam did not show retinopathy       Right Eye - Please check Retinopathy or No Retinopathy       Exam did show retinopathy    Exam did not show retinopathy       Comments __________________________________________________________    Practice Providing Exam ______________________________________________    Exam Performed By (print name) _______________________________________      Provider Signature ___________________________________________________      These reports are needed for  compliance. Please fax this completed form and a copy of the Diabetic Eye Exam report to our office located at [address] [place] [address] as soon as possible via Fax [phone] attention Rach: Phone [phone]  We thank you for your assistance in treating our mutual patient.

## 2023-07-14 NOTE — TELEPHONE ENCOUNTER
----- Message from Ryne Patel sent at 7/13/2023  5:25 PM EDT -----  Regarding: care gap request  07/13/23 5:25 PM    Hello, our patient attached above has had Diabetic Eye Exam completed/performed. Please assist in updating the patient chart by making an External outreach to Dr. Castro [facility] facility located in [place], [place]. The date of service is unknown.     Thank you,  Ryne Patel  [facility]

## 2023-07-14 NOTE — TELEPHONE ENCOUNTER
Upon review of the In Basket request and the patient's chart, initial outreach has been made via fax to facility. Please see Contacts section for details.      Thank you  Jacqui Celis MA

## 2023-07-14 NOTE — TELEPHONE ENCOUNTER
Upon review of the In Basket request we were able to locate, review, and update the patient chart as requested for Diabetic Eye Exam.    Any additional questions or concerns should be emailed to the Practice Liaisons via the appropriate education email address, please do not reply via In Basket.     Thank you  Dutch Carbajal MA

## 2023-08-07 ENCOUNTER — EVALUATION (OUTPATIENT)
Dept: PHYSICAL THERAPY | Facility: CLINIC | Age: 88
End: 2023-08-07
Payer: MEDICARE

## 2023-08-07 DIAGNOSIS — R53.81 PHYSICAL DECONDITIONING: Primary | ICD-10-CM

## 2023-08-07 PROCEDURE — 97110 THERAPEUTIC EXERCISES: CPT | Performed by: PHYSICAL THERAPIST

## 2023-08-07 PROCEDURE — 97530 THERAPEUTIC ACTIVITIES: CPT | Performed by: PHYSICAL THERAPIST

## 2023-08-07 PROCEDURE — 97161 PT EVAL LOW COMPLEX 20 MIN: CPT | Performed by: PHYSICAL THERAPIST

## 2023-08-07 NOTE — PROGRESS NOTES
PT Evaluation     Today's date: 2023  Patient name: Prosper Lane  : 1934  MRN: [id number]  Referring provider: Julienne Jenkins MD  Dx:   Encounter Diagnosis     ICD-10-CM    1. Physical deconditioning  R53.81                      Assessment  Assessment details: Pt demonstrated severely decreased gait speed, LE strength, balance and functional mobility. Pt will benefit from PT to address impairments and restore function. Impairments: abnormal gait, abnormal movement, activity intolerance, impaired balance, impaired physical strength, lacks appropriate home exercise program and poor body mechanics    Goals  ST-4 weeks. 1.  Pt will increase gait speed > 20% as evident of increasing 3 min walk test distance > 216 ft.  2.  Pt will increase stair tolerance > 1 flight of 8 inch steps. LT-8 weeks. 1.  Pt will increase 3 min walk test > 250 ft.  2.  Pt will increase LE strength > 3+/5 in all planes. Plan  Planned therapy interventions: abdominal trunk stabilization, neuromuscular re-education, patient education, postural training, self care, strengthening, stretching, therapeutic activities, therapeutic exercise, gait training, therapeutic training, graded activity, graded exercise and home exercise program  Frequency: 2x week  Duration in weeks: 4        Subjective Evaluation    History of Present Illness  Mechanism of injury: Pt is a 80 yom c/o decreased ambulation speed, fatigue and difficulty with stairs, STS and ambulation. Pt reports ambulating with a SPC in the community and no AD in the home.   Patient Goals  Patient goals for therapy: improved balance, increased strength, independence with ADLs/IADLs and return to sport/leisure activities    Pain  No pain reported  Progression: worsening    Social Support  Steps to enter house: yes (railing)  5  Stairs in house: no   Lives in: one-story house          Objective     Strength/Myotome Testing     Left Hip   Planes of Motion   Flexion: 4-  Extension: 3+  Abduction: 3+  Adduction: 3+    Right Hip   Planes of Motion   Flexion: 4-  Extension: 3+  Abduction: 3+  Adduction: 3+    Left Knee   Flexion: 3+  Extension: 4-    Right Knee   Flexion: 3+  Extension: 4-    Left Ankle/Foot   Dorsiflexion: 4-  Plantar flexion: 3+  Inversion: 3+  Eversion: 3+  Great toe extension: 4    Right Ankle/Foot   Dorsiflexion: 4-  Plantar flexion: 3+  Inversion: 3+  Eversion: 3+  Great toe extension: 4    Additional Strength Details  Gait: severely decreased ambulation speed, increased HARIS, R > L hip IR t/o stance with SPC. 3 min walk test: 180 ft. STS: min UE assist, occasional post mild LOB self corrected. Stairs: 6" x 10 with railing support. Fatigue after, min LOB self corrected. Balance: FT EO: 20", FT EC: 20", tandem stance: R: 1", L: 1".             Precautions: DM2, CKD4.     Manuals 8/7                                                                Neuro Re-Ed             Mod tandem stance   20"x2 ea                                                                                         Ther Ex             DL heelraises 1x10            Standing GA stretch 15"x1 ea            Seated toe taps 1x15 ea                                                                             Ther Activity             STS 1x5 2x5           Step ups 6"/ 1x6 6"/ 1x10           Step downs  6"/ 1x10           Gait Training             SPC  3x140 ft                        Modalities

## 2023-08-09 DIAGNOSIS — E78.5 DYSLIPIDEMIA: ICD-10-CM

## 2023-08-09 DIAGNOSIS — N18.1 TYPE 2 DIABETES MELLITUS WITH STAGE 1 CHRONIC KIDNEY DISEASE, UNSPECIFIED WHETHER LONG TERM INSULIN USE: ICD-10-CM

## 2023-08-09 DIAGNOSIS — I12.9 PARENCHYMAL RENAL HYPERTENSION, STAGE 1 THROUGH STAGE 4 OR UNSPECIFIED CHRONIC KIDNEY DISEASE: ICD-10-CM

## 2023-08-09 DIAGNOSIS — D63.1 ANEMIA IN STAGE 4 CHRONIC KIDNEY DISEASE: ICD-10-CM

## 2023-08-09 DIAGNOSIS — E11.22 TYPE 2 DIABETES MELLITUS WITH STAGE 4 CHRONIC KIDNEY DISEASE, WITHOUT LONG-TERM CURRENT USE OF INSULIN (HCC): ICD-10-CM

## 2023-08-09 DIAGNOSIS — N18.4 TYPE 2 DIABETES MELLITUS WITH STAGE 4 CHRONIC KIDNEY DISEASE, WITHOUT LONG-TERM CURRENT USE OF INSULIN (HCC): ICD-10-CM

## 2023-08-09 DIAGNOSIS — E11.22 TYPE 2 DIABETES MELLITUS WITH STAGE 1 CHRONIC KIDNEY DISEASE, UNSPECIFIED WHETHER LONG TERM INSULIN USE: ICD-10-CM

## 2023-08-09 DIAGNOSIS — N18.4 STAGE 4 CHRONIC KIDNEY DISEASE (HCC): ICD-10-CM

## 2023-08-09 DIAGNOSIS — N18.4 ANEMIA IN STAGE 4 CHRONIC KIDNEY DISEASE: ICD-10-CM

## 2023-08-10 ENCOUNTER — OFFICE VISIT (OUTPATIENT)
Dept: PHYSICAL THERAPY | Facility: CLINIC | Age: 88
End: 2023-08-10
Payer: MEDICARE

## 2023-08-10 DIAGNOSIS — R53.81 PHYSICAL DECONDITIONING: Primary | ICD-10-CM

## 2023-08-10 PROCEDURE — 97110 THERAPEUTIC EXERCISES: CPT

## 2023-08-10 PROCEDURE — 97530 THERAPEUTIC ACTIVITIES: CPT

## 2023-08-10 RX ORDER — AMLODIPINE BESYLATE 5 MG/1
10 TABLET ORAL DAILY
Qty: 60 TABLET | Refills: 3 | Status: SHIPPED | OUTPATIENT
Start: 2023-08-10

## 2023-08-10 NOTE — PROGRESS NOTES
Daily Note     Today's date: 8/10/2023  Patient name: Debbie Phelan  : 1934  MRN: [id number]  Referring provider: Braxton Corey MD  Dx:   Encounter Diagnosis     ICD-10-CM    1. Physical deconditioning  R53.81                      Subjective: Pt has no complaints pre tx. Objective: See treatment diary below      Assessment: Pt demonstrated increased ambulation tolerance, difficulty with tandem stance balance. Plan: Continue poc as per PT. Precautions: DM2, CKD4.     Manuals 8/7 8/10                                                               Neuro Re-Ed             Mod tandem stance   20"x2 ea                                                                                         Ther Ex             DL heelraises 1x10 1x10           Standing GA stretch 15"x1 ea 15"x1 ea           Seated toe taps 1x15 ea 1x20 ea                                                                            Ther Activity             STS 1x5 2x5           Step ups 6"/ 1x6 6"/ 1x10           Step downs  6"/ 1x10           Gait Training             SPC  3x150 ft                        Modalities

## 2023-08-11 ENCOUNTER — TELEPHONE (OUTPATIENT)
Dept: HEMATOLOGY ONCOLOGY | Facility: CLINIC | Age: 88
End: 2023-08-11
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
## 2023-08-15 ENCOUNTER — OFFICE VISIT (OUTPATIENT)
Dept: PHYSICAL THERAPY | Facility: CLINIC | Age: 88
End: 2023-08-15
Payer: MEDICARE

## 2023-08-15 DIAGNOSIS — R53.81 PHYSICAL DECONDITIONING: Primary | ICD-10-CM

## 2023-08-15 PROCEDURE — 97110 THERAPEUTIC EXERCISES: CPT

## 2023-08-15 PROCEDURE — 97530 THERAPEUTIC ACTIVITIES: CPT

## 2023-08-15 NOTE — PROGRESS NOTES
Daily Note     Today's date: 8/15/2023  Patient name: Edilia Roldan  : 1934  MRN: [id number]  Referring provider: Julianna Robertson MD  Dx:   Encounter Diagnosis     ICD-10-CM    1. Physical deconditioning  R53.81           Start Time: 1440  Stop Time: 1515  Total time in clinic (min): 35 minutes    Subjective: Patient reports feeling fatigued and sore after last visit which only lasted a few hours. Objective: See treatment diary below    Assessment: Patient progressed well this visit with exercise rep and ambulation distance. Plan: Continue poc as per PT. Precautions: DM2, CKD4.     Manuals 8/7 8/10 8/15                                                              Neuro Re-Ed             Mod tandem stance   20"x2 ea 20"x2 ea                                                                                        Ther Ex             DL heelraises 1x10 1x10 1x15          Standing GA stretch 15"x1 ea 15"x1 ea 15"x2 ea          Seated toe taps 1x15 ea 1x20 ea 1x25 ea                                                                           Ther Activity             STS 1x5 2x5 2x6          Step ups 6"/ 1x6 6"/ 1x10 6"  1x10 ea          Step downs  6"/ 1x10 6"  1x10   ea          Gait Training             SPC  3x150 ft 210 ft                       Modalities

## 2023-08-17 ENCOUNTER — OFFICE VISIT (OUTPATIENT)
Dept: PHYSICAL THERAPY | Facility: CLINIC | Age: 88
End: 2023-08-17
Payer: MEDICARE

## 2023-08-17 DIAGNOSIS — R53.81 PHYSICAL DECONDITIONING: Primary | ICD-10-CM

## 2023-08-17 PROCEDURE — 97112 NEUROMUSCULAR REEDUCATION: CPT | Performed by: PHYSICAL THERAPIST

## 2023-08-17 PROCEDURE — 97110 THERAPEUTIC EXERCISES: CPT | Performed by: PHYSICAL THERAPIST

## 2023-08-17 NOTE — PROGRESS NOTES
Daily Note     Today's date: 2023  Patient name: Sadia Villegas  : 1934  MRN: [id number]  Referring provider: Jodie Johnson MD  Dx:   Encounter Diagnosis     ICD-10-CM    1. Physical deconditioning  R53.81                      Subjective: Patient reports fatigue with amb > 5-10 min. Fair compliance with HEP. Objective: See treatment diary below    Assessment: Patient demonstrated improved hip IR with cueing during gait. Plan: Continue POC. Precautions: DM2, CKD4.     Manuals 8/7 8/10 8/15 8/17                                                             Neuro Re-Ed             Mod tandem stance   20"x2 ea 20"x2 ea 20"x2 ea         Standing hip ABD    2x10 ea                                                                          Ther Ex             DL heelraises 1x10 1x10 1x15 1x15         Standing GA stretch 15"x1 ea 15"x1 ea 15"x2 ea 15"x2 ea         Seated toe taps 1x15 ea 1x20 ea 1x25 ea          Seated hip flexion    1x20 ea                                                             Ther Activity             STS 1x5 2x5 2x6 2x6         Step ups 6"/ 1x6 6"/ 1x10 6"  1x10 ea 6"/ 1x10          Step downs  6"/ 1x10 6"  1x10   ea 6"/ 1x10         Gait Training             SPC  3x150 ft 210 ft 1x210 ft                      Modalities

## 2023-08-22 ENCOUNTER — OFFICE VISIT (OUTPATIENT)
Dept: PHYSICAL THERAPY | Facility: CLINIC | Age: 88
End: 2023-08-22
Payer: MEDICARE

## 2023-08-22 DIAGNOSIS — R53.81 PHYSICAL DECONDITIONING: Primary | ICD-10-CM

## 2023-08-22 PROCEDURE — 97110 THERAPEUTIC EXERCISES: CPT | Performed by: PHYSICAL THERAPIST

## 2023-08-22 PROCEDURE — 97530 THERAPEUTIC ACTIVITIES: CPT | Performed by: PHYSICAL THERAPIST

## 2023-08-22 NOTE — PROGRESS NOTES
Daily Note     Today's date: 2023  Patient name: Anna Churchill  : 1934  MRN: [id number]  Referring provider: Yina Hughes MD  Dx:   Encounter Diagnosis     ICD-10-CM    1. Physical deconditioning  R53.81                      Subjective: Patient reports mild fatigue, no soreness. Objective: See treatment diary below    Assessment: Patient demonstrated improved amb speed. Plan: Continue POC. Precautions: DM2, CKD4.     Manuals 8/7 8/10 8/15 8/17 8/22                                                            Neuro Re-Ed             Mod tandem stance   20"x2 ea 20"x2 ea 20"x2 ea         Standing hip ABD    2x10 ea 2x10 ea                                                                         Ther Ex             DL heelraises 1x10 1x10 1x15 1x15 1x15        Standing GA stretch 15"x1 ea 15"x1 ea 15"x2 ea 15"x2 ea 15"x2 ea        Seated toe taps 1x15 ea 1x20 ea 1x25 ea  1x30        Seated hip flexion    1x20 ea 1x20 ea                                                            Ther Activity             STS 1x5 2x5 2x6 2x6 2x6        Step ups 6"/ 1x6 6"/ 1x10 6"  1x10 ea 6"/ 1x10  6"/ 1x10        Step downs  6"/ 1x10 6"  1x10   ea 6"/ 1x10 6"/ 1x10        Gait Training             SPC  3x150 ft 210 ft 1x210 ft 1x280 ft                     Modalities

## 2023-08-24 ENCOUNTER — OFFICE VISIT (OUTPATIENT)
Dept: PHYSICAL THERAPY | Facility: CLINIC | Age: 88
End: 2023-08-24
Payer: MEDICARE

## 2023-08-24 DIAGNOSIS — R53.81 PHYSICAL DECONDITIONING: Primary | ICD-10-CM

## 2023-08-24 PROCEDURE — 97530 THERAPEUTIC ACTIVITIES: CPT

## 2023-08-24 PROCEDURE — 97110 THERAPEUTIC EXERCISES: CPT

## 2023-08-24 NOTE — PROGRESS NOTES
Daily Note     Today's date: 2023  Patient name: Anna Churchill  : 1934  MRN: [id number]  Referring provider: Yina Hughes MD  Dx:   Encounter Diagnosis     ICD-10-CM    1. Physical deconditioning  R53.81                      Subjective: Patient reports feeling better since last visit. Objective: See treatment diary below    Assessment: Patient demonstrated decreased ambulation endurance but was able to progress stair tolerance. Cueing was required to perform STS with good eccentric control. Plan: Continue POC. Precautions: DM2, CKD4.     Manuals 8/7 8/10 8/15 8/17 8/22 8/24                                                           Neuro Re-Ed             Mod tandem stance   20"x2 ea 20"x2 ea 20"x2 ea         Standing hip ABD    2x10 ea 2x10 ea 2x10 ea                                                                        Ther Ex             DL heelraises 1x10 1x10 1x15 1x15 1x15 2x10       Standing GA stretch 15"x1 ea 15"x1 ea 15"x2 ea 15"x2 ea 15"x2 ea 15"x2 ea       Seated toe taps 1x15 ea 1x20 ea 1x25 ea  1x30 1x30       Seated hip flexion    1x20 ea 1x20 ea 1x30 ea                                                           Ther Activity             STS 1x5 2x5 2x6 2x6 2x6 2x6       Step ups 6"/ 1x6 6"/ 1x10 6"  1x10 ea 6"/ 1x10  6"/ 1x10 6"/ 1x15       Step downs  6"/ 1x10 6"  1x10   ea 6"/ 1x10 6"/ 1x10 6"/ 1x15       Gait Training             SPC  3x150 ft 210 ft 1x210 ft 1x280 ft 2x150 ft                    Modalities

## 2023-08-28 ENCOUNTER — APPOINTMENT (OUTPATIENT)
Dept: PHYSICAL THERAPY | Facility: CLINIC | Age: 88
End: 2023-08-28
Payer: MEDICARE

## 2023-08-31 ENCOUNTER — OFFICE VISIT (OUTPATIENT)
Dept: PHYSICAL THERAPY | Facility: CLINIC | Age: 88
End: 2023-08-31
Payer: MEDICARE

## 2023-08-31 DIAGNOSIS — R53.81 PHYSICAL DECONDITIONING: Primary | ICD-10-CM

## 2023-08-31 PROCEDURE — 97110 THERAPEUTIC EXERCISES: CPT | Performed by: PHYSICAL THERAPIST

## 2023-08-31 PROCEDURE — 97530 THERAPEUTIC ACTIVITIES: CPT | Performed by: PHYSICAL THERAPIST

## 2023-08-31 NOTE — PROGRESS NOTES
Daily Note     Today's date: 2023  Patient name: Lalito Garcia  : 1934  MRN: [id number]  Referring provider: William Moncada MD  Dx:   Encounter Diagnosis     ICD-10-CM    1. Physical deconditioning  R53.81                      Subjective: Patient reports it is a little easier getting up from a chair. Objective: See treatment diary below    Assessment: Patient demonstrated improved amb tolerance and LE strengthening. Plan: Continue POC. Precautions: DM2, CKD4.     Manuals 8/7 8/10 8/15 8/17 8/22 8/24 8/31                                                          Neuro Re-Ed             Mod tandem stance   20"x2 ea 20"x2 ea 20"x2 ea         Standing hip ABD    2x10 ea 2x10 ea 2x10 ea                                                                        Ther Ex             DL heelraises 1x10 1x10 1x15 1x15 1x15 2x10       Standing GA stretch 15"x1 ea 15"x1 ea 15"x2 ea 15"x2 ea 15"x2 ea 15"x2 ea       Seated toe taps 1x15 ea 1x20 ea 1x25 ea  1x30 1x30       Seated hip flexion    1x20 ea 1x20 ea 1x30 ea 2#/ 3x10      LAQ       3#/ 3x10                                             Ther Activity             STS 1x5 2x5 2x6 2x6 2x6 2x6 2x6      Step ups 6"/ 1x6 6"/ 1x10 6"  1x10 ea 6"/ 1x10  6"/ 1x10 6"/ 1x15 6"/ 1x15      Step downs  6"/ 1x10 6"  1x10   ea 6"/ 1x10 6"/ 1x10 6"/ 1x15       Gait Training             SPC  3x150 ft 210 ft 1x210 ft 1x280 ft 2x150 ft 1x280 ft, 1x210 ft                   Modalities

## 2023-09-07 ENCOUNTER — OFFICE VISIT (OUTPATIENT)
Dept: PHYSICAL THERAPY | Facility: CLINIC | Age: 88
End: 2023-09-07
Payer: MEDICARE

## 2023-09-07 DIAGNOSIS — R53.81 PHYSICAL DECONDITIONING: Primary | ICD-10-CM

## 2023-09-07 PROCEDURE — 97110 THERAPEUTIC EXERCISES: CPT

## 2023-09-07 PROCEDURE — 97530 THERAPEUTIC ACTIVITIES: CPT

## 2023-09-07 NOTE — PROGRESS NOTES
Daily Note     Today's date: 2023  Patient name: Sharona Key  : 1934  MRN: [id number]  Referring provider: Ishan Bro MD  Dx:   Encounter Diagnosis     ICD-10-CM    1. Physical deconditioning  R53.81           Start Time: 1130  Stop Time: 1210  Total time in clinic (min): 40 minutes    Subjective: Patient reports feeling tired prior to therapy. Objective: See treatment diary below    Assessment: Patient demonstrates good from with mod cueing required for form. Seated rest breaks were given as needed. Cueing to stand up with a full upright posture during sit to stands. Progress as able. Plan: Continue POC. Precautions: DM2, CKD4.     Manuals 8/7 8/10 8/15 8/17 8/22 8/24 8/31 9/7                                                         Neuro Re-Ed             Mod tandem stance   20"x2 ea 20"x2 ea 20"x2 ea         Standing hip ABD    2x10 ea 2x10 ea 2x10 ea  2x10 ea                                                                      Ther Ex             DL heelraises 1x10 1x10 1x15 1x15 1x15 2x10  2x10     Standing GA stretch 15"x1 ea 15"x1 ea 15"x2 ea 15"x2 ea 15"x2 ea 15"x2 ea       Seated toe taps 1x15 ea 1x20 ea 1x25 ea  1x30 1x30  1x20     Seated hip flexion    1x20 ea 1x20 ea 1x30 ea 2#/ 3x10 2#  3x10     LAQ       3#/ 3x10 3#/ 3x10                                            Ther Activity             STS 1x5 2x5 2x6 2x6 2x6 2x6 2x6 2x8     Step ups 6"/ 1x6 6"/ 1x10 6"  1x10 ea 6"/ 1x10  6"/ 1x10 6"/ 1x15 6"/ 1x15 6"/ 1x15 ea     Step downs  6"/ 1x10 6"  1x10   ea 6"/ 1x10 6"/ 1x10 6"/ 1x15       Gait Training             SPC  3x150 ft 210 ft 1x210 ft 1x280 ft 2x150 ft 1x280 ft, 1x210 ft 1x280 ft, 1x210   ft                  Modalities

## 2023-09-11 ENCOUNTER — OFFICE VISIT (OUTPATIENT)
Dept: PHYSICAL THERAPY | Facility: CLINIC | Age: 88
End: 2023-09-11
Payer: MEDICARE

## 2023-09-11 DIAGNOSIS — R53.81 PHYSICAL DECONDITIONING: Primary | ICD-10-CM

## 2023-09-11 PROCEDURE — 97116 GAIT TRAINING THERAPY: CPT | Performed by: PHYSICAL THERAPIST

## 2023-09-11 PROCEDURE — 97110 THERAPEUTIC EXERCISES: CPT | Performed by: PHYSICAL THERAPIST

## 2023-09-11 PROCEDURE — 97530 THERAPEUTIC ACTIVITIES: CPT | Performed by: PHYSICAL THERAPIST

## 2023-09-11 NOTE — PROGRESS NOTES
Daily Note     Today's date: 2023  Patient name: Anna Churchill  : 1934  MRN: [id number]  Referring provider: Yina Hughes MD  Dx:   Encounter Diagnosis     ICD-10-CM    1. Physical deconditioning  R53.81                      Subjective: No complaints prior to therapy. Objective: See treatment diary below    Assessment: Patient demonstrated improved ambulation distance. Plan: Continue POC. Precautions: DM2, CKD4. Dx: Deconditioning.     Manuals 8/7 8/10 8/15 8/17 8/22 8/24 8/31 9/7 9/11                                                        Neuro Re-Ed             Mod tandem stance   20"x2 ea 20"x2 ea 20"x2 ea                                                                                       Ther Ex             DL heelraises 1x10 1x10 1x15 1x15 1x15 2x10  2x10     Standing GA stretch 15"x1 ea 15"x1 ea 15"x2 ea 15"x2 ea 15"x2 ea 15"x2 ea       Seated toe taps 1x15 ea 1x20 ea 1x25 ea  1x30 1x30  1x20     Seated hip flexion    1x20 ea 1x20 ea 1x30 ea 2#/ 3x10 2#  3x10     LAQ       3#/ 3x10 3#/ 3x10 3#/ 3x10    Standing hip ABD         1x10 ea                              Ther Activity             STS 1x5 2x5 2x6 2x6 2x6 2x6 2x6 2x8 2x8    Step ups 6"/ 1x6 6"/ 1x10 6"  1x10 ea 6"/ 1x10  6"/ 1x10 6"/ 1x15 6"/ 1x15 6"/ 1x15 ea 6"/ 1x15 ea    Step downs  6"/ 1x10 6"  1x10   ea 6"/ 1x10 6"/ 1x10 6"/ 1x15       Gait Training             SPC  3x150 ft 210 ft 1x210 ft 1x280 ft 2x150 ft 1x280 ft, 1x210 ft 1x280 ft, 1x210   ft 1x420 ft                 Modalities

## 2023-09-12 ENCOUNTER — TELEPHONE (OUTPATIENT)
Dept: NEPHROLOGY | Facility: CLINIC | Age: 88
End: 2023-09-12

## 2023-09-12 NOTE — TELEPHONE ENCOUNTER
BARB for patient reminding him to go for lab work and to bring BP readings to his appt on 9/19. Asked him to call back if he has any questions.

## 2023-09-14 NOTE — PROGRESS NOTES
RENAL FOLLOW UP NOTE:.td    ASSESSMENT AND PLAN:   35 D. o. year old male with a history of diabetes mellitus/hypertension/osteoarthritis/bladder CA/asthma/anemia who we are asked to see for chronic kidney disease in the setting of diabetes mellitus:        1.  CKD stage 4.    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  Of great concern is he is very noncompliant with medications and diet     Labs from last appointment     Baseline creatinine:  2.5-2.6  Current creatinine:  2.36 at baseline  Urine protein creatinine ratio:  0.449 g at goal  UA:  No proteinuria and no hematuria  Renal ultrasound:  11/26/2021:  Small echogenic kidneys no hydronephrosis:  7.3 x 7.2  Recommendations:  Treat hypertension-please see below  Treat dyslipidemia-please see below  Maintain proteinuria less than 1 g or as low as possible  Avoid nephrotoxic agents such as NSAIDs, and proton pump inhibitors if possible; patient counseled as such  Kidney smart completed 3/24/2023  ACP done 6/16/2023    I rediscussed the issue of kidney machine dialysis with the patient and his wife. Should he need it in the future they are agreeable to in center hemodialysis.     2.  Volume:  Euvolemic     3.  Hypertension:       Current blood pressure averages:    None at this time     Goal blood pressure:  Less than 130/80 given CKD     Recommendations:  Push nonmedical regimen including weight loss, isotonic exercise and a low sodium diet.  Patient has been counseled the such. Can be very noncompliant  Workup:  Aldosterone/renin:  Negative  Thyroid profile: Normal  Renal workup as outlined above  Hold on renal artery duplex unless cannot control blood pressure  MedicationChanges today:    Slightly above goal: AOBP 140/73 he does not take any at home so I will add losartan 25 mg daily in the morning     4.  Electrolytes: All normal     5.  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  Dyslipidemia:  Goal LDL:  Less than 100  Current lipid profile:  LDL 79/HDL 51/triglycerides 55 from October 2022  Recommendations:   Low-cholesterol/low-fat diet / weight loss as appropriate and isotonic exercise   Medication changes today at goal so no changes, will repeat with next labs     7.  Anemia:  Most likely from CKD  Current hemoglobin: 10.9 improved  Iron studies:  Iron saturation and ferritin both acceptable from 1/31/2023  Multiple myeloma evaluation:  Faint band in IgM and lambda:  Probably reactive/inflammatory recommend follow-up in 3-6 months from December 27, 2021: [facility] will obtain in 3 months:  It is persistent, therefore the patient needs immunoelectrophoresis at this time!!! From 6/14/2023: Light chain ratio 1.29  Faint band of IgM and lambda though may suggest polyclonal background recommended repeat studies in 3 to 6 months which I will repeat at this time  GI had seen the patient Dr. Yolette Chambers who felt given stool Hemoccult negative age and no overt iron-deficiency no further GI investigation including colonoscopy   Potential referral to Hematology for possible erythropoietin agent  depending upon follow-up lab  8.  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 Yolette Chambers with heme-negative stools he did not feel there is any need for a colonoscopy at this time    PATIENT INSTRUCTIONS:    Patient Instructions   Visit summary:  -Overall kidney function is staying stable  - Blood pressure slightly above goal please see below for recommendations        1. Medication changes today:  Please begin losartan 25 mg daily in the morning  Continue amlodipine 10 mg daily as you are doing    2. Please go for non fasting  lab work 1-2 weeks after making the above medication change. 3.  Please take 1 week a blood pressure readings 4 weeks after making the above medication change    AS FOLLOWS  MORNING AND EVENING, SITTING  as follows:  TAKE THE MORNING READINGS BEFORE ANY MEDICATIONS AND WHEN YOU ARE RELAXED FOR SEVERAL MINUTES  TAKE THE EVENING READINGS:  BETWEEN 7-10 P.M.; PRIOR TO ANY MEDICATIONS; AT LEAST IN OUR  FROM DINNER; AND CERTAINLY AFTER RELAXING FOR A FEW MINUTES  PLEASE INCLUDE HEART RATE WITH YOUR BLOOD PRESSURE READINGS  When taking standing readings, keep your arm supported at heart level and not dangling  Make sure you are sitting with your back supported and feet on the ground and do not cross your legs or feet  Make sure you have not taken any coffee or caffeine products or exercised or smoke cigarettes at least 30 minutes before taking your blood pressure  Then please mail these readings into the office    4. Follow-up in 4 months  Please bring in 1 week a blood pressure readings morning evening, sitting and standing is outlined above  PLEASE BRING AN YOUR BLOOD PRESSURE MACHINE TO CORRELATE WITH THE OFFICE MACHINE AT THIS NEXT SCHEDULED VISIT  Please go for fasting lab work 1-2 weeks prior to your appointment      5.   General instructions:  AVOID SALT BUT NOT ADDING AN READING LABELS TO MAKE SURE THERE IS LOW-SALT IN THE FOOD THAT YOU ARE EATING  Goal is less than 2 g of sodium intake or less than 5 g of sodium chloride intake per day    Avoid nonsteroidal anti-inflammatory drugs such as Naprosyn, ibuprofen, Aleve, Advil, Celebrex, Meloxicam (Mobic) etc.  You can use Tylenol as needed if you do not have any liver condition to be concerned about    Avoid medications such as Sudafed or decongestants and antihistamines that contained the D component which is the decongestant. You can take antihistamines without the decongestant or D component. Try to avoid medications such as pantoprazole or  Protonix/Nexium or Esomeprazole)/Prilosec or omeprazole/Prevacid or lansoprazole/AcipHex or Rabeprazole. If you are able to, use Pepcid as this is safer for your kidneys. Try to exercise at least 30 minutes 3 days a week to begin with with an ultimate goal of 5 days a week for at least 30 minutes      Please do not drink more than 2 glasses of alcohol/wine on a daily basis as this may contribute to your high blood pressure. Subjective: There has been no hospitalizations or acute illnesses since last visit. The patient overall is feeling well. No fevers, chills, or cough or colds.   Good appetite and fair energy  No hematuria, dysuria, voiding symptoms or foamy urine  No gastrointestinal symptoms  No cardiovascular symptoms including swelling of the legs  No headaches, dizziness or lightheadedness  Blood pressure medications/renal pertinent medications:  Farxiga 5 mg daily  Amlodipine 10 mg daily      ROS:  See HPI, otherwise review of systems as completely reviewed with the patient are negative    Past Medical History:   Diagnosis Date   • Cataracts, bilateral      Past Surgical History:   Procedure Laterality Date   • CATARACT EXTRACTION Bilateral 07/15/2012   • COLONOSCOPY     • CYSTOSCOPY  01/15/2018    Last assessed 9/14/17, diagnostic   • HERNIA REPAIR     • INSTILLATION MYTOMYCIN N/A 10/25/2017    Procedure: INSTILLATION OF MITOMYCIN C;  Surgeon: Dolly Powers MD;  Location: [facility];  Service: Urology   • SC CYSTO W/REMOVAL OF LESIONS SMALL N/A 10/25/2017    Procedure: Jose Webster; BLADDER BIOPSY WITH Chiara Cruz;  Surgeon: Dolly Powers MD;  [facility];  Service: Urology   • TONSILLECTOMY     • TRANSURETHRAL RESECTION OF BLADDER TUMOR N/A 10/25/2017    Procedure: TUR BLADDER TUMOR;  Surgeon: Dolly Powers MD; Location: [facility];  Service: Urology   • WISDOM TOOTH EXTRACTION       Family History   Problem Relation Age of Onset   • Diabetes Mother       reports that he quit smoking about 59 years ago. His smoking use included cigarettes. He has never used smokeless tobacco. He reports that he does not drink alcohol and does not use drugs. I COMPLETELY REVIEWED THE PAST MEDICAL HISTORY/PAST SURGICAL HISTORY/SOCIAL HISTORY/FAMILY HISTORY/AND MEDICATIONS  AND UPDATED ALL    Objective:     Vitals:   BP sitting on left: 160/72 with a heart rate of 60 and regular  BP standing on left: 170/80 with a heart rate of 60 and regular    Weight (last 2 days)     Date/Time Weight    09/18/23 1022 63.5 (140)        Wt Readings from Last 3 Encounters:   09/18/23 63.5 kg (140 lb)   06/27/23 64 kg (141 lb)   06/16/23 64 kg (141 lb)       Body mass index is 24.03 kg/m².     Physical Exam: General:  No acute distress  Skin:  No acute rash  Eyes:  No scleral icterus, noninjected, no discharge from eyes  ENT:  Moist mucous membranes  Neck:  Supple, no jugular venous distention, trachea is midline, no lymphadenopathy and no thyromegaly  Back   No CVAT  Chest:  Clear to auscultation and percussion, good respiratory effort  CVS:  Regular rate and rhythm without a rub, or gallops or murmurs  Abdomen:  Soft and nontender with normal bowel sounds  Extremities:  No cyanosis and no edema, no arthritic changes, normal range of motion  Neuro:  Grossly intact  Psych:  Alert, oriented x3 and appropriate      Medications:    Current Outpatient Medications:   •  amLODIPine (NORVASC) 5 mg tablet, Take 2 tablets (10 mg total) by mouth daily, Disp: 60 tablet, Rfl: 3  •  aspirin 325 mg tablet, Take 325 mg by mouth daily, Disp: , Rfl:   •  atorvastatin (LIPITOR) 10 mg tablet, Take 1 tablet (10 mg total) by mouth daily, Disp: 90 tablet, Rfl: 3  •  brimonidine tartrate 0.2 % ophthalmic solution, INSTILL 1 DROP INTO EACH EYE THREE TIMES DAILY, Disp: , Rfl:   • Cyanocobalamin (Vitamin B-12) 1000 MCG/15ML LIQD, Take by mouth daily, Disp: , Rfl:   •  dapagliflozin 5 MG TABS, Take 1 tablet (5 mg total) by mouth daily, Disp: 30 tablet, Rfl: 2  •  Dorzolamide HCl-Timolol Mal PF 2-0.5 % SOLN, INSTILL 1 DROP INTO EACH EYE TWICE DAILY, Disp: , Rfl:   •  losartan (COZAAR) 25 mg tablet, Take 1 tablet (25 mg total) by mouth daily, Disp: 30 tablet, Rfl: 5  •  prednisoLONE acetate (PRED FORTE) 1 % ophthalmic suspension, INSTILL 1 DROP INTO LEFT EYE TWICE DAILY, Disp: , Rfl:     Lab, Imaging and other studies: I have personally reviewed pertinent labs.   Laboratory Results:  Results for orders placed or performed in visit on 09/15/23   PTH, Intact and Calcium   Result Value Ref Range    PTH, Intact 67 16 - 77 pg/mL    Calcium 9.2 8.6 - 10.3 mg/dL   Magnesium   Result Value Ref Range    Magnesium, Serum 2.4 1.5 - 2.5 mg/dL   Phosphorus   Result Value Ref Range    Phosphorus, Serum 3.6 2.1 - 4.3 mg/dL   Comprehensive metabolic panel   Result Value Ref Range    Glucose, Random 136 (H) 65 - 99 mg/dL    BUN 42 (H) 7 - 25 mg/dL    Creatinine 2.36 (H) 0.70 - 1.22 mg/dL    eGFR 26 (L) > OR = 60 mL/min/1.73m2    SL AMB BUN/CREATININE RATIO 18 6 - 22 (calc)    Sodium 142 135 - 146 mmol/L    Potassium 3.9 3.5 - 5.3 mmol/L    Chloride 110 98 - 110 mmol/L    CO2 24 20 - 32 mmol/L    Calcium 9.2 8.6 - 10.3 mg/dL    Protein, Total 7.3 6.1 - 8.1 g/dL    Albumin 3.9 3.6 - 5.1 g/dL    Globulin 3.4 1.9 - 3.7 g/dL (calc)    Albumin/Globulin Ratio 1.1 1.0 - 2.5 (calc)    TOTAL BILIRUBIN 0.6 0.2 - 1.2 mg/dL    Alkaline Phosphatase 62 35 - 144 U/L    AST 14 10 - 35 U/L    ALT 10 9 - 46 U/L   CBC and differential   Result Value Ref Range    White Blood Cell Count 7.8 3.8 - 10.8 Thousand/uL    Red Blood Cell Count 3.41 (L) 4.20 - 5.80 Million/uL    Hemoglobin 10.9 (L) 13.2 - 17.1 g/dL    HCT 33.3 (L) 38.5 - 50.0 %    MCV 97.7 80.0 - 100.0 fL    MCH 32.0 27.0 - 33.0 pg    MCHC 32.7 32.0 - 36.0 g/dL    RDW 11.5 11.0 - 15.0 %    Platelet Count 448 319 - 400 Thousand/uL    SL AMB MPV 11.3 7.5 - 12.5 fL    Neutrophils (Absolute) 4,984 1,500 - 7,800 cells/uL    Lymphocytes (Absolute) 1,630 850 - 3,900 cells/uL    Monocytes (Absolute) 741 200 - 950 cells/uL    Eosinophils (Absolute) 398 15 - 500 cells/uL    Basophils ABS 47 0 - 200 cells/uL    Neutrophils 63.9 %    Lymphocytes 20.9 %    Monocytes 9.5 %    Eosinophils 5.1 %    Basophils PCT 0.6 %   Protein, Total w/Creat, Random Urine   Result Value Ref Range    Creatinine, Urine 69 20 - 320 mg/dL    Protein/Creat Ratio 449 (H) 25 - 148 mg/g creat    Protein/Creat Ratio 0.449 (H) 0.025 - 0.148 mg/mg creat    Total Protein, Urine 31 (H) 5 - 25 mg/dL       Results from last 7 days   Lab Units 09/15/23  1253   WHITE BLOOD CELL COUNT. Thousand/uL 7.8   HEMOGLOBIN. g/dL 10.9*   HEMATOCRIT. % 33.3*   PLATELETS. Thousand/uL 168   POTASSIUM mmol/L 3.9   CHLORIDE mmol/L 110   CO2 mmol/L 24   BUN mg/dL 42*   CREATININE mg/dL 2.36*   CALCIUM mg/dL 9.2  9.2   MAGNESIUM mg/dL 2.4           Radiology review:   chest X-ray    Ultrasound      Portions of the record may have been created with voice recognition software. Occasional wrong word or "sound a like" substitutions may have occurred due to the inherent limitations of voice recognition software. Read the chart carefully and recognize, using context, where substitutions have occurred.

## 2023-09-15 ENCOUNTER — APPOINTMENT (OUTPATIENT)
Dept: PHYSICAL THERAPY | Facility: CLINIC | Age: 88
End: 2023-09-15
Payer: MEDICARE

## 2023-09-16 LAB
ALBUMIN SERPL-MCNC: 3.9 G/DL (ref 3.6–5.1)
ALBUMIN/GLOB SERPL: 1.1 (CALC) (ref 1–2.5)
ALP SERPL-CCNC: 62 U/L (ref 35–144)
ALT SERPL-CCNC: 10 U/L (ref 9–46)
AST SERPL-CCNC: 14 U/L (ref 10–35)
BASOPHILS # BLD AUTO: 47 CELLS/UL (ref 0–200)
BASOPHILS NFR BLD AUTO: 0.6 %
BILIRUB SERPL-MCNC: 0.6 MG/DL (ref 0.2–1.2)
BUN SERPL-MCNC: 42 MG/DL (ref 7–25)
BUN/CREAT SERPL: 18 (CALC) (ref 6–22)
CALCIUM SERPL-MCNC: 9.2 MG/DL (ref 8.6–10.3)
CALCIUM SERPL-MCNC: 9.2 MG/DL (ref 8.6–10.3)
CHLORIDE SERPL-SCNC: 110 MMOL/L (ref 98–110)
CO2 SERPL-SCNC: 24 MMOL/L (ref 20–32)
CREAT SERPL-MCNC: 2.36 MG/DL (ref 0.7–1.22)
CREAT UR-MCNC: 69 MG/DL (ref 20–320)
EOSINOPHIL # BLD AUTO: 398 CELLS/UL (ref 15–500)
EOSINOPHIL NFR BLD AUTO: 5.1 %
ERYTHROCYTE [DISTWIDTH] IN BLOOD BY AUTOMATED COUNT: 11.5 % (ref 11–15)
GFR/BSA.PRED SERPLBLD CYS-BASED-ARV: 26 ML/MIN/1.73M2
GLOBULIN SER CALC-MCNC: 3.4 G/DL (CALC) (ref 1.9–3.7)
GLUCOSE SERPL-MCNC: 136 MG/DL (ref 65–99)
HCT VFR BLD AUTO: 33.3 % (ref 38.5–50)
HGB BLD-MCNC: 10.9 G/DL (ref 13.2–17.1)
LYMPHOCYTES # BLD AUTO: 1630 CELLS/UL (ref 850–3900)
LYMPHOCYTES NFR BLD AUTO: 20.9 %
MAGNESIUM SERPL-MCNC: 2.4 MG/DL (ref 1.5–2.5)
MCH RBC QN AUTO: 32 PG (ref 27–33)
MCHC RBC AUTO-ENTMCNC: 32.7 G/DL (ref 32–36)
MCV RBC AUTO: 97.7 FL (ref 80–100)
MONOCYTES # BLD AUTO: 741 CELLS/UL (ref 200–950)
MONOCYTES NFR BLD AUTO: 9.5 %
NEUTROPHILS # BLD AUTO: 4984 CELLS/UL (ref 1500–7800)
NEUTROPHILS NFR BLD AUTO: 63.9 %
PHOSPHATE SERPL-MCNC: 3.6 MG/DL (ref 2.1–4.3)
PLATELET # BLD AUTO: 168 THOUSAND/UL (ref 140–400)
PMV BLD REES-ECKER: 11.3 FL (ref 7.5–12.5)
POTASSIUM SERPL-SCNC: 3.9 MMOL/L (ref 3.5–5.3)
PROT SERPL-MCNC: 7.3 G/DL (ref 6.1–8.1)
PROT UR-MCNC: 31 MG/DL (ref 5–25)
PROT/CREAT UR: 0.45 MG/MG CREAT (ref 0.03–0.15)
PROT/CREAT UR: 449 MG/G CREAT (ref 25–148)
PTH-INTACT SERPL-MCNC: 67 PG/ML (ref 16–77)
RBC # BLD AUTO: 3.41 MILLION/UL (ref 4.2–5.8)
SODIUM SERPL-SCNC: 142 MMOL/L (ref 135–146)
WBC # BLD AUTO: 7.8 THOUSAND/UL (ref 3.8–10.8)

## 2023-09-18 ENCOUNTER — OFFICE VISIT (OUTPATIENT)
Dept: PHYSICAL THERAPY | Facility: CLINIC | Age: 88
End: 2023-09-18
Payer: MEDICARE

## 2023-09-18 ENCOUNTER — TELEPHONE (OUTPATIENT)
Dept: HEMATOLOGY ONCOLOGY | Facility: CLINIC | Age: 88
End: 2023-09-18

## 2023-09-18 ENCOUNTER — OFFICE VISIT (OUTPATIENT)
Dept: NEPHROLOGY | Facility: CLINIC | Age: 88
End: 2023-09-18
Payer: MEDICARE

## 2023-09-18 VITALS
WEIGHT: 140 LBS | DIASTOLIC BLOOD PRESSURE: 73 MMHG | HEART RATE: 60 BPM | BODY MASS INDEX: 23.9 KG/M2 | SYSTOLIC BLOOD PRESSURE: 144 MMHG | HEIGHT: 64 IN

## 2023-09-18 DIAGNOSIS — N18.4 CKD STAGE 4 DUE TO TYPE 2 DIABETES MELLITUS (HCC): Primary | ICD-10-CM

## 2023-09-18 DIAGNOSIS — I12.9 PARENCHYMAL RENAL HYPERTENSION, STAGE 1 THROUGH STAGE 4 OR UNSPECIFIED CHRONIC KIDNEY DISEASE: Primary | ICD-10-CM

## 2023-09-18 DIAGNOSIS — D63.1 ANEMIA IN STAGE 4 CHRONIC KIDNEY DISEASE: ICD-10-CM

## 2023-09-18 DIAGNOSIS — R53.81 PHYSICAL DECONDITIONING: Primary | ICD-10-CM

## 2023-09-18 DIAGNOSIS — N18.4 STAGE 4 CHRONIC KIDNEY DISEASE (HCC): ICD-10-CM

## 2023-09-18 DIAGNOSIS — E11.21 DIABETIC NEPHROPATHY ASSOCIATED WITH TYPE 2 DIABETES MELLITUS (HCC): ICD-10-CM

## 2023-09-18 DIAGNOSIS — N18.4 ANEMIA IN STAGE 4 CHRONIC KIDNEY DISEASE: ICD-10-CM

## 2023-09-18 DIAGNOSIS — N18.4 BENIGN HYPERTENSION WITH CKD (CHRONIC KIDNEY DISEASE) STAGE IV (HCC): ICD-10-CM

## 2023-09-18 DIAGNOSIS — E78.5 DYSLIPIDEMIA: ICD-10-CM

## 2023-09-18 DIAGNOSIS — E55.9 MILD VITAMIN D DEFICIENCY: ICD-10-CM

## 2023-09-18 DIAGNOSIS — E11.22 CKD STAGE 4 DUE TO TYPE 2 DIABETES MELLITUS (HCC): Primary | ICD-10-CM

## 2023-09-18 DIAGNOSIS — R80.1 PERSISTENT PROTEINURIA: ICD-10-CM

## 2023-09-18 DIAGNOSIS — I12.9 BENIGN HYPERTENSION WITH CKD (CHRONIC KIDNEY DISEASE) STAGE IV (HCC): ICD-10-CM

## 2023-09-18 PROCEDURE — 97530 THERAPEUTIC ACTIVITIES: CPT

## 2023-09-18 PROCEDURE — 97110 THERAPEUTIC EXERCISES: CPT

## 2023-09-18 PROCEDURE — 99214 OFFICE O/P EST MOD 30 MIN: CPT | Performed by: INTERNAL MEDICINE

## 2023-09-18 RX ORDER — LOSARTAN POTASSIUM 25 MG/1
25 TABLET ORAL DAILY
Qty: 30 TABLET | Refills: 5 | Status: SHIPPED | OUTPATIENT
Start: 2023-09-18

## 2023-09-18 NOTE — TELEPHONE ENCOUNTER
Happy Monday! Patient's wife called and requested a script for a diabetic machine for [facility]  sent to the [facility] ([facility] on file. Thank you for your help.

## 2023-09-18 NOTE — PATIENT INSTRUCTIONS
Visit summary:  -Overall kidney function is staying stable  - Blood pressure slightly above goal please see below for recommendations        1. Medication changes today:  Please begin losartan 25 mg daily in the morning  Continue amlodipine 10 mg daily as you are doing    2. Please go for non fasting  lab work 1-2 weeks after making the above medication change. 3.  Please take 1 week a blood pressure readings 4 weeks after making the above medication change    AS FOLLOWS  MORNING AND EVENING, SITTING  as follows:  TAKE THE MORNING READINGS BEFORE ANY MEDICATIONS AND WHEN YOU ARE RELAXED FOR SEVERAL MINUTES  TAKE THE EVENING READINGS:  BETWEEN 7-10 P.M.; PRIOR TO ANY MEDICATIONS; AT LEAST IN OUR  FROM DINNER; AND CERTAINLY AFTER RELAXING FOR A FEW MINUTES  PLEASE INCLUDE HEART RATE WITH YOUR BLOOD PRESSURE READINGS  When taking standing readings, keep your arm supported at heart level and not dangling  Make sure you are sitting with your back supported and feet on the ground and do not cross your legs or feet  Make sure you have not taken any coffee or caffeine products or exercised or smoke cigarettes at least 30 minutes before taking your blood pressure  Then please mail these readings into the office    4. Follow-up in 4 months  Please bring in 1 week a blood pressure readings morning evening, sitting and standing is outlined above  PLEASE BRING AN YOUR BLOOD PRESSURE MACHINE TO CORRELATE WITH THE OFFICE MACHINE AT THIS NEXT SCHEDULED VISIT  Please go for fasting lab work 1-2 weeks prior to your appointment      5.   General instructions:  AVOID SALT BUT NOT ADDING AN READING LABELS TO MAKE SURE THERE IS LOW-SALT IN THE FOOD THAT YOU ARE EATING  Goal is less than 2 g of sodium intake or less than 5 g of sodium chloride intake per day    Avoid nonsteroidal anti-inflammatory drugs such as Naprosyn, ibuprofen, Aleve, Advil, Celebrex, Meloxicam (Mobic) etc.  You can use Tylenol as needed if you do not have any liver condition to be concerned about    Avoid medications such as Sudafed or decongestants and antihistamines that contained the D component which is the decongestant. You can take antihistamines without the decongestant or D component. Try to avoid medications such as pantoprazole or  Protonix/Nexium or Esomeprazole)/Prilosec or omeprazole/Prevacid or lansoprazole/AcipHex or Rabeprazole. If you are able to, use Pepcid as this is safer for your kidneys. Try to exercise at least 30 minutes 3 days a week to begin with with an ultimate goal of 5 days a week for at least 30 minutes      Please do not drink more than 2 glasses of alcohol/wine on a daily basis as this may contribute to your high blood pressure.

## 2023-09-18 NOTE — TELEPHONE ENCOUNTER
Appointment Change  Cancel, Reschedule, Change to Virtual      Who are you speaking with? Spouse   If it is not the patient, is the caller listed on the communication consent form? Yes   Which provider is the appointment scheduled with? [place] PA-C   When was the original appointment scheduled? Please list date and time 9/19/23 2:00pm   At which location is the appointment scheduled to take place? [facility]   Was the appointment rescheduled? Was the appointment changed from an in person visit to a virtual visit? If so, please list the details of the change. 10/6/23 3:30pm Cristina Foley   What is the reason for the appointment change? Patient had a family emergency        Was STAR transport scheduled? No   Does STAR transport need to be scheduled for the new visit (if applicable) No   Does the patient need an infusion appointment rescheduled? No   Does the patient have an upcoming infusion appointment scheduled? If so, when? No   Is the patient undergoing chemotherapy? No   For appointments cancelled with less than 24 hours:  Was the no-show policy reviewed?  Yes

## 2023-09-18 NOTE — LETTER
September 18, 2023     Teodora Leroy, [address]    Patient: Jorge Velasquez   YOB: 1934   Date of Visit: 9/18/2023       Dear Dr. Bronwyn Malhotra: Thank you for referring Anthony Reno to me for evaluation. Below are my notes for this consultation. If you have questions, please do not hesitate to call me. I look forward to following your patient along with you. Sincerely,        Ari Hauser MD        CC: No Recipients    Ari Hauser MD  9/18/2023 10:52 AM  Sign when Signing Visit  RENAL FOLLOW UP NOTE:.td    ASSESSMENT AND PLAN:   80y.o. year old male with a history of diabetes mellitus/hypertension/osteoarthritis/bladder CA/asthma/anemia who we are asked to see for chronic kidney disease in the setting of diabetes mellitus:        1. CKD stage 4. 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 Of great concern is he is very noncompliant with medications and diet     Labs from last appointment     Baseline creatinine:  2.5-2.6  Current creatinine:  2.36 at baseline  Urine protein creatinine ratio:  0.449 g at goal  UA:  No proteinuria and no hematuria  Renal ultrasound:  11/26/2021:  Small echogenic kidneys no hydronephrosis:  7.3 x 7.2  Recommendations:  Treat hypertension-please see below  Treat dyslipidemia-please see below  Maintain proteinuria less than 1 g or as low as possible  Avoid nephrotoxic agents such as NSAIDs, and proton pump inhibitors if possible; patient counseled as such  Kidney smart completed 3/24/2023  ACP done 6/16/2023    I rediscussed the issue of kidney machine dialysis with the patient and his wife. Should he need it in the future they are agreeable to in center hemodialysis. 2.  Volume:  Euvolemic     3.   Hypertension:       Current blood pressure averages:    None at this time     Goal blood pressure:  Less than 130/80 given CKD     Recommendations:  Push nonmedical regimen including weight loss, isotonic exercise and a low sodium diet. Patient has been counseled the such. Can be very noncompliant  Workup:  Aldosterone/renin:  Negative  Thyroid profile: Normal  Renal workup as outlined above  Hold on renal artery duplex unless cannot control blood pressure  MedicationChanges today:    Slightly above goal: AOBP 140/73 he does not take any at home so I will add losartan 25 mg daily in the morning     4. Electrolytes: All normal     5. 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 Dyslipidemia:  Goal LDL:  Less than 100  Current lipid profile:  LDL 79/HDL 51/triglycerides 55 from October 2022  Recommendations:   Low-cholesterol/low-fat diet / weight loss as appropriate and isotonic exercise   Medication changes today at goal so no changes, will repeat with next labs     7. Anemia:  Most likely from CKD  Current hemoglobin: 10.9 improved  Iron studies:  Iron saturation and ferritin both acceptable from 1/31/2023  Multiple myeloma evaluation:  Faint band in IgM and lambda:  Probably reactive/inflammatory recommend follow-up in 3-6 months from December 27, 2021:  I will obtain in 3 months:  It is persistent, therefore the patient needs immunoelectrophoresis at this time!!! From 6/14/2023: Light chain ratio 1.29  Faint band of IgM and lambda though may suggest polyclonal background recommended repeat studies in 3 to 6 months which I will repeat at this time  GI had seen the patient Dr. Italo Langston who felt given stool Hemoccult negative age and no overt iron-deficiency no further GI investigation including colonoscopy   Potential referral to Hematology for possible erythropoietin agent  depending upon follow-up lab  8.   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 Khadar Turk with heme-negative stools he did not feel there is any need for a colonoscopy at this time    PATIENT INSTRUCTIONS:    Patient Instructions   Visit summary:  -Overall kidney function is staying stable  - Blood pressure slightly above goal please see below for recommendations        1. Medication changes today:  Please begin losartan 25 mg daily in the morning  Continue amlodipine 10 mg daily as you are doing    2. Please go for non fasting  lab work 1-2 weeks after making the above medication change. 3.  Please take 1 week a blood pressure readings 4 weeks after making the above medication change    AS FOLLOWS  MORNING AND EVENING, SITTING  as follows:  TAKE THE MORNING READINGS BEFORE ANY MEDICATIONS AND WHEN YOU ARE RELAXED FOR SEVERAL MINUTES  TAKE THE EVENING READINGS:  BETWEEN 7-10 P.M.; PRIOR TO ANY MEDICATIONS; AT LEAST IN OUR  FROM DINNER; AND CERTAINLY AFTER RELAXING FOR A FEW MINUTES  PLEASE INCLUDE HEART RATE WITH YOUR BLOOD PRESSURE READINGS  When taking standing readings, keep your arm supported at heart level and not dangling  Make sure you are sitting with your back supported and feet on the ground and do not cross your legs or feet  Make sure you have not taken any coffee or caffeine products or exercised or smoke cigarettes at least 30 minutes before taking your blood pressure  Then please mail these readings into the office    4. Follow-up in 4 months  Please bring in 1 week a blood pressure readings morning evening, sitting and standing is outlined above  PLEASE BRING AN YOUR BLOOD PRESSURE MACHINE TO CORRELATE WITH THE OFFICE MACHINE AT THIS NEXT SCHEDULED VISIT  Please go for fasting lab work 1-2 weeks prior to your appointment      5. General instructions:  AVOID SALT BUT NOT ADDING AN READING LABELS TO MAKE SURE THERE IS LOW-SALT IN THE FOOD THAT YOU ARE EATING  Goal is less than 2 g of sodium intake or less than 5 g of sodium chloride intake per day    Avoid nonsteroidal anti-inflammatory drugs such as Naprosyn, ibuprofen, Aleve, Advil, Celebrex, Meloxicam (Mobic) etc.  You can use Tylenol as needed if you do not have any liver condition to be concerned about    Avoid medications such as Sudafed or decongestants and antihistamines that contained the D component which is the decongestant. You can take antihistamines without the decongestant or D component. Try to avoid medications such as pantoprazole or  Protonix/Nexium or Esomeprazole)/Prilosec or omeprazole/Prevacid or lansoprazole/AcipHex or Rabeprazole. If you are able to, use Pepcid as this is safer for your kidneys. Try to exercise at least 30 minutes 3 days a week to begin with with an ultimate goal of 5 days a week for at least 30 minutes      Please do not drink more than 2 glasses of alcohol/wine on a daily basis as this may contribute to your high blood pressure. Subjective: There has been no hospitalizations or acute illnesses since last visit. The patient overall is feeling well. No fevers, chills, or cough or colds.   Good appetite and fair energy  No hematuria, dysuria, voiding symptoms or foamy urine  No gastrointestinal symptoms  No cardiovascular symptoms including swelling of the legs  No headaches, dizziness or lightheadedness  Blood pressure medications/renal pertinent medications:  Farxiga 5 mg daily  Amlodipine 10 mg daily      ROS:  See HPI, otherwise review of systems as completely reviewed with the patient are negative    Past Medical History:   Diagnosis Date   • Cataracts, bilateral      Past Surgical History:   Procedure Laterality Date   • CATARACT EXTRACTION Bilateral 07/15/2012   • COLONOSCOPY     • CYSTOSCOPY  01/15/2018    Last assessed 9/14/17, diagnostic   • HERNIA REPAIR     • INSTILLATION MYTOMYCIN N/A 10/25/2017    Procedure: INSTILLATION OF MITOMYCIN C;  Surgeon: Shauna Pearce MD;  [facility];  Service: Urology   • OK CYSTO W/REMOVAL OF LESIONS SMALL N/A 10/25/2017    Procedure: Nidhi Posey; BLADDER BIOPSY WITH [facility];  Surgeon: Shauna Pearce MD;  Location: [facility];  Service: Urology   • TONSILLECTOMY     • TRANSURETHRAL RESECTION OF BLADDER TUMOR N/A 10/25/2017    Procedure: TUR BLADDER TUMOR;  Surgeon: Shauna Pearce MD;  Location: [facility];  Service: Urology   • WISDOM TOOTH EXTRACTION       Family History   Problem Relation Age of Onset   • Diabetes Mother       reports that he quit smoking about 59 years ago. His smoking use included cigarettes. He has never used smokeless tobacco. He reports that he does not drink alcohol and does not use drugs. I COMPLETELY REVIEWED THE PAST MEDICAL HISTORY/PAST SURGICAL HISTORY/SOCIAL HISTORY/FAMILY HISTORY/AND MEDICATIONS  AND UPDATED ALL    Objective:     Vitals:   BP sitting on left: 160/72 with a heart rate of 60 and regular  BP standing on left: 170/80 with a heart rate of 60 and regular    Weight (last 2 days)       Date/Time Weight    09/18/23 1022 63.5 (140)          Wt Readings from Last 3 Encounters:   09/18/23 63.5 kg (140 lb)   06/27/23 64 kg (141 lb)   06/16/23 64 kg (141 lb)       Body mass index is 24.03 kg/m².     Physical Exam: General:  No acute distress  Skin:  No acute rash  Eyes:  No scleral icterus, noninjected, no discharge from eyes  ENT:  Moist mucous membranes  Neck:  Supple, no jugular venous distention, trachea is midline, no lymphadenopathy and no thyromegaly  Back   No CVAT  Chest:  Clear to auscultation and percussion, good respiratory effort  CVS:  Regular rate and rhythm without a rub, or gallops or murmurs  Abdomen:  Soft and nontender with normal bowel sounds  Extremities:  No cyanosis and no edema, no arthritic changes, normal range of motion  Neuro:  Grossly intact  Psych:  Alert, oriented x3 and appropriate      Medications:    Current Outpatient Medications:   •  amLODIPine (NORVASC) 5 mg tablet, Take 2 tablets (10 mg total) by mouth daily, Disp: 60 tablet, Rfl: 3  •  aspirin 325 mg tablet, Take 325 mg by mouth daily, Disp: , Rfl:   •  atorvastatin (LIPITOR) 10 mg tablet, Take 1 tablet (10 mg total) by mouth daily, Disp: 90 tablet, Rfl: 3  •  brimonidine tartrate 0.2 % ophthalmic solution, INSTILL 1 DROP INTO EACH EYE THREE TIMES DAILY, Disp: , Rfl:   •  Cyanocobalamin (Vitamin B-12) 1000 MCG/15ML LIQD, Take by mouth daily, Disp: , Rfl:   •  dapagliflozin 5 MG TABS, Take 1 tablet (5 mg total) by mouth daily, Disp: 30 tablet, Rfl: 2  •  Dorzolamide HCl-Timolol Mal PF 2-0.5 % SOLN, INSTILL 1 DROP INTO EACH EYE TWICE DAILY, Disp: , Rfl:   •  losartan (COZAAR) 25 mg tablet, Take 1 tablet (25 mg total) by mouth daily, Disp: 30 tablet, Rfl: 5  •  prednisoLONE acetate (PRED FORTE) 1 % ophthalmic suspension, INSTILL 1 DROP INTO LEFT EYE TWICE DAILY, Disp: , Rfl:     Lab, Imaging and other studies: I have personally reviewed pertinent labs.   Laboratory Results:  Results for orders placed or performed in visit on 09/15/23   PTH, Intact and Calcium   Result Value Ref Range    PTH, Intact 67 16 - 77 pg/mL    Calcium 9.2 8.6 - 10.3 mg/dL   Magnesium   Result Value Ref Range    Magnesium, Serum 2.4 1.5 - 2.5 mg/dL   Phosphorus   Result Value Ref Range    Phosphorus, Serum 3.6 2.1 - 4.3 mg/dL   Comprehensive metabolic panel   Result Value Ref Range    Glucose, Random 136 (H) 65 - 99 mg/dL    BUN 42 (H) 7 - 25 mg/dL    Creatinine 2.36 (H) 0.70 - 1.22 mg/dL    eGFR 26 (L) > OR = 60 mL/min/1.73m2    SL AMB BUN/CREATININE RATIO 18 6 - 22 (calc)    Sodium 142 135 - 146 mmol/L    Potassium 3.9 3.5 - 5.3 mmol/L    Chloride 110 98 - 110 mmol/L    CO2 24 20 - 32 mmol/L    Calcium 9.2 8.6 - 10.3 mg/dL    Protein, Total 7.3 6.1 - 8.1 g/dL    Albumin 3.9 3.6 - 5.1 g/dL    Globulin 3.4 1.9 - 3.7 g/dL (calc)    Albumin/Globulin Ratio 1.1 1.0 - 2.5 (calc)    TOTAL BILIRUBIN 0.6 0.2 - 1.2 mg/dL    Alkaline Phosphatase 62 35 - 144 U/L    AST 14 10 - 35 U/L    ALT 10 9 - 46 U/L   CBC and differential   Result Value Ref Range    White Blood Cell Count 7.8 3.8 - 10.8 Thousand/uL    Red Blood Cell Count 3.41 (L) 4.20 - 5.80 Million/uL    Hemoglobin 10.9 (L) 13.2 - 17.1 g/dL    HCT 33.3 (L) 38.5 - 50.0 %    MCV 97.7 80.0 - 100.0 fL    MCH 32.0 27.0 - 33.0 pg    MCHC 32.7 32.0 - 36.0 g/dL    RDW 11.5 11.0 - 15.0 %    Platelet Count 173 790 - 400 Thousand/uL    SL AMB MPV 11.3 7.5 - 12.5 fL    Neutrophils (Absolute) 4,984 1,500 - 7,800 cells/uL    Lymphocytes (Absolute) 1,630 850 - 3,900 cells/uL    Monocytes (Absolute) 741 200 - 950 cells/uL    Eosinophils (Absolute) 398 15 - 500 cells/uL    Basophils ABS 47 0 - 200 cells/uL    Neutrophils 63.9 %    Lymphocytes 20.9 %    Monocytes 9.5 %    Eosinophils 5.1 %    Basophils PCT 0.6 %   Protein, Total w/Creat, Random Urine   Result Value Ref Range    Creatinine, Urine 69 20 - 320 mg/dL    Protein/Creat Ratio 449 (H) 25 - 148 mg/g creat    Protein/Creat Ratio 0.449 (H) 0.025 - 0.148 mg/mg creat    Total Protein, Urine 31 (H) 5 - 25 mg/dL       Results from last 7 days   Lab Units 09/15/23  1253   WHITE BLOOD CELL COUNT. Thousand/uL 7.8   HEMOGLOBIN. g/dL 10.9*   HEMATOCRIT. % 33.3*   PLATELETS. Thousand/uL 168   POTASSIUM mmol/L 3.9   CHLORIDE mmol/L 110   CO2 mmol/L 24   BUN mg/dL 42*   CREATININE mg/dL 2.36*   CALCIUM mg/dL 9.2  9.2   MAGNESIUM mg/dL 2.4         Radiology review:   chest X-ray    Ultrasound      Portions of the record may have been created with voice recognition software. Occasional wrong word or "sound a like" substitutions may have occurred due to the inherent limitations of voice recognition software. Read the chart carefully and recognize, using context, where substitutions have occurred.

## 2023-09-18 NOTE — PROGRESS NOTES
Daily Note     Today's date: 2023  Patient name: Nori Gomez  : 1934  MRN: [id number]  Referring provider: Dimitrios Cavanaugh MD  Dx:   Encounter Diagnosis     ICD-10-CM    1. Physical deconditioning  R53.81           Start Time: 1130  Stop Time: 1210  Total time in clinic (min): 40 minutes    Subjective: Patient reports better strength and walking tolerance prior to therapy. Objective: See treatment diary below    Assessment: Patient did well during therapy today. He required a few seated rest breaks t/o secondary to fatigue. Utilized gait belt t/o entire session with no LOB or therapist assist needed. Progress as able. Plan: Continue POC. Precautions: DM2, CKD4. Dx: Deconditioning.     Manuals 8/7 8/10 8/15 8/17 8/22 8/24 8/31 9/7 9/11 9/18                                                       Neuro Re-Ed             Mod tandem stance   20"x2 ea 20"x2 ea 20"x2 ea                                                                                       Ther Ex             DL heelraises 1x10 1x10 1x15 1x15 1x15 2x10  2x10     Standing GA stretch 15"x1 ea 15"x1 ea 15"x2 ea 15"x2 ea 15"x2 ea 15"x2 ea       Seated toe taps 1x15 ea 1x20 ea 1x25 ea  1x30 1x30  1x20     Seated hip flexion    1x20 ea 1x20 ea 1x30 ea 2#/ 3x10 2#  3x10     LAQ       3#/ 3x10 3#/ 3x10 3#/ 3x10 3#/ 3x10   Standing hip ABD         1x10 ea 2x10   ea                             Ther Activity             STS 1x5 2x5 2x6 2x6 2x6 2x6 2x6 2x8 2x8 2x8   Step ups 6"/ 1x6 6"/ 1x10 6"  1x10 ea 6"/ 1x10  6"/ 1x10 6"/ 1x15 6"/ 1x15 6"/ 1x15 ea 6"/ 1x15 ea 6"/ 1x15 ea   Step downs  6"/ 1x10 6"  1x10   ea 6"/ 1x10 6"/ 1x10 6"/ 1x15       Gait Training             SPC  3x150 ft 210 ft 1x210 ft 1x280 ft 2x150 ft 1x280 ft, 1x210 ft 1x280 ft, 1x210   ft 1x420 ft 1x420 ft                Modalities

## 2023-09-19 RX ORDER — LANCETS
EACH MISCELLANEOUS
COMMUNITY
End: 2023-09-19

## 2023-09-19 RX ORDER — LANCETS 33 GAUGE
EACH MISCELLANEOUS
Qty: 200 EACH | Refills: 3 | Status: SHIPPED | OUTPATIENT
Start: 2023-09-19

## 2023-09-19 RX ORDER — LANCETS 33 GAUGE
EACH MISCELLANEOUS
COMMUNITY
End: 2023-09-19

## 2023-09-19 RX ORDER — BLOOD SUGAR DIAGNOSTIC
STRIP MISCELLANEOUS
Qty: 200 EACH | Refills: 3 | Status: SHIPPED | OUTPATIENT
Start: 2023-09-19

## 2023-09-19 RX ORDER — BLOOD-GLUCOSE METER
KIT MISCELLANEOUS
Qty: 1 KIT | Refills: 0 | Status: SHIPPED | OUTPATIENT
Start: 2023-09-19

## 2023-09-21 ENCOUNTER — OFFICE VISIT (OUTPATIENT)
Dept: PHYSICAL THERAPY | Facility: CLINIC | Age: 88
End: 2023-09-21
Payer: MEDICARE

## 2023-09-21 DIAGNOSIS — R53.81 PHYSICAL DECONDITIONING: Primary | ICD-10-CM

## 2023-09-21 PROCEDURE — 97110 THERAPEUTIC EXERCISES: CPT

## 2023-09-21 PROCEDURE — 97116 GAIT TRAINING THERAPY: CPT

## 2023-09-21 NOTE — PROGRESS NOTES
Daily Note     Today's date: 2023  Patient name: Sharona Key  : 1934  MRN: [id number]  Referring provider: Ishan Bro MD  Dx:   Encounter Diagnosis     ICD-10-CM    1. Physical deconditioning  R53.81                      Subjective: Patient reports his balance is getting a little better. Objective: See treatment diary below    Assessment: Patient demonstrated increased quad strength and was able to increase ambulation distance today. Plan: Continue POC. Precautions: DM2, CKD4. Dx: Deconditioning.     Manuals                                                        Neuro Re-Ed             Mod tandem stance                                                                                            Ther Ex             DL heelraises 2x10    1x15 2x10  2x10     Standing GA stretch     15"x2 ea 15"x2 ea       Seated toe taps     1x30 1x30  1x20     Seated hip flexion     1x20 ea 1x30 ea 2#/ 3x10 2#  3x10     LAQ 4#  3x10      3#/ 3x10 3#/ 3x10 3#/ 3x10 3#/ 3x10   Standing hip ABD 2x10 ea        1x10 ea 2x10   ea                             Ther Activity             STS 2x8    2x6 2x6 2x6 2x8 2x8 2x8   Step ups 6"/ 1x15 ea    6"/ 1x10 6"/ 1x15 6"/ 1x15 6"/ 1x15 ea 6"/ 1x15 ea 6"/ 1x15 ea   Step downs     6"/ 1x10 6"/ 1x15       Gait Training             SPC 1x500 ft    1x280 ft 2x150 ft 1x280 ft, 1x210 ft 1x280 ft, 1x210   ft 1x420 ft 1x420 ft                Modalities

## 2023-09-25 ENCOUNTER — OFFICE VISIT (OUTPATIENT)
Dept: PHYSICAL THERAPY | Facility: CLINIC | Age: 88
End: 2023-09-25
Payer: MEDICARE

## 2023-09-25 DIAGNOSIS — R53.81 PHYSICAL DECONDITIONING: Primary | ICD-10-CM

## 2023-09-25 PROCEDURE — 97110 THERAPEUTIC EXERCISES: CPT

## 2023-09-25 PROCEDURE — 97530 THERAPEUTIC ACTIVITIES: CPT

## 2023-09-25 NOTE — PROGRESS NOTES
Daily Note     Today's date: 2023  Patient name: Sidra Luz  : 1934  MRN: [id number]  Referring provider: Terrance Mcfarland MD  Dx:   Encounter Diagnosis     ICD-10-CM    1. Physical deconditioning  R53.81           Start Time: 1140  Stop Time: 1215  Total time in clinic (min): 35 minutes    Subjective: Patient notes improved strength. Notes his poor L eye vision effects his balance. Objective: See treatment diary below    Assessment: Patient demonstrates good form t/o therapy. He required a few seated rest breaks secondary to fatigue. No LOB during therapy. Able to slightly increase reps for two exercises. Assess response NV. Plan: Continue POC. Precautions: DM2, CKD4. Dx: Deconditioning.     Manuals                                                        Neuro Re-Ed             Mod tandem stance                                                                                            Ther Ex             DL heelraises 2x10 2x12      2x10     Standing GA stretch             Seated toe taps        1x20     Seated hip flexion        2#  3x10     LAQ 4#  3x10 4#  3x10      3#/ 3x10 3#/ 3x10 3#/ 3x10   Standing hip ABD 2x10 ea 2x12 ea       1x10 ea 2x10   ea                             Ther Activity             STS 2x8 2x8      2x8 2x8 2x8   Step ups 6"/ 1x15 ea 6"/ 1x15 ea      6"/ 1x15 ea 6"/ 1x15 ea 6"/ 1x15 ea   Step downs             Gait Training             SPC 1x500 ft 1x420 ft      1x280 ft, 1x210   ft 1x420 ft 1x420 ft                Modalities

## 2023-09-27 ENCOUNTER — TELEPHONE (OUTPATIENT)
Age: 88
End: 2023-09-27

## 2023-09-27 NOTE — TELEPHONE ENCOUNTER
Pharmacy called -the form that was completed for Medicare part B for the pharmacy for his test strips and lancets is being returned to the pharmacy which is incorrect. Needs to go to  the fax number on the form which is [phone].  Asking for this to be resent

## 2023-09-28 ENCOUNTER — APPOINTMENT (OUTPATIENT)
Dept: PHYSICAL THERAPY | Facility: CLINIC | Age: 88
End: 2023-09-28
Payer: MEDICARE

## 2023-10-02 ENCOUNTER — OFFICE VISIT (OUTPATIENT)
Dept: PHYSICAL THERAPY | Facility: CLINIC | Age: 88
End: 2023-10-02
Payer: MEDICARE

## 2023-10-02 DIAGNOSIS — R53.81 PHYSICAL DECONDITIONING: Primary | ICD-10-CM

## 2023-10-02 PROCEDURE — 97110 THERAPEUTIC EXERCISES: CPT

## 2023-10-02 PROCEDURE — 97530 THERAPEUTIC ACTIVITIES: CPT

## 2023-10-02 NOTE — PROGRESS NOTES
Daily Note     Today's date: 10/2/2023  Patient name: Kevin Salguero  : 1934  MRN: [id number]  Referring provider: Chiquis Moe MD  Dx:   Encounter Diagnosis     ICD-10-CM    1. Physical deconditioning  R53.81           Start Time:   Stop Time: 1140  Total time in clinic (min): 35 minutes    Subjective: Patient reports no falls over the weekend. Notes his balance has been pretty good. Objective: See treatment diary below    Assessment: Patient demonstrates good form t/o therapy. He required a few seated rest breaks secondary to fatigue. No LOB during therapy. Able to increase reps as listed below. Assess response NV. Plan: Continue POC. Precautions: DM2, CKD4. Dx: Deconditioning.     Manuals 9/21 9/25 10/2     9/7 9/11 9/18                                                       Neuro Re-Ed             Mod tandem stance                                                                                            Ther Ex             DL heelraises 2x10 2x12 2x12     2x10     Standing GA stretch             Seated toe taps        1x20     Seated hip flexion        2#  3x10     LAQ 4#  3x10 4#  3x10 4#  3x10     3#/ 3x10 3#/ 3x10 3#/ 3x10   Standing hip ABD 2x10 ea 2x12 ea 2x12 ea      1x10 ea 2x10   ea                             Ther Activity             STS 2x8 2x8 2x10     2x8 2x8 2x8   Step ups 6"/ 1x15 ea 6"/ 1x15 ea 6"/ 1x15 ea     6"/ 1x15 ea 6"/ 1x15 ea 6"/ 1x15 ea   Step downs             Gait Training             SPC 1x500 ft 1x420 ft 1x560 ft     1x280 ft, 1x210   ft 1x420 ft 1x420 ft                Modalities

## 2023-10-03 ENCOUNTER — TELEPHONE (OUTPATIENT)
Age: 88
End: 2023-10-03

## 2023-10-03 NOTE — TELEPHONE ENCOUNTER
Patient's spouse called in to report [address] is completed form to be returned prior to filling orders for Blood glucose test strips and lancets; she reports they have the device. RN reached DOC in the office who reports fax is not going through to the number requested. Glenda Gathers to fax to pharmacy fax # and they can forward it to other department if needed. DOC agreed and SHILPA Champion form to [employer] fax [phone]. Returned call to patient to advise form will be faxed to [facility] and to follow up with pharmacy tomorrow. Call back if there are any additional problems or concerns with filling orders.

## 2023-10-05 ENCOUNTER — TELEPHONE (OUTPATIENT)
Age: 88
End: 2023-10-05

## 2023-10-05 ENCOUNTER — APPOINTMENT (OUTPATIENT)
Dept: PHYSICAL THERAPY | Facility: CLINIC | Age: 88
End: 2023-10-05
Payer: MEDICARE

## 2023-10-05 NOTE — TELEPHONE ENCOUNTER
Patients wife called regarding a form that was faxed from [address] [place] [address] regarding patients test strips and lancets. Please fax completed form to [facility] [phone]

## 2023-10-06 ENCOUNTER — TELEPHONE (OUTPATIENT)
Dept: HEMATOLOGY ONCOLOGY | Facility: CLINIC | Age: 88
End: 2023-10-06

## 2023-10-06 NOTE — TELEPHONE ENCOUNTER
I called PT in regards to No Show for Consult with Jairo Brown. I spoke with his wife. She has conflicting information as to wether or not he should be seeing hem/onc based on what his Dr. Saintclair Gu. He is going to get new blood work this coming week and then determine with his Dr. Celeste Johnson) Appt was not reschedule at this time.

## 2023-10-09 ENCOUNTER — OFFICE VISIT (OUTPATIENT)
Dept: PHYSICAL THERAPY | Facility: CLINIC | Age: 88
End: 2023-10-09
Payer: MEDICARE

## 2023-10-09 DIAGNOSIS — R53.81 PHYSICAL DECONDITIONING: Primary | ICD-10-CM

## 2023-10-09 PROCEDURE — 97110 THERAPEUTIC EXERCISES: CPT

## 2023-10-09 PROCEDURE — 97116 GAIT TRAINING THERAPY: CPT

## 2023-10-09 NOTE — PROGRESS NOTES
Daily Note     Today's date: 10/9/2023  Patient name: Paola Mendes  : 1934  MRN: [id number]  Referring provider: Antonieta Stanley MD  Dx:   Encounter Diagnosis     ICD-10-CM    1. Physical deconditioning  R53.81                      Subjective: Patient reports no falls over the weekend. Objective: See treatment diary below    Assessment: Patient demonstrated fatigue with progressing functional strengthening. Pt unable to increase ambulation distance secondary to LE fatigue. Plan: Continue POC. Precautions: DM2, CKD4. Dx: Deconditioning.     Manuals 9/21 9/25 10/2 10/9    9/7 9/11 9/18                                                       Neuro Re-Ed             Mod tandem stance                                                                                            Ther Ex             DL heelraises 2x10 2x12 2x12 3x10    2x10     Standing GA stretch             Seated toe taps        1x20     Seated hip flexion        2#  3x10     LAQ 4#  3x10 4#  3x10 4#  3x10 nv    3#/ 3x10 3#/ 3x10 3#/ 3x10   Standing hip ABD 2x10 ea 2x12 ea 2x12 ea 2x12 ea     1x10 ea 2x10   ea                             Ther Activity             STS 2x8 2x8 2x10 2x10    2x8 2x8 2x8   Step ups 6"/ 1x15 ea 6"/ 1x15 ea 6"/ 1x15 ea 6"/ 1x15 ea  8" 1x5 ea    6"/ 1x15 ea 6"/ 1x15 ea 6"/ 1x15 ea   Step downs             Gait Training             SPC 1x500 ft 1x420 ft 1x560 ft 1x560 ft    1x280 ft, 1x210   ft 1x420 ft 1x420 ft                Modalities

## 2023-10-12 ENCOUNTER — APPOINTMENT (OUTPATIENT)
Dept: PHYSICAL THERAPY | Facility: CLINIC | Age: 88
End: 2023-10-12
Payer: MEDICARE

## 2023-10-16 ENCOUNTER — OFFICE VISIT (OUTPATIENT)
Dept: PHYSICAL THERAPY | Facility: CLINIC | Age: 88
End: 2023-10-16
Payer: MEDICARE

## 2023-10-16 DIAGNOSIS — R53.81 PHYSICAL DECONDITIONING: Primary | ICD-10-CM

## 2023-10-16 PROCEDURE — 97116 GAIT TRAINING THERAPY: CPT

## 2023-10-16 PROCEDURE — 97110 THERAPEUTIC EXERCISES: CPT

## 2023-10-16 NOTE — PROGRESS NOTES
Daily Note     Today's date: 10/16/2023  Patient name: Kasey Castro  : 1934  MRN: [id number]  Referring provider: Vickye Koyanagi, MD  Dx:   Encounter Diagnosis     ICD-10-CM    1. Physical deconditioning  R53.81             Start Time: 1100  Stop Time: 1135  Total time in clinic (min): 35 minutes    Subjective: Patient has no complaints prior to therapy. Objective: See treatment diary below    Assessment: Patient continues to appropriately fatigue to the current exercise program listed below. Patient able to increase gait distance this visit with no complaints of pain. Plan: Continue POC. Precautions: DM2, CKD4. Dx: Deconditioning.     Manuals 9/21 9/25 10/2 10/9 10/16                                                            Neuro Re-Ed             Mod tandem stance                                                                                            Ther Ex             DL heelraises 2x10 2x12 2x12 3x10 3x10        Standing GA stretch             Seated toe taps             Seated hip flexion             LAQ 4#  3x10 4#  3x10 4#  3x10 nv 4#  3x10        Standing hip ABD 2x10 ea 2x12 ea 2x12 ea 2x12 ea 2x12 ea                                  Ther Activity             STS 2x8 2x8 2x10 2x10 2x10        Step ups 6"/ 1x15 ea 6"/ 1x15 ea 6"/ 1x15 ea 6"/ 1x15 ea  8" 1x5 ea 6"/ 1x15 ea  8" 1x5 ea        Step downs             Gait Training             SPC 1x500 ft 1x420 ft 1x560 ft 1x560 ft 1x630 ft                     Modalities

## 2023-10-19 ENCOUNTER — OFFICE VISIT (OUTPATIENT)
Dept: PHYSICAL THERAPY | Facility: CLINIC | Age: 88
End: 2023-10-19
Payer: MEDICARE

## 2023-10-19 DIAGNOSIS — R53.81 PHYSICAL DECONDITIONING: Primary | ICD-10-CM

## 2023-10-19 PROCEDURE — 97116 GAIT TRAINING THERAPY: CPT | Performed by: PHYSICAL THERAPIST

## 2023-10-19 PROCEDURE — 97110 THERAPEUTIC EXERCISES: CPT | Performed by: PHYSICAL THERAPIST

## 2023-10-19 NOTE — PROGRESS NOTES
Daily Note     Today's date: 10/19/2023  Patient name: Edith Reyes  : 1934  MRN: [id number]  Referring provider: Marli Salas MD  Dx:   Encounter Diagnosis     ICD-10-CM    1. Physical deconditioning  R53.81                      Subjective: Pt reports poorer than normal vision today. Pt denies h/a or verbal deficits. Objective: See treatment diary below    Assessment: Patient demonstrated improved stair tolerance. Plan: Continue POC, decrease freq to 1 x / wk per pt request.     Precautions: DM2, CKD4. Dx: Deconditioning.     Manuals 8/7 8/10 8/15 8/17 8/22 8/24 8/31 9/7 9/11 10/19                                                       Neuro Re-Ed             Mod tandem stance   20"x2 ea 20"x2 ea 20"x2 ea      20"x1 ea   FT          20"x1                                                                    Ther Ex             DL heelraises 1x10 1x10 1x15 1x15 1x15 2x10  2x10     Standing GA stretch 15"x1 ea 15"x1 ea 15"x2 ea 15"x2 ea 15"x2 ea 15"x2 ea    15"x1 ea   Seated toe taps 1x15 ea 1x20 ea 1x25 ea  1x30 1x30  1x20     Seated hip flexion    1x20 ea 1x20 ea 1x30 ea 2#/ 3x10 2#  3x10     LAQ       3#/ 3x10 3#/ 3x10 3#/ 3x10 4#/ 3x10   Standing hip ABD         1x10 ea                              Ther Activity             STS 1x5 2x5 2x6 2x6 2x6 2x6 2x6 2x8 2x8 Foam/ 1x10, 1x10   Step ups 6"/ 1x6 6"/ 1x10 6"  1x10 ea 6"/ 1x10  6"/ 1x10 6"/ 1x15 6"/ 1x15 6"/ 1x15 ea 6"/ 1x15 ea 6"/ 1x15 ea   Step downs  6"/ 1x10 6"  1x10   ea 6"/ 1x10 6"/ 1x10 6"/ 1x15       Gait Training             SPC  3x150 ft 210 ft 1x210 ft 1x280 ft 2x150 ft 1x280 ft, 1x210 ft 1x280 ft, 1x210   ft 1x420 ft 1x420 ft                Modalities

## 2023-10-24 ENCOUNTER — OFFICE VISIT (OUTPATIENT)
Dept: FAMILY MEDICINE CLINIC | Facility: CLINIC | Age: 88
End: 2023-10-24
Payer: MEDICARE

## 2023-10-24 VITALS
OXYGEN SATURATION: 98 % | HEART RATE: 67 BPM | SYSTOLIC BLOOD PRESSURE: 100 MMHG | HEIGHT: 65 IN | WEIGHT: 142 LBS | DIASTOLIC BLOOD PRESSURE: 60 MMHG | TEMPERATURE: 98 F | BODY MASS INDEX: 23.66 KG/M2 | RESPIRATION RATE: 18 BRPM

## 2023-10-24 DIAGNOSIS — J45.20 MILD INTERMITTENT ASTHMA WITHOUT COMPLICATION: ICD-10-CM

## 2023-10-24 DIAGNOSIS — I12.9 BENIGN HYPERTENSION WITH CKD (CHRONIC KIDNEY DISEASE) STAGE IV (HCC): ICD-10-CM

## 2023-10-24 DIAGNOSIS — I12.9 PARENCHYMAL RENAL HYPERTENSION, STAGE 1 THROUGH STAGE 4 OR UNSPECIFIED CHRONIC KIDNEY DISEASE: ICD-10-CM

## 2023-10-24 DIAGNOSIS — N18.4 STAGE 4 CHRONIC KIDNEY DISEASE (HCC): ICD-10-CM

## 2023-10-24 DIAGNOSIS — N18.4 ANEMIA IN STAGE 4 CHRONIC KIDNEY DISEASE: ICD-10-CM

## 2023-10-24 DIAGNOSIS — N18.4 CKD STAGE 4 DUE TO TYPE 2 DIABETES MELLITUS (HCC): ICD-10-CM

## 2023-10-24 DIAGNOSIS — Z00.00 MEDICARE ANNUAL WELLNESS VISIT, SUBSEQUENT: Primary | ICD-10-CM

## 2023-10-24 DIAGNOSIS — D63.1 ANEMIA IN STAGE 4 CHRONIC KIDNEY DISEASE: ICD-10-CM

## 2023-10-24 DIAGNOSIS — Z23 ENCOUNTER FOR IMMUNIZATION: ICD-10-CM

## 2023-10-24 DIAGNOSIS — Z71.0 DISCUSSION ABOUT ADVANCE CARE PLANNING HELD WITH FAMILY MEMBER: ICD-10-CM

## 2023-10-24 DIAGNOSIS — E78.5 DYSLIPIDEMIA: ICD-10-CM

## 2023-10-24 DIAGNOSIS — N18.4 TYPE 2 DIABETES MELLITUS WITH STAGE 4 CHRONIC KIDNEY DISEASE, WITHOUT LONG-TERM CURRENT USE OF INSULIN (HCC): ICD-10-CM

## 2023-10-24 DIAGNOSIS — E11.22 TYPE 2 DIABETES MELLITUS WITH STAGE 4 CHRONIC KIDNEY DISEASE, WITHOUT LONG-TERM CURRENT USE OF INSULIN (HCC): ICD-10-CM

## 2023-10-24 DIAGNOSIS — R80.1 PERSISTENT PROTEINURIA: ICD-10-CM

## 2023-10-24 DIAGNOSIS — E55.9 MILD VITAMIN D DEFICIENCY: ICD-10-CM

## 2023-10-24 DIAGNOSIS — Z71.89 ADVANCED CARE PLANNING/COUNSELING DISCUSSION: ICD-10-CM

## 2023-10-24 DIAGNOSIS — N18.4 BENIGN HYPERTENSION WITH CKD (CHRONIC KIDNEY DISEASE) STAGE IV (HCC): ICD-10-CM

## 2023-10-24 DIAGNOSIS — Z12.5 PROSTATE CANCER SCREENING ENCOUNTER, OPTIONS AND RISKS DISCUSSED: ICD-10-CM

## 2023-10-24 DIAGNOSIS — E11.22 CKD STAGE 4 DUE TO TYPE 2 DIABETES MELLITUS (HCC): ICD-10-CM

## 2023-10-24 LAB — SL AMB POCT HEMOGLOBIN AIC: 7 (ref ?–6.5)

## 2023-10-24 PROCEDURE — 99214 OFFICE O/P EST MOD 30 MIN: CPT | Performed by: FAMILY MEDICINE

## 2023-10-24 PROCEDURE — 99497 ADVNCD CARE PLAN 30 MIN: CPT | Performed by: FAMILY MEDICINE

## 2023-10-24 PROCEDURE — G0008 ADMIN INFLUENZA VIRUS VAC: HCPCS | Performed by: FAMILY MEDICINE

## 2023-10-24 PROCEDURE — 90662 IIV NO PRSV INCREASED AG IM: CPT | Performed by: FAMILY MEDICINE

## 2023-10-24 PROCEDURE — 83036 HEMOGLOBIN GLYCOSYLATED A1C: CPT | Performed by: FAMILY MEDICINE

## 2023-10-24 PROCEDURE — G0439 PPPS, SUBSEQ VISIT: HCPCS | Performed by: FAMILY MEDICINE

## 2023-10-24 RX ORDER — LANCETS 30 GAUGE
EACH MISCELLANEOUS
Qty: 1 KIT | Refills: 0 | Status: SHIPPED | OUTPATIENT
Start: 2023-10-24

## 2023-10-24 RX ORDER — ATORVASTATIN CALCIUM 10 MG/1
10 TABLET, FILM COATED ORAL DAILY
Qty: 90 TABLET | Refills: 3 | Status: SHIPPED | OUTPATIENT
Start: 2023-10-24

## 2023-10-24 RX ORDER — LOSARTAN POTASSIUM 25 MG/1
25 TABLET ORAL DAILY
Qty: 30 TABLET | Refills: 5 | Status: SHIPPED | OUTPATIENT
Start: 2023-10-24

## 2023-10-24 RX ORDER — LANCETS 33 GAUGE
EACH MISCELLANEOUS
Qty: 100 EACH | Refills: 3 | Status: SHIPPED | OUTPATIENT
Start: 2023-10-24 | End: 2023-10-26

## 2023-10-24 RX ORDER — FLUTICASONE PROPIONATE AND SALMETEROL 250; 50 UG/1; UG/1
1 POWDER RESPIRATORY (INHALATION) 2 TIMES DAILY
Qty: 60 BLISTER | Refills: 2 | Status: SHIPPED | OUTPATIENT
Start: 2023-10-24

## 2023-10-24 RX ORDER — AMLODIPINE BESYLATE 5 MG/1
10 TABLET ORAL DAILY
Qty: 60 TABLET | Refills: 3 | Status: SHIPPED | OUTPATIENT
Start: 2023-10-24

## 2023-10-24 RX ORDER — BLOOD-GLUCOSE METER
EACH MISCELLANEOUS
Qty: 100 EACH | Refills: 3 | Status: SHIPPED | OUTPATIENT
Start: 2023-10-24 | End: 2023-10-26 | Stop reason: SDUPTHER

## 2023-10-24 NOTE — PROGRESS NOTES
Assessment and Plan:     Problem List Items Addressed This Visit          Endocrine    Type 2 diabetes mellitus with stage 4 chronic kidney disease, without long-term current use of insulin (HCC)    Relevant Medications    dapagliflozin (Farxiga) 10 MG tablet    amLODIPine (NORVASC) 5 mg tablet    Blood Glucose Monitoring Suppl (OneTouch Verio Reflect) w/Device KIT    glucose blood (OneTouch Verio) test strip    OneTouch Delica Lancets 00X MISC    Other Relevant Orders    POCT hemoglobin A1c (Completed)       Respiratory    Asthma, mild intermittent    Relevant Medications    Fluticasone-Salmeterol (Advair Diskus) 250-50 mcg/dose inhaler       Cardiovascular and Mediastinum    Parenchymal renal hypertension    Relevant Medications    atorvastatin (LIPITOR) 10 mg tablet    losartan (COZAAR) 25 mg tablet    amLODIPine (NORVASC) 5 mg tablet    Benign 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 (HCC)    Relevant Medications    atorvastatin (LIPITOR) 10 mg tablet    losartan (COZAAR) 25 mg tablet    amLODIPine (NORVASC) 5 mg tablet       Genitourinary    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     Relevant Medications    atorvastatin (LIPITOR) 10 mg tablet    losartan (COZAAR) 25 mg tablet    amLODIPine (NORVASC) 5 mg tablet    Stage 4 chronic kidney disease (HCC)    Relevant Medications    atorvastatin (LIPITOR) 10 mg tablet    losartan (COZAAR) 25 mg tablet    amLODIPine (NORVASC) 5 mg tablet       Other    Dyslipidemia    Relevant Medications    atorvastatin (LIPITOR) 10 mg tablet    losartan (COZAAR) 25 mg tablet    amLODIPine (NORVASC) 5 mg tablet    Mild vitamin D deficiency    Relevant Medications    atorvastatin (LIPITOR) 10 mg tablet    losartan (COZAAR) 25 mg tablet    Persistent proteinuria    Relevant Medications    losartan (COZAAR) 25 mg tablet    Advanced care planning/counseling discussion     Other Visit Diagnoses       Medicare annual wellness visit, subsequent    -  Primary    Encounter for immunization        Relevant Orders    influenza vaccine, high-dose, PF 0.7 mL (FLUZONE HIGH-DOSE) (Completed)    CKD stage 4 due to type 2 diabetes mellitus ([address])        Relevant Medications    dapagliflozin (Farxiga) 10 MG tablet    Prostate cancer screening encounter, options and risks discussed        Discussion about advance care planning held with family member              Discussed to continue                       Serious Illness Conversation    1. What is your understanding now of where you are with your illness? Prognostic Understanding: overestimates prognosis     2. How much information about what is likely to be ahead with your illness would you like to have? Information: patient does not want any information for him/herself  Wife manages everything for him     3. What did you (clinician) communicate to the patient? Prognostic Communication: Uncertain - It can be difficult to predict what will happen with your illness. I hope you will continue to live well for a long time but I’m worried that you could get sick quickly, and I think it is important to prepare for that possibility. 4. If your health situation worsens, what are your most important goals? Goals: be spiritually and emotionally at peace, be physically comfortable, be at home,live as long as possible     5. What are the biggest fears and worries about the future and your health? Fears/Worries: memory and kidney     6. What abilities are so critical to your life that you cannot imagine living without them?  nothing     7. What gives you strength as you think about the future with your illness? GOD, wife     8. If you become sicker, how much are you willing to go through for the possibility of gaining more time? Be in the hospital: Yes Have a feeding tube: Yes   Be in the ICU: Yes Live in a nursing home: No   Be on a ventilator: Yes Be uncomfortable: Yes   Be on dialysis: Yes Undergo aggressive test and/or procedures:  Yes Ventilator for 2 weeks  9. How much does your proxy and family know about your priorities and wishes? Discussion Discussion: wants clinician to talk with family  Wife manages everything     Clydeon Jaciel heard you say that being able to live as long as possible is really important to you. Keeping that in mind, and what we know about your illness, I recommend that we continue current care. This will help us make sure that your treatment plans reflect what’s important to you. How does this plan sound to you? I will do everything I can to help you through this. Patient verbalized understanding of the plan     I have spent 22 minutes speaking with my patient and wife Manjuladakota Andres on advanced care planning today or during this visit     Advanced directives  Five Wishes: Patient does not have Five Wishes- would not like information       A1c 7%, discussed to start Farxiga 10 mg daily as this will has help CKD4, also recommend avoiding NSAIDs, continue other medications  Labs on file reviewed  Flu vaccine today  Diabetic diet recommended  Daily feet check  Annual diabetic eye exams recommended        Depression Screening and Follow-up Plan: Patient was screened for depression during today's encounter. They screened negative with a PHQ-2 score of 0. Preventive health issues were discussed with patient, and age appropriate screening tests were ordered as noted in patient's After Visit Summary. Personalized health advice and appropriate referrals for health education or preventive services given if needed, as noted in patient's After Visit Summary.      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 HENT:  Negative for congestion, rhinorrhea and sore throat. Eyes:  Negative for discharge, redness and itching. Respiratory:  Negative for chest tightness, shortness of breath and wheezing. Cardiovascular:  Negative for chest pain and palpitations. Gastrointestinal:  Negative for abdominal pain, constipation and diarrhea. Genitourinary:  Negative for dysuria. Skin:  Negative for pallor and rash. Neurological:  Negative for dizziness, weakness, numbness and headaches.         Memory problems        Problem List:     Patient Active Problem List   Diagnosis    Asthma, mild intermittent    Essential hypertension    Glaucoma    Dyslipidemia    Mild vitamin D deficiency    Organic impotence    Primary osteoarthritis of both knees    Iliotibial band syndrome of both sides    Weight loss    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     Retinal vein occlusion of left eye    Malignant neoplasm of urinary bladder (HCC)    Vision loss of left eye    Neurogenic claudication    Low back pain    Spinal stenosis of lumbar region    DDD (degenerative disc disease), lumbar    Lumbar disc disease with radiculopathy    Parenchymal renal hypertension    Benign 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 (HCC)    Persistent proteinuria    Stage 4 chronic kidney disease ([address])    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)      Past Medical and Surgical History:     Past Medical History:   Diagnosis Date    Cataracts, bilateral      Past Surgical History:   Procedure Laterality Date    CATARACT EXTRACTION Bilateral 07/15/2012    COLONOSCOPY      CYSTOSCOPY  01/15/2018    Last assessed 17, diagnostic    HERNIA REPAIR      INSTILLATION MYTOMYCIN N/A 10/25/2017    Procedure: INSTILLATION OF MITOMYCIN C;  Surgeon: Levi Krueger MD;  Location: [facility];  Service: Urology    OR CYSTO W/REMOVAL OF LESIONS SMALL N/A 10/25/2017    Procedure: Manuelita Fought; BLADDER BIOPSY WITH Tonya Garrison;  Surgeon: Levi Krueger MD;  Location: [facility];  Service: Urology    TONSILLECTOMY      TRANSURETHRAL RESECTION OF BLADDER TUMOR N/A 10/25/2017    Procedure: TUR BLADDER TUMOR;  Surgeon: Levi Krueger MD;  Location: [facility];  Service: Urology    WISDOM TOOTH EXTRACTION        Family History:     Family History   Problem Relation Age of Onset    Diabetes Mother       Social History:     Social History     Socioeconomic History    Marital status: /Civil Union     Spouse name: None    Number of children: None    Years of education: None    Highest education level: None   Occupational History    Occupation: RETIRED   Tobacco Use    Smoking status: Former     Types: Cigarettes     Quit date: 1964     Years since quittin.8    Smokeless tobacco: Never   Vaping Use    Vaping Use: Never used   Substance and Sexual Activity    Alcohol use: No     Alcohol/week: 0.0 standard drinks of alcohol     Comment: quit 23 years ago    Drug use: No    Sexual activity: None   Other Topics Concern    None   Social History Narrative    None     Social Determinants of Health     Financial Resource Strain: Low Risk  (10/24/2023)    Overall Financial Resource Strain (CARDIA)     Difficulty of Paying Living Expenses: Not hard at all   Food Insecurity: Not on file   Transportation Needs: No Transportation Needs (10/24/2023)    PRAPARE - Transportation     Lack of Transportation (Medical): No     Lack of Transportation (Non-Medical): No   Physical Activity: Not on file   Stress: Not on file   Social Connections: Not on file   Intimate Partner Violence: Not on file   Housing Stability: Not on file      Medications and Allergies:     Current Outpatient Medications   Medication Sig Dispense Refill    amLODIPine (NORVASC) 5 mg tablet Take 2 tablets (10 mg total) by mouth daily 60 tablet 3    aspirin 325 mg tablet Take 325 mg by mouth daily      atorvastatin (LIPITOR) 10 mg tablet Take 1 tablet (10 mg total) by mouth daily 90 tablet 3    Blood Glucose Monitoring Suppl (OneTouch Verio Reflect) w/Device KIT Check blood sugars twice daily. Please substitute with appropriate alternative as covered by patient's insurance. Dx: E11.65 1 kit 0    Blood Glucose Monitoring Suppl (OneTouch Verio Reflect) w/Device KIT Check blood sugars once daily. Please substitute with appropriate alternative as covered by patient's insurance. Dx: E11.65 1 kit 0    brimonidine tartrate 0.2 % ophthalmic solution INSTILL 1 DROP INTO EACH EYE THREE TIMES DAILY      Cyanocobalamin (Vitamin B-12) 1000 MCG/15ML LIQD Take by mouth daily      dapagliflozin (Farxiga) 10 MG tablet Take 1 tablet (10 mg total) by mouth daily 90 tablet 1    Dorzolamide HCl-Timolol Mal PF 2-0.5 % SOLN INSTILL 1 DROP INTO EACH EYE TWICE DAILY      Fluticasone-Salmeterol (Advair Diskus) 250-50 mcg/dose inhaler Inhale 1 puff 2 (two) times a day Rinse mouth after use. 60 blister 2    glucose blood (OneTouch Verio) test strip Check blood sugars twice daily. Please substitute with appropriate alternative as covered by patient's insurance. Dx: E11.65 200 each 3    glucose blood (OneTouch Verio) test strip Check blood sugars once daily. Please substitute with appropriate alternative as covered by patient's insurance.  Dx: E11.65 100 each 3    losartan (COZAAR) 25 mg tablet Take 1 tablet (25 mg total) by mouth daily 30 tablet 5    OneTouch Delica Lancets 43S MISC Check blood sugars twice daily. Please substitute with appropriate alternative as covered by patient's insurance. Dx: E11.65 200 each 3    OneTouch Delica Lancets 04S MISC Check blood sugars once daily. Please substitute with appropriate alternative as covered by patient's insurance. Dx: E11.65 100 each 3    prednisoLONE acetate (PRED FORTE) 1 % ophthalmic suspension INSTILL 1 DROP INTO LEFT EYE TWICE DAILY       No current facility-administered medications for this visit. Allergies   Allergen Reactions    Ace Inhibitors Cough     Other reaction(s): hyperkalemia    Penicillins GI Intolerance      Immunizations:     Immunization History   Administered Date(s) Administered    COVID-19 PFIZER VACCINE 0.3 ML IM 02/13/2021, 03/29/2021, 12/11/2021    INFLUENZA 10/27/2018, 09/29/2022    Influenza Split High Dose Preservative Free IM 10/15/2012, 09/26/2013, 11/06/2014, 10/20/2015, 11/07/2016, 11/14/2017    Influenza, high dose seasonal 0.7 mL 09/17/2019, 09/22/2020, 09/28/2021, 09/29/2022, 10/24/2023    Pneumococcal Conjugate 13-Valent 11/07/2016    Pneumococcal Polysaccharide PPV23 11/28/2007, 11/14/2017      Health Maintenance: There are no preventive care reminders to display for this patient. Topic Date Due    COVID-19 Vaccine (4 - Pfizer series) 02/05/2022      Medicare Screening Tests and Risk Assessments:     Sofie Wilkinson is here for his Subsequent Wellness visit. Last Medicare Wellness visit information reviewed, patient interviewed, no change since last AWV. Health Risk Assessment:   Patient rates overall health as good. Patient feels that their physical health rating is same. Patient is satisfied with their life. Eyesight was rated as slightly worse. Hearing was rated as slightly worse. Patient feels that their emotional and mental health rating is same. Patients states they are never, rarely angry.  Patient states they are never, rarely unusually tired/fatigued. Pain experienced in the last 7 days has been none. Patient states that he has experienced no weight loss or gain in last 6 months. Depression Screening:   PHQ-2 Score: 0      Fall Risk Screening: In the past year, patient has experienced: no history of falling in past year      Home Safety:  Patient does not have trouble with stairs inside or outside of their home. Patient has working smoke alarms and has working carbon monoxide detector. Home safety hazards include: none. Nutrition:   Current diet is Diabetic and Regular. Medications:   Patient is currently taking over-the-counter supplements. OTC medications include: see medication list. Patient is able to manage medications. Activities of Daily Living (ADLs)/Instrumental Activities of Daily Living (IADLs):   Walk and transfer into and out of bed and chair?: Yes  Dress and groom yourself?: Yes    Bathe or shower yourself?: Yes    Feed yourself?  Yes  Do your laundry/housekeeping?: Yes  Manage your money, pay your bills and track your expenses?: Yes  Make your own meals?: Yes    Do your own shopping?: Yes    Previous Hospitalizations:   Any hospitalizations or ED visits within the last 12 months?: No      Advance Care Planning:   Living will: No    Durable POA for healthcare: No    Advanced directive: No    Advanced directive counseling given: Yes    ACP document given: Yes    Patient declined ACP directive: No    End of Life Decisions reviewed with patient: Yes    Provider agrees with end of life decisions: Yes      PREVENTIVE SCREENINGS      Cardiovascular Screening:    General: Screening Current      Diabetes Screening:     General: Screening Not Indicated and History Diabetes      Colorectal Cancer Screening:     General: Screening Not Indicated      Prostate Cancer Screening:    General: Screening Not Indicated      Abdominal Aortic Aneurysm (AAA) Screening:    Risk factors include: tobacco use        Lung Cancer Screening:     General: Screening Not Indicated    Screening, Brief Intervention, and Referral to Treatment (SBIRT)    Screening  Typical number of drinks in a day: 0  Typical number of drinks in a week: 0  Interpretation: Low risk drinking behavior. AUDIT-C Screenin) How often did you have a drink containing alcohol in the past year? never  2) How many drinks did you have on a typical day when you were drinking in the past year? 0  3) How often did you have 6 or more drinks on one occasion in the past year? never    AUDIT-C Score: 0  Interpretation: Score 0-3 (male): Negative screen for alcohol misuse    Single Item Drug Screening:  How often have you used an illegal drug (including marijuana) or a prescription medication for non-medical reasons in the past year? never    Single Item Drug Screen Score: 0  Interpretation: Negative screen for possible drug use disorder         Physical Exam:     /60 (BP Location: Right arm, Patient Position: Sitting, Cuff Size: Standard)   Pulse 67   Temp 98 °F (36.7 °C) (Tympanic)   Resp 18   Ht 5' 5" (1.651 m)   Wt 64.4 kg (142 lb)   SpO2 98%   BMI 23.63 kg/m²     Physical Exam  Vitals and nursing note reviewed. Constitutional:       General: He is not in acute distress. Appearance: Normal appearance. He is well-developed and normal weight. He is not ill-appearing or toxic-appearing. HENT:      Head: Normocephalic and atraumatic. Right Ear: Tympanic membrane, ear canal and external ear normal.      Left Ear: Tympanic membrane, ear canal and external ear normal.      Nose: No mucosal edema or rhinorrhea. Mouth/Throat:      Mouth: Mucous membranes are not pale, dry and not cyanotic. Pharynx: Oropharynx is clear. No oropharyngeal exudate or posterior oropharyngeal erythema. Eyes:      General: No scleral icterus. Right eye: No discharge. Left eye: No discharge. Extraocular Movements: Extraocular movements intact. Conjunctiva/sclera: Conjunctivae normal.      Pupils: Pupils are equal, round, and reactive to light. Cardiovascular:      Rate and Rhythm: Normal rate and regular rhythm. Heart sounds: Normal heart sounds. No murmur heard. No gallop. Pulmonary:      Effort: Pulmonary effort is normal. No respiratory distress. Breath sounds: Normal breath sounds. No wheezing or rales. Abdominal:      General: Abdomen is flat. Palpations: Abdomen is soft. Tenderness: There is no abdominal tenderness. Musculoskeletal:         General: No swelling, tenderness, deformity or signs of injury. Cervical back: Normal range of motion. Right lower leg: No edema. Left lower leg: No edema. Skin:     General: Skin is warm. Coloration: Skin is not jaundiced or pale. Findings: No rash. Neurological:      General: No focal deficit present. Mental Status: He is alert and oriented to person, place, and time. Psychiatric:         Mood and Affect: Mood normal.         Behavior: Behavior normal.         Thought Content: Thought content normal.         Cognition and Memory: Cognition is impaired. Memory is impaired. He exhibits impaired recent memory and impaired remote memory.          Judgment: Judgment normal.          Arturo Crowell MD

## 2023-10-25 NOTE — PATIENT INSTRUCTIONS

## 2023-10-26 ENCOUNTER — OFFICE VISIT (OUTPATIENT)
Dept: PHYSICAL THERAPY | Facility: CLINIC | Age: 88
End: 2023-10-26
Payer: MEDICARE

## 2023-10-26 DIAGNOSIS — N18.4 TYPE 2 DIABETES MELLITUS WITH STAGE 4 CHRONIC KIDNEY DISEASE, WITHOUT LONG-TERM CURRENT USE OF INSULIN (HCC): ICD-10-CM

## 2023-10-26 DIAGNOSIS — R53.81 PHYSICAL DECONDITIONING: Primary | ICD-10-CM

## 2023-10-26 DIAGNOSIS — E11.22 TYPE 2 DIABETES MELLITUS WITH STAGE 4 CHRONIC KIDNEY DISEASE, WITHOUT LONG-TERM CURRENT USE OF INSULIN (HCC): ICD-10-CM

## 2023-10-26 PROCEDURE — 97112 NEUROMUSCULAR REEDUCATION: CPT

## 2023-10-26 PROCEDURE — 97110 THERAPEUTIC EXERCISES: CPT

## 2023-10-26 RX ORDER — BLOOD-GLUCOSE METER
EACH MISCELLANEOUS
Qty: 100 EACH | Refills: 3 | Status: SHIPPED | OUTPATIENT
Start: 2023-10-26

## 2023-10-26 RX ORDER — LANCETS 33 GAUGE
EACH MISCELLANEOUS
Qty: 100 EACH | Refills: 3 | Status: SHIPPED | OUTPATIENT
Start: 2023-10-26

## 2023-10-26 NOTE — PROGRESS NOTES
Daily Note     Today's date: 10/26/2023  Patient name: Adam Zhou  : 1934  MRN: [id number]  Referring provider: Gloria Castro MD  Dx:   Encounter Diagnosis     ICD-10-CM    1. Physical deconditioning  R53.81                      Subjective: Pt reports no difficulties with balance or daily activities at home. Objective: See treatment diary below    Assessment: Patient demonstrated decreased endurance with ambulation secondary to fatigue. He was able to progress balance and quad strengthening as per flow sheet. Plan: Continue POC, decrease freq to 1 x / wk per pt request.     Precautions: DM2, CKD4. Dx: Deconditioning.     Manuals 10/26    8/22 8/24 8/31 9/7 9/11 10/19                                                       Neuro Re-Ed             Mod tandem stance  30"x1 ea         20"x1 ea   FT 30"x1          20"x1                                                                    Ther Ex             DL heelraises     1x15 2x10  2x10     Standing GA stretch 15"x1    15"x2 ea 15"x2 ea    15"x1 ea   Seated toe taps     1x30 1x30  1x20     Seated hip flexion     1x20 ea 1x30 ea 2#/ 3x10 2#  3x10     LAQ 5#  3x10      3#/ 3x10 3#/ 3x10 3#/ 3x10 4#/ 3x10   Standing hip ABD         1x10 ea                              Ther Activity             STS Foam   2x10    2x6 2x6 2x6 2x8 2x8 Foam/ 1x10, 1x10   Step ups 6"/ 1x15 ea    6"/ 1x10 6"/ 1x15 6"/ 1x15 6"/ 1x15 ea 6"/ 1x15 ea 6"/ 1x15 ea   Step downs     6"/ 1x10 6"/ 1x15       Gait Training             SPC 1x300 ft    1x280 ft 2x150 ft 1x280 ft, 1x210 ft 1x280 ft, 1x210   ft 1x420 ft 1x420 ft                Modalities

## 2023-10-30 ENCOUNTER — TELEPHONE (OUTPATIENT)
Age: 88
End: 2023-10-30

## 2023-10-30 NOTE — TELEPHONE ENCOUNTER
Ofelia from [facility] DR YAMIL CARRILLO called to verify patient is to take Losartan and Amlodipine. Patient wife told Ofelia she was unaware of he is to take both. After review of 10/24/2023 office visit patient is taking both.  Ofelia will notify and fill medication

## 2023-10-31 ENCOUNTER — APPOINTMENT (OUTPATIENT)
Dept: PHYSICAL THERAPY | Facility: CLINIC | Age: 88
End: 2023-10-31
Payer: MEDICARE

## 2023-11-09 ENCOUNTER — APPOINTMENT (OUTPATIENT)
Dept: PHYSICAL THERAPY | Facility: CLINIC | Age: 88
End: 2023-11-09
Payer: MEDICARE

## 2023-11-13 ENCOUNTER — OFFICE VISIT (OUTPATIENT)
Dept: PHYSICAL THERAPY | Facility: CLINIC | Age: 88
End: 2023-11-13
Payer: MEDICARE

## 2023-11-13 DIAGNOSIS — R53.81 PHYSICAL DECONDITIONING: Primary | ICD-10-CM

## 2023-11-13 PROCEDURE — 97112 NEUROMUSCULAR REEDUCATION: CPT

## 2023-11-13 PROCEDURE — 97110 THERAPEUTIC EXERCISES: CPT

## 2023-11-13 PROCEDURE — 97530 THERAPEUTIC ACTIVITIES: CPT

## 2023-11-13 NOTE — PROGRESS NOTES
Daily Note     Today's date: 2023  Patient name: Sharona Key  : 1934  MRN: [id number]  Referring provider: Ishan Bro MD  Dx:   Encounter Diagnosis     ICD-10-CM    1. Physical deconditioning  R53.81                      Subjective: Pt reports no falls. Objective: See treatment diary below    Assessment: Patient demonstrated limited endurance with gait training and poor tandem balance. Plan: Continue POC as per PT. Precautions: DM2, CKD4. Dx: Deconditioning.     Manuals 10/26 11/13   8/22 8/24 8/31 9/7 9/11 10/19                                                       Neuro Re-Ed             Mod tandem stance  30"x1 ea 30"x1 ea        20"x1 ea   FT 30"x1  40"x1        20"x1                                                                    Ther Ex             DL heelraises     1x15 2x10  2x10     Standing GA stretch 15"x1 15"x1   15"x2 ea 15"x2 ea    15"x1 ea   Seated toe taps     1x30 1x30  1x20     Seated hip flexion     1x20 ea 1x30 ea 2#/ 3x10 2#  3x10     LAQ 5#  3x10 5# 3x10     3#/ 3x10 3#/ 3x10 3#/ 3x10 4#/ 3x10   Standing hip ABD  1x10 ea       1x10 ea                              Ther Activity             STS Foam   2x10 Foam  2x10   2x6 2x6 2x6 2x8 2x8 Foam/ 1x10, 1x10   Step ups 6"/ 1x15 ea 6"/ 1x15 ea   6"/ 1x10 6"/ 1x15 6"/ 1x15 6"/ 1x15 ea 6"/ 1x15 ea 6"/ 1x15 ea   Step downs     6"/ 1x10 6"/ 1x15       Gait Training             SPC 1x300 ft 1x300 ft   1x280 ft 2x150 ft 1x280 ft, 1x210 ft 1x280 ft, 1x210   ft 1x420 ft 1x420 ft                Modalities

## 2023-11-20 ENCOUNTER — OFFICE VISIT (OUTPATIENT)
Dept: PHYSICAL THERAPY | Facility: CLINIC | Age: 88
End: 2023-11-20
Payer: MEDICARE

## 2023-11-20 DIAGNOSIS — R53.81 PHYSICAL DECONDITIONING: Primary | ICD-10-CM

## 2023-11-20 PROCEDURE — 97110 THERAPEUTIC EXERCISES: CPT

## 2023-11-20 PROCEDURE — 97530 THERAPEUTIC ACTIVITIES: CPT

## 2023-11-20 PROCEDURE — 97112 NEUROMUSCULAR REEDUCATION: CPT

## 2023-11-20 NOTE — PROGRESS NOTES
Daily Note     Today's date: 2023  Patient name: Paola Mendes  : 1934  MRN: [id number]  Referring provider: Antonieta Stanley MD  Dx:   Encounter Diagnosis     ICD-10-CM    1. Physical deconditioning  R53.81           Start Time: 1520  Stop Time: 1558  Total time in clinic (min): 38 minutes    Subjective: Patient reports no new complaints or major changes since last session. Objective: See treatment diary below      Assessment: Tolerated treatment well. Continued to focus on LE strengthening and stabilization exercises to promote improvements with balance, increase endurance, and improve patient's overall functional ability. Progressed balance exercises by increasing reps which was difficult, but tolerable enough to complete. Patient displayed episodes of instability and bouts of LOB throughout tandem stance and STS exercises and required both verbal and tactile cues in order to maintain center of balance throughout duration. R quad tightness>L quad tightness noted after completion of LAQs today. Muscle fatigue present at the conclusion of session. Patient demonstrated fatigue post treatment and would benefit from continued PT      Plan: Continue per plan of care. Precautions: DM2, CKD4. Dx: Deconditioning.     Manuals 10/26 11/13 11/20  8/22 8/24 8/31 9/7 9/11 10/19                                                       Neuro Re-Ed             Mod tandem stance  30"x1 ea 30"x1 ea 30"x1 ea       20"x1 ea   FT 30"x1  40"x1 40"x2       20"x1                                                                    Ther Ex             DL heelraises     1x15 2x10  2x10     Standing GA stretch 15"x1 15"x1 15"x2 ea  15"x2 ea 15"x2 ea    15"x1 ea   Seated toe taps     1x30 1x30  1x20     Seated hip flexion     1x20 ea 1x30 ea 2#/ 3x10 2#  3x10     LAQ 5#  3x10 5# 3x10 5# 3x10 ea    3#/ 3x10 3#/ 3x10 3#/ 3x10 4#/ 3x10   Standing hip ABD  1x10 ea 1x15 ea      1x10 ea                              Ther Activity             STS Foam   2x10 Foam  2x10 Foam 2x10  2x6 2x6 2x6 2x8 2x8 Foam/ 1x10, 1x10   Step ups 6"/ 1x15 ea 6"/ 1x15 ea 6"/ 1x15 ea  6"/ 1x10 6"/ 1x15 6"/ 1x15 6"/ 1x15 ea 6"/ 1x15 ea 6"/ 1x15 ea   Step downs     6"/ 1x10 6"/ 1x15       Gait Training             SPC 1x300 ft 1x300 ft 1x300 ft  1x280 ft 2x150 ft 1x280 ft, 1x210 ft 1x280 ft, 1x210   ft 1x420 ft 1x420 ft                [facility]                                          [phone]

## 2023-11-27 ENCOUNTER — APPOINTMENT (OUTPATIENT)
Dept: PHYSICAL THERAPY | Facility: CLINIC | Age: 88
End: 2023-11-27
Payer: MEDICARE

## 2023-12-07 ENCOUNTER — OFFICE VISIT (OUTPATIENT)
Dept: PHYSICAL THERAPY | Facility: CLINIC | Age: 88
End: 2023-12-07
Payer: MEDICARE

## 2023-12-07 DIAGNOSIS — R53.81 PHYSICAL DECONDITIONING: Primary | ICD-10-CM

## 2023-12-07 PROCEDURE — 97110 THERAPEUTIC EXERCISES: CPT

## 2023-12-07 PROCEDURE — 97112 NEUROMUSCULAR REEDUCATION: CPT

## 2023-12-07 NOTE — PROGRESS NOTES
Daily Note     Today's date: 2023  Patient name: Sveta Loyola  : 1934  MRN: [id number]  Referring provider: Camryn West MD  Dx:   Encounter Diagnosis     ICD-10-CM    1. Physical deconditioning  R53.81                      Subjective: Patient reports no falls. Objective: See treatment diary below      Assessment: Pt demonstrated difficulty with tandem stance, limited ambulation endurance. Pt was able to progress stair tolerance today. Plan: Continue per plan of care. Precautions: DM2, CKD4. Dx: Deconditioning.     Manuals 10/26 11/13 11/20 12/7   8/31 9/7 9/11 10/19                                                       Neuro Re-Ed             Mod tandem stance  30"x1 ea 30"x1 ea 30"x1 ea 30"x1 ea      20"x1 ea   FT 30"x1  40"x1 40"x2 40"x1      20"x1                                                                    Ther Ex             DL heelraises        2x10     Standing GA stretch 15"x1 15"x1 15"x2 ea 15"x1 ea      15"x1 ea   Seated toe taps        1x20     Seated hip flexion       2#/ 3x10 2#  3x10     LAQ 5#  3x10 5# 3x10 5# 3x10 ea 5# 3x10 ea   3#/ 3x10 3#/ 3x10 3#/ 3x10 4#/ 3x10   Standing hip ABD  1x10 ea 1x15 ea 1x10 ea     1x10 ea                              Ther Activity             STS Foam   2x10 Foam  2x10 Foam 2x10 Foam  2x10   2x6 2x8 2x8 Foam/ 1x10, 1x10   Step ups 6"/ 1x15 ea 6"/ 1x15 ea 6"/ 1x15 ea 6"/ 1x20 ea   6"/ 1x15 6"/ 1x15 ea 6"/ 1x15 ea 6"/ 1x15 ea   Step downs             Gait Training             SPC 1x300 ft 1x300 ft 1x300 ft 1x300 ft   1x280 ft, 1x210 ft 1x280 ft, 1x210   ft 1x420 ft 1x420 ft                Modalities                                          [id number]

## 2023-12-11 ENCOUNTER — OFFICE VISIT (OUTPATIENT)
Dept: PHYSICAL THERAPY | Facility: CLINIC | Age: 88
End: 2023-12-11
Payer: MEDICARE

## 2023-12-11 DIAGNOSIS — R53.81 PHYSICAL DECONDITIONING: Primary | ICD-10-CM

## 2023-12-11 PROCEDURE — 97112 NEUROMUSCULAR REEDUCATION: CPT

## 2023-12-11 PROCEDURE — 97110 THERAPEUTIC EXERCISES: CPT

## 2023-12-11 NOTE — PROGRESS NOTES
Daily Note     Today's date: 2023  Patient name: Kenrick Cannon  : 1934  MRN: [id number]  Referring provider: Belinda Wilkerson MD  Dx:   Encounter Diagnosis     ICD-10-CM    1. Physical deconditioning  R53.81                      Subjective: Patient reports no balance issues since last visit. Objective: See treatment diary below      Assessment: Pt demonstrated increased ambulation distance and improved stability with tandem balance. Progressed quad strength today. Plan: Continue per plan of care. Precautions: DM2, CKD4. Dx: Deconditioning.     Manuals 10/26 11/13 11/20 12/7 12/11  8/31 9/7 9/11 10/19                                                       Neuro Re-Ed             Mod tandem stance  30"x1 ea 30"x1 ea 30"x1 ea 30"x1 ea 40"x1 ea     20"x1 ea   FT 30"x1  40"x1 40"x2 40"x1 40"x1     20"x1                                                                    Ther Ex             DL heelraises        2x10     Standing GA stretch 15"x1 15"x1 15"x2 ea 15"x1 ea 15"x1 ea     15"x1 ea   Seated toe taps        1x20     Seated hip flexion       2#/ 3x10 2#  3x10     LAQ 5#  3x10 5# 3x10 5# 3x10 ea 5# 3x10 ea 6#  3x10 ea  3#/ 3x10 3#/ 3x10 3#/ 3x10 4#/ 3x10   Standing hip ABD  1x10 ea 1x15 ea 1x10 ea 1x10 ea    1x10 ea                              Ther Activity             STS Foam   2x10 Foam  2x10 Foam 2x10 Foam  2x10 Foam  2x10  2x6 2x8 2x8 Foam/ 1x10, 1x10   Step ups 6"/ 1x15 ea 6"/ 1x15 ea 6"/ 1x15 ea 6"/ 1x20 ea 6"/ 1x20 ea  6"/ 1x15 6"/ 1x15 ea 6"/ 1x15 ea 6"/ 1x15 ea   Step downs             Gait Training             SPC 1x300 ft 1x300 ft 1x300 ft 1x300 ft 1x370 ft  1x280 ft, 1x210 ft 1x280 ft, 1x210   ft 1x420 ft 1x420 ft                Modalities                                          [id number]

## 2023-12-18 ENCOUNTER — OFFICE VISIT (OUTPATIENT)
Dept: PHYSICAL THERAPY | Facility: CLINIC | Age: 88
End: 2023-12-18
Payer: MEDICARE

## 2023-12-18 DIAGNOSIS — R53.81 PHYSICAL DECONDITIONING: Primary | ICD-10-CM

## 2023-12-18 PROCEDURE — 97110 THERAPEUTIC EXERCISES: CPT

## 2023-12-18 PROCEDURE — 97112 NEUROMUSCULAR REEDUCATION: CPT

## 2023-12-18 NOTE — PROGRESS NOTES
"Daily Note     Today's date: 2023  Patient name: Jeffry Almanzar  : 1934  MRN: [id number]  Referring provider: Debbie Maloney MD  Dx:   Encounter Diagnosis     ICD-10-CM    1. Physical deconditioning  R53.81           Start Time: 1435  Stop Time: 1505  Total time in clinic (min): 30 minutes    Subjective: Patient offered no complaints prior to therapy.     Objective: See treatment diary below    Assessment: Patient demonstrated fatigue following ambulation and required occasional seated rest breaks t/o therapy.    Plan: Continue per plan of care.      Precautions: DM2, CKD4.    Dx: Deconditioning.    Manuals 10/26 11/13 11/20 12/7 12/11 12/18                                                           Neuro Re-Ed             Mod tandem stance  30\"x1 ea 30\"x1 ea 30\"x1 ea 30\"x1 ea 40\"x1 ea 40\"x1 ea       FT 30\"x1  40\"x1 40\"x2 40\"x1 40\"x1 40\"x1                                                                        Ther Ex             DL heelraises             Standing GA stretch 15\"x1 15\"x1 15\"x2 ea 15\"x1 ea 15\"x1 ea 15\"x1 ea       Seated toe taps             Seated hip flexion             LAQ 5#  3x10 5# 3x10 5# 3x10 ea 5# 3x10 ea 6#  3x10 ea 6#  3x10 ea       Standing hip ABD  1x10 ea 1x15 ea 1x10 ea 1x10 ea 1x10 ea                                 Ther Activity             STS Foam   2x10 Foam  2x10 Foam 2x10 Foam  2x10 Foam  2x10 Foam  2x10       Step ups 6\"/ 1x15 ea 6\"/ 1x15 ea 6\"/ 1x15 ea 6\"/ 1x20 ea 6\"/ 1x20 ea 6\"/ 1x20 ea       Step downs             Gait Training             SPC 1x300 ft 1x300 ft 1x300 ft 1x300 ft 1x370 ft 1x370 ft                    Modalities                                          "

## 2023-12-28 ENCOUNTER — OFFICE VISIT (OUTPATIENT)
Dept: PHYSICAL THERAPY | Facility: CLINIC | Age: 88
End: 2023-12-28
Payer: MEDICARE

## 2023-12-28 DIAGNOSIS — R53.81 PHYSICAL DECONDITIONING: Primary | ICD-10-CM

## 2023-12-28 PROCEDURE — 97110 THERAPEUTIC EXERCISES: CPT | Performed by: PHYSICAL THERAPIST

## 2023-12-28 PROCEDURE — 97530 THERAPEUTIC ACTIVITIES: CPT | Performed by: PHYSICAL THERAPIST

## 2023-12-28 NOTE — PROGRESS NOTES
"Daily Note     Today's date: 2023  Patient name: Jeffry Almanzar  : 1934  MRN: [id number]  Referring provider: Debbie Maloney MD  Dx:   Encounter Diagnosis     ICD-10-CM    1. Physical deconditioning  R53.81                      Subjective: Patient offered no complaints prior to therapy.     Objective: See treatment diary below    Assessment: Patient demonstrated mod lat trunk lean during amb without SPC.    Plan: Continue per plan of care.      Precautions: DM2, CKD4.    Dx: Deconditioning.    Manuals 10/26 11/13 11/20 12/7 12/11 12/18 12/28                                                          Neuro Re-Ed             Mod tandem stance  30\"x1 ea 30\"x1 ea 30\"x1 ea 30\"x1 ea 40\"x1 ea 40\"x1 ea 45\"x1 ea      FT 30\"x1  40\"x1 40\"x2 40\"x1 40\"x1 40\"x1 45\"x1                                                                       Ther Ex             DL heelraises             Standing GA stretch 15\"x1 15\"x1 15\"x2 ea 15\"x1 ea 15\"x1 ea 15\"x1 ea       Seated toe taps             Seated hip flexion             LAQ 5#  3x10 5# 3x10 5# 3x10 ea 5# 3x10 ea 6#  3x10 ea 6#  3x10 ea 6#/ 3x10 ea      Standing hip ABD  1x10 ea 1x15 ea 1x10 ea 1x10 ea 1x10 ea 1x10 ea                                Ther Activity             STS Foam   2x10 Foam  2x10 Foam 2x10 Foam  2x10 Foam  2x10 Foam  2x10 Foam/ 2x10      Step ups 6\"/ 1x15 ea 6\"/ 1x15 ea 6\"/ 1x15 ea 6\"/ 1x20 ea 6\"/ 1x20 ea 6\"/ 1x20 ea 6\"/ 1x20      Step downs             Gait Training             SPC 1x300 ft 1x300 ft 1x300 ft 1x300 ft 1x370 ft 1x370 ft       No AD       1x210 ft      Modalities                                          "

## 2024-01-22 ENCOUNTER — APPOINTMENT (OUTPATIENT)
Dept: PHYSICAL THERAPY | Facility: CLINIC | Age: 89
End: 2024-01-22
Payer: MEDICARE

## 2024-01-25 ENCOUNTER — OFFICE VISIT (OUTPATIENT)
Dept: FAMILY MEDICINE CLINIC | Facility: CLINIC | Age: 89
End: 2024-01-25
Payer: MEDICARE

## 2024-01-25 VITALS
HEART RATE: 68 BPM | TEMPERATURE: 97.2 F | BODY MASS INDEX: 23.49 KG/M2 | RESPIRATION RATE: 18 BRPM | HEIGHT: 65 IN | WEIGHT: 141 LBS | DIASTOLIC BLOOD PRESSURE: 82 MMHG | SYSTOLIC BLOOD PRESSURE: 136 MMHG | OXYGEN SATURATION: 98 %

## 2024-01-25 DIAGNOSIS — J45.20 MILD INTERMITTENT ASTHMA WITHOUT COMPLICATION: ICD-10-CM

## 2024-01-25 DIAGNOSIS — C67.9 MALIGNANT NEOPLASM OF URINARY BLADDER, UNSPECIFIED SITE (HCC): ICD-10-CM

## 2024-01-25 DIAGNOSIS — E55.9 MILD VITAMIN D DEFICIENCY: ICD-10-CM

## 2024-01-25 DIAGNOSIS — N18.4 BENIGN HYPERTENSION WITH CKD (CHRONIC KIDNEY DISEASE) STAGE IV (HCC): ICD-10-CM

## 2024-01-25 DIAGNOSIS — D63.1 ANEMIA IN STAGE 4 CHRONIC KIDNEY DISEASE: ICD-10-CM

## 2024-01-25 DIAGNOSIS — I12.9 PARENCHYMAL RENAL HYPERTENSION, STAGE 1 THROUGH STAGE 4 OR UNSPECIFIED CHRONIC KIDNEY DISEASE: ICD-10-CM

## 2024-01-25 DIAGNOSIS — E11.22 TYPE 2 DIABETES MELLITUS WITH STAGE 4 CHRONIC KIDNEY DISEASE, WITHOUT LONG-TERM CURRENT USE OF INSULIN (HCC): Primary | ICD-10-CM

## 2024-01-25 DIAGNOSIS — E11.21 DIABETIC NEPHROPATHY ASSOCIATED WITH TYPE 2 DIABETES MELLITUS (HCC): ICD-10-CM

## 2024-01-25 DIAGNOSIS — E78.2 MIXED HYPERLIPIDEMIA: ICD-10-CM

## 2024-01-25 DIAGNOSIS — N18.4 STAGE 4 CHRONIC KIDNEY DISEASE (HCC): ICD-10-CM

## 2024-01-25 DIAGNOSIS — D51.3 OTHER DIETARY VITAMIN B12 DEFICIENCY ANEMIA: ICD-10-CM

## 2024-01-25 DIAGNOSIS — N18.4 ANEMIA IN STAGE 4 CHRONIC KIDNEY DISEASE: ICD-10-CM

## 2024-01-25 DIAGNOSIS — D61.89 ANEMIA DUE TO OTHER BONE MARROW FAILURE (HCC): ICD-10-CM

## 2024-01-25 DIAGNOSIS — I12.9 BENIGN HYPERTENSION WITH CKD (CHRONIC KIDNEY DISEASE) STAGE IV (HCC): ICD-10-CM

## 2024-01-25 DIAGNOSIS — N18.4 TYPE 2 DIABETES MELLITUS WITH STAGE 4 CHRONIC KIDNEY DISEASE, WITHOUT LONG-TERM CURRENT USE OF INSULIN (HCC): Primary | ICD-10-CM

## 2024-01-25 DIAGNOSIS — N18.4 CKD STAGE 4 DUE TO TYPE 2 DIABETES MELLITUS (HCC): ICD-10-CM

## 2024-01-25 DIAGNOSIS — F02.B0 MODERATE DEMENTIA ASSOCIATED WITH OTHER UNDERLYING DISEASE, WITHOUT BEHAVIORAL DISTURBANCE, PSYCHOTIC DISTURBANCE, MOOD DISTURBANCE, OR ANXIETY (HCC): ICD-10-CM

## 2024-01-25 DIAGNOSIS — H34.8122 RETINAL VEIN OCCLUSION OF LEFT EYE, UNSPECIFIED RETINAL VEIN: ICD-10-CM

## 2024-01-25 DIAGNOSIS — E11.22 CKD STAGE 4 DUE TO TYPE 2 DIABETES MELLITUS (HCC): ICD-10-CM

## 2024-01-25 DIAGNOSIS — R80.1 PERSISTENT PROTEINURIA: ICD-10-CM

## 2024-01-25 PROBLEM — F03.90 DEMENTIA (HCC): Status: ACTIVE | Noted: 2024-01-25

## 2024-01-25 LAB — SL AMB POCT HEMOGLOBIN AIC: 7.5 (ref ?–6.5)

## 2024-01-25 PROCEDURE — 83036 HEMOGLOBIN GLYCOSYLATED A1C: CPT | Performed by: FAMILY MEDICINE

## 2024-01-25 PROCEDURE — 99214 OFFICE O/P EST MOD 30 MIN: CPT | Performed by: FAMILY MEDICINE

## 2024-01-25 RX ORDER — LATANOPROSTENE BUNOD 0.24 MG/ML
1 SOLUTION/ DROPS OPHTHALMIC
COMMUNITY
Start: 2024-01-03

## 2024-01-25 RX ORDER — FLUTICASONE PROPIONATE AND SALMETEROL 250; 50 UG/1; UG/1
1 POWDER RESPIRATORY (INHALATION) 2 TIMES DAILY
Qty: 60 BLISTER | Refills: 2 | Status: SHIPPED | OUTPATIENT
Start: 2024-01-25

## 2024-01-25 RX ORDER — AMLODIPINE BESYLATE 5 MG/1
10 TABLET ORAL DAILY
Qty: 60 TABLET | Refills: 3 | Status: SHIPPED | OUTPATIENT
Start: 2024-01-25

## 2024-01-25 RX ORDER — LOSARTAN POTASSIUM 25 MG/1
25 TABLET ORAL DAILY
Qty: 90 TABLET | Refills: 1 | Status: SHIPPED | OUTPATIENT
Start: 2024-01-25

## 2024-01-25 NOTE — PROGRESS NOTES
Subjective:      Patient ID: Jeffry Almanzar is a 89 y.o. male.      With type 2 diabetes, hypertension,seasonal asthma, CKD-3 presents for follow up, wife gives history and all the information due to his advanced dementia and confusion  He does have presbyacusis and follows with ophthalmologist regularly for CRVO  He has not been taking FARXIGA and takes it only when his BG is high  He is otherwise ok, no complaints  Did not obtain labs as ordered previously  mini-mental status exam-failed, clockdraw failed, 3 word recall          Past Medical History:   Diagnosis Date    Cataracts, bilateral        Family History   Problem Relation Age of Onset    Diabetes Mother        Past Surgical History:   Procedure Laterality Date    CATARACT EXTRACTION Bilateral 07/15/2012    COLONOSCOPY      CYSTOSCOPY  01/15/2018    Last assessed 9/14/17, diagnostic    HERNIA REPAIR      INSTILLATION MYTOMYCIN N/A 10/25/2017    Procedure: INSTILLATION OF MITOMYCIN C;  Surgeon: Danish Esposito MD;  Location: [facility];  Service: Urology    IL CYSTO W/REMOVAL OF LESIONS SMALL N/A 10/25/2017    Procedure: CYSTOSCOPY; BLADDER BIOPSY WITH FULGERATION;  Surgeon: Danish Esposito MD;  Location: [facility];  Service: Urology    TONSILLECTOMY      TRANSURETHRAL RESECTION OF BLADDER TUMOR N/A 10/25/2017    Procedure: TUR BLADDER TUMOR;  Surgeon: Danish Esposito MD;  Location: [facility];  Service: Urology    WISDOM TOOTH EXTRACTION          reports that he quit smoking about 60 years ago. His smoking use included cigarettes. He has never used smokeless tobacco. He reports that he does not drink alcohol and does not use drugs.      Current Outpatient Medications:     amLODIPine (NORVASC) 5 mg tablet, Take 2 tablets (10 mg total) by mouth daily, Disp: 60 tablet, Rfl: 3    aspirin 325 mg tablet, Take 325 mg by mouth daily, Disp: , Rfl:     atorvastatin (LIPITOR) 10 mg tablet, Take 1 tablet (10 mg total) by mouth daily, Disp: 90 tablet, Rfl: 3     Blood Glucose Monitoring Suppl (OneTouch Verio Reflect) w/Device KIT, Check blood sugars twice daily. Please substitute with appropriate alternative as covered by patient's insurance. Dx: E11.65, Disp: 1 kit, Rfl: 0    Blood Glucose Monitoring Suppl (OneTouch Verio Reflect) w/Device KIT, Check blood sugars once daily. Please substitute with appropriate alternative as covered by patient's insurance. Dx: E11.65, Disp: 1 kit, Rfl: 0    brimonidine tartrate 0.2 % ophthalmic solution, INSTILL 1 DROP INTO EACH EYE THREE TIMES DAILY, Disp: , Rfl:     Cyanocobalamin (Vitamin B-12) 1000 MCG/15ML LIQD, Take by mouth daily, Disp: , Rfl:     dapagliflozin (Farxiga) 10 MG tablet, Take 1 tablet (10 mg total) by mouth daily, Disp: 90 tablet, Rfl: 1    Dorzolamide HCl-Timolol Mal PF 2-0.5 % SOLN, INSTILL 1 DROP INTO EACH EYE TWICE DAILY, Disp: , Rfl:     Fluticasone-Salmeterol (Advair Diskus) 250-50 mcg/dose inhaler, Inhale 1 puff 2 (two) times a day Rinse mouth after use., Disp: 60 blister, Rfl: 2    losartan (COZAAR) 25 mg tablet, Take 1 tablet (25 mg total) by mouth daily, Disp: 90 tablet, Rfl: 1    prednisoLONE acetate (PRED FORTE) 1 % ophthalmic suspension, INSTILL 1 DROP INTO LEFT EYE TWICE DAILY, Disp: , Rfl:     Vyzulta 0.024 % SOLN, Administer 1 drop to both eyes daily at bedtime, Disp: , Rfl:     glucose blood (OneTouch Verio) test strip, Check blood sugars once daily. Please substitute with appropriate alternative as covered by patient's insurance. Dx: E11.65, Disp: 100 each, Rfl: 3    OneTouch Delica Lancets 33G MISC, Check blood sugars once daily. Please substitute with appropriate alternative as covered by patient's insurance. Dx: E11.65, Disp: 100 each, Rfl: 3    The following portions of the patient's history were reviewed and updated as appropriate: allergies, current medications, past family history, past medical history, past social history, past surgical history and problem list.    Review of Systems  "  Constitutional:  Negative for chills and fever.   HENT:  Negative for congestion, rhinorrhea and sore throat.    Eyes:  Negative for discharge, redness and itching.   Respiratory:  Negative for chest tightness, shortness of breath and wheezing.    Cardiovascular:  Negative for chest pain and palpitations.   Gastrointestinal:  Negative for abdominal pain, constipation and diarrhea.   Genitourinary:  Negative for dysuria.   Skin:  Negative for pallor and rash.   Neurological:  Negative for dizziness, weakness, numbness and headaches.         PHQ-2/9 Depression Screening    Little interest or pleasure in doing things: 0 - not at all  Feeling down, depressed, or hopeless: 0 - not at all  PHQ-2 Score: 0  PHQ-2 Interpretation: Negative depression screen             Objective:    /82   Pulse 68   Temp (!) 97.2 °F (36.2 °C) (Tympanic)   Resp 18   Ht 5' 5\" (1.651 m)   Wt 64 kg (141 lb)   SpO2 98%   BMI 23.46 kg/m²      Physical Exam  Vitals and nursing note reviewed.   Constitutional:       Appearance: Normal appearance.   HENT:      Right Ear: External ear normal.      Left Ear: External ear normal.   Eyes:      General:         Right eye: No discharge.         Left eye: No discharge.      Conjunctiva/sclera: Conjunctivae normal.   Cardiovascular:      Rate and Rhythm: Normal rate and regular rhythm.      Pulses: no weak pulses          Dorsalis pedis pulses are 2+ on the right side and 2+ on the left side.      Heart sounds: No murmur heard.  Pulmonary:      Effort: Pulmonary effort is normal.      Breath sounds: Normal breath sounds. No wheezing.   Abdominal:      General: There is no distension.      Palpations: Abdomen is soft.      Tenderness: There is no abdominal tenderness.   Musculoskeletal:      Right lower leg: No edema.      Left lower leg: No edema.        Feet:    Feet:      Right foot:      Skin integrity: No ulcer, skin breakdown, erythema, warmth, callus or dry skin.      Left foot:      Skin " integrity: No ulcer, skin breakdown, erythema, warmth, callus or dry skin.   Skin:     Findings: No lesion or rash.   Neurological:      General: No focal deficit present.      Mental Status: He is alert.      Comments: Failed 3 word recall, clock draw and MMSE -could not complete  Oriented to place and self, wife, not time   Psychiatric:         Mood and Affect: Mood normal.         Thought Content: Thought content normal.       Patient's shoes and socks removed.    Right Foot/Ankle   Right Foot Inspection  Skin Exam: skin normal. Skin not intact, no dry skin, no warmth, no callus, no erythema, no maceration, no abnormal color, no pre-ulcer, no ulcer and no callus.     Toe Exam: ROM and strength within normal limits.     Sensory   Monofilament testing: diminished    Vascular  Capillary refills: < 3 seconds  The right DP pulse is 2+.     Right Toe  - Comprehensive Exam  Ecchymosis: none  Swelling: none   Tenderness: none       Left Foot/Ankle  Left Foot Inspection  Skin Exam: skin normal. Skin not intact, no dry skin, no warmth, no erythema, no maceration, normal color, no pre-ulcer, no ulcer and no callus.     Toe Exam: ROM and strength within normal limits.     Sensory   Monofilament testing: intact    Vascular  Capillary refills: < 3 seconds  The left DP pulse is 2+.     Left Toe  - Comprehensive Exam  Ecchymosis: none  Swelling: none   Tenderness: none       Assign Risk Category  No deformity present  Loss of protective sensation  No weak pulses  Risk: 1              Recent Results (from the past 8736 hour(s))   PTH, Intact and Calcium    Collection Time: 01/31/23 10:36 AM   Result Value Ref Range    PTH, Intact 95 (H) 16 - 77 pg/mL    Calcium 9.2 8.6 - 10.3 mg/dL   Magnesium    Collection Time: 01/31/23 10:36 AM   Result Value Ref Range    Magnesium, Serum 2.3 1.5 - 2.5 mg/dL   Renal function panel    Collection Time: 01/31/23 10:36 AM   Result Value Ref Range    Glucose, Random 144 (H) 65 - 99 mg/dL    BUN 32  (H) 7 - 25 mg/dL    Creatinine 2.36 (H) 0.70 - 1.22 mg/dL    eGFR 26 (L) > OR = 60 mL/min/1.73m2    SL AMB BUN/CREATININE RATIO 14 6 - 22 (calc)    Sodium 142 135 - 146 mmol/L    Potassium 4.1 3.5 - 5.3 mmol/L    Chloride 109 98 - 110 mmol/L    CO2 24 20 - 32 mmol/L    Calcium 9.2 8.6 - 10.3 mg/dL    Phosphorus, Serum 3.6 2.1 - 4.3 mg/dL    Albumin 3.7 3.6 - 5.1 g/dL   CBC and differential    Collection Time: 01/31/23 10:36 AM   Result Value Ref Range    White Blood Cell Count 7.1 3.8 - 10.8 Thousand/uL    Red Blood Cell Count 3.26 (L) 4.20 - 5.80 Million/uL    Hemoglobin 10.2 (L) 13.2 - 17.1 g/dL    HCT 30.4 (L) 38.5 - 50.0 %    MCV 93.3 80.0 - 100.0 fL    MCH 31.3 27.0 - 33.0 pg    MCHC 33.6 32.0 - 36.0 g/dL    RDW 11.5 11.0 - 15.0 %    Platelet Count 200 140 - 400 Thousand/uL    SL AMB MPV 11.7 7.5 - 12.5 fL    Neutrophils (Absolute) 4,636 1,500 - 7,800 cells/uL    Lymphocytes (Absolute) 1,470 850 - 3,900 cells/uL    Monocytes (Absolute) 696 200 - 950 cells/uL    Eosinophils (Absolute) 256 15 - 500 cells/uL    Basophils ABS 43 0 - 200 cells/uL    Neutrophils 65.3 %    Lymphocytes 20.7 %    Monocytes 9.8 %    Eosinophils 3.6 %    Basophils PCT 0.6 %   Vitamin D 25 hydroxy    Collection Time: 01/31/23 10:36 AM   Result Value Ref Range    Vitamin D, 25-Hydroxy, Serum 40 30 - 100 ng/mL   Protein, Total w/Creat, Random Urine    Collection Time: 01/31/23 10:36 AM   Result Value Ref Range    Creatinine, Urine 142 20 - 320 mg/dL    Protein/Creat Ratio 387 (H) 25 - 148 mg/g creat    Protein/Creat Ratio 0.387 (H) 0.025 - 0.148 mg/mg creat    Total Protein, Urine 55 (H) 5 - 25 mg/dL   UA (URINE) with reflex to Scope    Collection Time: 01/31/23 10:36 AM   Result Value Ref Range    Color UA YELLOW YELLOW    Urine Appearance CLEAR CLEAR    Specific Gravity 1.019 1.001 - 1.035    Ph 5.5 5.0 - 8.0    Glucose, Urine 2+ (A) NEGATIVE    Bilirubin, Urine NEGATIVE NEGATIVE    Ketone, Urine NEGATIVE NEGATIVE    Blood, Urine NEGATIVE  NEGATIVE    Protein, Urine 1+ (A) NEGATIVE    Nitrites Urine NEGATIVE NEGATIVE    Leukocyte Esterase NEGATIVE NEGATIVE    SL AMB WBC, URINE NONE SEEN < OR = 5 /HPF    RBC, Urine NONE SEEN < OR = 2 /HPF    Squamous Epithelial Cells NONE SEEN < OR = 5 /HPF    Bacteria, UA NONE SEEN NONE SEEN /HPF    Hyaline Casts 0-5 (A) NONE SEEN /LPF    Comment(s)     Renal function panel    Collection Time: 01/31/23 10:36 AM   Result Value Ref Range    ALBUMIN 3.7     EXTERNAL CALCIUM 9.2     EXTERNAL PHOSPHORUS 3.6     Glucose 144     BUN 32     CREATININE 2.36     SODIUM 142     POTASSIUM 4.1     CHLORIDE 109     CO2 24     EXTERNAL EGFR 26    PTH, intact    Collection Time: 01/31/23 10:36 AM   Result Value Ref Range    PTH 95    Magnesium    Collection Time: 01/31/23 10:36 AM   Result Value Ref Range    EXTERNAL MAGNESIUM 2.3    Protein / creatinine ratio, urine    Collection Time: 01/31/23 10:36 AM   Result Value Ref Range    PROTEIN UA 55     EXT Creatinine Urine 142     EXTERNAL Ur.Prot/Creat Ratio 0.387    CBC and differential    Collection Time: 01/31/23 10:36 AM   Result Value Ref Range    WBC 7.1     External Hemoglobin 10.2 g/dL    Hematocrit 30 %    External Platelets 200 K/µL   Urinalysis with microscopic    Collection Time: 01/31/23 10:36 AM   Result Value Ref Range    EXTERNAL COLOR,UA yellow     Spec Grav, UA (Ref:1.003-1.030) 1.019     Glucose, UA (Ref: Negative) 2+     Ketones, UA (Ref: Negative) neg     Blood, UA neg     Nitrite, UA (Ref: Negative) neg      Leukocytes, UA (Ref: Negative) neg     pH, UA (Ref: 4.5-8.0) 5.5     Protein, UA (Ref: Negative) 1+     Bilirubin, UA (Ref: Negative) neg     Urobilinogen, UA (Ref: 0.2- 1.0) neg     RBC, UA none seen     EXTERNAL WBC, UA none seen     EXTERNAL BACTERIA, UA none seen     CASTS, UA none seen     Hyaline Casts, UA 2-4 (A) (none) /lpf   Vitamin D 25 hydroxy    Collection Time: 01/31/23 10:36 AM   Result Value Ref Range    EXTERNAL VITAMIN D,25 40    Iron, TIBC  and Ferritin Panel    Collection Time: 01/31/23 10:36 AM   Result Value Ref Range    Iron, Serum 49 (L) 50 - 180 mcg/dL    Total Iron Binding Capacity (TIBC) 226 (L) 250 - 425 mcg/dL (calc)    Iron Saturation 22 20 - 48 % (calc)    Ferritin 396 (H) 24 - 380 ng/mL   Test Authorization    Collection Time: 01/31/23 10:36 AM   Result Value Ref Range    Test Name  IRON, TIBC AND FERRITIN PANEL     Test Code  [id number]     Client Contact MARCO ARTHUR     Report Always Message Signature      COMMENT     Hm Diabetes Eye Exam    Collection Time: 05/30/23  2:55 PM   Result Value Ref Range    Severity Normal     Right Eye Diabetic Retinopathy None     Left Eye Diabetic Retinopathy None    Protein electrophoresis, serum    Collection Time: 06/14/23  2:45 PM   Result Value Ref Range    Protein, Total 7.2 6.1 - 8.1 g/dL    Albumin, Electrophoresis 3.7 (L) 3.8 - 4.8 g/dL    Alpha-1 Globulin 0.3 0.2 - 0.3 g/dL    Alpha-2 Globulin 0.8 0.5 - 0.9 g/dL    Beta 1 Globulin 0.4 0.4 - 0.6 g/dL    Beta 2 Globulin 0.4 0.2 - 0.5 g/dL    Gamma Globulin 1.6 0.8 - 1.7 g/dL    Interpretation     PTH, Intact and Calcium    Collection Time: 06/14/23  2:45 PM   Result Value Ref Range    PTH, Intact 85 (H) 16 - 77 pg/mL    Calcium 9.1 8.6 - 10.3 mg/dL   Magnesium    Collection Time: 06/14/23  2:45 PM   Result Value Ref Range    Magnesium, Serum 2.4 1.5 - 2.5 mg/dL   Phosphorus    Collection Time: 06/14/23  2:45 PM   Result Value Ref Range    Phosphorus, Serum 3.8 2.1 - 4.3 mg/dL   TIBC    Collection Time: 06/14/23  2:45 PM   Result Value Ref Range    Iron, Serum 99 50 - 180 mcg/dL    Total Iron Binding Capacity (TIBC) 250 250 - 425 mcg/dL (calc)    Iron Saturation 40 20 - 48 % (calc)   Comprehensive metabolic panel    Collection Time: 06/14/23  2:45 PM   Result Value Ref Range    Glucose, Random 143 (H) 65 - 139 mg/dL    BUN 42 (H) 7 - 25 mg/dL    Creatinine 2.25 (H) 0.70 - 1.22 mg/dL    eGFR 27 (L) > OR = 60 mL/min/1.73m2    SL AMB BUN/CREATININE  RATIO 19 6 - 22 (calc)    Sodium 140 135 - 146 mmol/L    Potassium 4.0 3.5 - 5.3 mmol/L    Chloride 109 98 - 110 mmol/L    CO2 24 20 - 32 mmol/L    Calcium 9.1 8.6 - 10.3 mg/dL    Protein, Total 7.2 6.1 - 8.1 g/dL    Albumin 3.8 3.6 - 5.1 g/dL    Globulin 3.4 1.9 - 3.7 g/dL (calc)    Albumin/Globulin Ratio 1.1 1.0 - 2.5 (calc)    TOTAL BILIRUBIN 0.6 0.2 - 1.2 mg/dL    Alkaline Phosphatase 64 35 - 144 U/L    AST 13 10 - 35 U/L    ALT 9 9 - 46 U/L   Creatine Kinase, Total    Collection Time: 06/14/23  2:45 PM   Result Value Ref Range    Creatine Kinase, Total 91 44 - 196 U/L   CBC and differential    Collection Time: 06/14/23  2:45 PM   Result Value Ref Range    White Blood Cell Count 7.4 3.8 - 10.8 Thousand/uL    Red Blood Cell Count 3.28 (L) 4.20 - 5.80 Million/uL    Hemoglobin 10.3 (L) 13.2 - 17.1 g/dL    HCT 31.5 (L) 38.5 - 50.0 %    MCV 96.0 80.0 - 100.0 fL    MCH 31.4 27.0 - 33.0 pg    MCHC 32.7 32.0 - 36.0 g/dL    RDW 11.8 11.0 - 15.0 %    Platelet Count 165 140 - 400 Thousand/uL    SL AMB MPV 11.3 7.5 - 12.5 fL    Neutrophils (Absolute) 4,780 1,500 - 7,800 cells/uL    Lymphocytes (Absolute) 1,458 850 - 3,900 cells/uL    Monocytes (Absolute) 807 200 - 950 cells/uL    Eosinophils (Absolute) 318 15 - 500 cells/uL    Basophils ABS 37 0 - 200 cells/uL    Neutrophils 64.6 %    Lymphocytes 19.7 %    Monocytes 10.9 %    Eosinophils 4.3 %    Basophils PCT 0.5 %    Always Message     Immunofixation, Serum(Reflex Only-Do Not Order)    Collection Time: 06/14/23  2:45 PM   Result Value Ref Range    Interpretation:     Immunoglobulins    Collection Time: 06/14/23  2:45 PM   Result Value Ref Range    IgA 416 (H) 70 - 320 mg/dL    IgG 1,479 600 - 1,540 mg/dL     (H) 50 - 300 mg/dL   Kappa/Lambda Light Chains Free With Ratio, Serum    Collection Time: 06/14/23  2:45 PM   Result Value Ref Range    Ig Kappa Free Light Chain 67.4 (H) 3.3 - 19.4 mg/L    Ig Lambda Free Light Chain 52.3 (H) 5.7 - 26.3 mg/L    Kappa/Lambda  FluidC Ratio 1.29 0.26 - 1.65   Ferritin    Collection Time: 06/14/23  2:45 PM   Result Value Ref Range    Ferritin 367 24 - 380 ng/mL   Protein, Total w/Creat, Random Urine    Collection Time: 06/14/23  2:45 PM   Result Value Ref Range    Creatinine, Urine 104 20 - 320 mg/dL    Protein/Creat Ratio 519 (H) 25 - 148 mg/g creat    Protein/Creat Ratio 0.519 (H) 0.025 - 0.148 mg/mg creat    Total Protein, Urine 54 (H) 5 - 25 mg/dL   Albumin / creatinine urine ratio    Collection Time: 06/27/23 12:08 PM   Result Value Ref Range    Creatinine, Ur 108.0 mg/dL    Albumin,U,Random 79.6 (H) 0.0 - 20.0 mg/L    Albumin Creat Ratio 74 (H) 0 - 30 mg/g creatinine   POCT hemoglobin A1c    Collection Time: 06/27/23  2:14 PM   Result Value Ref Range    Hemoglobin A1C 6.5 6.5   PTH, Intact and Calcium    Collection Time: 09/15/23 12:53 PM   Result Value Ref Range    PTH, Intact 67 16 - 77 pg/mL    Calcium 9.2 8.6 - 10.3 mg/dL   Magnesium    Collection Time: 09/15/23 12:53 PM   Result Value Ref Range    Magnesium, Serum 2.4 1.5 - 2.5 mg/dL   Phosphorus    Collection Time: 09/15/23 12:53 PM   Result Value Ref Range    Phosphorus, Serum 3.6 2.1 - 4.3 mg/dL   Comprehensive metabolic panel    Collection Time: 09/15/23 12:53 PM   Result Value Ref Range    Glucose, Random 136 (H) 65 - 99 mg/dL    BUN 42 (H) 7 - 25 mg/dL    Creatinine 2.36 (H) 0.70 - 1.22 mg/dL    eGFR 26 (L) > OR = 60 mL/min/1.73m2    SL AMB BUN/CREATININE RATIO 18 6 - 22 (calc)    Sodium 142 135 - 146 mmol/L    Potassium 3.9 3.5 - 5.3 mmol/L    Chloride 110 98 - 110 mmol/L    CO2 24 20 - 32 mmol/L    Calcium 9.2 8.6 - 10.3 mg/dL    Protein, Total 7.3 6.1 - 8.1 g/dL    Albumin 3.9 3.6 - 5.1 g/dL    Globulin 3.4 1.9 - 3.7 g/dL (calc)    Albumin/Globulin Ratio 1.1 1.0 - 2.5 (calc)    TOTAL BILIRUBIN 0.6 0.2 - 1.2 mg/dL    Alkaline Phosphatase 62 35 - 144 U/L    AST 14 10 - 35 U/L    ALT 10 9 - 46 U/L   CBC and differential    Collection Time: 09/15/23 12:53 PM   Result Value  Ref Range    White Blood Cell Count 7.8 3.8 - 10.8 Thousand/uL    Red Blood Cell Count 3.41 (L) 4.20 - 5.80 Million/uL    Hemoglobin 10.9 (L) 13.2 - 17.1 g/dL    HCT 33.3 (L) 38.5 - 50.0 %    MCV 97.7 80.0 - 100.0 fL    MCH 32.0 27.0 - 33.0 pg    MCHC 32.7 32.0 - 36.0 g/dL    RDW 11.5 11.0 - 15.0 %    Platelet Count 168 140 - 400 Thousand/uL    SL AMB MPV 11.3 7.5 - 12.5 fL    Neutrophils (Absolute) 4,984 1,500 - 7,800 cells/uL    Lymphocytes (Absolute) 1,630 850 - 3,900 cells/uL    Monocytes (Absolute) 741 200 - 950 cells/uL    Eosinophils (Absolute) 398 15 - 500 cells/uL    Basophils ABS 47 0 - 200 cells/uL    Neutrophils 63.9 %    Lymphocytes 20.9 %    Monocytes 9.5 %    Eosinophils 5.1 %    Basophils PCT 0.6 %   Protein, Total w/Creat, Random Urine    Collection Time: 09/15/23 12:53 PM   Result Value Ref Range    Creatinine, Urine 69 20 - 320 mg/dL    Protein/Creat Ratio 449 (H) 25 - 148 mg/g creat    Protein/Creat Ratio 0.449 (H) 0.025 - 0.148 mg/mg creat    Total Protein, Urine 31 (H) 5 - 25 mg/dL   POCT hemoglobin A1c    Collection Time: 10/24/23  4:50 PM   Result Value Ref Range    Hemoglobin A1C 7.0 (A) 6.5   POCT hemoglobin A1c    Collection Time: 01/25/24  1:40 PM   Result Value Ref Range    Hemoglobin A1C 7.5 (A) 6.5       Laboratory Results: I have personally reviewed the pertinent laboratory results/reports     Radiology/Other Diagnostic Testing Results: I have personally reviewed pertinent reports.      US kidney and bladder with pvr    Result Date: 11/30/2021  RENAL ULTRASOUND INDICATION:   I12.9: Hypertensive chronic kidney disease with stage 1 through stage 4 chronic kidney disease, or unspecified chronic kidney disease N18.4: Chronic kidney disease, stage 4 (severe) E11.22: Type 2 diabetes mellitus with diabetic chronic kidney disease N18.1: Chronic kidney disease, stage 1 E78.5: Hyperlipidemia, unspecified N18.4: Chronic kidney disease, stage 4 (severe) D63.1: Anemia in chr. COMPARISON: CT  8/20/1718 TECHNIQUE:   Ultrasound of the retroperitoneum was performed with a curvilinear transducer utilizing volumetric sweeps and still imaging techniques. FINDINGS: KIDNEYS: There is mild to moderate bilateral renal atrophy with measurements below. Right kidney:  7.3 x 5.1 x 4.9 cm. Left kidney:  7.2 x 5.5 x 4.4 cm. Right kidney The renal parenchyma is diffusely echogenic consistent with medical renal disease. No suspicious masses detected. No hydronephrosis. No shadowing calculi. No perinephric fluid collections. Left kidney The renal parenchyma is diffusely echogenic consistent with medical renal disease. No suspicious masses detected. No hydronephrosis. No shadowing calculi. No perinephric fluid collections. URETERS: Nonvisualized. BLADDER: Normally distended. No focal thickening or mass lesions. Bilateral ureteral jets detected. Prevoid bladder volume 143 cc's. Postvoid bladder volume 47 cc.     1. Small echogenic kidneys bilaterally compatible with medical renal disease. 2. Small postvoid residual. Workstation performed: [id number]        Assessment/Plan:  Problem List Items Addressed This Visit          Endocrine    Diabetic nephropathy associated with type 2 diabetes mellitus (HCC)    Relevant Medications    dapagliflozin (Farxiga) 10 MG tablet    Type 2 diabetes mellitus with stage 4 chronic kidney disease, without long-term current use of insulin (HCC) - Primary    Relevant Medications    amLODIPine (NORVASC) 5 mg tablet    dapagliflozin (Farxiga) 10 MG tablet    Other Relevant Orders    POCT hemoglobin A1c (Completed)    Albumin / creatinine urine ratio    Comprehensive metabolic panel       Respiratory    Asthma, mild intermittent    Relevant Medications    Fluticasone-Salmeterol (Advair Diskus) 250-50 mcg/dose inhaler       Cardiovascular and Mediastinum    Retinal vein occlusion of left eye    Relevant Medications    Vyzulta 0.024 % SOLN    Parenchymal renal hypertension    Relevant Medications     losartan (COZAAR) 25 mg tablet    amLODIPine (NORVASC) 5 mg tablet    Benign 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 (HCC)    Relevant Medications    losartan (COZAAR) 25 mg tablet    amLODIPine (NORVASC) 5 mg tablet       Nervous and Auditory    Dementia (HCC)    Relevant Orders    Vitamin B12    Vitamin B1, whole blood    Folate    RPR (DX) W/REFL TITER AND CONFIRM TESTING (REFL)       Genitourinary    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     Relevant Medications    losartan (COZAAR) 25 mg tablet    amLODIPine (NORVASC) 5 mg tablet    Malignant neoplasm of urinary bladder (HCC)    Stage 4 chronic kidney disease (HCC)    Relevant Medications    losartan (COZAAR) 25 mg tablet    amLODIPine (NORVASC) 5 mg tablet       Other    Dyslipidemia    Relevant Medications    losartan (COZAAR) 25 mg tablet    amLODIPine (NORVASC) 5 mg tablet    Mild vitamin D deficiency    Relevant Medications    losartan (COZAAR) 25 mg tablet    Persistent proteinuria    Relevant Medications    losartan (COZAAR) 25 mg tablet    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 They screened negative with a PHQ-2 score of 0.          Read package inserts for all medications before starting a new medications, call me if you have any questions.    Patient was given opportunity to ask questions and all questions were answered.    Disclaimer: Portions of the record may have been created with voice  "recognition software. Occasional wrong word or \"sound a like\" substitutions may have occurred due to the inherent limitations of voice recognition software. Read the chart carefully and recognize, using context, where substitutions have occurred. I have used the Epic copy/forward function to compose this note. I have reviewed my current note to ensure it reflects the current patient status, exam, assessment and plan.    "

## 2024-01-29 ENCOUNTER — OFFICE VISIT (OUTPATIENT)
Dept: PHYSICAL THERAPY | Facility: CLINIC | Age: 89
End: 2024-01-29
Payer: MEDICARE

## 2024-01-29 DIAGNOSIS — R53.81 PHYSICAL DECONDITIONING: Primary | ICD-10-CM

## 2024-01-29 PROCEDURE — 97110 THERAPEUTIC EXERCISES: CPT

## 2024-01-29 PROCEDURE — 97112 NEUROMUSCULAR REEDUCATION: CPT

## 2024-01-29 PROCEDURE — 97530 THERAPEUTIC ACTIVITIES: CPT

## 2024-01-29 NOTE — PROGRESS NOTES
"Daily Note     Today's date: 2024  Patient name: Jeffry Almanzar  : 1934  MRN: [id number]  Referring provider: Debbie Maloney MD  Dx:   Encounter Diagnosis     ICD-10-CM    1. Physical deconditioning  R53.81             Start Time: 1500  Stop Time: 1545  Total time in clinic (min): 45 minutes    Subjective: Patient returns to therapy after a month off. He reports no falls or change in medical status since last visit. He notes feeling weak and hasn't been moving a lot lately which he knows hurt his strength and endurance.     Objective: See treatment diary below    Assessment: Patient had LOB during tandem balance and a few times during sit to stands. He demonstrates limited endurance and required seated breaks between most exercise.     Plan: Continue per plan of care.      Precautions: DM2, CKD4.    Dx: Deconditioning.    Manuals 10/26 11/13 11/20 12/7 12/11 12/18 12/28 1/29                                                         Neuro Re-Ed             Mod tandem stance  30\"x1 ea 30\"x1 ea 30\"x1 ea 30\"x1 ea 40\"x1 ea 40\"x1 ea 45\"x1 ea 45\"x1 ea     FT 30\"x1  40\"x1 40\"x2 40\"x1 40\"x1 40\"x1 45\"x1 45\"x1     FTEC        45\"x1                                                         Ther Ex             DL heelraises             Standing GA stretch 15\"x1 15\"x1 15\"x2 ea 15\"x1 ea 15\"x1 ea 15\"x1 ea       Seated toe taps             Seated hip flexion             LAQ 5#  3x10 5# 3x10 5# 3x10 ea 5# 3x10 ea 6#  3x10 ea 6#  3x10 ea 6#/ 3x10 ea 6#/ 3x10 ea     Standing hip ABD  1x10 ea 1x15 ea 1x10 ea 1x10 ea 1x10 ea 1x10 ea 2x10 ea                               Ther Activity             STS Foam   2x10 Foam  2x10 Foam 2x10 Foam  2x10 Foam  2x10 Foam  2x10 Foam/ 2x10 2x10     Step ups 6\"/ 1x15 ea 6\"/ 1x15 ea 6\"/ 1x15 ea 6\"/ 1x20 ea 6\"/ 1x20 ea 6\"/ 1x20 ea 6\"/ 1x20 6\"/ 1x20     Step downs             Gait Training             SPC 1x300 ft 1x300 ft 1x300 ft 1x300 ft 1x370 ft 1x370 ft       No AD       1x210 ft 1x210 ft   "   Modalities

## 2024-02-04 NOTE — PROGRESS NOTES
RENAL FOLLOW UP NOTE:.td                Virtual Regular Visit    Verification of patient location:    Patient is located at Home in the following state in which I hold an active license [place]          Assessment/Plan:  89 y.o. year old male with a history of diabetes mellitus/hypertension/osteoarthritis/bladder CA/asthma/anemia who we are asked to see for chronic kidney disease in the setting of diabetes mellitus:        1.  CKD stage 4.    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  Of great concern is he is very noncompliant with medications and diet     Labs from last appointment     Baseline creatinine:  2.5-2.6  Current creatinine:  2.36 at baseline  Urine protein creatinine ratio:  0.449 g at goal  UA:  No proteinuria and no hematuria  Renal ultrasound:  11/26/2021:  Small echogenic kidneys no hydronephrosis:  7.3 x 7.2  Recommendations:  Treat hypertension-please see below  Treat dyslipidemia-please see below  Maintain proteinuria less than 1 g or as low as possible  Avoid nephrotoxic agents such as NSAIDs, and proton pump inhibitors if possible; patient counseled as such  Kidney smart completed 3/24/2023  ACP done 6/16/2023     I rediscussed the issue of kidney machine dialysis with the patient and his wife.  Should he need it in the future they are agreeable to in center hemodialysis.     2.  Volume:  Euvolemic     3.  Hypertension:       Current blood pressure averages:     Roughly      Goal blood pressure:  Less than 130/80 given CKD     Recommendations:  Push nonmedical regimen including weight loss, isotonic exercise and a low sodium diet.  Patient has been counseled the such.  Can be very noncompliant  Workup:  Aldosterone/renin:  Negative  Thyroid profile: Normal  Renal workup as outlined above  Hold on renal artery duplex unless cannot control blood  pressure  MedicationChanges today:    Pending home readings     4.  Electrolytes:  All normal     5.  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  Dyslipidemia:  Goal LDL:  Less than 100  Current lipid profile:  LDL 79/HDL 51/triglycerides 55 from October 2022  Recommendations:   Low-cholesterol/low-fat diet / weight loss as appropriate and isotonic exercise   Medication changes today at goal so no changes, will repeat with next labs     7.  Anemia:  Most likely from CKD  Current hemoglobin: 10.9 improved  Iron studies:  Iron saturation and ferritin both acceptable from 1/31/2023  Multiple myeloma evaluation:  Faint band in IgM and lambda:  Probably reactive/inflammatory recommend follow-up in 3-6 months from December 27, 2021:  I will obtain in 3 months:  It is persistent, therefore the patient needs immunoelectrophoresis at this time!!!  From 6/14/2023: Light chain ratio 1.29  Faint band of IgM and lambda though may suggest polyclonal background recommended repeat studies in 3 to 6 months which I will repeat at this time  GI had seen the patient Dr. Gonzalez who felt given stool Hemoccult negative age and no overt iron-deficiency no further GI investigation including colonoscopy   Potential referral to Hematology for possible erythropoietin agent  only if needed  8.  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 Gonzalez with heme-negative stools he did not feel there is any need for a colonoscopy at this time      Problem List Items Addressed This Visit          Endocrine    Diabetic nephropathy associated with type 2 diabetes mellitus (HCC)    Relevant  Orders    Basic metabolic panel    CBC    Ferritin    Magnesium    Lipid Panel with Direct LDL reflex    Phosphorus    Protein / creatinine ratio, urine    PTH, intact       Cardiovascular and Mediastinum    Benign 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 (HCC) - Primary    Relevant Orders    Basic metabolic panel    CBC    Ferritin    Magnesium    Lipid Panel with Direct LDL reflex    Phosphorus    Protein / creatinine ratio, urine    PTH, intact       Genitourinary    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     Relevant Orders    Basic metabolic panel    CBC    Ferritin    Magnesium    Lipid Panel with Direct LDL reflex    Phosphorus    Protein / creatinine ratio, urine    PTH, intact    Stage 4 chronic kidney disease (HCC)    Relevant Orders    Basic metabolic panel    CBC    Ferritin    Magnesium    Lipid Panel with Direct LDL reflex    Phosphorus    Protein / creatinine ratio, urine    PTH, intact       Other    Dyslipidemia    Relevant Orders    Basic metabolic panel    CBC    Ferritin    Magnesium    Lipid Panel with Direct LDL reflex    Phosphorus    Protein / creatinine ratio, urine    PTH, intact    Mild vitamin D deficiency    Relevant Orders    Basic metabolic panel    CBC    Ferritin    Magnesium    Lipid Panel with Direct LDL reflex    Phosphorus    Protein / creatinine ratio, urine    PTH, intact    Persistent proteinuria    Relevant Orders    Basic metabolic panel    CBC    Ferritin    Magnesium    Lipid Panel with Direct LDL reflex    Phosphorus    Protein / creatinine ratio, urine    PTH, intact            Reason for visit is for CKD    Encounter provider Tanner Rey MD    Provider [facility] [address] [place] [id number]      Recent Visits  Date Type Provider Dept   02[facility]09[facility]   Showing recent visits within past 7 days and meeting all other requirements  Today's  Visits  Date Type Provider Dept   02/13/24 Telemedicine Tanner Rey MD [facility]   Showing today's visits and meeting all other requirements  Future Appointments  No visits were found meeting these conditions.  Showing future appointments within next 150 days and meeting all other requirements       The patient was identified by name and date of birth. Jeffry Almanzar was informed that this is a telemedicine visit and that the visit is being conducted through Telephone.  My office door was closed. No one else was in the room.  He acknowledged consent and understanding of privacy and security of the video platform. The patient has agreed to participate and understands they can discontinue the visit at any time.    Patient is aware this is a billable service.     Subjective  Jeffry Almanzar is a 89 y.o. male with CKD.      HPI   There has been no hospitalizations or acute illnesses since last visit.  The patient overall is feeling well.  Good appetite and good energy  No fevers, chills, or cough or colds.  No hematuria, dysuria, voiding symptoms or foamy urine  No gastrointestinal symptoms  No chest pain, some mild dyspnea on exertion unchanged, very minimal right foot swelling otherwise no edema  No headaches, dizziness or lightheadedness  Blood pressure medications:  Amlodipine 10 mg daily in the morning  Losartan 25 mg daily    Renal pertinent medications:  Aspirin 325 mg daily  Atorvastatin 10 mg daily  Farxiga 10 mg daily      Past Medical History:   Diagnosis Date    Cataracts, bilateral        Past Surgical History:   Procedure Laterality Date    CATARACT EXTRACTION Bilateral 07/15/2012    COLONOSCOPY      CYSTOSCOPY  01/15/2018    Last assessed 9/14/17, diagnostic    HERNIA REPAIR      INSTILLATION MYTOMYCIN N/A 10/25/2017    Procedure: INSTILLATION OF MITOMYCIN C;  Surgeon: Danish Esposito MD;  Location: [facility];  Service: Urology    MD CYSTO W/REMOVAL OF LESIONS SMALL N/A 10/25/2017     Procedure: CYSTOSCOPY; BLADDER BIOPSY WITH FULGERATION;  Surgeon: Danish Esposito MD;  [facility];  Service: Urology    TONSILLECTOMY      TRANSURETHRAL RESECTION OF BLADDER TUMOR N/A 10/25/2017    Procedure: TUR BLADDER TUMOR;  Surgeon: Danish Esposito MD;  [facility];  Service: Urology    WISDOM TOOTH EXTRACTION         Current Outpatient Medications   Medication Sig Dispense Refill    amLODIPine (NORVASC) 5 mg tablet Take 2 tablets (10 mg total) by mouth daily 60 tablet 3    aspirin 325 mg tablet Take 325 mg by mouth daily      atorvastatin (LIPITOR) 10 mg tablet Take 1 tablet (10 mg total) by mouth daily 90 tablet 3    dapagliflozin (Farxiga) 10 MG tablet Take 1 tablet (10 mg total) by mouth daily 90 tablet 1    losartan (COZAAR) 25 mg tablet Take 1 tablet (25 mg total) by mouth daily 90 tablet 1    Blood Glucose Monitoring Suppl (OneTouch Verio Reflect) w/Device KIT Check blood sugars twice daily. Please substitute with appropriate alternative as covered by patient's insurance. Dx: E11.65 1 kit 0    Blood Glucose Monitoring Suppl (OneTouch Verio Reflect) w/Device KIT Check blood sugars once daily. Please substitute with appropriate alternative as covered by patient's insurance. Dx: E11.65 1 kit 0    brimonidine tartrate 0.2 % ophthalmic solution INSTILL 1 DROP INTO EACH EYE THREE TIMES DAILY      Cyanocobalamin (Vitamin B-12) 1000 MCG/15ML LIQD Take by mouth daily      Dorzolamide HCl-Timolol Mal PF 2-0.5 % SOLN INSTILL 1 DROP INTO EACH EYE TWICE DAILY      Fluticasone-Salmeterol (Advair Diskus) 250-50 mcg/dose inhaler Inhale 1 puff 2 (two) times a day Rinse mouth after use. 60 blister 2    glucose blood (OneTouch Verio) test strip Check blood sugars once daily. Please substitute with appropriate alternative as covered by patient's insurance. Dx: E11.65 100 each 3    OneTouch Delica Lancets 33G MISC Check blood sugars once daily. Please substitute with appropriate alternative as covered  "by patient's insurance. Dx: E11.65 100 each 3    prednisoLONE acetate (PRED FORTE) 1 % ophthalmic suspension INSTILL 1 DROP INTO LEFT EYE TWICE DAILY      Vyzulta 0.024 % SOLN Administer 1 drop to both eyes daily at bedtime       No current facility-administered medications for this visit.        Allergies   Allergen Reactions    Ace Inhibitors Cough     Other reaction(s): hyperkalemia    Penicillins GI Intolerance       Review of Systems: Please see HPI, otherwise review of systems is completely reviewed with the patient are negative    Video Exam    There were no vitals filed for this visit.    Physical Exam 138/81 no meds yet    It was my intent to perform this visit via video technology but the patient was not able to do a video connection so the visit was completed via audio telephone only.   The patient could not set up video      Visit Time  Total Visit Duration: 20\"        "

## 2024-02-05 ENCOUNTER — OFFICE VISIT (OUTPATIENT)
Dept: PHYSICAL THERAPY | Facility: CLINIC | Age: 89
End: 2024-02-05
Payer: MEDICARE

## 2024-02-05 DIAGNOSIS — R53.81 PHYSICAL DECONDITIONING: Primary | ICD-10-CM

## 2024-02-05 PROCEDURE — 97112 NEUROMUSCULAR REEDUCATION: CPT

## 2024-02-05 PROCEDURE — 97110 THERAPEUTIC EXERCISES: CPT

## 2024-02-05 PROCEDURE — 97530 THERAPEUTIC ACTIVITIES: CPT

## 2024-02-05 NOTE — PROGRESS NOTES
"Daily Note     Today's date: 2024  Patient name: Jeffry Almanzar  : 1934  MRN: [id number]  Referring provider: Debbie Maloney MD  Dx:   Encounter Diagnosis     ICD-10-CM    1. Physical deconditioning  R53.81             Start Time: 1445  Stop Time: 1526  Total time in clinic (min): 41 minutes    Subjective: Patient reports feeling okay prior to therapy. He notes his vision isn't very good which makes walking challenging.     Objective: See treatment diary below    Assessment: Patient most challenged with tandem balance this visit. Able to increase ambulation tolerance this visit. He demonstrates limited endurance and required seated breaks between most exercise.     Plan: Continue per plan of care.      Precautions: DM2, CKD4.    Dx: Deconditioning.    Manuals 10/26 11/13 11/20 12/7 12/11 12/18 12/28 1/29 2/5                                                        Neuro Re-Ed             Mod tandem stance  30\"x1 ea 30\"x1 ea 30\"x1 ea 30\"x1 ea 40\"x1 ea 40\"x1 ea 45\"x1 ea 45\"x1 ea 45\"x1 ea    FT 30\"x1  40\"x1 40\"x2 40\"x1 40\"x1 40\"x1 45\"x1 45\"x1 45\"x1    FTEC        45\"x1 45\"x1                                                        Ther Ex             DL heelraises             Standing GA stretch 15\"x1 15\"x1 15\"x2 ea 15\"x1 ea 15\"x1 ea 15\"x1 ea       Seated toe taps             Seated hip flexion             LAQ 5#  3x10 5# 3x10 5# 3x10 ea 5# 3x10 ea 6#  3x10 ea 6#  3x10 ea 6#/ 3x10 ea 6#/ 3x10 ea 6#/ 3x10 ea    Standing hip ABD  1x10 ea 1x15 ea 1x10 ea 1x10 ea 1x10 ea 1x10 ea 2x10 ea 2x10 ea                              Ther Activity             STS Foam   2x10 Foam  2x10 Foam 2x10 Foam  2x10 Foam  2x10 Foam  2x10 Foam/ 2x10 2x10 2x10    Step ups 6\"/ 1x15 ea 6\"/ 1x15 ea 6\"/ 1x15 ea 6\"/ 1x20 ea 6\"/ 1x20 ea 6\"/ 1x20 ea 6\"/ 1x20 6\"/ 1x20 6\"/ 1x20    Step downs             Gait Training             SPC 1x300 ft 1x300 ft 1x300 ft 1x300 ft 1x370 ft 1x370 ft   1x280 ft    No AD       1x210 ft 1x210 ft 1x210 ft  "   Modalities

## 2024-02-09 ENCOUNTER — TELEPHONE (OUTPATIENT)
Dept: NEPHROLOGY | Facility: CLINIC | Age: 89
End: 2024-02-09

## 2024-02-09 NOTE — TELEPHONE ENCOUNTER
LM for patient reminding them to go for lab work and bring BP readings to their appt on 2/13.  Asked them to call back if they have any questions.

## 2024-02-12 ENCOUNTER — OFFICE VISIT (OUTPATIENT)
Dept: PHYSICAL THERAPY | Facility: CLINIC | Age: 89
End: 2024-02-12
Payer: MEDICARE

## 2024-02-12 DIAGNOSIS — R53.81 PHYSICAL DECONDITIONING: Primary | ICD-10-CM

## 2024-02-12 PROCEDURE — 97112 NEUROMUSCULAR REEDUCATION: CPT

## 2024-02-12 PROCEDURE — 97110 THERAPEUTIC EXERCISES: CPT

## 2024-02-12 NOTE — PROGRESS NOTES
"Daily Note     Today's date: 2024  Patient name: Jeffry Almanzar  : 1934  MRN: [id number]  Referring provider: Debbie Maloney MD  Dx:   Encounter Diagnosis     ICD-10-CM    1. Physical deconditioning  R53.81             Start Time: 1436  Stop Time: 1510  Total time in clinic (min): 34 minutes    Subjective: Patient offers no complaints prior to therapy.     Objective: See treatment diary below    Assessment: Patient had very limited ambulation tolerance this visit secondary to increased fatigue and L knee discomfort. He required seated breaks periodically t/o therapy. LOB with tandem balance most notable which was recovered with therapist assist. Utilized gait belt t/o therapy.     Plan: Continue per plan of care.      Precautions: DM2, CKD4.    Dx: Deconditioning.    Manuals 10/26 11/13 11/20 12/7 12/11 12/18 12/28 1/29 2/5 2/12                                                       Neuro Re-Ed             Mod tandem stance  30\"x1 ea 30\"x1 ea 30\"x1 ea 30\"x1 ea 40\"x1 ea 40\"x1 ea 45\"x1 ea 45\"x1 ea 45\"x1 ea 45\"x1 ea   FT 30\"x1  40\"x1 40\"x2 40\"x1 40\"x1 40\"x1 45\"x1 45\"x1 45\"x1 45\"x1   FTEC        45\"x1 45\"x1 45\"x1                                                       Ther Ex             DL heelraises             Standing GA stretch 15\"x1 15\"x1 15\"x2 ea 15\"x1 ea 15\"x1 ea 15\"x1 ea       Seated toe taps             Seated hip flexion             LAQ 5#  3x10 5# 3x10 5# 3x10 ea 5# 3x10 ea 6#  3x10 ea 6#  3x10 ea 6#/ 3x10 ea 6#/ 3x10 ea 6#/ 3x10 ea 6#/ 3x10 ea   Standing hip ABD  1x10 ea 1x15 ea 1x10 ea 1x10 ea 1x10 ea 1x10 ea 2x10 ea 2x10 ea 3x10 ea                             Ther Activity             STS Foam   2x10 Foam  2x10 Foam 2x10 Foam  2x10 Foam  2x10 Foam  2x10 Foam/ 2x10 2x10 2x10 2x10   Step ups 6\"/ 1x15 ea 6\"/ 1x15 ea 6\"/ 1x15 ea 6\"/ 1x20 ea 6\"/ 1x20 ea 6\"/ 1x20 ea 6\"/ 1x20 6\"/ 1x20 6\"/ 1x20 6\"/ 1x20   Step downs             Gait Training             [facility] 1x300 ft 1x300 ft 1x300 ft 1x300 ft 1x370 ft " 1x370 ft   1x280 ft    No AD       1x210 ft 1x210 ft 1x210 ft 1x20 ft   Modalities

## 2024-02-13 ENCOUNTER — TELEMEDICINE (OUTPATIENT)
Dept: NEPHROLOGY | Facility: CLINIC | Age: 89
End: 2024-02-13
Payer: MEDICARE

## 2024-02-13 DIAGNOSIS — I12.9 BENIGN HYPERTENSION WITH CKD (CHRONIC KIDNEY DISEASE) STAGE IV (HCC): Primary | ICD-10-CM

## 2024-02-13 DIAGNOSIS — N18.4 STAGE 4 CHRONIC KIDNEY DISEASE (HCC): ICD-10-CM

## 2024-02-13 DIAGNOSIS — E55.9 MILD VITAMIN D DEFICIENCY: ICD-10-CM

## 2024-02-13 DIAGNOSIS — N18.4 BENIGN HYPERTENSION WITH CKD (CHRONIC KIDNEY DISEASE) STAGE IV (HCC): Primary | ICD-10-CM

## 2024-02-13 DIAGNOSIS — R80.1 PERSISTENT PROTEINURIA: ICD-10-CM

## 2024-02-13 DIAGNOSIS — D63.1 ANEMIA IN STAGE 4 CHRONIC KIDNEY DISEASE: ICD-10-CM

## 2024-02-13 DIAGNOSIS — E78.5 DYSLIPIDEMIA: ICD-10-CM

## 2024-02-13 DIAGNOSIS — E11.21 DIABETIC NEPHROPATHY ASSOCIATED WITH TYPE 2 DIABETES MELLITUS (HCC): ICD-10-CM

## 2024-02-13 DIAGNOSIS — N18.4 ANEMIA IN STAGE 4 CHRONIC KIDNEY DISEASE: ICD-10-CM

## 2024-02-13 PROCEDURE — 99442 PR PHYS/QHP TELEPHONE EVALUATION 11-20 MIN: CPT | Performed by: INTERNAL MEDICINE

## 2024-02-13 NOTE — PATIENT INSTRUCTIONS
Visit summary:  - We will need to obtain lab work and blood pressures at this appointment before making any changes        1. Medication changes today:  No changes pending 1 week Home blood pressure readings and follow-up labs    2.  General instructions:  Continue to avoid salt and be as active as possible    3.  Please go for  fasting  lab work at this time     4.  Please take 1 week a blood pressure readings at this time 5.    AS FOLLOWS  MORNING AND EVENING, SITTING AND STANDING as follows:  TAKE THE MORNING READINGS BEFORE ANY MEDICATIONS AND WHEN YOU ARE RELAXED FOR SEVERAL MINUTES  TAKE THE EVENING READINGS:  BETWEEN 7-10 P.M.; PRIOR TO ANY MEDICATIONS; AT LEAST IN OUR  FROM DINNER; AND CERTAINLY AFTER RELAXING FOR A FEW MINUTES  PLEASE INCLUDE HEART RATE WITH YOUR BLOOD PRESSURE READINGS  When taking standing readings, keep your arm supported at heart level and not dangling  Make sure you are sitting with your back supported and feet on the ground and do not cross your legs or feet  Make sure you have not taken any coffee or caffeine products or exercised or smoke cigarettes at least 30 minutes before taking your blood pressure  Then please mail these readings into the office    5.  Follow-up in 4 months  Please bring in 1 week a blood pressure readings morning evening, sitting and standing is outlined above  PLEASE BRING AN YOUR BLOOD PRESSURE MACHINE TO CORRELATE WITH THE OFFICE MACHINE AT THIS NEXT SCHEDULED VISIT  Please go for fasting lab work 1-2 weeks prior to your appointment      6 General non medical recommendations:  AVOID SALT BUT NOT ADDING AN READING LABELS TO MAKE SURE THERE IS LOW-SALT IN THE FOOD THAT YOU ARE EATING  Goal is less than 2 g of sodium intake or less than 5 g of sodium chloride intake per day    Avoid nonsteroidal anti-inflammatory drugs such as Naprosyn, ibuprofen, Aleve, Advil, Celebrex, Meloxicam (Mobic) etc.  You can use Tylenol as needed if you do not have any  liver condition to be concerned about    Avoid medications such as Sudafed or decongestants and antihistamines that contained the D component which is the decongestant.  You can take antihistamines without the decongestant or D component.    Try to avoid medications such as pantoprazole or  Protonix/Nexium or Esomeprazole)/Prilosec or omeprazole/Prevacid or lansoprazole/AcipHex or Rabeprazole.  If you are able to, use Pepcid as this is safer for your kidneys.    Try to exercise at least 30 minutes 3 days a week to begin with with an ultimate goal of 5 days a week for at least 30 minutes    Please do not drink more than 2 glasses of alcohol/wine on a daily basis as this may contribute to your high blood pressure.

## 2024-02-17 LAB
25(OH)D3 SERPL-MCNC: 63 NG/ML (ref 30–100)
ALBUMIN SERPL-MCNC: 3.9 G/DL (ref 3.6–5.1)
ALBUMIN/GLOB SERPL: 1.2 (CALC) (ref 1–2.5)
ALP SERPL-CCNC: 52 U/L (ref 35–144)
ALT SERPL-CCNC: 11 U/L (ref 9–46)
AST SERPL-CCNC: 14 U/L (ref 10–35)
BASOPHILS # BLD AUTO: 48 CELLS/UL (ref 0–200)
BASOPHILS NFR BLD AUTO: 0.6 %
BILIRUB SERPL-MCNC: 0.5 MG/DL (ref 0.2–1.2)
BUN SERPL-MCNC: 46 MG/DL (ref 7–25)
BUN/CREAT SERPL: 21 (CALC) (ref 6–22)
CALCIUM SERPL-MCNC: 9.2 MG/DL (ref 8.6–10.3)
CALCIUM SERPL-MCNC: 9.2 MG/DL (ref 8.6–10.3)
CHLORIDE SERPL-SCNC: 109 MMOL/L (ref 98–110)
CHOLEST SERPL-MCNC: 153 MG/DL
CHOLEST/HDLC SERPL: 2.9 (CALC)
CO2 SERPL-SCNC: 23 MMOL/L (ref 20–32)
CREAT SERPL-MCNC: 2.18 MG/DL (ref 0.7–1.22)
CREAT UR-MCNC: 80 MG/DL (ref 20–320)
EOSINOPHIL # BLD AUTO: 312 CELLS/UL (ref 15–500)
EOSINOPHIL NFR BLD AUTO: 3.9 %
ERYTHROCYTE [DISTWIDTH] IN BLOOD BY AUTOMATED COUNT: 11.7 % (ref 11–15)
FERRITIN SERPL-MCNC: 284 NG/ML (ref 24–380)
GFR/BSA.PRED SERPLBLD CYS-BASED-ARV: 28 ML/MIN/1.73M2
GLOBULIN SER CALC-MCNC: 3.3 G/DL (CALC) (ref 1.9–3.7)
GLUCOSE SERPL-MCNC: 163 MG/DL (ref 65–99)
HCT VFR BLD AUTO: 31.8 % (ref 38.5–50)
HDLC SERPL-MCNC: 53 MG/DL
HGB BLD-MCNC: 10.1 G/DL (ref 13.2–17.1)
IRON SATN MFR SERPL: 31 % (CALC) (ref 20–48)
IRON SERPL-MCNC: 78 MCG/DL (ref 50–180)
LDLC SERPL CALC-MCNC: 86 MG/DL (CALC)
LYMPHOCYTES # BLD AUTO: 1656 CELLS/UL (ref 850–3900)
LYMPHOCYTES NFR BLD AUTO: 20.7 %
MAGNESIUM SERPL-MCNC: 2.4 MG/DL (ref 1.5–2.5)
MCH RBC QN AUTO: 31.1 PG (ref 27–33)
MCHC RBC AUTO-ENTMCNC: 31.8 G/DL (ref 32–36)
MCV RBC AUTO: 97.8 FL (ref 80–100)
MONOCYTES # BLD AUTO: 712 CELLS/UL (ref 200–950)
MONOCYTES NFR BLD AUTO: 8.9 %
NEUTROPHILS # BLD AUTO: 5272 CELLS/UL (ref 1500–7800)
NEUTROPHILS NFR BLD AUTO: 65.9 %
NONHDLC SERPL-MCNC: 100 MG/DL (CALC)
PHOSPHATE SERPL-MCNC: 3.6 MG/DL (ref 2.1–4.3)
PLATELET # BLD AUTO: 177 THOUSAND/UL (ref 140–400)
PMV BLD REES-ECKER: 11.2 FL (ref 7.5–12.5)
POTASSIUM SERPL-SCNC: 3.9 MMOL/L (ref 3.5–5.3)
PROT SERPL-MCNC: 7.2 G/DL (ref 6.1–8.1)
PROT UR-MCNC: 32 MG/DL (ref 5–25)
PROT/CREAT UR: 0.4 MG/MG CREAT (ref 0.03–0.15)
PROT/CREAT UR: 400 MG/G CREAT (ref 25–148)
PTH-INTACT SERPL-MCNC: 69 PG/ML (ref 16–77)
RBC # BLD AUTO: 3.25 MILLION/UL (ref 4.2–5.8)
SODIUM SERPL-SCNC: 141 MMOL/L (ref 135–146)
TIBC SERPL-MCNC: 248 MCG/DL (CALC) (ref 250–425)
TRIGL SERPL-MCNC: 57 MG/DL
WBC # BLD AUTO: 8 THOUSAND/UL (ref 3.8–10.8)

## 2024-02-19 ENCOUNTER — TELEPHONE (OUTPATIENT)
Dept: NEPHROLOGY | Facility: CLINIC | Age: 89
End: 2024-02-19

## 2024-02-19 NOTE — TELEPHONE ENCOUNTER
LM for patient about the following, asked him to call back if he has any questions:    ----- Message from Tanner Rey MD sent at 2/19/2024  7:31 AM EST -----  Labs in general look good no changes  ----- Message -----  From: Eva Yotomo Lab Results In  Sent: 2/17/2024   1:15 AM EST  To: Tanner Rey MD

## 2024-02-19 NOTE — TELEPHONE ENCOUNTER
Patient's wife called back, went over previous message.  She expressed understanding and thanked us for the call.

## 2024-02-26 ENCOUNTER — OFFICE VISIT (OUTPATIENT)
Dept: PHYSICAL THERAPY | Facility: CLINIC | Age: 89
End: 2024-02-26
Payer: MEDICARE

## 2024-02-26 DIAGNOSIS — R53.81 PHYSICAL DECONDITIONING: Primary | ICD-10-CM

## 2024-02-26 PROCEDURE — 97116 GAIT TRAINING THERAPY: CPT | Performed by: PHYSICAL THERAPIST

## 2024-02-26 PROCEDURE — 97110 THERAPEUTIC EXERCISES: CPT | Performed by: PHYSICAL THERAPIST

## 2024-02-26 PROCEDURE — 97530 THERAPEUTIC ACTIVITIES: CPT | Performed by: PHYSICAL THERAPIST

## 2024-02-26 NOTE — PROGRESS NOTES
"Daily Note     Today's date: 2024  Patient name: Jeffry Almanzar  : 1934  MRN: [id number]  Referring provider: Debbie Maloney MD  Dx:   Encounter Diagnosis     ICD-10-CM    1. Physical deconditioning  R53.81                        Subjective: Patient offers no complaints prior to therapy.     Objective: See treatment diary below    Assessment: Patient demonstrated R > L quad and hip weakness.    Plan: Continue per plan of care.      Precautions: DM2, CKD4.    Dx: Deconditioning.    Manuals                                                        Neuro Re-Ed             Mod tandem stance  45\"x1 ea         45\"x1 ea   FT           45\"x1   FTEC          45\"x1                                                       Ther Ex             DL heelraises             Standing GA stretch             Seated HA stretch 10\"x3            Seated toe taps             Seated hip flexion             LAQ 6#  3x10         6#/ 3x10 ea   Standing hip ABD 2x10 ea         3x10 ea                             Ther Activity             STS Foam   3x10         2x10   Step ups 6\"/ 1x10 ea         6\"/ 1x20   Step downs             Gait Training             SPC 1x280 ft            No AD 1x210 ft         1x20 ft   Modalities                                          "

## 2024-03-04 ENCOUNTER — OFFICE VISIT (OUTPATIENT)
Dept: PHYSICAL THERAPY | Facility: CLINIC | Age: 89
End: 2024-03-04
Payer: MEDICARE

## 2024-03-04 DIAGNOSIS — R53.81 PHYSICAL DECONDITIONING: Primary | ICD-10-CM

## 2024-03-04 PROCEDURE — 97530 THERAPEUTIC ACTIVITIES: CPT

## 2024-03-04 PROCEDURE — 97110 THERAPEUTIC EXERCISES: CPT

## 2024-03-04 PROCEDURE — 97116 GAIT TRAINING THERAPY: CPT

## 2024-03-04 NOTE — PROGRESS NOTES
"Daily Note     Today's date: 3/4/2024  Patient name: Jeffry Almanzar  : 1934  MRN: [id number]  Referring provider: Debbie Maloney MD  Dx:   Encounter Diagnosis     ICD-10-CM    1. Physical deconditioning  R53.81             Start Time: 1405  Stop Time: 1450  Total time in clinic (min): 45 minutes    Subjective: Patient reports no falls since last visit. He notes the L knee is stiff but not painful currently.     Objective: See treatment diary below    Assessment: Patient had limited ambulation tolerance this visit secondary to fatigue. Seated rest breaks between each exercises were require. Gait belt utilized t/o entire visit for safety.     Plan: Continue per plan of care.      Precautions: DM2, CKD4.    Dx: Deconditioning.    Manuals 2/26 3/4        2/12                                                       Neuro Re-Ed             Mod tandem stance  45\"x1 ea         45\"x1 ea   FT           45\"x1   FTEC          45\"x1                                                       Ther Ex             DL heelraises             Standing GA stretch             Seated HA stretch 10\"x3 10\"x3           Seated toe taps             Seated hip flexion             LAQ 6#  3x10 6#  3x10        6#/ 3x10 ea   Standing hip ABD 2x10 ea 3x10 ea        3x10 ea                             Ther Activity             STS Foam   3x10 Foam  3x10        2x10   Step ups 6\"/ 1x10 ea 6\"/ 1x10 ea        6\"/ 1x20   Step downs             Gait Training             SPC 1x280 ft 140 ft           No AD 1x210 ft 140 ft        1x20 ft   Modalities                                          "

## 2024-03-12 ENCOUNTER — APPOINTMENT (OUTPATIENT)
Dept: PHYSICAL THERAPY | Facility: CLINIC | Age: 89
End: 2024-03-12
Payer: MEDICARE

## 2024-03-14 ENCOUNTER — APPOINTMENT (OUTPATIENT)
Dept: PHYSICAL THERAPY | Facility: CLINIC | Age: 89
End: 2024-03-14
Payer: MEDICARE

## 2024-03-19 ENCOUNTER — OFFICE VISIT (OUTPATIENT)
Dept: PHYSICAL THERAPY | Facility: CLINIC | Age: 89
End: 2024-03-19
Payer: MEDICARE

## 2024-03-19 DIAGNOSIS — R53.81 PHYSICAL DECONDITIONING: Primary | ICD-10-CM

## 2024-03-19 PROCEDURE — 97116 GAIT TRAINING THERAPY: CPT

## 2024-03-19 PROCEDURE — 97530 THERAPEUTIC ACTIVITIES: CPT

## 2024-03-19 PROCEDURE — 97110 THERAPEUTIC EXERCISES: CPT

## 2024-03-19 NOTE — PROGRESS NOTES
"Daily Note     Today's date: 3/19/2024  Patient name: Jeffry Almanzar  : 1934  MRN: [id number]  Referring provider: Debbie Maloney MD  Dx:   Encounter Diagnosis     ICD-10-CM    1. Physical deconditioning  R53.81             Start Time: 1400  Stop Time: 1445  Total time in clinic (min): 45 minutes    Subjective: Patient reports no falls since last visit. Notes his balance has been pretty good.     Objective: See treatment diary below    Assessment: Patient demonstrated improved gait distance w/o an AD this visit with seated rest required between each exercise. LOB while stepping down was self corrected by the patient. Gait belt t/o therapy for safety. Continue to progress as able.     Plan: Continue per plan of care.      Precautions: DM2, CKD4.    Dx: Deconditioning.    Manuals 2/26 3/4 3/19       2/12                                                       Neuro Re-Ed             Mod tandem stance  45\"x1 ea         45\"x1 ea   FT           45\"x1   FTEC          45\"x1                                                       Ther Ex             DL heelraises             Standing GA stretch             Seated HA stretch 10\"x3 10\"x3 10\"x3          Seated toe taps             Seated hip flexion             LAQ 6#  3x10 6#  3x10 6#  3x10       6#/ 3x10 ea   Standing hip ABD 2x10 ea 3x10 ea 3x10 ea       3x10 ea                             Ther Activity             STS Foam   3x10 Foam  3x10 Foam  3x10       2x10   Step ups 6\"/ 1x10 ea 6\"/ 1x10 ea 6\"/ 1x15 ea       6\"/ 1x20   Step downs             Gait Training             SPC 1x280 ft 140 ft 1x350 ft          No AD 1x210 ft 140 ft 1x210 ft       1x20 ft   Modalities                                          "

## 2024-03-26 ENCOUNTER — OFFICE VISIT (OUTPATIENT)
Dept: PHYSICAL THERAPY | Facility: CLINIC | Age: 89
End: 2024-03-26
Payer: MEDICARE

## 2024-03-26 DIAGNOSIS — R53.81 PHYSICAL DECONDITIONING: Primary | ICD-10-CM

## 2024-03-26 PROCEDURE — 97110 THERAPEUTIC EXERCISES: CPT | Performed by: PHYSICAL THERAPIST

## 2024-03-26 PROCEDURE — 97530 THERAPEUTIC ACTIVITIES: CPT | Performed by: PHYSICAL THERAPIST

## 2024-03-26 PROCEDURE — 97116 GAIT TRAINING THERAPY: CPT | Performed by: PHYSICAL THERAPIST

## 2024-03-26 NOTE — PROGRESS NOTES
"Daily Note     Today's date: 3/26/2024  Patient name: Jeffry Almanzar  : 1934  MRN: [id number]  Referring provider: Debbie Maloney MD  Dx:   Encounter Diagnosis     ICD-10-CM    1. Physical deconditioning  R53.81                        Subjective: Patient reports fatigue today.      Objective: See treatment diary below    Assessment: Patient demonstrated difficulty with standing hip ABD.    Plan: Continue per plan of care.      Precautions: DM2, CKD4.    Dx: Deconditioning.    Manuals 2/26 3/4 3/19 3/26      2/12                                                       Neuro Re-Ed             Mod tandem stance  45\"x1 ea         45\"x1 ea   FT           45\"x1   FTEC          45\"x1                                                       Ther Ex             DL heelraises             Standing GA stretch             Seated HA stretch 10\"x3 10\"x3 10\"x3 10\"x3 ea         Seated toe taps             Seated hip flexion             LAQ 6#  3x10 6#  3x10 6#  3x10       6#/ 3x10 ea   Standing hip ABD 2x10 ea 3x10 ea 3x10 ea 3x10 ea      3x10 ea                             Ther Activity             STS Foam   3x10 Foam  3x10 Foam  3x10 Foam/ 3x10      2x10   Step ups 6\"/ 1x10 ea 6\"/ 1x10 ea 6\"/ 1x15 ea 6\"/ 1x15 ea      6\"/ 1x20   Step downs             Gait Training             SPC 1x280 ft 140 ft 1x350 ft          No AD 1x210 ft 140 ft 1x210 ft 1x280 ft, 1x210 ft      1x20 ft   Modalities                                          "

## 2024-04-02 ENCOUNTER — OFFICE VISIT (OUTPATIENT)
Dept: PHYSICAL THERAPY | Facility: CLINIC | Age: 89
End: 2024-04-02
Payer: MEDICARE

## 2024-04-02 DIAGNOSIS — R53.81 PHYSICAL DECONDITIONING: Primary | ICD-10-CM

## 2024-04-02 PROCEDURE — 97116 GAIT TRAINING THERAPY: CPT

## 2024-04-02 PROCEDURE — 97530 THERAPEUTIC ACTIVITIES: CPT

## 2024-04-02 PROCEDURE — 97110 THERAPEUTIC EXERCISES: CPT

## 2024-04-02 NOTE — PROGRESS NOTES
"Daily Note     Today's date: 2024  Patient name: Jeffry Almanzar  : 1934  MRN: [id number]  Referring provider: Debbie Maloney MD  Dx:   Encounter Diagnosis     ICD-10-CM    1. Physical deconditioning  R53.81             Start Time: 1440  Stop Time: 1520  Total time in clinic (min): 40 minutes    Subjective: Patient reports slight L knee pain prior to therapy.     Objective: See treatment diary below    Assessment: Patient has limited ambulation tolerance secondary to fatigue. Seated breaks were given t/o therapy as able.     Plan: Continue per plan of care.      Precautions: DM2, CKD4.    Dx: Deconditioning.    Manuals 2/26 3/4 3/19 3/26 4/2     2/12                                                       Neuro Re-Ed             Mod tandem stance  45\"x1 ea         45\"x1 ea   FT           45\"x1   FTEC          45\"x1                                                       Ther Ex             DL heelraises             Standing GA stretch             Seated HA stretch 10\"x3 10\"x3 10\"x3 10\"x3 ea 10\"x3 ea        Seated toe taps             Seated hip flexion             LAQ 6#  3x10 6#  3x10 6#  3x10       6#/ 3x10 ea   Standing hip ABD 2x10 ea 3x10 ea 3x10 ea 3x10 ea 3x10 ea     3x10 ea                             Ther Activity             STS Foam   3x10 Foam  3x10 Foam  3x10 Foam/ 3x10 Foam/ 3x10     2x10   Step ups 6\"/ 1x10 ea 6\"/ 1x10 ea 6\"/ 1x15 ea 6\"/ 1x15 ea 6\"/ 1x15 ea     6\"/ 1x20   Step downs             Gait Training             SPC 1x280 ft 140 ft 1x350 ft          No AD 1x210 ft 140 ft 1x210 ft 1x280 ft, 1x210 ft 1x210 ft  1x280 ft     1x20 ft   Modalities                                          "

## 2024-04-09 ENCOUNTER — APPOINTMENT (OUTPATIENT)
Dept: PHYSICAL THERAPY | Facility: CLINIC | Age: 89
End: 2024-04-09
Payer: MEDICARE

## 2024-04-11 ENCOUNTER — APPOINTMENT (OUTPATIENT)
Dept: PHYSICAL THERAPY | Facility: CLINIC | Age: 89
End: 2024-04-11
Payer: MEDICARE

## 2024-04-18 ENCOUNTER — APPOINTMENT (OUTPATIENT)
Dept: PHYSICAL THERAPY | Facility: CLINIC | Age: 89
End: 2024-04-18
Payer: MEDICARE

## 2024-04-22 ENCOUNTER — APPOINTMENT (OUTPATIENT)
Dept: PHYSICAL THERAPY | Facility: CLINIC | Age: 89
End: 2024-04-22
Payer: MEDICARE

## 2024-04-25 ENCOUNTER — OFFICE VISIT (OUTPATIENT)
Dept: FAMILY MEDICINE CLINIC | Facility: CLINIC | Age: 89
End: 2024-04-25
Payer: MEDICARE

## 2024-04-25 VITALS
DIASTOLIC BLOOD PRESSURE: 88 MMHG | SYSTOLIC BLOOD PRESSURE: 130 MMHG | RESPIRATION RATE: 16 BRPM | TEMPERATURE: 96.8 F | HEIGHT: 65 IN | BODY MASS INDEX: 23.99 KG/M2 | HEART RATE: 85 BPM | OXYGEN SATURATION: 98 % | WEIGHT: 144 LBS

## 2024-04-25 DIAGNOSIS — E11.22 TYPE 2 DIABETES MELLITUS WITH STAGE 4 CHRONIC KIDNEY DISEASE, WITHOUT LONG-TERM CURRENT USE OF INSULIN (HCC): ICD-10-CM

## 2024-04-25 DIAGNOSIS — N18.4 CKD STAGE 4 DUE TO TYPE 2 DIABETES MELLITUS (HCC): ICD-10-CM

## 2024-04-25 DIAGNOSIS — N18.4 ANEMIA IN STAGE 4 CHRONIC KIDNEY DISEASE  (HCC): ICD-10-CM

## 2024-04-25 DIAGNOSIS — N18.4 TYPE 2 DIABETES MELLITUS WITH STAGE 4 CHRONIC KIDNEY DISEASE, WITHOUT LONG-TERM CURRENT USE OF INSULIN (HCC): ICD-10-CM

## 2024-04-25 DIAGNOSIS — G30.0 MILD EARLY ONSET ALZHEIMER'S DEMENTIA WITHOUT BEHAVIORAL DISTURBANCE, PSYCHOTIC DISTURBANCE, MOOD DISTURBANCE, OR ANXIETY (HCC): Primary | ICD-10-CM

## 2024-04-25 DIAGNOSIS — F02.A0 MILD EARLY ONSET ALZHEIMER'S DEMENTIA WITHOUT BEHAVIORAL DISTURBANCE, PSYCHOTIC DISTURBANCE, MOOD DISTURBANCE, OR ANXIETY (HCC): Primary | ICD-10-CM

## 2024-04-25 DIAGNOSIS — E78.2 MIXED HYPERLIPIDEMIA: ICD-10-CM

## 2024-04-25 DIAGNOSIS — I12.9 PARENCHYMAL RENAL HYPERTENSION, STAGE 1 THROUGH STAGE 4 OR UNSPECIFIED CHRONIC KIDNEY DISEASE: ICD-10-CM

## 2024-04-25 DIAGNOSIS — N18.4 BENIGN HYPERTENSION WITH CKD (CHRONIC KIDNEY DISEASE) STAGE IV (HCC): ICD-10-CM

## 2024-04-25 DIAGNOSIS — D63.1 ANEMIA IN STAGE 4 CHRONIC KIDNEY DISEASE  (HCC): ICD-10-CM

## 2024-04-25 DIAGNOSIS — E11.21 DIABETIC NEPHROPATHY ASSOCIATED WITH TYPE 2 DIABETES MELLITUS (HCC): ICD-10-CM

## 2024-04-25 DIAGNOSIS — E11.22 CKD STAGE 4 DUE TO TYPE 2 DIABETES MELLITUS (HCC): ICD-10-CM

## 2024-04-25 DIAGNOSIS — I12.9 BENIGN HYPERTENSION WITH CKD (CHRONIC KIDNEY DISEASE) STAGE IV (HCC): ICD-10-CM

## 2024-04-25 DIAGNOSIS — R80.1 PERSISTENT PROTEINURIA: ICD-10-CM

## 2024-04-25 DIAGNOSIS — E78.5 DYSLIPIDEMIA: ICD-10-CM

## 2024-04-25 DIAGNOSIS — N18.4 STAGE 4 CHRONIC KIDNEY DISEASE (HCC): ICD-10-CM

## 2024-04-25 DIAGNOSIS — E55.9 MILD VITAMIN D DEFICIENCY: ICD-10-CM

## 2024-04-25 LAB — SL AMB POCT HEMOGLOBIN AIC: 6.9 (ref ?–6.5)

## 2024-04-25 PROCEDURE — 99214 OFFICE O/P EST MOD 30 MIN: CPT | Performed by: FAMILY MEDICINE

## 2024-04-25 PROCEDURE — 83036 HEMOGLOBIN GLYCOSYLATED A1C: CPT | Performed by: FAMILY MEDICINE

## 2024-04-25 RX ORDER — LOSARTAN POTASSIUM 25 MG/1
25 TABLET ORAL DAILY
Qty: 90 TABLET | Refills: 1 | Status: SHIPPED | OUTPATIENT
Start: 2024-04-25

## 2024-04-25 RX ORDER — DAPAGLIFLOZIN 5 MG/1
10 TABLET, FILM COATED ORAL DAILY
Qty: 180 TABLET | Refills: 1 | Status: SHIPPED | OUTPATIENT
Start: 2024-04-25 | End: 2024-05-01 | Stop reason: SDUPTHER

## 2024-04-25 RX ORDER — AMLODIPINE BESYLATE 10 MG/1
10 TABLET ORAL DAILY
Qty: 90 TABLET | Refills: 1 | Status: SHIPPED | OUTPATIENT
Start: 2024-04-25

## 2024-04-25 RX ORDER — CHLORTHALIDONE 25 MG/1
25 TABLET ORAL DAILY
Qty: 90 TABLET | Refills: 3 | Status: SHIPPED | OUTPATIENT
Start: 2024-04-25

## 2024-04-25 RX ORDER — BRIMONIDINE TARTRATE 1.5 MG/ML
SOLUTION/ DROPS OPHTHALMIC
COMMUNITY
Start: 2024-04-25

## 2024-04-25 NOTE — PATIENT INSTRUCTIONS
Start taking Farxiga 5 mg 2 tablets daily instead of 10 mg daily as your insurance does not cover 10 mg.  You will take amlodipine 10 mg 1 tablet oral daily and chlorthalidone 25 mg 1 tablet oral daily for your blood pressure.  You will continue atorvastatin 10 mg oral daily.  Continue baby aspirin.  Continue losartan 25 mg

## 2024-04-26 NOTE — PROGRESS NOTES
Subjective:      Patient ID: Jeffry Almanzar is a 89 y.o. male.    With type 2 diabetes, hypertension,seasonal asthma, CKD-3 presents for follow up, wife gives history and all the information due to his advanced dementia and confusion  He does have presbyacusis and follows with ophthalmologist regularly for CRVO  He has not been taking FARXIGA and takes it only when his BG is high  He is otherwise ok, no complaints  mini-mental status exam-failed, clockdraw failed, 3 word recall        Past Medical History:   Diagnosis Date    Cataracts, bilateral        Family History   Problem Relation Age of Onset    Diabetes Mother        Past Surgical History:   Procedure Laterality Date    CATARACT EXTRACTION Bilateral 07/15/2012    COLONOSCOPY      CYSTOSCOPY  01/15/2018    Last assessed 9/14/17, diagnostic    HERNIA REPAIR      INSTILLATION MYTOMYCIN N/A 10/25/2017    Procedure: INSTILLATION OF MITOMYCIN C;  Surgeon: Danish Esposito MD;  Location: [facility];  Service: Urology    LA CYSTO W/REMOVAL OF LESIONS SMALL N/A 10/25/2017    Procedure: CYSTOSCOPY; BLADDER BIOPSY WITH FULGERATION;  Surgeon: Danish Esposito MD;  Location: [facility];  Service: Urology    TONSILLECTOMY      TRANSURETHRAL RESECTION OF BLADDER TUMOR N/A 10/25/2017    Procedure: TUR BLADDER TUMOR;  Surgeon: Danish Esposito MD;  Location: [facility];  Service: Urology    WISDOM TOOTH EXTRACTION          reports that he quit smoking about 60 years ago. His smoking use included cigarettes. He has never used smokeless tobacco. He reports that he does not drink alcohol and does not use drugs.      Current Outpatient Medications:     amLODIPine (NORVASC) 10 mg tablet, Take 1 tablet (10 mg total) by mouth daily, Disp: 90 tablet, Rfl: 1    aspirin 325 mg tablet, Take 325 mg by mouth daily, Disp: , Rfl:     atorvastatin (LIPITOR) 10 mg tablet, Take 1 tablet (10 mg total) by mouth daily, Disp: 90 tablet, Rfl: 3    brimonidine (ALPHAGAN P) 0.15 % ophthalmic  solution, , Disp: , Rfl:     chlorthalidone 25 mg tablet, Take 1 tablet (25 mg total) by mouth daily In morning, Disp: 90 tablet, Rfl: 3    Cyanocobalamin (Vitamin B-12) 1000 MCG/15ML LIQD, Take by mouth daily, Disp: , Rfl:     dapagliflozin 5 MG TABS, Take 2 tablets (10 mg total) by mouth daily, Disp: 180 tablet, Rfl: 1    Dorzolamide HCl-Timolol Mal PF 2-0.5 % SOLN, INSTILL 1 DROP INTO EACH EYE TWICE DAILY, Disp: , Rfl:     losartan (COZAAR) 25 mg tablet, Take 1 tablet (25 mg total) by mouth daily, Disp: 90 tablet, Rfl: 1    prednisoLONE acetate (PRED FORTE) 1 % ophthalmic suspension, INSTILL 1 DROP INTO LEFT EYE TWICE DAILY, Disp: , Rfl:     Blood Glucose Monitoring Suppl (OneTouch Verio Reflect) w/Device KIT, Check blood sugars twice daily. Please substitute with appropriate alternative as covered by patient's insurance. Dx: E11.65, Disp: 1 kit, Rfl: 0    Blood Glucose Monitoring Suppl (OneTouch Verio Reflect) w/Device KIT, Check blood sugars once daily. Please substitute with appropriate alternative as covered by patient's insurance. Dx: E11.65, Disp: 1 kit, Rfl: 0    glucose blood (OneTouch Verio) test strip, Check blood sugars once daily. Please substitute with appropriate alternative as covered by patient's insurance. Dx: E11.65, Disp: 100 each, Rfl: 3    OneTouch Delica Lancets 33G MISC, Check blood sugars once daily. Please substitute with appropriate alternative as covered by patient's insurance. Dx: E11.65, Disp: 100 each, Rfl: 3    Vyzulta 0.024 % SOLN, Administer 1 drop to both eyes daily at bedtime, Disp: , Rfl:     The following portions of the patient's history were reviewed and updated as appropriate: allergies, current medications, past family history, past medical history, past social history, past surgical history and problem list.    Review of Systems   Constitutional:  Negative for chills and fever.   HENT:  Negative for congestion, rhinorrhea and sore throat.    Eyes:  Negative for discharge,  "redness and itching.   Respiratory:  Negative for chest tightness, shortness of breath and wheezing.    Cardiovascular:  Positive for leg swelling. Negative for chest pain and palpitations.   Gastrointestinal:  Negative for abdominal pain, constipation and diarrhea.   Genitourinary:  Negative for dysuria.   Skin:  Negative for pallor and rash.   Neurological:  Negative for dizziness, weakness, numbness and headaches.         PHQ-2/9 Depression Screening    Little interest or pleasure in doing things: 0 - not at all  Feeling down, depressed, or hopeless: 0 - not at all  PHQ-2 Score: 0  PHQ-2 Interpretation: Negative depression screen             Objective:    /88   Pulse 85   Temp (!) 96.8 °F (36 °C) (Tympanic)   Resp 16   Ht 5' 5\" (1.651 m)   Wt 65.3 kg (144 lb)   SpO2 98%   BMI 23.96 kg/m²      Physical Exam  Vitals and nursing note reviewed.   Constitutional:       Appearance: Normal appearance. He is obese.   HENT:      Head: Normocephalic and atraumatic.      Right Ear: External ear normal.      Left Ear: External ear normal.   Eyes:      General:         Right eye: No discharge.         Left eye: No discharge.      Conjunctiva/sclera: Conjunctivae normal.   Cardiovascular:      Rate and Rhythm: Normal rate and regular rhythm.      Heart sounds: No murmur heard.  Pulmonary:      Effort: Pulmonary effort is normal.      Breath sounds: Normal breath sounds. No wheezing.   Abdominal:      General: There is no distension.      Palpations: Abdomen is soft.      Tenderness: There is no abdominal tenderness.   Musculoskeletal:      Right lower leg: Edema (trace) present.      Left lower leg: Edema (trace) present.   Skin:     Findings: No lesion or rash.   Neurological:      Mental Status: He is alert. Mental status is at baseline.   Psychiatric:         Mood and Affect: Mood normal.         Thought Content: Thought content normal.           Recent Results (from the past 8736 hour(s))   Hm Diabetes Eye " Exam    Collection Time: 05/30/23  2:55 PM   Result Value Ref Range    Severity Normal     Right Eye Diabetic Retinopathy None     Left Eye Diabetic Retinopathy None    Protein electrophoresis, serum    Collection Time: 06/14/23  2:45 PM   Result Value Ref Range    Protein, Total 7.2 6.1 - 8.1 g/dL    Albumin, Electrophoresis 3.7 (L) 3.8 - 4.8 g/dL    Alpha-1 Globulin 0.3 0.2 - 0.3 g/dL    Alpha-2 Globulin 0.8 0.5 - 0.9 g/dL    Beta 1 Globulin 0.4 0.4 - 0.6 g/dL    Beta 2 Globulin 0.4 0.2 - 0.5 g/dL    Gamma Globulin 1.6 0.8 - 1.7 g/dL    Interpretation     PTH, Intact and Calcium    Collection Time: 06/14/23  2:45 PM   Result Value Ref Range    PTH, Intact 85 (H) 16 - 77 pg/mL    Calcium 9.1 8.6 - 10.3 mg/dL   Magnesium    Collection Time: 06/14/23  2:45 PM   Result Value Ref Range    Magnesium, Serum 2.4 1.5 - 2.5 mg/dL   Phosphorus    Collection Time: 06/14/23  2:45 PM   Result Value Ref Range    Phosphorus, Serum 3.8 2.1 - 4.3 mg/dL   TIBC    Collection Time: 06/14/23  2:45 PM   Result Value Ref Range    Iron, Serum 99 50 - 180 mcg/dL    Total Iron Binding Capacity (TIBC) 250 250 - 425 mcg/dL (calc)    Iron Saturation 40 20 - 48 % (calc)   Comprehensive metabolic panel    Collection Time: 06/14/23  2:45 PM   Result Value Ref Range    Glucose, Random 143 (H) 65 - 139 mg/dL    BUN 42 (H) 7 - 25 mg/dL    Creatinine 2.25 (H) 0.70 - 1.22 mg/dL    eGFR 27 (L) > OR = 60 mL/min/1.73m2    SL AMB BUN/CREATININE RATIO 19 6 - 22 (calc)    Sodium 140 135 - 146 mmol/L    Potassium 4.0 3.5 - 5.3 mmol/L    Chloride 109 98 - 110 mmol/L    CO2 24 20 - 32 mmol/L    Calcium 9.1 8.6 - 10.3 mg/dL    Protein, Total 7.2 6.1 - 8.1 g/dL    Albumin 3.8 3.6 - 5.1 g/dL    Globulin 3.4 1.9 - 3.7 g/dL (calc)    Albumin/Globulin Ratio 1.1 1.0 - 2.5 (calc)    TOTAL BILIRUBIN 0.6 0.2 - 1.2 mg/dL    Alkaline Phosphatase 64 35 - 144 U/L    AST 13 10 - 35 U/L    ALT 9 9 - 46 U/L   Creatine Kinase, Total    Collection Time: 06/14/23  2:45 PM    Result Value Ref Range    Creatine Kinase, Total 91 44 - 196 U/L   CBC and differential    Collection Time: 06/14/23  2:45 PM   Result Value Ref Range    White Blood Cell Count 7.4 3.8 - 10.8 Thousand/uL    Red Blood Cell Count 3.28 (L) 4.20 - 5.80 Million/uL    Hemoglobin 10.3 (L) 13.2 - 17.1 g/dL    HCT 31.5 (L) 38.5 - 50.0 %    MCV 96.0 80.0 - 100.0 fL    MCH 31.4 27.0 - 33.0 pg    MCHC 32.7 32.0 - 36.0 g/dL    RDW 11.8 11.0 - 15.0 %    Platelet Count 165 140 - 400 Thousand/uL    SL AMB MPV 11.3 7.5 - 12.5 fL    Neutrophils (Absolute) 4,780 1,500 - 7,800 cells/uL    Lymphocytes (Absolute) 1,458 850 - 3,900 cells/uL    Monocytes (Absolute) 807 200 - 950 cells/uL    Eosinophils (Absolute) 318 15 - 500 cells/uL    Basophils ABS 37 0 - 200 cells/uL    Neutrophils 64.6 %    Lymphocytes 19.7 %    Monocytes 10.9 %    Eosinophils 4.3 %    Basophils PCT 0.5 %    Always Message     Immunofixation, Serum(Reflex Only-Do Not Order)    Collection Time: 06/14/23  2:45 PM   Result Value Ref Range    Interpretation:     Immunoglobulins    Collection Time: 06/14/23  2:45 PM   Result Value Ref Range    IgA 416 (H) 70 - 320 mg/dL    IgG 1,479 600 - 1,540 mg/dL     (H) 50 - 300 mg/dL   Kappa/Lambda Light Chains Free With Ratio, Serum    Collection Time: 06/14/23  2:45 PM   Result Value Ref Range    Ig Kappa Free Light Chain 67.4 (H) 3.3 - 19.4 mg/L    Ig Lambda Free Light Chain 52.3 (H) 5.7 - 26.3 mg/L    Kappa/Lambda FluidC Ratio 1.29 0.26 - 1.65   Ferritin    Collection Time: 06/14/23  2:45 PM   Result Value Ref Range    Ferritin 367 24 - 380 ng/mL   Protein, Total w/Creat, Random Urine    Collection Time: 06/14/23  2:45 PM   Result Value Ref Range    Creatinine, Urine 104 20 - 320 mg/dL    Protein/Creat Ratio 519 (H) 25 - 148 mg/g creat    Protein/Creat Ratio 0.519 (H) 0.025 - 0.148 mg/mg creat    Total Protein, Urine 54 (H) 5 - 25 mg/dL   Albumin / creatinine urine ratio    Collection Time: 06/27/23 12:08 PM   Result  Value Ref Range    Creatinine, Ur 108.0 mg/dL    Albumin,U,Random 79.6 (H) 0.0 - 20.0 mg/L    Albumin Creat Ratio 74 (H) 0 - 30 mg/g creatinine   POCT hemoglobin A1c    Collection Time: 06/27/23  2:14 PM   Result Value Ref Range    Hemoglobin A1C 6.5 6.5   PTH, Intact and Calcium    Collection Time: 09/15/23 12:53 PM   Result Value Ref Range    PTH, Intact 67 16 - 77 pg/mL    Calcium 9.2 8.6 - 10.3 mg/dL   Magnesium    Collection Time: 09/15/23 12:53 PM   Result Value Ref Range    Magnesium, Serum 2.4 1.5 - 2.5 mg/dL   Phosphorus    Collection Time: 09/15/23 12:53 PM   Result Value Ref Range    Phosphorus, Serum 3.6 2.1 - 4.3 mg/dL   Comprehensive metabolic panel    Collection Time: 09/15/23 12:53 PM   Result Value Ref Range    Glucose, Random 136 (H) 65 - 99 mg/dL    BUN 42 (H) 7 - 25 mg/dL    Creatinine 2.36 (H) 0.70 - 1.22 mg/dL    eGFR 26 (L) > OR = 60 mL/min/1.73m2    SL AMB BUN/CREATININE RATIO 18 6 - 22 (calc)    Sodium 142 135 - 146 mmol/L    Potassium 3.9 3.5 - 5.3 mmol/L    Chloride 110 98 - 110 mmol/L    CO2 24 20 - 32 mmol/L    Calcium 9.2 8.6 - 10.3 mg/dL    Protein, Total 7.3 6.1 - 8.1 g/dL    Albumin 3.9 3.6 - 5.1 g/dL    Globulin 3.4 1.9 - 3.7 g/dL (calc)    Albumin/Globulin Ratio 1.1 1.0 - 2.5 (calc)    TOTAL BILIRUBIN 0.6 0.2 - 1.2 mg/dL    Alkaline Phosphatase 62 35 - 144 U/L    AST 14 10 - 35 U/L    ALT 10 9 - 46 U/L   CBC and differential    Collection Time: 09/15/23 12:53 PM   Result Value Ref Range    White Blood Cell Count 7.8 3.8 - 10.8 Thousand/uL    Red Blood Cell Count 3.41 (L) 4.20 - 5.80 Million/uL    Hemoglobin 10.9 (L) 13.2 - 17.1 g/dL    HCT 33.3 (L) 38.5 - 50.0 %    MCV 97.7 80.0 - 100.0 fL    MCH 32.0 27.0 - 33.0 pg    MCHC 32.7 32.0 - 36.0 g/dL    RDW 11.5 11.0 - 15.0 %    Platelet Count 168 140 - 400 Thousand/uL    SL AMB MPV 11.3 7.5 - 12.5 fL    Neutrophils (Absolute) 4,984 1,500 - 7,800 cells/uL    Lymphocytes (Absolute) 1,630 850 - 3,900 cells/uL    Monocytes (Absolute) 741  200 - 950 cells/uL    Eosinophils (Absolute) 398 15 - 500 cells/uL    Basophils ABS 47 0 - 200 cells/uL    Neutrophils 63.9 %    Lymphocytes 20.9 %    Monocytes 9.5 %    Eosinophils 5.1 %    Basophils PCT 0.6 %   Protein, Total w/Creat, Random Urine    Collection Time: 09/15/23 12:53 PM   Result Value Ref Range    Creatinine, Urine 69 20 - 320 mg/dL    Protein/Creat Ratio 449 (H) 25 - 148 mg/g creat    Protein/Creat Ratio 0.449 (H) 0.025 - 0.148 mg/mg creat    Total Protein, Urine 31 (H) 5 - 25 mg/dL   POCT hemoglobin A1c    Collection Time: 10/24/23  4:50 PM   Result Value Ref Range    Hemoglobin A1C 7.0 (A) 6.5   POCT hemoglobin A1c    Collection Time: 01/25/24  1:40 PM   Result Value Ref Range    Hemoglobin A1C 7.5 (A) 6.5   Lipid Panel with Direct LDL reflex    Collection Time: 02/16/24 12:42 PM   Result Value Ref Range    Total Cholesterol 153 <200 mg/dL    HDL 53 > OR = 40 mg/dL    Triglycerides 57 <150 mg/dL    LDL Calculated 86 mg/dL (calc)    Chol HDLC Ratio 2.9 <5.0 (calc)    Non-HDL Cholesterol 100 <130 mg/dL (calc)   PTH, Intact and Calcium    Collection Time: 02/16/24 12:42 PM   Result Value Ref Range    PTH, Intact 69 16 - 77 pg/mL    Calcium 9.2 8.6 - 10.3 mg/dL   Magnesium    Collection Time: 02/16/24 12:42 PM   Result Value Ref Range    Magnesium, Serum 2.4 1.5 - 2.5 mg/dL   Phosphorus    Collection Time: 02/16/24 12:42 PM   Result Value Ref Range    Phosphorus, Serum 3.6 2.1 - 4.3 mg/dL   TIBC    Collection Time: 02/16/24 12:42 PM   Result Value Ref Range    Iron, Serum 78 50 - 180 mcg/dL    Total Iron Binding Capacity (TIBC) 248 (L) 250 - 425 mcg/dL (calc)    Iron Saturation 31 20 - 48 % (calc)   Comprehensive metabolic panel    Collection Time: 02/16/24 12:42 PM   Result Value Ref Range    Glucose, Random 163 (H) 65 - 99 mg/dL    BUN 46 (H) 7 - 25 mg/dL    Creatinine 2.18 (H) 0.70 - 1.22 mg/dL    eGFR 28 (L) > OR = 60 mL/min/1.73m2    SL AMB BUN/CREATININE RATIO 21 6 - 22 (calc)    Sodium 141 135  - 146 mmol/L    Potassium 3.9 3.5 - 5.3 mmol/L    Chloride 109 98 - 110 mmol/L    CO2 23 20 - 32 mmol/L    Calcium 9.2 8.6 - 10.3 mg/dL    Protein, Total 7.2 6.1 - 8.1 g/dL    Albumin 3.9 3.6 - 5.1 g/dL    Globulin 3.3 1.9 - 3.7 g/dL (calc)    Albumin/Globulin Ratio 1.2 1.0 - 2.5 (calc)    TOTAL BILIRUBIN 0.5 0.2 - 1.2 mg/dL    Alkaline Phosphatase 52 35 - 144 U/L    AST 14 10 - 35 U/L    ALT 11 9 - 46 U/L   CBC and differential    Collection Time: 02/16/24 12:42 PM   Result Value Ref Range    White Blood Cell Count 8.0 3.8 - 10.8 Thousand/uL    Red Blood Cell Count 3.25 (L) 4.20 - 5.80 Million/uL    Hemoglobin 10.1 (L) 13.2 - 17.1 g/dL    HCT 31.8 (L) 38.5 - 50.0 %    MCV 97.8 80.0 - 100.0 fL    MCH 31.1 27.0 - 33.0 pg    MCHC 31.8 (L) 32.0 - 36.0 g/dL    RDW 11.7 11.0 - 15.0 %    Platelet Count 177 140 - 400 Thousand/uL    SL AMB MPV 11.2 7.5 - 12.5 fL    Neutrophils (Absolute) 5,272 1,500 - 7,800 cells/uL    Lymphocytes (Absolute) 1,656 850 - 3,900 cells/uL    Monocytes (Absolute) 712 200 - 950 cells/uL    Eosinophils (Absolute) 312 15 - 500 cells/uL    Basophils ABS 48 0 - 200 cells/uL    Neutrophils 65.9 %    Lymphocytes 20.7 %    Monocytes 8.9 %    Eosinophils 3.9 %    Basophils PCT 0.6 %    Always Message     Ferritin    Collection Time: 02/16/24 12:42 PM   Result Value Ref Range    Ferritin 284 24 - 380 ng/mL   Vitamin D 25 hydroxy    Collection Time: 02/16/24 12:42 PM   Result Value Ref Range    Vitamin D, 25-Hydroxy, Serum 63 30 - 100 ng/mL   Protein, Total w/Creat, Random Urine    Collection Time: 02/16/24 12:42 PM   Result Value Ref Range    Creatinine, Urine 80 20 - 320 mg/dL    Protein/Creat Ratio 400 (H) 25 - 148 mg/g creat    Protein/Creat Ratio 0.400 (H) 0.025 - 0.148 mg/mg creat    Total Protein, Urine 32 (H) 5 - 25 mg/dL   POCT hemoglobin A1c    Collection Time: 04/25/24  3:58 PM   Result Value Ref Range    Hemoglobin A1C 6.9 (A) 6.5       Laboratory Results: I have personally reviewed the  pertinent laboratory results/reports     Radiology/Other Diagnostic Testing Results: I have personally reviewed pertinent reports.      US kidney and bladder with pvr    Result Date: 11/30/2021  RENAL ULTRASOUND INDICATION:   I12.9: Hypertensive chronic kidney disease with stage 1 through stage 4 chronic kidney disease, or unspecified chronic kidney disease N18.4: Chronic kidney disease, stage 4 (severe) E11.22: Type 2 diabetes mellitus with diabetic chronic kidney disease N18.1: Chronic kidney disease, stage 1 E78.5: Hyperlipidemia, unspecified N18.4: Chronic kidney disease, stage 4 (severe) D63.1: Anemia in chr. COMPARISON: CT 8/20/1718 TECHNIQUE:   Ultrasound of the retroperitoneum was performed with a curvilinear transducer utilizing volumetric sweeps and still imaging techniques. FINDINGS: KIDNEYS: There is mild to moderate bilateral renal atrophy with measurements below. Right kidney:  7.3 x 5.1 x 4.9 cm. Left kidney:  7.2 x 5.5 x 4.4 cm. Right kidney The renal parenchyma is diffusely echogenic consistent with medical renal disease. No suspicious masses detected. No hydronephrosis. No shadowing calculi. No perinephric fluid collections. Left kidney The renal parenchyma is diffusely echogenic consistent with medical renal disease. No suspicious masses detected. No hydronephrosis. No shadowing calculi. No perinephric fluid collections. URETERS: Nonvisualized. BLADDER: Normally distended. No focal thickening or mass lesions. Bilateral ureteral jets detected. Prevoid bladder volume 143 cc's. Postvoid bladder volume 47 cc.     1. Small echogenic kidneys bilaterally compatible with medical renal disease. 2. Small postvoid residual. Workstation performed: [id number]        Assessment/Plan:  Problem List Items Addressed This Visit          Cardiovascular and Mediastinum    Parenchymal renal hypertension    Relevant Medications    losartan (COZAAR) 25 mg tablet    amLODIPine (NORVASC) 10 mg tablet    chlorthalidone 25  mg tablet    Benign 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 (HCC)    Relevant Medications    losartan (COZAAR) 25 mg tablet    amLODIPine (NORVASC) 10 mg tablet    chlorthalidone 25 mg tablet       Endocrine    Diabetic nephropathy associated with type 2 diabetes mellitus (HCC)    Relevant Medications    dapagliflozin 5 MG TABS    chlorthalidone 25 mg tablet    Other Relevant Orders    POCT hemoglobin A1c (Completed)    Type 2 diabetes mellitus with stage 4 chronic kidney disease, without long-term current use of insulin (HCC)    Relevant Medications    dapagliflozin 5 MG TABS    amLODIPine (NORVASC) 10 mg tablet    chlorthalidone 25 mg tablet       Nervous and Auditory    Dementia (HCC) - Primary       Genitourinary    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  (HCC)    Relevant Medications    losartan (COZAAR) 25 mg tablet    amLODIPine (NORVASC) 10 mg tablet    chlorthalidone 25 mg tablet    Stage 4 chronic kidney disease (HCC)    Relevant Medications    losartan (COZAAR) 25 mg tablet    amLODIPine (NORVASC) 10 mg tablet    chlorthalidone 25 mg tablet       Other    Dyslipidemia    Relevant Medications    losartan (COZAAR) 25 mg tablet    amLODIPine (NORVASC) 10 mg tablet    Mild vitamin D deficiency    Relevant Medications    losartan (COZAAR) 25 mg tablet    Persistent proteinuria    Relevant Medications    losartan (COZAAR) 25 mg tablet     Other Visit Diagnoses       Mixed hyperlipidemia        Relevant Medications    losartan (COZAAR) 25 mg tablet    amLODIPine (NORVASC) 10 mg tablet    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)        Relevant Medications    dapagliflozin 5 MG TABS    chlorthalidone 25 mg tablet        BP uncontrolled -160/88 initially per wife also has been running high at home  Start taking Farxiga 5 mg 2 tablets daily instead of 10 mg daily as your insurance does not cover 10 mg.  You will take amlodipine 10 mg 1 tablet oral daily and chlorthalidone 25 mg 1 tablet  "oral daily for your blood pressure.  You will continue atorvastatin 10 mg oral daily.  Continue baby aspirin.  Continue losartan 25 mg               Read package inserts for all medications before starting a new medications, call me if you have any questions.    Patient was given opportunity to ask questions and all questions were answered.    Disclaimer: Portions of the record may have been created with voice recognition software. Occasional wrong word or \"sound a like\" substitutions may have occurred due to the inherent limitations of voice recognition software. Read the chart carefully and recognize, using context, where substitutions have occurred. I have used the Epic copy/forward function to compose this note. I have reviewed my current note to ensure it reflects the current patient status, exam, assessment and plan.    "

## 2024-05-01 ENCOUNTER — TELEPHONE (OUTPATIENT)
Age: 89
End: 2024-05-01

## 2024-05-01 DIAGNOSIS — N18.4 CKD STAGE 4 DUE TO TYPE 2 DIABETES MELLITUS (HCC): ICD-10-CM

## 2024-05-01 DIAGNOSIS — E11.22 CKD STAGE 4 DUE TO TYPE 2 DIABETES MELLITUS (HCC): ICD-10-CM

## 2024-05-01 RX ORDER — DAPAGLIFLOZIN 10 MG/1
10 TABLET, FILM COATED ORAL DAILY
Qty: 90 TABLET | Refills: 3 | Status: SHIPPED | OUTPATIENT
Start: 2024-05-01

## 2024-05-31 ENCOUNTER — TELEPHONE (OUTPATIENT)
Age: 89
End: 2024-05-31

## 2024-05-31 DIAGNOSIS — E11.21 DIABETIC NEPHROPATHY ASSOCIATED WITH TYPE 2 DIABETES MELLITUS (HCC): Primary | ICD-10-CM

## 2024-06-06 ENCOUNTER — OFFICE VISIT (OUTPATIENT)
Dept: PODIATRY | Facility: CLINIC | Age: 89
End: 2024-06-06
Payer: MEDICARE

## 2024-06-06 VITALS
DIASTOLIC BLOOD PRESSURE: 82 MMHG | HEART RATE: 90 BPM | OXYGEN SATURATION: 96 % | BODY MASS INDEX: 23.66 KG/M2 | SYSTOLIC BLOOD PRESSURE: 136 MMHG | WEIGHT: 142 LBS | HEIGHT: 65 IN

## 2024-06-06 DIAGNOSIS — N18.4 TYPE 2 DIABETES MELLITUS WITH STAGE 4 CHRONIC KIDNEY DISEASE, WITHOUT LONG-TERM CURRENT USE OF INSULIN (HCC): ICD-10-CM

## 2024-06-06 DIAGNOSIS — E11.22 TYPE 2 DIABETES MELLITUS WITH STAGE 4 CHRONIC KIDNEY DISEASE, WITHOUT LONG-TERM CURRENT USE OF INSULIN (HCC): ICD-10-CM

## 2024-06-06 DIAGNOSIS — B35.1 ONYCHOMYCOSIS: Primary | ICD-10-CM

## 2024-06-06 DIAGNOSIS — I73.9 PVD (PERIPHERAL VASCULAR DISEASE) (HCC): ICD-10-CM

## 2024-06-06 PROCEDURE — 99202 OFFICE O/P NEW SF 15 MIN: CPT | Performed by: PODIATRIST

## 2024-06-06 PROCEDURE — 11721 DEBRIDE NAIL 6 OR MORE: CPT | Performed by: PODIATRIST

## 2024-06-06 NOTE — PROGRESS NOTES
Assessment/Plan:     The patient's clinical examination today significant for thickened and dystrophic, brittle pedal nail plates with subungual debris consistent with onychomycosis x 10.  There are no open wounds nor callus lesions.  The interdigital spaces are clear without maceration.  Pedal pulses are nonpalpable bilaterally.  Cap refill time to the toes is less than 3 seconds x 10.  Temperature gradient is mildly increased bilaterally.  Skin is thin with decreased turgor.  There is absence of hair growth to the bilateral extremities.    The pedal nail plates are sharply debrided with a sterile nail clipper x 10 without complication.  The nails were then mechanically reduced in thickness and girth utilizing a rotary bur without incident.  There are no other acute pedal issues noted today.  The patient's most recent hemoglobin A1c from April 2024 was 6.9, prior to that it was 7.5 in January 2024.  A diabetic foot examination was also performed and the patient is deemed to be in the low to moderate risk category.    Recommend follow-up in 2 to 3 months for continued at risk diabetic footcare.     Diagnoses and all orders for this visit:    Onychomycosis    PVD (peripheral vascular disease) (HCC)    Type 2 diabetes mellitus with stage 4 chronic kidney disease, without long-term current use of insulin ([facility])          Subjective:     Patient ID: Jeffry Almanzar is a 89 y.o. male.    The patient presents today with with a chief complaint of elongated and thickened pedal nail plates.  He does have a history of diabetes but denies any numbness or tingling sensations to either foot.  He does note the nails are very long and thick and tends to curl around causing him discomfort and close toed shoe gear.  His wife is present with him in the exam room.      PAST MEDICAL HISTORY:  Past Medical History:   Diagnosis Date    Cataracts, bilateral        PAST SURGICAL HISTORY:  Past Surgical History:   Procedure Laterality Date     CATARACT EXTRACTION Bilateral 07/15/2012    COLONOSCOPY      CYSTOSCOPY  01/15/2018    Last assessed 9/14/17, diagnostic    HERNIA REPAIR      INSTILLATION MYTOMYCIN N/A 10/25/2017    Procedure: INSTILLATION OF MITOMYCIN C;  Surgeon: Danish Esposito MD;  Location: [facility];  Service: Urology    AK CYSTO W/REMOVAL OF LESIONS SMALL N/A 10/25/2017    Procedure: CYSTOSCOPY; BLADDER BIOPSY WITH FULGERATION;  Surgeon: Danish Esposito MD;  Location: [facility];  Service: Urology    TONSILLECTOMY      TRANSURETHRAL RESECTION OF BLADDER TUMOR N/A 10/25/2017    Procedure: TUR BLADDER TUMOR;  Surgeon: Danish Esposito MD;  Location: [facility];  Service: Urology    WISDOM TOOTH EXTRACTION          ALLERGIES:  Ace inhibitors and Penicillins    MEDICATIONS:  Current Outpatient Medications   Medication Sig Dispense Refill    amLODIPine (NORVASC) 10 mg tablet Take 1 tablet (10 mg total) by mouth daily 90 tablet 1    aspirin 325 mg tablet Take 325 mg by mouth daily      atorvastatin (LIPITOR) 10 mg tablet Take 1 tablet (10 mg total) by mouth daily 90 tablet 3    Blood Glucose Monitoring Suppl (OneTouch Verio Reflect) w/Device KIT Check blood sugars twice daily. Please substitute with appropriate alternative as covered by patient's insurance. Dx: E11.65 1 kit 0    Blood Glucose Monitoring Suppl (OneTouch Verio Reflect) w/Device KIT Check blood sugars once daily. Please substitute with appropriate alternative as covered by patient's insurance. Dx: E11.65 1 kit 0    brimonidine (ALPHAGAN P) 0.15 % ophthalmic solution       chlorthalidone 25 mg tablet Take 1 tablet (25 mg total) by mouth daily In morning 90 tablet 3    Cyanocobalamin (Vitamin B-12) 1000 MCG/15ML LIQD Take by mouth daily      Dorzolamide HCl-Timolol Mal PF 2-0.5 % SOLN INSTILL 1 DROP INTO EACH EYE TWICE DAILY      Farxiga 10 MG tablet Take 1 tablet (10 mg total) by mouth daily 90 tablet 3    glucose blood (OneTouch Verio) test strip Check blood sugars once daily.  Please substitute with appropriate alternative as covered by patient's insurance. Dx: E11.65 100 each 3    losartan (COZAAR) 25 mg tablet Take 1 tablet (25 mg total) by mouth daily 90 tablet 1    OneTouch Delica Lancets 33G MISC Check blood sugars once daily. Please substitute with appropriate alternative as covered by patient's insurance. Dx: E11.65 100 each 3    prednisoLONE acetate (PRED FORTE) 1 % ophthalmic suspension INSTILL 1 DROP INTO LEFT EYE TWICE DAILY      Vyzulta 0.024 % SOLN Administer 1 drop to both eyes daily at bedtime       No current facility-administered medications for this visit.       SOCIAL HISTORY:  Social History     Socioeconomic History    Marital status: /Civil Union     Spouse name: None    Number of children: None    Years of education: None    Highest education level: None   Occupational History    Occupation: RETIRED   Tobacco Use    Smoking status: Former     Current packs/day: 0.00     Types: Cigarettes     Quit date: 1964     Years since quittin.4    Smokeless tobacco: Never   Vaping Use    Vaping status: Never Used   Substance and Sexual Activity    Alcohol use: No     Alcohol/week: 0.0 standard drinks of alcohol     Comment: quit 23 years ago    Drug use: No    Sexual activity: None   Other Topics Concern    None   Social History Narrative    None     Social Determinants of Health     Financial Resource Strain: Low Risk  (10/24/2023)    Overall Financial Resource Strain (CARDIA)     Difficulty of Paying Living Expenses: Not hard at all   Food Insecurity: Not on file   Transportation Needs: No Transportation Needs (10/24/2023)    PRAPARE - Transportation     Lack of Transportation (Medical): No     Lack of Transportation (Non-Medical): No   Physical Activity: Not on file   Stress: Not on file   Social Connections: Not on file   Intimate Partner Violence: Not on file   Housing Stability: Not on file        Review of Systems   Constitutional: Negative.    HENT:  Negative.     Eyes: Negative.    Respiratory: Negative.     Cardiovascular: Negative.    Endocrine: Negative.    Musculoskeletal: Negative.    Neurological: Negative.    Hematological: Negative.    Psychiatric/Behavioral: Negative.           Objective:     Physical Exam  Vitals reviewed.   Constitutional:       Appearance: Normal appearance.   HENT:      Head: Normocephalic and atraumatic.      Nose: Nose normal.   Eyes:      Conjunctiva/sclera: Conjunctivae normal.      Pupils: Pupils are equal, round, and reactive to light.   Cardiovascular:      Pulses:           Dorsalis pedis pulses are 0 on the right side and 0 on the left side.        Posterior tibial pulses are 0 on the right side and 0 on the left side.   Pulmonary:      Effort: Pulmonary effort is normal.   Feet:      Right foot:      Skin integrity: Skin integrity normal.      Toenail Condition: Right toenails are abnormally thick and long. Fungal disease present.     Left foot:      Skin integrity: Skin integrity normal.      Toenail Condition: Left toenails are abnormally thick and long. Fungal disease present.     Comments: The patient's clinical examination today significant for thickened and dystrophic, brittle pedal nail plates with subungual debris consistent with onychomycosis x 10.  There are no open wounds nor callus lesions.  The interdigital spaces are clear without maceration.  Pedal pulses are nonpalpable bilaterally.  Cap refill time to the toes is less than 3 seconds x 10.  Temperature gradient is mildly increased bilaterally.  Skin is thin with decreased turgor.  There is absence of hair growth to the bilateral extremities.  Skin:     General: Skin is warm.      Capillary Refill: Capillary refill takes 2 to 3 seconds.   Neurological:      General: No focal deficit present.      Mental Status: He is alert and oriented to person, place, and time.   Psychiatric:         Mood and Affect: Mood normal.         Behavior: Behavior normal.          Thought Content: Thought content normal.

## 2024-06-25 ENCOUNTER — TELEPHONE (OUTPATIENT)
Dept: NEPHROLOGY | Facility: CLINIC | Age: 89
End: 2024-06-25

## 2024-07-11 ENCOUNTER — TELEPHONE (OUTPATIENT)
Dept: NEPHROLOGY | Facility: CLINIC | Age: 89
End: 2024-07-11

## 2024-07-12 ENCOUNTER — TELEPHONE (OUTPATIENT)
Dept: NEPHROLOGY | Facility: CLINIC | Age: 89
End: 2024-07-12

## 2024-07-19 ENCOUNTER — TELEPHONE (OUTPATIENT)
Age: 89
End: 2024-07-19

## 2024-07-19 NOTE — TELEPHONE ENCOUNTER
Patient's wife called in requesting his labs to be faxed to [facility]:      [address] [address]    Phone   [phone]  Fax   [phone]      Faxed completed.

## 2024-07-23 ENCOUNTER — NURSE TRIAGE (OUTPATIENT)
Age: 89
End: 2024-07-23

## 2024-07-23 NOTE — TELEPHONE ENCOUNTER
"Pt's wife, Antony Teran has been fatigued lately.  On 07/21/2024 he had a brief episode of confusion and his blood sugar was 221. Today he is alert and oriented, he is still fatigued. Appt made for tomorrow.  Pt's wife instructed to proceed to the ED immediately for any change in mental status or elevated BS.       Reason for Disposition   Brief confusion (now gone)    Answer Assessment - Initial Assessment Questions  1. LEVEL OF CONSCIOUSNESS: \"How is he (she, the patient) acting right now?\" (e.g., alert-oriented, confused, lethargic, stuporous, comatose)     Alert and oriented today, just tired   2. ONSET: \"When did the confusion start?\"  (minutes, hours, days)      Brief episode on 07/21  3. PATTERN \"Does this come and go, or has it been constant since it started?\"  \"Is it present now?\"      Brief episode  4. ALCOHOL or DRUGS: \"Has he been drinking alcohol or taking any drugs?\"       no  5. NARCOTIC MEDICATIONS: \"Has he been receiving any narcotic medications?\" (e.g., morphine, Vicodin)      no  6. CAUSE: \"What do you think is causing the confusion?\"       I think his blood sugar was a little high for him at 221  7. OTHER SYMPTOMS: \"Are there any other symptoms?\" (e.g., difficulty breathing, headache, fever, weakness)      fatigue    Protocols used: Confusion - Delirium-ADULT-OH    "

## 2024-07-24 ENCOUNTER — OFFICE VISIT (OUTPATIENT)
Dept: FAMILY MEDICINE CLINIC | Facility: CLINIC | Age: 89
End: 2024-07-24
Payer: MEDICARE

## 2024-07-24 ENCOUNTER — TELEPHONE (OUTPATIENT)
Dept: NEPHROLOGY | Facility: CLINIC | Age: 89
End: 2024-07-24

## 2024-07-24 VITALS
SYSTOLIC BLOOD PRESSURE: 100 MMHG | BODY MASS INDEX: 23.49 KG/M2 | RESPIRATION RATE: 14 BRPM | WEIGHT: 141 LBS | OXYGEN SATURATION: 97 % | HEART RATE: 70 BPM | DIASTOLIC BLOOD PRESSURE: 60 MMHG | HEIGHT: 65 IN | TEMPERATURE: 96.8 F

## 2024-07-24 DIAGNOSIS — E78.5 DYSLIPIDEMIA: ICD-10-CM

## 2024-07-24 DIAGNOSIS — N18.4 BENIGN HYPERTENSION WITH CKD (CHRONIC KIDNEY DISEASE) STAGE IV (HCC): ICD-10-CM

## 2024-07-24 DIAGNOSIS — E55.9 MILD VITAMIN D DEFICIENCY: ICD-10-CM

## 2024-07-24 DIAGNOSIS — E78.2 MIXED HYPERLIPIDEMIA: ICD-10-CM

## 2024-07-24 DIAGNOSIS — R80.1 PERSISTENT PROTEINURIA: ICD-10-CM

## 2024-07-24 DIAGNOSIS — E11.22 TYPE 2 DIABETES MELLITUS WITH STAGE 4 CHRONIC KIDNEY DISEASE, WITHOUT LONG-TERM CURRENT USE OF INSULIN (HCC): ICD-10-CM

## 2024-07-24 DIAGNOSIS — I12.9 BENIGN HYPERTENSION WITH CKD (CHRONIC KIDNEY DISEASE) STAGE IV (HCC): ICD-10-CM

## 2024-07-24 DIAGNOSIS — D63.1 ANEMIA IN STAGE 4 CHRONIC KIDNEY DISEASE  (HCC): ICD-10-CM

## 2024-07-24 DIAGNOSIS — E11.22 CKD STAGE 4 DUE TO TYPE 2 DIABETES MELLITUS (HCC): ICD-10-CM

## 2024-07-24 DIAGNOSIS — N18.4 TYPE 2 DIABETES MELLITUS WITH STAGE 4 CHRONIC KIDNEY DISEASE, WITHOUT LONG-TERM CURRENT USE OF INSULIN (HCC): ICD-10-CM

## 2024-07-24 DIAGNOSIS — N18.4 ANEMIA IN STAGE 4 CHRONIC KIDNEY DISEASE  (HCC): ICD-10-CM

## 2024-07-24 DIAGNOSIS — N18.4 STAGE 4 CHRONIC KIDNEY DISEASE (HCC): ICD-10-CM

## 2024-07-24 DIAGNOSIS — I12.9 PARENCHYMAL RENAL HYPERTENSION, STAGE 1 THROUGH STAGE 4 OR UNSPECIFIED CHRONIC KIDNEY DISEASE: ICD-10-CM

## 2024-07-24 DIAGNOSIS — D61.89 ANEMIA DUE TO OTHER BONE MARROW FAILURE (HCC): ICD-10-CM

## 2024-07-24 DIAGNOSIS — N18.4 CKD STAGE 4 DUE TO TYPE 2 DIABETES MELLITUS (HCC): ICD-10-CM

## 2024-07-24 DIAGNOSIS — N18.4 STAGE 4 CHRONIC KIDNEY DISEASE (HCC): Primary | ICD-10-CM

## 2024-07-24 LAB
BASOPHILS # BLD AUTO: 39 CELLS/UL (ref 0–200)
BASOPHILS NFR BLD AUTO: 0.5 %
BUN SERPL-MCNC: 60 MG/DL (ref 7–25)
BUN/CREAT SERPL: 19 (CALC) (ref 6–22)
CALCIUM SERPL-MCNC: 9.1 MG/DL (ref 8.6–10.3)
CALCIUM SERPL-MCNC: 9.1 MG/DL (ref 8.6–10.3)
CHLORIDE SERPL-SCNC: 106 MMOL/L (ref 98–110)
CHOLEST SERPL-MCNC: 137 MG/DL
CHOLEST/HDLC SERPL: 3 (CALC)
CO2 SERPL-SCNC: 24 MMOL/L (ref 20–32)
CREAT SERPL-MCNC: 3.24 MG/DL (ref 0.7–1.22)
CREAT UR-MCNC: 110 MG/DL (ref 20–320)
EOSINOPHIL # BLD AUTO: 370 CELLS/UL (ref 15–500)
EOSINOPHIL NFR BLD AUTO: 4.8 %
ERYTHROCYTE [DISTWIDTH] IN BLOOD BY AUTOMATED COUNT: 11.5 % (ref 11–15)
FERRITIN SERPL-MCNC: 407 NG/ML (ref 24–380)
GFR/BSA.PRED SERPLBLD CYS-BASED-ARV: 18 ML/MIN/1.73M2
GLUCOSE SERPL-MCNC: 192 MG/DL (ref 65–99)
HCT VFR BLD AUTO: 28.6 % (ref 38.5–50)
HDLC SERPL-MCNC: 46 MG/DL
HGB BLD-MCNC: 9.1 G/DL (ref 13.2–17.1)
LDLC SERPL CALC-MCNC: 77 MG/DL (CALC)
LYMPHOCYTES # BLD AUTO: 1263 CELLS/UL (ref 850–3900)
LYMPHOCYTES NFR BLD AUTO: 16.4 %
MAGNESIUM SERPL-MCNC: 2.4 MG/DL (ref 1.5–2.5)
MCH RBC QN AUTO: 30.8 PG (ref 27–33)
MCHC RBC AUTO-ENTMCNC: 31.8 G/DL (ref 32–36)
MCV RBC AUTO: 96.9 FL (ref 80–100)
MONOCYTES # BLD AUTO: 816 CELLS/UL (ref 200–950)
MONOCYTES NFR BLD AUTO: 10.6 %
NEUTROPHILS # BLD AUTO: 5213 CELLS/UL (ref 1500–7800)
NEUTROPHILS NFR BLD AUTO: 67.7 %
NONHDLC SERPL-MCNC: 91 MG/DL (CALC)
PHOSPHATE SERPL-MCNC: 3.5 MG/DL (ref 2.1–4.3)
PLATELET # BLD AUTO: 179 THOUSAND/UL (ref 140–400)
PMV BLD REES-ECKER: 10.8 FL (ref 7.5–12.5)
POTASSIUM SERPL-SCNC: 3.8 MMOL/L (ref 3.5–5.3)
PROT UR-MCNC: 31 MG/DL (ref 5–25)
PROT/CREAT UR: 0.28 MG/MG CREAT (ref 0.03–0.15)
PROT/CREAT UR: 282 MG/G CREAT (ref 25–148)
PTH-INTACT SERPL-MCNC: 75 PG/ML (ref 16–77)
RBC # BLD AUTO: 2.95 MILLION/UL (ref 4.2–5.8)
SODIUM SERPL-SCNC: 139 MMOL/L (ref 135–146)
TRIGL SERPL-MCNC: 60 MG/DL
WBC # BLD AUTO: 7.7 THOUSAND/UL (ref 3.8–10.8)

## 2024-07-24 PROCEDURE — 99214 OFFICE O/P EST MOD 30 MIN: CPT | Performed by: FAMILY MEDICINE

## 2024-07-24 PROCEDURE — G2211 COMPLEX E/M VISIT ADD ON: HCPCS | Performed by: FAMILY MEDICINE

## 2024-07-24 RX ORDER — LOSARTAN POTASSIUM 25 MG/1
25 TABLET ORAL DAILY
Qty: 90 TABLET | Refills: 1 | Status: SHIPPED | OUTPATIENT
Start: 2024-07-24 | End: 2024-07-24

## 2024-07-24 RX ORDER — BLOOD SUGAR DIAGNOSTIC
STRIP MISCELLANEOUS
Qty: 100 EACH | Refills: 3 | Status: SHIPPED | OUTPATIENT
Start: 2024-07-24

## 2024-07-24 RX ORDER — ATORVASTATIN CALCIUM 10 MG/1
10 TABLET, FILM COATED ORAL DAILY
Qty: 90 TABLET | Refills: 3 | Status: SHIPPED | OUTPATIENT
Start: 2024-07-24

## 2024-07-24 RX ORDER — DAPAGLIFLOZIN 10 MG/1
10 TABLET, FILM COATED ORAL DAILY
Qty: 90 TABLET | Refills: 3 | Status: SHIPPED | OUTPATIENT
Start: 2024-07-24

## 2024-07-24 NOTE — TELEPHONE ENCOUNTER
----- Message from Tanner Rey MD sent at 7/24/2024  9:44 AM EDT -----  Also when he goes for repeat labs have him do a CBC and iron saturation he had his ferritin  Fecal immunochemical testing as well

## 2024-07-24 NOTE — TELEPHONE ENCOUNTER
----- Message from Tanner Rey MD sent at 7/24/2024  9:43 AM EDT -----  Patient's creatinine slightly higher than normal  1.  Hold losartan  2.  Recheck a basic metabolic profile over the next couple of weeks nonfasting good hydration  3.  Please make sure he is feeling well and no other new medications including NSAIDs  4.  2 weeks after adjusting/stopping losartan recheck week of blood pressures and bring them in when you see Kelly  ----- Message -----  From: Dino Velasquez Lab Results In  Sent: 7/24/2024   1:30 AM EDT  To: Tanner Rey MD

## 2024-07-24 NOTE — PROGRESS NOTES
Subjective:      Patient ID: Jeffry Almanzar is a 89 y.o. male.    He had an episode of fatigue and confusion that lasted transiently and improved after hydration.  Per the wife he had 3 bottles of Coke on that given day and his blood glucose was 221.  She also has not been giving him Farxiga for the past 2 months since there was no refills  Currently denies any complaints  He had labs done yesterday for nephrology orders but no recent A1c        Past Medical History:   Diagnosis Date    Cataracts, bilateral        Family History   Problem Relation Age of Onset    Diabetes Mother        Past Surgical History:   Procedure Laterality Date    CATARACT EXTRACTION Bilateral 07/15/2012    COLONOSCOPY      CYSTOSCOPY  01/15/2018    Last assessed 9/14/17, diagnostic    HERNIA REPAIR      INSTILLATION MYTOMYCIN N/A 10/25/2017    Procedure: INSTILLATION OF MITOMYCIN C;  Surgeon: Danish Esposito MD;  Location: [facility];  Service: Urology    OR CYSTO W/REMOVAL OF LESIONS SMALL N/A 10/25/2017    Procedure: CYSTOSCOPY; BLADDER BIOPSY WITH FULGERATION;  Surgeon: Danish Esposito MD;  Location: [facility];  Service: Urology    TONSILLECTOMY      TRANSURETHRAL RESECTION OF BLADDER TUMOR N/A 10/25/2017    Procedure: TUR BLADDER TUMOR;  Surgeon: Danish Esposito MD;  Location: [facility];  Service: Urology    WISDOM TOOTH EXTRACTION          reports that he quit smoking about 60 years ago. His smoking use included cigarettes. He has never used smokeless tobacco. He reports that he does not drink alcohol and does not use drugs.      Current Outpatient Medications:     amLODIPine (NORVASC) 10 mg tablet, Take 1 tablet (10 mg total) by mouth daily, Disp: 90 tablet, Rfl: 1    aspirin 325 mg tablet, Take 325 mg by mouth daily, Disp: , Rfl:     atorvastatin (LIPITOR) 10 mg tablet, Take 1 tablet (10 mg total) by mouth daily, Disp: 90 tablet, Rfl: 3    Blood Glucose Monitoring Suppl (OneTouch Verio Reflect) w/Device KIT, Check blood  sugars twice daily. Please substitute with appropriate alternative as covered by patient's insurance. Dx: E11.65, Disp: 1 kit, Rfl: 0    Blood Glucose Monitoring Suppl (OneTouch Verio Reflect) w/Device KIT, Check blood sugars once daily. Please substitute with appropriate alternative as covered by patient's insurance. Dx: E11.65, Disp: 1 kit, Rfl: 0    brimonidine (ALPHAGAN P) 0.15 % ophthalmic solution, , Disp: , Rfl:     chlorthalidone 25 mg tablet, Take 1 tablet (25 mg total) by mouth daily In morning, Disp: 90 tablet, Rfl: 3    Cyanocobalamin (Vitamin B-12) 1000 MCG/15ML LIQD, Take by mouth daily, Disp: , Rfl:     Dorzolamide HCl-Timolol Mal PF 2-0.5 % SOLN, INSTILL 1 DROP INTO EACH EYE TWICE DAILY, Disp: , Rfl:     Farxiga 10 MG tablet, Take 1 tablet (10 mg total) by mouth daily, Disp: 90 tablet, Rfl: 3    glucose blood (OneTouch Verio) test strip, Check blood sugars once daily. Please substitute with appropriate alternative as covered by patient's insurance. Dx: E11.65, Disp: 100 each, Rfl: 3    OneTouch Delica Lancets 33G MISC, Check blood sugars once daily. Please substitute with appropriate alternative as covered by patient's insurance. Dx: E11.65, Disp: 100 each, Rfl: 3    prednisoLONE acetate (PRED FORTE) 1 % ophthalmic suspension, INSTILL 1 DROP INTO LEFT EYE TWICE DAILY, Disp: , Rfl:     Vyzulta 0.024 % SOLN, Administer 1 drop to both eyes daily at bedtime, Disp: , Rfl:     The following portions of the patient's history were reviewed and updated as appropriate: allergies, current medications, past family history, past medical history, past social history, past surgical history and problem list.    Review of Systems   Constitutional:  Negative for chills and fever.   HENT:  Negative for congestion, rhinorrhea and sore throat.    Eyes:  Negative for discharge, redness and itching.   Respiratory:  Negative for chest tightness, shortness of breath and wheezing.    Cardiovascular:  Negative for chest pain  "and palpitations.   Gastrointestinal:  Negative for abdominal pain, constipation and diarrhea.   Genitourinary:  Negative for dysuria.   Skin:  Negative for pallor and rash.   Neurological:  Negative for dizziness, weakness, numbness and headaches.         PHQ-2/9 Depression Screening    Little interest or pleasure in doing things: 0 - not at all  Feeling down, depressed, or hopeless: 0 - not at all  PHQ-2 Score: 0  PHQ-2 Interpretation: Negative depression screen             Objective:    /60 (BP Location: Left arm, Patient Position: Sitting, Cuff Size: Adult)   Pulse 70   Temp (!) 96.8 °F (36 °C) (Tympanic)   Resp 14   Ht 5' 5\" (1.651 m)   Wt 64 kg (141 lb)   SpO2 97%   BMI 23.46 kg/m²      Physical Exam  Vitals and nursing note reviewed.   Constitutional:       Appearance: Normal appearance.   HENT:      Right Ear: External ear normal.      Left Ear: External ear normal.   Eyes:      General:         Right eye: No discharge.         Left eye: No discharge.      Conjunctiva/sclera: Conjunctivae normal.   Cardiovascular:      Rate and Rhythm: Normal rate and regular rhythm.      Heart sounds: No murmur heard.  Pulmonary:      Effort: Pulmonary effort is normal.      Breath sounds: Normal breath sounds. No wheezing.   Abdominal:      General: There is no distension.      Palpations: Abdomen is soft.      Tenderness: There is no abdominal tenderness.   Musculoskeletal:      Right lower leg: No edema.      Left lower leg: No edema.   Skin:     Findings: No lesion or rash.   Neurological:      Mental Status: He is alert. Mental status is at baseline.   Psychiatric:         Mood and Affect: Mood normal.         Thought Content: Thought content normal.           Recent Results (from the past 8736 hour(s))   PTH, Intact and Calcium    Collection Time: 09/15/23 12:53 PM   Result Value Ref Range    PTH, Intact 67 16 - 77 pg/mL    Calcium 9.2 8.6 - 10.3 mg/dL   Magnesium    Collection Time: 09/15/23 12:53 PM "   Result Value Ref Range    Magnesium, Serum 2.4 1.5 - 2.5 mg/dL   Phosphorus    Collection Time: 09/15/23 12:53 PM   Result Value Ref Range    Phosphorus, Serum 3.6 2.1 - 4.3 mg/dL   Comprehensive metabolic panel    Collection Time: 09/15/23 12:53 PM   Result Value Ref Range    Glucose, Random 136 (H) 65 - 99 mg/dL    BUN 42 (H) 7 - 25 mg/dL    Creatinine 2.36 (H) 0.70 - 1.22 mg/dL    eGFR 26 (L) > OR = 60 mL/min/1.73m2    SL AMB BUN/CREATININE RATIO 18 6 - 22 (calc)    Sodium 142 135 - 146 mmol/L    Potassium 3.9 3.5 - 5.3 mmol/L    Chloride 110 98 - 110 mmol/L    CO2 24 20 - 32 mmol/L    Calcium 9.2 8.6 - 10.3 mg/dL    Protein, Total 7.3 6.1 - 8.1 g/dL    Albumin 3.9 3.6 - 5.1 g/dL    Globulin 3.4 1.9 - 3.7 g/dL (calc)    Albumin/Globulin Ratio 1.1 1.0 - 2.5 (calc)    TOTAL BILIRUBIN 0.6 0.2 - 1.2 mg/dL    Alkaline Phosphatase 62 35 - 144 U/L    AST 14 10 - 35 U/L    ALT 10 9 - 46 U/L   CBC and differential    Collection Time: 09/15/23 12:53 PM   Result Value Ref Range    White Blood Cell Count 7.8 3.8 - 10.8 Thousand/uL    Red Blood Cell Count 3.41 (L) 4.20 - 5.80 Million/uL    Hemoglobin 10.9 (L) 13.2 - 17.1 g/dL    HCT 33.3 (L) 38.5 - 50.0 %    MCV 97.7 80.0 - 100.0 fL    MCH 32.0 27.0 - 33.0 pg    MCHC 32.7 32.0 - 36.0 g/dL    RDW 11.5 11.0 - 15.0 %    Platelet Count 168 140 - 400 Thousand/uL    SL AMB MPV 11.3 7.5 - 12.5 fL    Neutrophils (Absolute) 4,984 1,500 - 7,800 cells/uL    Lymphocytes (Absolute) 1,630 850 - 3,900 cells/uL    Monocytes (Absolute) 741 200 - 950 cells/uL    Eosinophils (Absolute) 398 15 - 500 cells/uL    Basophils ABS 47 0 - 200 cells/uL    Neutrophils 63.9 %    Lymphocytes 20.9 %    Monocytes 9.5 %    Eosinophils 5.1 %    Basophils PCT 0.6 %   Protein, Total w/Creat, Random Urine    Collection Time: 09/15/23 12:53 PM   Result Value Ref Range    Creatinine, Urine 69 20 - 320 mg/dL    Protein/Creat Ratio 449 (H) 25 - 148 mg/g creat    Protein/Creat Ratio 0.449 (H) 0.025 - 0.148 mg/mg  creat    Total Protein, Urine 31 (H) 5 - 25 mg/dL   POCT hemoglobin A1c    Collection Time: 10/24/23  4:50 PM   Result Value Ref Range    Hemoglobin A1C 7.0 (A) 6.5   POCT hemoglobin A1c    Collection Time: 01/25/24  1:40 PM   Result Value Ref Range    Hemoglobin A1C 7.5 (A) 6.5   Lipid Panel with Direct LDL reflex    Collection Time: 02/16/24 12:42 PM   Result Value Ref Range    Total Cholesterol 153 <200 mg/dL    HDL 53 > OR = 40 mg/dL    Triglycerides 57 <150 mg/dL    LDL Calculated 86 mg/dL (calc)    Chol HDLC Ratio 2.9 <5.0 (calc)    Non-HDL Cholesterol 100 <130 mg/dL (calc)   PTH, Intact and Calcium    Collection Time: 02/16/24 12:42 PM   Result Value Ref Range    PTH, Intact 69 16 - 77 pg/mL    Calcium 9.2 8.6 - 10.3 mg/dL   Magnesium    Collection Time: 02/16/24 12:42 PM   Result Value Ref Range    Magnesium, Serum 2.4 1.5 - 2.5 mg/dL   Phosphorus    Collection Time: 02/16/24 12:42 PM   Result Value Ref Range    Phosphorus, Serum 3.6 2.1 - 4.3 mg/dL   TIBC    Collection Time: 02/16/24 12:42 PM   Result Value Ref Range    Iron, Serum 78 50 - 180 mcg/dL    Total Iron Binding Capacity (TIBC) 248 (L) 250 - 425 mcg/dL (calc)    Iron Saturation 31 20 - 48 % (calc)   Comprehensive metabolic panel    Collection Time: 02/16/24 12:42 PM   Result Value Ref Range    Glucose, Random 163 (H) 65 - 99 mg/dL    BUN 46 (H) 7 - 25 mg/dL    Creatinine 2.18 (H) 0.70 - 1.22 mg/dL    eGFR 28 (L) > OR = 60 mL/min/1.73m2    SL AMB BUN/CREATININE RATIO 21 6 - 22 (calc)    Sodium 141 135 - 146 mmol/L    Potassium 3.9 3.5 - 5.3 mmol/L    Chloride 109 98 - 110 mmol/L    CO2 23 20 - 32 mmol/L    Calcium 9.2 8.6 - 10.3 mg/dL    Protein, Total 7.2 6.1 - 8.1 g/dL    Albumin 3.9 3.6 - 5.1 g/dL    Globulin 3.3 1.9 - 3.7 g/dL (calc)    Albumin/Globulin Ratio 1.2 1.0 - 2.5 (calc)    TOTAL BILIRUBIN 0.5 0.2 - 1.2 mg/dL    Alkaline Phosphatase 52 35 - 144 U/L    AST 14 10 - 35 U/L    ALT 11 9 - 46 U/L   CBC and differential    Collection Time:  02/16/24 12:42 PM   Result Value Ref Range    White Blood Cell Count 8.0 3.8 - 10.8 Thousand/uL    Red Blood Cell Count 3.25 (L) 4.20 - 5.80 Million/uL    Hemoglobin 10.1 (L) 13.2 - 17.1 g/dL    HCT 31.8 (L) 38.5 - 50.0 %    MCV 97.8 80.0 - 100.0 fL    MCH 31.1 27.0 - 33.0 pg    MCHC 31.8 (L) 32.0 - 36.0 g/dL    RDW 11.7 11.0 - 15.0 %    Platelet Count 177 140 - 400 Thousand/uL    SL AMB MPV 11.2 7.5 - 12.5 fL    Neutrophils (Absolute) 5,272 1,500 - 7,800 cells/uL    Lymphocytes (Absolute) 1,656 850 - 3,900 cells/uL    Monocytes (Absolute) 712 200 - 950 cells/uL    Eosinophils (Absolute) 312 15 - 500 cells/uL    Basophils ABS 48 0 - 200 cells/uL    Neutrophils 65.9 %    Lymphocytes 20.7 %    Monocytes 8.9 %    Eosinophils 3.9 %    Basophils PCT 0.6 %    Always Message     Ferritin    Collection Time: 02/16/24 12:42 PM   Result Value Ref Range    Ferritin 284 24 - 380 ng/mL   Vitamin D 25 hydroxy    Collection Time: 02/16/24 12:42 PM   Result Value Ref Range    Vitamin D, 25-Hydroxy, Serum 63 30 - 100 ng/mL   Protein, Total w/Creat, Random Urine    Collection Time: 02/16/24 12:42 PM   Result Value Ref Range    Creatinine, Urine 80 20 - 320 mg/dL    Protein/Creat Ratio 400 (H) 25 - 148 mg/g creat    Protein/Creat Ratio 0.400 (H) 0.025 - 0.148 mg/mg creat    Total Protein, Urine 32 (H) 5 - 25 mg/dL   POCT hemoglobin A1c    Collection Time: 04/25/24  3:58 PM   Result Value Ref Range    Hemoglobin A1C 6.9 (A) 6.5   Lipid Panel with Direct LDL reflex    Collection Time: 07/23/24  1:25 PM   Result Value Ref Range    Total Cholesterol 137 <200 mg/dL    HDL 46 > OR = 40 mg/dL    Triglycerides 60 <150 mg/dL    LDL Calculated 77 mg/dL (calc)    Chol HDLC Ratio 3.0 <5.0 (calc)    Non-HDL Cholesterol 91 <130 mg/dL (calc)   PTH, Intact and Calcium    Collection Time: 07/23/24  1:25 PM   Result Value Ref Range    PTH, Intact 75 16 - 77 pg/mL    Calcium 9.1 8.6 - 10.3 mg/dL   Magnesium    Collection Time: 07/23/24  1:25 PM   Result  Value Ref Range    Magnesium, Serum 2.4 1.5 - 2.5 mg/dL   Phosphorus    Collection Time: 07/23/24  1:25 PM   Result Value Ref Range    Phosphorus, Serum 3.5 2.1 - 4.3 mg/dL   Basic metabolic panel    Collection Time: 07/23/24  1:25 PM   Result Value Ref Range    Glucose, Random 192 (H) 65 - 99 mg/dL    BUN 60 (H) 7 - 25 mg/dL    Creatinine 3.24 (H) 0.70 - 1.22 mg/dL    eGFR 18 (L) > OR = 60 mL/min/1.73m2    SL AMB BUN/CREATININE RATIO 19 6 - 22 (calc)    Sodium 139 135 - 146 mmol/L    Potassium 3.8 3.5 - 5.3 mmol/L    Chloride 106 98 - 110 mmol/L    CO2 24 20 - 32 mmol/L    Calcium 9.1 8.6 - 10.3 mg/dL   CBC and differential    Collection Time: 07/23/24  1:25 PM   Result Value Ref Range    White Blood Cell Count 7.7 3.8 - 10.8 Thousand/uL    Red Blood Cell Count 2.95 (L) 4.20 - 5.80 Million/uL    Hemoglobin 9.1 (L) 13.2 - 17.1 g/dL    HCT 28.6 (L) 38.5 - 50.0 %    MCV 96.9 80.0 - 100.0 fL    MCH 30.8 27.0 - 33.0 pg    MCHC 31.8 (L) 32.0 - 36.0 g/dL    RDW 11.5 11.0 - 15.0 %    Platelet Count 179 140 - 400 Thousand/uL    SL AMB MPV 10.8 7.5 - 12.5 fL    Neutrophils (Absolute) 5,213 1,500 - 7,800 cells/uL    Lymphocytes (Absolute) 1,263 850 - 3,900 cells/uL    Monocytes (Absolute) 816 200 - 950 cells/uL    Eosinophils (Absolute) 370 15 - 500 cells/uL    Basophils ABS 39 0 - 200 cells/uL    Neutrophils 67.7 %    Lymphocytes 16.4 %    Monocytes 10.6 %    Eosinophils 4.8 %    Basophils PCT 0.5 %    Always Message     Ferritin    Collection Time: 07/23/24  1:25 PM   Result Value Ref Range    Ferritin 407 (H) 24 - 380 ng/mL   Protein, Total w/Creat, Random Urine    Collection Time: 07/23/24  1:25 PM   Result Value Ref Range    Creatinine, Urine 110 20 - 320 mg/dL    Protein/Creat Ratio 282 (H) 25 - 148 mg/g creat    Protein/Creat Ratio 0.282 (H) 0.025 - 0.148 mg/mg creat    Total Protein, Urine 31 (H) 5 - 25 mg/dL       Laboratory Results: I have personally reviewed the pertinent laboratory results/reports      Radiology/Other Diagnostic Testing Results: I have personally reviewed pertinent reports.      US kidney and bladder with pvr    Result Date: 11/30/2021  RENAL ULTRASOUND INDICATION:   I12.9: Hypertensive chronic kidney disease with stage 1 through stage 4 chronic kidney disease, or unspecified chronic kidney disease N18.4: Chronic kidney disease, stage 4 (severe) E11.22: Type 2 diabetes mellitus with diabetic chronic kidney disease N18.1: Chronic kidney disease, stage 1 E78.5: Hyperlipidemia, unspecified N18.4: Chronic kidney disease, stage 4 (severe) D63.1: Anemia in chr. COMPARISON: CT 8/20/1718 TECHNIQUE:   Ultrasound of the retroperitoneum was performed with a curvilinear transducer utilizing volumetric sweeps and still imaging techniques. FINDINGS: KIDNEYS: There is mild to moderate bilateral renal atrophy with measurements below. Right kidney:  7.3 x 5.1 x 4.9 cm. Left kidney:  7.2 x 5.5 x 4.4 cm. Right kidney The renal parenchyma is diffusely echogenic consistent with medical renal disease. No suspicious masses detected. No hydronephrosis. No shadowing calculi. No perinephric fluid collections. Left kidney The renal parenchyma is diffusely echogenic consistent with medical renal disease. No suspicious masses detected. No hydronephrosis. No shadowing calculi. No perinephric fluid collections. URETERS: Nonvisualized. BLADDER: Normally distended. No focal thickening or mass lesions. Bilateral ureteral jets detected. Prevoid bladder volume 143 cc's. Postvoid bladder volume 47 cc.     1. Small echogenic kidneys bilaterally compatible with medical renal disease. 2. Small postvoid residual. Workstation performed: [id number]        Assessment/Plan:  1. 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 Dyslipidemia  -     atorvastatin (LIPITOR) 10 mg tablet; Take 1 tablet (10 mg total) by mouth daily  3. Mild vitamin D deficiency  -      "atorvastatin (LIPITOR) 10 mg tablet; Take 1 tablet (10 mg total) by mouth daily  4. 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 Stage 4 chronic kidney disease (HCC)  -     atorvastatin (LIPITOR) 10 mg tablet; Take 1 tablet (10 mg total) by mouth daily  6. Parenchymal renal hypertension, stage 1 through stage 4 or unspecified chronic kidney disease  -     atorvastatin (LIPITOR) 10 mg tablet; Take 1 tablet (10 mg total) by mouth daily  7. Type 2 diabetes mellitus with stage 4 chronic kidney disease, without long-term current use of insulin ([facility])  -     glucose blood (OneTouch Verio) test strip; Check blood sugars once daily. Please substitute with appropriate alternative as covered by patient's insurance. Dx: E11.65  -     Hemoglobin A1C; Future  8. Benign 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 (HCC)  9. Mixed hyperlipidemia  10. Persistent proteinuria      Resume taking Farxiga 10 mg oral daily.  Dr. Rey reviewed her blood work  And recommended that because the kidney number that his creatinine is a little bit elevated stop the losartan for 2 weeks and recheck the blood work that he is already ordered.  This can be done when you return.  Avoid any NSAIDs including ibuprofen Advil Aleve Motrin  Stay hydrated.             Read package inserts for all medications before starting a new medications, call me if you have any questions.    Patient was given opportunity to ask questions and all questions were answered.    Disclaimer: Portions of the record may have been created with voice recognition software. Occasional wrong word or \"sound a like\" substitutions may have occurred due to the inherent limitations of voice recognition software. Read the chart carefully and recognize, using context, where substitutions have occurred. I have used the Epic copy/forward function to compose this note. I have reviewed my " current note to ensure it reflects the current patient status, exam, assessment and plan.

## 2024-07-24 NOTE — TELEPHONE ENCOUNTER
Lm for pt to r/s sept 26th appt with Aria due to provider schedule change. Please do not schedule over held time. Please transfer to [facility]

## 2024-07-24 NOTE — PATIENT INSTRUCTIONS
Resume taking Farxiga 10 mg oral daily.  Dr. Rey reviewed her blood work  And recommended that because the kidney number that his creatinine is a little bit elevated stop the losartan for 2 weeks and recheck the blood work that he is already ordered.  This can be done when you return.  Avoid any NSAIDs including ibuprofen Advil Aleve Motrin  Stay hydrated.

## 2024-07-24 NOTE — TELEPHONE ENCOUNTER
LM for patient about the following, asked him to call back if he is having symptoms:    Patient's creatinine slightly higher than normal  1.  Hold losartan  2.  Recheck a basic metabolic profile over the next couple of weeks nonfasting good hydration  3.  Please make sure he is feeling well and no other new medications including NSAIDs  4.  2 weeks after adjusting/stopping losartan recheck week of blood pressures and bring them in when you see Kelly  Also when he goes for repeat labs have him do a CBC and iron saturation he had his ferritin  Fecal immunochemical testing as well

## 2024-08-24 LAB
BASOPHILS # BLD AUTO: 64 CELLS/UL (ref 0–200)
BASOPHILS NFR BLD AUTO: 0.8 %
BUN SERPL-MCNC: 66 MG/DL (ref 7–25)
BUN/CREAT SERPL: 20 (CALC) (ref 6–22)
CALCIUM SERPL-MCNC: 9.6 MG/DL (ref 8.6–10.3)
CHLORIDE SERPL-SCNC: 105 MMOL/L (ref 98–110)
CO2 SERPL-SCNC: 23 MMOL/L (ref 20–32)
CREAT SERPL-MCNC: 3.28 MG/DL (ref 0.7–1.22)
EOSINOPHIL # BLD AUTO: 360 CELLS/UL (ref 15–500)
EOSINOPHIL NFR BLD AUTO: 4.5 %
ERYTHROCYTE [DISTWIDTH] IN BLOOD BY AUTOMATED COUNT: 11.6 % (ref 11–15)
FERRITIN SERPL-MCNC: 411 NG/ML (ref 24–380)
GFR/BSA.PRED SERPLBLD CYS-BASED-ARV: 17 ML/MIN/1.73M2
GLUCOSE SERPL-MCNC: 171 MG/DL (ref 65–99)
HBA1C MFR BLD: 6.9 % OF TOTAL HGB
HCT VFR BLD AUTO: 31 % (ref 38.5–50)
HGB BLD-MCNC: 9.7 G/DL (ref 13.2–17.1)
IRON SATN MFR SERPL: 31 % (CALC) (ref 20–48)
IRON SERPL-MCNC: 79 MCG/DL (ref 50–180)
LYMPHOCYTES # BLD AUTO: 1112 CELLS/UL (ref 850–3900)
LYMPHOCYTES NFR BLD AUTO: 13.9 %
MCH RBC QN AUTO: 31.5 PG (ref 27–33)
MCHC RBC AUTO-ENTMCNC: 31.3 G/DL (ref 32–36)
MCV RBC AUTO: 100.6 FL (ref 80–100)
MONOCYTES # BLD AUTO: 744 CELLS/UL (ref 200–950)
MONOCYTES NFR BLD AUTO: 9.3 %
NEUTROPHILS # BLD AUTO: 5720 CELLS/UL (ref 1500–7800)
NEUTROPHILS NFR BLD AUTO: 71.5 %
PLATELET # BLD AUTO: 224 THOUSAND/UL (ref 140–400)
PMV BLD REES-ECKER: 10.5 FL (ref 7.5–12.5)
POTASSIUM SERPL-SCNC: 3.9 MMOL/L (ref 3.5–5.3)
RBC # BLD AUTO: 3.08 MILLION/UL (ref 4.2–5.8)
SODIUM SERPL-SCNC: 138 MMOL/L (ref 135–146)
TIBC SERPL-MCNC: 254 MCG/DL (CALC) (ref 250–425)
WBC # BLD AUTO: 8 THOUSAND/UL (ref 3.8–10.8)

## 2024-08-26 ENCOUNTER — TELEPHONE (OUTPATIENT)
Dept: FAMILY MEDICINE CLINIC | Facility: CLINIC | Age: 89
End: 2024-08-26

## 2024-08-26 ENCOUNTER — OFFICE VISIT (OUTPATIENT)
Dept: FAMILY MEDICINE CLINIC | Facility: CLINIC | Age: 89
End: 2024-08-26
Payer: MEDICARE

## 2024-08-26 ENCOUNTER — TELEPHONE (OUTPATIENT)
Dept: NEPHROLOGY | Facility: CLINIC | Age: 89
End: 2024-08-26

## 2024-08-26 VITALS
HEART RATE: 71 BPM | HEIGHT: 65 IN | SYSTOLIC BLOOD PRESSURE: 100 MMHG | WEIGHT: 136.8 LBS | TEMPERATURE: 97 F | DIASTOLIC BLOOD PRESSURE: 50 MMHG | OXYGEN SATURATION: 98 % | RESPIRATION RATE: 14 BRPM | BODY MASS INDEX: 22.79 KG/M2

## 2024-08-26 DIAGNOSIS — E55.9 MILD VITAMIN D DEFICIENCY: ICD-10-CM

## 2024-08-26 DIAGNOSIS — N18.4 STAGE 4 CHRONIC KIDNEY DISEASE (HCC): ICD-10-CM

## 2024-08-26 DIAGNOSIS — I12.9 BENIGN HYPERTENSION WITH CKD (CHRONIC KIDNEY DISEASE) STAGE IV (HCC): ICD-10-CM

## 2024-08-26 DIAGNOSIS — R47.1 DYSARTHRIA: ICD-10-CM

## 2024-08-26 DIAGNOSIS — D63.1 ANEMIA IN STAGE 4 CHRONIC KIDNEY DISEASE  (HCC): ICD-10-CM

## 2024-08-26 DIAGNOSIS — M62.81 MUSCLE WEAKNESS OF RIGHT UPPER EXTREMITY: Primary | ICD-10-CM

## 2024-08-26 DIAGNOSIS — R63.4 WEIGHT LOSS: ICD-10-CM

## 2024-08-26 DIAGNOSIS — R80.1 PERSISTENT PROTEINURIA: ICD-10-CM

## 2024-08-26 DIAGNOSIS — I12.9 PARENCHYMAL RENAL HYPERTENSION, STAGE 1 THROUGH STAGE 4 OR UNSPECIFIED CHRONIC KIDNEY DISEASE: ICD-10-CM

## 2024-08-26 DIAGNOSIS — E78.2 MIXED HYPERLIPIDEMIA: ICD-10-CM

## 2024-08-26 DIAGNOSIS — N18.4 BENIGN HYPERTENSION WITH CKD (CHRONIC KIDNEY DISEASE) STAGE IV (HCC): ICD-10-CM

## 2024-08-26 DIAGNOSIS — N18.4 ANEMIA IN STAGE 4 CHRONIC KIDNEY DISEASE  (HCC): ICD-10-CM

## 2024-08-26 PROCEDURE — 99215 OFFICE O/P EST HI 40 MIN: CPT | Performed by: FAMILY MEDICINE

## 2024-08-26 PROCEDURE — G2211 COMPLEX E/M VISIT ADD ON: HCPCS | Performed by: FAMILY MEDICINE

## 2024-08-26 RX ORDER — LOSARTAN POTASSIUM 25 MG/1
25 TABLET ORAL DAILY
Qty: 90 TABLET | Refills: 1 | Status: SHIPPED | OUTPATIENT
Start: 2024-08-26

## 2024-08-26 NOTE — TELEPHONE ENCOUNTER
LM for patient about the following, asked him to call back if he has any questions:    ----- Message from Tanner Rey MD sent at 8/25/2024  9:17 AM EDT -----  Labs are stable iron studies are good no changes  ----- Message -----  From: [facility] Results In  Sent: 8/24/2024   1:15 AM EDT  To: Tanner Rey MD

## 2024-08-26 NOTE — ADDENDUM NOTE
Addended by: BETTYE SEE on: 8/26/2024 05:38 PM     Modules accepted: Level of Service     Tremfya Counseling: I discussed with the patient the risks of guselkumab including but not limited to immunosuppression, serious infections, and drug reactions.  The patient understands that monitoring is required including a PPD at baseline and must alert us or the primary physician if symptoms of infection or other concerning signs are noted.

## 2024-08-26 NOTE — PROGRESS NOTES
Subjective:      Patient ID: Jeffry Almanzar is a 89 y.o. male.    Here with his wife.   He does have dementia and the wife states that his appetite has been getting worse for the last 2 weeks.  She she gave majority of the history and states that last week around Wednesday he developed weakness of his right upper extremity and states that he cannot lift it.  She would not take him to the emergency room and Jeffry refused to go to the emergency room she has also noticed that he is speech has been slurred since last week.  She took him to get the labs done 3 days ago.  She also states that she needs help as he is dependent on her for his ADLs and IADLs and does not do anything .   she states that someone came from the [facility] and answered some questions which she answered but then she had an argument in the left    Fatigue  Associated symptoms include fatigue.       Past Medical History:   Diagnosis Date    Cataracts, bilateral        Family History   Problem Relation Age of Onset    Diabetes Mother        Past Surgical History:   Procedure Laterality Date    CATARACT EXTRACTION Bilateral 07/15/2012    COLONOSCOPY      CYSTOSCOPY  01/15/2018    Last assessed 9/14/17, diagnostic    HERNIA REPAIR      INSTILLATION MYTOMYCIN N/A 10/25/2017    Procedure: INSTILLATION OF MITOMYCIN C;  Surgeon: Danish Esposito MD;  Location: [facility];  Service: Urology    ND CYSTO W/REMOVAL OF LESIONS SMALL N/A 10/25/2017    Procedure: CYSTOSCOPY; BLADDER BIOPSY WITH FULGERATION;  Surgeon: Danish Esposito MD;  Location: [facility];  Service: Urology    TONSILLECTOMY      TRANSURETHRAL RESECTION OF BLADDER TUMOR N/A 10/25/2017    Procedure: TUR BLADDER TUMOR;  Surgeon: Danish Esposito MD;  Location: [facility];  Service: Urology    WISDOM TOOTH EXTRACTION          reports that he quit smoking about 60 years ago. His smoking use included cigarettes. He has never used smokeless tobacco. He reports that he does not drink  alcohol and does not use drugs.      Current Outpatient Medications:     aspirin 325 mg tablet, Take 325 mg by mouth daily, Disp: , Rfl:     atorvastatin (LIPITOR) 10 mg tablet, Take 1 tablet (10 mg total) by mouth daily, Disp: 90 tablet, Rfl: 3    brimonidine (ALPHAGAN P) 0.15 % ophthalmic solution, , Disp: , Rfl:     chlorthalidone 25 mg tablet, Take 1 tablet (25 mg total) by mouth daily In morning, Disp: 90 tablet, Rfl: 3    Cyanocobalamin (Vitamin B-12) 1000 MCG/15ML LIQD, Take by mouth daily, Disp: , Rfl:     Dorzolamide HCl-Timolol Mal PF 2-0.5 % SOLN, INSTILL 1 DROP INTO EACH EYE TWICE DAILY, Disp: , Rfl:     Farxiga 10 MG tablet, Take 1 tablet (10 mg total) by mouth daily, Disp: 90 tablet, Rfl: 3    losartan (COZAAR) 25 mg tablet, Take 1 tablet (25 mg total) by mouth daily, Disp: 90 tablet, Rfl: 1    Nutritional Supplements (Nepro) LIQD, Take 1 Bottle by mouth 2 (two) times a day, Disp: 14573 mL, Rfl: 0    prednisoLONE acetate (PRED FORTE) 1 % ophthalmic suspension, INSTILL 1 DROP INTO LEFT EYE TWICE DAILY, Disp: , Rfl:     Vyzulta 0.024 % SOLN, Administer 1 drop to both eyes daily at bedtime, Disp: , Rfl:     Blood Glucose Monitoring Suppl (OneTouch Verio Reflect) w/Device KIT, Check blood sugars twice daily. Please substitute with appropriate alternative as covered by patient's insurance. Dx: E11.65, Disp: 1 kit, Rfl: 0    Blood Glucose Monitoring Suppl (OneTouch Verio Reflect) w/Device KIT, Check blood sugars once daily. Please substitute with appropriate alternative as covered by patient's insurance. Dx: E11.65, Disp: 1 kit, Rfl: 0    glucose blood (OneTouch Verio) test strip, Check blood sugars once daily. Please substitute with appropriate alternative as covered by patient's insurance. Dx: E11.65, Disp: 100 each, Rfl: 3    OneTouch Delica Lancets 33G MISC, Check blood sugars once daily. Please substitute with appropriate alternative as covered by patient's insurance. Dx: E11.65, Disp: 100 each, Rfl:  "3    The following portions of the patient's history were reviewed and updated as appropriate: allergies, current medications, past family history, past medical history, past social history, past surgical history and problem list.    Review of Systems   Constitutional:  Positive for fatigue.         PHQ-2/9 Depression Screening                 Objective:    /50 (BP Location: Right arm, Patient Position: Sitting, Cuff Size: Adult)   Pulse 71   Temp (!) 97 °F (36.1 °C) (Tympanic)   Resp 14   Ht 5' 5\" (1.651 m)   Wt 62.1 kg (136 lb 12.8 oz)   SpO2 98%   BMI 22.76 kg/m²      Physical Exam  Vitals and nursing note reviewed.   Constitutional:       Appearance: Normal appearance.   HENT:      Head: Normocephalic and atraumatic.      Right Ear: External ear normal.      Left Ear: External ear normal.   Eyes:      General:         Right eye: No discharge.         Left eye: No discharge.      Conjunctiva/sclera: Conjunctivae normal.   Cardiovascular:      Rate and Rhythm: Normal rate and regular rhythm.      Heart sounds: No murmur heard.  Pulmonary:      Effort: Pulmonary effort is normal.      Breath sounds: Normal breath sounds. No wheezing.   Abdominal:      General: There is no distension.      Palpations: Abdomen is soft.      Tenderness: There is no abdominal tenderness.   Musculoskeletal:      Right lower leg: Edema present.      Left lower leg: Edema present.   Skin:     Findings: No lesion or rash.   Neurological:      Mental Status: Mental status is at baseline.      Cranial Nerves: Dysarthria and facial asymmetry present.      Sensory: Sensory deficit present.      Motor: Weakness present.      Coordination: Coordination abnormal.      Gait: Gait abnormal.      Comments: Wide based gait   Psychiatric:         Mood and Affect: Mood normal.         Thought Content: Thought content normal.           Recent Results (from the past 8736 hour(s))   PTH, Intact and Calcium    Collection Time: 09/15/23 12:53 " PM   Result Value Ref Range    PTH, Intact 67 16 - 77 pg/mL    Calcium 9.2 8.6 - 10.3 mg/dL   Magnesium    Collection Time: 09/15/23 12:53 PM   Result Value Ref Range    Magnesium, Serum 2.4 1.5 - 2.5 mg/dL   Phosphorus    Collection Time: 09/15/23 12:53 PM   Result Value Ref Range    Phosphorus, Serum 3.6 2.1 - 4.3 mg/dL   Comprehensive metabolic panel    Collection Time: 09/15/23 12:53 PM   Result Value Ref Range    Glucose, Random 136 (H) 65 - 99 mg/dL    BUN 42 (H) 7 - 25 mg/dL    Creatinine 2.36 (H) 0.70 - 1.22 mg/dL    eGFR 26 (L) > OR = 60 mL/min/1.73m2    SL AMB BUN/CREATININE RATIO 18 6 - 22 (calc)    Sodium 142 135 - 146 mmol/L    Potassium 3.9 3.5 - 5.3 mmol/L    Chloride 110 98 - 110 mmol/L    CO2 24 20 - 32 mmol/L    Calcium 9.2 8.6 - 10.3 mg/dL    Protein, Total 7.3 6.1 - 8.1 g/dL    Albumin 3.9 3.6 - 5.1 g/dL    Globulin 3.4 1.9 - 3.7 g/dL (calc)    Albumin/Globulin Ratio 1.1 1.0 - 2.5 (calc)    TOTAL BILIRUBIN 0.6 0.2 - 1.2 mg/dL    Alkaline Phosphatase 62 35 - 144 U/L    AST 14 10 - 35 U/L    ALT 10 9 - 46 U/L   CBC and differential    Collection Time: 09/15/23 12:53 PM   Result Value Ref Range    White Blood Cell Count 7.8 3.8 - 10.8 Thousand/uL    Red Blood Cell Count 3.41 (L) 4.20 - 5.80 Million/uL    Hemoglobin 10.9 (L) 13.2 - 17.1 g/dL    HCT 33.3 (L) 38.5 - 50.0 %    MCV 97.7 80.0 - 100.0 fL    MCH 32.0 27.0 - 33.0 pg    MCHC 32.7 32.0 - 36.0 g/dL    RDW 11.5 11.0 - 15.0 %    Platelet Count 168 140 - 400 Thousand/uL    SL AMB MPV 11.3 7.5 - 12.5 fL    Neutrophils (Absolute) 4,984 1,500 - 7,800 cells/uL    Lymphocytes (Absolute) 1,630 850 - 3,900 cells/uL    Monocytes (Absolute) 741 200 - 950 cells/uL    Eosinophils (Absolute) 398 15 - 500 cells/uL    Basophils ABS 47 0 - 200 cells/uL    Neutrophils 63.9 %    Lymphocytes 20.9 %    Monocytes 9.5 %    Eosinophils 5.1 %    Basophils PCT 0.6 %   Protein, Total w/Creat, Random Urine    Collection Time: 09/15/23 12:53 PM   Result Value Ref Range     Creatinine, Urine 69 20 - 320 mg/dL    Protein/Creat Ratio 449 (H) 25 - 148 mg/g creat    Protein/Creat Ratio 0.449 (H) 0.025 - 0.148 mg/mg creat    Total Protein, Urine 31 (H) 5 - 25 mg/dL   POCT hemoglobin A1c    Collection Time: 10/24/23  4:50 PM   Result Value Ref Range    Hemoglobin A1C 7.0 (A) 6.5   POCT hemoglobin A1c    Collection Time: 01/25/24  1:40 PM   Result Value Ref Range    Hemoglobin A1C 7.5 (A) 6.5   Lipid Panel with Direct LDL reflex    Collection Time: 02/16/24 12:42 PM   Result Value Ref Range    Total Cholesterol 153 <200 mg/dL    HDL 53 > OR = 40 mg/dL    Triglycerides 57 <150 mg/dL    LDL Calculated 86 mg/dL (calc)    Chol HDLC Ratio 2.9 <5.0 (calc)    Non-HDL Cholesterol 100 <130 mg/dL (calc)   PTH, Intact and Calcium    Collection Time: 02/16/24 12:42 PM   Result Value Ref Range    PTH, Intact 69 16 - 77 pg/mL    Calcium 9.2 8.6 - 10.3 mg/dL   Magnesium    Collection Time: 02/16/24 12:42 PM   Result Value Ref Range    Magnesium, Serum 2.4 1.5 - 2.5 mg/dL   Phosphorus    Collection Time: 02/16/24 12:42 PM   Result Value Ref Range    Phosphorus, Serum 3.6 2.1 - 4.3 mg/dL   TIBC    Collection Time: 02/16/24 12:42 PM   Result Value Ref Range    Iron, Serum 78 50 - 180 mcg/dL    Total Iron Binding Capacity (TIBC) 248 (L) 250 - 425 mcg/dL (calc)    Iron Saturation 31 20 - 48 % (calc)   Comprehensive metabolic panel    Collection Time: 02/16/24 12:42 PM   Result Value Ref Range    Glucose, Random 163 (H) 65 - 99 mg/dL    BUN 46 (H) 7 - 25 mg/dL    Creatinine 2.18 (H) 0.70 - 1.22 mg/dL    eGFR 28 (L) > OR = 60 mL/min/1.73m2    SL AMB BUN/CREATININE RATIO 21 6 - 22 (calc)    Sodium 141 135 - 146 mmol/L    Potassium 3.9 3.5 - 5.3 mmol/L    Chloride 109 98 - 110 mmol/L    CO2 23 20 - 32 mmol/L    Calcium 9.2 8.6 - 10.3 mg/dL    Protein, Total 7.2 6.1 - 8.1 g/dL    Albumin 3.9 3.6 - 5.1 g/dL    Globulin 3.3 1.9 - 3.7 g/dL (calc)    Albumin/Globulin Ratio 1.2 1.0 - 2.5 (calc)    TOTAL BILIRUBIN 0.5 0.2  - 1.2 mg/dL    Alkaline Phosphatase 52 35 - 144 U/L    AST 14 10 - 35 U/L    ALT 11 9 - 46 U/L   CBC and differential    Collection Time: 02/16/24 12:42 PM   Result Value Ref Range    White Blood Cell Count 8.0 3.8 - 10.8 Thousand/uL    Red Blood Cell Count 3.25 (L) 4.20 - 5.80 Million/uL    Hemoglobin 10.1 (L) 13.2 - 17.1 g/dL    HCT 31.8 (L) 38.5 - 50.0 %    MCV 97.8 80.0 - 100.0 fL    MCH 31.1 27.0 - 33.0 pg    MCHC 31.8 (L) 32.0 - 36.0 g/dL    RDW 11.7 11.0 - 15.0 %    Platelet Count 177 140 - 400 Thousand/uL    SL AMB MPV 11.2 7.5 - 12.5 fL    Neutrophils (Absolute) 5,272 1,500 - 7,800 cells/uL    Lymphocytes (Absolute) 1,656 850 - 3,900 cells/uL    Monocytes (Absolute) 712 200 - 950 cells/uL    Eosinophils (Absolute) 312 15 - 500 cells/uL    Basophils ABS 48 0 - 200 cells/uL    Neutrophils 65.9 %    Lymphocytes 20.7 %    Monocytes 8.9 %    Eosinophils 3.9 %    Basophils PCT 0.6 %    Always Message     Ferritin    Collection Time: 02/16/24 12:42 PM   Result Value Ref Range    Ferritin 284 24 - 380 ng/mL   Vitamin D 25 hydroxy    Collection Time: 02/16/24 12:42 PM   Result Value Ref Range    Vitamin D, 25-Hydroxy, Serum 63 30 - 100 ng/mL   Protein, Total w/Creat, Random Urine    Collection Time: 02/16/24 12:42 PM   Result Value Ref Range    Creatinine, Urine 80 20 - 320 mg/dL    Protein/Creat Ratio 400 (H) 25 - 148 mg/g creat    Protein/Creat Ratio 0.400 (H) 0.025 - 0.148 mg/mg creat    Total Protein, Urine 32 (H) 5 - 25 mg/dL   POCT hemoglobin A1c    Collection Time: 04/25/24  3:58 PM   Result Value Ref Range    Hemoglobin A1C 6.9 (A) 6.5   Lipid Panel with Direct LDL reflex    Collection Time: 07/23/24  1:25 PM   Result Value Ref Range    Total Cholesterol 137 <200 mg/dL    HDL 46 > OR = 40 mg/dL    Triglycerides 60 <150 mg/dL    LDL Calculated 77 mg/dL (calc)    Chol HDLC Ratio 3.0 <5.0 (calc)    Non-HDL Cholesterol 91 <130 mg/dL (calc)   PTH, Intact and Calcium    Collection Time: 07/23/24  1:25 PM   Result  Value Ref Range    PTH, Intact 75 16 - 77 pg/mL    Calcium 9.1 8.6 - 10.3 mg/dL   Magnesium    Collection Time: 07/23/24  1:25 PM   Result Value Ref Range    Magnesium, Serum 2.4 1.5 - 2.5 mg/dL   Phosphorus    Collection Time: 07/23/24  1:25 PM   Result Value Ref Range    Phosphorus, Serum 3.5 2.1 - 4.3 mg/dL   Basic metabolic panel    Collection Time: 07/23/24  1:25 PM   Result Value Ref Range    Glucose, Random 192 (H) 65 - 99 mg/dL    BUN 60 (H) 7 - 25 mg/dL    Creatinine 3.24 (H) 0.70 - 1.22 mg/dL    eGFR 18 (L) > OR = 60 mL/min/1.73m2    SL AMB BUN/CREATININE RATIO 19 6 - 22 (calc)    Sodium 139 135 - 146 mmol/L    Potassium 3.8 3.5 - 5.3 mmol/L    Chloride 106 98 - 110 mmol/L    CO2 24 20 - 32 mmol/L    Calcium 9.1 8.6 - 10.3 mg/dL   CBC and differential    Collection Time: 07/23/24  1:25 PM   Result Value Ref Range    White Blood Cell Count 7.7 3.8 - 10.8 Thousand/uL    Red Blood Cell Count 2.95 (L) 4.20 - 5.80 Million/uL    Hemoglobin 9.1 (L) 13.2 - 17.1 g/dL    HCT 28.6 (L) 38.5 - 50.0 %    MCV 96.9 80.0 - 100.0 fL    MCH 30.8 27.0 - 33.0 pg    MCHC 31.8 (L) 32.0 - 36.0 g/dL    RDW 11.5 11.0 - 15.0 %    Platelet Count 179 140 - 400 Thousand/uL    SL AMB MPV 10.8 7.5 - 12.5 fL    Neutrophils (Absolute) 5,213 1,500 - 7,800 cells/uL    Lymphocytes (Absolute) 1,263 850 - 3,900 cells/uL    Monocytes (Absolute) 816 200 - 950 cells/uL    Eosinophils (Absolute) 370 15 - 500 cells/uL    Basophils ABS 39 0 - 200 cells/uL    Neutrophils 67.7 %    Lymphocytes 16.4 %    Monocytes 10.6 %    Eosinophils 4.8 %    Basophils PCT 0.5 %    Always Message     Ferritin    Collection Time: 07/23/24  1:25 PM   Result Value Ref Range    Ferritin 407 (H) 24 - 380 ng/mL   Protein, Total w/Creat, Random Urine    Collection Time: 07/23/24  1:25 PM   Result Value Ref Range    Creatinine, Urine 110 20 - 320 mg/dL    Protein/Creat Ratio 282 (H) 25 - 148 mg/g creat    Protein/Creat Ratio 0.282 (H) 0.025 - 0.148 mg/mg creat    Total  Protein, Urine 31 (H) 5 - 25 mg/dL   Iron, TIBC and Ferritin Panel    Collection Time: 08/23/24  2:18 PM   Result Value Ref Range    Iron, Serum 79 50 - 180 mcg/dL    Total Iron Binding Capacity (TIBC) 254 250 - 425 mcg/dL (calc)    Iron Saturation 31 20 - 48 % (calc)    Ferritin 411 (H) 24 - 380 ng/mL   Basic metabolic panel    Collection Time: 08/23/24  2:18 PM   Result Value Ref Range    Glucose, Random 171 (H) 65 - 99 mg/dL    BUN 66 (H) 7 - 25 mg/dL    Creatinine 3.28 (H) 0.70 - 1.22 mg/dL    eGFR 17 (L) > OR = 60 mL/min/1.73m2    SL AMB BUN/CREATININE RATIO 20 6 - 22 (calc)    Sodium 138 135 - 146 mmol/L    Potassium 3.9 3.5 - 5.3 mmol/L    Chloride 105 98 - 110 mmol/L    CO2 23 20 - 32 mmol/L    Calcium 9.6 8.6 - 10.3 mg/dL   CBC and differential    Collection Time: 08/23/24  2:18 PM   Result Value Ref Range    White Blood Cell Count 8.0 3.8 - 10.8 Thousand/uL    Red Blood Cell Count 3.08 (L) 4.20 - 5.80 Million/uL    Hemoglobin 9.7 (L) 13.2 - 17.1 g/dL    HCT 31.0 (L) 38.5 - 50.0 %    .6 (H) 80.0 - 100.0 fL    MCH 31.5 27.0 - 33.0 pg    MCHC 31.3 (L) 32.0 - 36.0 g/dL    RDW 11.6 11.0 - 15.0 %    Platelet Count 224 140 - 400 Thousand/uL    SL AMB MPV 10.5 7.5 - 12.5 fL    Neutrophils (Absolute) 5,720 1,500 - 7,800 cells/uL    Lymphocytes (Absolute) 1,112 850 - 3,900 cells/uL    Monocytes (Absolute) 744 200 - 950 cells/uL    Eosinophils (Absolute) 360 15 - 500 cells/uL    Basophils ABS 64 0 - 200 cells/uL    Neutrophils 71.5 %    Lymphocytes 13.9 %    Monocytes 9.3 %    Eosinophils 4.5 %    Basophils PCT 0.8 %   Hemoglobin A1c (w/out EAG)    Collection Time: 08/23/24  2:24 PM   Result Value Ref Range    Hemoglobin A1C 6.9 (H) <5.7 % of total Hgb       Laboratory Results: I have personally reviewed the pertinent laboratory results/reports     Radiology/Other Diagnostic Testing Results: I have personally reviewed pertinent reports.      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 COMPARISON: CT 8/20/1718 TECHNIQUE:   Ultrasound of the retroperitoneum was performed with a curvilinear transducer utilizing volumetric sweeps and still imaging techniques. FINDINGS: KIDNEYS: There is mild to moderate bilateral renal atrophy with measurements below. Right kidney:  7.3 x 5.1 x 4.9 cm. Left kidney:  7.2 x 5.5 x 4.4 cm. Right kidney The renal parenchyma is diffusely echogenic consistent with medical renal disease. No suspicious masses detected. No hydronephrosis. No shadowing calculi. No perinephric fluid collections. Left kidney The renal parenchyma is diffusely echogenic consistent with medical renal disease. No suspicious masses detected. No hydronephrosis. No shadowing calculi. No perinephric fluid collections. URETERS: Nonvisualized. BLADDER: Normally distended. No focal thickening or mass lesions. Bilateral ureteral jets detected. Prevoid bladder volume 143 cc's. Postvoid bladder volume 47 cc.     1. Small echogenic kidneys bilaterally compatible with medical renal disease. 2. Small postvoid residual. Workstation performed: [id number]        Assessment/Plan:  1. Muscle weakness of right upper extremity  -     Ammonia; Future  -     CT head wo contrast; Future; Expected date: 08/26/2024  2. Dysarthria  -     Ammonia; Future  -     CT head wo contrast; Future; Expected date: 08/26/2024  3. Weight loss  -     Nutritional Supplements (Nepro) LIQD; Take 1 Bottle by mouth 2 (two) times a day  4. Mild vitamin D deficiency  -     losartan (COZAAR) 25 mg tablet; Take 1 tablet (25 mg total) by  "mouth daily  5. 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 Stage 4 chronic kidney disease (HCC)  -     losartan (COZAAR) 25 mg tablet; Take 1 tablet (25 mg total) by mouth daily  7. Parenchymal renal hypertension, stage 1 through stage 4 or unspecified chronic kidney disease  -     losartan (COZAAR) 25 mg tablet; Take 1 tablet (25 mg total) by mouth daily  8. Benign 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 (HCC)  -     losartan (COZAAR) 25 mg tablet; Take 1 tablet (25 mg total) by mouth daily  9. Mixed hyperlipidemia  -     losartan (COZAAR) 25 mg tablet; Take 1 tablet (25 mg total) by mouth daily  10. Persistent proteinuria  -     losartan (COZAAR) 25 mg tablet; Take 1 tablet (25 mg total) by mouth daily        I recommended patient go to the emergency room however patient and the wife both refused.  I ordered a stat CT and be called scheduling to schedule the CT for him however we were told that his wife needs to call to schedule.  Given his lower extremity swelling, hypertension and the creatinine remained the same off losartan it is possible that his CKD has worsened.  Will resume losartan at this time, however given his blood pressure has been low we will discontinue amlodipine.    I have given them stroke precautions and reinforced the need to go to the emergency room in case his symptoms to get worse.  Wife Capo who is a primary caretaker verbalized understanding           Read package inserts for all medications before starting a new medications, call me if you have any questions.    Patient was given opportunity to ask questions and all questions were answered.    Disclaimer: Portions of the record may have been created with voice recognition software. Occasional wrong word or \"sound a like\" substitutions may have occurred due to the inherent limitations of voice recognition software. Read the chart " carefully and recognize, using context, where substitutions have occurred. I have used the Epic copy/forward function to compose this note. I have reviewed my current note to ensure it reflects the current patient status, exam, assessment and plan.

## 2024-08-27 ENCOUNTER — TELEPHONE (OUTPATIENT)
Dept: FAMILY MEDICINE CLINIC | Facility: CLINIC | Age: 89
End: 2024-08-27

## 2024-08-27 ENCOUNTER — HOSPITAL ENCOUNTER (OUTPATIENT)
Dept: CT IMAGING | Facility: HOSPITAL | Age: 89
Discharge: HOME | End: 2024-08-27
Attending: FAMILY MEDICINE
Payer: MEDICARE

## 2024-08-27 DIAGNOSIS — R47.1 DYSARTHRIA: ICD-10-CM

## 2024-08-27 DIAGNOSIS — I63.81 LEFT SIDED LACUNAR INFARCTION (HCC): Primary | ICD-10-CM

## 2024-08-27 DIAGNOSIS — M62.81 MUSCLE WEAKNESS OF RIGHT UPPER EXTREMITY: ICD-10-CM

## 2024-08-27 PROCEDURE — 70450 CT HEAD/BRAIN W/O DYE: CPT

## 2024-08-27 NOTE — TELEPHONE ENCOUNTER
Spoke to pts wife and advised him to stop aspirin 325. Central scheduling number give for her to schedule MRI of brain in one week

## 2024-08-28 RX ORDER — ASPIRIN 81 MG/1
81 TABLET ORAL DAILY
Qty: 90 TABLET | Refills: 3 | Status: SHIPPED | OUTPATIENT
Start: 2024-08-28

## 2024-08-28 NOTE — TELEPHONE ENCOUNTER
Due to CKD-4 , recommend aspirin 81 mg daily for antiplatelet and continue atorvatstatin, MRI ordered

## 2024-08-29 DIAGNOSIS — R63.4 WEIGHT LOSS: ICD-10-CM

## 2024-08-29 NOTE — TELEPHONE ENCOUNTER
Left message for pts wife to call office back. Need to let her know Dr Maloney reviewed his chart and wants him to just take aspirin 81mg daily and she decided NOT to send the plavix to the pharmacy.

## 2024-08-30 ENCOUNTER — TELEPHONE (OUTPATIENT)
Dept: FAMILY MEDICINE CLINIC | Facility: CLINIC | Age: 89
End: 2024-08-30

## 2024-08-30 NOTE — TELEPHONE ENCOUNTER
Patient's wife returned nurse's call- attempted to warm transfer to office clinical, but was unavailable at time of call. Please call wife back at [phone]

## 2024-08-30 NOTE — TELEPHONE ENCOUNTER
----- Message from Debbie Maloney MD sent at 8/27/2024  3:12 PM EDT -----  Like I discussed - he likely did have a stroke, given he and Lit did not want him to go to hospital, recommend daily baby aspirin 81 mg and continue other meds.  We can obtain MRI brain to further characterize it however this will likely not change the management,  Also, if he does develop any other symptoms- strongly encourage her to take him to the ED.

## 2024-09-07 ENCOUNTER — HOSPITAL ENCOUNTER (OUTPATIENT)
Dept: MRI IMAGING | Facility: HOSPITAL | Age: 89
Discharge: HOME | End: 2024-09-07
Attending: FAMILY MEDICINE
Payer: MEDICARE

## 2024-09-07 DIAGNOSIS — I63.81 LEFT SIDED LACUNAR INFARCTION (HCC): ICD-10-CM

## 2024-09-07 PROCEDURE — 70551 MRI BRAIN STEM W/O DYE: CPT

## 2024-09-09 ENCOUNTER — HOSPITAL ENCOUNTER (INPATIENT)
Facility: HOSPITAL | Age: 89
LOS: 1 days | Discharge: HOME HEALTH | DRG: 065 | End: 2024-09-11
Attending: EMERGENCY MEDICINE | Admitting: STUDENT IN AN ORGANIZED HEALTH CARE EDUCATION/TRAINING PROGRAM
Payer: MEDICARE

## 2024-09-09 ENCOUNTER — TELEPHONE (OUTPATIENT)
Age: 89
End: 2024-09-09

## 2024-09-09 DIAGNOSIS — I63.9 CVA (CEREBRAL VASCULAR ACCIDENT) (HCC): ICD-10-CM

## 2024-09-09 DIAGNOSIS — R29.898 RUE WEAKNESS: ICD-10-CM

## 2024-09-09 DIAGNOSIS — I63.81 LACUNAR CEREBROVASCULAR ACCIDENT (CVA) (HCC): Primary | ICD-10-CM

## 2024-09-09 PROBLEM — N18.9 ACUTE-ON-CHRONIC KIDNEY INJURY  (HCC): Status: ACTIVE | Noted: 2024-09-09

## 2024-09-09 PROBLEM — N17.9 ACUTE-ON-CHRONIC KIDNEY INJURY  (HCC): Status: ACTIVE | Noted: 2024-09-09

## 2024-09-09 LAB
ALBUMIN SERPL BCG-MCNC: 3.6 G/DL (ref 3.5–5)
ALP SERPL-CCNC: 38 U/L (ref 34–104)
ALT SERPL W P-5'-P-CCNC: 9 U/L (ref 7–52)
ANION GAP SERPL CALCULATED.3IONS-SCNC: 10 MMOL/L (ref 4–13)
AST SERPL W P-5'-P-CCNC: 12 U/L (ref 13–39)
BASOPHILS # BLD AUTO: 0.05 THOUSANDS/ÂΜL (ref 0–0.1)
BASOPHILS NFR BLD AUTO: 1 % (ref 0–1)
BILIRUB SERPL-MCNC: 0.41 MG/DL (ref 0.2–1)
BUN SERPL-MCNC: 69 MG/DL (ref 5–25)
CALCIUM SERPL-MCNC: 9.1 MG/DL (ref 8.4–10.2)
CHLORIDE SERPL-SCNC: 102 MMOL/L (ref 96–108)
CHOLEST SERPL-MCNC: 117 MG/DL
CO2 SERPL-SCNC: 22 MMOL/L (ref 21–32)
CREAT SERPL-MCNC: 3.71 MG/DL (ref 0.6–1.3)
EOSINOPHIL # BLD AUTO: 0.35 THOUSAND/ÂΜL (ref 0–0.61)
EOSINOPHIL NFR BLD AUTO: 4 % (ref 0–6)
ERYTHROCYTE [DISTWIDTH] IN BLOOD BY AUTOMATED COUNT: 12.6 % (ref 11.6–15.1)
GFR SERPL CREATININE-BSD FRML MDRD: 13 ML/MIN/1.73SQ M
GLUCOSE SERPL-MCNC: 302 MG/DL (ref 65–140)
HCT VFR BLD AUTO: 28.2 % (ref 36.5–49.3)
HDLC SERPL-MCNC: 44 MG/DL
HGB BLD-MCNC: 9 G/DL (ref 12–17)
IMM GRANULOCYTES # BLD AUTO: 0.03 THOUSAND/UL (ref 0–0.2)
IMM GRANULOCYTES NFR BLD AUTO: 0 % (ref 0–2)
LDLC SERPL CALC-MCNC: 59 MG/DL (ref 0–100)
LYMPHOCYTES # BLD AUTO: 1.47 THOUSANDS/ÂΜL (ref 0.6–4.47)
LYMPHOCYTES NFR BLD AUTO: 17 % (ref 14–44)
MCH RBC QN AUTO: 32.4 PG (ref 26.8–34.3)
MCHC RBC AUTO-ENTMCNC: 31.9 G/DL (ref 31.4–37.4)
MCV RBC AUTO: 101 FL (ref 82–98)
MONOCYTES # BLD AUTO: 0.85 THOUSAND/ÂΜL (ref 0.17–1.22)
MONOCYTES NFR BLD AUTO: 10 % (ref 4–12)
NEUTROPHILS # BLD AUTO: 5.89 THOUSANDS/ÂΜL (ref 1.85–7.62)
NEUTS SEG NFR BLD AUTO: 68 % (ref 43–75)
NONHDLC SERPL-MCNC: 73 MG/DL
NRBC BLD AUTO-RTO: 0 /100 WBCS
PLATELET # BLD AUTO: 159 THOUSANDS/UL (ref 149–390)
PMV BLD AUTO: 10.7 FL (ref 8.9–12.7)
POTASSIUM SERPL-SCNC: 3.7 MMOL/L (ref 3.5–5.3)
PROT SERPL-MCNC: 6.8 G/DL (ref 6.4–8.4)
RBC # BLD AUTO: 2.78 MILLION/UL (ref 3.88–5.62)
SODIUM SERPL-SCNC: 134 MMOL/L (ref 135–147)
TRIGL SERPL-MCNC: 72 MG/DL
WBC # BLD AUTO: 8.64 THOUSAND/UL (ref 4.31–10.16)

## 2024-09-09 PROCEDURE — 80053 COMPREHEN METABOLIC PANEL: CPT

## 2024-09-09 PROCEDURE — 80061 LIPID PANEL: CPT | Performed by: INTERNAL MEDICINE

## 2024-09-09 PROCEDURE — 83036 HEMOGLOBIN GLYCOSYLATED A1C: CPT | Performed by: INTERNAL MEDICINE

## 2024-09-09 PROCEDURE — 85025 COMPLETE CBC W/AUTO DIFF WBC: CPT

## 2024-09-09 PROCEDURE — 83550 IRON BINDING TEST: CPT | Performed by: INTERNAL MEDICINE

## 2024-09-09 PROCEDURE — 93005 ELECTROCARDIOGRAM TRACING: CPT

## 2024-09-09 PROCEDURE — 99285 EMERGENCY DEPT VISIT HI MDM: CPT | Performed by: EMERGENCY MEDICINE

## 2024-09-09 PROCEDURE — 99223 1ST HOSP IP/OBS HIGH 75: CPT | Performed by: INTERNAL MEDICINE

## 2024-09-09 PROCEDURE — 36415 COLL VENOUS BLD VENIPUNCTURE: CPT

## 2024-09-09 PROCEDURE — 82728 ASSAY OF FERRITIN: CPT | Performed by: INTERNAL MEDICINE

## 2024-09-09 PROCEDURE — 99283 EMERGENCY DEPT VISIT LOW MDM: CPT

## 2024-09-09 PROCEDURE — 83540 ASSAY OF IRON: CPT | Performed by: INTERNAL MEDICINE

## 2024-09-09 RX ORDER — ACETAMINOPHEN 325 MG/1
650 TABLET ORAL EVERY 4 HOURS PRN
Status: DISCONTINUED | OUTPATIENT
Start: 2024-09-09 | End: 2024-09-11 | Stop reason: HOSPADM

## 2024-09-09 RX ORDER — ATORVASTATIN CALCIUM 10 MG/1
10 TABLET, FILM COATED ORAL DAILY
Status: DISCONTINUED | OUTPATIENT
Start: 2024-09-10 | End: 2024-09-09

## 2024-09-09 RX ORDER — BRIMONIDINE TARTRATE 2 MG/ML
1 SOLUTION/ DROPS OPHTHALMIC 3 TIMES DAILY
Status: DISCONTINUED | OUTPATIENT
Start: 2024-09-09 | End: 2024-09-10

## 2024-09-09 RX ORDER — INSULIN LISPRO 100 [IU]/ML
1-5 INJECTION, SOLUTION INTRAVENOUS; SUBCUTANEOUS
Status: DISCONTINUED | OUTPATIENT
Start: 2024-09-10 | End: 2024-09-11 | Stop reason: HOSPADM

## 2024-09-09 RX ORDER — ATORVASTATIN CALCIUM 40 MG/1
40 TABLET, FILM COATED ORAL DAILY
Status: DISCONTINUED | OUTPATIENT
Start: 2024-09-09 | End: 2024-09-11 | Stop reason: HOSPADM

## 2024-09-09 RX ORDER — ENOXAPARIN SODIUM 100 MG/ML
30 INJECTION SUBCUTANEOUS DAILY
Status: DISCONTINUED | OUTPATIENT
Start: 2024-09-10 | End: 2024-09-09

## 2024-09-09 RX ORDER — BIMATOPROST 0.3 MG/ML
1 SOLUTION/ DROPS OPHTHALMIC
Status: DISCONTINUED | OUTPATIENT
Start: 2024-09-09 | End: 2024-09-10

## 2024-09-09 RX ORDER — SODIUM CHLORIDE 9 MG/ML
75 INJECTION, SOLUTION INTRAVENOUS CONTINUOUS
Status: DISPENSED | OUTPATIENT
Start: 2024-09-09 | End: 2024-09-10

## 2024-09-09 RX ORDER — INSULIN LISPRO 100 [IU]/ML
1-5 INJECTION, SOLUTION INTRAVENOUS; SUBCUTANEOUS
Status: DISCONTINUED | OUTPATIENT
Start: 2024-09-10 | End: 2024-09-09

## 2024-09-09 RX ORDER — ENOXAPARIN SODIUM 100 MG/ML
40 INJECTION SUBCUTANEOUS DAILY
Status: DISCONTINUED | OUTPATIENT
Start: 2024-09-10 | End: 2024-09-09 | Stop reason: DRUGHIGH

## 2024-09-09 RX ORDER — CLOPIDOGREL BISULFATE 75 MG/1
300 TABLET ORAL ONCE
Status: DISCONTINUED | OUTPATIENT
Start: 2024-09-09 | End: 2024-09-09

## 2024-09-09 RX ADMIN — BRIMONIDINE TARTRATE 1 DROP: 2 SOLUTION/ DROPS OPHTHALMIC at 23:56

## 2024-09-09 RX ADMIN — SODIUM CHLORIDE 75 ML/HR: 0.9 INJECTION, SOLUTION INTRAVENOUS at 23:55

## 2024-09-09 RX ADMIN — ATORVASTATIN CALCIUM 40 MG: 40 TABLET, FILM COATED ORAL at 23:54

## 2024-09-09 RX ADMIN — BIMATOPROST 1 DROP: 0.3 SOLUTION/ DROPS OPHTHALMIC at 23:58

## 2024-09-09 NOTE — ED PROVIDER NOTES
History  Chief Complaint   Patient presents with    Evaluation of Abnormal Diagnostic Test     Pt had an MRI on Saturday. Was told to come in by PCP due to possible stroke shown on the MRI. Pt and family reports he has had increased confusion over the past month. GCS 14 in triage      The patient is an 89 year old male who presents to ED with wife for evaluation of RUE weakness. PMHx dementia, HTN, DM, glaucoma. History limited due to dementia and history is provided by wife. Wife states she noticed RUE weakness on the pt about 2 weeks ago along with some mildly slurred speech. She states the tp did not want to come tot he hospital so when symptoms seemed to persist, she called PCP who ordered outpatient CT head and MRI brain which showed lacunar infarct and directed the pt to come to the ED for admission. Wife states the pt has been not using the right hand to eat despite that he is right handed. She notes his speech has intermittently sounded more slurred. He is still able to ambulate at his baseline with assistance. Pt reports no headache or dizziness or chest pain        Prior to Admission Medications   Prescriptions Last Dose Informant Patient Reported? Taking?   Blood Glucose Monitoring Suppl (OneTouch Verio Reflect) w/Device KIT  Self No No   Sig: Check blood sugars twice daily. Please substitute with appropriate alternative as covered by patient's insurance. Dx: E11.65   Blood Glucose Monitoring Suppl (OneTouch Verio Reflect) w/Device KIT  Self No No   Sig: Check blood sugars once daily. Please substitute with appropriate alternative as covered by patient's insurance. Dx: E11.65   Cyanocobalamin (Vitamin B-12) 1000 MCG/15ML LIQD  Self Yes No   Sig: Take by mouth daily   Dorzolamide HCl-Timolol Mal PF 2-0.5 % SOLN  Self Yes No   Sig: INSTILL 1 DROP INTO EACH EYE TWICE DAILY   Farxiga 10 MG tablet   No No   Sig: Take 1 tablet (10 mg total) by mouth daily   Nutritional Supplements (Nepro) LIQD   No No   Sig:  Take 237 mL by mouth 2 (two) times a day for 60 doses   OneTouch Delica Lancets 33G MISC  Self No No   Sig: Check blood sugars once daily. Please substitute with appropriate alternative as covered by patient's insurance. Dx: E11.65   Vyzulta 0.024 % SOLN  Self Yes No   Sig: Administer 1 drop to both eyes daily at bedtime   aspirin (Aspirin 81) 81 mg EC tablet   No No   Sig: Take 1 tablet (81 mg total) by mouth daily   atorvastatin (LIPITOR) 10 mg tablet   No No   Sig: Take 1 tablet (10 mg total) by mouth daily   brimonidine (ALPHAGAN P) 0.15 % ophthalmic solution  Self Yes No   chlorthalidone 25 mg tablet  Self No No   Sig: Take 1 tablet (25 mg total) by mouth daily In morning   glucose blood (OneTouch Verio) test strip   No No   Sig: Check blood sugars once daily. Please substitute with appropriate alternative as covered by patient's insurance. Dx: E11.65   losartan (COZAAR) 25 mg tablet   No No   Sig: Take 1 tablet (25 mg total) by mouth daily   prednisoLONE acetate (PRED FORTE) 1 % ophthalmic suspension  Self Yes No   Sig: INSTILL 1 DROP INTO LEFT EYE TWICE DAILY      Facility-Administered Medications: None       Past Medical History:   Diagnosis Date    Cataracts, bilateral        Past Surgical History:   Procedure Laterality Date    CATARACT EXTRACTION Bilateral 07/15/2012    COLONOSCOPY      CYSTOSCOPY  01/15/2018    Last assessed 9/14/17, diagnostic    HERNIA REPAIR      INSTILLATION MYTOMYCIN N/A 10/25/2017    Procedure: INSTILLATION OF MITOMYCIN C;  Surgeon: Danish Esposito MD;  Location: [facility];  Service: Urology    DC CYSTO W/REMOVAL OF LESIONS SMALL N/A 10/25/2017    Procedure: CYSTOSCOPY; BLADDER BIOPSY WITH FULGERATION;  Surgeon: Danish Esposito MD;  Location: [facility];  Service: Urology    TONSILLECTOMY      TRANSURETHRAL RESECTION OF BLADDER TUMOR N/A 10/25/2017    Procedure: TUR BLADDER TUMOR;  Surgeon: Danish Esposito MD;  Location: [facility];  Service: Urology    WISDOM TOOTH  EXTRACTION         Family History   Problem Relation Age of Onset    Diabetes Mother      I have reviewed and agree with the history as documented.    E-Cigarette/Vaping    E-Cigarette Use Never User      E-Cigarette/Vaping Substances    Nicotine No     THC No     CBD No     Flavoring No     Other No     Unknown No      Social History     Tobacco Use    Smoking status: Former     Current packs/day: 0.00     Types: Cigarettes     Quit date: 1964     Years since quittin.7    Smokeless tobacco: Never   Vaping Use    Vaping status: Never Used   Substance Use Topics    Alcohol use: No     Alcohol/week: 0.0 standard drinks of alcohol     Comment: quit 23 years ago    Drug use: No        Review of Systems   Unable to perform ROS: Dementia   Constitutional:  Positive for activity change. Negative for fever.   Respiratory:  Negative for shortness of breath.    Cardiovascular:  Negative for chest pain.   Gastrointestinal:  Negative for abdominal pain and vomiting.   Musculoskeletal:  Negative for gait problem.   Skin:  Negative for rash and wound.   Neurological:  Positive for speech difficulty and weakness. Negative for headaches.       Physical Exam  ED Triage Vitals [24 1843]   Temperature Pulse Respirations Blood Pressure SpO2   98.5 °F (36.9 °C) 72 20 103/55 99 %      Temp Source Heart Rate Source Patient Position - Orthostatic VS BP Location FiO2 (%)   Oral Monitor Sitting Right arm --      Pain Score       --             Orthostatic Vital Signs  Vitals:    09/10/24 0000 09/10/24 0030 09/10/24 0045 09/10/24 0100   BP: 148/69 135/63 127/65 140/73   Pulse: 67 65 63 68   Patient Position - Orthostatic VS: Sitting  Sitting Lying       Physical Exam  Vitals and nursing note reviewed.   Constitutional:       General: He is not in acute distress.  HENT:      Head: Normocephalic and atraumatic.      Nose: Nose normal.      Mouth/Throat:      Mouth: Mucous membranes are moist.      Pharynx: Oropharynx is clear.    Eyes:      Comments: Left pupil un reactive - reportedly chronic   Cardiovascular:      Rate and Rhythm: Normal rate and regular rhythm.      Pulses: Normal pulses.      Heart sounds: Normal heart sounds.   Pulmonary:      Effort: Pulmonary effort is normal. No respiratory distress.      Breath sounds: Normal breath sounds.   Abdominal:      Palpations: Abdomen is soft.      Tenderness: There is no abdominal tenderness. There is no guarding or rebound.   Musculoskeletal:         General: No deformity or signs of injury. Normal range of motion.      Cervical back: Neck supple.      Right lower leg: No edema.      Left lower leg: No edema.   Skin:     General: Skin is warm and dry.      Findings: No rash.   Neurological:      Mental Status: He is alert. Mental status is at baseline.      Cranial Nerves: Dysarthria present.      Motor: Weakness present. No tremor.      Comments: Mild RUE weakness - RUE strength 4/5. LUE 5/5. Pt unable to follow directions for coordination testing.   Pt with mild slurring of speech          ED Medications  Medications   aspirin (ECOTRIN LOW STRENGTH) EC tablet 81 mg (has no administration in time range)   brimonidine tartrate 0.2 % ophthalmic solution 1 drop (1 drop Both Eyes Given 9/9/24 1316)   bimatoprost (LUMIGAN) 0.03 % ophthalmic drops 1 drop (1 drop Left Eye Given 9/9/24 2358)   sodium chloride 0.9 % infusion (75 mL/hr Intravenous New Bag 9/9/24 8655)   acetaminophen (TYLENOL) tablet 650 mg (has no administration in time range)   atorvastatin (LIPITOR) tablet 40 mg (40 mg Oral Given 9/9/24 5294)   insulin lispro (HumALOG/ADMELOG) 100 units/mL subcutaneous injection 1-5 Units (has no administration in time range)       Diagnostic Studies  Results Reviewed       Procedure Component Value Units Date/Time    Platelet count [[id number]]  (Normal) Collected: 09/10/24 0003    Lab Status: Final result Specimen: Blood from Arm, Right Updated: 09/10/24 0012     Platelets 153 Thousands/uL       MPV 10.2 fL     Haptoglobin [[id number]] Collected: 09/10/24 0003    Lab Status: In process Specimen: Blood from Arm, Right Updated: 09/10/24 0010    Fingerstick Glucose (POCT) [[id number]]  (Abnormal) Collected: 09/10/24 0004    Lab Status: Final result Specimen: Blood Updated: 09/10/24 0005     POC Glucose 174 mg/dl     Lipid panel [[id number]] Collected: 09/09/24 1931    Lab Status: Final result Specimen: Blood from Arm, Right Updated: 09/09/24 2254     Cholesterol 117 mg/dL      Triglycerides 72 mg/dL      HDL, Direct 44 mg/dL      LDL Calculated 59 mg/dL      Non-HDL-Chol (CHOL-HDL) 73 mg/dl     Hemoglobin A1C [[id number]] Collected: 09/09/24 1931    Lab Status: In process Specimen: Blood from Arm, Right Updated: 09/09/24 2240    Comprehensive metabolic panel [[id number]]  (Abnormal) Collected: 09/09/24 1931    Lab Status: Final result Specimen: Blood from Arm, Right Updated: 09/09/24 2011     Sodium 134 mmol/L      Potassium 3.7 mmol/L      Chloride 102 mmol/L      CO2 22 mmol/L      ANION GAP 10 mmol/L      BUN 69 mg/dL      Creatinine 3.71 mg/dL      Glucose 302 mg/dL      Calcium 9.1 mg/dL      AST 12 U/L      ALT 9 U/L      Alkaline Phosphatase 38 U/L      Total Protein 6.8 g/dL      Albumin 3.6 g/dL      Total Bilirubin 0.41 mg/dL      eGFR 13 ml/min/1.73sq m     Narrative:      National Kid[employer] Foundation guidelines for Chronic Kidney Disease (CKD):     Stage 1 with normal or high GFR (GFR > 90 mL/min/1.73 square meters)    Stage 2 Mild CKD (GFR = 60-89 mL/min/1.73 square meters)    Stage 3A Moderate CKD (GFR = 45-59 mL/min/1.73 square meters)    Stage 3B Moderate CKD (GFR = 30-44 mL/min/1.73 square meters)    Stage 4 Severe CKD (GFR = 15-29 mL/min/1.73 square meters)    Stage 5 End Stage CKD (GFR <15 mL/min/1.73 square meters)  Note: GFR calculation is accurate only with a steady state creatinine    CBC and differential [[id number]]  (Abnormal) Collected: 09/09/24 1931    Lab Status: Final result  Specimen: Blood from Arm, Right Updated: 09/09/24 1951     WBC 8.64 Thousand/uL      RBC 2.78 Million/uL      Hemoglobin 9.0 g/dL      Hematocrit 28.2 %       fL      MCH 32.4 pg      MCHC 31.9 g/dL      RDW 12.6 %      MPV 10.7 fL      Platelets 159 Thousands/uL      nRBC 0 /100 WBCs      Segmented % 68 %      Immature Grans % 0 %      Lymphocytes % 17 %      Monocytes % 10 %      Eosinophils Relative 4 %      Basophils Relative 1 %      Absolute Neutrophils 5.89 Thousands/µL      Absolute Immature Grans 0.03 Thousand/uL      Absolute Lymphocytes 1.47 Thousands/µL      Absolute Monocytes 0.85 Thousand/µL      Eosinophils Absolute 0.35 Thousand/µL      Basophils Absolute 0.05 Thousands/µL                    No orders to display         Procedures  ECG 12 Lead Documentation Only    Date/Time: 9/9/2024 7:33 PM    Performed by: Phylicia Weber MD  Authorized by: Phylicia Weber MD    Indications / Diagnosis:  AMS  ECG reviewed by me, the ED Provider: yes    Patient location:  ED  Previous ECG:     Previous ECG:  Compared to current    Comparison ECG info:  10/19/2017    Similarity:  No change  Interpretation:     Interpretation: abnormal    Rate:     ECG rate:  66    ECG rate assessment: normal    Rhythm:     Rhythm: sinus rhythm    Ectopy:     Ectopy: PVCs      PVCs:  Infrequent  QRS:     QRS axis:  Normal    QRS intervals:  Normal  Conduction:     Conduction: normal    ST segments:     ST segments:  Normal  T waves:     T waves: normal          ED Course  ED Course as of 09/10/24 0219   Mon Sep 09, 2024   2110 Reached out to [facility] for admission.                  Stroke Assessment       Row Name 09/09/24 1950             NIH Stroke Scale    Interval Baseline      Level of Consciousness (1a.) 0      LOC Questions (1b.) 0      LOC Commands (1c.) 0      Best Gaze (2.) 0      Visual (3.) 0      Facial Palsy (4.) 0      Motor Arm, Left (5a.) 0      Motor Arm, Right (5b.) 1      Motor Leg, Left (6a.) 0       Motor Leg, Right (6b.) 0      Limb Ataxia (7.) 0      Sensory (8.) 0      Best Language (9.) 0      Dysarthria (10.) 1      Extinction and Inattention (11.) (Formerly Neglect) 0      Total 2                    Flowsheet Row Most Recent Value   Thrombolytic Decision Options    Thrombolytic Decision Patient not a candidate.   Patient is not a candidate options comment  [symptoms started 10-14 days Ago]                    SBIRT 20yo+      Flowsheet Row Most Recent Value   Initial Alcohol Screen: US AUDIT-C     1. How often do you have a drink containing alcohol? 0 Filed at: 09/09/2024 1911   2. How many drinks containing alcohol do you have on a typical day you are drinking?  0 Filed at: 09/09/2024 1911   3b. FEMALE Any Age, or MALE 65+: How often do you have 4 or more drinks on one occassion? 0 Filed at: 09/09/2024 1911   Audit-C Score 0 Filed at: 09/09/2024 1911   DAWNA: How many times in the past year have you...    Used an illegal drug or used a prescription medication for non-medical reasons? Never Filed at: 09/09/2024 1911                  Medical Decision Making  Pt had outpatient imaging showing lacunar infarct on MRI. Pt outside of window for intervention as sx onset was 10-14 days ago. Basic labs, CBC, CMP obtained. Elevated creatinine consistent with CKD. Pt stable and admitted to [facility] service.     Amount and/or Complexity of Data Reviewed  Independent Historian: spouse  Labs: ordered.    Risk  Decision regarding hospitalization.          Disposition  Final diagnoses:   Lacunar cerebrovascular accident (CVA) (HCC)   RUE weakness     Time reflects when diagnosis was documented in both MDM as applicable and the Disposition within this note       Time User Action Codes Description Comment    9/9/2024  9:23 PM Phylicia Weber Add [I63.81] Lacunar cerebrovascular accident (CVA) (HCC)     9/9/2024  9:24 PM Phylicia Weber Add [R29.898] RUE weakness     9/9/2024 10:30 PM Libby Boggs Add [I63.9] CVA  (cerebral vascular accident) (HCC)           ED Disposition       ED Disposition   Admit    Condition   Stable    Date/Time   Mon Sep 9, 2024 2123    Comment   Case was discussed with BUSHRA and the patient's admission status was agreed to be Admission Status: observation status to the service of Dr. Gallegos .               Follow-up Information    None         Patient's Medications   Discharge Prescriptions    No medications on file     No discharge procedures on file.    PDMP Review       None             ED Provider  Attending physically available and evaluated Jeffry HODGES Yohan. I managed the patient along with the ED Attending.    Electronically Signed by           Phylicia Weber MD  09/10/24 9407

## 2024-09-09 NOTE — TELEPHONE ENCOUNTER
Patients wife calls in for MRI results.  They were advised of Dr. Maloney's message to seek further evaluation in the ED.  Patient's wife would like to speak with Dr. Maloney.  Patient transferred to office at this time.

## 2024-09-10 ENCOUNTER — APPOINTMENT (OUTPATIENT)
Dept: MRI IMAGING | Facility: HOSPITAL | Age: 89
DRG: 065 | End: 2024-09-10
Payer: MEDICARE

## 2024-09-10 ENCOUNTER — APPOINTMENT (OUTPATIENT)
Dept: NON INVASIVE DIAGNOSTICS | Facility: HOSPITAL | Age: 89
DRG: 065 | End: 2024-09-10
Payer: MEDICARE

## 2024-09-10 ENCOUNTER — APPOINTMENT (INPATIENT)
Dept: VASCULAR ULTRASOUND | Facility: HOSPITAL | Age: 89
DRG: 065 | End: 2024-09-10
Payer: MEDICARE

## 2024-09-10 LAB
ANION GAP SERPL CALCULATED.3IONS-SCNC: 8 MMOL/L (ref 4–13)
AORTIC ROOT: 3.3 CM
APICAL FOUR CHAMBER EJECTION FRACTION: 68 %
ASCENDING AORTA: 3.3 CM
ATRIAL RATE: 66 BPM
BSA FOR ECHO PROCEDURE: 1.67 M2
BUN SERPL-MCNC: 63 MG/DL (ref 5–25)
CALCIUM SERPL-MCNC: 8.8 MG/DL (ref 8.4–10.2)
CHLORIDE SERPL-SCNC: 107 MMOL/L (ref 96–108)
CHOLEST SERPL-MCNC: 132 MG/DL
CO2 SERPL-SCNC: 21 MMOL/L (ref 21–32)
CREAT SERPL-MCNC: 3.22 MG/DL (ref 0.6–1.3)
E WAVE DECELERATION TIME: 335 MS
E/A RATIO: 0.67
ERYTHROCYTE [DISTWIDTH] IN BLOOD BY AUTOMATED COUNT: 12.8 % (ref 11.6–15.1)
EST. AVERAGE GLUCOSE BLD GHB EST-MCNC: 146 MG/DL
FERRITIN SERPL-MCNC: 317 NG/ML (ref 24–336)
FRACTIONAL SHORTENING: 18 (ref 28–44)
GFR SERPL CREATININE-BSD FRML MDRD: 16 ML/MIN/1.73SQ M
GLUCOSE SERPL-MCNC: 126 MG/DL (ref 65–140)
GLUCOSE SERPL-MCNC: 174 MG/DL (ref 65–140)
GLUCOSE SERPL-MCNC: 283 MG/DL (ref 65–140)
GLUCOSE SERPL-MCNC: 92 MG/DL (ref 65–140)
GLUCOSE SERPL-MCNC: 99 MG/DL (ref 65–140)
HBA1C MFR BLD: 6.7 %
HCT VFR BLD AUTO: 29 % (ref 36.5–49.3)
HDLC SERPL-MCNC: 43 MG/DL
HGB BLD-MCNC: 9.1 G/DL (ref 12–17)
INTERVENTRICULAR SEPTUM IN DIASTOLE (PARASTERNAL SHORT AXIS VIEW): 1.1 CM
INTERVENTRICULAR SEPTUM: 1.1 CM (ref 0.6–1.1)
IRON SATN MFR SERPL: 26 % (ref 15–50)
IRON SERPL-MCNC: 62 UG/DL (ref 50–212)
LAAS-AP2: 19.5 CM2
LAAS-AP4: 11.1 CM2
LDLC SERPL CALC-MCNC: 78 MG/DL (ref 0–100)
LEFT ATRIUM SIZE: 3.2 CM
LEFT ATRIUM VOLUME (MOD BIPLANE): 36 ML
LEFT ATRIUM VOLUME INDEX (MOD BIPLANE): 21.6 ML/M2
LEFT INTERNAL DIMENSION IN SYSTOLE: 3.2 CM (ref 2.1–4)
LEFT VENTRICULAR INTERNAL DIMENSION IN DIASTOLE: 3.9 CM (ref 3.5–6)
LEFT VENTRICULAR POSTERIOR WALL IN END DIASTOLE: 1.1 CM
LEFT VENTRICULAR STROKE VOLUME: 27 ML
LVSV (TEICH): 27 ML
MAGNESIUM SERPL-MCNC: 2.7 MG/DL (ref 1.9–2.7)
MCH RBC QN AUTO: 31.8 PG (ref 26.8–34.3)
MCHC RBC AUTO-ENTMCNC: 31.4 G/DL (ref 31.4–37.4)
MCV RBC AUTO: 101 FL (ref 82–98)
MV E'TISSUE VEL-SEP: 7 CM/S
MV PEAK A VEL: 0.92 M/S
MV PEAK E VEL: 62 CM/S
MV STENOSIS PRESSURE HALF TIME: 97 MS
MV VALVE AREA P 1/2 METHOD: 2.27
P AXIS: 52 DEGREES
PLATELET # BLD AUTO: 148 THOUSANDS/UL (ref 149–390)
PLATELET # BLD AUTO: 153 THOUSANDS/UL (ref 149–390)
PMV BLD AUTO: 10.2 FL (ref 8.9–12.7)
PMV BLD AUTO: 11.2 FL (ref 8.9–12.7)
POTASSIUM SERPL-SCNC: 5.8 MMOL/L (ref 3.5–5.3)
PR INTERVAL: 194 MS
QRS AXIS: 29 DEGREES
QRSD INTERVAL: 92 MS
QT INTERVAL: 432 MS
QTC INTERVAL: 452 MS
RBC # BLD AUTO: 2.86 MILLION/UL (ref 3.88–5.62)
RIGHT ATRIAL 2D VOLUME: 17 ML
RIGHT ATRIUM AREA SYSTOLE A4C: 10.3 CM2
RIGHT VENTRICLE ID DIMENSION: 3 CM
SL CV LEFT ATRIUM LENGTH A2C: 5.6 CM
SL CV LV EF: 65
SL CV PED ECHO LEFT VENTRICLE DIASTOLIC VOLUME (MOD BIPLANE) 2D: 68 ML
SL CV PED ECHO LEFT VENTRICLE SYSTOLIC VOLUME (MOD BIPLANE) 2D: 41 ML
SODIUM SERPL-SCNC: 136 MMOL/L (ref 135–147)
T WAVE AXIS: 52 DEGREES
TIBC SERPL-MCNC: 234 UG/DL (ref 250–450)
TR MAX PG: 23 MMHG
TR PEAK VELOCITY: 2.4 M/S
TRICUSPID VALVE PEAK REGURGITATION VELOCITY: 2.41 M/S
TRIGL SERPL-MCNC: 56 MG/DL
UIBC SERPL-MCNC: 172 UG/DL (ref 155–355)
VENTRICULAR RATE: 66 BPM
WBC # BLD AUTO: 7.01 THOUSAND/UL (ref 4.31–10.16)

## 2024-09-10 PROCEDURE — 93306 TTE W/DOPPLER COMPLETE: CPT

## 2024-09-10 PROCEDURE — 82948 REAGENT STRIP/BLOOD GLUCOSE: CPT

## 2024-09-10 PROCEDURE — 83735 ASSAY OF MAGNESIUM: CPT | Performed by: INTERNAL MEDICINE

## 2024-09-10 PROCEDURE — 99223 1ST HOSP IP/OBS HIGH 75: CPT | Performed by: PSYCHIATRY & NEUROLOGY

## 2024-09-10 PROCEDURE — 83010 ASSAY OF HAPTOGLOBIN QUANT: CPT | Performed by: INTERNAL MEDICINE

## 2024-09-10 PROCEDURE — 80061 LIPID PANEL: CPT | Performed by: INTERNAL MEDICINE

## 2024-09-10 PROCEDURE — 93880 EXTRACRANIAL BILAT STUDY: CPT

## 2024-09-10 PROCEDURE — 97167 OT EVAL HIGH COMPLEX 60 MIN: CPT

## 2024-09-10 PROCEDURE — 92610 EVALUATE SWALLOWING FUNCTION: CPT

## 2024-09-10 PROCEDURE — 93010 ELECTROCARDIOGRAM REPORT: CPT | Performed by: INTERNAL MEDICINE

## 2024-09-10 PROCEDURE — 36415 COLL VENOUS BLD VENIPUNCTURE: CPT | Performed by: INTERNAL MEDICINE

## 2024-09-10 PROCEDURE — 85027 COMPLETE CBC AUTOMATED: CPT | Performed by: INTERNAL MEDICINE

## 2024-09-10 PROCEDURE — 93880 EXTRACRANIAL BILAT STUDY: CPT | Performed by: INTERNAL MEDICINE

## 2024-09-10 PROCEDURE — 97163 PT EVAL HIGH COMPLEX 45 MIN: CPT

## 2024-09-10 PROCEDURE — 70544 MR ANGIOGRAPHY HEAD W/O DYE: CPT

## 2024-09-10 PROCEDURE — 93306 TTE W/DOPPLER COMPLETE: CPT | Performed by: INTERNAL MEDICINE

## 2024-09-10 PROCEDURE — 85049 AUTOMATED PLATELET COUNT: CPT | Performed by: INTERNAL MEDICINE

## 2024-09-10 PROCEDURE — 99232 SBSQ HOSP IP/OBS MODERATE 35: CPT | Performed by: STUDENT IN AN ORGANIZED HEALTH CARE EDUCATION/TRAINING PROGRAM

## 2024-09-10 PROCEDURE — 80048 BASIC METABOLIC PNL TOTAL CA: CPT | Performed by: INTERNAL MEDICINE

## 2024-09-10 RX ORDER — CLOPIDOGREL BISULFATE 75 MG/1
300 TABLET ORAL DAILY
Status: DISCONTINUED | OUTPATIENT
Start: 2024-09-10 | End: 2024-09-11

## 2024-09-10 RX ORDER — CLOPIDOGREL BISULFATE 75 MG/1
75 TABLET ORAL DAILY
Status: DISCONTINUED | OUTPATIENT
Start: 2024-09-11 | End: 2024-09-11 | Stop reason: HOSPADM

## 2024-09-10 RX ORDER — DORZOLAMIDE HYDROCHLORIDE AND TIMOLOL MALEATE 20; 5 MG/ML; MG/ML
1 SOLUTION/ DROPS OPHTHALMIC 2 TIMES DAILY
Status: DISCONTINUED | OUTPATIENT
Start: 2024-09-10 | End: 2024-09-11 | Stop reason: HOSPADM

## 2024-09-10 RX ORDER — HEPARIN SODIUM 5000 [USP'U]/ML
5000 INJECTION, SOLUTION INTRAVENOUS; SUBCUTANEOUS EVERY 8 HOURS SCHEDULED
Status: DISCONTINUED | OUTPATIENT
Start: 2024-09-10 | End: 2024-09-11 | Stop reason: HOSPADM

## 2024-09-10 RX ORDER — PREDNISOLONE ACETATE 10 MG/ML
1 SUSPENSION/ DROPS OPHTHALMIC 2 TIMES DAILY
Status: DISCONTINUED | OUTPATIENT
Start: 2024-09-10 | End: 2024-09-11 | Stop reason: HOSPADM

## 2024-09-10 RX ORDER — BRIMONIDINE TARTRATE 2 MG/ML
1 SOLUTION/ DROPS OPHTHALMIC 3 TIMES DAILY
Status: DISCONTINUED | OUTPATIENT
Start: 2024-09-10 | End: 2024-09-11 | Stop reason: HOSPADM

## 2024-09-10 RX ADMIN — INSULIN LISPRO 3 UNITS: 100 INJECTION, SOLUTION INTRAVENOUS; SUBCUTANEOUS at 13:21

## 2024-09-10 RX ADMIN — PREDNISOLONE ACETATE 1 DROP: 10 SUSPENSION/ DROPS OPHTHALMIC at 22:14

## 2024-09-10 RX ADMIN — HEPARIN SODIUM 5000 UNITS: 5000 INJECTION INTRAVENOUS; SUBCUTANEOUS at 22:25

## 2024-09-10 RX ADMIN — CLOPIDOGREL BISULFATE 300 MG: 75 TABLET ORAL at 10:18

## 2024-09-10 RX ADMIN — BRIMONIDINE TARTRATE 1 DROP: 2 SOLUTION/ DROPS OPHTHALMIC at 09:38

## 2024-09-10 RX ADMIN — BRIMONIDINE TARTRATE 1 DROP: 2 SOLUTION/ DROPS OPHTHALMIC at 22:13

## 2024-09-10 RX ADMIN — ASPIRIN 81 MG: 81 TABLET, COATED ORAL at 09:25

## 2024-09-10 NOTE — PLAN OF CARE
"  Problem: PHYSICAL THERAPY ADULT  Goal: Performs mobility at highest level of function for planned discharge setting.  See evaluation for individualized goals.  Description: Treatment/Interventions: Functional transfer training, LE strengthening/ROM, Elevations, Therapeutic exercise, Endurance training, Patient/family training, Equipment eval/education, Bed mobility, Gait training, Continued evaluation, Spoke to nursing  Equipment Recommended:  (TBD)       See flowsheet documentation for full assessment, interventions and recommendations.  Note: Prognosis: Fair  Problem List: Decreased strength, Decreased endurance, Impaired balance, Decreased mobility, Decreased coordination, Decreased cognition, Impaired judgement, Decreased safety awareness, Impaired vision, Decreased skin integrity  Assessment: Pt is a 88 yo male admitted to [facility] 2* stroke alert, MRI brain (+) small L frontal lobe infaract with RUE weakness, slurred speech and inc confusion. Pt resides with wife in 1  with basement area that pt does not use, (+)JACQUELINE,reports no recent falls, use of personal DME PTA and pts wife A with ADLs, mobility \"at times\" and IADLs. pt currently is not at functional mobility baseline, needs A for mobility and gait, multiple lines, telemetry monitoring,dec cognition, impulsive at times. Pt demonstrates minimal deficits during functional mobility and gait including dec endurance, dec balance, dec BLE strength, ataxic and unsteady gait and movement patterns,dec cognition (baseline?) and needs min Ax1 for bed mobility, TT and GT with HHA. Pt would cont to benefit from skilled inpt PT services to maximize functional independence and to dec caregiver burden upon being DC from the hospital. Recommend trial of SPC vs RW during future PT tx session and gait.  Barriers to Discharge: Inaccessible home environment (JACQUELINE)     Rehab Resource Intensity Level, PT: II (Moderate Resource Intensity)    See flowsheet documentation for full " assessment.

## 2024-09-10 NOTE — PROGRESS NOTES
Progress Note - Hospitalist   Name: Jeffry Almanzar 89 y.o. male I MRN: [id number]  Unit/Bed#: ED-42 I Date of Admission: 9/9/2024   Date of Service: 9/10/2024 I Hospital Day: 0    Assessment & Plan  CVA (cerebral vascular accident) (HCC)  89 year old male presented to our ED due to right arm weakness. An MRI was performed which showed a small acute infarct in the left corona radiata with associated petechial hemorrhage. Small left frontal lobe infarct. No significant mass effect or midline shift.  Stain, plavix. Aspirin discontinued due to concerns for failure.  Neurology recommendations appreciated. Echo pending, carotid US, MRA head  PT/OT  Speech cleared for diet  Glaucoma  Continue eye drops.   Prednisolone acetate BID left  Brimonidine tartrate 0.15% right eye TID  Vyztula both eyes HS  Cosopt BID  Essential hypertension  Controlled. Given elevated creatinine hold chlorthalidone / losartan  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  (HCC)  Anemia due to CKD  Stable, no indication for transfusion at this time.    Results from last 7 days   Lab Units 09/10/24  0543 09/09/24  1931   HEMOGLOBIN g/dL 9.1* 9.0*   MCV fL 101* 101*   RDW % 12.8 12.6   IRON ug/dL  --  62   TIBC ug/dL  --  234*   FERRITIN ng/mL  --  317     CKD IV  Elevated, does not meet SOFÍA criteria at this time. Hold HCTZ / losartan for now    Recent Labs     09/09/24  1931 09/10/24  0543   BUN 69* 63*   CREATININE 3.71* 3.22*   EGFR 13 16     Type 2 diabetes mellitus with stage 4 chronic kidney disease, without long-term current use of insulin ([facility])  Lab Results   Component Value Date    HGBA1C 6.7 (H) 09/09/2024       Recent Labs     09/10/24  0004 09/10/24  1125   POCGLU 174* 283*       Blood Sugar Average: Last 72 hrs:  (P) 228.5    Hold Farxiga, Continue sliding scale    VTE Pharmacologic Prophylaxis:   Moderate Risk (Score 3-4) - Pharmacological DVT Prophylaxis Ordered: heparin.    Mobility:   Basic Mobility Inpatient Raw Score: (P) 17  JH-HLM  Goal: (P) 5: Stand one or more mins  -[facility] Achieved: (P) 5: Stand (1 or more minutes)    Discussions with Specialists or Other Care Team Provider: nursing    Education and Discussions with Family / Patient: wife at bedside    Current Length of Stay: 0 day(s)  Current Patient Status: Observation   Certification Statement: The patient will continue to require additional inpatient hospital stay due to stroke pathway / workup, neuro reeval  Discharge Plan: 24-48 hours. TBD depending on workup    Code Status: Level 1 - Full Code    Subjective   Patient seen and examined at bedside. No new issues or complaints overnight.    Objective     Vitals:   Temp (24hrs), Av.5 °F (36.9 °C), Min:98.5 °F (36.9 °C), Max:98.5 °F (36.9 °C)    Temp:  [98.5 °F (36.9 °C)] 98.5 °F (36.9 °C)  HR:  [58-77] 69  Resp:  [17-20] 18  BP: ()/(55-73) 127/66  SpO2:  [94 %-100 %] 99 %  Body mass index is 22.49 kg/m².     Input and Output Summary (last 24 hours):     Intake/Output Summary (Last 24 hours) at 9/10/2024 1414  Last data filed at 9/10/2024 1025  Gross per 24 hour   Intake 787.5 ml   Output 275 ml   Net 512.5 ml       Physical Exam  Vitals reviewed.   Constitutional:       General: He is not in acute distress.  HENT:      Head: Normocephalic.      Nose: Nose normal.      Mouth/Throat:      Mouth: Mucous membranes are moist.   Eyes:      General: No scleral icterus.  Cardiovascular:      Rate and Rhythm: Normal rate and regular rhythm.   Pulmonary:      Effort: Pulmonary effort is normal. No respiratory distress.   Abdominal:      General: There is no distension.      Palpations: Abdomen is soft.      Tenderness: There is no abdominal tenderness.   Skin:     General: Skin is warm.   Neurological:      Mental Status: He is alert.      Sensory: No sensory deficit.      Motor: Weakness (mild weakness right side) present.   Psychiatric:         Mood and Affect: Mood normal.         Behavior: Behavior normal.        Lines/Drains:  Lines/Drains/Airways       Active Status       None                      Telemetry:  Telemetry Orders (From admission, onward)               24 Hour Telemetry Monitoring  Continuous x 24 Hours (Telem)        Question:  Reason for 24 Hour Telemetry  Answer:  TIA/Suspected CVA/ Confirmed CVA                     Telemetry Reviewed: Normal Sinus Rhythm  Indication for Continued Telemetry Use:                Lab Results: I have reviewed labs during his stay here.   Results from last 7 days   Lab Units 09/10/24  0543 09/10/24  0003 09/09/24  1931   WBC Thousand/uL 7.01  --  8.64   HEMOGLOBIN g/dL 9.1*  --  9.0*   HEMATOCRIT % 29.0*  --  28.2*   PLATELETS Thousands/uL 148*   < > 159   SEGS PCT %  --   --  68   LYMPHO PCT %  --   --  17   MONO PCT %  --   --  10   EOS PCT %  --   --  4    < > = values in this interval not displayed.     Results from last 7 days   Lab Units 09/10/24  0543 09/09/24  1931   SODIUM mmol/L 136 134*   POTASSIUM mmol/L 5.8* 3.7   CHLORIDE mmol/L 107 102   CO2 mmol/L 21 22   BUN mg/dL 63* 69*   CREATININE mg/dL 3.22* 3.71*   ANION GAP mmol/L 8 10   CALCIUM mg/dL 8.8 9.1   ALBUMIN g/dL  --  3.6   TOTAL BILIRUBIN mg/dL  --  0.41   ALK PHOS U/L  --  38   ALT U/L  --  9   AST U/L  --  12*   GLUCOSE RANDOM mg/dL 126 302*         Results from last 7 days   Lab Units 09/10/24  1125 09/10/24  0004   POC GLUCOSE mg/dl 283* 174*     Results from last 7 days   Lab Units 09/09/24  1931   HEMOGLOBIN A1C % 6.7*           Recent Cultures (last 7 days):         Imaging Review: imaging reviewed for this admision    Last 24 Hours Medication List:     Current Facility-Administered Medications:     acetaminophen (TYLENOL) tablet 650 mg, Q4H PRN    atorvastatin (LIPITOR) tablet 40 mg, Daily    brimonidine tartrate 0.2 % ophthalmic solution 1 drop, TID    clopidogrel (PLAVIX) tablet 300 mg, Daily    [START ON 9/11/2024] clopidogrel (PLAVIX) tablet 75 mg, Daily    dorzolamide-timolol (COSOPT) 2-0.5 %  ophthalmic solution 1 drop, BID    insulin lispro (HumALOG/ADMELOG) 100 units/mL subcutaneous injection 1-5 Units, TID AC **AND** Fingerstick Glucose (POCT), TID AC    NON FORMULARY, HS    prednisoLONE acetate (PRED FORTE) 1 % ophthalmic suspension 1 drop, BID    **Please Note: This note may have been constructed using a voice recognition system.**

## 2024-09-10 NOTE — PHYSICAL THERAPY NOTE
Physical Therapy Evaluation:    2 forms of pt ID verified:name,birthdate and pt ID marifer    Patient's Name: Jeffry Almanzar    Admitting Diagnosis  Abnormal laboratory test result [R89.9]    Problem List  Patient Active Problem List   Diagnosis    Asthma, mild intermittent    Essential hypertension    Glaucoma    Dyslipidemia    Mild vitamin D deficiency    Organic impotence    Primary osteoarthritis of both knees    Iliotibial band syndrome of both sides    Weight loss    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  (HCC)    Retinal vein occlusion of left eye    Malignant neoplasm of urinary bladder (HCC)    Vision loss of left eye    Neurogenic claudication    Low back pain    Spinal stenosis of lumbar region    DDD (degenerative disc disease), lumbar    Lumbar disc disease with radiculopathy    Parenchymal renal hypertension    Benign 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 (HCC)    Persistent proteinuria    Stage 4 chronic kidney disease (HCC)    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)    Diabetic nephropathy associated with type 2 diabetes mellitus (HCC)    Advanced care planning/counseling discussion    IgM lambda paraproteinemia    Type 2 diabetes mellitus with stage 4 chronic kidney disease, without long-term current use of insulin (HCC)    Dementia (HCC)    CVA (cerebral vascular accident) (HCC)    Acute-on-chronic kidney injury  (HCC)       Past Medical History  Past Medical History:   Diagnosis Date    Cataracts, bilateral        Past Surgical History  Past Surgical History:   Procedure Laterality Date    CATARACT EXTRACTION Bilateral 07/15/2012    COLONOSCOPY      CYSTOSCOPY  01/15/2018    Last assessed 9/14/17, diagnostic    HERNIA REPAIR      INSTILLATION MYTOMYCIN N/A 10/25/2017    Procedure: INSTILLATION OF MITOMYCIN C;  Surgeon: Danish Esposito MD;  Location: [facility];  Service: Urology    TN CYSTO W/REMOVAL OF LESIONS SMALL N/A 10/25/2017    Procedure: CYSTOSCOPY;  "BLADDER BIOPSY WITH FULGERATION;  Surgeon: Danish Esposito MD;  [facility];  Service: Urology    TONSILLECTOMY      TRANSURETHRAL RESECTION OF BLADDER TUMOR N/A 10/25/2017    Procedure: TUR BLADDER TUMOR;  Surgeon: Danish Esposito MD;  [facility];  Service: Urology    WISDOM TOOTH EXTRACTION            09/10/24 1359   PT Last Visit   PT Visit Date 09/10/24   Note Type   Note type Evaluation   Pain Assessment   Pain Assessment Tool 0-10   Pain Score No Pain   Restrictions/Precautions   Other Precautions Cognitive;Multiple lines;Telemetry;Fall Risk;Hard of hearing;Impulsive   Home Living   Type of Home House   Home Layout One level;Performs ADLs on one level;Able to live on main level with bedroom/bathroom  (basement area that pt does not use, a total of 9 JACQUELINE with LANDING in between)   Home Equipment Walker;Cane;Other (Comment)  (per pts wife, interchange with use of RW vs SPC depending on surface level and A from wife)   Additional Comments pt lives with supportive wife in 1 , basement area which pt reports not using,reports no recent falls, use of personal DME PTA, wife A with ADLs and IADLs, per pts wife \"I help him with everything\".   Prior Function   Level of [place] Needs assistance with ADLs;Needs assistance with functional mobility;Needs assistance with IADLS   Lives With Spouse   Receives Help From Family   IADLs Family/Friend/Other provides transportation;Family/Friend/Other provides meals;Family/Friend/Other provides medication management   Falls in the last 6 months 0   General   Additional Pertinent History stroke pathway, slurred speech,inc confusion, MRI brain (+) small L frontal lobe infaract   Family/Caregiver Present Yes  (pts wife)   Cognition   Overall Cognitive Status Impaired   Arousal/Participation Cooperative   Orientation Level Oriented to person;Disoriented to place;Disoriented to time;Disoriented to situation  (pt [facility], unable to " "recall year/date and month,unable to recall situation)   Following Commands Follows one step commands with increased time or repetition   Comments 2* dec cognition and [place]?   Subjective   Subjective Pt supine in ED stretcher resting comfortably; pt willing and agreeable to work with PT and to participate in therapy intervention; pts wife present during therapy session; \"I'm alright\".   RLE Assessment   RLE Assessment   (at least 4/5 grossly throughout)   LLE Assessment   LLE Assessment   (at least 4/5 grossly throughout)   Vision-Basic Assessment   Current Vision   (per pts wife, glaucoma and L eye dec eyesight)   Visual History Glaucoma;Other (Comment)  (per pts wife, glaucoma and L eye dec eyesight)   Coordination   Movements are Fluid and Coordinated 0   Coordination and Movement Description impulsive at times   Sensation WFL   Light Touch   RLE Light Touch Grossly intact   LLE Light Touch Grossly intact   Bed Mobility   Rolling L 5  Supervision   Additional items Assist x 1;HOB elevated;Bedrails;Increased time required;Verbal cues   Supine to Sit 4  Minimal assistance   Additional items Assist x 1;HOB elevated;Bedrails;Increased time required;Verbal cues   Sit to Supine 4  Minimal assistance   Additional items Assist x 1;HOB elevated;Increased time required;Verbal cues   Transfers   Sit to Stand 4  Minimal assistance   Additional items Assist x 1;Bedrails;Increased time required;Verbal cues   Stand to Sit 4  Minimal assistance   Additional items Assist x 1;Bedrails;Increased time required;Verbal cues   Ambulation/Elevation   Gait pattern Poor UE support;Improper Weight shift;Narrow HARIS;Forward Flexion;Shuffling;Inconsistent venus;Foward flexed;Short stride;Ataxia;Step to   Gait Assistance 4  Minimal assist   Additional items Assist x 1;Verbal cues;Tactile cues   Assistive Device   (HHA; recommend use of RW vs SPC for future PT tx sessions)   Distance 6 sidesteps towards HOB; limited ambulation distance 2* " "medical line length and telemetry line length->per nursing going for MRI   Balance   Static Sitting Fair   Dynamic Sitting Poor +   Static Standing Poor +   Dynamic Standing Poor +   Ambulatory Poor   Endurance Deficit   Endurance Deficit Yes   Endurance Deficit Description fatigues easily,ataxic and unsteady gait and movement pattern   Activity Tolerance   Activity Tolerance Patient limited by fatigue  (fair)   Nurse Made Aware yes   Assessment   Prognosis Fair   Problem List Decreased strength;Decreased endurance;Impaired balance;Decreased mobility;Decreased coordination;Decreased cognition;Impaired judgement;Decreased safety awareness;Impaired vision;Decreased skin integrity   Assessment Pt is a 88 yo male admitted to [facility]* stroke alert, MRI brain (+) small L frontal lobe infaract with RUE weakness, slurred speech and inc confusion. Pt resides with wife in 1  with basement area that pt does not use, (+)JACQUELINE,reports no recent falls, use of personal DME PTA and pts wife A with ADLs, mobility \"at times\" and IADLs. pt currently is not at functional mobility baseline, needs A for mobility and gait, multiple lines, telemetry monitoring,dec cognition, impulsive at times. Pt demonstrates minimal deficits during functional mobility and gait including dec endurance, dec balance, dec BLE strength, ataxic and unsteady gait and movement patterns,dec cognition (baseline?) and needs min Ax1 for bed mobility, TT and GT with HHA. Pt would cont to benefit from skilled inpt PT services to maximize functional independence and to dec caregiver burden upon being DC from the hospital. Recommend trial of SPC vs RW during future PT tx session and gait.   Barriers to Discharge Inaccessible home environment  (JACQUELINE)   Goals   Patient Goals to get warm   STG Expiration Date 09/20/24   Short Term Goal #1 in 7-10 days: (1) Pt will be able to ambulate greater than 100 feet with use of LRAD on various surfaces needing min to S level of A in " order to A pt to return to PLOF, (2) activity tolerance:45 mins/45mins, (3) pt will be able to perform sit to stand transfers needing S level of A to and from various surfaces consistently in order to return to PLOF, (4) pt will be able to perform BM needing S level of A to A pt to return to PLOF, (5) (I) with BLE therapeutic ex HEP in various positions to A pt to inc balance,strength,mobility,endurance  (6) inc balance 1/2 grade in order to dec fall risk, (7) pt will be able to go up and down 1 FF of steps needing min level of A in order to navigate JACQUELINE  as able and as needed prior to D/C, (8) cont to provide pt and pt family education for safe D/C planning, (9) inc BLE strength 1/2 to 1 full grade in order to A pt to inc balance,strength,mobility,endurance   PT Treatment Day 0   Plan   Treatment/Interventions Functional transfer training;LE strengthening/ROM;Elevations;Therapeutic exercise;Endurance training;Patient/family training;Equipment eval/education;Bed mobility;Gait training;Continued evaluation;Spoke to nursing   PT Frequency 4-6x/wk   Discharge Recommendation   Rehab Resource Intensity Level, PT II (Moderate Resource Intensity)   Equipment Recommended   (TBD)   AM-PAC Basic Mobility Inpatient   Turning in Flat Bed Without Bedrails 3   Lying on Back to Sitting on Edge of Flat Bed Without Bedrails 3   Moving Bed to Chair 3   Standing Up From Chair Using Arms 3   Walk in Room 3   Climb 3-5 Stairs With Railing 2   Basic Mobility Inpatient Raw Score 17   Basic Mobility Standardized Score 39.67   [facility] Highest Level Of Mobility   -HLM Goal 5: Stand one or more mins   -HLM Achieved 5: Stand (1 or more minutes)         @Michelle Singh, PT, DPT@

## 2024-09-10 NOTE — ASSESSMENT & PLAN NOTE
Lab Results   Component Value Date    EGFR 13 09/09/2024    EGFR 17 (L) 08/23/2024    EGFR 18 (L) 07/23/2024    CREATININE 3.71 (H) 09/09/2024    CREATININE 3.28 (H) 08/23/2024    CREATININE 3.24 (H) 07/23/2024     Cr 3.71, previously back on 8/23 Cr 3.28  Holding Chlorthalidone, Losartan  Cont IVF  Avoid nephrotoxicity  Monitor renal fn

## 2024-09-10 NOTE — ASSESSMENT & PLAN NOTE
"Lab Results   Component Value Date    HGBA1C 6.9 (H) 08/23/2024       No results for input(s): \"POCGLU\" in the last 72 hours.    Blood Sugar Average: Last 72 hrs:    F/u HgbA1C  Insulin sliding scale with meals and FS  Holding Farxiga  Restart diabetic diet once passes swallow evaluation    "

## 2024-09-10 NOTE — PLAN OF CARE
Problem: Prexisting or High Potential for Compromised Skin Integrity  Goal: Skin integrity is maintained or improved  Description: INTERVENTIONS:  - Identify patients at risk for skin breakdown  - Assess and monitor skin integrity  - Assess and monitor nutrition and hydration status  - Monitor labs   - Assess for incontinence   - Turn and reposition patient  - Assist with mobility/ambulation  - Relieve pressure over bony prominences  - Avoid friction and shearing  - Provide appropriate hygiene as needed including keeping skin clean and dry  - Evaluate need for skin moisturizer/barrier cream  - Collaborate with interdisciplinary team   - Patient/family teaching  - Consider wound care consult   Outcome: Progressing     Problem: PAIN - ADULT  Goal: Verbalizes/displays adequate comfort level or baseline comfort level  Description: Interventions:  - Encourage patient to monitor pain and request assistance  - Assess pain using appropriate pain scale  - Administer analgesics based on type and severity of pain and evaluate response  - Implement non-pharmacological measures as appropriate and evaluate response  - Consider cultural and social influences on pain and pain management  - Notify physician/advanced practitioner if interventions unsuccessful or patient reports new pain  Outcome: Progressing     Problem: INFECTION - ADULT  Goal: Absence or prevention of progression during hospitalization  Description: INTERVENTIONS:  - Assess and monitor for signs and symptoms of infection  - Monitor lab/diagnostic results  - Monitor all insertion sites, i.e. indwelling lines, tubes, and drains  - Monitor endotracheal if appropriate and nasal secretions for changes in amount and color  - [place] appropriate cooling/warming therapies per order  - Administer medications as ordered  - Instruct and encourage patient and family to use good hand hygiene technique  - Identify and instruct in appropriate isolation precautions for  identified infection/condition  Outcome: Progressing     Problem: SAFETY ADULT  Goal: Patient will remain free of falls  Description: INTERVENTIONS:  - Educate patient/family on patient safety including physical limitations  - Instruct patient to call for assistance with activity   - Consult OT/PT to assist with strengthening/mobility   - Keep Call bell within reach  - Keep bed low and locked with side rails adjusted as appropriate  - Keep care items and personal belongings within reach  - Initiate and maintain comfort rounds  - Make Fall Risk Sign visible to staff  - Offer Toileting every 2 Hours, in advance of need  - Initiate/Maintain bed alarm  - Obtain necessary fall risk management equipment: alarms  - Apply yellow socks and bracelet for high fall risk patients  - Consider moving patient to room near nurses station  Outcome: Progressing  Goal: Maintain or return to baseline ADL function  Description: INTERVENTIONS:  -  Assess patient's ability to carry out ADLs; assess patient's baseline for ADL function and identify physical deficits which impact ability to perform ADLs (bathing, care of mouth/teeth, toileting, grooming, dressing, etc.)  - Assess/evaluate cause of self-care deficits   - Assess range of motion  - Assess patient's mobility; develop plan if impaired  - Assess patient's need for assistive devices and provide as appropriate  - Encourage maximum independence but intervene and supervise when necessary  - Involve family in performance of ADLs  - Assess for home care needs following discharge   - Consider OT consult to assist with ADL evaluation and planning for discharge  - Provide patient education as appropriate  Outcome: Progressing  Goal: Maintains/Returns to pre admission functional level  Description: INTERVENTIONS:  - Perform AM-PAC 6 Click Basic Mobility/ Daily Activity assessment daily.  - Set and communicate daily mobility goal to care team and patient/family/caregiver.   - Collaborate with  rehabilitation services on mobility goals if consulted  - Out of bed for toileting  - Record patient progress and toleration of activity level   Outcome: Progressing     Problem: DISCHARGE PLANNING  Goal: Discharge to home or other facility with appropriate resources  Description: INTERVENTIONS:  - Identify barriers to discharge w/patient and caregiver  - Arrange for needed discharge resources and transportation as appropriate  - Identify discharge learning needs (meds, wound care, etc.)  - Arrange for interpretive services to assist at discharge as needed  - Refer to Case Management Department for coordinating discharge planning if the patient needs post-hospital services based on physician/advanced practitioner order or complex needs related to functional status, cognitive ability, or social support system  Outcome: Progressing     Problem: Knowledge Deficit  Goal: Patient/family/caregiver demonstrates understanding of disease process, treatment plan, medications, and discharge instructions  Description: Complete learning assessment and assess knowledge base.  Interventions:  - Provide teaching at level of understanding  - Provide teaching via preferred learning methods  Outcome: Progressing     Problem: Neurological Deficit  Goal: Neurological status is stable or improving  Description: Interventions:  - Monitor and assess patient's level of consciousness, motor function, sensory function, and level of assistance needed for ADLs.   - Monitor and report changes from baseline. Collaborate with interdisciplinary team to initiate plan and implement interventions as ordered.   - Provide and maintain a safe environment.  - Consider seizure precautions.  - Consider fall precautions.  - Consider aspiration precautions.  - Consider bleeding precautions.  Outcome: Progressing     Problem: Activity Intolerance/Impaired Mobility  Goal: Mobility/activity is maintained at optimum level for patient  Description:  Interventions:  - Assess and monitor patient  barriers to mobility and need for assistive/adaptive devices.  - Assess patient's emotional response to limitations.  - Collaborate with interdisciplinary team and initiate plans and interventions as ordered.  - Encourage independent activity per ability.  - Maintain proper body alignment.  - Perform active/passive rom as tolerated/ordered.  - Plan activities to conserve energy.  - Turn patient as appropriate  Outcome: Progressing     Problem: Communication Impairment  Goal: Ability to express needs and understand communication  Description: Assess patient's communication skills and ability to understand information.  Patient will demonstrate use of effective communication techniques, alternative methods of communication and understanding even if not able to speak.     - Encourage communication and provide alternate methods of communication as needed.  - Collaborate with case management/ for discharge needs.  - Include patient/family/caregiver in decisions related to communication.  Outcome: Progressing     Problem: Potential for Aspiration  Goal: Non-ventilated patient's risk of aspiration is minimized  Description: Assess and monitor vital signs, respiratory status, and labs (WBC).  Monitor for signs of aspiration (tachypnea, cough, rales, wheezing, cyanosis, fever).    - Assess and monitor patient's ability to swallow.  - Place patient up in chair to eat if possible.  - HOB up at 90 degrees to eat if unable to get patient up into chair.  - Supervise patient during oral intake.   - Instruct patient/ family to take small bites.  - Instruct patient/ family to take small single sips when taking liquids.  - Follow patient-specific strategies generated by speech pathologist.  Outcome: Progressing     Problem: Nutrition  Goal: Nutrition/Hydration status is improving  Description: Monitor and assess patient's nutrition/hydration status for malnutrition (ex-  brittle hair, bruises, dry skin, pale skin and conjunctiva, muscle wasting, smooth red tongue, and disorientation). Collaborate with interdisciplinary team and initiate plan and interventions as ordered.  Monitor patient's weight and dietary intake as ordered or per policy. Utilize nutrition screening tool and intervene per policy. Determine patient's food preferences and provide high-protein, high-caloric foods as appropriate.     - Assist patient with eating.  - Allow adequate time for meals.  - Encourage patient to take dietary supplement as ordered.  - Collaborate with clinical nutritionist.  - Include patient/family/caregiver in decisions related to nutrition.  Outcome: Progressing

## 2024-09-10 NOTE — ASSESSMENT & PLAN NOTE
MRI brain: 1. Small acute infarct in the left corona radiata with associated petechial hemorrhage. Small left frontal lobe infarct. No significant mass effect or midline shift.  Admit to stroke passway  Cont ASA  Increased dose of Lipitor to 40 mg  Hgb A1C  6.9 on 8/23/24  F/u Lipid panel  Keep NPO  Swallow eval at [facility]  Neurocheck as per stroke protocol  Telemetry monitoring  Neurology consult

## 2024-09-10 NOTE — ED NOTES
SBAR sent to Charge Nurse, patient is off to the Unit, AAOx3 resp even and unlabored with no S$S of distress.      Arnaldo Obrien RN  09/10/24 1017

## 2024-09-10 NOTE — ASSESSMENT & PLAN NOTE
Continue eye drops.   Prednisolone acetate BID left  Brimonidine tartrate 0.15% right eye TID  Vyztula both eyes HS  Cosopt BID

## 2024-09-10 NOTE — ASSESSMENT & PLAN NOTE
Lab Results   Component Value Date    HGBA1C 6.7 (H) 09/09/2024       Recent Labs     09/10/24  0004 09/10/24  1125   POCGLU 174* 283*       Blood Sugar Average: Last 72 hrs:  (P) 228.5    Hold Farxiga, Continue sliding scale

## 2024-09-10 NOTE — CONSULTS
NEUROLOGY RESIDENCY CONSULT NOTE     Name: Jeffry Almanzar   Age & Sex: 89 y.o. male   MRN: [id number]  Unit/Bed#: ED-42   Encounter: [id number]  Length of Stay: 0        ASSESSMENT & PLAN     * CVA (cerebral vascular accident) (HCC)  Assessment & Plan  This is an 89-year-old male with past medical history of CKD, diabetes, glaucoma, who presents with a one week history of right sided weakness and dysarthria following ED presentation after an MRI on 9/7 showed new hyperintensities. He is on the stroke pathway.    Not a candidate for TNK/thrombectomy due to age of stroke  Etiology of stoke: vascular insult versus embolus; Echo pending  Vascular risk factors: CKD, diabetes, smoking history      Workup:  Lab Results   Component Value Date    HGBA1C 6.7 (H) 09/09/2024    CHOLESTEROL 132 09/10/2024    LDLCALC 78 09/10/2024    TRIG 56 09/10/2024    INR 0.97 10/19/2017      CTH: 8/27 Scattered microvascular ischemic change with more discrete lacunar infarct in the left corona radiata which is age-indeterminate but may be recent and should be correlated with the timing of patient's symptomatology.   CTA: Not performed. Patient's GFR is 16, poor candidate for contrast  MRI: 9/7 Small acute infarct in the left corona radiata with associated petechial hemorrhage. Small left frontal lobe infarct. No significant mass effect or midline shift.   TTE/GRIFFIN: Pending    Lab Results   Component Value Date    TRIG 56 09/10/2024    HDL 43 09/10/2024    CHOLESTEROL 132 09/10/2024    LDLCALC 78 09/10/2024    HGBA1C 6.7 (H) 09/09/2024       Plan:  Discontinue aspiring as this is an aspirin failure  Start plavix: load 300 mg followed by 75 mg QD  Echocardiogram  Carotid US  MRA head  Continue Lipitor 40 mg  BP goals <160. Permissive hypertension guidelines are lowered due to patient being at least one week out from CVA  PT/OT/Speech  Lipids have been obtained  A1c obtained  CTA deferred due to renal function            SUBJECTIVE     Reason  for Consult / Principal Problem: Stroke-like symptoms  Hx and PE limited by: None    HPI: Jeffry Almanzar is a 89 y.o. right-handed male with a past medical history of CKD, diabetes, glaucoma, who presents with a 1 week history of worsening speech and right-sided weakness.  This reportedly started last Wednesday, and was noticed by his wife.  When he woke that morning he was difficult to understand and was unable to grasp items with his right hand, despite being right-handed. He received a CT head on 8/27 due to a general worsening of his baseline in terms of confusion and orientation per his wife. However, he and his wife were initially hesitant to present to the ED following the CT head result. When his symptoms worsened last Wednesday, he re-contacted his PCP who recommended urgent MRI. He underwent an MRI over the weekend on 9/7, which showed two acute hyperintensities. His PCP again encouraged ED admission, leading to their presentation on 9/9.    Inpatient consult to Neurology  Consult performed by: Joseph Barron MD  Consult ordered by: Libby Boggs MD            Historical Information   Past Medical History:   Diagnosis Date    Cataracts, bilateral      Past Surgical History:   Procedure Laterality Date    CATARACT EXTRACTION Bilateral 07/15/2012    COLONOSCOPY      CYSTOSCOPY  01/15/2018    Last assessed 9/14/17, diagnostic    HERNIA REPAIR      INSTILLATION MYTOMYCIN N/A 10/25/2017    Procedure: INSTILLATION OF MITOMYCIN C;  Surgeon: Danish Esposito MD;  Location: [facility];  Service: Urology    MI CYSTO W/REMOVAL OF LESIONS SMALL N/A 10/25/2017    Procedure: CYSTOSCOPY; BLADDER BIOPSY WITH FULGERATION;  Surgeon: Danish Esposito MD;  Location: [facility];  Service: Urology    TONSILLECTOMY      TRANSURETHRAL RESECTION OF BLADDER TUMOR N/A 10/25/2017    Procedure: TUR BLADDER TUMOR;  Surgeon: Danish Esposito MD;  Location: [facility];  Service: Urology    WISDOM TOOTH EXTRACTION       Social  History   Social History     Substance and Sexual Activity   Alcohol Use Not Currently    Comment: quit 23 years ago     Social History     Substance and Sexual Activity   Drug Use No     E-Cigarette/Vaping    E-Cigarette Use Never User      E-Cigarette/Vaping Substances    Nicotine No     THC No     CBD No     Flavoring No     Other No     Unknown No      Social History     Tobacco Use   Smoking Status Former    Current packs/day: 0.00    Types: Cigarettes    Quit date: 1964    Years since quittin.7   Smokeless Tobacco Never     Family History:   Family History   Problem Relation Age of Onset    Diabetes Mother      Meds/Allergies   current meds:   Current Facility-Administered Medications:     acetaminophen (TYLENOL) tablet 650 mg, Q4H PRN    atorvastatin (LIPITOR) tablet 40 mg, Daily    bimatoprost (LUMIGAN) 0.03 % ophthalmic drops 1 drop, HS    brimonidine tartrate 0.2 % ophthalmic solution 1 drop, TID    clopidogrel (PLAVIX) tablet 300 mg, Daily    [START ON 2024] clopidogrel (PLAVIX) tablet 75 mg, Daily    insulin lispro (HumALOG/ADMELOG) 100 units/mL subcutaneous injection 1-5 Units, TID AC **AND** Fingerstick Glucose (POCT), TID AC  Allergies   Allergen Reactions    Ace Inhibitors Cough     Other reaction(s): hyperkalemia    Penicillins GI Intolerance       Review of previous medical records was  completed.       Review of Systems   Neurological:  Positive for facial asymmetry and weakness. Negative for dizziness, tremors, seizures, syncope, speech difficulty, light-headedness, numbness and headaches.       OBJECTIVE     Patient ID: Jeffry Almanzar is a 89 y.o. male.    Vitals:   Vitals:    09/10/24 0700 09/10/24 0800 09/10/24 0930 09/10/24 1100   BP: 158/69 121/58  92/55   BP Location: Left arm Left arm     Pulse: 66 65 68 62   Resp: 18      Temp:       TempSrc:       SpO2: 100% 97% 99% 98%   Weight:       Height:          Body mass index is 22.49 kg/m².     Intake/Output Summary (Last 24  hours) at 9/10/2024 1217  Last data filed at 9/10/2024 1025  Gross per 24 hour   Intake 787.5 ml   Output 275 ml   Net 512.5 ml       Temperature:   Temp (24hrs), Av.5 °F (36.9 °C), Min:98.5 °F (36.9 °C), Max:98.5 °F (36.9 °C)    Temperature: 98.5 °F (36.9 °C)    Invasive Devices:   Invasive Devices       Peripheral Intravenous Line  Duration             Peripheral IV 24 Right Antecubital <1 day                    Physical Exam  Eyes:      Pupils: Pupils are equal, round, and reactive to light.      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      Coordination: Finger-Nose-Finger Test abnormal (Uses thumb on right, does not meet finger) and Heel to Shin Test abnormal (Unable to maintain contact with shin on both sides).      Deep Tendon Reflexes:      Reflex Scores:       Tricep reflexes are 2+ on the right side and 2+ on the left side.       Bicep reflexes are 2+ on the right side and 2+ on the left side.       Brachioradialis reflexes are 2+ on the right side and 2+ on the left side.       Patellar reflexes are 2+ on the right side and 2+ on the left side.       Achilles reflexes are 2+ on the right side and 2+ on the left side.  Psychiatric:         Speech: Speech is slurred.          Neurologic Exam     Mental Status   Oriented to person, place, and time.   Attention: normal. Concentration: normal.   Speech: slurred   Level of consciousness: alert  Knowledge: consistent with education.   Able to name object. Able to repeat. Normal comprehension.     Cranial Nerves     CN II   Visual acuity: decreased  Right visual field deficit: lower nasal and lower temporal quadrant(s)  Left visual field deficit: upper nasal, lower nasal, upper temporal and lower temporal quadrant(s)    CN III, IV, VI   Pupils are equal, round, and reactive to  light.  Nystagmus: none     CN V   Facial sensation intact.     CN VII   Right facial weakness: central  Left facial weakness: none    CN VIII   Hearing: impaired    CN IX, X   Palate: symmetric    CN XI   Right sternocleidomastoid strength: normal  Left sternocleidomastoid strength: normal  Right trapezius strength: normal  Left trapezius strength: normal    CN XII   Tongue: not atrophic  Fasciculations: absent  Tongue deviation: none    Motor Exam   Muscle bulk: normal  Overall muscle tone: normal  Right arm tone: normal  Left arm tone: normal  Right leg tone: normal  Left leg tone: normal    Strength   Strength 5/5 except as noted.   Right biceps: 4/5  Right wrist flexion: 4/5  Right wrist extension: 4/5  Right interossei: 4/5    Sensory Exam   Light touch normal.   Proprioception normal.   Pinprick normal.     Gait, Coordination, and Reflexes     Coordination   Finger to nose coordination: abnormal (Uses thumb on right, does not meet finger)  Heel to shin coordination: abnormal (Unable to maintain contact with shin on both sides)    Tremor   Resting tremor: absent  Action tremor: absent    Reflexes   Right brachioradialis: 2+  Left brachioradialis: 2+  Right biceps: 2+  Left biceps: 2+  Right triceps: 2+  Left triceps: 2+  Right patellar: 2+  Left patellar: 2+  Right achilles: 2+  Left achilles: 2+  Right : 2+  Left : 2+  Right plantar: upgoing  Left plantar: normal  Right Carpio: absent          Delete this line once neuro exam completed.    LABORATORY DATA     Labs: Reviewed  Results from last 7 days   Lab Units 09/10/24  0543 09/10/24  0003 09/09/24 1931   WBC Thousand/uL 7.01  --  8.64   HEMOGLOBIN g/dL 9.1*  --  9.0*   HEMATOCRIT % 29.0*  --  28.2*   PLATELETS Thousands/uL 148* 153 159   SEGS PCT %  --   --  68   MONO PCT %  --   --  10   EOS PCT %  --   --  4      Results from last 7 days   Lab Units 09/10/24  0543 09/09/24 1931   POTASSIUM mmol/L 5.8* 3.7   CHLORIDE mmol/L 107 102   CO2 mmol/L  21 22   BUN mg/dL 63* 69*   CREATININE mg/dL 3.22* 3.71*   CALCIUM mg/dL 8.8 9.1   ALK PHOS U/L  --  38   ALT U/L  --  9   AST U/L  --  12*     Results from last 7 days   Lab Units 09/10/24  0543   MAGNESIUM mg/dL 2.7                        IMAGING & DIAGNOSTIC TESTING     Radiology Results: Pending from this admission  MRI inpatient order    (Results Pending)   VAS carotid complete study    (Results Pending)       Other Diagnostic Testing: Reviewed prior head CT on 8/27 and MRI on 9/7    ACTIVE MEDICATIONS       Current Facility-Administered Medications:     acetaminophen (TYLENOL) tablet 650 mg, Q4H PRN    atorvastatin (LIPITOR) tablet 40 mg, Daily    bimatoprost (LUMIGAN) 0.03 % ophthalmic drops 1 drop, HS    brimonidine tartrate 0.2 % ophthalmic solution 1 drop, TID    clopidogrel (PLAVIX) tablet 300 mg, Daily    [START ON 9/11/2024] clopidogrel (PLAVIX) tablet 75 mg, Daily    insulin lispro (HumALOG/ADMELOG) 100 units/mL subcutaneous injection 1-5 Units, TID AC **AND** Fingerstick Glucose (POCT), TID AC    Prior to Admission medications    Medication Sig Start Date End Date Taking? Authorizing Provider   aspirin (Aspirin 81) 81 mg EC tablet Take 1 tablet (81 mg total) by mouth daily 8/28/24   Debbie Maloney MD   atorvastatin (LIPITOR) 10 mg tablet Take 1 tablet (10 mg total) by mouth daily 7/24/24   Debbie Maloney MD   Blood Glucose Monitoring Suppl (OneTouch Verio Reflect) w/Device KIT Check blood sugars twice daily. Please substitute with appropriate alternative as covered by patient's insurance. Dx: E11.65 9/19/23   Debbie Maloney MD   Blood Glucose Monitoring Suppl (OneTouch Verio Reflect) w/Device KIT Check blood sugars once daily. Please substitute with appropriate alternative as covered by patient's insurance. Dx: E11.65 10/24/23   Debbie Maloney MD   brimonidine (ALPHAGAN P) 0.15 % ophthalmic solution  4/25/24   Historical Provider, MD   chlorthalidone 25 mg tablet Take 1 tablet (25 mg total) by mouth daily  In morning 4/25/24   Debbie Maloney MD   Cyanocobalamin (Vitamin B-12) 1000 MCG/15ML LIQD Take by mouth daily    Historical Provider, MD   Dorzolamide HCl-Timolol Mal PF 2-0.5 % SOLN INSTILL 1 DROP INTO EACH EYE TWICE DAILY 7/23/20   Historical Provider, MD   Farxiga 10 MG tablet Take 1 tablet (10 mg total) by mouth daily 7/24/24   Debbie Maloney MD   glucose blood (OneTouch Verio) test strip Check blood sugars once daily. Please substitute with appropriate alternative as covered by patient's insurance. Dx: E11.65 7/24/24   Debbie Maloney MD   losartan (COZAAR) 25 mg tablet Take 1 tablet (25 mg total) by mouth daily 8/26/24   Debbie Maloney MD   Nutritional Supplements (Nepro) LIQD Take 237 mL by mouth 2 (two) times a day for 60 doses 8/29/24 9/28/24  Debbie Maloney MD   OneTouch Delica Lancets 33G MISC Check blood sugars once daily. Please substitute with appropriate alternative as covered by patient's insurance. Dx: E11.65 10/26/23   Debbie Maloney MD   prednisoLONE acetate (PRED FORTE) 1 % ophthalmic suspension INSTILL 1 DROP INTO LEFT EYE TWICE DAILY 9/7/22   Historical Provider, MD   Vyzulta 0.024 % SOLN Administer 1 drop to both eyes daily at bedtime 1/3/24   Historical Provider, MD       CODE STATUS & ADVANCED DIRECTIVES     Code Status: Level 1 - Full Code  Advance Directive and Living Will:      Power of :    POLST:        VTE Pharmacologic Prophylaxis: None. Risk of bleed  VTE Mechanical Prophylaxis: sequential compression device    ======    I have discussed the patient's history, physical exam findings, assessment, and plan in detail with attending, Dr. Herman    Thank you for allowing me to participate in the care of your patient, Jeffry Almanzar.    Joseph Barron MD  [facility] Neurology Residency, PGY-1

## 2024-09-10 NOTE — H&P
VTE Pharmacologic Prophylaxis:   Moderate Risk (Score 3-4) - Pharmacological DVT Prophylaxis Contraindicated. Sequential Compression Devices Ordered.  Code Status: Level 1 - Full Code confirmed with pt  Discussion with family: Updated  (wife) at bedside.    Anticipated Length of Stay: Patient will be admitted on an inpatient basis with an anticipated length of stay of greater than 2 midnights secondary to CVA.    Total Time Spent on Date of Encounter in care of patient: 45 mins. This time was spent on one or more of the following: performing physical exam; counseling and coordination of care; obtaining or reviewing history; documenting in the medical record; reviewing/ordering tests, medications or procedures; communicating with other healthcare professionals and discussing with patient's family/caregivers.    Chief Complaint: Rt arm weakness    History of Present Illness:  Jeffry Almanzar is a 89 y.o. male with a PMH of CKD, DM, HTN, dementia     Pt wife gave majority of the history and states that about 1,5 week ago pt developed weakness of his right upper extremity and states that he couldn't  lift it.  She has also noticed that he is speech has been slurred. Pt went to see PCP and was done MRI brain last Saturday. Today PCP called regarding stroke findings on MRI and recommended to bring pt to ED. As per wife pt also appears to be more confused then usual, but this lasts for the past month.  MRI brain: 1. Small acute infarct in the left corona radiata with associated petechial hemorrhage. Small left frontal lobe infarct. No significant mass effect or midline shift.   Nonspecific left greater than right mastoid opacification.   Hgb 9.0, previously baseline 10.0   Cr 3.71, previously back on 8/23 Cr 3.28   Hgb A1C 6.9 on 8/23/24, Glu 302  GCS 14   On my evaluation pt NAD, hemodynamically stable. Pt denies HA, any new vision changes, sensory deficits anywhere in the body, dizziness, changes in  urinary/bowel habits.       Review of Systems:  Review of Systems   Constitutional:  Positive for activity change. Negative for appetite change, chills, diaphoresis, fatigue, fever and unexpected weight change.   HENT: Negative.     Eyes: Negative.         Blindness of Lf eye   Respiratory: Negative.     Cardiovascular: Negative.    Gastrointestinal: Negative.    Endocrine: Negative.    Genitourinary: Negative.    Musculoskeletal: Negative.    Neurological:  Positive for speech difficulty and weakness. Negative for dizziness, tremors, seizures, syncope, facial asymmetry, light-headedness, numbness and headaches.        Rt arm weakness   Hematological: Negative.    Psychiatric/Behavioral:  Positive for confusion.        Past Medical and Surgical History:   Past Medical History:   Diagnosis Date    Cataracts, bilateral        Past Surgical History:   Procedure Laterality Date    CATARACT EXTRACTION Bilateral 07/15/2012    COLONOSCOPY      CYSTOSCOPY  01/15/2018    Last assessed 9/14/17, diagnostic    HERNIA REPAIR      INSTILLATION MYTOMYCIN N/A 10/25/2017    Procedure: INSTILLATION OF MITOMYCIN C;  Surgeon: Danish Esposito MD;  Location: [facility];  Service: Urology    GA CYSTO W/REMOVAL OF LESIONS SMALL N/A 10/25/2017    Procedure: CYSTOSCOPY; BLADDER BIOPSY WITH FULGERATION;  Surgeon: Danish Esposito MD;  Location: [facility];  Service: Urology    TONSILLECTOMY      TRANSURETHRAL RESECTION OF BLADDER TUMOR N/A 10/25/2017    Procedure: TUR BLADDER TUMOR;  Surgeon: Danish Esposito MD;  Location: [facility];  Service: Urology    WISDOM TOOTH EXTRACTION         Meds/Allergies:  Prior to Admission medications    Medication Sig Start Date End Date Taking? Authorizing Provider   aspirin (Aspirin 81) 81 mg EC tablet Take 1 tablet (81 mg total) by mouth daily 8/28/24   Debbie Maloney MD   atorvastatin (LIPITOR) 10 mg tablet Take 1 tablet (10 mg total) by mouth daily 7/24/24   Debbie Maloney MD   Blood Glucose  Monitoring Suppl (OneTouch Verio Reflect) w/Device KIT Check blood sugars twice daily. Please substitute with appropriate alternative as covered by patient's insurance. Dx: E11.65 9/19/23   Debbie Maloney MD   Blood Glucose Monitoring Suppl (OneTouch Verio Reflect) w/Device KIT Check blood sugars once daily. Please substitute with appropriate alternative as covered by patient's insurance. Dx: E11.65 10/24/23   Debbie Maloney MD   brimonidine (ALPHAGAN P) 0.15 % ophthalmic solution  4/25/24   Historical Provider, MD   chlorthalidone 25 mg tablet Take 1 tablet (25 mg total) by mouth daily In morning 4/25/24   Debbie Maloney MD   Cyanocobalamin (Vitamin B-12) 1000 MCG/15ML LIQD Take by mouth daily    Historical Provider, MD   Dorzolamide HCl-Timolol Mal PF 2-0.5 % SOLN INSTILL 1 DROP INTO EACH EYE TWICE DAILY 7/23/20   Historical Provider, MD   Farxiga 10 MG tablet Take 1 tablet (10 mg total) by mouth daily 7/24/24   Debbie Maloney MD   glucose blood (OneTouch Verio) test strip Check blood sugars once daily. Please substitute with appropriate alternative as covered by patient's insurance. Dx: E11.65 7/24/24   Debbie Maloney MD   losartan (COZAAR) 25 mg tablet Take 1 tablet (25 mg total) by mouth daily 8/26/24   Debbie Maloney MD   Nutritional Supplements (Nepro) LIQD Take 237 mL by mouth 2 (two) times a day for 60 doses 8/29/24 9/28/24  Debbie Maloney MD   OneTouch Delica Lancets 33G MISC Check blood sugars once daily. Please substitute with appropriate alternative as covered by patient's insurance. Dx: E11.65 10/26/23   Debbie Maloney MD   prednisoLONE acetate (PRED FORTE) 1 % ophthalmic suspension INSTILL 1 DROP INTO LEFT EYE TWICE DAILY 9/7/22   Historical Provider, MD   Vyzulta 0.024 % SOLN Administer 1 drop to both eyes daily at bedtime 1/3/24   Historical Provider, MD     Home medications reviewed with wife at the bedside    Allergies:   Allergies   Allergen Reactions    Ace Inhibitors Cough     Other reaction(s):  hyperkalemia    Penicillins GI Intolerance       Social History:  Marital Status: /Civil Union   Occupation:   Patient Pre-hospital Living Situation: Home  Patient Pre-hospital Level of Mobility: walks with cane  Patient Pre-hospital Diet Restrictions:   Substance Use History:   Social History     Substance and Sexual Activity   Alcohol Use No    Alcohol/week: 0.0 standard drinks of alcohol    Comment: quit 23 years ago     Social History     Tobacco Use   Smoking Status Former    Current packs/day: 0.00    Types: Cigarettes    Quit date: 1964    Years since quittin.7   Smokeless Tobacco Never     Social History     Substance and Sexual Activity   Drug Use No       Family History:      Physical Exam:     Vitals:   Blood Pressure: 132/60 (24)  Pulse: 64 (24)  Temperature: 98.5 °F (36.9 °C) (24)  Temp Source: Oral (24)  Respirations: 17 (24)  Weight - Scale: 61.3 kg (135 lb 2.3 oz) (24)  SpO2: 97 % (24)    Physical Exam  Constitutional:       General: He is not in acute distress.     Appearance: He is normal weight. He is not ill-appearing, toxic-appearing or diaphoretic.   HENT:      Head: Normocephalic and atraumatic.      Mouth/Throat:      Mouth: Mucous membranes are moist.   Eyes:      Pupils: Pupils are equal, round, and reactive to light.      Comments: Lf pupil slowly reactive   Neck:      Vascular: No carotid bruit.   Cardiovascular:      Rate and Rhythm: Normal rate and regular rhythm.      Pulses: Normal pulses.      Heart sounds: Normal heart sounds. No murmur heard.     No friction rub. No gallop.   Pulmonary:      Effort: Pulmonary effort is normal. No respiratory distress.      Breath sounds: Normal breath sounds. No stridor. No wheezing, rhonchi or rales.   Chest:      Chest wall: No tenderness.   Abdominal:      General: Abdomen is flat. Bowel sounds are normal. There is no distension.      Palpations: Abdomen  is soft. There is no mass.      Tenderness: There is no abdominal tenderness. There is no right CVA tenderness, left CVA tenderness, guarding or rebound.      Hernia: No hernia is present.   Musculoskeletal:         General: Normal range of motion.      Cervical back: Neck supple. No rigidity or tenderness.   Lymphadenopathy:      Cervical: No cervical adenopathy.   Skin:     General: Skin is warm and dry.      Capillary Refill: Capillary refill takes less than 2 seconds.      Coloration: Skin is not jaundiced or pale.      Findings: No bruising, erythema, lesion or rash.   Neurological:      Mental Status: He is alert. Mental status is at baseline.      Cranial Nerves: Facial asymmetry present. No cranial nerve deficit.      Sensory: No sensory deficit.      Motor: Weakness present.      Coordination: Coordination normal.      Comments: Rt arm weakness 2/5   Psychiatric:         Mood and Affect: Mood normal.         Behavior: Behavior normal.          Additional Data:     Lab Results:  Results from last 7 days   Lab Units 09/09/24  1931   WBC Thousand/uL 8.64   HEMOGLOBIN g/dL 9.0*   HEMATOCRIT % 28.2*   PLATELETS Thousands/uL 159   SEGS PCT % 68   LYMPHO PCT % 17   MONO PCT % 10   EOS PCT % 4     Results from last 7 days   Lab Units 09/09/24  1931   SODIUM mmol/L 134*   POTASSIUM mmol/L 3.7   CHLORIDE mmol/L 102   CO2 mmol/L 22   BUN mg/dL 69*   CREATININE mg/dL 3.71*   ANION GAP mmol/L 10   CALCIUM mg/dL 9.1   ALBUMIN g/dL 3.6   TOTAL BILIRUBIN mg/dL 0.41   ALK PHOS U/L 38   ALT U/L 9   AST U/L 12*   GLUCOSE RANDOM mg/dL 302*             Lab Results   Component Value Date    HGBA1C 6.9 (H) 08/23/2024    HGBA1C 6.9 (A) 04/25/2024    HGBA1C 7.5 (A) 01/25/2024           Lines/Drains:  Invasive Devices       Peripheral Intravenous Line  Duration             Peripheral IV 09/09/24 Right Antecubital <1 day                        Imaging:   No orders to display       EKG and Other Studies Reviewed on Admission:   EKG:  "    ** Please Note: This note has been constructed using a voice recognition system. **  [facility]  H&P  Name: Jeffry Almanzar 89 y.o. male I MRN: [id number]  Unit/Bed#: ED-42 I Date of Admission: 9/9/2024   Date of Service: 9/9/2024 I Hospital Day: 0      Assessment & Plan   * CVA (cerebral vascular accident) ([facility])  Assessment & Plan  MRI brain: 1. Small acute infarct in the left corona radiata with associated petechial hemorrhage. Small left frontal lobe infarct. No significant mass effect or midline shift.  Admit to stroke passway  Cont ASA  Increased dose of Lipitor to 40 mg  Hgb A1C  6.9 on 8/23/24  F/u Lipid panel  Keep NPO  [facility]  Telemetry monitoring  Neurology consult          Acute-on-chronic kidney injury  (HCC)  Assessment & Plan  Lab Results   Component Value Date    EGFR 13 09/09/2024    EGFR 17 (L) 08/23/2024    EGFR 18 (L) 07/23/2024    CREATININE 3.71 (H) 09/09/2024    CREATININE 3.28 (H) 08/23/2024    CREATININE 3.24 (H) 07/23/2024     Cr 3.71, previously back on 8/23 Cr 3.28  Holding Chlorthalidone, Losartan  Cont IVF  Avoid nephrotoxicity  Monitor renal fn    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  ([facility])  Assessment & Plan  Lab Results   Component Value Date    EGFR 13 09/09/2024    EGFR 17 (L) 08/23/2024    EGFR 18 (L) 07/23/2024    CREATININE 3.71 (H) 09/09/2024    CREATININE 3.28 (H) 08/23/2024    CREATININE 3.24 (H) 07/23/2024     Hgb 9.0, previously baseline 10.0  Iron panel neg for Iron def from prev admission  F/u haptoglobin  Cont monitoring Hgb    Type 2 diabetes mellitus with stage 4 chronic kidney disease, without long-term current use of insulin ([facility])  Assessment & Plan  Lab Results   Component Value Date    HGBA1C 6.9 (H) 08/23/2024       No results for input(s): \"POCGLU\" in the last 72 hours.    Blood Sugar Average: Last 72 hrs:    F/u HgbA1C  Insulin sliding scale with meals and FS  Holding " Farxiga  Restart diabetic diet once passes swallow evaluation      Glaucoma  Assessment & Plan  Needs clarification regarding eye drops with pt wife    Essential hypertension  Assessment & Plan  Holding Chlorthalidone, Losartan for SOFÍA on CKD  Re-start Amlodipine if becomes hypertensive

## 2024-09-10 NOTE — OCCUPATIONAL THERAPY NOTE
Occupational Therapy Evaluation     Patient Name: Jeffry Almanzar  Today's Date: 9/10/2024  Problem List  Principal Problem:    CVA (cerebral vascular accident) (HCC)  Active Problems:    Essential hypertension    Glaucoma    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  (HCC)    Type 2 diabetes mellitus with stage 4 chronic kidney disease, without long-term current use of insulin (HCC)    Past Medical History  Past Medical History:   Diagnosis Date    Cataracts, bilateral      Past Surgical History  Past Surgical History:   Procedure Laterality Date    CATARACT EXTRACTION Bilateral 07/15/2012    COLONOSCOPY      CYSTOSCOPY  01/15/2018    Last assessed 9/14/17, diagnostic    HERNIA REPAIR      INSTILLATION MYTOMYCIN N/A 10/25/2017    Procedure: INSTILLATION OF MITOMYCIN C;  Surgeon: Danish Esposito MD;  Location: [facility];  Service: Urology    IL CYSTO W/REMOVAL OF LESIONS SMALL N/A 10/25/2017    Procedure: CYSTOSCOPY; BLADDER BIOPSY WITH FULGERATION;  Surgeon: Danish Esposito MD;  Location: [facility];  Service: Urology    TONSILLECTOMY      TRANSURETHRAL RESECTION OF BLADDER TUMOR N/A 10/25/2017    Procedure: TUR BLADDER TUMOR;  Surgeon: Danish Esposito MD;  Location: [facility];  Service: Urology    WISDOM TOOTH EXTRACTION            09/10/24 1402   OT Last Visit   OT Visit Date 09/10/24   Note Type   Note type Evaluation  (Seen in the ED)   Pain Assessment   Pain Assessment Tool 0-10   Pain Score No Pain   Restrictions/Precautions   Weight Bearing Precautions Per Order No   Other Precautions Cognitive;Chair Alarm;Bed Alarm;Multiple lines;Fall Risk;Visual impairment   Home Living   Type of Home House   Home Layout Performs ADLs on one level;Able to live on main level with bedroom/bathroom  (9 [address] w/ a landing in between; finished basement that patient does not access)   Bathroom Shower/Tub Tub/shower unit   Bathroom Toilet Raised   Bathroom Equipment Grab bars in shower;Shower chair   Bathroom  "Accessibility Accessible   Home Equipment Walker;Cane;Grab bars   Additional Comments Pt ambulates w/ SPC vs RW at baseline - spouse intermittently provides assistance   Prior Function   Level of [place] Independent with functional mobility;Needs assistance with IADLS  (Spouse (A) with bathing and setup up clothing, patient able to dress independently)   Lives With Spouse   Receives Help From Family   IADLs Family/Friend/Other provides transportation;Family/Friend/Other provides meals;Family/Friend/Other provides medication management  (spouse provides assistance with all IADLs. Reports decrease in independence over the past 6 months.)   Falls in the last 6 months 0  (per patient and spouse)   Vocational Retired   Comments At baseline, spouse (A) with bathing, KIM w/ setup for dressing. Use of RW vs SPC for functional mobility of community and household distance. (A) with all IADL tasks.   Lifestyle   Autonomy Pt lives with spouse, maintains a FFSU. Spouse (A) with bathing, KIM w/ setup for dressing. Use of RW vs SPC for functional mobility of community and household distance. (A) with all IADL tasks. (-) falls and (-) driving   Reciprocal Relationships Supportive spouse   Service to Others Retired   Intrinsic Gratification Going to [employer] on Sunday's and Wednesday's   General   Additional Pertinent History 89-year-old male presents with a one week history of right sided weakness and dysarthria following ED presentation after an MRI on 9/7 showed new hyperintensities. PMHx: dementia, HTN, DM, glaucoma   Family/Caregiver Present Yes  (supportive spouse at bedside - questionable insight into patients limitiations and deficits. \"I am hoping to be out of here by tomorrow  - I have stuff to do\")   Subjective   Subjective \"I am cold\"   ADL   Eating Assistance 5  Supervision/Setup   Grooming Assistance 4  Minimal Assistance   UB Bathing Assistance 4  Minimal Assistance   LB Bathing Assistance 4  Minimal Assistance "   UB Dressing Assistance 4  Minimal Assistance   LB Dressing Assistance 4  Minimal Assistance   LB Dressing Deficit Don/doff R sock;Don/doff L sock;Setup;Verbal cueing;Supervision/safety;Increased time to complete  (sitting at the EOB; supports LE on rail of strecther while donning socks)   Toileting Assistance  4  Minimal Assistance   Additional Comments Unable to formally assess UB bathing, dressing, grooming at time of eval. The above levels of assistance are anticipated based on functional performance deficits with use of clinical judgement.   Bed Mobility   Supine to Sit 4  Minimal assistance   Additional items Assist x 1;HOB elevated;Increased time required;Verbal cues  (trunk managment)   Sit to Supine 5  Supervision   Additional items Increased time required;Verbal cues  (flat bed)   Additional Comments Close supervision to maintain static sitting balance at the EOB. Pt denies lightheadedness / dizziness   Transfers   Sit to Stand 4  Minimal assistance   Additional items Assist x 1;Increased time required;Verbal cues   Stand to Sit 4  Minimal assistance   Additional items Assist x 1;Increased time required;Verbal cues   Additional Comments VC for optimal hand placement and body mechanics for safe transfers. HHA for support / steadying.   Functional Mobility   Functional Mobility 4  Minimal assistance   Additional Comments Ax1, functional household distance w/ HHA. VC to navigate unfamiliar enviorment.   Additional items Hand hold assistance   Balance   Static Sitting Fair +   Dynamic Sitting Fair   Static Standing Poor +   Dynamic Standing Poor   Activity Tolerance   Activity Tolerance Patient limited by fatigue;Treatment limited secondary to medical complications (Comment)  (cognition)   Medical Staff Made Aware Spoke with PT   Nurse Made Aware Spoke with RN pre/post   RUE Assessment   RUE Assessment WFL  (Limited shoulder ROM)   RUE Strength   RUE Overall Strength Within Functional Limits - able to perform  ADL tasks with strength  (fair  strength)   R Shoulder Flexion 3+/5   R Elbow Flexion 3/5   R Elbow Extension 3+/5   LUE Assessment   LUE Assessment WFL  (Limited shoulder ROM ~ MMT 4-/5 grossly, fair  strength)   LUE Strength   LUE Overall Strength Within Functional Limits - able to perform ADL tasks with strength   Hand Function   Gross Motor Coordination Impaired   Fine Motor Coordination Impaired   Hand Function Comments Pt is R handed ~ spouse reports that patient has been using L hand for ADL tasks over the past 2 weeks. Finger-to-nose test, impaired R>L. Unable to participate in thumb-to-finger test b/l d/t inability to appropriately follow commands.   Sensation   Light Touch No apparent deficits   Vision-Basic Assessment   Current Vision Wears glasses all the time  (not present at this time)   Visual History Glaucoma;Other (Comment)  (blind in L eye)   Vision - Complex Assessment   Additional Comments Peripheral field deficits b/l at baseline per spouse   Perception   Inattention/Neglect Appears intact   Motor Planning Cues to use objects appropriately   Cognition   Overall Cognitive Status Impaired   Arousal/Participation Responsive;Cooperative   Attention Attends with cues to redirect   Orientation Level Oriented to person;Disoriented to time;Disoriented to situation  (grossly oriented to place ~ able to recall being in a hospital)   Memory Decreased recall of precautions;Decreased recall of recent events;Decreased short term memory   Following Commands Follows one step commands with increased time or repetition   Comments Pt ID via wristband, name and . Pt is pleasent and cooperative. Difficulty command following and attending to tasks. Poor insight into current limitiations and deficits. Pt will benefit from a formal cognitive assessment to aide in safe discharge planning  (Pt reports cognitive decline over the past 6 months. Needing VC for orientation, sequencing and problem solving. She  "reports he often \"does not know where to go or what to do\")   Assessment   Limitation Decreased ADL status;Decreased UE ROM;Decreased UE strength;Decreased Safe judgement during ADL;Decreased cognition;Decreased endurance;Visual deficit;Decreased fine motor control;Decreased self-care trans;Decreased high-level ADLs   Prognosis Fair   Assessment Patient is a 89 y.o. male seen for OT evaluation following admission on 9/9/2024  s/p CVA (cerebral vascular accident) (HCC). Please see above for comprehensive list of comorbidities and significant PMHx impacting functional performance. Upon initial evaluation, pt appears to be performing below baseline functional status. Pt requires MIN for UB ADLs, NAVDEEP for LB ADLs, NAVDEEP for bed mobility, NAVDEEP for transfers and NAVDEEP for functional mobility household distance with HHA. The AM-PAC & Barthel Index outcome tools were used to assist in determining pt safety w/self care /mobility and appropriate d/c recommendations, see above for score. Occupational performance is affected by the following deficits: endurance ,  decreased muscular strength , impaired gross motor coordination, impaired fine motor control, motor planning, decreased balance , decreased standing tolerance for self care tasks , decreased dynamic balance impacting functional reach, decreased activity tolerance , impaired memory , attention to task, impaired judgement and problem solving , impaired safety awareness , impaired learning ability d/t current cognitive status , impaired global mental status, and direction following. Personal/Environmental factors impacting D/C include: steps to enter/navigate the home, Assistance needed for ADL/IADLs, Assistance needed for ADLs and functional mobility, High fall risk , decreased insight toward deficits , decreased recall of precautions , health management, and baseline cognitive deficits. Supporting factors include: able to maintain FFSU, support system available, and " attitude towards recovery . Patient would benefit from OT services within the acute care setting to maximize level of functional independence in the following areas fall prevention , self-care transfers, bed mobility , functional mobility, formal cognitive evaluation, and ADLs.  From OT standpoint, recommendation at time of D/C would be Level II (Moderate Resource Intensity) .   Goals   Patient Goals to go home   LTG Time Frame 10-14   Long Term Goal See below   Plan   Treatment Interventions ADL retraining;Functional transfer training;UE strengthening/ROM;Endurance training;Cognitive reorientation;Patient/family training;Equipment evaluation/education;Fine motor coordination activities;Compensatory technique education;Neuromuscular reeducation;Energy conservation;Activityengagement  (cognitive assessments)   Goal Expiration Date 09/20/24   OT Treatment Day 0   OT Frequency 3-5x/wk   Discharge Recommendation   Rehab Resource Intensity Level, OT II (Moderate Resource Intensity)   AM-PAC Daily Activity Inpatient   Lower Body Dressing 3   Bathing 3   Toileting 3   Upper Body Dressing 3   Grooming 3   Eating 3   Daily Activity Raw Score 18   Daily Activity Standardized Score (Calc for Raw Score >=11) 38.66   AM-PAC Applied Cognition Inpatient   Following a Speech/Presentation 2   Understanding Ordinary Conversation 3   Taking Medications 1   Remembering Where Things Are Placed or Put Away 2   Remembering List of 4-5 Errands 2   Taking Care of Complicated Tasks 1   Applied Cognition Raw Score 11   Applied Cognition Standardized Score 27.03   Barthel Index   Feeding 5   Bathing 0   Grooming Score 0   Dressing Score 5   Bladder Score 10   Bowels Score 10   Toilet Use Score 5   Transfers (Bed/Chair) Score 10   Mobility (Level Surface) Score 0   Stairs Score 0   Barthel Index Score 45   End of Consult   Education Provided Yes;Family or social support of family present for education by provider   Patient Position at End of  Consult Supine;Bed/Chair alarm activated;All needs within reach   Nurse Communication Nurse aware of consult   GOALS:      -Patient will perform grooming tasks standing at sink with overall supervision in order to increase overall independence     -Patient will be supervision with UB dressing using AE and AD as needed in order to increase (I) with ADLs     -Patient will be supervision  with UB bathing using AE and AD as needed in order to increase (I) with ADLs     -Patient will be supervision with LB dressing with use of AE and AD as needed in order to increase (I) with ADLs     -Patient will be supervision with LB bathing with use of AE and AD as needed in order to increase (I) with ADLs     -Patient will complete toileting w/ supervision w/ G hygiene/thoroughness in order to reduce caregiver burden     -Patient will demonstrate supervision with bed mobility for ability to manage own comfort and initiate OOB tasks.      -Patient will perform functional transfers with supervision to/from all surfaces using DME as needed in order to increase (I) with functional tasks     -Patient will be supervision with functional mobility to/from bathroom for increased independence with toileting tasks     -Patient will tolerate therapeutic activities for greater than 30 min, in order to increase tolerance for functional activities.      -Patient will engage in ongoing cognitive assessment in order to assist with safe discharge planning/recommendations.     -Patient will independently integrate one pacing strategy into morning ADL's.     -Patient will demonstrate standing for 5 min in order to increase active participation in functional activities    Liseth Dunn MS OTR/L   [place] Licensure# [id number]

## 2024-09-10 NOTE — PLAN OF CARE
Problem: OCCUPATIONAL THERAPY ADULT  Goal: Performs self-care activities at highest level of function for planned discharge setting.  See evaluation for individualized goals.  Description: Treatment Interventions: ADL retraining, Functional transfer training, UE strengthening/ROM, Endurance training, Cognitive reorientation, Patient/family training, Equipment evaluation/education, Fine motor coordination activities, Compensatory technique education, Neuromuscular reeducation, Energy conservation, Activityengagement (cognitive assessments)          See flowsheet documentation for full assessment, interventions and recommendations.   Note: Limitation: Decreased ADL status, Decreased UE ROM, Decreased UE strength, Decreased Safe judgement during ADL, Decreased cognition, Decreased endurance, Visual deficit, Decreased fine motor control, Decreased self-care trans, Decreased high-level ADLs  Prognosis: Fair  Assessment: Patient is a 89 y.o. male seen for OT evaluation following admission on 9/9/2024  s/p CVA (cerebral vascular accident) (HCC). Please see above for comprehensive list of comorbidities and significant PMHx impacting functional performance. Upon initial evaluation, pt appears to be performing below baseline functional status. Pt requires MIN for UB ADLs, NAVDEEP for LB ADLs, NAVDEEP for bed mobility, NAVDEEP for transfers and NAVDEEP for functional mobility household distance with HHA. The AM-PAC & Barthel Index outcome tools were used to assist in determining pt safety w/self care /mobility and appropriate d/c recommendations, see above for score. Occupational performance is affected by the following deficits: endurance ,  decreased muscular strength , impaired gross motor coordination, impaired fine motor control, motor planning, decreased balance , decreased standing tolerance for self care tasks , decreased dynamic balance impacting functional reach, decreased activity tolerance , impaired memory , attention to  task, impaired judgement and problem solving , impaired safety awareness , impaired learning ability d/t current cognitive status , impaired global mental status, and direction following. Personal/Environmental factors impacting D/C include: steps to enter/navigate the home, Assistance needed for ADL/IADLs, Assistance needed for ADLs and functional mobility, High fall risk , decreased insight toward deficits , decreased recall of precautions , health management, and baseline cognitive deficits. Supporting factors include: able to maintain FFSU, support system available, and attitude towards recovery . Patient would benefit from OT services within the acute care setting to maximize level of functional independence in the following areas fall prevention , self-care transfers, bed mobility , functional mobility, formal cognitive evaluation, and ADLs.  From OT standpoint, recommendation at time of D/C would be Level II (Moderate Resource Intensity) .     Rehab Resource Intensity Level, OT: II (Moderate Resource Intensity)     Liseth Dunn MS OTR/L   [place] Licensure# [id number]

## 2024-09-10 NOTE — PLAN OF CARE
Problem: Prexisting or High Potential for Compromised Skin Integrity  Goal: Skin integrity is maintained or improved  Description: INTERVENTIONS:  - Identify patients at risk for skin breakdown  - Assess and monitor skin integrity  - Assess and monitor nutrition and hydration status  - Monitor labs   - Assess for incontinence   - Turn and reposition patient  - Assist with mobility/ambulation  - Relieve pressure over bony prominences  - Avoid friction and shearing  - Provide appropriate hygiene as needed including keeping skin clean and dry  - Evaluate need for skin moisturizer/barrier cream  - Collaborate with interdisciplinary team   - Patient/family teaching  - Consider wound care consult   9/10/2024 1344 by Josselyn Bond RN  Outcome: Progressing  9/10/2024 0327 by Josselyn Bond RN  Outcome: Progressing     Problem: PAIN - ADULT  Goal: Verbalizes/displays adequate comfort level or baseline comfort level  Description: Interventions:  - Encourage patient to monitor pain and request assistance  - Assess pain using appropriate pain scale  - Administer analgesics based on type and severity of pain and evaluate response  - Implement non-pharmacological measures as appropriate and evaluate response  - Consider cultural and social influences on pain and pain management  - Notify physician/advanced practitioner if interventions unsuccessful or patient reports new pain  9/10/2024 1344 by Josselyn Bond RN  Outcome: Progressing  9/10/2024 0327 by Josselyn Bond RN  Outcome: Progressing     Problem: INFECTION - ADULT  Goal: Absence or prevention of progression during hospitalization  Description: INTERVENTIONS:  - Assess and monitor for signs and symptoms of infection  - Monitor lab/diagnostic results  - Monitor all insertion sites, i.e. indwelling lines, tubes, and drains  - Monitor endotracheal if appropriate and nasal secretions for changes in amount and color  - [place] appropriate cooling/warming  therapies per order  - Administer medications as ordered  - Instruct and encourage patient and family to use good hand hygiene technique  - Identify and instruct in appropriate isolation precautions for identified infection/condition  9/10/2024 1344 by Josselyn Bond RN  Outcome: Progressing  9/10/2024 0327 by Josselyn Bond RN  Outcome: Progressing     Problem: SAFETY ADULT  Goal: Patient will remain free of falls  Description: INTERVENTIONS:  - Educate patient/family on patient safety including physical limitations  - Instruct patient to call for assistance with activity   - Consult OT/PT to assist with strengthening/mobility   - Keep Call bell within reach  - Keep bed low and locked with side rails adjusted as appropriate  - Keep care items and personal belongings within reach  - Initiate and maintain comfort rounds  - Make Fall Risk Sign visible to staff  - Offer Toileting every 2 Hours, in advance of need  - Initiate/Maintain bed alarm  - Obtain necessary fall risk management equipment: alarms  - Apply yellow socks and bracelet for high fall risk patients  - Consider moving patient to room near nurses station  9/10/2024 1344 by Josselyn Bond RN  Outcome: Progressing  9/10/2024 0327 by Josselyn Bond RN  Outcome: Progressing  Goal: Maintain or return to baseline ADL function  Description: INTERVENTIONS:  -  Assess patient's ability to carry out ADLs; assess patient's baseline for ADL function and identify physical deficits which impact ability to perform ADLs (bathing, care of mouth/teeth, toileting, grooming, dressing, etc.)  - Assess/evaluate cause of self-care deficits   - Assess range of motion  - Assess patient's mobility; develop plan if impaired  - Assess patient's need for assistive devices and provide as appropriate  - Encourage maximum independence but intervene and supervise when necessary  - Involve family in performance of ADLs  - Assess for home care needs following discharge   -  Consider OT consult to assist with ADL evaluation and planning for discharge  - Provide patient education as appropriate  9/10/2024 1344 by Josselyn Bond RN  Outcome: Progressing  9/10/2024 0327 by Josselyn Bond RN  Outcome: Progressing  Goal: Maintains/Returns to pre admission functional level  Description: INTERVENTIONS:  - Perform AM-PAC 6 Click Basic Mobility/ Daily Activity assessment daily.  - Set and communicate daily mobility goal to care team and patient/family/caregiver.   - Collaborate with rehabilitation services on mobility goals if consulted  - Out of bed for toileting  - Record patient progress and toleration of activity level   9/10/2024 1344 by Josselyn Bond RN  Outcome: Progressing  9/10/2024 0327 by Josselyn Bond RN  Outcome: Progressing     Problem: DISCHARGE PLANNING  Goal: Discharge to home or other facility with appropriate resources  Description: INTERVENTIONS:  - Identify barriers to discharge w/patient and caregiver  - Arrange for needed discharge resources and transportation as appropriate  - Identify discharge learning needs (meds, wound care, etc.)  - Arrange for interpretive services to assist at discharge as needed  - Refer to Case Management Department for coordinating discharge planning if the patient needs post-hospital services based on physician/advanced practitioner order or complex needs related to functional status, cognitive ability, or social support system  9/10/2024 1344 by Josselyn Bond RN  Outcome: Progressing  9/10/2024 0327 by Josselyn Bond RN  Outcome: Progressing     Problem: Knowledge Deficit  Goal: Patient/family/caregiver demonstrates understanding of disease process, treatment plan, medications, and discharge instructions  Description: Complete learning assessment and assess knowledge base.  Interventions:  - Provide teaching at level of understanding  - Provide teaching via preferred learning methods  9/10/2024 1344 by Josselyn HODGES  LILIYA Bond  Outcome: Progressing  9/10/2024 0327 by Josselyn Bond RN  Outcome: Progressing     Problem: Neurological Deficit  Goal: Neurological status is stable or improving  Description: Interventions:  - Monitor and assess patient's level of consciousness, motor function, sensory function, and level of assistance needed for ADLs.   - Monitor and report changes from baseline. Collaborate with interdisciplinary team to initiate plan and implement interventions as ordered.   - Provide and maintain a safe environment.  - Consider seizure precautions.  - Consider fall precautions.  - Consider aspiration precautions.  - Consider bleeding precautions.  9/10/2024 1344 by Josselyn Bond RN  Outcome: Progressing  9/10/2024 0327 by Josselyn Bond RN  Outcome: Progressing     Problem: Activity Intolerance/Impaired Mobility  Goal: Mobility/activity is maintained at optimum level for patient  Description: Interventions:  - Assess and monitor patient  barriers to mobility and need for assistive/adaptive devices.  - Assess patient's emotional response to limitations.  - Collaborate with interdisciplinary team and initiate plans and interventions as ordered.  - Encourage independent activity per ability.  - Maintain proper body alignment.  - Perform active/passive rom as tolerated/ordered.  - Plan activities to conserve energy.  - Turn patient as appropriate  9/10/2024 1344 by Josselyn Bond RN  Outcome: Progressing  9/10/2024 0327 by Josselyn Bond RN  Outcome: Progressing     Problem: Communication Impairment  Goal: Ability to express needs and understand communication  Description: Assess patient's communication skills and ability to understand information.  Patient will demonstrate use of effective communication techniques, alternative methods of communication and understanding even if not able to speak.     - Encourage communication and provide alternate methods of communication as needed.  -  Collaborate with case management/ for discharge needs.  - Include patient/family/caregiver in decisions related to communication.  9/10/2024 1344 by Josselyn Bond RN  Outcome: Progressing  9/10/2024 0327 by Josselyn Bond RN  Outcome: Progressing     Problem: Potential for Aspiration  Goal: Non-ventilated patient's risk of aspiration is minimized  Description: Assess and monitor vital signs, respiratory status, and labs (WBC).  Monitor for signs of aspiration (tachypnea, cough, rales, wheezing, cyanosis, fever).    - Assess and monitor patient's ability to swallow.  - Place patient up in chair to eat if possible.  - HOB up at 90 degrees to eat if unable to get patient up into chair.  - Supervise patient during oral intake.   - Instruct patient/ family to take small bites.  - Instruct patient/ family to take small single sips when taking liquids.  - Follow patient-specific strategies generated by speech pathologist.  9/10/2024 1344 by Josselyn Bond RN  Outcome: Progressing  9/10/2024 0327 by Josselyn Bnod RN  Outcome: Progressing     Problem: Nutrition  Goal: Nutrition/Hydration status is improving  Description: Monitor and assess patient's nutrition/hydration status for malnutrition (ex- brittle hair, bruises, dry skin, pale skin and conjunctiva, muscle wasting, smooth red tongue, and disorientation). Collaborate with interdisciplinary team and initiate plan and interventions as ordered.  Monitor patient's weight and dietary intake as ordered or per policy. Utilize nutrition screening tool and intervene per policy. Determine patient's food preferences and provide high-protein, high-caloric foods as appropriate.     - Assist patient with eating.  - Allow adequate time for meals.  - Encourage patient to take dietary supplement as ordered.  - Collaborate with clinical nutritionist.  - Include patient/family/caregiver in decisions related to nutrition.  9/10/2024 1344 by Josselyn HODGES  LILIYA Bond  Outcome: Progressing  9/10/2024 0327 by Josselyn Bond RN  Outcome: Progressing

## 2024-09-10 NOTE — ASSESSMENT & PLAN NOTE
Lab Results   Component Value Date    EGFR 13 09/09/2024    EGFR 17 (L) 08/23/2024    EGFR 18 (L) 07/23/2024    CREATININE 3.71 (H) 09/09/2024    CREATININE 3.28 (H) 08/23/2024    CREATININE 3.24 (H) 07/23/2024     Hgb 9.0, previously baseline 10.0  Iron panel neg for Iron def from prev admission  F/u haptoglobin  Cont monitoring Hgb

## 2024-09-10 NOTE — ASSESSMENT & PLAN NOTE
89 year old male presented to our ED due to right arm weakness. An MRI was performed which showed a small acute infarct in the left corona radiata with associated petechial hemorrhage. Small left frontal lobe infarct. No significant mass effect or midline shift.  Stain, plavix. Aspirin discontinued due to concerns for failure.  Neurology recommendations appreciated. 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 She had recent EBUS/TBNA and thoracentesis on 9/1/22. Pathology with non-small cell lung adenocarcinoma, Stage IV with malignant pleural effusion.\par Effusion has continued to improve. She continues to take Tagrisso. She states her breathing is at baseline and has not worsened since her last visit. \par

## 2024-09-10 NOTE — ASSESSMENT & PLAN NOTE
This is an 89-year-old male with past medical history of CKD, diabetes, glaucoma, who presents with a one week history of right sided weakness and dysarthria following ED presentation after an MRI on 9/7 showed new hyperintensities. He is on the stroke pathway.    Not a candidate for TNK/thrombectomy due to age of stroke  Etiology of stoke: vascular insult versus embolus; Echo pending  Vascular risk factors: CKD, diabetes, smoking history      Workup:  Lab Results   Component Value Date    HGBA1C 6.7 (H) 09/09/2024    CHOLESTEROL 132 09/10/2024    LDLCALC 78 09/10/2024    TRIG 56 09/10/2024    INR 0.97 10/19/2017      MRA:   Somewhat limited assessment of the right PCA at and beyond mid P2 segment where there is relatively diminished signal which could be artifactual or technique related versus stenosis or occlusive disease. Also proximal to mid intracranial vertebral   arteries are not included in the field-of-view. Consider CT angiogram if further evaluation of these territories are desired.     2.  The remainder of the major vessels of the [place] are patent without high-grade stenosis.    Transthoracic Echocardiogram:    Left Ventricle: Left ventricular cavity size is normal. Wall thickness is normal. The left ventricular ejection fraction is 65%. Systolic function is normal. Wall motion is normal. Diastolic function is mildly abnormal, consistent with grade I (abnormal) relaxation.  Right Ventricle: Systolic function is low normal.    Carotid US:  Vertebral artery flow is antegrade. There is no significant subclavian artery  disease. <50% stenosis in R and L ICA     Compared to previous study on 4/12/19, there is no significant change.  Lab Results   Component Value Date    TRIG 56 09/10/2024    HDL 43 09/10/2024    CHOLESTEROL 132 09/10/2024    LDLCALC 78 09/10/2024    HGBA1C 6.7 (H) 09/09/2024       Plan:  Stroke workup complete, no concerning findings on MRA, Carotid US, or echo  Follow-up with  outpatient stroke doctor  Continue Plavix 75 mg  Continue Lipitor 40 mg  Case discussed with Dr. Herman  Neurology will sign off  Rest of care per primary team

## 2024-09-10 NOTE — SPEECH THERAPY NOTE
Speech-Language Pathology Bedside Swallow Evaluation        Patient Name: Jeffry Almanzar    Today's Date: 9/10/2024     Problem List  Principal Problem:    CVA (cerebral vascular accident) (HCC)  Active Problems:    Essential hypertension    Glaucoma    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  (HCC)    Type 2 diabetes mellitus with stage 4 chronic kidney disease, without long-term current use of insulin (HCC)    Acute-on-chronic kidney injury  (HCC)         Past Medical History  Past Medical History:   Diagnosis Date    Cataracts, bilateral          Summary    Pt presents with normal appearing oral and pharyngeal swallowing skills, no overt s/s aspiration and no c/o food dysphagia.      Recommendations:   Diet: regular diet and thin liquids   Meds: whole with liquid   Frequent Oral care: 2x/day  Other Recommendations/ considerations: no follow up tx needed        Current Medical Status  Pt is a 89 y.o. male who presented to [facility]  with CVA. Pt wife gave majority of the history and states that about 1,5 week ago pt developed weakness of his right upper extremity and states that he couldn't  lift it.  She has also noticed that he is speech has been slurred. Pt went to see PCP and was done MRI brain last Saturday. Today PCP called regarding stroke findings on MRI and recommended to bring pt to ED. As per wife pt also appears to be more confused then usual, but this lasts for the past month.     Past medical history:   Please see H&P for details    Special Studies:  MRI: 9/7/24 Small acute infarct in the left corona radiata with associated petechial hemorrhage. Small left frontal lobe infarct. No significant mass effect or midline shift.     Social/Education/Vocational Hx:  Pt lives with family    Swallow Information   Prior speech/swallowing tx: none   Current Risks for Dysphagia & Aspiration: CVA  Current Symptoms/Concerns:  + CVA -wife denied pt w/ difficulty swallowing   Current Diet: NPO   Baseline  "Diet: regular diet and thin liquids  Takes pills- whole w/ water     Baseline Assessment   Behavior/Cognition: alert  Speech/Language Status: able to participate in basic conversation and able to follow commands- mild dysarthria noted, wife reported pt very minimal slurred speech and \"not all the time, usually when he first wakes up.\"  Patient Positioning: upright in bed     Swallow Mechanism Exam   Facial: symmetrical  Labial: WFL  Lingual: WFL  Velum: unable to visualize  Mandible: adequate ROM  Dentition: adequate and upper dentures  Vocal quality:clear/adequate   Volitional Cough: strong/productive   Respiratory: RA    Consistencies Assessed and Performance   Consistencies Administered: thin liquids, nectar thick, puree, and soft solids    Oral Stage: pt was able to bite toast, drink from cup and straw w/o labial leakage, good oral control of liquids.  Mastication/manipulation appeared WNL. No pocketing or oral residue.     Pharyngeal Stage: swallow initiation appeared timely w/ complete laryngeal excursion by palpation. No coughing, throat clearing or wet voice noted.       Esophageal Concerns: none reported      Results Reviewed with: patient, RN, and family   Dysphagia Goals: none at this time      Carole Aragon MA CCC-SLP  Speech Pathologist  [place] license # [id number]  [place] license # [id number]  Available via Secure Chat     "

## 2024-09-10 NOTE — CASE MANAGEMENT
Case Management Assessment & Discharge Planning Note    Patient name Jeffry Almanzar  Location ED-42/ED-42 MRN [id number]  : 1934 Date 9/10/2024       Current Admission Date: 2024  Current Admission Diagnosis:CVA (cerebral vascular accident) ([facility])   Patient Active Problem List    Diagnosis Date Noted Date Diagnosed    CVA (cerebral vascular accident) ([facility]) 2024     Acute-on-chronic kidney injury  ([facility]) 2024     Dementia ([facility]) 2024     Type 2 diabetes mellitus with stage 4 chronic kidney disease, without long-term current use of insulin ([facility]) 10/24/2023     IgM lambda paraproteinemia 2023     Advanced care planning/counseling discussion 2023     Diabetic nephropathy associated with type 2 diabetes mellitus ([facility]) 2023     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[facility]) 2023     Stage 4 chronic kidney disease ([facility]) 2022     Persistent proteinuria 2022     Benign 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 ([facility]) 02/15/2022     Parenchymal renal hypertension 2021     Spinal stenosis of lumbar region      DDD (degenerative disc disease), lumbar      Lumbar disc disease with radiculopathy      Neurogenic claudication 2019     Low back pain 2019     Retinal vein occlusion of left eye 2019     Malignant neoplasm of urinary bladder ([facility]) 2019     Vision loss of left eye 2019     Weight loss 2018     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  ([facility]) 2018     Iliotibial band syndrome of both sides 05/15/2018     Primary osteoarthritis of both knees 03/15/2018     Asthma, mild intermittent 2017     Essential hypertension 2016     Mild vitamin D deficiency 2014     Glaucoma 2014     Organic impotence 2013     Dyslipidemia 07/10/2012       LOS (days): 0  Geometric Mean LOS (GMLOS) (days):   Days to GMLOS:     OBJECTIVE:    Risk of Unplanned Readmission Score: 17.22          Current admission status: Inpatient       Preferred Pharmacy:   [id number] - [place] - [address]  [address] [address]  Phone: [phone] Fax: [phone]    Primary Care Provider: Debbie Maloney MD    Primary Insurance: MEDICARE  Secondary Insurance: BLUE CROSS    ASSESSMENT:  Active Health Care Proxies    There are no active Health Care Proxies on file.                 Readmission Root Cause  30 Day Readmission: No    Patient Information  Admitted from:: Home  Mental Status: Alert  During Assessment patient was accompanied by: Spouse (Antony)  Assessment information provided by:: Spouse  Primary Caregiver: Spouse  Caregiver's Name:: Antony Almanzar  Caregiver's Relationship to Patient:: Significant Other  Caregiver's Telephone Number:: [phone]  Support Systems: Spouse/significant other  What [place] do you live in[place]  Home entry access options. Select all that apply.: Stairs (Patient enters from the garage/ back of the house)  Number of steps to enter home.: 5 (Patient's wife stated It is approximately 3-5 steps but it is like a long path from the garage and then a step or two and then another path, then another step & path and then one small one at the backdoor to enter the house)  Do the steps have railings?: Yes (Patient's wife stated there is a long railing along the path)  Type of Current Residence: [facility]  Living Arrangements: Lives w/ Spouse/significant other  Is patient a ?: No    Activities of Daily Living Prior to Admission  Functional Status: Assistance  Completes ADLs independently?: No  Level of ADL dependence: Assistance (patient's wife assists him with everything)  Ambulates independently?: No  Level of ambulatory dependence: Assistance  Does patient use assisted devices?: Yes  Assisted Devices (DME) used: Straight Cane  Does patient currently own DME?: Yes  What DME does the patient currently own?: Straight Cane  Does patient  have a history of Outpatient Therapy (PT/OT)?: Yes  Does the patient have a history of Short-Term Rehab?: No  Does patient have a history of HHC?: No  Does patient currently have HHC?: No         Patient Information Continued  Income Source: SSI/SSD  Does patient have prescription coverage?: Yes  Does patient receive dialysis treatments?: No  Does patient have a history of substance abuse?: No  Does patient have a history of Mental Health Diagnosis?: No         Means of Transportation  Means of Transport to [facility]:: Family transport      Social Determinants of Health (SDOH)      Flowsheet Row Most Recent Value   Housing Stability    In the last 12 months, was there a time when you were not able to pay the mortgage or rent on time? N   In the past 12 months, how many times have you moved where you were living? 0   At any time in the past 12 months, were you homeless or living in a shelter (including now)? N   Transportation Needs    In the past 12 months, has lack of transportation kept you from medical appointments or from getting medications? no   In the past 12 months, has lack of transportation kept you from meetings, work, or from getting things needed for daily living? No   Food Insecurity    Within the past 12 months, you worried that your food would run out before you got the money to buy more. Never true   Within the past 12 months, the food you bought just didn't last and you didn't have money to get more. Never true   Utilities    In the past 12 months has the electric, gas, oil, or water company threatened to shut off services in your home? No            DISCHARGE DETAILS:    Discharge planning discussed with:: Patient's wife at bedside  Freedom of Choice: Yes  Comments - Freedom of Choice: ESTEE spoke with patient's wife at bedside, introduced herself/role and discharge planning. Patient's Wife Antony is looking for information on caregiver pay, possibly having a caregiver, VNA services. CM offered to send  referral for VNA - wife is agreeable to referral.  CM contacted family/caregiver?: Yes  Were Treatment Team discharge recommendations reviewed with patient/caregiver?: Yes  Did patient/caregiver verbalize understanding of patient care needs?: Yes  Were patient/caregiver advised of the risks associated with not following Treatment Team discharge recommendations?: Yes    Contacts  Patient Contacts: Antony Almanzar  Relationship to Patient:: Family  Contact Method: In Person  Reason/Outcome: Discharge Planning, Referral

## 2024-09-10 NOTE — ASSESSMENT & PLAN NOTE
Anemia due to CKD  Stable, no indication for transfusion at this time.    Results from last 7 days   Lab Units 09/10/24  0543 09/09/24 1931   HEMOGLOBIN g/dL 9.1* 9.0*   MCV fL 101* 101*   RDW % 12.8 12.6   IRON ug/dL  --  62   TIBC ug/dL  --  234*   FERRITIN ng/mL  --  317     CKD IV  Elevated, does not meet SOFÍA criteria at this time. Hold HCTZ / losartan for now    Recent Labs     09/09/24  1931 09/10/24  0543   BUN 69* 63*   CREATININE 3.71* 3.22*   EGFR 13 16

## 2024-09-11 ENCOUNTER — TELEPHONE (OUTPATIENT)
Age: 89
End: 2024-09-11

## 2024-09-11 VITALS
OXYGEN SATURATION: 98 % | HEIGHT: 65 IN | HEART RATE: 87 BPM | SYSTOLIC BLOOD PRESSURE: 147 MMHG | DIASTOLIC BLOOD PRESSURE: 78 MMHG | TEMPERATURE: 98.5 F | BODY MASS INDEX: 22.49 KG/M2 | RESPIRATION RATE: 16 BRPM | WEIGHT: 135 LBS

## 2024-09-11 LAB
ANION GAP SERPL CALCULATED.3IONS-SCNC: 9 MMOL/L (ref 4–13)
BUN SERPL-MCNC: 58 MG/DL (ref 5–25)
CALCIUM SERPL-MCNC: 9.1 MG/DL (ref 8.4–10.2)
CHLORIDE SERPL-SCNC: 107 MMOL/L (ref 96–108)
CO2 SERPL-SCNC: 23 MMOL/L (ref 21–32)
CREAT SERPL-MCNC: 3 MG/DL (ref 0.6–1.3)
ERYTHROCYTE [DISTWIDTH] IN BLOOD BY AUTOMATED COUNT: 12.6 % (ref 11.6–15.1)
GFR SERPL CREATININE-BSD FRML MDRD: 17 ML/MIN/1.73SQ M
GLUCOSE SERPL-MCNC: 122 MG/DL (ref 65–140)
GLUCOSE SERPL-MCNC: 130 MG/DL (ref 65–140)
GLUCOSE SERPL-MCNC: 195 MG/DL (ref 65–140)
HAPTOGLOB SERPL-MCNC: 145 MG/DL (ref 38–329)
HCT VFR BLD AUTO: 29.6 % (ref 36.5–49.3)
HGB BLD-MCNC: 9.5 G/DL (ref 12–17)
MAGNESIUM SERPL-MCNC: 2.5 MG/DL (ref 1.9–2.7)
MCH RBC QN AUTO: 31.9 PG (ref 26.8–34.3)
MCHC RBC AUTO-ENTMCNC: 32.1 G/DL (ref 31.4–37.4)
MCV RBC AUTO: 99 FL (ref 82–98)
PLATELET # BLD AUTO: 166 THOUSANDS/UL (ref 149–390)
PMV BLD AUTO: 10.9 FL (ref 8.9–12.7)
POTASSIUM SERPL-SCNC: 3.5 MMOL/L (ref 3.5–5.3)
RBC # BLD AUTO: 2.98 MILLION/UL (ref 3.88–5.62)
SODIUM SERPL-SCNC: 139 MMOL/L (ref 135–147)
WBC # BLD AUTO: 7.61 THOUSAND/UL (ref 4.31–10.16)

## 2024-09-11 PROCEDURE — 85027 COMPLETE CBC AUTOMATED: CPT | Performed by: STUDENT IN AN ORGANIZED HEALTH CARE EDUCATION/TRAINING PROGRAM

## 2024-09-11 PROCEDURE — 83735 ASSAY OF MAGNESIUM: CPT | Performed by: STUDENT IN AN ORGANIZED HEALTH CARE EDUCATION/TRAINING PROGRAM

## 2024-09-11 PROCEDURE — 80048 BASIC METABOLIC PNL TOTAL CA: CPT | Performed by: STUDENT IN AN ORGANIZED HEALTH CARE EDUCATION/TRAINING PROGRAM

## 2024-09-11 PROCEDURE — 99239 HOSP IP/OBS DSCHRG MGMT >30: CPT | Performed by: STUDENT IN AN ORGANIZED HEALTH CARE EDUCATION/TRAINING PROGRAM

## 2024-09-11 PROCEDURE — 82948 REAGENT STRIP/BLOOD GLUCOSE: CPT

## 2024-09-11 PROCEDURE — 99233 SBSQ HOSP IP/OBS HIGH 50: CPT | Performed by: PSYCHIATRY & NEUROLOGY

## 2024-09-11 RX ORDER — ATORVASTATIN CALCIUM 40 MG/1
40 TABLET, FILM COATED ORAL DAILY
Qty: 30 TABLET | Refills: 0 | Status: SHIPPED | OUTPATIENT
Start: 2024-09-12 | End: 2024-10-12

## 2024-09-11 RX ORDER — CLOPIDOGREL BISULFATE 75 MG/1
75 TABLET ORAL DAILY
Qty: 30 TABLET | Refills: 0 | Status: SHIPPED | OUTPATIENT
Start: 2024-09-12 | End: 2024-10-12

## 2024-09-11 RX ADMIN — INSULIN LISPRO 1 UNITS: 100 INJECTION, SOLUTION INTRAVENOUS; SUBCUTANEOUS at 13:28

## 2024-09-11 RX ADMIN — BRIMONIDINE TARTRATE 1 DROP: 2 SOLUTION/ DROPS OPHTHALMIC at 08:51

## 2024-09-11 RX ADMIN — PREDNISOLONE ACETATE 1 DROP: 10 SUSPENSION/ DROPS OPHTHALMIC at 08:51

## 2024-09-11 RX ADMIN — ATORVASTATIN CALCIUM 40 MG: 40 TABLET, FILM COATED ORAL at 08:50

## 2024-09-11 RX ADMIN — HEPARIN SODIUM 5000 UNITS: 5000 INJECTION INTRAVENOUS; SUBCUTANEOUS at 05:36

## 2024-09-11 RX ADMIN — CLOPIDOGREL BISULFATE 75 MG: 75 TABLET ORAL at 08:50

## 2024-09-11 RX ADMIN — DORZOLAMIDE HYDROCHLORIDE AND TIMOLOL MALEATE 1 DROP: 20; 5 SOLUTION/ DROPS OPHTHALMIC at 08:51

## 2024-09-11 RX ADMIN — HEPARIN SODIUM 5000 UNITS: 5000 INJECTION INTRAVENOUS; SUBCUTANEOUS at 13:28

## 2024-09-11 NOTE — PROGRESS NOTES
Progress Note - Neurology   Name: Jeffry Almanzar 89 y.o. male I MRN: [id number]  Unit/Bed#: S -01 I Date of Admission: 9/9/2024   Date of Service: 9/11/2024 I Hospital Day: 1     Assessment & Plan  CVA (cerebral vascular accident) (HCC)  This is an 89-year-old male with past medical history of CKD, diabetes, glaucoma, who presents with a one week history of right sided weakness and dysarthria following ED presentation after an MRI on 9/7 showed new hyperintensities. He is on the stroke pathway.    Not a candidate for TNK/thrombectomy due to age of stroke  Etiology of stoke: vascular insult versus embolus; Echo pending  Vascular risk factors: CKD, diabetes, smoking history      Workup:  Lab Results   Component Value Date    HGBA1C 6.7 (H) 09/09/2024    CHOLESTEROL 132 09/10/2024    LDLCALC 78 09/10/2024    TRIG 56 09/10/2024    INR 0.97 10/19/2017      MRA:   Somewhat limited assessment of the right PCA at and beyond mid P2 segment where there is relatively diminished signal which could be artifactual or technique related versus stenosis or occlusive disease. Also proximal to mid intracranial vertebral   arteries are not included in the field-of-view. Consider CT angiogram if further evaluation of these territories are desired.     2.  The remainder of the major vessels of the [place] are patent without high-grade stenosis.    Transthoracic Echocardiogram:    Left Ventricle: Left ventricular cavity size is normal. Wall thickness is normal. The left ventricular ejection fraction is 65%. Systolic function is normal. Wall motion is normal. Diastolic function is mildly abnormal, consistent with grade I (abnormal) relaxation.  Right Ventricle: Systolic function is low normal.    Carotid US:  Vertebral artery flow is antegrade. There is no significant subclavian artery  disease. <50% stenosis in R and L ICA     Compared to previous study on 4/12/19, there is no significant change.  Lab Results   Component  Value Date    TRIG 56 09/10/2024    HDL 43 09/10/2024    CHOLESTEROL 132 09/10/2024    LDLCALC 78 09/10/2024    HGBA1C 6.7 (H) 09/09/2024       Plan:  Stroke workup complete, no concerning findings on MRA, Carotid US, or echo  Follow-up with outpatient stroke doctor  Continue Plavix 75 mg  Continue Lipitor 40 mg  Case discussed with Dr. Herman  Neurology will sign off  Rest of care per primary team    Essential hypertension  Management as per primary team  Glaucoma  Management as per primary team  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  (HCC)  Lab Results   Component Value Date    EGFR 17 09/11/2024    EGFR 16 09/10/2024    EGFR 13 09/09/2024    CREATININE 3.00 (H) 09/11/2024    CREATININE 3.22 (H) 09/10/2024    CREATININE 3.71 (H) 09/09/2024   Management as per primary team  Type 2 diabetes mellitus with stage 4 chronic kidney disease, without long-term current use of insulin (HCC)  Lab Results   Component Value Date    HGBA1C 6.7 (H) 09/09/2024     Management as per primary team  Recent Labs     09/10/24  1125 09/10/24  1755 09/10/24  2100 09/11/24  0805   POCGLU 283* 92 99 122       Blood Sugar Average: Last 72 hrs:  (P) 154          NEUROLOGY RESIDENCY PROGRESS NOTE     Name: Jeffry Almanzar   Age & Sex: 89 y.o. male   MRN: [id number]  Unit/Bed#: S -01   Encounter: [id number]        ASSESSMENT & PLAN   As above    SUBJECTIVE     Patient was seen and examined. No acute events overnight. Patient subjectively feels that his deficits have improved somewhat. His speech remains slurred, but he is somewhat easier to understand this morning. He feels he slept well. He has noticed no worsening changes in his neurological deficits since yesterday. He denies chest pain, nausea, vomiting, new weakness or numbness.    Pertinent Negatives include: headaches, syncope, numbness or tingling     Review of Systems   Neurological:  Negative for dizziness, tremors, seizures, syncope, facial asymmetry, speech difficulty,  weakness, light-headedness, numbness and headaches.       OBJECTIVE     Patient ID: Jeffry Almanzar is a 89 y.o. male.    Vitals:    09/10/24 2020 09/10/24 2209 24 0209 24 0758   BP: 120/64 165/77 101/64 114/67   BP Location:  Left arm     Pulse: 76 88 77 78   Resp:  16     Temp:  97.9 °F (36.6 °C)  98.3 °F (36.8 °C)   TempSrc:  Oral     SpO2: 96% 97% 97% 97%   Weight:       Height:          Temperature:   Temp (24hrs), Av.1 °F (36.7 °C), Min:97.4 °F (36.3 °C), Max:98.5 °F (36.9 °C)    Temperature: 98.3 °F (36.8 °C)    Physical Exam  Eyes:      Pupils: Pupils are equal, round, and reactive to light.      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      Coordination: Finger-Nose-Finger Test abnormal (Uses thumb on right, does not meet finger) and Heel to Shin Test abnormal (Unable to maintain contact with shin on both sides).      Deep Tendon Reflexes:      Reflex Scores:       Tricep reflexes are 2+ on the right side and 2+ on the left side.       Bicep reflexes are 2+ on the right side and 2+ on the left side.       Brachioradialis reflexes are 2+ on the right side and 2+ on the left side.       Patellar reflexes are 2+ on the right side and 2+ on the left side.       Achilles reflexes are 2+ on the right side and 2+ on the left side.  Psychiatric:         Speech: Speech is slurred.          Neurologic Exam     Mental Status   Oriented to person, place, and time.   Attention: normal. Concentration: normal.   Speech: slurred   Level of consciousness: alert  Knowledge: consistent with education.   Able to name object. Able to repeat. Normal comprehension.     Cranial Nerves     CN II   Visual acuity: decreased  Right visual field deficit: lower nasal and lower temporal quadrant(s)  Left visual field deficit: upper nasal, lower nasal, upper  temporal and lower temporal quadrant(s)    CN III, IV, VI   Pupils are equal, round, and reactive to light.  Nystagmus: none     CN V   Facial sensation intact.     CN VII   Right facial weakness: central  Left facial weakness: none    CN VIII   Hearing: impaired    CN IX, X   Palate: symmetric    CN XI   Right sternocleidomastoid strength: normal  Left sternocleidomastoid strength: normal  Right trapezius strength: normal  Left trapezius strength: normal    CN XII   Tongue: not atrophic  Fasciculations: absent  Tongue deviation: none    Motor Exam   Muscle bulk: normal  Overall muscle tone: normal  Right arm tone: normal  Left arm tone: normal  Right leg tone: normal  Left leg tone: normal    Strength   Strength 5/5 except as noted.   Right biceps: 4/5  Right wrist flexion: 4/5  Right wrist extension: 4/5  Right interossei: 4/5    Sensory Exam   Light touch normal.   Proprioception normal.   Pinprick normal.     Gait, Coordination, and Reflexes     Coordination   Finger to nose coordination: abnormal (Uses thumb on right, does not meet finger)  Heel to shin coordination: abnormal (Unable to maintain contact with shin on both sides)    Tremor   Resting tremor: absent  Action tremor: absent    Reflexes   Right brachioradialis: 2+  Left brachioradialis: 2+  Right biceps: 2+  Left biceps: 2+  Right triceps: 2+  Left triceps: 2+  Right patellar: 2+  Left patellar: 2+  Right achilles: 2+  Left achilles: 2+  Right : 2+  Left : 2+  Right plantar: upgoing  Left plantar: normal  Right [place]: absent      LABORATORY DATA     Labs: reviewed  Results from last 7 days   Lab Units 09/11/24  0438 09/10/24  0543 09/10/24  0003 09/09/24  1931   WBC Thousand/uL 7.61 7.01  --  8.64   HEMOGLOBIN g/dL 9.5* 9.1*  --  9.0*   HEMATOCRIT % 29.6* 29.0*  --  28.2*   PLATELETS Thousands/uL 166 148* 153 159   SEGS PCT %  --   --   --  68   MONO PCT %  --   --   --  10   EOS PCT %  --   --   --  4      Results from last 7 days   Lab  Units 09/11/24  0438 09/10/24  0543 09/09/24  1931   SODIUM mmol/L 139 136 134*   POTASSIUM mmol/L 3.5 5.8* 3.7   CHLORIDE mmol/L 107 107 102   CO2 mmol/L 23 21 22   BUN mg/dL 58* 63* 69*   CREATININE mg/dL 3.00* 3.22* 3.71*   CALCIUM mg/dL 9.1 8.8 9.1   ALK PHOS U/L  --   --  38   ALT U/L  --   --  9   AST U/L  --   --  12*     Results from last 7 days   Lab Units 09/11/24  0438 09/10/24  0543   MAGNESIUM mg/dL 2.5 2.7                        IMAGING & DIAGNOSTIC TESTING     Radiology Results: Reviewed Carotid US, echocardiogram, MRA results    VAS carotid complete study   Final Result by Maia Santiago MD (09/10 4692)      MRA head wo contrast   Final Result by Deshaun Lopes MD (09/10 9175)      1.  Somewhat limited assessment of the right PCA at and beyond mid P2 segment where there is relatively diminished signal which could be artifactual or technique related versus stenosis or occlusive disease. Also proximal to mid intracranial vertebral    arteries are not included in the field-of-view. Consider CT angiogram if further evaluation of these territories are desired.      2.  The remainder of the major vessels of the [place] are patent without high-grade stenosis.               Workstation performed: [id number]             Other Diagnostic Testing: none    ACTIVE MEDICATIONS       Current Facility-Administered Medications:     acetaminophen (TYLENOL) tablet 650 mg, Q4H PRN    atorvastatin (LIPITOR) tablet 40 mg, Daily    brimonidine tartrate 0.2 % ophthalmic solution 1 drop, TID    clopidogrel (PLAVIX) tablet 75 mg, Daily    dorzolamide-timolol (COSOPT) 2-0.5 % ophthalmic solution 1 drop, BID    heparin (porcine) subcutaneous injection 5,000 Units, Q8H MARQUIS    insulin lispro (HumALOG/ADMELOG) 100 units/mL subcutaneous injection 1-5 Units, TID AC **AND** Fingerstick Glucose (POCT), TID AC    NON FORMULARY, HS    prednisoLONE acetate (PRED FORTE) 1 % ophthalmic suspension 1 drop, BID    Prior to Admission  medications    Medication Sig Start Date End Date Taking? Authorizing Provider   aspirin (Aspirin 81) 81 mg EC tablet Take 1 tablet (81 mg total) by mouth daily 8/28/24   Debbie Maloney MD   atorvastatin (LIPITOR) 10 mg tablet Take 1 tablet (10 mg total) by mouth daily 7/24/24   Debbie Maloney MD   Blood Glucose Monitoring Suppl (OneTouch Verio Reflect) w/Device KIT Check blood sugars twice daily. Please substitute with appropriate alternative as covered by patient's insurance. Dx: E11.65 9/19/23   Debbie Maloney MD   Blood Glucose Monitoring Suppl (OneTouch Verio Reflect) w/Device KIT Check blood sugars once daily. Please substitute with appropriate alternative as covered by patient's insurance. Dx: E11.65 10/24/23   Debbie Maloney MD   brimonidine (ALPHAGAN P) 0.15 % ophthalmic solution  4/25/24   Historical Provider, MD   chlorthalidone 25 mg tablet Take 1 tablet (25 mg total) by mouth daily In morning 4/25/24   Debbie Maloney MD   Cyanocobalamin (Vitamin B-12) 1000 MCG/15ML LIQD Take by mouth daily    Historical Provider, MD   Dorzolamide HCl-Timolol Mal PF 2-0.5 % SOLN INSTILL 1 DROP INTO EACH EYE TWICE DAILY 7/23/20   Historical Provider, MD   Farxiga 10 MG tablet Take 1 tablet (10 mg total) by mouth daily 7/24/24   Debbie Maloney MD   glucose blood (OneTouch Verio) test strip Check blood sugars once daily. Please substitute with appropriate alternative as covered by patient's insurance. Dx: E11.65 7/24/24   Debbie Maloney MD   losartan (COZAAR) 25 mg tablet Take 1 tablet (25 mg total) by mouth daily 8/26/24   Debbie Maloney MD   Nutritional Supplements (Nepro) LIQD Take 237 mL by mouth 2 (two) times a day for 60 doses 8/29/24 9/28/24  MD Santos Walshica Lancets 33G MISC Check blood sugars once daily. Please substitute with appropriate alternative as covered by patient's insurance. Dx: E11.65 10/26/23   Debbie Maloney MD   prednisoLONE acetate (PRED FORTE) 1 % ophthalmic suspension INSTILL 1 DROP INTO  LEFT EYE TWICE DAILY 9/7/22   Historical Provider, MD   Vyzulta 0.024 % SOLN Administer 1 drop to both eyes daily at bedtime 1/3/24   Historical Provider, MD         VTE Pharmacologic Prophylaxis: VTE covered by:  heparin (porcine), Subcutaneous, 5,000 Units at 09/11/24 0536      VTE Mechanical Prophylaxis: sequential compression device    ======    I have discussed the patient's history, physical exam findings, assessment, and plan in detail with attending, Dr. Herman    Thank you for allowing me to participate in the care of your patient, Jeffry Almanzar.    Joseph Barron MD  [facility] Neurology Residency, PGY-1

## 2024-09-11 NOTE — ED ATTENDING ATTESTATION
9/9/2024  IAlli MD, saw and evaluated the patient. I have discussed the patient with the resident/non-physician practitioner and agree with the resident's/non-physician practitioner's findings, Plan of Care, and MDM as documented in the resident's/non-physician practitioner's note, except where noted. All available labs and Radiology studies were reviewed.  I was present for key portions of any procedure(s) performed by the resident/non-physician practitioner and I was immediately available to provide assistance.       At this point I agree with the current assessment done in the Emergency Department.  I have conducted an independent evaluation of this patient a history and physical is as follows:see h and p above     ED Course  ED Course as of 09/11/24 0843   Mon Sep 09, 2024   1939 [facility] hoffman note- 8/27 head ct and 97/24 mri of b rain report all reviewed by er md   1940 Er md note- 12 lead ecg reviewed by er md-  nsr rate of 66- solitary pvc- no signs of ischemia/ injury    1952 Er md note- pt seen and thoroughly evAluated by er md- previous imaging all reviewed by er md- case d/w er resident - 89 yr male with hx of  c kd--  r handed glaucoma/ legally blind in left eye - htn/ niddm- as per wife- approx 10-14 days Ago with weakness in r hand/arm slurred speech - which is still present- no head/neck pain/ no trauma-- no right \leg weakness- still ambulation  with robin- had outpt brain imaging told to go to er-- for cva--  wife also relates approx 1 year of cognitive decline -- rrr s1/s2-cta-b/soft nt/nd-  normal cn exam- rue weakness-  mild dysarthria-     2035 Er md note- current labs reviewed and compared by er md         Critical Care Time  Procedures

## 2024-09-11 NOTE — ASSESSMENT & PLAN NOTE
Anemia due to CKD  Stable, no indication for transfusion at this time.    Results from last 7 days   Lab Units 09/11/24  0438 09/10/24  0543 09/09/24 1931   HEMOGLOBIN g/dL 9.5* 9.1* 9.0*   MCV fL 99* 101* 101*   RDW % 12.6 12.8 12.6   IRON ug/dL  --   --  62   TIBC ug/dL  --   --  234*   FERRITIN ng/mL  --   --  317     8/23/2024 creatinine at 3.28. Possible progression of CKD from previous baseline os mid 2s last year.  Clinically improving. Baseline appears to be in the Mid 2s.   Hold HCTZ, resume lisinopril on discharge. Outpatient PCP and nephrology follow-up    Recent Labs     09/09/24  1931 09/10/24  0543 09/11/24  0438   BUN 69* 63* 58*   CREATININE 3.71* 3.22* 3.00*   EGFR 13 16 17

## 2024-09-11 NOTE — TELEPHONE ENCOUNTER
STILL ADMITTED:9/9/2024 - present (2 days)  [facility]        HFU/ SL STACY / CVA    DC-     ----- Message from Tae Loredo DO sent at 9/11/2024 11:36 AM EDT -----  Jeffry CARROLL Barlowey will need follow-up in 6-8 weeks with neurovascular team for Other in 60 minute appointment. They will not require outpatient neurological testing.

## 2024-09-11 NOTE — ASSESSMENT & PLAN NOTE
Lab Results   Component Value Date    HGBA1C 6.7 (H) 09/09/2024       Recent Labs     09/10/24  1755 09/10/24  2100 09/11/24  0805 09/11/24  1123   POCGLU 92 99 122 195*       Blood Sugar Average: Last 72 hrs:  (P) [id number]    Resume home medications on discharge

## 2024-09-11 NOTE — ASSESSMENT & PLAN NOTE
89 year old male presented to our ED due to right arm weakness. An MRI was performed which showed a small acute infarct in the left corona radiata with associated petechial hemorrhage. Small left frontal lobe infarct. No significant mass effect or midline shift.  Stain, plavix. Aspirin discontinued due to concerns for failure. Cleared by neurology for discharge  PT/OT appreciated. Wife and patient elected home with [facility] instead

## 2024-09-11 NOTE — ASSESSMENT & PLAN NOTE
Continue eye drops.  On discharge  Prednisolone acetate BID left  Brimonidine tartrate 0.15% right eye TID  Vyztula both eyes HS  Cosopt BID

## 2024-09-11 NOTE — DISCHARGE SUMMARY
Discharge Summary - Hospitalist   Name: Jeffry Almanzar 89 y.o. male I MRN: [id number]  Unit/Bed#: S -01 I Date of Admission: 9/9/2024   Date of Service: 9/11/2024 I Hospital Day: 1     Assessment & Plan  CVA (cerebral vascular accident) (HCC)  89 year old male presented to our ED due to right arm weakness. An MRI was performed which showed a small acute infarct in the left corona radiata with associated petechial hemorrhage. Small left frontal lobe infarct. No significant mass effect or midline shift.  Stain, plavix. Aspirin discontinued due to concerns for failure. Cleared by neurology for discharge  PT/OT appreciated. Wife and patient elected home with VNA instead  Glaucoma  Continue eye drops.  On discharge  Prednisolone acetate BID left  Brimonidine tartrate 0.15% right eye TID  Vyztula both eyes HS  Cosopt BID  Essential hypertension  Resume home medications on discharge (Only lisinopril)  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  (HCC)  Anemia due to CKD  Stable, no indication for transfusion at this time.    Results from last 7 days   Lab Units 09/11/24  0438 09/10/24  0543 09/09/24 1931   HEMOGLOBIN g/dL 9.5* 9.1* 9.0*   MCV fL 99* 101* 101*   RDW % 12.6 12.8 12.6   IRON ug/dL  --   --  62   TIBC ug/dL  --   --  234*   FERRITIN ng/mL  --   --  317     8/23/2024 creatinine at 3.28. Possible progression of CKD from previous baseline os mid 2s last year.  Clinically improving. Baseline appears to be in the Mid 2s.   Hold HCTZ, resume lisinopril on discharge. Outpatient PCP and nephrology follow-up    Recent Labs     09/09/24  1931 09/10/24  0543 09/11/24  0438   BUN 69* 63* 58*   CREATININE 3.71* 3.22* 3.00*   EGFR 13 16 17     Type 2 diabetes mellitus with stage 4 chronic kidney disease, without long-term current use of insulin ([facility])  Lab Results   Component Value Date    HGBA1C 6.7 (H) 09/09/2024       Recent Labs     09/10/24  1755 09/10/24  2100 09/11/24  0805 09/11/24  1123   POCGLU 92 99 122 195*        Blood Sugar Average: Last 72 hrs:  (P) [id number]    Resume home medications on discharge     Discharging Physician / Practitioner: Jason Gao MD  PCP: Debbie Maloney MD  Admission Date:   Admission Orders (From admission, onward)       Ordered        09/10/24 1540  INPATIENT ADMISSION  Once            09/09/24 2124  Place in Observation  Once                          Discharge Date: 09/11/24    Medical Problems       Resolved Problems  Date Reviewed: 9/10/2024   None         Consultations During Hospital Stay:  neurology    Procedures Performed:   none    Significant Findings / Test Results:   Imaging  MRI brain:  1. Small acute infarct in the left corona radiata with associated petechial hemorrhage. Small left frontal lobe infarct. No significant mass effect or midline shift.     2. Nonspecific left greater than right mastoid opacification. Correlate with clinical symptomatology and examination to exclude mastoiditis.    MRA head and neck:    1.  Somewhat limited assessment of the right PCA at and beyond mid P2 segment where there is relatively diminished signal which could be artifactual or technique related versus stenosis or occlusive disease. Also proximal to mid intracranial vertebral   arteries are not included in the field-of-view. Consider CT angiogram if further evaluation of these territories are desired.     2.  The remainder of the major vessels of the [place] are patent without high-grade stenosis.    VAS carotid:  RIGHT:  There is <50% stenosis noted in the internal carotid artery. Plaque is  heterogenous and irregular.  Vertebral artery flow is antegrade. There is no significant subclavian artery  disease.  LEFT:  There is <50% stenosis noted in the internal carotid artery. Plaque is  heterogenous and irregular.  Vertebral artery flow is antegrade. There is no significant subclavian artery  disease.    Echo:    Left Ventricle: Left ventricular cavity size is normal. Wall  "thickness is normal. The left ventricular ejection fraction is 65%. Systolic function is normal. Wall motion is normal. Diastolic function is mildly abnormal, consistent with grade I (abnormal) relaxation.    Right Ventricle: Systolic function is low normal.    Incidental Findings:   As written above     Test Results Pending at Discharge (will require follow up):   none     Outpatient Tests Requested:  Pcp, cbc, bmp  neurology    Complications:  none    Reason for Admission: stroke      History of Present Illness:  Jeffry Almanzar is a 89 y.o. male with a PMH of CKD, DM, HTN, dementia      \"Pt wife gave majority of the history and states that about 1,5 week ago pt developed weakness of his right upper extremity and states that he couldn't  lift it.  She has also noticed that he is speech has been slurred. Pt went to see PCP and was done MRI brain last Saturday. Today PCP called regarding stroke findings on MRI and recommended to bring pt to ED. As per wife pt also appears to be more confused then usual, but this lasts for the past month. MRI brain showed a Small acute infarct in the left corona radiata with associated petechial hemorrhage. Small left frontal lobe infarct. No significant mass effect or midline shift. Admission was requested for stroke workup.    Hospital Course:     Neurology was consulted. Stroke pathway with no concerning findings on MRA, carotid US, or echo. He was cleared by neurology for discharge with plavix and statin. They recommend outpatient neurology follow-up. PT/OT recommending rehab, but wife and  elected home with VNA instead.    Patient agrees to follow-up with his providers as scheduled and to take his medications as prescribed. All questions were addressed.    he understood the need to seek immediate medical attention should he develop any chest pain, shortness of breath, severe pain, fever, chills, or any other concerning symptoms.    Please see above list of diagnoses and " "related plan for additional information.     Condition at Discharge: fair     Discharge Day Visit / Exam:     Subjective:  patient seen and examined at bedside. Comfortable, no new issues overnight.   Vitals: Blood Pressure: 147/78 (09/11/24 1521)  Pulse: 87 (09/11/24 1521)  Temperature: 98.5 °F (36.9 °C) (09/11/24 1521)  Temp Source: Oral (09/10/24 2209)  Respirations: 16 (09/10/24 2209)  Height: 5' 5\" (165.1 cm) (09/10/24 1530)  Weight - Scale: 61.2 kg (135 lb) (09/10/24 1530)  SpO2: 98 % (09/11/24 1521)  Exam:   Physical Exam  Vitals reviewed.   Constitutional:       General: He is not in acute distress.  HENT:      Head: Normocephalic.      Nose: Nose normal.      Mouth/Throat:      Mouth: Mucous membranes are moist.   Eyes:      General: No scleral icterus.  Cardiovascular:      Rate and Rhythm: Normal rate.   Pulmonary:      Effort: Pulmonary effort is normal. No respiratory distress.   Abdominal:      General: There is no distension.      Palpations: Abdomen is soft.      Tenderness: There is no abdominal tenderness.   Skin:     General: Skin is warm.   Neurological:      Mental Status: He is alert.      Motor: Weakness present.      Coordination: Coordination abnormal.      Comments: RUE weak compared to LUE   Psychiatric:         Mood and Affect: Mood normal.         Behavior: Behavior normal.       Discussion with Family: wife    Discharge instructions/Information to patient and family:   See after visit summary for information provided to patient and family.      Provisions for Follow-Up Care:  See after visit summary for information related to follow-up care and any pertinent home health orders.      Disposition:     Home with VNA Services (Reminder: Complete face to face encounter)    Planned Readmission: No     Discharge Statement:  I spent 40 minutes discharging the patient. This time was spent on the day of discharge. I had direct contact with the patient on the day of discharge. Greater than 50% of " the total time was spent examining patient, answering all patient questions, arranging and discussing plan of care with patient as well as directly providing post-discharge instructions.  Additional time then spent on discharge activities.    Discharge Medications:  See after visit summary for reconciled discharge medications provided to patient and family.      ** Please Note: This note has been constructed using a voice recognition system **

## 2024-09-11 NOTE — CASE MANAGEMENT
Case Management Discharge Planning Note    Patient name Jeffry Almanzar  Location S /S -01 MRN [id number]  : 1934 Date 2024       Current Admission Date: 2024  Current Admission Diagnosis:CVA (cerebral vascular accident) ([facility])   Patient Active Problem List    Diagnosis Date Noted Date Diagnosed    CVA (cerebral vascular accident) ([facility]) 2024     Acute-on-chronic kidney injury  ([facility]) 2024     Dementia ([facility]) 2024     Type 2 diabetes mellitus with stage 4 chronic kidney disease, without long-term current use of insulin ([facility]) 10/24/2023     IgM lambda paraproteinemia 2023     Advanced care planning/counseling discussion 2023     Diabetic nephropathy associated with type 2 diabetes mellitus ([facility]) 2023     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[facility]) 2023     Stage 4 chronic kidney disease ([facility]) 2022     Persistent proteinuria 2022     Benign 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 ([facility]) 02/15/2022     Parenchymal renal hypertension 2021     Spinal stenosis of lumbar region      DDD (degenerative disc disease), lumbar      Lumbar disc disease with radiculopathy      Neurogenic claudication 2019     Low back pain 2019     Retinal vein occlusion of left eye 2019     Malignant neoplasm of urinary bladder ([facility]) 2019     Vision loss of left eye 2019     Weight loss 2018     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  ([facility]) 2018     Iliotibial band syndrome of both sides 05/15/2018     Primary osteoarthritis of both knees 03/15/2018     Asthma, mild intermittent 2017     Essential hypertension 2016     Mild vitamin D deficiency 2014     Glaucoma 2014     Organic impotence 2013     Dyslipidemia 07/10/2012       LOS (days): 1  Geometric Mean LOS (GMLOS) (days): 2.9  Days to GMLOS:1.9     OBJECTIVE:  Risk of Unplanned Readmission Score: 17.07          Current admission status: Inpatient   Preferred Pharmacy:   [facility] [address]  [address] [place] [id number]  Phone: [phone] Fax: [phone]    Primary Care Provider: Debbie Maloney MD    Primary Insurance: MEDICARE  Secondary Insurance: BLUE CROSS    DISCHARGE DETAILS:        Other Referral/Resources/Interventions Provided:  Referral Comments:  provide Pt and his wife a choice list of accpeting HHC providers. They have slected [facility].

## 2024-09-11 NOTE — ASSESSMENT & PLAN NOTE
Lab Results   Component Value Date    EGFR 17 09/11/2024    EGFR 16 09/10/2024    EGFR 13 09/09/2024    CREATININE 3.00 (H) 09/11/2024    CREATININE 3.22 (H) 09/10/2024    CREATININE 3.71 (H) 09/09/2024   Management as per primary team

## 2024-09-11 NOTE — ASSESSMENT & PLAN NOTE
Lab Results   Component Value Date    HGBA1C 6.7 (H) 09/09/2024     Management as per primary team  Recent Labs     09/10/24  1125 09/10/24  1755 09/10/24  2100 09/11/24  0805   POCGLU 283* 92 99 122       Blood Sugar Average: Last 72 hrs:  (P) 154

## 2024-09-13 ENCOUNTER — TRANSITIONAL CARE MANAGEMENT (OUTPATIENT)
Dept: FAMILY MEDICINE CLINIC | Facility: CLINIC | Age: 89
End: 2024-09-13

## 2024-09-17 ENCOUNTER — OFFICE VISIT (OUTPATIENT)
Dept: PODIATRY | Facility: CLINIC | Age: 89
End: 2024-09-17
Payer: MEDICARE

## 2024-09-17 ENCOUNTER — TELEPHONE (OUTPATIENT)
Dept: NEPHROLOGY | Facility: CLINIC | Age: 89
End: 2024-09-17

## 2024-09-17 ENCOUNTER — TELEPHONE (OUTPATIENT)
Dept: FAMILY MEDICINE CLINIC | Facility: CLINIC | Age: 89
End: 2024-09-17

## 2024-09-17 VITALS
BODY MASS INDEX: 22.49 KG/M2 | HEART RATE: 88 BPM | DIASTOLIC BLOOD PRESSURE: 82 MMHG | SYSTOLIC BLOOD PRESSURE: 145 MMHG | HEIGHT: 65 IN | WEIGHT: 135 LBS | OXYGEN SATURATION: 99 %

## 2024-09-17 DIAGNOSIS — I73.9 PVD (PERIPHERAL VASCULAR DISEASE) (HCC): ICD-10-CM

## 2024-09-17 DIAGNOSIS — E11.22 TYPE 2 DIABETES MELLITUS WITH STAGE 4 CHRONIC KIDNEY DISEASE, WITHOUT LONG-TERM CURRENT USE OF INSULIN (HCC): ICD-10-CM

## 2024-09-17 DIAGNOSIS — B35.1 ONYCHOMYCOSIS: Primary | ICD-10-CM

## 2024-09-17 DIAGNOSIS — N18.4 TYPE 2 DIABETES MELLITUS WITH STAGE 4 CHRONIC KIDNEY DISEASE, WITHOUT LONG-TERM CURRENT USE OF INSULIN (HCC): ICD-10-CM

## 2024-09-17 PROCEDURE — 11721 DEBRIDE NAIL 6 OR MORE: CPT | Performed by: PODIATRIST

## 2024-09-17 PROCEDURE — RECHECK: Performed by: PODIATRIST

## 2024-09-17 NOTE — PROGRESS NOTES
Assessment/Plan:     The patient's clinical examination today significant for thickened and dystrophic, brittle pedal nail plates with subungual debris consistent with onychomycosis x 10.  There are no open wounds nor callus lesions.  The interdigital spaces are clear without maceration.  Pedal pulses are nonpalpable bilaterally.  Cap refill time to the toes is less than 3 seconds x 10.  Temperature gradient is mildly increased bilaterally.  Skin is thin with decreased turgor.  There is absence of hair growth to the bilateral extremities.     The pedal nail plates are sharply debrided with a sterile nail clipper x 10 without complication.  The nails were then mechanically reduced in thickness and girth utilizing a rotary bur without incident.  There are no other acute pedal issues noted today.  The patient's most recent hemoglobin A1c from September 2024 was 6.7, prior to that it was 6.9 in August 2024.       Recommend follow-up in 2 to 3 months for continued at risk diabetic footcare.        There are no diagnoses linked to this encounter.      Subjective:     Patient ID: Jeffry Almanzar is a 89 y.o. male.    The patient presents today with with a chief complaint of elongated and thickened pedal nail plates.  He does have a history of diabetes but denies any numbness or tingling sensations to either foot.  He does note the nails are very long and thick and tends to curl around causing him discomfort and close toed shoe gear.  His wife is present with him in the exam room.      PAST MEDICAL HISTORY:  Past Medical History:   Diagnosis Date    Cataracts, bilateral        PAST SURGICAL HISTORY:  Past Surgical History:   Procedure Laterality Date    CATARACT EXTRACTION Bilateral 07/15/2012    COLONOSCOPY      CYSTOSCOPY  01/15/2018    Last assessed 9/14/17, diagnostic    HERNIA REPAIR      INSTILLATION MYTOMYCIN N/A 10/25/2017    Procedure: INSTILLATION OF MITOMYCIN C;  Surgeon: Danish Esposito MD;  [facility];  Service: Urology    MT CYSTO W/REMOVAL OF LESIONS SMALL N/A 10/25/2017    Procedure: CYSTOSCOPY; BLADDER BIOPSY WITH FULGERATION;  Surgeon: Danish Esposito MD;  [facility];  Service: Urology    TONSILLECTOMY      TRANSURETHRAL RESECTION OF BLADDER TUMOR N/A 10/25/2017    Procedure: TUR BLADDER TUMOR;  Surgeon: Danish Esposito MD;  [facility];  Service: Urology    WISDOM TOOTH EXTRACTION          ALLERGIES:  Ace inhibitors and Penicillins    MEDICATIONS:  Current Outpatient Medications   Medication Sig Dispense Refill    atorvastatin (LIPITOR) 40 mg tablet Take 1 tablet (40 mg total) by mouth daily 30 tablet 0    Blood Glucose Monitoring Suppl (OneTouch Verio Reflect) w/Device KIT Check blood sugars twice daily. Please substitute with appropriate alternative as covered by patient's insurance. Dx: E11.65 1 kit 0    Blood Glucose Monitoring Suppl (OneTouch Verio Reflect) w/Device KIT Check blood sugars once daily. Please substitute with appropriate alternative as covered by patient's insurance. Dx: E11.65 1 kit 0    brimonidine (ALPHAGAN P) 0.15 % ophthalmic solution       clopidogrel (PLAVIX) 75 mg tablet Take 1 tablet (75 mg total) by mouth daily 30 tablet 0    Cyanocobalamin (Vitamin B-12) 1000 MCG/15ML LIQD Take by mouth daily      Dorzolamide HCl-Timolol Mal PF 2-0.5 % SOLN INSTILL 1 DROP INTO EACH EYE TWICE DAILY      Farxiga 10 MG tablet Take 1 tablet (10 mg total) by mouth daily 90 tablet 3    glucose blood (OneTouch Verio) test strip Check blood sugars once daily. Please substitute with appropriate alternative as covered by patient's insurance. Dx: E11.65 100 each 3    losartan (COZAAR) 25 mg tablet Take 1 tablet (25 mg total) by mouth daily 90 tablet 1    Nutritional Supplements (Nepro) LIQD Take 237 mL by mouth 2 (two) times a day for 60 doses 03556 mL 0    OneTouch Delica Lancets 33G MISC Check blood sugars once daily. Please substitute with appropriate alternative as covered by  patient's insurance. Dx: E11.65 100 each 3    prednisoLONE acetate (PRED FORTE) 1 % ophthalmic suspension INSTILL 1 DROP INTO LEFT EYE TWICE DAILY      Vyzulta 0.024 % SOLN Administer 1 drop to both eyes daily at bedtime       No current facility-administered medications for this visit.       SOCIAL HISTORY:  Social History     Socioeconomic History    Marital status: /Civil Union     Spouse name: None    Number of children: None    Years of education: None    Highest education level: None   Occupational History    Occupation: RETIRED   Tobacco Use    Smoking status: Former     Current packs/day: 0.00     Types: Cigarettes     Quit date: 1964     Years since quittin.7    Smokeless tobacco: Never   Vaping Use    Vaping status: Never Used   Substance and Sexual Activity    Alcohol use: Not Currently     Comment: quit 23 years ago    Drug use: No    Sexual activity: None   Other Topics Concern    None   Social History Narrative    None     Social Determinants of Health     Financial Resource Strain: Low Risk  (10/24/2023)    Overall Financial Resource Strain (CARDIA)     Difficulty of Paying Living Expenses: Not hard at all   Food Insecurity: No Food Insecurity (9/10/2024)    Hunger Vital Sign     Worried About Running Out of Food in the Last Year: Never true     Ran Out of Food in the Last Year: Never true   Transportation Needs: No Transportation Needs (9/10/2024)    PRAPARE - Transportation     Lack of Transportation (Medical): No     Lack of Transportation (Non-Medical): No   Physical Activity: Not on file   Stress: Not on file   Social Connections: Not on file   Intimate Partner Violence: Not on file   Housing Stability: Low Risk  (9/10/2024)    Housing Stability Vital Sign     Unable to Pay for Housing in the Last Year: No     Number of Times Moved in the Last Year: 0     Homeless in the Last Year: No        Review of Systems   Constitutional: Negative.    HENT: Negative.     Eyes: Negative.     Respiratory: Negative.     Cardiovascular: Negative.    Endocrine: Negative.    Musculoskeletal: Negative.    Neurological: Negative.    Hematological: Negative.    Psychiatric/Behavioral: Negative.           Objective:     Physical Exam  Vitals reviewed.   Constitutional:       Appearance: Normal appearance.   HENT:      Head: Normocephalic and atraumatic.      Nose: Nose normal.   Eyes:      Conjunctiva/sclera: Conjunctivae normal.      Pupils: Pupils are equal, round, and reactive to light.   Cardiovascular:      Pulses:           Dorsalis pedis pulses are 1+ on the right side.        Posterior tibial pulses are 0 on the right side and 0 on the left side.   Pulmonary:      Effort: Pulmonary effort is normal.   Feet:      Right foot:      Skin integrity: Skin integrity normal.      Toenail Condition: Right toenails are abnormally thick and long. Fungal disease present.     Left foot:      Skin integrity: Skin integrity normal.      Toenail Condition: Left toenails are abnormally thick and long. Fungal disease present.     Comments: The patient's clinical examination today significant for thickened and dystrophic, brittle pedal nail plates with subungual debris consistent with onychomycosis x 10.  There are no open wounds nor callus lesions.  The interdigital spaces are clear without maceration.  Pedal pulses are nonpalpable bilaterally.  Cap refill time to the toes is less than 3 seconds x 10.  Temperature gradient is mildly increased bilaterally.  Skin is thin with decreased turgor.  There is absence of hair growth to the bilateral extremities.  Skin:     General: Skin is warm.      Capillary Refill: Capillary refill takes 2 to 3 seconds.   Neurological:      General: No focal deficit present.      Mental Status: He is alert and oriented to person, place, and time.   Psychiatric:         Mood and Affect: Mood normal.         Behavior: Behavior normal.         Thought Content: Thought content normal.

## 2024-09-17 NOTE — TELEPHONE ENCOUNTER
Patients wife called to schedule TCM.  Jeffry was discharged on 9/11.  Appt 9/25 (14 days after dc)

## 2024-09-23 ENCOUNTER — TELEPHONE (OUTPATIENT)
Dept: NEUROLOGY | Facility: CLINIC | Age: 89
End: 2024-09-23

## 2024-09-23 NOTE — TELEPHONE ENCOUNTER
"Post CVA Discharge Follow Up  Hospitalization: 9/9/24-9/11/24    Called patient's primary phone number to obtain an update. Spoke with patient's spouse, Antony. Since discharge, she denies the patient experiencing any new or worsening stroke-like symptoms.     Per Dr. Herman's attestation on 9/11/24: \"pt will follow up with OP neurovascular attending or AP for a 60 minute appointment for the diagnosis of small vessel lacunar infarct\"    Offered to schedule HFU, she is agreeable to this. Scheduled appointment and provided appointment details.     He continues to have the following symptoms: right arm weakness. She reports this has remained about the same since discharge with no changes.     Attempted to obtain more information and review stroke education, but Antony requested to end the phone call since she is at the dentist right now. She will call the office at a later time if she wishes to complete the call.   "

## 2024-09-24 ENCOUNTER — OFFICE VISIT (OUTPATIENT)
Dept: NEPHROLOGY | Facility: CLINIC | Age: 89
End: 2024-09-24
Payer: MEDICARE

## 2024-09-24 VITALS — HEIGHT: 65 IN | BODY MASS INDEX: 22.16 KG/M2 | WEIGHT: 133 LBS

## 2024-09-24 DIAGNOSIS — N18.4 ANEMIA IN STAGE 4 CHRONIC KIDNEY DISEASE  (HCC): ICD-10-CM

## 2024-09-24 DIAGNOSIS — N18.4 BENIGN HYPERTENSION WITH CKD (CHRONIC KIDNEY DISEASE) STAGE IV (HCC): Primary | ICD-10-CM

## 2024-09-24 DIAGNOSIS — N18.4 STAGE 4 CHRONIC KIDNEY DISEASE (HCC): ICD-10-CM

## 2024-09-24 DIAGNOSIS — E55.9 MILD VITAMIN D DEFICIENCY: ICD-10-CM

## 2024-09-24 DIAGNOSIS — E78.5 DYSLIPIDEMIA: ICD-10-CM

## 2024-09-24 DIAGNOSIS — I12.9 BENIGN HYPERTENSION WITH CKD (CHRONIC KIDNEY DISEASE) STAGE IV (HCC): Primary | ICD-10-CM

## 2024-09-24 DIAGNOSIS — R80.1 PERSISTENT PROTEINURIA: ICD-10-CM

## 2024-09-24 DIAGNOSIS — I12.9 PARENCHYMAL RENAL HYPERTENSION, STAGE 1 THROUGH STAGE 4 OR UNSPECIFIED CHRONIC KIDNEY DISEASE: ICD-10-CM

## 2024-09-24 DIAGNOSIS — D63.1 ANEMIA IN STAGE 4 CHRONIC KIDNEY DISEASE  (HCC): ICD-10-CM

## 2024-09-24 DIAGNOSIS — E11.21 DIABETIC NEPHROPATHY ASSOCIATED WITH TYPE 2 DIABETES MELLITUS (HCC): ICD-10-CM

## 2024-09-24 PROBLEM — N17.9 ACUTE-ON-CHRONIC KIDNEY INJURY  (HCC): Status: RESOLVED | Noted: 2024-09-09 | Resolved: 2024-09-24

## 2024-09-24 PROBLEM — N18.9 ACUTE-ON-CHRONIC KIDNEY INJURY  (HCC): Status: RESOLVED | Noted: 2024-09-09 | Resolved: 2024-09-24

## 2024-09-24 PROCEDURE — 99214 OFFICE O/P EST MOD 30 MIN: CPT | Performed by: INTERNAL MEDICINE

## 2024-09-24 PROCEDURE — G2211 COMPLEX E/M VISIT ADD ON: HCPCS | Performed by: INTERNAL MEDICINE

## 2024-09-24 NOTE — PATIENT INSTRUCTIONS
Visit summary:  - Overall kidney function is stable  - Blood pressure appears to be good as well but please send in a week of readings        1. Medication changes today:  No medication changes pending your home blood pressure readings    2.  General instructions:  Avoid salt is much as possible    3.  Please go for non fasting  lab work 1-2 weeks after making the above medication change.    4.  Please take 1 week a blood pressure readings at this time just sitting morning and evening    AS FOLLOWS  MORNING AND EVENING, SITTING AND STANDING as follows:  TAKE THE MORNING READINGS BEFORE ANY MEDICATIONS AND WHEN YOU ARE RELAXED FOR SEVERAL MINUTES  TAKE THE EVENING READINGS:  BETWEEN 7-10 P.M.; PRIOR TO ANY MEDICATIONS; AT LEAST IN OUR  FROM DINNER; AND CERTAINLY AFTER RELAXING FOR A FEW MINUTES  PLEASE INCLUDE HEART RATE WITH YOUR BLOOD PRESSURE READINGS  When taking standing readings, keep your arm supported at heart level and not dangling  Make sure you are sitting with your back supported and feet on the ground and do not cross your legs or feet  Make sure you have not taken any coffee or caffeine products or exercised or smoke cigarettes at least 30 minutes before taking your blood pressure  Then please mail these readings into the office            5.  Follow-up in 4 months  Please bring in 1 week a blood pressure readings morning evening, sitting and standing is outlined above  PLEASE BRING AN YOUR BLOOD PRESSURE MACHINE TO CORRELATE WITH THE OFFICE MACHINE AT THIS NEXT SCHEDULED VISIT  Please go for fasting lab work 1-2 weeks prior to your appointment      6.  General non medical recommendations:  AVOID SALT BUT NOT ADDING AN READING LABELS TO MAKE SURE THERE IS LOW-SALT IN THE FOOD THAT YOU ARE EATING  Goal is less than 2 g of sodium intake or less than 5 g of sodium chloride intake per day    Avoid nonsteroidal anti-inflammatory drugs such as Naprosyn, ibuprofen, Aleve, Advil, Celebrex, Meloxicam  (Mobic) etc.  You can use Tylenol as needed if you do not have any liver condition to be concerned about    Avoid medications such as Sudafed or decongestants and antihistamines that contained the D component which is the decongestant.  You can take antihistamines without the decongestant or D component.    Try to avoid medications such as pantoprazole or  Protonix/Nexium or Esomeprazole)/Prilosec or omeprazole/Prevacid or lansoprazole/AcipHex or Rabeprazole.  If you are able to, use Pepcid as this is safer for your kidneys.    Try to exercise at least 30 minutes 3 days a week to begin with with an ultimate goal of 5 days a week for at least 30 minutes    Please do not drink more than 2 glasses of alcohol/wine on a daily basis as this may contribute to your high blood pressure.

## 2024-09-24 NOTE — PROGRESS NOTES
RENAL FOLLOW UP NOTE:.td    ASSESSMENT AND PLAN:  89 y.o. year old male with a history of diabetes mellitus/hypertension/osteoarthritis/bladder CA/asthma/anemia who we are asked to see for chronic kidney disease in the setting of diabetes mellitus:        1.  CKD stage 4.    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  Of great concern is he is very noncompliant with medications and diet        Baseline creatinine approximately 2.3-2.6 however recently increased July 23, 2024 3.24  Current creatinine 3.00 from 9/11/2024 at baseline  Urine protein creatinine ratio:  0.282 g at goal  UA:  No proteinuria and no hematuria  Renal ultrasound:  11/26/2021:  Small echogenic kidneys no hydronephrosis:  7.3 x 7.2  Recommendations:  Treat hypertension-please see below  Treat dyslipidemia-please see below  Maintain proteinuria less than 1 g or as low as possible  Avoid nephrotoxic agents such as NSAIDs, and proton pump inhibitors if possible; patient counseled as such  Kidney smart completed 3/24/2023  ACP done 6/16/2023     I rediscussed the issue of kidney machine dialysis with the patient and his wife at this juncture they do not want to pursue!  He would rather die peacefully.     2.  Volume:  Euvolemic     3.  Hypertension:       Current blood pressure averages:     None today but apparently in the 120/70 range     Goal blood pressure:  Less than 130/80 given CKD     Recommendations:  Push nonmedical regimen including weight loss, isotonic exercise and a low sodium diet.  Patient has been counseled the such.  Can be very noncompliant  Workup:  Aldosterone/renin:  Negative  Thyroid profile: Normal  Renal workup as outlined above  Hold on renal artery duplex unless cannot control blood pressure  MedicationChanges today:    Given slightly low diastolic readings in the setting of a recent  CVA especially given age frail condition I would not overtreat at this time but he will send in a week of readings     4.  Electrolytes:  All normal except borderline potassium of 3.5 slight increase in potassium diet      5.  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  Dyslipidemia:  Goal LDL:  Less than 100  Current lipid profile:  LDL 59/HDL 44/triglycerides 72 from 9/9/2024  Recommendations:   Low-cholesterol/low-fat diet / weight loss as appropriate and isotonic exercise   Medication changes today at goal so no changes, will repeat with next labs     7.  Anemia:  Most likely from CKD  Current hemoglobin: 9.5 relatively stable  Iron studies: Adequate from 9/9/2024  Multiple myeloma evaluation:  Faint band in IgM and lambda:  Probably reactive/inflammatory recommend follow-up in 3-6 months from December 27, 2021:  I will obtain in 3 months:  It is persistent, therefore the patient needs immunoelectrophoresis at this time!!!  From 6/14/2023: Light chain ratio 1.29  Faint band of IgM and lambda though may suggest polyclonal background recommended repeat studies in 3 to 6 months which I will repeat at this time  GI had seen the patient Dr. Gonzalez who felt given stool Hemoccult negative age and no overt iron-deficiency no further GI investigation including colonoscopy   Potential referral to Hematology for possible erythropoietin agent  only if needed  8.  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  Recommended Plavix and statin therapy with follow-up with neurology PT OT outpatient.       GI HEALTH MAINTENANCE: LAST COLONOSCOPY:  seen by Dr. Gonzalez with heme-negative stools he did not feel there is any need for a colonoscopy at this time    PATIENT INSTRUCTIONS:    Patient Instructions   Visit summary:  - Overall kidney function is stable  - Blood pressure appears to be good as well but please send in a week of readings        1. Medication changes today:  No medication changes pending your home blood pressure readings    2.  General instructions:  Avoid salt is much as possible    3.  Please go for non fasting  lab work 1-2 weeks after making the above medication change.    4.  Please take 1 week a blood pressure readings at this time just sitting morning and evening    AS FOLLOWS  MORNING AND EVENING, SITTING AND STANDING as follows:  TAKE THE MORNING READINGS BEFORE ANY MEDICATIONS AND WHEN YOU ARE RELAXED FOR SEVERAL MINUTES  TAKE THE EVENING READINGS:  BETWEEN 7-10 P.M.; PRIOR TO ANY MEDICATIONS; AT LEAST IN OUR  FROM DINNER; AND CERTAINLY AFTER RELAXING FOR A FEW MINUTES  PLEASE INCLUDE HEART RATE WITH YOUR BLOOD PRESSURE READINGS  When taking standing readings, keep your arm supported at heart level and not dangling  Make sure you are sitting with your back supported and feet on the ground and do not cross your legs or feet  Make sure you have not taken any coffee or caffeine products or exercised or smoke cigarettes at least 30 minutes before taking your blood pressure  Then please mail these readings into the office            5.  Follow-up in 4 months  Please bring in 1 week a blood pressure readings morning evening, sitting and standing is outlined above  PLEASE BRING AN YOUR BLOOD PRESSURE MACHINE TO CORRELATE WITH THE OFFICE MACHINE AT THIS NEXT SCHEDULED VISIT  Please go for fasting lab work 1-2 weeks prior to your  appointment      6.  General non medical recommendations:  AVOID SALT BUT NOT ADDING AN READING LABELS TO MAKE SURE THERE IS LOW-SALT IN THE FOOD THAT YOU ARE EATING  Goal is less than 2 g of sodium intake or less than 5 g of sodium chloride intake per day    Avoid nonsteroidal anti-inflammatory drugs such as Naprosyn, ibuprofen, Aleve, Advil, Celebrex, Meloxicam (Mobic) etc.  You can use Tylenol as needed if you do not have any liver condition to be concerned about    Avoid medications such as Sudafed or decongestants and antihistamines that contained the D component which is the decongestant.  You can take antihistamines without the decongestant or D component.    Try to avoid medications such as pantoprazole or  Protonix/Nexium or Esomeprazole)/Prilosec or omeprazole/Prevacid or lansoprazole/AcipHex or Rabeprazole.  If you are able to, use Pepcid as this is safer for your kidneys.    Try to exercise at least 30 minutes 3 days a week to begin with with an ultimate goal of 5 days a week for at least 30 minutes    Please do not drink more than 2 glasses of alcohol/wine on a daily basis as this may contribute to your high blood pressure.            Subjective:   Recent hospitalization for CVA  The patient overall is feeling well.  No fevers, chills, or cough or colds.  Good appetite and good energy  No hematuria, dysuria, voiding symptoms or foamy urine  No gastrointestinal symptoms  No cardiovascular symptoms including swelling of the legs  No headaches, dizziness or lightheadedness  Blood pressure medications:  Losartan 25 mg daily    Renal pertinent medications:  Farxiga 10 mg daily  Atorvastatin 40 mg daily    ROS:  See HPI, otherwise review of systems as completely reviewed with the patient are negative    Past Medical History:   Diagnosis Date    Cataracts, bilateral      Past Surgical History:   Procedure Laterality Date    CATARACT EXTRACTION Bilateral 07/15/2012    COLONOSCOPY      CYSTOSCOPY  01/15/2018     Last assessed 9/14/17, diagnostic    HERNIA REPAIR      INSTILLATION MYTOMYCIN N/A 10/25/2017    Procedure: INSTILLATION OF MITOMYCIN C;  Surgeon: Danish Esposito MD;  Location: [facility];  Service: Urology    NC CYSTO W/REMOVAL OF LESIONS SMALL N/A 10/25/2017    Procedure: CYSTOSCOPY; BLADDER BIOPSY WITH FULGERATION;  Surgeon: Danish Esposito MD;  Location: [facility];  Service: Urology    TONSILLECTOMY      TRANSURETHRAL RESECTION OF BLADDER TUMOR N/A 10/25/2017    Procedure: TUR BLADDER TUMOR;  Surgeon: Danish Esposito MD;  Location: [facility];  Service: Urology    WISDOM TOOTH EXTRACTION       Family History   Problem Relation Age of Onset    Diabetes Mother       reports that he quit smoking about 60 years ago. His smoking use included cigarettes. He has never used smokeless tobacco. He reports that he does not currently use alcohol. He reports that he does not use drugs.    I COMPLETELY REVIEWED THE PAST MEDICAL HISTORY/PAST SURGICAL HISTORY/SOCIAL HISTORY/FAMILY HISTORY/AND MEDICATIONS  AND UPDATED ALL    Objective:     Vitals:   BP sitting on right: 140/68 with a heart rate of 68 and regular same on right as left  BP standing on right: 120/66 with a heart rate of 76 and regular    Weight (last 2 days)       Date/Time Weight    09/24/24 1137 60.3 (133)          Wt Readings from Last 3 Encounters:   09/24/24 60.3 kg (133 lb)   09/17/24 61.2 kg (135 lb)   09/10/24 61.2 kg (135 lb)       Body mass index is 22.13 kg/m².    Physical Exam: General:  No acute distress  Skin:  No acute rash  Eyes:  No scleral icterus, noninjected, no discharge from eyes  ENT:  Moist mucous membranes  Neck:  Supple, no jugular venous distention, trachea is midline, no lymphadenopathy and no thyromegaly  Back   No CVAT  Chest:  Clear to auscultation and percussion, good respiratory effort  CVS:  Regular rate and rhythm without a rub, or gallops or murmurs  Abdomen:  Soft and nontender with normal bowel sounds  Extremities:  No  cyanosis and no edema, no arthritic changes, normal range of motion  Neuro:  Grossly intact no significant right-sided weakness  Psych:  Alert, oriented x3 and appropriate      Medications:    Current Outpatient Medications:     atorvastatin (LIPITOR) 40 mg tablet, Take 1 tablet (40 mg total) by mouth daily, Disp: 30 tablet, Rfl: 0    Blood Glucose Monitoring Suppl (OneTouch Verio Reflect) w/Device KIT, Check blood sugars twice daily. Please substitute with appropriate alternative as covered by patient's insurance. Dx: E11.65, Disp: 1 kit, Rfl: 0    Blood Glucose Monitoring Suppl (OneTouch Verio Reflect) w/Device KIT, Check blood sugars once daily. Please substitute with appropriate alternative as covered by patient's insurance. Dx: E11.65, Disp: 1 kit, Rfl: 0    brimonidine (ALPHAGAN P) 0.15 % ophthalmic solution, , Disp: , Rfl:     clopidogrel (PLAVIX) 75 mg tablet, Take 1 tablet (75 mg total) by mouth daily, Disp: 30 tablet, Rfl: 0    Cyanocobalamin (Vitamin B-12) 1000 MCG/15ML LIQD, Take by mouth daily, Disp: , Rfl:     Dorzolamide HCl-Timolol Mal PF 2-0.5 % SOLN, INSTILL 1 DROP INTO EACH EYE TWICE DAILY, Disp: , Rfl:     Farxiga 10 MG tablet, Take 1 tablet (10 mg total) by mouth daily, Disp: 90 tablet, Rfl: 3    glucose blood (OneTouch Verio) test strip, Check blood sugars once daily. Please substitute with appropriate alternative as covered by patient's insurance. Dx: E11.65, Disp: 100 each, Rfl: 3    losartan (COZAAR) 25 mg tablet, Take 1 tablet (25 mg total) by mouth daily, Disp: 90 tablet, Rfl: 1    OneTouch Delica Lancets 33G MISC, Check blood sugars once daily. Please substitute with appropriate alternative as covered by patient's insurance. Dx: E11.65, Disp: 100 each, Rfl: 3    prednisoLONE acetate (PRED FORTE) 1 % ophthalmic suspension, INSTILL 1 DROP INTO LEFT EYE TWICE DAILY, Disp: , Rfl:     Vyzulta 0.024 % SOLN, Administer 1 drop to both eyes daily at bedtime, Disp: , Rfl:     Nutritional  Supplements (Nepro) LIQD, Take 237 mL by mouth 2 (two) times a day for 60 doses (Patient not taking: Reported on 9/24/2024), Disp: 98683 mL, Rfl: 0    Lab, Imaging and other studies: I have personally reviewed pertinent labs.  Laboratory Results:  Results for orders placed or performed during the hospital encounter of 09/09/24   CBC and differential   Result Value Ref Range    WBC 8.64 4.31 - 10.16 Thousand/uL    RBC 2.78 (L) 3.88 - 5.62 Million/uL    Hemoglobin 9.0 (L) 12.0 - 17.0 g/dL    Hematocrit 28.2 (L) 36.5 - 49.3 %     (H) 82 - 98 fL    MCH 32.4 26.8 - 34.3 pg    MCHC 31.9 31.4 - 37.4 g/dL    RDW 12.6 11.6 - 15.1 %    MPV 10.7 8.9 - 12.7 fL    Platelets 159 149 - 390 Thousands/uL    nRBC 0 /100 WBCs    Segmented % 68 43 - 75 %    Immature Grans % 0 0 - 2 %    Lymphocytes % 17 14 - 44 %    Monocytes % 10 4 - 12 %    Eosinophils Relative 4 0 - 6 %    Basophils Relative 1 0 - 1 %    Absolute Neutrophils 5.89 1.85 - 7.62 Thousands/µL    Absolute Immature Grans 0.03 0.00 - 0.20 Thousand/uL    Absolute Lymphocytes 1.47 0.60 - 4.47 Thousands/µL    Absolute Monocytes 0.85 0.17 - 1.22 Thousand/µL    Eosinophils Absolute 0.35 0.00 - 0.61 Thousand/µL    Basophils Absolute 0.05 0.00 - 0.10 Thousands/µL   Comprehensive metabolic panel   Result Value Ref Range    Sodium 134 (L) 135 - 147 mmol/L    Potassium 3.7 3.5 - 5.3 mmol/L    Chloride 102 96 - 108 mmol/L    CO2 22 21 - 32 mmol/L    ANION GAP 10 4 - 13 mmol/L    BUN 69 (H) 5 - 25 mg/dL    Creatinine 3.71 (H) 0.60 - 1.30 mg/dL    Glucose 302 (H) 65 - 140 mg/dL    Calcium 9.1 8.4 - 10.2 mg/dL    AST 12 (L) 13 - 39 U/L    ALT 9 7 - 52 U/L    Alkaline Phosphatase 38 34 - 104 U/L    Total Protein 6.8 6.4 - 8.4 g/dL    Albumin 3.6 3.5 - 5.0 g/dL    Total Bilirubin 0.41 0.20 - 1.00 mg/dL    eGFR 13 ml/min/1.73sq m   Platelet count   Result Value Ref Range    Platelets 153 149 - 390 Thousands/uL    MPV 10.2 8.9 - 12.7 fL   Hemoglobin A1C   Result Value Ref Range     Hemoglobin A1C 6.7 (H) Normal 4.0-5.6%; PreDiabetic 5.7-6.4%; Diabetic >=6.5%; Glycemic control for adults with diabetes <7.0% %     mg/dl   Lipid panel   Result Value Ref Range    Cholesterol 117 See Comment mg/dL    Triglycerides 72 See Comment mg/dL    HDL, Direct 44 >=40 mg/dL    LDL Calculated 59 0 - 100 mg/dL    Non-HDL-Chol (CHOL-HDL) 73 mg/dl   Haptoglobin   Result Value Ref Range    Haptoglobin 145 38 - 329 mg/dL   TIBC Panel (incl. Iron, TIBC, % Iron Saturation)   Result Value Ref Range    Iron Saturation 26 15 - 50 %    TIBC 234 (L) 250 - 450 ug/dL    Iron 62 50 - 212 ug/dL    UIBC 172 155 - 355 ug/dL   Ferritin   Result Value Ref Range    Ferritin 317 24 - 336 ng/mL   Lipid Panel with Direct LDL reflex   Result Value Ref Range    Cholesterol 132 See Comment mg/dL    Triglycerides 56 See Comment mg/dL    HDL, Direct 43 >=40 mg/dL    LDL Calculated 78 0 - 100 mg/dL   Basic metabolic panel   Result Value Ref Range    Sodium 136 135 - 147 mmol/L    Potassium 5.8 (H) 3.5 - 5.3 mmol/L    Chloride 107 96 - 108 mmol/L    CO2 21 21 - 32 mmol/L    ANION GAP 8 4 - 13 mmol/L    BUN 63 (H) 5 - 25 mg/dL    Creatinine 3.22 (H) 0.60 - 1.30 mg/dL    Glucose 126 65 - 140 mg/dL    Calcium 8.8 8.4 - 10.2 mg/dL    eGFR 16 ml/min/1.73sq m   Magnesium   Result Value Ref Range    Magnesium 2.7 1.9 - 2.7 mg/dL   CBC (With Platelets)   Result Value Ref Range    WBC 7.01 4.31 - 10.16 Thousand/uL    RBC 2.86 (L) 3.88 - 5.62 Million/uL    Hemoglobin 9.1 (L) 12.0 - 17.0 g/dL    Hematocrit 29.0 (L) 36.5 - 49.3 %     (H) 82 - 98 fL    MCH 31.8 26.8 - 34.3 pg    MCHC 31.4 31.4 - 37.4 g/dL    RDW 12.8 11.6 - 15.1 %    Platelets 148 (L) 149 - 390 Thousands/uL    MPV 11.2 8.9 - 12.7 fL   CBC   Result Value Ref Range    WBC 7.61 4.31 - 10.16 Thousand/uL    RBC 2.98 (L) 3.88 - 5.62 Million/uL    Hemoglobin 9.5 (L) 12.0 - 17.0 g/dL    Hematocrit 29.6 (L) 36.5 - 49.3 %    MCV 99 (H) 82 - 98 fL    MCH 31.9 26.8 - 34.3 pg    MCHC  32.1 31.4 - 37.4 g/dL    RDW 12.6 11.6 - 15.1 %    Platelets 166 149 - 390 Thousands/uL    MPV 10.9 8.9 - 12.7 fL   Basic metabolic panel   Result Value Ref Range    Sodium 139 135 - 147 mmol/L    Potassium 3.5 3.5 - 5.3 mmol/L    Chloride 107 96 - 108 mmol/L    CO2 23 21 - 32 mmol/L    ANION GAP 9 4 - 13 mmol/L    BUN 58 (H) 5 - 25 mg/dL    Creatinine 3.00 (H) 0.60 - 1.30 mg/dL    Glucose 130 65 - 140 mg/dL    Calcium 9.1 8.4 - 10.2 mg/dL    eGFR 17 ml/min/1.73sq m   Magnesium   Result Value Ref Range    Magnesium 2.5 1.9 - 2.7 mg/dL   ECG 12 lead   Result Value Ref Range    Ventricular Rate 66 BPM    Atrial Rate 66 BPM    KY Interval 194 ms    QRSD Interval 92 ms    QT Interval 432 ms    QTC Interval 452 ms    P Axis 52 degrees    QRS Axis 29 degrees    T Wave [place] 52 degrees   Echo complete w/ contrast if indicated   Result Value Ref Range    BSA 1.67 m2    A4C EF 68 %    LVIDd 3.90 cm    LVIDS 3.20 cm    IVSd 1.10 cm    LVPWd 1.10 cm    FS 18 28 - 44    MV E' Tissue Velocity Septal 7 cm/s    LA Volume Index (BP) 21.6 mL/m2    E/A ratio 0.67     E wave deceleration time 335 ms    MV Peak E Carlo 62 cm/s    MV Peak A Carlo 0.92 m/s    RVID d 3.0 cm    LA size 3.2 cm    LA length (A2C) 5.60 cm    LA volume (BP) 36 mL    RA 2D Volume 17.0 mL    RAA A4C 10.3 cm2    MV stenosis pressure 1/2 time 97 ms    MV valve area p 1/2 method 2.27     TR Peak Carlo 2.4 m/s    Triscuspid Valve Regurgitation Peak Gradient 23.0 mmHg    Ao root 3.30 cm    Asc Ao 3.3 cm    Tricuspid valve peak regurgitation velocity 2.41 m/s    Left ventricular stroke volume (2D) 27.00 mL    IVS 1.1 cm    LEFT VENTRICLE SYSTOLIC VOLUME (MOD BIPLANE) 2D 41 mL    LV DIASTOLIC VOLUME (MOD BIPLANE) 2D 68 mL    Left Atrium Area-systolic Four Chamber 11.1 cm2    Left Atrium Area-systolic Apical Two Chamber 19.5 cm2    LVSV, 2D 27 mL    LV EF 65    Fingerstick Glucose (POCT)   Result Value Ref Range    POC Glucose 174 (H) 65 - 140 mg/dl   Fingerstick Glucose  "(POCT)   Result Value Ref Range    POC Glucose 283 (H) 65 - 140 mg/dl   Fingerstick Glucose (POCT)   Result Value Ref Range    POC Glucose 92 65 - 140 mg/dl   Fingerstick Glucose (POCT)   Result Value Ref Range    POC Glucose 99 65 - 140 mg/dl   Fingerstick Glucose (POCT)   Result Value Ref Range    POC Glucose 122 65 - 140 mg/dl   Fingerstick Glucose (POCT)   Result Value Ref Range    POC Glucose 195 (H) 65 - 140 mg/dl             Invalid input(s): \"ALBUMIN\"      Radiology review:   chest X-ray    Ultrasound      Portions of the record may have been created with voice recognition software.  Occasional wrong word or \"sound a like\" substitutions may have occurred due to the inherent limitations of voice recognition software.  Read the chart carefully and recognize, using context, where substitutions have occurred.                    "

## 2024-09-24 NOTE — LETTER
September 24, 2024     Debbie Maloney MD  [address] [facility]  [address]  [facility] [address]    Patient: Jeffry Almanzar   YOB: 1934   Date of Visit: 9/24/2024       Dear Dr. Maloney:    Thank you for referring Jeffry Almanzar to me for evaluation. Below are my notes for this consultation.    If you have questions, please do not hesitate to call me. I look forward to following your patient along with you.         Sincerely,        Tanner Rey MD        CC: No Recipients    Tanner Rey MD  9/24/2024 12:03 PM  Sign when Signing Visit  RENAL FOLLOW UP NOTE:.td    ASSESSMENT AND PLAN:  89 y.o. year old male with a history of diabetes mellitus/hypertension/osteoarthritis/bladder CA/asthma/anemia who we are asked to see for chronic kidney disease in the setting of diabetes mellitus:        1.  CKD stage 4.    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  Of great concern is he is very noncompliant with medications and diet        Baseline creatinine approximately 2.3-2.6 however recently increased July 23, 2024 3.24  Current creatinine 3.00 from 9/11/2024 at baseline  Urine protein creatinine ratio:  0.282 g at goal  UA:  No proteinuria and no hematuria  Renal ultrasound:  11/26/2021:  Small echogenic kidneys no hydronephrosis:  7.3 x 7.2  Recommendations:  Treat hypertension-please see below  Treat dyslipidemia-please see below  Maintain proteinuria less than 1 g or as low as possible  Avoid nephrotoxic agents such as NSAIDs, and proton pump inhibitors if possible; patient counseled as such  Kidney smart completed 3/24/2023  ACP done 6/16/2023     I rediscussed the issue of kidney machine dialysis with the patient and his wife at this juncture they do not want to pursue!  He would rather die peacefully.     2.  Volume:  Euvolemic     3.  Hypertension:       Current  blood pressure averages:     None today but apparently in the 120/70 range     Goal blood pressure:  Less than 130/80 given CKD     Recommendations:  Push nonmedical regimen including weight loss, isotonic exercise and a low sodium diet.  Patient has been counseled the such.  Can be very noncompliant  Workup:  Aldosterone/renin:  Negative  Thyroid profile: Normal  Renal workup as outlined above  Hold on renal artery duplex unless cannot control blood pressure  MedicationChanges today:    Given slightly low diastolic readings in the setting of a recent CVA especially given age frail condition I would not overtreat at this time but he will send in a week of readings     4.  Electrolytes:  All normal except borderline potassium of 3.5 slight increase in potassium diet      5.  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  Dyslipidemia:  Goal LDL:  Less than 100  Current lipid profile:  LDL 59/HDL 44/triglycerides 72 from 9/9/2024  Recommendations:   Low-cholesterol/low-fat diet / weight loss as appropriate and isotonic exercise   Medication changes today at goal so no changes, will repeat with next labs     7.  Anemia:  Most likely from CKD  Current hemoglobin: 9.5 relatively stable  Iron studies: Adequate from 9/9/2024  Multiple myeloma evaluation:  Faint band in IgM and lambda:  Probably reactive/inflammatory recommend follow-up in 3-6 months from December 27, 2021:  I will obtain in 3 months:  It is persistent, therefore the patient needs immunoelectrophoresis at this time!!!  From 6/14/2023: Light chain ratio 1.29  Faint band of IgM and lambda though may suggest polyclonal background recommended repeat studies in 3 to 6 months which I will repeat at this time  GI had seen the patient Dr. Gonzalez who felt given stool Hemoccult negative age and no overt iron-deficiency no further GI investigation  including colonoscopy   Potential referral to Hematology for possible erythropoietin agent  only if needed  8.  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  Recommended Plavix and statin therapy with follow-up with neurology PT OT outpatient.       GI HEALTH MAINTENANCE: LAST COLONOSCOPY:  seen by Dr. Gonzalez with heme-negative stools he did not feel there is any need for a colonoscopy at this time    PATIENT INSTRUCTIONS:    Patient Instructions   Visit summary:  - Overall kidney function is stable  - Blood pressure appears to be good as well but please send in a week of readings        1. Medication changes today:  No medication changes pending your home blood pressure readings    2.  General instructions:  Avoid salt is much as possible    3.  Please go for non fasting  lab work 1-2 weeks after making the above medication change.    4.  Please take 1 week a blood pressure readings at this time just sitting morning and evening    AS FOLLOWS  MORNING AND EVENING, SITTING AND STANDING as follows:  TAKE THE MORNING READINGS BEFORE ANY MEDICATIONS AND WHEN YOU ARE RELAXED FOR SEVERAL MINUTES  TAKE THE EVENING READINGS:  BETWEEN 7-10 P.M.; PRIOR TO ANY MEDICATIONS; AT LEAST IN OUR  FROM DINNER; AND CERTAINLY AFTER RELAXING FOR A FEW MINUTES  PLEASE INCLUDE HEART RATE WITH YOUR BLOOD PRESSURE READINGS  When taking standing readings, keep your arm supported at heart level and not dangling  Make sure you are sitting with your back supported and feet on the  ground and do not cross your legs or feet  Make sure you have not taken any coffee or caffeine products or exercised or smoke cigarettes at least 30 minutes before taking your blood pressure  Then please mail these readings into the office            5.  Follow-up in 4 months  Please bring in 1 week a blood pressure readings morning evening, sitting and standing is outlined above  PLEASE BRING AN YOUR BLOOD PRESSURE MACHINE TO CORRELATE WITH THE OFFICE MACHINE AT THIS NEXT SCHEDULED VISIT  Please go for fasting lab work 1-2 weeks prior to your appointment      6.  General non medical recommendations:  AVOID SALT BUT NOT ADDING AN READING LABELS TO MAKE SURE THERE IS LOW-SALT IN THE FOOD THAT YOU ARE EATING  Goal is less than 2 g of sodium intake or less than 5 g of sodium chloride intake per day    Avoid nonsteroidal anti-inflammatory drugs such as Naprosyn, ibuprofen, Aleve, Advil, Celebrex, Meloxicam (Mobic) etc.  You can use Tylenol as needed if you do not have any liver condition to be concerned about    Avoid medications such as Sudafed or decongestants and antihistamines that contained the D component which is the decongestant.  You can take antihistamines without the decongestant or D component.    Try to avoid medications such as pantoprazole or  Protonix/Nexium or Esomeprazole)/Prilosec or omeprazole/Prevacid or lansoprazole/AcipHex or Rabeprazole.  If you are able to, use Pepcid as this is safer for your kidneys.    Try to exercise at least 30 minutes 3 days a week to begin with with an ultimate goal of 5 days a week for at least 30 minutes    Please do not drink more than 2 glasses of alcohol/wine on a daily basis as this may contribute to your high blood pressure.            Subjective:   Recent hospitalization for CVA  The patient overall is feeling well.  No fevers, chills, or cough or colds.  Good appetite and good energy  No hematuria, dysuria, voiding symptoms or foamy urine  No gastrointestinal  symptoms  No cardiovascular symptoms including swelling of the legs  No headaches, dizziness or lightheadedness  Blood pressure medications:  Losartan 25 mg daily    Renal pertinent medications:  Farxiga 10 mg daily  Atorvastatin 40 mg daily    ROS:  See HPI, otherwise review of systems as completely reviewed with the patient are negative    Past Medical History:   Diagnosis Date   • Cataracts, bilateral      Past Surgical History:   Procedure Laterality Date   • CATARACT EXTRACTION Bilateral 07/15/2012   • COLONOSCOPY     • CYSTOSCOPY  01/15/2018    Last assessed 9/14/17, diagnostic   • HERNIA REPAIR     • INSTILLATION MYTOMYCIN N/A 10/25/2017    Procedure: INSTILLATION OF MITOMYCIN C;  Surgeon: Danish Esposito MD;  Location: [facility];  Service: Urology   • AR CYSTO W/REMOVAL OF LESIONS SMALL N/A 10/25/2017    Procedure: CYSTOSCOPY; BLADDER BIOPSY WITH FULGERATION;  Surgeon: Danish Esposito MD;  Location: [facility];  Service: Urology   • TONSILLECTOMY     • TRANSURETHRAL RESECTION OF BLADDER TUMOR N/A 10/25/2017    Procedure: TUR BLADDER TUMOR;  Surgeon: Danish Esposito MD;  Location: [facility];  Service: Urology   • WISDOM TOOTH EXTRACTION       Family History   Problem Relation Age of Onset   • Diabetes Mother       reports that he quit smoking about 60 years ago. His smoking use included cigarettes. He has never used smokeless tobacco. He reports that he does not currently use alcohol. He reports that he does not use drugs.    I COMPLETELY REVIEWED THE PAST MEDICAL HISTORY/PAST SURGICAL HISTORY/SOCIAL HISTORY/FAMILY HISTORY/AND MEDICATIONS  AND UPDATED ALL    Objective:     Vitals:   BP sitting on right: 140/68 with a heart rate of 68 and regular same on right as left  BP standing on right: 120/66 with a heart rate of 76 and regular    Weight (last 2 days)       Date/Time Weight    09/24/24 1137 60.3 (133)          Wt Readings from Last 3 Encounters:   09/24/24 60.3 kg (133 lb)   09/17/24 61.2 kg (135  lb)   09/10/24 61.2 kg (135 lb)       Body mass index is 22.13 kg/m².    Physical Exam: General:  No acute distress  Skin:  No acute rash  Eyes:  No scleral icterus, noninjected, no discharge from eyes  ENT:  Moist mucous membranes  Neck:  Supple, no jugular venous distention, trachea is midline, no lymphadenopathy and no thyromegaly  Back   No CVAT  Chest:  Clear to auscultation and percussion, good respiratory effort  CVS:  Regular rate and rhythm without a rub, or gallops or murmurs  Abdomen:  Soft and nontender with normal bowel sounds  Extremities:  No cyanosis and no edema, no arthritic changes, normal range of motion  Neuro:  Grossly intact  Psych:  Alert, oriented x3 and appropriate      Medications:    Current Outpatient Medications:   •  atorvastatin (LIPITOR) 40 mg tablet, Take 1 tablet (40 mg total) by mouth daily, Disp: 30 tablet, Rfl: 0  •  Blood Glucose Monitoring Suppl (OneTouch Verio Reflect) w/Device KIT, Check blood sugars twice daily. Please substitute with appropriate alternative as covered by patient's insurance. Dx: E11.65, Disp: 1 kit, Rfl: 0  •  Blood Glucose Monitoring Suppl (OneTouch Verio Reflect) w/Device KIT, Check blood sugars once daily. Please substitute with appropriate alternative as covered by patient's insurance. Dx: E11.65, Disp: 1 kit, Rfl: 0  •  brimonidine (ALPHAGAN P) 0.15 % ophthalmic solution, , Disp: , Rfl:   •  clopidogrel (PLAVIX) 75 mg tablet, Take 1 tablet (75 mg total) by mouth daily, Disp: 30 tablet, Rfl: 0  •  Cyanocobalamin (Vitamin B-12) 1000 MCG/15ML LIQD, Take by mouth daily, Disp: , Rfl:   •  Dorzolamide HCl-Timolol Mal PF 2-0.5 % SOLN, INSTILL 1 DROP INTO EACH EYE TWICE DAILY, Disp: , Rfl:   •  Farxiga 10 MG tablet, Take 1 tablet (10 mg total) by mouth daily, Disp: 90 tablet, Rfl: 3  •  glucose blood (OneTouch Verio) test strip, Check blood sugars once daily. Please substitute with appropriate alternative as covered by patient's insurance. Dx: E11.65, Disp:  100 each, Rfl: 3  •  losartan (COZAAR) 25 mg tablet, Take 1 tablet (25 mg total) by mouth daily, Disp: 90 tablet, Rfl: 1  •  OneTouch Delica Lancets 33G MISC, Check blood sugars once daily. Please substitute with appropriate alternative as covered by patient's insurance. Dx: E11.65, Disp: 100 each, Rfl: 3  •  prednisoLONE acetate (PRED FORTE) 1 % ophthalmic suspension, INSTILL 1 DROP INTO LEFT EYE TWICE DAILY, Disp: , Rfl:   •  Vyzulta 0.024 % SOLN, Administer 1 drop to both eyes daily at bedtime, Disp: , Rfl:   •  Nutritional Supplements (Nepro) LIQD, Take 237 mL by mouth 2 (two) times a day for 60 doses (Patient not taking: Reported on 9/24/2024), Disp: 29149 mL, Rfl: 0    Lab, Imaging and other studies: I have personally reviewed pertinent labs.  Laboratory Results:  Results for orders placed or performed during the hospital encounter of 09/09/24   CBC and differential   Result Value Ref Range    WBC 8.64 4.31 - 10.16 Thousand/uL    RBC 2.78 (L) 3.88 - 5.62 Million/uL    Hemoglobin 9.0 (L) 12.0 - 17.0 g/dL    Hematocrit 28.2 (L) 36.5 - 49.3 %     (H) 82 - 98 fL    MCH 32.4 26.8 - 34.3 pg    MCHC 31.9 31.4 - 37.4 g/dL    RDW 12.6 11.6 - 15.1 %    MPV 10.7 8.9 - 12.7 fL    Platelets 159 149 - 390 Thousands/uL    nRBC 0 /100 WBCs    Segmented % 68 43 - 75 %    Immature Grans % 0 0 - 2 %    Lymphocytes % 17 14 - 44 %    Monocytes % 10 4 - 12 %    Eosinophils Relative 4 0 - 6 %    Basophils Relative 1 0 - 1 %    Absolute Neutrophils 5.89 1.85 - 7.62 Thousands/µL    Absolute Immature Grans 0.03 0.00 - 0.20 Thousand/uL    Absolute Lymphocytes 1.47 0.60 - 4.47 Thousands/µL    Absolute Monocytes 0.85 0.17 - 1.22 Thousand/µL    Eosinophils Absolute 0.35 0.00 - 0.61 Thousand/µL    Basophils Absolute 0.05 0.00 - 0.10 Thousands/µL   Comprehensive metabolic panel   Result Value Ref Range    Sodium 134 (L) 135 - 147 mmol/L    Potassium 3.7 3.5 - 5.3 mmol/L    Chloride 102 96 - 108 mmol/L    CO2 22 21 - 32 mmol/L    ANION  GAP 10 4 - 13 mmol/L    BUN 69 (H) 5 - 25 mg/dL    Creatinine 3.71 (H) 0.60 - 1.30 mg/dL    Glucose 302 (H) 65 - 140 mg/dL    Calcium 9.1 8.4 - 10.2 mg/dL    AST 12 (L) 13 - 39 U/L    ALT 9 7 - 52 U/L    Alkaline Phosphatase 38 34 - 104 U/L    Total Protein 6.8 6.4 - 8.4 g/dL    Albumin 3.6 3.5 - 5.0 g/dL    Total Bilirubin 0.41 0.20 - 1.00 mg/dL    eGFR 13 ml/min/1.73sq m   Platelet count   Result Value Ref Range    Platelets 153 149 - 390 Thousands/uL    MPV 10.2 8.9 - 12.7 fL   Hemoglobin A1C   Result Value Ref Range    Hemoglobin A1C 6.7 (H) Normal 4.0-5.6%; PreDiabetic 5.7-6.4%; Diabetic >=6.5%; Glycemic control for adults with diabetes <7.0% %     mg/dl   Lipid panel   Result Value Ref Range    Cholesterol 117 See Comment mg/dL    Triglycerides 72 See Comment mg/dL    HDL, Direct 44 >=40 mg/dL    LDL Calculated 59 0 - 100 mg/dL    Non-HDL-Chol (CHOL-HDL) 73 mg/dl   Haptoglobin   Result Value Ref Range    Haptoglobin 145 38 - 329 mg/dL   TIBC Panel (incl. Iron, TIBC, % Iron Saturation)   Result Value Ref Range    Iron Saturation 26 15 - 50 %    TIBC 234 (L) 250 - 450 ug/dL    Iron 62 50 - 212 ug/dL    UIBC 172 155 - 355 ug/dL   Ferritin   Result Value Ref Range    Ferritin 317 24 - 336 ng/mL   Lipid Panel with Direct LDL reflex   Result Value Ref Range    Cholesterol 132 See Comment mg/dL    Triglycerides 56 See Comment mg/dL    HDL, Direct 43 >=40 mg/dL    LDL Calculated 78 0 - 100 mg/dL   Basic metabolic panel   Result Value Ref Range    Sodium 136 135 - 147 mmol/L    Potassium 5.8 (H) 3.5 - 5.3 mmol/L    Chloride 107 96 - 108 mmol/L    CO2 21 21 - 32 mmol/L    ANION GAP 8 4 - 13 mmol/L    BUN 63 (H) 5 - 25 mg/dL    Creatinine 3.22 (H) 0.60 - 1.30 mg/dL    Glucose 126 65 - 140 mg/dL    Calcium 8.8 8.4 - 10.2 mg/dL    eGFR 16 ml/min/1.73sq m   Magnesium   Result Value Ref Range    Magnesium 2.7 1.9 - 2.7 mg/dL   CBC (With Platelets)   Result Value Ref Range    WBC 7.01 4.31 - 10.16 Thousand/uL    RBC  2.86 (L) 3.88 - 5.62 Million/uL    Hemoglobin 9.1 (L) 12.0 - 17.0 g/dL    Hematocrit 29.0 (L) 36.5 - 49.3 %     (H) 82 - 98 fL    MCH 31.8 26.8 - 34.3 pg    MCHC 31.4 31.4 - 37.4 g/dL    RDW 12.8 11.6 - 15.1 %    Platelets 148 (L) 149 - 390 Thousands/uL    MPV 11.2 8.9 - 12.7 fL   CBC   Result Value Ref Range    WBC 7.61 4.31 - 10.16 Thousand/uL    RBC 2.98 (L) 3.88 - 5.62 Million/uL    Hemoglobin 9.5 (L) 12.0 - 17.0 g/dL    Hematocrit 29.6 (L) 36.5 - 49.3 %    MCV 99 (H) 82 - 98 fL    MCH 31.9 26.8 - 34.3 pg    MCHC 32.1 31.4 - 37.4 g/dL    RDW 12.6 11.6 - 15.1 %    Platelets 166 149 - 390 Thousands/uL    MPV 10.9 8.9 - 12.7 fL   Basic metabolic panel   Result Value Ref Range    Sodium 139 135 - 147 mmol/L    Potassium 3.5 3.5 - 5.3 mmol/L    Chloride 107 96 - 108 mmol/L    CO2 23 21 - 32 mmol/L    ANION GAP 9 4 - 13 mmol/L    BUN 58 (H) 5 - 25 mg/dL    Creatinine 3.00 (H) 0.60 - 1.30 mg/dL    Glucose 130 65 - 140 mg/dL    Calcium 9.1 8.4 - 10.2 mg/dL    eGFR 17 ml/min/1.73sq m   Magnesium   Result Value Ref Range    Magnesium 2.5 1.9 - 2.7 mg/dL   ECG 12 lead   Result Value Ref Range    Ventricular Rate 66 BPM    Atrial Rate 66 BPM    OK Interval 194 ms    QRSD Interval 92 ms    QT Interval 432 ms    QTC Interval 452 ms    P Axis 52 degrees    QRS Axis 29 degrees    T Wave [place] 52 degrees   Echo complete w/ contrast if indicated   Result Value Ref Range    BSA 1.67 m2    A4C EF 68 %    LVIDd 3.90 cm    LVIDS 3.20 cm    IVSd 1.10 cm    LVPWd 1.10 cm    FS 18 28 - 44    MV E' Tissue Velocity Septal 7 cm/s    LA Volume Index (BP) 21.6 mL/m2    E/A ratio 0.67     E wave deceleration time 335 ms    MV Peak E Carlo 62 cm/s    MV Peak A Carlo 0.92 m/s    RVID d 3.0 cm    LA size 3.2 cm    LA length (A2C) 5.60 cm    LA volume (BP) 36 mL    RA 2D Volume 17.0 mL    RAA A4C 10.3 cm2    MV stenosis pressure 1/2 time 97 ms    MV valve area p 1/2 method 2.27     TR Peak Carlo 2.4 m/s    Triscuspid Valve Regurgitation Peak  "Gradient 23.0 mmHg    Ao root 3.30 cm    Asc Ao 3.3 cm    Tricuspid valve peak regurgitation velocity 2.41 m/s    Left ventricular stroke volume (2D) 27.00 mL    IVS 1.1 cm    LEFT VENTRICLE SYSTOLIC VOLUME (MOD BIPLANE) 2D 41 mL    LV DIASTOLIC VOLUME (MOD BIPLANE) 2D 68 mL    Left Atrium Area-systolic Four Chamber 11.1 cm2    Left Atrium Area-systolic Apical Two Chamber 19.5 cm2    LVSV, 2D 27 mL    LV EF 65    Fingerstick Glucose (POCT)   Result Value Ref Range    POC Glucose 174 (H) 65 - 140 mg/dl   Fingerstick Glucose (POCT)   Result Value Ref Range    POC Glucose 283 (H) 65 - 140 mg/dl   Fingerstick Glucose (POCT)   Result Value Ref Range    POC Glucose 92 65 - 140 mg/dl   Fingerstick Glucose (POCT)   Result Value Ref Range    POC Glucose 99 65 - 140 mg/dl   Fingerstick Glucose (POCT)   Result Value Ref Range    POC Glucose 122 65 - 140 mg/dl   Fingerstick Glucose (POCT)   Result Value Ref Range    POC Glucose 195 (H) 65 - 140 mg/dl             Invalid input(s): \"ALBUMIN\"      Radiology review:   chest X-ray    Ultrasound      Portions of the record may have been created with voice recognition software.  Occasional wrong word or \"sound a like\" substitutions may have occurred due to the inherent limitations of voice recognition software.  Read the chart carefully and recognize, using context, where substitutions have occurred.                    "

## 2024-10-02 ENCOUNTER — OFFICE VISIT (OUTPATIENT)
Dept: FAMILY MEDICINE CLINIC | Facility: CLINIC | Age: 89
End: 2024-10-02
Payer: MEDICARE

## 2024-10-02 ENCOUNTER — TELEPHONE (OUTPATIENT)
Age: 89
End: 2024-10-02

## 2024-10-02 VITALS
HEIGHT: 65 IN | SYSTOLIC BLOOD PRESSURE: 136 MMHG | DIASTOLIC BLOOD PRESSURE: 82 MMHG | OXYGEN SATURATION: 97 % | HEART RATE: 87 BPM | BODY MASS INDEX: 23.49 KG/M2 | RESPIRATION RATE: 18 BRPM | WEIGHT: 141 LBS

## 2024-10-02 DIAGNOSIS — I12.9 BENIGN HYPERTENSION WITH CKD (CHRONIC KIDNEY DISEASE) STAGE IV (HCC): ICD-10-CM

## 2024-10-02 DIAGNOSIS — I63.81 LACUNAR CEREBROVASCULAR ACCIDENT (CVA) (HCC): Primary | ICD-10-CM

## 2024-10-02 DIAGNOSIS — Z23 ENCOUNTER FOR IMMUNIZATION: ICD-10-CM

## 2024-10-02 DIAGNOSIS — E11.22 CKD STAGE 4 DUE TO TYPE 2 DIABETES MELLITUS (HCC): ICD-10-CM

## 2024-10-02 DIAGNOSIS — N18.4 ANEMIA IN STAGE 4 CHRONIC KIDNEY DISEASE  (HCC): ICD-10-CM

## 2024-10-02 DIAGNOSIS — N18.4 BENIGN HYPERTENSION WITH CKD (CHRONIC KIDNEY DISEASE) STAGE IV (HCC): ICD-10-CM

## 2024-10-02 DIAGNOSIS — D63.1 ANEMIA IN STAGE 4 CHRONIC KIDNEY DISEASE  (HCC): ICD-10-CM

## 2024-10-02 DIAGNOSIS — N18.4 CKD STAGE 4 DUE TO TYPE 2 DIABETES MELLITUS (HCC): ICD-10-CM

## 2024-10-02 DIAGNOSIS — F02.B0 MODERATE DEMENTIA ASSOCIATED WITH OTHER UNDERLYING DISEASE, WITHOUT BEHAVIORAL DISTURBANCE, PSYCHOTIC DISTURBANCE, MOOD DISTURBANCE, OR ANXIETY (HCC): ICD-10-CM

## 2024-10-02 DIAGNOSIS — E11.22 TYPE 2 DIABETES MELLITUS WITH STAGE 4 CHRONIC KIDNEY DISEASE, WITHOUT LONG-TERM CURRENT USE OF INSULIN (HCC): ICD-10-CM

## 2024-10-02 DIAGNOSIS — N18.4 TYPE 2 DIABETES MELLITUS WITH STAGE 4 CHRONIC KIDNEY DISEASE, WITHOUT LONG-TERM CURRENT USE OF INSULIN (HCC): ICD-10-CM

## 2024-10-02 PROCEDURE — G0008 ADMIN INFLUENZA VIRUS VAC: HCPCS | Performed by: FAMILY MEDICINE

## 2024-10-02 PROCEDURE — 90662 IIV NO PRSV INCREASED AG IM: CPT | Performed by: FAMILY MEDICINE

## 2024-10-02 PROCEDURE — 99214 OFFICE O/P EST MOD 30 MIN: CPT | Performed by: FAMILY MEDICINE

## 2024-10-02 RX ORDER — ATORVASTATIN CALCIUM 40 MG/1
40 TABLET, FILM COATED ORAL DAILY
Qty: 90 TABLET | Refills: 3 | Status: SHIPPED | OUTPATIENT
Start: 2024-10-02 | End: 2025-09-27

## 2024-10-02 RX ORDER — DAPAGLIFLOZIN 10 MG/1
10 TABLET, FILM COATED ORAL DAILY
Qty: 30 TABLET | Refills: 11 | Status: SHIPPED | OUTPATIENT
Start: 2024-10-02

## 2024-10-02 RX ORDER — CLOPIDOGREL BISULFATE 75 MG/1
75 TABLET ORAL DAILY
Qty: 90 TABLET | Refills: 3 | Status: SHIPPED | OUTPATIENT
Start: 2024-10-02 | End: 2025-09-27

## 2024-10-02 NOTE — PROGRESS NOTES
Subjective:      Patient ID: Jeffry Almanzar is a 89 y.o. male.    Post recent acute lacunar infarct ,discharge does have right upper extremity discomfort.  The wife states that the Farxiga was $400 and hence she did not pick it up.  He is pleasant and cooperative denies any complaints.  He is following with neurology for CVA as well as dementia.  Has a follow-up appointment        Past Medical History:   Diagnosis Date    Cataracts, bilateral        Family History   Problem Relation Age of Onset    Diabetes Mother        Past Surgical History:   Procedure Laterality Date    CATARACT EXTRACTION Bilateral 07/15/2012    COLONOSCOPY      CYSTOSCOPY  01/15/2018    Last assessed 9/14/17, diagnostic    HERNIA REPAIR      INSTILLATION MYTOMYCIN N/A 10/25/2017    Procedure: INSTILLATION OF MITOMYCIN C;  Surgeon: Danish Esposito MD;  Location: [facility];  Service: Urology    NC CYSTO W/REMOVAL OF LESIONS SMALL N/A 10/25/2017    Procedure: CYSTOSCOPY; BLADDER BIOPSY WITH FULGERATION;  Surgeon: Danish Esposito MD;  Location: [facility];  Service: Urology    TONSILLECTOMY      TRANSURETHRAL RESECTION OF BLADDER TUMOR N/A 10/25/2017    Procedure: TUR BLADDER TUMOR;  Surgeon: Danish Esposito MD;  Location: [facility];  Service: Urology    WISDOM TOOTH EXTRACTION          reports that he quit smoking about 60 years ago. His smoking use included cigarettes. He has never used smokeless tobacco. He reports that he does not currently use alcohol. He reports that he does not use drugs.      Current Outpatient Medications:     atorvastatin (LIPITOR) 40 mg tablet, Take 1 tablet (40 mg total) by mouth daily, Disp: 90 tablet, Rfl: 3    Blood Glucose Monitoring Suppl (OneTouch Verio Reflect) w/Device KIT, Check blood sugars twice daily. Please substitute with appropriate alternative as covered by patient's insurance. Dx: E11.65, Disp: 1 kit, Rfl: 0    Blood Glucose Monitoring Suppl (OneTouch Verio Reflect) w/Device KIT, Check blood  sugars once daily. Please substitute with appropriate alternative as covered by patient's insurance. Dx: E11.65, Disp: 1 kit, Rfl: 0    brimonidine (ALPHAGAN P) 0.15 % ophthalmic solution, , Disp: , Rfl:     clopidogrel (PLAVIX) 75 mg tablet, Take 1 tablet (75 mg total) by mouth daily, Disp: 90 tablet, Rfl: 3    Cyanocobalamin (Vitamin B-12) 1000 MCG/15ML LIQD, Take by mouth daily, Disp: , Rfl:     Dorzolamide HCl-Timolol Mal PF 2-0.5 % SOLN, INSTILL 1 DROP INTO EACH EYE TWICE DAILY, Disp: , Rfl:     Farxiga 10 MG tablet, Take 1 tablet (10 mg total) by mouth daily, Disp: 30 tablet, Rfl: 11    glucose blood (OneTouch Verio) test strip, Check blood sugars once daily. Please substitute with appropriate alternative as covered by patient's insurance. Dx: E11.65, Disp: 100 each, Rfl: 3    losartan (COZAAR) 25 mg tablet, Take 1 tablet (25 mg total) by mouth daily, Disp: 90 tablet, Rfl: 1    OneTouch Delica Lancets 33G MISC, Check blood sugars once daily. Please substitute with appropriate alternative as covered by patient's insurance. Dx: E11.65, Disp: 100 each, Rfl: 3    prednisoLONE acetate (PRED FORTE) 1 % ophthalmic suspension, INSTILL 1 DROP INTO LEFT EYE TWICE DAILY, Disp: , Rfl:     Vyzulta 0.024 % SOLN, Administer 1 drop to both eyes daily at bedtime, Disp: , Rfl:     Nutritional Supplements (Nepro) LIQD, Take 237 mL by mouth 2 (two) times a day for 60 doses (Patient not taking: Reported on 9/24/2024), Disp: 25276 mL, Rfl: 0    The following portions of the patient's history were reviewed and updated as appropriate: allergies, current medications, past family history, past medical history, past social history, past surgical history and problem list.    Review of Systems   Constitutional:  Negative for chills and fever.   HENT:  Negative for congestion, rhinorrhea and sore throat.    Eyes:  Negative for discharge, redness and itching.   Respiratory:  Negative for chest tightness, shortness of breath and wheezing.   "  Cardiovascular:  Negative for chest pain and palpitations.   Gastrointestinal:  Negative for abdominal pain, constipation and diarrhea.   Genitourinary:  Negative for dysuria.   Skin:  Negative for pallor and rash.   Neurological:  Negative for dizziness, weakness, numbness and headaches.         PHQ-2/9 Depression Screening    Little interest or pleasure in doing things: 0 - not at all  Feeling down, depressed, or hopeless: 0 - not at all  PHQ-2 Score: 0  PHQ-2 Interpretation: Negative depression screen             Objective:    /82 (BP Location: Left arm, Patient Position: Sitting, Cuff Size: Adult)   Pulse 87   Resp 18   Ht 5' 5\" (1.651 m)   Wt 64 kg (141 lb)   SpO2 97%   BMI 23.46 kg/m²      Physical Exam  Vitals and nursing note reviewed.   Constitutional:       Appearance: Normal appearance. He is well-developed.   HENT:      Head: Normocephalic and atraumatic.      Right Ear: External ear normal.      Left Ear: External ear normal.      Nose: Nose normal.   Eyes:      Conjunctiva/sclera: Conjunctivae normal.      Pupils: Pupils are equal, round, and reactive to light.   Cardiovascular:      Rate and Rhythm: Normal rate and regular rhythm.      Heart sounds: Normal heart sounds. No murmur heard.     No gallop.   Pulmonary:      Effort: Pulmonary effort is normal. No respiratory distress.      Breath sounds: Normal breath sounds. No wheezing or rales.   Abdominal:      General: There is no distension.      Palpations: Abdomen is soft.      Tenderness: There is no abdominal tenderness.   Musculoskeletal:         General: No tenderness or deformity.      Cervical back: Normal range of motion and neck supple.      Right lower leg: No edema.      Left lower leg: No edema.   Lymphadenopathy:      Cervical: No cervical adenopathy.   Skin:     Coloration: Skin is not pale.      Findings: No erythema or rash.   Neurological:      General: No focal deficit present.      Mental Status: He is alert. Mental " status is at baseline.   Psychiatric:         Mood and Affect: Mood normal.         Behavior: Behavior normal.           Recent Results (from the past 8736 hour(s))   POCT hemoglobin A1c    Collection Time: 10/24/23  4:50 PM   Result Value Ref Range    Hemoglobin A1C 7.0 (A) 6.5   POCT hemoglobin A1c    Collection Time: 01/25/24  1:40 PM   Result Value Ref Range    Hemoglobin A1C 7.5 (A) 6.5   Lipid Panel with Direct LDL reflex    Collection Time: 02/16/24 12:42 PM   Result Value Ref Range    Total Cholesterol 153 <200 mg/dL    HDL 53 > OR = 40 mg/dL    Triglycerides 57 <150 mg/dL    LDL Calculated 86 mg/dL (calc)    Chol HDLC Ratio 2.9 <5.0 (calc)    Non-HDL Cholesterol 100 <130 mg/dL (calc)   PTH, Intact and Calcium    Collection Time: 02/16/24 12:42 PM   Result Value Ref Range    PTH, Intact 69 16 - 77 pg/mL    Calcium 9.2 8.6 - 10.3 mg/dL   Magnesium    Collection Time: 02/16/24 12:42 PM   Result Value Ref Range    Magnesium, Serum 2.4 1.5 - 2.5 mg/dL   Phosphorus    Collection Time: 02/16/24 12:42 PM   Result Value Ref Range    Phosphorus, Serum 3.6 2.1 - 4.3 mg/dL   TIBC    Collection Time: 02/16/24 12:42 PM   Result Value Ref Range    Iron, Serum 78 50 - 180 mcg/dL    Total Iron Binding Capacity (TIBC) 248 (L) 250 - 425 mcg/dL (calc)    Iron Saturation 31 20 - 48 % (calc)   Comprehensive metabolic panel    Collection Time: 02/16/24 12:42 PM   Result Value Ref Range    Glucose, Random 163 (H) 65 - 99 mg/dL    BUN 46 (H) 7 - 25 mg/dL    Creatinine 2.18 (H) 0.70 - 1.22 mg/dL    eGFR 28 (L) > OR = 60 mL/min/1.73m2    SL AMB BUN/CREATININE RATIO 21 6 - 22 (calc)    Sodium 141 135 - 146 mmol/L    Potassium 3.9 3.5 - 5.3 mmol/L    Chloride 109 98 - 110 mmol/L    CO2 23 20 - 32 mmol/L    Calcium 9.2 8.6 - 10.3 mg/dL    Protein, Total 7.2 6.1 - 8.1 g/dL    Albumin 3.9 3.6 - 5.1 g/dL    Globulin 3.3 1.9 - 3.7 g/dL (calc)    Albumin/Globulin Ratio 1.2 1.0 - 2.5 (calc)    TOTAL BILIRUBIN 0.5 0.2 - 1.2 mg/dL    Alkaline  Phosphatase 52 35 - 144 U/L    AST 14 10 - 35 U/L    ALT 11 9 - 46 U/L   CBC and differential    Collection Time: 02/16/24 12:42 PM   Result Value Ref Range    White Blood Cell Count 8.0 3.8 - 10.8 Thousand/uL    Red Blood Cell Count 3.25 (L) 4.20 - 5.80 Million/uL    Hemoglobin 10.1 (L) 13.2 - 17.1 g/dL    HCT 31.8 (L) 38.5 - 50.0 %    MCV 97.8 80.0 - 100.0 fL    MCH 31.1 27.0 - 33.0 pg    MCHC 31.8 (L) 32.0 - 36.0 g/dL    RDW 11.7 11.0 - 15.0 %    Platelet Count 177 140 - 400 Thousand/uL    SL AMB MPV 11.2 7.5 - 12.5 fL    Neutrophils (Absolute) 5,272 1,500 - 7,800 cells/uL    Lymphocytes (Absolute) 1,656 850 - 3,900 cells/uL    Monocytes (Absolute) 712 200 - 950 cells/uL    Eosinophils (Absolute) 312 15 - 500 cells/uL    Basophils ABS 48 0 - 200 cells/uL    Neutrophils 65.9 %    Lymphocytes 20.7 %    Monocytes 8.9 %    Eosinophils 3.9 %    Basophils PCT 0.6 %    Always Message     Ferritin    Collection Time: 02/16/24 12:42 PM   Result Value Ref Range    Ferritin 284 24 - 380 ng/mL   Vitamin D 25 hydroxy    Collection Time: 02/16/24 12:42 PM   Result Value Ref Range    Vitamin D, 25-Hydroxy, Serum 63 30 - 100 ng/mL   Protein, Total w/Creat, Random Urine    Collection Time: 02/16/24 12:42 PM   Result Value Ref Range    Creatinine, Urine 80 20 - 320 mg/dL    Protein/Creat Ratio 400 (H) 25 - 148 mg/g creat    Protein/Creat Ratio 0.400 (H) 0.025 - 0.148 mg/mg creat    Total Protein, Urine 32 (H) 5 - 25 mg/dL   POCT hemoglobin A1c    Collection Time: 04/25/24  3:58 PM   Result Value Ref Range    Hemoglobin A1C 6.9 (A) 6.5   Lipid Panel with Direct LDL reflex    Collection Time: 07/23/24  1:25 PM   Result Value Ref Range    Total Cholesterol 137 <200 mg/dL    HDL 46 > OR = 40 mg/dL    Triglycerides 60 <150 mg/dL    LDL Calculated 77 mg/dL (calc)    Chol HDLC Ratio 3.0 <5.0 (calc)    Non-HDL Cholesterol 91 <130 mg/dL (calc)   PTH, Intact and Calcium    Collection Time: 07/23/24  1:25 PM   Result Value Ref Range    PTH,  Intact 75 16 - 77 pg/mL    Calcium 9.1 8.6 - 10.3 mg/dL   Magnesium    Collection Time: 07/23/24  1:25 PM   Result Value Ref Range    Magnesium, Serum 2.4 1.5 - 2.5 mg/dL   Phosphorus    Collection Time: 07/23/24  1:25 PM   Result Value Ref Range    Phosphorus, Serum 3.5 2.1 - 4.3 mg/dL   Basic metabolic panel    Collection Time: 07/23/24  1:25 PM   Result Value Ref Range    Glucose, Random 192 (H) 65 - 99 mg/dL    BUN 60 (H) 7 - 25 mg/dL    Creatinine 3.24 (H) 0.70 - 1.22 mg/dL    eGFR 18 (L) > OR = 60 mL/min/1.73m2    SL AMB BUN/CREATININE RATIO 19 6 - 22 (calc)    Sodium 139 135 - 146 mmol/L    Potassium 3.8 3.5 - 5.3 mmol/L    Chloride 106 98 - 110 mmol/L    CO2 24 20 - 32 mmol/L    Calcium 9.1 8.6 - 10.3 mg/dL   CBC and differential    Collection Time: 07/23/24  1:25 PM   Result Value Ref Range    White Blood Cell Count 7.7 3.8 - 10.8 Thousand/uL    Red Blood Cell Count 2.95 (L) 4.20 - 5.80 Million/uL    Hemoglobin 9.1 (L) 13.2 - 17.1 g/dL    HCT 28.6 (L) 38.5 - 50.0 %    MCV 96.9 80.0 - 100.0 fL    MCH 30.8 27.0 - 33.0 pg    MCHC 31.8 (L) 32.0 - 36.0 g/dL    RDW 11.5 11.0 - 15.0 %    Platelet Count 179 140 - 400 Thousand/uL    SL AMB MPV 10.8 7.5 - 12.5 fL    Neutrophils (Absolute) 5,213 1,500 - 7,800 cells/uL    Lymphocytes (Absolute) 1,263 850 - 3,900 cells/uL    Monocytes (Absolute) 816 200 - 950 cells/uL    Eosinophils (Absolute) 370 15 - 500 cells/uL    Basophils ABS 39 0 - 200 cells/uL    Neutrophils 67.7 %    Lymphocytes 16.4 %    Monocytes 10.6 %    Eosinophils 4.8 %    Basophils PCT 0.5 %    Always Message     Ferritin    Collection Time: 07/23/24  1:25 PM   Result Value Ref Range    Ferritin 407 (H) 24 - 380 ng/mL   Protein, Total w/Creat, Random Urine    Collection Time: 07/23/24  1:25 PM   Result Value Ref Range    Creatinine, Urine 110 20 - 320 mg/dL    Protein/Creat Ratio 282 (H) 25 - 148 mg/g creat    Protein/Creat Ratio 0.282 (H) 0.025 - 0.148 mg/mg creat    Total Protein, Urine 31 (H) 5 - 25  mg/dL   Iron, TIBC and Ferritin Panel    Collection Time: 08/23/24  2:18 PM   Result Value Ref Range    Iron, Serum 79 50 - 180 mcg/dL    Total Iron Binding Capacity (TIBC) 254 250 - 425 mcg/dL (calc)    Iron Saturation 31 20 - 48 % (calc)    Ferritin 411 (H) 24 - 380 ng/mL   Basic metabolic panel    Collection Time: 08/23/24  2:18 PM   Result Value Ref Range    Glucose, Random 171 (H) 65 - 99 mg/dL    BUN 66 (H) 7 - 25 mg/dL    Creatinine 3.28 (H) 0.70 - 1.22 mg/dL    eGFR 17 (L) > OR = 60 mL/min/1.73m2    SL AMB BUN/CREATININE RATIO 20 6 - 22 (calc)    Sodium 138 135 - 146 mmol/L    Potassium 3.9 3.5 - 5.3 mmol/L    Chloride 105 98 - 110 mmol/L    CO2 23 20 - 32 mmol/L    Calcium 9.6 8.6 - 10.3 mg/dL   CBC and differential    Collection Time: 08/23/24  2:18 PM   Result Value Ref Range    White Blood Cell Count 8.0 3.8 - 10.8 Thousand/uL    Red Blood Cell Count 3.08 (L) 4.20 - 5.80 Million/uL    Hemoglobin 9.7 (L) 13.2 - 17.1 g/dL    HCT 31.0 (L) 38.5 - 50.0 %    .6 (H) 80.0 - 100.0 fL    MCH 31.5 27.0 - 33.0 pg    MCHC 31.3 (L) 32.0 - 36.0 g/dL    RDW 11.6 11.0 - 15.0 %    Platelet Count 224 140 - 400 Thousand/uL    SL AMB MPV 10.5 7.5 - 12.5 fL    Neutrophils (Absolute) 5,720 1,500 - 7,800 cells/uL    Lymphocytes (Absolute) 1,112 850 - 3,900 cells/uL    Monocytes (Absolute) 744 200 - 950 cells/uL    Eosinophils (Absolute) 360 15 - 500 cells/uL    Basophils ABS 64 0 - 200 cells/uL    Neutrophils 71.5 %    Lymphocytes 13.9 %    Monocytes 9.3 %    Eosinophils 4.5 %    Basophils PCT 0.8 %   Hemoglobin A1c (w/out EAG)    Collection Time: 08/23/24  2:24 PM   Result Value Ref Range    Hemoglobin A1C 6.9 (H) <5.7 % of total Hgb   CBC and differential    Collection Time: 09/09/24  7:31 PM   Result Value Ref Range    WBC 8.64 4.31 - 10.16 Thousand/uL    RBC 2.78 (L) 3.88 - 5.62 Million/uL    Hemoglobin 9.0 (L) 12.0 - 17.0 g/dL    Hematocrit 28.2 (L) 36.5 - 49.3 %     (H) 82 - 98 fL    MCH 32.4 26.8 - 34.3 pg     MCHC 31.9 31.4 - 37.4 g/dL    RDW 12.6 11.6 - 15.1 %    MPV 10.7 8.9 - 12.7 fL    Platelets 159 149 - 390 Thousands/uL    nRBC 0 /100 WBCs    Segmented % 68 43 - 75 %    Immature Grans % 0 0 - 2 %    Lymphocytes % 17 14 - 44 %    Monocytes % 10 4 - 12 %    Eosinophils Relative 4 0 - 6 %    Basophils Relative 1 0 - 1 %    Absolute Neutrophils 5.89 1.85 - 7.62 Thousands/µL    Absolute Immature Grans 0.03 0.00 - 0.20 Thousand/uL    Absolute Lymphocytes 1.47 0.60 - 4.47 Thousands/µL    Absolute Monocytes 0.85 0.17 - 1.22 Thousand/µL    Eosinophils Absolute 0.35 0.00 - 0.61 Thousand/µL    Basophils Absolute 0.05 0.00 - 0.10 Thousands/µL   Comprehensive metabolic panel    Collection Time: 09/09/24  7:31 PM   Result Value Ref Range    Sodium 134 (L) 135 - 147 mmol/L    Potassium 3.7 3.5 - 5.3 mmol/L    Chloride 102 96 - 108 mmol/L    CO2 22 21 - 32 mmol/L    ANION GAP 10 4 - 13 mmol/L    BUN 69 (H) 5 - 25 mg/dL    Creatinine 3.71 (H) 0.60 - 1.30 mg/dL    Glucose 302 (H) 65 - 140 mg/dL    Calcium 9.1 8.4 - 10.2 mg/dL    AST 12 (L) 13 - 39 U/L    ALT 9 7 - 52 U/L    Alkaline Phosphatase 38 34 - 104 U/L    Total Protein 6.8 6.4 - 8.4 g/dL    Albumin 3.6 3.5 - 5.0 g/dL    Total Bilirubin 0.41 0.20 - 1.00 mg/dL    eGFR 13 ml/min/1.73sq m   Hemoglobin A1C    Collection Time: 09/09/24  7:31 PM   Result Value Ref Range    Hemoglobin A1C 6.7 (H) Normal 4.0-5.6%; PreDiabetic 5.7-6.4%; Diabetic >=6.5%; Glycemic control for adults with diabetes <7.0% %     mg/dl   Lipid panel    Collection Time: 09/09/24  7:31 PM   Result Value Ref Range    Cholesterol 117 See Comment mg/dL    Triglycerides 72 See Comment mg/dL    HDL, Direct 44 >=40 mg/dL    LDL Calculated 59 0 - 100 mg/dL    Non-HDL-Chol (CHOL-HDL) 73 mg/dl   TIBC Panel (incl. Iron, TIBC, % Iron Saturation)    Collection Time: 09/09/24  7:31 PM   Result Value Ref Range    Iron Saturation 26 15 - 50 %    TIBC 234 (L) 250 - 450 ug/dL    Iron 62 50 - 212 ug/dL    UIBC 172 155  - 355 ug/dL   Ferritin    Collection Time: 09/09/24  7:31 PM   Result Value Ref Range    Ferritin 317 24 - 336 ng/mL   ECG 12 lead    Collection Time: 09/09/24  7:33 PM   Result Value Ref Range    Ventricular Rate 66 BPM    Atrial Rate 66 BPM    MI Interval 194 ms    QRSD Interval 92 ms    QT Interval 432 ms    QTC Interval 452 ms    P Axis 52 degrees    QRS Axis 29 degrees    T Wave Axis 52 degrees   Platelet count    Collection Time: 09/10/24 12:03 AM   Result Value Ref Range    Platelets 153 149 - 390 Thousands/uL    MPV 10.2 8.9 - 12.7 fL   Haptoglobin    Collection Time: 09/10/24 12:03 AM   Result Value Ref Range    Haptoglobin 145 38 - 329 mg/dL   Fingerstick Glucose (POCT)    Collection Time: 09/10/24 12:04 AM   Result Value Ref Range    POC Glucose 174 (H) 65 - 140 mg/dl   Lipid Panel with Direct LDL reflex    Collection Time: 09/10/24  5:43 AM   Result Value Ref Range    Cholesterol 132 See Comment mg/dL    Triglycerides 56 See Comment mg/dL    HDL, Direct 43 >=40 mg/dL    LDL Calculated 78 0 - 100 mg/dL   Basic metabolic panel    Collection Time: 09/10/24  5:43 AM   Result Value Ref Range    Sodium 136 135 - 147 mmol/L    Potassium 5.8 (H) 3.5 - 5.3 mmol/L    Chloride 107 96 - 108 mmol/L    CO2 21 21 - 32 mmol/L    ANION GAP 8 4 - 13 mmol/L    BUN 63 (H) 5 - 25 mg/dL    Creatinine 3.22 (H) 0.60 - 1.30 mg/dL    Glucose 126 65 - 140 mg/dL    Calcium 8.8 8.4 - 10.2 mg/dL    eGFR 16 ml/min/1.73sq m   Magnesium    Collection Time: 09/10/24  5:43 AM   Result Value Ref Range    Magnesium 2.7 1.9 - 2.7 mg/dL   CBC (With Platelets)    Collection Time: 09/10/24  5:43 AM   Result Value Ref Range    WBC 7.01 4.31 - 10.16 Thousand/uL    RBC 2.86 (L) 3.88 - 5.62 Million/uL    Hemoglobin 9.1 (L) 12.0 - 17.0 g/dL    Hematocrit 29.0 (L) 36.5 - 49.3 %     (H) 82 - 98 fL    MCH 31.8 26.8 - 34.3 pg    MCHC 31.4 31.4 - 37.4 g/dL    RDW 12.8 11.6 - 15.1 %    Platelets 148 (L) 149 - 390 Thousands/uL    MPV 11.2 8.9 - 12.7  fL   Fingerstick Glucose (POCT)    Collection Time: 09/10/24 11:25 AM   Result Value Ref Range    POC Glucose 283 (H) 65 - 140 mg/dl   Echo complete w/ contrast if indicated    Collection Time: 09/10/24  3:56 PM   Result Value Ref Range    BSA 1.67 m2    A4C EF 68 %    LVIDd 3.90 cm    LVIDS 3.20 cm    IVSd 1.10 cm    LVPWd 1.10 cm    FS 18 28 - 44    MV E' Tissue Velocity Septal 7 cm/s    LA Volume Index (BP) 21.6 mL/m2    E/A ratio 0.67     E wave deceleration time 335 ms    MV Peak E Carlo 62 cm/s    MV Peak A Carlo 0.92 m/s    RVID d 3.0 cm    LA size 3.2 cm    LA length (A2C) 5.60 cm    LA volume (BP) 36 mL    RA 2D Volume 17.0 mL    RAA A4C 10.3 cm2    MV stenosis pressure 1/2 time 97 ms    MV valve area p 1/2 method 2.27     TR Peak Carlo 2.4 m/s    Triscuspid Valve Regurgitation Peak Gradient 23.0 mmHg    Ao root 3.30 cm    Asc Ao 3.3 cm    Tricuspid valve peak regurgitation velocity 2.41 m/s    Left ventricular stroke volume (2D) 27.00 mL    IVS 1.1 cm    LEFT VENTRICLE SYSTOLIC VOLUME (MOD BIPLANE) 2D 41 mL    LV DIASTOLIC VOLUME (MOD BIPLANE) 2D 68 mL    Left Atrium Area-systolic Four Chamber 11.1 cm2    Left Atrium Area-systolic Apical Two Chamber 19.5 cm2    LVSV, 2D 27 mL    LV EF 65    Fingerstick Glucose (POCT)    Collection Time: 09/10/24  5:55 PM   Result Value Ref Range    POC Glucose 92 65 - 140 mg/dl   Fingerstick Glucose (POCT)    Collection Time: 09/10/24  9:00 PM   Result Value Ref Range    POC Glucose 99 65 - 140 mg/dl   CBC    Collection Time: 09/11/24  4:38 AM   Result Value Ref Range    WBC 7.61 4.31 - 10.16 Thousand/uL    RBC 2.98 (L) 3.88 - 5.62 Million/uL    Hemoglobin 9.5 (L) 12.0 - 17.0 g/dL    Hematocrit 29.6 (L) 36.5 - 49.3 %    MCV 99 (H) 82 - 98 fL    MCH 31.9 26.8 - 34.3 pg    MCHC 32.1 31.4 - 37.4 g/dL    RDW 12.6 11.6 - 15.1 %    Platelets 166 149 - 390 Thousands/uL    MPV 10.9 8.9 - 12.7 fL   Basic metabolic panel    Collection Time: 09/11/24  4:38 AM   Result Value Ref Range     Sodium 139 135 - 147 mmol/L    Potassium 3.5 3.5 - 5.3 mmol/L    Chloride 107 96 - 108 mmol/L    CO2 23 21 - 32 mmol/L    ANION GAP 9 4 - 13 mmol/L    BUN 58 (H) 5 - 25 mg/dL    Creatinine 3.00 (H) 0.60 - 1.30 mg/dL    Glucose 130 65 - 140 mg/dL    Calcium 9.1 8.4 - 10.2 mg/dL    eGFR 17 ml/min/1.73sq m   Magnesium    Collection Time: 09/11/24  4:38 AM   Result Value Ref Range    Magnesium 2.5 1.9 - 2.7 mg/dL   Fingerstick Glucose (POCT)    Collection Time: 09/11/24  8:05 AM   Result Value Ref Range    POC Glucose 122 65 - 140 mg/dl   Fingerstick Glucose (POCT)    Collection Time: 09/11/24 11:23 AM   Result Value Ref Range    POC Glucose 195 (H) 65 - 140 mg/dl       Laboratory Results: I have personally reviewed the pertinent laboratory results/reports     Radiology/Other Diagnostic Testing Results: I have reviewed the following imaging and agree with the interpretation below.    VAS carotid complete study    Result Date: 9/10/2024   [facility] REPORT CLINICAL: Indications: Patient presents to evaluate for carotid artery stenosis s/p recent TIA/CVA. The patient admits symptoms of right arm weakness and slurred speech. Operative History: Cystoscopy, bladder biopsy Risk Factors The patient has history of HTN, Diabetes (Yes) and CKD. Clinical Right Pressure:  138/76 mm Hg, Left Pressure:  139/63 mm Hg.  FINDINGS:  Right        Impression  PSV  EDV (cm/s)  Direction of Flow  Ratio  Dist. ICA                 64          27                      1.04  Mid. ICA                  88          33                      1.44  Prox. ICA    1 - 49%      99          33                      1.62  Dist CCA                  47          14                            Mid CCA                   61          17                      1.10  Prox CCA                  56          17                            Ext Carotid               48           9                      0.78  Prox Vert                 86          22  Antegrade                  Subclavian               122           7                             Left         Impression  PSV  EDV (cm/s)  Direction of Flow  Ratio  Dist. ICA                 61          21                      0.82  Mid. ICA                  72          27                      0.97  Prox. ICA    1 - 49%     102          33                      1.38  Dist CCA                  51          13                            Mid CCA                   74          14                      0.68  Prox CCA                 109          25                            Ext Carotid               38           8                      0.51  Prox Vert                 52          20  Antegrade                 Subclavian                57           0                               CONCLUSION: Impression  RIGHT: There is <50% stenosis noted in the internal carotid artery. Plaque is heterogenous and irregular. Vertebral artery flow is antegrade. There is no significant subclavian artery disease. LEFT: There is <50% stenosis noted in the internal carotid artery. Plaque is heterogenous and irregular. Vertebral artery flow is antegrade. There is no significant subclavian artery disease.  Compared to previous study on 4/12/19, there is no significant change.  SIGNATURE: Electronically Signed by: GOLDIE LUNA MD FACP [facility] on 2024-09-10 05:45:58 PM    MRA head wo contrast    Result Date: 9/10/2024  MRA BRAIN WITHOUT CONTRAST INDICATION:  stroke COMPARISON: Recent brain MRI 9/7/2024 TECHNIQUE:  Axial 3-D time-of-flight imaging with 3-D reconstructions performed without contrast. IV Contrast:  Not administered. FINDINGS: IMAGE QUALITY: Suboptimal. ANATOMY INTERNAL CAROTID ARTERIES:  Normal flow related signal of the distal cervical, petrous and cavernous segments of the internal carotid arteries.  Normal ICA terminus. ANTERIOR CEREBRAL ARTERY CIRCULATION:  Normal A1 segments.  Normal anterior communicating artery.  Normal flow-related signal of the  anterior cerebral arteries. MIDDLE CEREBRAL ARTERY CIRCULATION:  The M1 segment and middle cerebral artery branches demonstrate normal flow-related signal. DISTAL VERTEBRAL ARTERIES: Early distal portion of bilateral intracranial vertebral arteries are included in the field-of-view which are patent without significant stenosis. BASILAR ARTERY:  Normal. POSTERIOR CEREBRAL ARTERIES: There is small right P1 segment with persistent fetal origin of the right posterior cerebral artery (normal anatomic variant). There is relatively diminished signal in the right posterior cerebral artery at and beyond mid P2 segment. Left posterior cerebral artery demonstrates normal flow-related signal. Normal right posterior communicating artery. Absent/hypoplastic left posterior communicating artery.     1.  Somewhat limited assessment of the right PCA at and beyond mid P2 segment where there is relatively diminished signal which could be artifactual or technique related versus stenosis or occlusive disease. Also proximal to mid intracranial vertebral arteries are not included in the field-of-view. Consider CT angiogram if further evaluation of these territories are desired. 2.  The remainder of the major vessels of the [place] are patent without high-grade stenosis. Workstation performed: [id number]     Echo complete w/ contrast if indicated    Result Date: 9/10/2024    Left Ventricle: Left ventricular cavity size is normal. Wall thickness is normal. The left ventricular ejection fraction is 65%. Systolic function is normal. Wall motion is normal. Diastolic function is mildly abnormal, consistent with grade I (abnormal) relaxation.   Right Ventricle: Systolic function is low normal.        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  He is off chlorthalidone and hydrochlorothiazide.  Continue Farxiga.  He will follow-up with neurology for CVA and wife would like to discuss dementia treatment at that time as well, nephrology for his CKD               Read package inserts for all medications before starting a new medications, call me if you have any questions.    Patient was given opportunity to ask questions and all questions were answered.    Disclaimer: Portions of the record may have been created with voice recognition software. Occasional wrong word or \"sound a like\" substitutions may have occurred due to the inherent limitations of voice recognition software. Read the chart carefully and recognize, using context, where substitutions have occurred. I have used the Epic copy/forward function to compose this note. I have reviewed my current note to ensure it reflects the current patient status, exam, assessment and plan.    "

## 2024-10-04 ENCOUNTER — TELEPHONE (OUTPATIENT)
Age: 89
End: 2024-10-04

## 2024-10-04 NOTE — LETTER
October 8, 2024     Patient: Jeffry Almanzar  YOB: 1934  Date of Visit: 10/4/2024      To Whom it May Concern:    Jeffry Almanzar is under my professional care. His wife Antony Almanzar is his primary care taker and may speak for him. He has medical conditions in which leave him needing a care taker.      Any questions please do not hesitate to call [phone].        Sincerely,        Debbie Maloney MD      CC: No Recipients

## 2024-10-04 NOTE — TELEPHONE ENCOUNTER
Patient's wife stated  told her to get a surrogate letter to speak for her , Jeffry.  He has dementia and diffinculty talking so they need a letter from the doctor stating she can speak for him.       [phone] fax number send letter       Wife is calling to ask who we are sending this letter to.  Who is requesting this.  Michi take message and send to office.  Thank you

## 2024-10-07 NOTE — TELEPHONE ENCOUNTER
I am not sure what is going on here, but she is the primary care taker and it is ok to do the letter

## 2024-10-25 ENCOUNTER — TELEPHONE (OUTPATIENT)
Dept: NEUROLOGY | Facility: CLINIC | Age: 89
End: 2024-10-25

## 2024-10-25 NOTE — TELEPHONE ENCOUNTER
Called and spoke with patient spouse in regards to patient appointment. Explained to spouse patient was recommended to see a neurovascular provider, whom he currently wasn't schedule with. Provided patient spouse with another appointment with Dr. Scott in the [place] office. Provided date, time and location address. Verified with patient home address and sent appointment card in the mail. Patient confirmed appointment, however stated wasn't sure if they could make it, provided patient with call back number in case of any changes.

## 2024-10-29 ENCOUNTER — OFFICE VISIT (OUTPATIENT)
Dept: FAMILY MEDICINE CLINIC | Facility: CLINIC | Age: 89
End: 2024-10-29
Payer: MEDICARE

## 2024-10-29 VITALS
TEMPERATURE: 98.4 F | WEIGHT: 137 LBS | HEIGHT: 65 IN | OXYGEN SATURATION: 93 % | HEART RATE: 75 BPM | BODY MASS INDEX: 22.82 KG/M2 | DIASTOLIC BLOOD PRESSURE: 62 MMHG | SYSTOLIC BLOOD PRESSURE: 120 MMHG | RESPIRATION RATE: 16 BRPM

## 2024-10-29 DIAGNOSIS — Z71.89 ADVANCED CARE PLANNING/COUNSELING DISCUSSION: ICD-10-CM

## 2024-10-29 DIAGNOSIS — I12.9 PARENCHYMAL RENAL HYPERTENSION, STAGE 1 THROUGH STAGE 4 OR UNSPECIFIED CHRONIC KIDNEY DISEASE: ICD-10-CM

## 2024-10-29 DIAGNOSIS — N18.4 STAGE 4 CHRONIC KIDNEY DISEASE (HCC): ICD-10-CM

## 2024-10-29 DIAGNOSIS — N18.4 ANEMIA IN STAGE 4 CHRONIC KIDNEY DISEASE  (HCC): ICD-10-CM

## 2024-10-29 DIAGNOSIS — I63.81 LACUNAR CEREBROVASCULAR ACCIDENT (CVA) (HCC): ICD-10-CM

## 2024-10-29 DIAGNOSIS — E78.2 MIXED HYPERLIPIDEMIA: ICD-10-CM

## 2024-10-29 DIAGNOSIS — D63.1 ANEMIA IN STAGE 4 CHRONIC KIDNEY DISEASE  (HCC): ICD-10-CM

## 2024-10-29 DIAGNOSIS — E55.9 MILD VITAMIN D DEFICIENCY: ICD-10-CM

## 2024-10-29 DIAGNOSIS — Z00.00 MEDICARE ANNUAL WELLNESS VISIT, SUBSEQUENT: Primary | ICD-10-CM

## 2024-10-29 DIAGNOSIS — Z71.0 DISCUSSION ABOUT ADVANCE CARE PLANNING HELD WITH FAMILY MEMBER: ICD-10-CM

## 2024-10-29 DIAGNOSIS — R80.1 PERSISTENT PROTEINURIA: ICD-10-CM

## 2024-10-29 DIAGNOSIS — N18.4 BENIGN HYPERTENSION WITH CKD (CHRONIC KIDNEY DISEASE) STAGE IV (HCC): ICD-10-CM

## 2024-10-29 DIAGNOSIS — I12.9 BENIGN HYPERTENSION WITH CKD (CHRONIC KIDNEY DISEASE) STAGE IV (HCC): ICD-10-CM

## 2024-10-29 PROCEDURE — 99214 OFFICE O/P EST MOD 30 MIN: CPT | Performed by: FAMILY MEDICINE

## 2024-10-29 PROCEDURE — G0439 PPPS, SUBSEQ VISIT: HCPCS | Performed by: FAMILY MEDICINE

## 2024-10-29 RX ORDER — LOSARTAN POTASSIUM 25 MG/1
25 TABLET ORAL DAILY
Qty: 90 TABLET | Refills: 1 | Status: SHIPPED | OUTPATIENT
Start: 2024-10-29

## 2024-10-29 RX ORDER — CLOPIDOGREL BISULFATE 75 MG/1
75 TABLET ORAL DAILY
Qty: 90 TABLET | Refills: 3 | Status: SHIPPED | OUTPATIENT
Start: 2024-10-29 | End: 2025-10-24

## 2024-10-29 NOTE — ASSESSMENT & PLAN NOTE
Lab Results   Component Value Date    EGFR 17 09/11/2024    EGFR 16 09/10/2024    EGFR 13 09/09/2024    CREATININE 3.00 (H) 09/11/2024    CREATININE 3.22 (H) 09/10/2024    CREATININE 3.71 (H) 09/09/2024       Orders:    losartan (COZAAR) 25 mg tablet; Take 1 tablet (25 mg total) by mouth daily

## 2024-10-29 NOTE — PATIENT INSTRUCTIONS
Medicare Preventive Visit Patient Instructions  Thank you for completing your Welcome to Medicare Visit or Medicare Annual Wellness Visit today. Your next wellness visit will be due in one year (10/30/2025).  The screening/preventive services that you may require over the next 5-10 years are detailed below. Some tests may not apply to you based off risk factors and/or age. Screening tests ordered at today's visit but not completed yet may show as past due. Also, please note that scanned in results may not display below.  Preventive Screenings:  Service Recommendations Previous Testing/Comments   Colorectal Cancer Screening  Colonoscopy    Fecal Occult Blood Test (FOBT)/Fecal Immunochemical Test (FIT)  Fecal DNA/Cologuard Test  Flexible Sigmoidoscopy Age: 45-75 years old   Colonoscopy: every 10 years (May be performed more frequently if at higher risk)  OR  FOBT/FIT: every 1 year  OR  Cologuard: every 3 years  OR  Sigmoidoscopy: every 5 years  Screening may be recommended earlier than age 45 if at higher risk for colorectal cancer. Also, an individualized decision between you and your healthcare provider will decide whether screening between the ages of 76-85 would be appropriate. Colonoscopy: Not on file  FOBT/FIT: 03/16/2022  Cologuard: Not on file  Sigmoidoscopy: Not on file    Screening Not Indicated     Prostate Cancer Screening Individualized decision between patient and health care provider in men between ages of 55-69   Medicare will cover every 12 months beginning on the day after your 50th birthday PSA: 0.36 ng/mL     Screening Not Indicated     Hepatitis C Screening Once for adults born between 1945 and 1965  More frequently in patients at high risk for Hepatitis C Hep C Antibody: Not on file        Diabetes Screening 1-2 times per year if you're at risk for diabetes or have pre-diabetes Fasting glucose: No results in last 5 years (No results in last 5 years)  A1C: 6.7 % (9/9/2024)  Screening Not  Indicated  History Diabetes   Cholesterol Screening Once every 5 years if you don't have a lipid disorder. May order more often based on risk factors. Lipid panel: 09/10/2024  History Lipid Disorder  Risks and Benefits Discussed  Due for Lipid Panel      Other Preventive Screenings Covered by Medicare:  Abdominal Aortic Aneurysm (AAA) Screening: covered once if your at risk. You're considered to be at risk if you have a family history of AAA or a male between the age of 65-75 who smoking at least 100 cigarettes in your lifetime.  Lung Cancer Screening: covers low dose CT scan once per year if you meet all of the following conditions: (1) Age 55-77; (2) No signs or symptoms of lung cancer; (3) Current smoker or have quit smoking within the last 15 years; (4) You have a tobacco smoking history of at least 20 pack years (packs per day x number of years you smoked); (5) You get a written order from a healthcare provider.  Glaucoma Screening: covered annually if you're considered high risk: (1) You have diabetes OR (2) Family history of glaucoma OR (3)  aged 50 and older OR (4)  American aged 65 and older  Osteoporosis Screening: covered every 2 years if you meet one of the following conditions: (1) Have a vertebral abnormality; (2) On glucocorticoid therapy for more than 3 months; (3) Have primary hyperparathyroidism; (4) On osteoporosis medications and need to assess response to drug therapy.  HIV Screening: covered annually if you're between the age of 15-65. Also covered annually if you are younger than 15 and older than 65 with risk factors for HIV infection. For pregnant patients, it is covered up to 3 times per pregnancy.    Immunizations:  Immunization Recommendations   Influenza Vaccine Annual influenza vaccination during flu season is recommended for all persons aged >= 6 months who do not have contraindications   Pneumococcal Vaccine   * Pneumococcal conjugate vaccine = PCV13 (Prevnar  13), PCV15 (Vaxneuvance), PCV20 (Prevnar 20)  * Pneumococcal polysaccharide vaccine = PPSV23 (Pneumovax) Adults 19-65 yo with certain risk factors or if 65+ yo  If never received any pneumonia vaccine: recommend Prevnar 20 (PCV20)  Give PCV20 if previously received 1 dose of PCV13 or PPSV23   Hepatitis B Vaccine 3 dose series if at intermediate or high risk (ex: diabetes, end stage renal disease, liver disease)   Respiratory syncytial virus (RSV) Vaccine - COVERED BY MEDICARE PART D  * RSVPreF3 (Arexvy) CDC recommends that adults 60 years of age and older may receive a single dose of RSV vaccine using shared clinical decision-making (SCDM)   Tetanus (Td) Vaccine - COST NOT COVERED BY MEDICARE PART B Following completion of primary series, a booster dose should be given every 10 years to maintain immunity against tetanus. Td may also be given as tetanus wound prophylaxis.   Tdap Vaccine - COST NOT COVERED BY MEDICARE PART B Recommended at least once for all adults. For pregnant patients, recommended with each pregnancy.   Shingles Vaccine (Shingrix) - COST NOT COVERED BY MEDICARE PART B  2 shot series recommended in those 19 years and older who have or will have weakened immune systems or those 50 years and older     Health Maintenance Due:  There are no preventive care reminders to display for this patient.  Immunizations Due:      Topic Date Due   • COVID-19 Vaccine (4 - 2023-24 season) 09/01/2024     Advance Directives   What are advance directives?  Advance directives are legal documents that state your wishes and plans for medical care. These plans are made ahead of time in case you lose your ability to make decisions for yourself. Advance directives can apply to any medical decision, such as the treatments you want, and if you want to donate organs.   What are the types of advance directives?  There are many types of advance directives, and each state has rules about how to use them. You may choose a  combination of any of the following:  Living will:  This is a written record of the treatment you want. You can also choose which treatments you do not want, which to limit, and which to stop at a certain time. This includes surgery, medicine, IV fluid, and tube feedings.   Durable power of  for healthcare (DPAHC):  This is a written record that states who you want to make healthcare choices for you when you are unable to make them for yourself. This person, called a proxy, is usually a family member or a friend. You may choose more than 1 proxy.  Do not resuscitate (DNR) order:  A DNR order is used in case your heart stops beating or you stop breathing. It is a request not to have certain forms of treatment, such as CPR. A DNR order may be included in other types of advance directives.  Medical directive:  This covers the care that you want if you are in a coma, near death, or unable to make decisions for yourself. You can list the treatments you want for each condition. Treatment may include pain medicine, surgery, blood transfusions, dialysis, IV or tube feedings, and a ventilator (breathing machine).  Values history:  This document has questions about your views, beliefs, and how you feel and think about life. This information can help others choose the care that you would choose.  Why are advance directives important?  An advance directive helps you control your care. Although spoken wishes may be used, it is better to have your wishes written down. Spoken wishes can be misunderstood, or not followed. Treatments may be given even if you do not want them. An advance directive may make it easier for your family to make difficult choices about your care.       © Copyright [employer] 2018 Information is for End User's use only and may not be sold, redistributed or otherwise used for commercial purposes. All illustrations and images included in CareNotes® are the copyrighted property of [employer]  or [employer]

## 2024-10-29 NOTE — PROGRESS NOTES
Ambulatory Visit  Name: Jeffry Almanzar      : 1934      MRN: [id number]  Encounter Provider: Debbie Maloney MD  Encounter Date: 10/29/2024   Encounter department: [facility]    Assessment & Plan  Medicare annual wellness visit, subsequent         Mild vitamin D deficiency    Orders:    losartan (COZAAR) 25 mg tablet; Take 1 tablet (25 mg total) by mouth daily    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  (HCC)  Lab Results   Component Value Date    EGFR 17 2024    EGFR 16 09/10/2024    EGFR 13 2024    CREATININE 3.00 (H) 2024    CREATININE 3.22 (H) 09/10/2024    CREATININE 3.71 (H) 2024       Orders:    losartan (COZAAR) 25 mg tablet; Take 1 tablet (25 mg total) by mouth daily    Stage 4 chronic kidney disease (HCC)  Lab Results   Component Value Date    EGFR 17 2024    EGFR 16 09/10/2024    EGFR 13 2024    CREATININE 3.00 (H) 2024    CREATININE 3.22 (H) 09/10/2024    CREATININE 3.71 (H) 2024       Orders:    losartan (COZAAR) 25 mg tablet; Take 1 tablet (25 mg total) by mouth daily    Parenchymal renal hypertension, stage 1 through stage 4 or unspecified chronic kidney disease  Lab Results   Component Value Date    EGFR 17 2024    EGFR 16 09/10/2024    EGFR 13 2024    CREATININE 3.00 (H) 2024    CREATININE 3.22 (H) 09/10/2024    CREATININE 3.71 (H) 2024       Orders:    losartan (COZAAR) 25 mg tablet; Take 1 tablet (25 mg total) by mouth daily    Benign 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 (HCC)  Lab Results   Component Value Date    EGFR 17 2024    EGFR 16 09/10/2024    EGFR 13 2024    CREATININE 3.00 (H) 2024    CREATININE 3.22 (H) 09/10/2024    CREATININE 3.71 (H) 2024       Orders:    losartan (COZAAR) 25 mg tablet; Take 1 tablet (25 mg total) by mouth daily    Mixed hyperlipidemia    Orders:    losartan (COZAAR) 25 mg tablet; Take 1 tablet (25 mg total) by mouth  daily    Persistent proteinuria    Orders:    losartan (COZAAR) 25 mg tablet; Take 1 tablet (25 mg total) by mouth daily    Lacunar cerebrovascular accident (CVA) (HCC)    Orders:    clopidogrel (PLAVIX) 75 mg tablet; Take 1 tablet (75 mg total) by mouth daily    Discussion about advance care planning held with family member         Advanced care planning/counseling discussion            Serious Illness Conversation    1. What is your understanding now of where you are with your illness?  Prognostic Understanding: no understanding of prognosis     2. How much information about what is likely to be ahead with your illness would you like to have?  Information: patient does not want any information for him/herself  Wife decides everything     3. What did you (clinician) communicate to the patient?  Prognostic Communication: Uncertain - It can be difficult to predict what will happen with your illness. I hope you will continue to live well for a long time but I’m worried that you could get sick quickly, and I think it is important to prepare for that possibility.     4. If your health situation worsens, what are your most important goals?  Goals: be spiritually and emotionally at peace, be physically comfortable, be at home     5. What are the biggest fears and worries about the future and your health?  Cannot answer due to dementia  But per wife- another stroke or being bedbound, unavle to care for himself     6. What abilities are so critical to your life that you cannot imagine living without them?  Unacceptable Function: not being able to care for myself, including toileting and feeding     7. What gives you strength as you think about the future with your illness?     8. If you become sicker, how much are you willing to go through for the possibility of gaining more time?  Be in the hospital: Yes Have a feeding tube: No   Be in the ICU: Yes Live in a nursing home: No   Be on a ventilator: No Be uncomfortable: No    Be on dialysis: No Undergo aggressive test and/or procedures: No   9. How much does your proxy and family know about your priorities and wishes?  Discussion Discussion: extensive discussion with family about goals and wishes  With wife Antony     I’ve heard you say that being comfortable is really important to you. Keeping that in mind, and what we know about your illness, I recommend that we DNR/DNI , respect your wishes. This will help us make sure that your treatment plans reflect what’s important to you.     How does this plan sound to you? I will do everything I can to help you through this.  Patient verbalized understanding of the plan     I have spent 20 minutes speaking with my patient  and his wife on advanced care planning today or during this visit     Advanced directives  Five Wishes: Patient has Five Wishes, not in chart         Preventive health issues were discussed with patient, and age appropriate screening tests were ordered as noted in patient's After Visit Summary. Personalized health advice and appropriate referrals for health education or preventive services given if needed, as noted in patient's After Visit Summary.    History of Present Illness     With type 2 diabetes, hypertension, CKD-3 presents for follow up, wife gives history and all the information due to his advanced dementia and recent lacunar infarct presents with wife for Medicare annual wellness visit.  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   HENT:  Negative for congestion, rhinorrhea and sore throat.    Eyes:  Negative for discharge, redness and itching.   Respiratory:  Negative for  chest tightness, shortness of breath and wheezing.    Cardiovascular:  Negative for chest pain and palpitations.   Gastrointestinal:  Negative for abdominal pain, constipation and diarrhea.   Genitourinary:  Negative for dysuria.   Skin:  Negative for pallor and rash.   Neurological:  Negative for dizziness, weakness, numbness and headaches.     Medical History Reviewed by provider this encounter:  Tobacco  Allergies  Meds  Problems  Med Hx  Surg Hx  Fam Hx       Annual Wellness Visit Questionnaire   Jeffry is here for his Subsequent Wellness visit.     Health Risk Assessment:   Patient rates overall health as good. Patient feels that their physical health rating is same. Patient is very satisfied with their life. Eyesight was rated as slightly worse. Hearing was rated as slightly worse. Patient feels that their emotional and mental health rating is same. Patients states they are sometimes angry. Patient states they are sometimes unusually tired/fatigued. Pain experienced in the last 7 days has been none. Patient states that he has experienced no weight loss or gain in last 6 months.     Depression Screening:   PHQ-2 Score: 2      Fall Risk Screening:   In the past year, patient has experienced: no history of falling in past year      Home Safety:  Patient does not have trouble with stairs inside or outside of their home. Patient has working smoke alarms and has working carbon monoxide detector. Home safety hazards include: none.     Nutrition:   Current diet is Regular, No Added Salt, Limited junk food and Diabetic.     Medications:   Patient is currently taking over-the-counter supplements. OTC medications include: see medication list. Patient is able to manage medications.     Activities of Daily Living (ADLs)/Instrumental Activities of Daily Living (IADLs):   Walk and transfer into and out of bed and chair?: Yes  Dress and groom yourself?: Yes    Bathe or shower yourself?: No    Feed yourself? Yes  Do your  laundry/housekeeping?: No  Manage your money, pay your bills and track your expenses?: No  Make your own meals?: No    Do your own shopping?: No    Previous Hospitalizations:   Any hospitalizations or ED visits within the last 12 months?: Yes    How many hospitalizations have you had in the last year?: 1-2    Advance Care Planning:   Living will: Yes    Durable POA for healthcare: Yes    Advanced directive: Yes      Cognitive Screening:   Provider or family/friend/caregiver concerned regarding cognition?: No    PREVENTIVE SCREENINGS      Cardiovascular Screening:    General: History Lipid Disorder and Risks and Benefits Discussed    Due for: Lipid Panel      Diabetes Screening:     General: Screening Not Indicated and History Diabetes      Colorectal Cancer Screening:     General: Screening Not Indicated      Prostate Cancer Screening:    General: Screening Not Indicated      Abdominal Aortic Aneurysm (AAA) Screening:    Risk factors include: tobacco use        Lung Cancer Screening:     General: Screening Not Indicated    Screening, Brief Intervention, and Referral to Treatment (SBIRT)    Screening      AUDIT-C Screenin) How often did you have a drink containing alcohol in the past year? never  2) How many drinks did you have on a typical day when you were drinking in the past year? 0  3) How often did you have 6 or more drinks on one occasion in the past year? never    AUDIT-C Score: 0  Interpretation: Score 0-3 (male): Negative screen for alcohol misuse    Single Item Drug Screening:  How often have you used an illegal drug (including marijuana) or a prescription medication for non-medical reasons in the past year? never    Single Item Drug Screen Score: 0  Interpretation: Negative screen for possible drug use disorder    Social Determinants of Health     Financial Resource Strain: Low Risk  (10/24/2023)    Overall Financial Resource Strain (CARDIA)     Difficulty of Paying Living Expenses: Not hard at all  "  Food Insecurity: No Food Insecurity (9/10/2024)    Nursing - Inadequate Food Risk Classification     Worried About Running Out of Food in the Last Year: Never true     Ran Out of Food in the Last Year: Never true   Transportation Needs: No Transportation Needs (9/10/2024)    PRAPARE - Transportation     Lack of Transportation (Medical): No     Lack of Transportation (Non-Medical): No   Housing Stability: Low Risk  (9/10/2024)    Housing Stability Vital Sign     Unable to Pay for Housing in the Last Year: No     Number of Times Moved in the Last Year: 0     Homeless in the Last Year: No   Utilities: Not At Risk (9/10/2024)    [facility] Utilities     Threatened with loss of utilities: No     No results found.    Objective     /62 (BP Location: Left arm, Patient Position: Sitting, Cuff Size: Standard)   Pulse 75   Temp 98.4 °F (36.9 °C) (Tympanic)   Resp 16   Ht 5' 5\" (1.651 m)   Wt 62.1 kg (137 lb)   SpO2 93%   BMI 22.80 kg/m²     Physical Exam  Vitals and nursing note reviewed.   Constitutional:       General: He is not in acute distress.     Appearance: Normal appearance. He is well-developed and normal weight. He is not ill-appearing or toxic-appearing.   HENT:      Head: Normocephalic and atraumatic.      Right Ear: Tympanic membrane, ear canal and external ear normal.      Left Ear: Tympanic membrane, ear canal and external ear normal.      Nose: No mucosal edema or rhinorrhea.      Mouth/Throat:      Mouth: Mucous membranes are not pale, dry and not cyanotic.      Pharynx: Oropharynx is clear. No oropharyngeal exudate or posterior oropharyngeal erythema.   Eyes:      General: No scleral icterus.        Right eye: No discharge.         Left eye: No discharge.      Extraocular Movements: Extraocular movements intact.      Conjunctiva/sclera: Conjunctivae normal.      Pupils: Pupils are equal, round, and reactive to light.   Cardiovascular:      Rate and Rhythm: Normal rate and regular rhythm.      Pulses: " no weak pulses.           Dorsalis pedis pulses are 2+ on the right side and 2+ on the left side.      Heart sounds: Normal heart sounds. No murmur heard.     No gallop.   Pulmonary:      Effort: Pulmonary effort is normal. No respiratory distress.      Breath sounds: Normal breath sounds. No wheezing or rales.   Abdominal:      General: Abdomen is flat.      Palpations: Abdomen is soft.      Tenderness: There is no abdominal tenderness.   Musculoskeletal:         General: No swelling, tenderness, deformity or signs of injury.      Cervical back: Normal range of motion.      Right lower leg: No edema.      Left lower leg: No edema.   Feet:      Right foot:      Skin integrity: No ulcer, skin breakdown, erythema, warmth, callus or dry skin.      Left foot:      Skin integrity: No ulcer, skin breakdown, erythema, warmth, callus or dry skin.   Skin:     General: Skin is warm.      Coloration: Skin is not jaundiced or pale.      Findings: No rash.   Neurological:      General: No focal deficit present.      Mental Status: He is alert and oriented to person, place, and time.   Psychiatric:         Mood and Affect: Mood normal.         Behavior: Behavior normal.         Thought Content: Thought content normal.         Cognition and Memory: Cognition is impaired. Memory is impaired. He exhibits impaired recent memory and impaired remote memory.         Judgment: Judgment normal.     Patient's shoes and socks removed.    Right Foot/Ankle   Right Foot Inspection  Skin Exam: skin normal. Skin not intact, no dry skin, no warmth, no callus, no erythema, no maceration, no abnormal color, no pre-ulcer, no ulcer and no callus.     Toe Exam: ROM and strength within normal limits.     Sensory   Monofilament testing: intact    Vascular  Capillary refills: < 3 seconds  The right DP pulse is 2+.     Right Toe  - Comprehensive Exam  Ecchymosis: none  Swelling: none   Tenderness: none       Left Foot/Ankle  Left Foot Inspection  Skin  Exam: skin normal. Skin not intact, no dry skin, no warmth, no erythema, no maceration, normal color, no pre-ulcer, no ulcer and no callus.     Toe Exam: ROM and strength within normal limits.     Sensory   Monofilament testing: intact    Vascular  Capillary refills: < 3 seconds  The left DP pulse is 2+.     Left Toe  - Comprehensive Exam  Ecchymosis: none  Swelling: none   Tenderness: none       Assign Risk Category  No deformity present  No loss of protective sensation  No weak pulses  Risk: 0

## 2024-11-19 ENCOUNTER — RESULTS FOLLOW-UP (OUTPATIENT)
Dept: NEPHROLOGY | Facility: CLINIC | Age: 89
End: 2024-11-19

## 2024-11-19 LAB
25(OH)D3 SERPL-MCNC: 64 NG/ML (ref 30–100)
BUN SERPL-MCNC: 64 MG/DL (ref 7–25)
BUN/CREAT SERPL: 19 (CALC) (ref 6–22)
CALCIUM SERPL-MCNC: 9.6 MG/DL (ref 8.6–10.3)
CALCIUM SERPL-MCNC: 9.6 MG/DL (ref 8.6–10.3)
CHLORIDE SERPL-SCNC: 105 MMOL/L (ref 98–110)
CO2 SERPL-SCNC: 24 MMOL/L (ref 20–32)
CREAT SERPL-MCNC: 3.31 MG/DL (ref 0.7–1.22)
GFR/BSA.PRED SERPLBLD CYS-BASED-ARV: 17 ML/MIN/1.73M2
GLUCOSE SERPL-MCNC: 165 MG/DL (ref 65–99)
MAGNESIUM SERPL-MCNC: 2.3 MG/DL (ref 1.5–2.5)
PHOSPHATE SERPL-MCNC: 3.9 MG/DL (ref 2.1–4.3)
POTASSIUM SERPL-SCNC: 4.1 MMOL/L (ref 3.5–5.3)
PTH-INTACT SERPL-MCNC: 55 PG/ML (ref 16–77)
SODIUM SERPL-SCNC: 140 MMOL/L (ref 135–146)

## 2024-11-19 NOTE — TELEPHONE ENCOUNTER
----- Message from Tanner Rey MD sent at 11/19/2024  7:20 AM EST -----  Labs are essentially stable make sure he gets labs done in January prior to his appointment in February  ----- Message -----  From: Dino Velasquez Lab Results In  Sent: 11/19/2024  12:45 AM EST  To: Tanner Rey MD

## 2024-11-19 NOTE — TELEPHONE ENCOUNTER
Called patient and left a voicemail stating the following information:     Labs are essentially stable make sure he gets labs done in January prior to his appointment in February.    Asked the patient please call back with further questions.

## 2024-12-19 ENCOUNTER — OFFICE VISIT (OUTPATIENT)
Dept: PODIATRY | Facility: CLINIC | Age: 89
End: 2024-12-19
Payer: MEDICARE

## 2024-12-19 VITALS
SYSTOLIC BLOOD PRESSURE: 140 MMHG | BODY MASS INDEX: 22.8 KG/M2 | HEIGHT: 65 IN | HEART RATE: 80 BPM | DIASTOLIC BLOOD PRESSURE: 72 MMHG | OXYGEN SATURATION: 98 %

## 2024-12-19 DIAGNOSIS — E11.22 TYPE 2 DIABETES MELLITUS WITH STAGE 4 CHRONIC KIDNEY DISEASE, WITHOUT LONG-TERM CURRENT USE OF INSULIN (HCC): ICD-10-CM

## 2024-12-19 DIAGNOSIS — B35.1 ONYCHOMYCOSIS: Primary | ICD-10-CM

## 2024-12-19 DIAGNOSIS — I73.9 PVD (PERIPHERAL VASCULAR DISEASE) (HCC): ICD-10-CM

## 2024-12-19 DIAGNOSIS — N18.4 TYPE 2 DIABETES MELLITUS WITH STAGE 4 CHRONIC KIDNEY DISEASE, WITHOUT LONG-TERM CURRENT USE OF INSULIN (HCC): ICD-10-CM

## 2024-12-19 PROCEDURE — RECHECK: Performed by: PODIATRIST

## 2024-12-19 PROCEDURE — 11721 DEBRIDE NAIL 6 OR MORE: CPT | Performed by: PODIATRIST

## 2024-12-19 NOTE — PROGRESS NOTES
Assessment/Plan:     The patient's clinical examination today significant for thickened and dystrophic, brittle pedal nail plates with subungual debris consistent with onychomycosis x 10.  There are no open wounds to either foot.  The interdigital spaces are clear without maceration.  Pedal pulses are nonpalpable bilaterally.  Cap refill time to the toes is less than 3 seconds x 10.  Temperature gradient is mildly increased bilaterally.  Skin is thin with decreased turgor.  There is absence of hair growth to the bilateral extremities.     The pedal nail plates are sharply debrided with a sterile nail clipper x 10 without complication.  The nails were then mechanically reduced in thickness and girth utilizing a rotary bur without incident.  There are no other acute pedal issues noted today.  The patient's most recent hemoglobin A1c from September 2024 was 6.7, prior to that it was 6.9 in August 2024.       Recommend follow-up in 2 to 3 months for continued at risk diabetic footcare.     Diagnoses and all orders for this visit:    Onychomycosis    PVD (peripheral vascular disease) ([facility])    Type 2 diabetes mellitus with stage 4 chronic kidney disease, without long-term current use of insulin ([facility])          Subjective:     Patient ID: Jeffry Almanzar is a 89 y.o. male.    The patient presents today with with a chief complaint of elongated and thickened pedal nail plates.  He does have a history of diabetes but denies any numbness or tingling sensations to either foot.  He notes no other acute pedal issues today.      PAST MEDICAL HISTORY:  Past Medical History:   Diagnosis Date    Cataracts, bilateral        PAST SURGICAL HISTORY:  Past Surgical History:   Procedure Laterality Date    CATARACT EXTRACTION Bilateral 07/15/2012    COLONOSCOPY      CYSTOSCOPY  01/15/2018    Last assessed 9/14/17, diagnostic    HERNIA REPAIR      INSTILLATION MYTOMYCIN N/A 10/25/2017    Procedure: INSTILLATION OF MITOMYCIN C;  Surgeon: Danish  MD Vaibhav;  Location: [facility];  Service: Urology    ID CYSTO W/REMOVAL OF LESIONS SMALL N/A 10/25/2017    Procedure: CYSTOSCOPY; BLADDER BIOPSY WITH FULGERATION;  Surgeon: Danish Esposito MD;  Location: [facility];  Service: Urology    TONSILLECTOMY      TRANSURETHRAL RESECTION OF BLADDER TUMOR N/A 10/25/2017    Procedure: TUR BLADDER TUMOR;  Surgeon: Danish Esposito MD;  Location: [facility];  Service: Urology    WISDOM TOOTH EXTRACTION          ALLERGIES:  Ace inhibitors and Penicillins    MEDICATIONS:  Current Outpatient Medications   Medication Sig Dispense Refill    atorvastatin (LIPITOR) 40 mg tablet Take 1 tablet (40 mg total) by mouth daily 90 tablet 3    Blood Glucose Monitoring Suppl (OneTouch Verio Reflect) w/Device KIT Check blood sugars twice daily. Please substitute with appropriate alternative as covered by patient's insurance. Dx: E11.65 1 kit 0    Blood Glucose Monitoring Suppl (OneTouch Verio Reflect) w/Device KIT Check blood sugars once daily. Please substitute with appropriate alternative as covered by patient's insurance. Dx: E11.65 1 kit 0    brimonidine (ALPHAGAN P) 0.15 % ophthalmic solution       clopidogrel (PLAVIX) 75 mg tablet Take 1 tablet (75 mg total) by mouth daily 90 tablet 3    Cyanocobalamin (Vitamin B-12) 1000 MCG/15ML LIQD Take by mouth daily      Dorzolamide HCl-Timolol Mal PF 2-0.5 % SOLN INSTILL 1 DROP INTO EACH EYE TWICE DAILY      Farxiga 10 MG tablet Take 1 tablet (10 mg total) by mouth daily 30 tablet 11    glucose blood (OneTouch Verio) test strip Check blood sugars once daily. Please substitute with appropriate alternative as covered by patient's insurance. Dx: E11.65 100 each 3    losartan (COZAAR) 25 mg tablet Take 1 tablet (25 mg total) by mouth daily 90 tablet 1    OneTouch Delica Lancets 33G MISC Check blood sugars once daily. Please substitute with appropriate alternative as covered by patient's insurance. Dx: E11.65 100 each 3    prednisoLONE acetate  (PRED FORTE) 1 % ophthalmic suspension INSTILL 1 DROP INTO LEFT EYE TWICE DAILY      Vyzulta 0.024 % SOLN Administer 1 drop to both eyes daily at bedtime       No current facility-administered medications for this visit.       SOCIAL HISTORY:  Social History     Socioeconomic History    Marital status: /Civil Union     Spouse name: None    Number of children: None    Years of education: None    Highest education level: None   Occupational History    Occupation: RETIRED   Tobacco Use    Smoking status: Former     Current packs/day: 0.00     Types: Cigarettes     Quit date: 1964     Years since quittin.0    Smokeless tobacco: Never   Vaping Use    Vaping status: Never Used   Substance and Sexual Activity    Alcohol use: Not Currently     Comment: quit 23 years ago    Drug use: No    Sexual activity: None   Other Topics Concern    None   Social History Narrative    None     Social Drivers of Health     Financial Resource Strain: Low Risk  (10/24/2023)    Overall Financial Resource Strain (CARDIA)     Difficulty of Paying Living Expenses: Not hard at all   Food Insecurity: No Food Insecurity (9/10/2024)    Nursing - Inadequate Food Risk Classification     Worried About Running Out of Food in the Last Year: Never true     Ran Out of Food in the Last Year: Never true     Ran Out of Food in the Last Year: Not on file   Transportation Needs: No Transportation Needs (9/10/2024)    PRAPARE - Transportation     Lack of Transportation (Medical): No     Lack of Transportation (Non-Medical): No   Physical Activity: Not on file   Stress: Not on file   Social Connections: Not on file   Intimate Partner Violence: Not on file   Housing Stability: Low Risk  (9/10/2024)    Housing Stability Vital Sign     Unable to Pay for Housing in the Last Year: No     Number of Times Moved in the Last Year: 0     Homeless in the Last Year: No        Review of Systems   Constitutional: Negative.    HENT: Negative.     Eyes: Negative.     Respiratory: Negative.     Cardiovascular: Negative.    Endocrine: Negative.    Musculoskeletal: Negative.    Neurological: Negative.    Hematological: Negative.    Psychiatric/Behavioral: Negative.           Objective:     Physical Exam  Vitals reviewed.   Constitutional:       Appearance: Normal appearance.   HENT:      Head: Normocephalic and atraumatic.      Nose: Nose normal.   Eyes:      Conjunctiva/sclera: Conjunctivae normal.      Pupils: Pupils are equal, round, and reactive to light.   Pulmonary:      Effort: Pulmonary effort is normal.   Skin:     General: Skin is warm.      Capillary Refill: Capillary refill takes 2 to 3 seconds.   Neurological:      General: No focal deficit present.      Mental Status: He is alert and oriented to person, place, and time.   Psychiatric:         Mood and Affect: Mood normal.         Behavior: Behavior normal.         Thought Content: Thought content normal.

## 2025-01-01 ENCOUNTER — HOME CARE VISIT (OUTPATIENT)
Dept: HOME HEALTH SERVICES | Facility: HOME HEALTHCARE | Age: OVER 89
End: 2025-01-01
Payer: MEDICARE

## 2025-01-01 ENCOUNTER — TELEPHONE (OUTPATIENT)
Dept: HOME HEALTH SERVICES | Facility: HOME HEALTHCARE | Age: OVER 89
End: 2025-01-01

## 2025-01-01 ENCOUNTER — HOME CARE VISIT (OUTPATIENT)
Dept: HOME HOSPICE | Facility: HOSPICE | Age: OVER 89
End: 2025-01-01
Payer: MEDICARE

## 2025-01-01 ENCOUNTER — HOSPICE ADMISSION (OUTPATIENT)
Dept: HOME HOSPICE | Facility: HOSPICE | Age: OVER 89
End: 2025-01-01
Payer: MEDICARE

## 2025-01-01 ENCOUNTER — HOSPITAL ENCOUNTER (EMERGENCY)
Facility: HOSPITAL | Age: OVER 89
Discharge: HOME | End: 2025-02-17
Attending: EMERGENCY MEDICINE
Payer: MEDICARE

## 2025-01-01 VITALS — RESPIRATION RATE: 24 BRPM | SYSTOLIC BLOOD PRESSURE: 140 MMHG | HEART RATE: 112 BPM | DIASTOLIC BLOOD PRESSURE: 62 MMHG

## 2025-01-01 VITALS
DIASTOLIC BLOOD PRESSURE: 83 MMHG | HEART RATE: 107 BPM | RESPIRATION RATE: 16 BRPM | TEMPERATURE: 100.1 F | SYSTOLIC BLOOD PRESSURE: 144 MMHG | OXYGEN SATURATION: 99 %

## 2025-01-01 DIAGNOSIS — Z59.12 HAS NO ELECTRICITY IN HOME: ICD-10-CM

## 2025-01-01 DIAGNOSIS — Z51.5 HOSPICE CARE PATIENT: ICD-10-CM

## 2025-01-01 DIAGNOSIS — Z99.81 SUPPLEMENTAL OXYGEN DEPENDENT: Primary | ICD-10-CM

## 2025-01-01 PROCEDURE — G0156 HHCP-SVS OF AIDE,EA 15 MIN: HCPCS

## 2025-01-01 PROCEDURE — 10330064 GLOVE, EXAM VNYL MED N/S (100/BX 10BX/CS

## 2025-01-01 PROCEDURE — 10330064 BRIEF, TAB CLSR ULTRA LG 45-58LG (18/BG

## 2025-01-01 PROCEDURE — 10330064 PHD EQUIPMENT HANDLING CHARGE PHD EQUIPM

## 2025-01-01 PROCEDURE — 10330064 ALIGNER, FOAM BODY SM (8/CS)

## 2025-01-01 PROCEDURE — G0299 HHS/HOSPICE OF RN EA 15 MIN: HCPCS

## 2025-01-01 PROCEDURE — 10330064 BAG, URINE DRN (20/CS)        BARD

## 2025-01-01 PROCEDURE — 10330064 GLOVE, EXAM VNYL LG N/S (100/BX 10BX/CS)

## 2025-01-01 PROCEDURE — 10330064

## 2025-01-01 PROCEDURE — 99282 EMERGENCY DEPT VISIT SF MDM: CPT

## 2025-01-01 PROCEDURE — 10330064 CATH SECURE, STATLOCK FOLEY SWIVEL SIL P

## 2025-01-01 PROCEDURE — 10330087 HSPC SERVICE INTENSITY ADD-ON

## 2025-01-01 PROCEDURE — 10330064 UNDERPAD, REUSE CTN FACE IND PK 34X36"

## 2025-01-01 PROCEDURE — 99284 EMERGENCY DEPT VISIT MOD MDM: CPT | Performed by: EMERGENCY MEDICINE

## 2025-01-01 PROCEDURE — G0155 HHCP-SVS OF CSW,EA 15 MIN: HCPCS

## 2025-01-01 SDOH — ECONOMIC STABILITY - HOUSING INSECURITY: INADEQUATE HOUSING UTILITIES: Z59.12

## 2025-01-17 ENCOUNTER — APPOINTMENT (EMERGENCY)
Dept: RADIOLOGY | Facility: HOSPITAL | Age: OVER 89
DRG: 208 | End: 2025-01-17
Payer: MEDICARE

## 2025-01-17 ENCOUNTER — HOSPITAL ENCOUNTER (INPATIENT)
Facility: HOSPITAL | Age: OVER 89
LOS: 24 days | Discharge: HOSPICE | DRG: 208 | End: 2025-02-11
Attending: EMERGENCY MEDICINE | Admitting: INTERNAL MEDICINE
Payer: MEDICARE

## 2025-01-17 ENCOUNTER — APPOINTMENT (EMERGENCY)
Dept: CT IMAGING | Facility: HOSPITAL | Age: OVER 89
DRG: 208 | End: 2025-01-17
Payer: MEDICARE

## 2025-01-17 DIAGNOSIS — E44.0 MODERATE PROTEIN-CALORIE MALNUTRITION (HCC): ICD-10-CM

## 2025-01-17 DIAGNOSIS — I46.9 CARDIAC ARREST (HCC): ICD-10-CM

## 2025-01-17 DIAGNOSIS — E43 SEVERE PROTEIN-CALORIE MALNUTRITION (HCC): ICD-10-CM

## 2025-01-17 DIAGNOSIS — J40 TRACHEOBRONCHITIS: ICD-10-CM

## 2025-01-17 DIAGNOSIS — N17.9 AKI (ACUTE KIDNEY INJURY) (HCC): ICD-10-CM

## 2025-01-17 DIAGNOSIS — H35.60 RETINAL HEMORRHAGE: ICD-10-CM

## 2025-01-17 DIAGNOSIS — R13.10 DYSPHAGIA: ICD-10-CM

## 2025-01-17 DIAGNOSIS — J96.00 ACUTE RESPIRATORY FAILURE (HCC): ICD-10-CM

## 2025-01-17 DIAGNOSIS — Z01.89 ENCOUNTER FOR COMPETENCY EVALUATION: ICD-10-CM

## 2025-01-17 DIAGNOSIS — Z51.5 PALLIATIVE CARE ENCOUNTER: ICD-10-CM

## 2025-01-17 DIAGNOSIS — Z71.89 ADVANCED CARE PLANNING/COUNSELING DISCUSSION: ICD-10-CM

## 2025-01-17 DIAGNOSIS — R13.12 OROPHARYNGEAL DYSPHAGIA: ICD-10-CM

## 2025-01-17 DIAGNOSIS — I26.02 ACUTE SADDLE PULMONARY EMBOLISM WITH ACUTE COR PULMONALE (HCC): Primary | ICD-10-CM

## 2025-01-17 DIAGNOSIS — R57.9 SHOCK (HCC): ICD-10-CM

## 2025-01-17 DIAGNOSIS — C67.9 MALIGNANT NEOPLASM OF URINARY BLADDER, UNSPECIFIED SITE (HCC): ICD-10-CM

## 2025-01-17 LAB
BASE EXCESS BLDA CALC-SCNC: -16 MMOL/L (ref -2–3)
CA-I BLD-SCNC: 1.24 MMOL/L (ref 1.12–1.32)
GLUCOSE SERPL-MCNC: 403 MG/DL (ref 65–140)
HCO3 BLDA-SCNC: 13.4 MMOL/L (ref 24–30)
HCT VFR BLD CALC: 26 % (ref 36.5–49.3)
HGB BLDA-MCNC: 8.8 G/DL (ref 12–17)
PCO2 BLD: 15 MMOL/L (ref 21–32)
PCO2 BLD: 44.1 MM HG (ref 42–50)
PH BLD: 7.09 [PH] (ref 7.3–7.4)
PO2 BLD: 31 MM HG (ref 35–45)
POTASSIUM BLD-SCNC: 4.3 MMOL/L (ref 3.5–5.3)
SAO2 % BLD FROM PO2: 39 % (ref 60–85)
SODIUM BLD-SCNC: 139 MMOL/L (ref 136–145)
SPECIMEN SOURCE: ABNORMAL

## 2025-01-17 PROCEDURE — 82947 ASSAY GLUCOSE BLOOD QUANT: CPT

## 2025-01-17 PROCEDURE — 99291 CRITICAL CARE FIRST HOUR: CPT

## 2025-01-17 PROCEDURE — 71045 X-RAY EXAM CHEST 1 VIEW: CPT

## 2025-01-17 PROCEDURE — 31500 INSERT EMERGENCY AIRWAY: CPT | Performed by: EMERGENCY MEDICINE

## 2025-01-17 PROCEDURE — 84295 ASSAY OF SERUM SODIUM: CPT

## 2025-01-17 PROCEDURE — 84132 ASSAY OF SERUM POTASSIUM: CPT

## 2025-01-17 PROCEDURE — 5A1945Z RESPIRATORY VENTILATION, 24-96 CONSECUTIVE HOURS: ICD-10-PCS | Performed by: EMERGENCY MEDICINE

## 2025-01-17 PROCEDURE — 82330 ASSAY OF CALCIUM: CPT

## 2025-01-17 PROCEDURE — 85014 HEMATOCRIT: CPT

## 2025-01-17 PROCEDURE — 71275 CT ANGIOGRAPHY CHEST: CPT

## 2025-01-17 PROCEDURE — 99291 CRITICAL CARE FIRST HOUR: CPT | Performed by: EMERGENCY MEDICINE

## 2025-01-17 PROCEDURE — 96365 THER/PROPH/DIAG IV INF INIT: CPT

## 2025-01-17 PROCEDURE — 0BH17EZ INSERTION OF ENDOTRACHEAL AIRWAY INTO TRACHEA, VIA NATURAL OR ARTIFICIAL OPENING: ICD-10-PCS | Performed by: EMERGENCY MEDICINE

## 2025-01-17 PROCEDURE — 96375 TX/PRO/DX INJ NEW DRUG ADDON: CPT

## 2025-01-17 PROCEDURE — 92950 HEART/LUNG RESUSCITATION CPR: CPT | Performed by: EMERGENCY MEDICINE

## 2025-01-17 PROCEDURE — 82803 BLOOD GASES ANY COMBINATION: CPT

## 2025-01-17 PROCEDURE — 70450 CT HEAD/BRAIN W/O DYE: CPT

## 2025-01-17 RX ORDER — EPINEPHRINE 0.1 MG/ML
INJECTION INTRAVENOUS CODE/TRAUMA/SEDATION MEDICATION
Status: COMPLETED | OUTPATIENT
Start: 2025-01-17 | End: 2025-01-18

## 2025-01-17 RX ORDER — ETOMIDATE 2 MG/ML
INJECTION INTRAVENOUS CODE/TRAUMA/SEDATION MEDICATION
Status: COMPLETED | OUTPATIENT
Start: 2025-01-17 | End: 2025-01-17

## 2025-01-17 RX ORDER — ATROPINE SULFATE 0.1 MG/ML
INJECTION INTRAVENOUS CODE/TRAUMA/SEDATION MEDICATION
Status: COMPLETED | OUTPATIENT
Start: 2025-01-17 | End: 2025-01-17

## 2025-01-17 RX ORDER — ATROPINE SULFATE 0.1 MG/ML
INJECTION, SOLUTION ENDOTRACHEAL; INTRAMUSCULAR; INTRAVENOUS; SUBCUTANEOUS CODE/TRAUMA/SEDATION MEDICATION
Status: COMPLETED | OUTPATIENT
Start: 2025-01-17 | End: 2025-01-18

## 2025-01-17 RX ORDER — ROCURONIUM BROMIDE 10 MG/ML
1.2 INJECTION, SOLUTION INTRAVENOUS ONCE
Status: COMPLETED | OUTPATIENT
Start: 2025-01-17 | End: 2025-01-17

## 2025-01-17 RX ADMIN — ROCURONIUM 70 MG: 50 INJECTION, SOLUTION INTRAVENOUS at 23:52

## 2025-01-17 RX ADMIN — EPINEPHRINE 10 MCG/MIN: 1 INJECTION, SOLUTION, CONCENTRATE INTRAVENOUS at 23:51

## 2025-01-17 RX ADMIN — EPINEPHRINE 0.05 MG: 0.1 INJECTION INTRAVENOUS at 23:50

## 2025-01-17 RX ADMIN — EPINEPHRINE 0.1 MG: 0.1 INJECTION INTRAVENOUS at 23:25

## 2025-01-17 RX ADMIN — ETOMIDATE 30 MG: 2 INJECTION, SOLUTION INTRAVENOUS at 23:51

## 2025-01-17 RX ADMIN — ATROPINE SULFATE 1 MG: 0.1 INJECTION INTRAVENOUS at 23:27

## 2025-01-17 RX ADMIN — ATROPINE SULFATE 1 MG: 0.1 INJECTION INTRAVENOUS at 23:26

## 2025-01-18 ENCOUNTER — APPOINTMENT (INPATIENT)
Dept: NON INVASIVE DIAGNOSTICS | Facility: HOSPITAL | Age: OVER 89
DRG: 208 | End: 2025-01-18
Payer: MEDICARE

## 2025-01-18 PROBLEM — I46.9 CARDIAC ARREST (HCC): Status: ACTIVE | Noted: 2025-01-18

## 2025-01-18 PROBLEM — I26.02 ACUTE SADDLE PULMONARY EMBOLISM WITH ACUTE COR PULMONALE (HCC): Status: ACTIVE | Noted: 2025-01-18

## 2025-01-18 LAB
2HR DELTA HS TROPONIN: 244 NG/L
4HR DELTA HS TROPONIN: 537 NG/L
ALBUMIN SERPL BCG-MCNC: 3 G/DL (ref 3.5–5)
ALBUMIN SERPL BCG-MCNC: 3.1 G/DL (ref 3.5–5)
ALP SERPL-CCNC: 108 U/L (ref 34–104)
ALP SERPL-CCNC: 68 U/L (ref 34–104)
ALT SERPL W P-5'-P-CCNC: 83 U/L (ref 7–52)
ALT SERPL W P-5'-P-CCNC: 997 U/L (ref 7–52)
ANION GAP SERPL CALCULATED.3IONS-SCNC: 17 MMOL/L (ref 4–13)
ANION GAP SERPL CALCULATED.3IONS-SCNC: 9 MMOL/L (ref 4–13)
ANISOCYTOSIS BLD QL SMEAR: PRESENT
AORTIC ROOT: 2.9 CM
APTT PPP: 38 SECONDS (ref 23–34)
APTT PPP: 88 SECONDS (ref 23–34)
APTT PPP: >210 SECONDS (ref 23–34)
APTT PPP: >210 SECONDS (ref 23–34)
ARTERIAL PATENCY WRIST A: YES
AST SERPL W P-5'-P-CCNC: 909 U/L (ref 13–39)
AST SERPL W P-5'-P-CCNC: 92 U/L (ref 13–39)
BASE EX.OXY STD BLDV CALC-SCNC: 24.6 % (ref 60–80)
BASE EXCESS BLDA CALC-SCNC: -14.6 MMOL/L
BASE EXCESS BLDV CALC-SCNC: -9.6 MMOL/L
BASOPHILS # BLD AUTO: 0.02 THOUSANDS/ΜL (ref 0–0.1)
BASOPHILS # BLD AUTO: 0.03 THOUSANDS/ΜL (ref 0–0.1)
BASOPHILS NFR BLD AUTO: 0 % (ref 0–1)
BASOPHILS NFR BLD AUTO: 0 % (ref 0–1)
BILIRUB SERPL-MCNC: 0.34 MG/DL (ref 0.2–1)
BILIRUB SERPL-MCNC: 0.69 MG/DL (ref 0.2–1)
BNP SERPL-MCNC: 375 PG/ML (ref 0–100)
BSA FOR ECHO PROCEDURE: 1.68 M2
BUN SERPL-MCNC: 39 MG/DL (ref 5–25)
BUN SERPL-MCNC: 41 MG/DL (ref 5–25)
CALCIUM ALBUM COR SERPL-MCNC: 8.9 MG/DL (ref 8.3–10.1)
CALCIUM ALBUM COR SERPL-MCNC: 9.4 MG/DL (ref 8.3–10.1)
CALCIUM SERPL-MCNC: 8.2 MG/DL (ref 8.4–10.2)
CALCIUM SERPL-MCNC: 8.6 MG/DL (ref 8.4–10.2)
CARDIAC TROPONIN I PNL SERPL HS: 1070 NG/L (ref ?–50)
CARDIAC TROPONIN I PNL SERPL HS: 533 NG/L (ref ?–50)
CARDIAC TROPONIN I PNL SERPL HS: 777 NG/L (ref ?–50)
CHLORIDE SERPL-SCNC: 107 MMOL/L (ref 96–108)
CHLORIDE SERPL-SCNC: 107 MMOL/L (ref 96–108)
CO2 SERPL-SCNC: 15 MMOL/L (ref 21–32)
CO2 SERPL-SCNC: 18 MMOL/L (ref 21–32)
CREAT SERPL-MCNC: 2.73 MG/DL (ref 0.6–1.3)
CREAT SERPL-MCNC: 3.08 MG/DL (ref 0.6–1.3)
EOSINOPHIL # BLD AUTO: 0.03 THOUSAND/ΜL (ref 0–0.61)
EOSINOPHIL # BLD AUTO: 0.22 THOUSAND/ΜL (ref 0–0.61)
EOSINOPHIL NFR BLD AUTO: 0 % (ref 0–6)
EOSINOPHIL NFR BLD AUTO: 2 % (ref 0–6)
ERYTHROCYTE [DISTWIDTH] IN BLOOD BY AUTOMATED COUNT: 13.3 % (ref 11.6–15.1)
ERYTHROCYTE [DISTWIDTH] IN BLOOD BY AUTOMATED COUNT: 13.3 % (ref 11.6–15.1)
EST. AVERAGE GLUCOSE BLD GHB EST-MCNC: 151 MG/DL
FRACTIONAL SHORTENING: 23 (ref 28–44)
GFR SERPL CREATININE-BSD FRML MDRD: 16 ML/MIN/1.73SQ M
GFR SERPL CREATININE-BSD FRML MDRD: 19 ML/MIN/1.73SQ M
GLUCOSE SERPL-MCNC: 102 MG/DL (ref 65–140)
GLUCOSE SERPL-MCNC: 173 MG/DL (ref 65–140)
GLUCOSE SERPL-MCNC: 209 MG/DL (ref 65–140)
GLUCOSE SERPL-MCNC: 260 MG/DL (ref 65–140)
GLUCOSE SERPL-MCNC: 292 MG/DL (ref 65–140)
GLUCOSE SERPL-MCNC: 327 MG/DL (ref 65–140)
GLUCOSE SERPL-MCNC: 446 MG/DL (ref 65–140)
HBA1C MFR BLD: 6.9 %
HCO3 BLDA-SCNC: 11.4 MMOL/L (ref 22–28)
HCO3 BLDV-SCNC: 16.9 MMOL/L (ref 24–30)
HCT VFR BLD AUTO: 27 % (ref 36.5–49.3)
HCT VFR BLD AUTO: 27.7 % (ref 36.5–49.3)
HGB BLD-MCNC: 8.2 G/DL (ref 12–17)
HGB BLD-MCNC: 8.6 G/DL (ref 12–17)
IMM GRANULOCYTES # BLD AUTO: 0.12 THOUSAND/UL (ref 0–0.2)
IMM GRANULOCYTES # BLD AUTO: 0.12 THOUSAND/UL (ref 0–0.2)
IMM GRANULOCYTES NFR BLD AUTO: 1 % (ref 0–2)
IMM GRANULOCYTES NFR BLD AUTO: 1 % (ref 0–2)
INR PPP: 1.36 (ref 0.85–1.19)
INR PPP: 3.44 (ref 0.85–1.19)
INTERVENTRICULAR SEPTUM IN DIASTOLE (PARASTERNAL SHORT AXIS VIEW): 1.4 CM
INTERVENTRICULAR SEPTUM: 1.4 CM (ref 0.6–1.1)
LACTATE SERPL-SCNC: 3 MMOL/L (ref 0.5–2)
LACTATE SERPL-SCNC: 3.9 MMOL/L (ref 0.5–2)
LACTATE SERPL-SCNC: 5.6 MMOL/L (ref 0.5–2)
LACTATE SERPL-SCNC: 9 MMOL/L (ref 0.5–2)
LEFT ATRIUM SIZE: 3.1 CM
LEFT INTERNAL DIMENSION IN SYSTOLE: 2.3 CM (ref 2.1–4)
LEFT VENTRICULAR INTERNAL DIMENSION IN DIASTOLE: 3 CM (ref 3.5–6)
LEFT VENTRICULAR POSTERIOR WALL IN END DIASTOLE: 1.2 CM
LEFT VENTRICULAR STROKE VOLUME: 17 ML
LVSV (TEICH): 17 ML
LYMPHOCYTES # BLD AUTO: 0.71 THOUSANDS/ΜL (ref 0.6–4.47)
LYMPHOCYTES # BLD AUTO: 3.81 THOUSANDS/ΜL (ref 0.6–4.47)
LYMPHOCYTES NFR BLD AUTO: 39 % (ref 14–44)
LYMPHOCYTES NFR BLD AUTO: 6 % (ref 14–44)
MAGNESIUM SERPL-MCNC: 2.4 MG/DL (ref 1.9–2.7)
MAGNESIUM SERPL-MCNC: 2.4 MG/DL (ref 1.9–2.7)
MCH RBC QN AUTO: 31 PG (ref 26.8–34.3)
MCH RBC QN AUTO: 31.9 PG (ref 26.8–34.3)
MCHC RBC AUTO-ENTMCNC: 30.4 G/DL (ref 31.4–37.4)
MCHC RBC AUTO-ENTMCNC: 31 G/DL (ref 31.4–37.4)
MCV RBC AUTO: 100 FL (ref 82–98)
MCV RBC AUTO: 105 FL (ref 82–98)
MONOCYTES # BLD AUTO: 0.87 THOUSAND/ΜL (ref 0.17–1.22)
MONOCYTES # BLD AUTO: 1.09 THOUSAND/ΜL (ref 0.17–1.22)
MONOCYTES NFR BLD AUTO: 9 % (ref 4–12)
MONOCYTES NFR BLD AUTO: 9 % (ref 4–12)
NEUTROPHILS # BLD AUTO: 10.12 THOUSANDS/ΜL (ref 1.85–7.62)
NEUTROPHILS # BLD AUTO: 4.65 THOUSANDS/ΜL (ref 1.85–7.62)
NEUTS SEG NFR BLD AUTO: 49 % (ref 43–75)
NEUTS SEG NFR BLD AUTO: 84 % (ref 43–75)
NRBC BLD AUTO-RTO: 0 /100 WBCS
NRBC BLD AUTO-RTO: 0 /100 WBCS
O2 CT BLDA-SCNC: 17.1 ML/DL (ref 16–23)
O2 CT BLDV-SCNC: 3.1 ML/DL
OXYHGB MFR BLDA: 98.2 % (ref 94–97)
PCO2 BLDA: 27.5 MM HG (ref 36–44)
PCO2 BLDV: 39.2 MM HG (ref 42–50)
PH BLDA: 7.24 [PH] (ref 7.35–7.45)
PH BLDV: 7.25 [PH] (ref 7.3–7.4)
PHOSPHATE SERPL-MCNC: 3.8 MG/DL (ref 2.3–4.1)
PLATELET # BLD AUTO: 93 THOUSANDS/UL (ref 149–390)
PLATELET # BLD AUTO: 95 THOUSANDS/UL (ref 149–390)
PLATELET BLD QL SMEAR: ABNORMAL
PMV BLD AUTO: 11.2 FL (ref 8.9–12.7)
PMV BLD AUTO: 11.5 FL (ref 8.9–12.7)
PO2 BLDA: 476.1 MM HG (ref 75–129)
PO2 BLDV: 19.9 MM HG (ref 35–45)
POTASSIUM SERPL-SCNC: 4.4 MMOL/L (ref 3.5–5.3)
POTASSIUM SERPL-SCNC: 4.9 MMOL/L (ref 3.5–5.3)
PROCALCITONIN SERPL-MCNC: 0.23 NG/ML
PROT SERPL-MCNC: 6 G/DL (ref 6.4–8.4)
PROT SERPL-MCNC: 6.2 G/DL (ref 6.4–8.4)
PROTHROMBIN TIME: 17.6 SECONDS (ref 12.3–15)
PROTHROMBIN TIME: 35.2 SECONDS (ref 12.3–15)
RBC # BLD AUTO: 2.57 MILLION/UL (ref 3.88–5.62)
RBC # BLD AUTO: 2.77 MILLION/UL (ref 3.88–5.62)
RBC MORPH BLD: PRESENT
SL CV PED ECHO LEFT VENTRICLE DIASTOLIC VOLUME (MOD BIPLANE) 2D: 34 ML
SL CV PED ECHO LEFT VENTRICLE SYSTOLIC VOLUME (MOD BIPLANE) 2D: 17 ML
SODIUM SERPL-SCNC: 134 MMOL/L (ref 135–147)
SODIUM SERPL-SCNC: 139 MMOL/L (ref 135–147)
SPECIMEN SOURCE: ABNORMAL
TRICUSPID ANNULAR PLANE SYSTOLIC EXCURSION: 1.3 CM
WBC # BLD AUTO: 12.09 THOUSAND/UL (ref 4.31–10.16)
WBC # BLD AUTO: 9.7 THOUSAND/UL (ref 4.31–10.16)

## 2025-01-18 PROCEDURE — 85610 PROTHROMBIN TIME: CPT

## 2025-01-18 PROCEDURE — 85730 THROMBOPLASTIN TIME PARTIAL: CPT

## 2025-01-18 PROCEDURE — 96376 TX/PRO/DX INJ SAME DRUG ADON: CPT

## 2025-01-18 PROCEDURE — 93005 ELECTROCARDIOGRAM TRACING: CPT

## 2025-01-18 PROCEDURE — 82805 BLOOD GASES W/O2 SATURATION: CPT

## 2025-01-18 PROCEDURE — 84484 ASSAY OF TROPONIN QUANT: CPT | Performed by: NURSE PRACTITIONER

## 2025-01-18 PROCEDURE — 94002 VENT MGMT INPAT INIT DAY: CPT

## 2025-01-18 PROCEDURE — 83880 ASSAY OF NATRIURETIC PEPTIDE: CPT

## 2025-01-18 PROCEDURE — 99291 CRITICAL CARE FIRST HOUR: CPT | Performed by: INTERNAL MEDICINE

## 2025-01-18 PROCEDURE — 96374 THER/PROPH/DIAG INJ IV PUSH: CPT

## 2025-01-18 PROCEDURE — 83735 ASSAY OF MAGNESIUM: CPT

## 2025-01-18 PROCEDURE — 85025 COMPLETE CBC W/AUTO DIFF WBC: CPT

## 2025-01-18 PROCEDURE — 3E03317 INTRODUCTION OF OTHER THROMBOLYTIC INTO PERIPHERAL VEIN, PERCUTANEOUS APPROACH: ICD-10-PCS | Performed by: EMERGENCY MEDICINE

## 2025-01-18 PROCEDURE — 36415 COLL VENOUS BLD VENIPUNCTURE: CPT

## 2025-01-18 PROCEDURE — 83036 HEMOGLOBIN GLYCOSYLATED A1C: CPT | Performed by: NURSE PRACTITIONER

## 2025-01-18 PROCEDURE — 84145 PROCALCITONIN (PCT): CPT

## 2025-01-18 PROCEDURE — 83605 ASSAY OF LACTIC ACID: CPT

## 2025-01-18 PROCEDURE — 82948 REAGENT STRIP/BLOOD GLUCOSE: CPT

## 2025-01-18 PROCEDURE — NC001 PR NO CHARGE: Performed by: INTERNAL MEDICINE

## 2025-01-18 PROCEDURE — 94150 VITAL CAPACITY TEST: CPT

## 2025-01-18 PROCEDURE — C8929 TTE W OR WO FOL WCON,DOPPLER: HCPCS

## 2025-01-18 PROCEDURE — 96375 TX/PRO/DX INJ NEW DRUG ADDON: CPT

## 2025-01-18 PROCEDURE — 93306 TTE W/DOPPLER COMPLETE: CPT | Performed by: INTERNAL MEDICINE

## 2025-01-18 PROCEDURE — 80053 COMPREHEN METABOLIC PANEL: CPT

## 2025-01-18 PROCEDURE — 92950 HEART/LUNG RESUSCITATION CPR: CPT

## 2025-01-18 PROCEDURE — 82805 BLOOD GASES W/O2 SATURATION: CPT | Performed by: NURSE PRACTITIONER

## 2025-01-18 PROCEDURE — 96365 THER/PROPH/DIAG IV INF INIT: CPT

## 2025-01-18 PROCEDURE — 31500 INSERT EMERGENCY AIRWAY: CPT

## 2025-01-18 PROCEDURE — 84100 ASSAY OF PHOSPHORUS: CPT

## 2025-01-18 PROCEDURE — 85730 THROMBOPLASTIN TIME PARTIAL: CPT | Performed by: NURSE PRACTITIONER

## 2025-01-18 PROCEDURE — 94760 N-INVAS EAR/PLS OXIMETRY 1: CPT

## 2025-01-18 PROCEDURE — 85610 PROTHROMBIN TIME: CPT | Performed by: NURSE PRACTITIONER

## 2025-01-18 RX ORDER — CHLORHEXIDINE GLUCONATE ORAL RINSE 1.2 MG/ML
15 SOLUTION DENTAL EVERY 12 HOURS SCHEDULED
Status: DISCONTINUED | OUTPATIENT
Start: 2025-01-18 | End: 2025-01-18 | Stop reason: SDUPTHER

## 2025-01-18 RX ORDER — ATROPINE SULFATE 0.1 MG/ML
INJECTION INTRAVENOUS
Status: DISPENSED
Start: 2025-01-18 | End: 2025-01-18

## 2025-01-18 RX ORDER — CHLORHEXIDINE GLUCONATE ORAL RINSE 1.2 MG/ML
15 SOLUTION DENTAL EVERY 12 HOURS SCHEDULED
Status: DISCONTINUED | OUTPATIENT
Start: 2025-01-18 | End: 2025-02-11 | Stop reason: HOSPADM

## 2025-01-18 RX ORDER — DEXMEDETOMIDINE HYDROCHLORIDE 4 UG/ML
.1-.7 INJECTION, SOLUTION INTRAVENOUS
Status: DISCONTINUED | OUTPATIENT
Start: 2025-01-18 | End: 2025-01-18

## 2025-01-18 RX ORDER — MINERAL OIL AND PETROLATUM 150; 830 MG/G; MG/G
OINTMENT OPHTHALMIC
Status: DISCONTINUED | OUTPATIENT
Start: 2025-01-18 | End: 2025-02-11 | Stop reason: HOSPADM

## 2025-01-18 RX ORDER — PREDNISOLONE ACETATE 10 MG/ML
1 SUSPENSION/ DROPS OPHTHALMIC 3 TIMES DAILY
Status: DISCONTINUED | OUTPATIENT
Start: 2025-01-18 | End: 2025-01-20

## 2025-01-18 RX ORDER — LORAZEPAM 2 MG/ML
INJECTION INTRAMUSCULAR CODE/TRAUMA/SEDATION MEDICATION
Status: COMPLETED | OUTPATIENT
Start: 2025-01-18 | End: 2025-01-18

## 2025-01-18 RX ORDER — ALBUMIN HUMAN 50 G/1000ML
25 SOLUTION INTRAVENOUS ONCE
Status: COMPLETED | OUTPATIENT
Start: 2025-01-18 | End: 2025-01-18

## 2025-01-18 RX ORDER — FENTANYL CITRATE 50 UG/ML
50 INJECTION, SOLUTION INTRAMUSCULAR; INTRAVENOUS EVERY 2 HOUR PRN
Refills: 0 | Status: DISCONTINUED | OUTPATIENT
Start: 2025-01-18 | End: 2025-01-19

## 2025-01-18 RX ORDER — BRIMONIDINE TARTRATE 2 MG/ML
1 SOLUTION/ DROPS OPHTHALMIC 3 TIMES DAILY
Status: DISCONTINUED | OUTPATIENT
Start: 2025-01-18 | End: 2025-01-20

## 2025-01-18 RX ORDER — INSULIN LISPRO 100 [IU]/ML
1-6 INJECTION, SOLUTION INTRAVENOUS; SUBCUTANEOUS EVERY 6 HOURS SCHEDULED
Status: DISCONTINUED | OUTPATIENT
Start: 2025-01-18 | End: 2025-01-20

## 2025-01-18 RX ORDER — FENTANYL CITRATE-0.9 % NACL/PF 10 MCG/ML
PLASTIC BAG, INJECTION (ML) INTRAVENOUS CONTINUOUS
Status: CANCELLED | OUTPATIENT
Start: 2025-01-18

## 2025-01-18 RX ORDER — HEPARIN SODIUM 1000 [USP'U]/ML
2400 INJECTION, SOLUTION INTRAVENOUS; SUBCUTANEOUS EVERY 6 HOURS PRN
Status: DISCONTINUED | OUTPATIENT
Start: 2025-01-18 | End: 2025-01-22

## 2025-01-18 RX ORDER — DORZOLAMIDE HYDROCHLORIDE AND TIMOLOL MALEATE 20; 5 MG/ML; MG/ML
1 SOLUTION/ DROPS OPHTHALMIC 2 TIMES DAILY
Status: DISCONTINUED | OUTPATIENT
Start: 2025-01-18 | End: 2025-02-11 | Stop reason: HOSPADM

## 2025-01-18 RX ORDER — INSULIN LISPRO 100 [IU]/ML
1-5 INJECTION, SOLUTION INTRAVENOUS; SUBCUTANEOUS EVERY 6 HOURS SCHEDULED
Status: DISCONTINUED | OUTPATIENT
Start: 2025-01-18 | End: 2025-01-18

## 2025-01-18 RX ORDER — SODIUM CHLORIDE, SODIUM GLUCONATE, SODIUM ACETATE, POTASSIUM CHLORIDE, MAGNESIUM CHLORIDE, SODIUM PHOSPHATE, DIBASIC, AND POTASSIUM PHOSPHATE .53; .5; .37; .037; .03; .012; .00082 G/100ML; G/100ML; G/100ML; G/100ML; G/100ML; G/100ML; G/100ML
1000 INJECTION, SOLUTION INTRAVENOUS ONCE
Status: COMPLETED | OUTPATIENT
Start: 2025-01-18 | End: 2025-01-18

## 2025-01-18 RX ORDER — FENTANYL CITRATE 50 UG/ML
25 INJECTION, SOLUTION INTRAMUSCULAR; INTRAVENOUS EVERY 2 HOUR PRN
Refills: 0 | Status: DISCONTINUED | OUTPATIENT
Start: 2025-01-18 | End: 2025-01-18

## 2025-01-18 RX ORDER — LORAZEPAM 2 MG/ML
2 INJECTION INTRAMUSCULAR ONCE
Status: DISCONTINUED | OUTPATIENT
Start: 2025-01-18 | End: 2025-01-18

## 2025-01-18 RX ORDER — FENTANYL CITRATE 50 UG/ML
100 INJECTION, SOLUTION INTRAMUSCULAR; INTRAVENOUS ONCE
Refills: 0 | Status: COMPLETED | OUTPATIENT
Start: 2025-01-18 | End: 2025-01-18

## 2025-01-18 RX ORDER — FENTANYL CITRATE-0.9 % NACL/PF 10 MCG/ML
50 PLASTIC BAG, INJECTION (ML) INTRAVENOUS CONTINUOUS
Status: DISCONTINUED | OUTPATIENT
Start: 2025-01-18 | End: 2025-01-20

## 2025-01-18 RX ORDER — HEPARIN SODIUM 10000 [USP'U]/100ML
3-30 INJECTION, SOLUTION INTRAVENOUS
Status: DISCONTINUED | OUTPATIENT
Start: 2025-01-18 | End: 2025-01-22

## 2025-01-18 RX ORDER — HEPARIN SODIUM 1000 [USP'U]/ML
4800 INJECTION, SOLUTION INTRAVENOUS; SUBCUTANEOUS EVERY 6 HOURS PRN
Status: DISCONTINUED | OUTPATIENT
Start: 2025-01-18 | End: 2025-01-22

## 2025-01-18 RX ORDER — ALBUMIN HUMAN 50 G/1000ML
12.5 SOLUTION INTRAVENOUS ONCE
Status: COMPLETED | OUTPATIENT
Start: 2025-01-18 | End: 2025-01-18

## 2025-01-18 RX ADMIN — NOREPINEPHRINE BITARTRATE 5 MCG/MIN: 1 INJECTION, SOLUTION, CONCENTRATE INTRAVENOUS at 08:37

## 2025-01-18 RX ADMIN — ALBUMIN (HUMAN) 25 G: 12.5 INJECTION, SOLUTION INTRAVENOUS at 15:01

## 2025-01-18 RX ADMIN — PERFLUTREN 0.4 ML/MIN: 6.52 INJECTION, SUSPENSION INTRAVENOUS at 10:10

## 2025-01-18 RX ADMIN — INSULIN LISPRO 2 UNITS: 100 INJECTION, SOLUTION INTRAVENOUS; SUBCUTANEOUS at 09:10

## 2025-01-18 RX ADMIN — Medication 100 MCG/HR: at 00:12

## 2025-01-18 RX ADMIN — DORZOLAMIDE HYDROCHLORIDE AND TIMOLOL MALEATE 1 DROP: 20; 5 SOLUTION OPHTHALMIC at 14:29

## 2025-01-18 RX ADMIN — DEXMEDETOMIDINE HYDROCHLORIDE 0.2 MCG/KG/HR: 400 INJECTION INTRAVENOUS at 13:56

## 2025-01-18 RX ADMIN — BRIMONIDINE TARTRATE 1 DROP: 2 SOLUTION/ DROPS OPHTHALMIC at 21:02

## 2025-01-18 RX ADMIN — EPINEPHRINE 0.05 MG: 0.1 INJECTION INTRAVENOUS at 00:29

## 2025-01-18 RX ADMIN — IOHEXOL 80 ML: 350 INJECTION, SOLUTION INTRAVENOUS at 00:26

## 2025-01-18 RX ADMIN — LATANOPROSTENE BUNOD 1 DROP: 0.24 SOLUTION/ DROPS OPHTHALMIC at 21:04

## 2025-01-18 RX ADMIN — FENTANYL CITRATE 50 MCG: 50 INJECTION INTRAMUSCULAR; INTRAVENOUS at 00:20

## 2025-01-18 RX ADMIN — EPINEPHRINE 0.05 MG: 0.1 INJECTION INTRAVENOUS at 00:07

## 2025-01-18 RX ADMIN — CHLORHEXIDINE GLUCONATE 15 ML: 1.2 RINSE ORAL at 09:06

## 2025-01-18 RX ADMIN — INSULIN LISPRO 3 UNITS: 100 INJECTION, SOLUTION INTRAVENOUS; SUBCUTANEOUS at 05:44

## 2025-01-18 RX ADMIN — EPINEPHRINE 0.05 MG: 0.1 INJECTION INTRAVENOUS at 00:06

## 2025-01-18 RX ADMIN — ALTEPLASE 10 MG: KIT at 00:01

## 2025-01-18 RX ADMIN — ALBUMIN (HUMAN) 12.5 G: 12.5 INJECTION, SOLUTION INTRAVENOUS at 17:52

## 2025-01-18 RX ADMIN — BRIMONIDINE TARTRATE 1 DROP: 2 SOLUTION/ DROPS OPHTHALMIC at 14:28

## 2025-01-18 RX ADMIN — CHLORHEXIDINE GLUCONATE 15 ML: 1.2 RINSE ORAL at 02:52

## 2025-01-18 RX ADMIN — PREDNISOLONE ACETATE 1 DROP: 10 SUSPENSION/ DROPS OPHTHALMIC at 21:02

## 2025-01-18 RX ADMIN — SODIUM CHLORIDE, SODIUM GLUCONATE, SODIUM ACETATE, POTASSIUM CHLORIDE, MAGNESIUM CHLORIDE, SODIUM PHOSPHATE, DIBASIC, AND POTASSIUM PHOSPHATE 1000 ML: .53; .5; .37; .037; .03; .012; .00082 INJECTION, SOLUTION INTRAVENOUS at 02:47

## 2025-01-18 RX ADMIN — FENTANYL CITRATE 25 MCG: 50 INJECTION INTRAMUSCULAR; INTRAVENOUS at 21:45

## 2025-01-18 RX ADMIN — INSULIN LISPRO 1 UNITS: 100 INJECTION, SOLUTION INTRAVENOUS; SUBCUTANEOUS at 13:59

## 2025-01-18 RX ADMIN — EPINEPHRINE 1 MG: 0.1 INJECTION INTRAVENOUS at 00:07

## 2025-01-18 RX ADMIN — ATROPINE SULFATE 1 MG: 0.1 INJECTION INTRAVENOUS at 00:04

## 2025-01-18 RX ADMIN — PREDNISOLONE ACETATE 1 DROP: 10 SUSPENSION/ DROPS OPHTHALMIC at 14:29

## 2025-01-18 RX ADMIN — HEPARIN SODIUM 18 UNITS/KG/HR: 10000 INJECTION, SOLUTION INTRAVENOUS at 02:53

## 2025-01-18 RX ADMIN — INSULIN LISPRO 2 UNITS: 100 INJECTION, SOLUTION INTRAVENOUS; SUBCUTANEOUS at 02:49

## 2025-01-18 RX ADMIN — LORAZEPAM 2 MG: 2 INJECTION INTRAMUSCULAR; INTRAVENOUS at 00:15

## 2025-01-18 RX ADMIN — MINERAL OIL, WHITE PETROLATUM: .03; .94 OINTMENT OPHTHALMIC at 21:04

## 2025-01-18 RX ADMIN — ALTEPLASE 10 MG: KIT at 00:00

## 2025-01-18 RX ADMIN — SODIUM CHLORIDE, SODIUM GLUCONATE, SODIUM ACETATE, POTASSIUM CHLORIDE, MAGNESIUM CHLORIDE, SODIUM PHOSPHATE, DIBASIC, AND POTASSIUM PHOSPHATE 1000 ML: .53; .5; .37; .037; .03; .012; .00082 INJECTION, SOLUTION INTRAVENOUS at 05:46

## 2025-01-18 RX ADMIN — CHLORHEXIDINE GLUCONATE 15 ML: 1.2 RINSE ORAL at 21:02

## 2025-01-18 NOTE — ED PROCEDURE NOTE
Procedure  Intubation    Date/Time: 1/17/2025 10:10 PM    Performed by: Jason Ceja MD  Authorized by: Jason Ceja MD    Patient location:  ED  Consent:     Consent obtained:  Emergent situation  Pre-procedure details:     Patient status:  Altered mental status    Pretreatment medications:  Etomidate    Paralytics:  Rocuronium  Indications:     Indications for intubation: respiratory failure and hypoxemia    Procedure details:     Preoxygenation:  Bag valve mask    CPR in progress: no      Intubation method:  Oral    Oral intubation technique:  Glidescope    Laryngoscope size: hyperangulated.    Tube size (mm):  7.5    Tube type:  Cuffed    Number of attempts:  1    Ventilation between attempts: no      Cricoid pressure: no      Tube visualized through cords: yes    Placement assessment:     ETT to teeth:  20.5cm    Tube secured with:  ETT choi    Breath sounds:  Equal    Placement verification: condensation, CXR verification, direct visualization, equal breath sounds and ETCO2 detector      CXR findings:  ETT in proper place  Post-procedure details:     Patient tolerance of procedure:  Tolerated well, no immediate complications                   Jason Ceja MD  01/18/25 0231

## 2025-01-18 NOTE — ASSESSMENT & PLAN NOTE
"No results found for: \"HSTNI0\", \"HSTNI2\", \"HSTNID2\", \"HSTNI4\", \"HSTNID4\"  Recent Labs     01/18/25  0041 01/18/25  0300   LACTICACID 9.0* 5.6*     Pt went into CA in ED 2/2 PE    PLAN:  [facility]rend [facility]  Tele  S/P TPA changed to Heparin drip  ECHO when possible   "

## 2025-01-18 NOTE — ED PROVIDER NOTES
Time reflects when diagnosis was documented in both MDM as applicable and the Disposition within this note       Time User Action Codes Description Comment    1/18/2025 12:35 AM Taylor Ordaz Add [I26.92] Acute saddle pulmonary embolism, unspecified whether acute cor pulmonale present (HCC)     1/18/2025 12:35 AM Taylor Ordaz Remove [I26.92] Acute saddle pulmonary embolism, unspecified whether acute cor pulmonale present (HCC)     1/18/2025 12:35 AM Taylor Ordaz Add [I26.02] Acute saddle pulmonary embolism with acute cor pulmonale (HCC)     1/18/2025 12:35 AM Taylor Ordaz Add [J96.00] Acute respiratory failure (HCC)     1/18/2025  1:51 AM Anita Cedillo Add [I46.9] Cardiac arrest (HCC)           ED Disposition       ED Disposition   Admit    Condition   Stable    Date/Time   Sat Jan 18, 2025 12:34 AM    Comment   Case was discussed with Rakel and the patient's admission status was agreed to be Admission Status: inpatient status to the service of Dr. Chilel .               Assessment & Plan       Medical Decision Making  89-year-old male presented with respiratory distress.  Requiring bag valve ventilation.  Patient appears to be an extremis and pericode.  Bedside ultrasound notable for cardiac clot in transit.  Attempted to reassess patient with fluids and start pressors prior to intubation.  Started tPA.  Approximately 20 minutes after tPA administration patient became bradycardic and went into cardiac arrest.  1 dose of cardiac epi and 1 round of CPR with ROSC.  Patient was discussed with critical care team and PERT team.  Currently unstable for transport.  Emergent CT head and CTA PE study.  Patient transferred to critical care unit for PE with subsequent cardiac arrest and ROSC requiring intubation and pressors for stabilization.    Amount and/or Complexity of Data Reviewed  Labs: ordered.  Radiology: ordered.    Risk  Prescription drug management.  Decision regarding  hospitalization.               Medications   EPINEPHrine 5,000 mcg (STANDARD CONCENTRATION) IV in sodium chloride 0.9% 250 mL (10 mcg/min Intravenous Rate/Dose Change 1/18/25 0038)   fentaNYL 1000 mcg in sodium chloride 0.9% 100mL infusion (100 mcg/hr Intravenous New Bag 1/18/25 0012)   LORazepam (ATIVAN) injection 2 mg (has no administration in time range)   atropine 1 mg/10 mL injection **ADS Override Pull** (has no administration in time range)   chlorhexidine (PERIDEX) 0.12 % oral rinse 15 mL (has no administration in time range)   atropine 1 mg/10 mL injection (1 mg Intravenous Given 1/17/25 2327)   EPINEPHrine (ADRENALIN) injection (0.05 mg Intravenous Given 1/18/25 0029)   ROCuronium (ZEMURON) injection 75 mg (70 mg Intravenous Given 1/17/25 2352)   atropine 1 mg/10 mL injection (1 mg Intravenous Given 1/18/25 0004)   alteplase (Activase) injection 50 mg (10 mg Intravenous Given 1/18/25 0001)     Followed by   alteplase (Activase) injection 50 mg (10 mg Intravenous Given 1/18/25 0000)   etomidate (AMIDATE) 2 mg/mL injection (30 mg Intravenous Given 1/17/25 2351)   fentaNYL injection 100 mcg (50 mcg Intravenous Given 1/18/25 0020)   LORazepam (ATIVAN) injection (2 mg Intravenous Given 1/18/25 0015)   iohexol (OMNIPAQUE) 350 MG/ML injection (MULTI-DOSE) 80 mL (80 mL Intravenous Given 1/18/25 0026)       ED Risk Strat Scores                                              History of Present Illness       Chief Complaint   Patient presents with    Respiratory Distress     Pt found unresponsive by family at home. Ems found pt in respiratory arrest.        Past Medical History:   Diagnosis Date    Cataracts, bilateral       Past Surgical History:   Procedure Laterality Date    CATARACT EXTRACTION Bilateral 07/15/2012    COLONOSCOPY      CYSTOSCOPY  01/15/2018    Last assessed 9/14/17, diagnostic    HERNIA REPAIR      INSTILLATION MYTOMYCIN N/A 10/25/2017    Procedure: INSTILLATION OF MITOMYCIN C;  Surgeon: Danish  MD Vaibhav;  Location: [facility];  Service: Urology    WA CYSTO W/REMOVAL OF LESIONS SMALL N/A 10/25/2017    Procedure: CYSTOSCOPY; BLADDER BIOPSY WITH FULGERATION;  Surgeon: Danish Esposito MD;  Location: [facility];  Service: Urology    TONSILLECTOMY      TRANSURETHRAL RESECTION OF BLADDER TUMOR N/A 10/25/2017    Procedure: TUR BLADDER TUMOR;  Surgeon: Danish Esposito MD;  Location: [facility];  Service: Urology    WISDOM TOOTH EXTRACTION        Family History   Problem Relation Age of Onset    Diabetes Mother       Social History     Tobacco Use    Smoking status: Former     Current packs/day: 0.00     Types: Cigarettes     Quit date: 1964     Years since quittin.0    Smokeless tobacco: Never   Vaping Use    Vaping status: Never Used   Substance Use Topics    Alcohol use: Not Currently     Comment: quit 23 years ago    Drug use: No      E-Cigarette/Vaping    E-Cigarette Use Never User       E-Cigarette/Vaping Substances    Nicotine No     THC No     CBD No     Flavoring No     Other No     Unknown No       I have reviewed and agree with the history as documented.     89-year-old male with history of HTN, CKD, CVA, DM presents with respiratory distress.    Per EMS patient was at home and had dinner and felt unwell so went to sleep early.  On evaluation by family he was noted to be unresponsive.  EMS was called and noted that he was in agonal respiration.  EMS started bag valve ventilation with periodic return of mentation but once bag valve ventilation was discontinued he would become unresponsive again.        History provided by:  EMS personnel  History limited by:  Acuity of condition    89-year-old M with h/o CVA, T2DM, CKD presenting to ED with AMS, unresponsive. EMS report patient went to lie down at approx 930pm found him unresponsive 10:30pm. EMS arrived found agonal breathing, started bagging and nasal trumpet, and patient became more arousable and breathing on own. Per family who came  to ED patient road his bike this morning. Patient is not talking at this time. History limited.    Review of Systems   Unable to perform ROS: Unstable vital signs           Objective       ED Triage Vitals   Temp Pulse Blood Pressure Respirations SpO2 Patient Position - Orthostatic VS   -- 01/17/25 2321 01/17/25 2322 01/18/25 0110 01/17/25 2322 --    (!) 45 (!) 77/58 16 (!) 82 %       Temp src Heart Rate Source BP Location FiO2 (%) Pain Score    -- 01/17/25 2321 -- 01/18/25 0043 --     Monitor  100       Vitals      Date and Time Temp Pulse SpO2 Resp BP Pain Score FACES Pain Rating User   01/18/25 0140 -- 81 100 % 16 111/77 -- -- EA   01/18/25 0130 -- 78 97 % 16 102/73 -- --    01/18/25 0120 -- 80 98 % 16 143/99 -- -- EA   01/18/25 0110 -- 85 99 % 16 147/99 -- --    01/18/25 0058 -- 93 -- -- 186/118 -- -- EA   01/18/25 0043 -- -- 96 % -- -- -- -- MO   01/17/25 2322 -- 46 82 % -- 77/58 -- -- RM   01/17/25 2321 -- 45 -- -- -- -- -- RM            Physical Exam  Vitals reviewed.   Constitutional:       General: He is in acute distress.      Appearance: He is normal weight. He is ill-appearing (acutely and chronically ill appearing) and toxic-appearing. He is not diaphoretic.      Comments: Patient periodically alert, spontaneous eye movement, unintelligible vocalizations, withdrawing from pain.   HENT:      Head: Normocephalic and atraumatic.      Right Ear: External ear normal.      Left Ear: External ear normal.      Nose: Nose normal.      Mouth/Throat:      Mouth: Mucous membranes are moist.      Pharynx: Oropharynx is clear.   Eyes:      Pupils: Pupils are equal, round, and reactive to light.   Cardiovascular:      Rate and Rhythm: Bradycardia present.      Comments: Thready radial pulses.  Bradycardia.  Skin is cool to touch.  Pallor.  Pulmonary:      Comments: Bag valve ventilations.  Clear lung sounds.  Abdominal:      General: There is no distension.      Palpations: Abdomen is soft. There is no mass.       Tenderness: There is no abdominal tenderness. There is no guarding or rebound.      Hernia: No hernia is present.   Musculoskeletal:      Cervical back: Neck supple.      Comments: Moving all extremities and withdrawing all extremities from pain.   Skin:     Coloration: Skin is pale.   Neurological:      GCS: GCS eye subscore is 4. GCS verbal subscore is 2. GCS motor subscore is 4.         Results Reviewed       Procedure Component Value Units Date/Time    Procalcitonin [[id number]]  (Normal) Collected: 01/18/25 0041    Lab Status: Final result Specimen: Blood from Arm, Left Updated: 01/18/25 0138     Procalcitonin 0.23 ng/ml     Lactic acid, plasma (w/reflex if result > 2.0) [[id number]]  (Abnormal) Collected: 01/18/25 0041    Lab Status: Final result Specimen: Blood from Arm, Left Updated: 01/18/25 0124     LACTIC ACID 9.0 mmol/L     Narrative:      Result may be elevated if tourniquet was used during collection.    CBC and differential [[id number]]  (Abnormal) Collected: 01/18/25 0041    Lab Status: Final result Specimen: Blood from Arm, Left Updated: 01/18/25 0123     WBC 9.70 Thousand/uL      RBC 2.57 Million/uL      Hemoglobin 8.2 g/dL      Hematocrit 27.0 %       fL      MCH 31.9 pg      MCHC 30.4 g/dL      RDW 13.3 %      MPV 11.2 fL      Platelets 93 Thousands/uL      nRBC 0 /100 WBCs      Segmented % 49 %      Immature Grans % 1 %      Lymphocytes % 39 %      Monocytes % 9 %      Eosinophils Relative 2 %      Basophils Relative 0 %      Absolute Neutrophils 4.65 Thousands/µL      Absolute Immature Grans 0.12 Thousand/uL      Absolute Lymphocytes 3.81 Thousands/µL      Absolute Monocytes 0.87 Thousand/µL      Eosinophils Absolute 0.22 Thousand/µL      Basophils Absolute 0.03 Thousands/µL     Narrative:      This is an appended report.  These results have been appended to a previously verified report.    Smear Review(Phlebs Do Not Order) [[id number]]  (Abnormal) Collected: 01/18/25 0041    Lab Status:  Final result Specimen: Blood from Arm, Left Updated: 01/18/25 0123     RBC Morphology Present     Platelet Estimate Borderline     Anisocytosis Present    Comprehensive metabolic panel [[id number]]  (Abnormal) Collected: 01/18/25 0041    Lab Status: Final result Specimen: Blood from Arm, Left Updated: 01/18/25 0123     Sodium 139 mmol/L      Potassium 4.4 mmol/L      Chloride 107 mmol/L      CO2 15 mmol/L      ANION GAP 17 mmol/L      BUN 39 mg/dL      Creatinine 3.08 mg/dL      Glucose 446 mg/dL      Calcium 8.6 mg/dL      Corrected Calcium 9.4 mg/dL      AST 92 U/L      ALT 83 U/L      Alkaline Phosphatase 68 U/L      Total Protein 6.0 g/dL      Albumin 3.0 g/dL      Total Bilirubin 0.34 mg/dL      eGFR 16 ml/min/1.73sq m     Narrative:      [employer] guidelines for Chronic Kidney Disease (CKD):     Stage 1 with normal or high GFR (GFR > 90 mL/min/1.73 square meters)    Stage 2 Mild CKD (GFR = 60-89 mL/min/1.73 square meters)    Stage 3A Moderate CKD (GFR = 45-59 mL/min/1.73 square meters)    Stage 3B Moderate CKD (GFR = 30-44 mL/min/1.73 square meters)    Stage 4 Severe CKD (GFR = 15-29 mL/min/1.73 square meters)    Stage 5 End Stage CKD (GFR <15 mL/min/1.73 square meters)  Note: GFR calculation is accurate only with a steady state creatinine    Magnesium [[id number]]  (Normal) Collected: 01/18/25 0041    Lab Status: Final result Specimen: Blood from Arm, Left Updated: 01/18/25 0118     Magnesium 2.4 mg/dL     Protime-INR [[id number]]  (Abnormal) Collected: 01/18/25 0041    Lab Status: Final result Specimen: Blood from Arm, Left Updated: 01/18/25 0108     Protime 17.6 seconds      INR 1.36    Narrative:      INR Therapeutic Range    Indication                                             INR Range      Atrial Fibrillation                                               2.0-3.0  Hypercoagulable State                                    2.0.2.3  Left Ventricular Asist Device                             2.0-3.0  Mechanical Heart Valve                                  -    Aortic(with afib, MI, embolism, HF, LA enlargement,    and/or coagulopathy)                                     2.0-3.0 (2.5-3.5)     Mitral                                                             2.5-3.5  Prosthetic/Bioprosthetic Heart Valve               2.0-3.0  Venous thromboembolism (VTE: VT, PE        2.0-3.0    APTT [[id number]]  (Abnormal) Collected: 01/18/25 0041    Lab Status: Final result Specimen: Blood from Arm, Left Updated: 01/18/25 0108     PTT 38 seconds     B-Type Natriuretic Peptide(BNP) [[id number]] Collected: 01/18/25 0041    Lab Status: In process Specimen: Blood from Arm, Left Updated: 01/18/25 0046    HS Troponin 0hr (reflex protocol) [[id number]] Collected: 01/18/25 0041    Lab Status: No result Specimen: Blood from Arm, Left     POCT Blood Gas (CG8+) [[id number]]  (Abnormal) Collected: 01/17/25 2337    Lab Status: Final result Specimen: Venous Updated: 01/17/25 2343     ph, Napoleon ISTAT 7.090     pCO2, Napoleon i-STAT 44.1 mm HG      pO2, Napoleon i-STAT 31.0 mm HG      BE, i-STAT -16 mmol/L      HCO3, Napoleon i-STAT 13.4 mmol/L      CO2, i-STAT 15 mmol/L      O2 Sat, i-STAT 39 %      SODIUM, I-STAT 139 mmol/l      Potassium, i-STAT 4.3 mmol/L      Calcium, Ionized i-STAT 1.24 mmol/L      Hct, i-STAT 26 %      Hgb, i-STAT 8.8 g/dl      Glucose, i-STAT 403 mg/dl      Specimen Type VENOUS            CT head without contrast   Final Interpretation by Willy Ward MD (01/18 0129)      No acute intracranial hemorrhage or large territorial infarction. Moderate chronic microangiopathic change, noting that MRI would be more sensitive for the detection of subtle/early acute infarct.         The study was marked in EPIC for immediate notification.         Workstation performed: [id number]         XR chest 1 view    (Results Pending)   CTA chest pe study    (Results Pending)       Procedures    ED Medication and Procedure Management   Prior to  Admission Medications   Prescriptions Last Dose Informant Patient Reported? Taking?   Blood Glucose Monitoring Suppl (OneTouch Verio Reflect) w/Device KIT  Self No No   Sig: Check blood sugars twice daily. Please substitute with appropriate alternative as covered by patient's insurance. Dx: E11.65   Blood Glucose Monitoring Suppl (OneTouch Verio Reflect) w/Device KIT  Self No No   Sig: Check blood sugars once daily. Please substitute with appropriate alternative as covered by patient's insurance. Dx: E11.65   Cyanocobalamin (Vitamin B-12) 1000 MCG/15ML LIQD  Self Yes No   Sig: Take by mouth daily   Dorzolamide HCl-Timolol Mal PF 2-0.5 % SOLN  Self Yes No   Sig: INSTILL 1 DROP INTO EACH EYE TWICE DAILY   Farxiga 10 MG tablet   No No   Sig: Take 1 tablet (10 mg total) by mouth daily   OneTouch Delica Lancets 33G MISC  Self No No   Sig: Check blood sugars once daily. Please substitute with appropriate alternative as covered by patient's insurance. Dx: E11.65   Vyzulta 0.024 % SOLN  Self Yes No   Sig: Administer 1 drop to both eyes daily at bedtime   atorvastatin (LIPITOR) 40 mg tablet   No No   Sig: Take 1 tablet (40 mg total) by mouth daily   brimonidine (ALPHAGAN P) 0.15 % ophthalmic solution  Self Yes No   clopidogrel (PLAVIX) 75 mg tablet   No No   Sig: Take 1 tablet (75 mg total) by mouth daily   glucose blood (OneTouch Verio) test strip  Self No No   Sig: Check blood sugars once daily. Please substitute with appropriate alternative as covered by patient's insurance. Dx: E11.65   losartan (COZAAR) 25 mg tablet   No No   Sig: Take 1 tablet (25 mg total) by mouth daily   prednisoLONE acetate (PRED FORTE) 1 % ophthalmic suspension  Self Yes No   Sig: INSTILL 1 DROP INTO LEFT EYE TWICE DAILY      Facility-Administered Medications: None     Current Discharge Medication List        CONTINUE these medications which have NOT CHANGED    Details   atorvastatin (LIPITOR) 40 mg tablet Take 1 tablet (40 mg total) by mouth  daily  Qty: 90 tablet, Refills: 3    Associated Diagnoses: Lacunar cerebrovascular accident (CVA) ([facility])      !! Blood Glucose Monitoring Suppl (OneTouch Verio Reflect) w/Device KIT Check blood sugars twice daily. Please substitute with appropriate alternative as covered by patient's insurance. Dx: E11.65  Qty: 1 kit, Refills: 0    Associated Diagnoses: CKD stage 4 due to type 2 diabetes mellitus ([facility])      !! Blood Glucose Monitoring Suppl (OneTouch Verio Reflect) w/Device KIT Check blood sugars once daily. Please substitute with appropriate alternative as covered by patient's insurance. Dx: E11.65  Qty: 1 kit, Refills: 0    Associated Diagnoses: Type 2 diabetes mellitus with stage 4 chronic kidney disease, without long-term current use of insulin ([facility])      brimonidine (ALPHAGAN P) 0.15 % ophthalmic solution       clopidogrel (PLAVIX) 75 mg tablet Take 1 tablet (75 mg total) by mouth daily  Qty: 90 tablet, Refills: 3    Associated Diagnoses: Lacunar cerebrovascular accident (CVA) ([facility])      Cyanocobalamin (Vitamin B-12) 1000 MCG/15ML LIQD Take by mouth daily      Dorzolamide HCl-Timolol Mal PF 2-0.5 % SOLN INSTILL 1 DROP INTO EACH EYE TWICE DAILY      Farxiga 10 MG tablet Take 1 tablet (10 mg total) by mouth daily  Qty: 30 tablet, Refills: 11    Associated Diagnoses: CKD stage 4 due to type 2 diabetes mellitus ([facility])      glucose blood (OneTouch Verio) test strip Check blood sugars once daily. Please substitute with appropriate alternative as covered by patient's insurance. 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 Please substitute with appropriate alternative as covered by patient's insurance. Dx: E11.65  Qty: 100 each, Refills: 3    Associated Diagnoses: Type 2 diabetes mellitus with stage 4 chronic kidney disease, without long-term current use of insulin ([facility])      prednisoLONE acetate (PRED FORTE) 1 % ophthalmic suspension INSTILL 1 DROP INTO LEFT EYE TWICE DAILY      Vyzulta 0.024 % SOLN Administer 1 drop to both eyes daily at bedtime       !! - Potential duplicate medications found. Please discuss with provider.        No discharge procedures on file.  ED SEPSIS DOCUMENTATION   Time reflects when diagnosis was documented in both MDM as applicable and the Disposition within this note       Time User Action Codes Description Comment    1/18/2025 12:35 AM Taylor Ordaz Add [I26.92] Acute saddle pulmonary embolism, unspecified whether acute cor pulmonale present (HCC)     1/18/2025 12:35 AM Taylor Ordaz Remove [I26.92] Acute saddle pulmonary embolism, unspecified whether acute cor pulmonale present (HCC)     1/18/2025 12:35 AM Taylor Ordaz Add [I26.02] Acute saddle pulmonary embolism with acute cor pulmonale (HCC)     1/18/2025 12:35 AM Taylor Ordaz Add [J96.00] Acute respiratory failure (HCC)     1/18/2025  1:51 AM Anita Cedillo Add [I46.9] Cardiac arrest ([facility])                  Anita Cedillo MD  01/18/25 0152

## 2025-01-18 NOTE — ASSESSMENT & PLAN NOTE
Lab Results   Component Value Date    EGFR 16 01/18/2025    EGFR 17 (L) 11/18/2024    EGFR 17 09/11/2024    CREATININE 3.08 (H) 01/18/2025    CREATININE 3.31 (H) 11/18/2024    CREATININE 3.00 (H) 09/11/2024     Continue to monitor

## 2025-01-18 NOTE — ED PROCEDURE NOTE
Procedure  POC Cardiac US    Date/Time: 1/18/2025 12:38 AM    Performed by: Taylor Ordaz DO  Authorized by: Taylor Ordaz DO    Patient location:  ED  Procedure details:     Exam Type:  Diagnostic    Indications: hypotension      Indications comment:  Periarrest    Assessment / Evaluation for: cardiac function and right heart strain (suspected pulmonary embolism)      Exam Type: initial exam      Image quality: limited diagnostic      Image availability:  Images available in PACS  Patient Details:     Cardiac Rhythm:  Regular    Medications: epinephrine      Mechanical ventilation: No    Cardiac findings:     Echo technique: limited 2D      Views obtained: subcostal    Interpretation:      Large thrombus in right atrium                   Taylor Ordaz DO  01/18/25 0041

## 2025-01-18 NOTE — ASSESSMENT & PLAN NOTE
CLASSIFICATIONS Acute unstable Saddle  RISK FACTORS: hx of stroke, hx of cancer    DX: CTA   Recent Labs     01/18/25  0041 01/18/25  0300   LACTICACID 9.0* 5.6*       TXT:   S/P TPA  Epinephrine gtt  Started Heparin when PTT<90  Target INR 2-3  ECHO Priority   TTM not indicated

## 2025-01-18 NOTE — PLAN OF CARE
Problem: PAIN - ADULT  Goal: Verbalizes/displays adequate comfort level or baseline comfort level  Description: Interventions:  - Encourage patient to monitor pain and request assistance  - Assess pain using appropriate pain scale  - Administer analgesics based on type and severity of pain and evaluate response  - Implement non-pharmacological measures as appropriate and evaluate response  - Consider cultural and social influences on pain and pain management  - Notify physician/advanced practitioner if interventions unsuccessful or patient reports new pain  Outcome: Progressing     Problem: INFECTION - ADULT  Goal: Absence or prevention of progression during hospitalization  Description: INTERVENTIONS:  - Assess and monitor for signs and symptoms of infection  - Monitor lab/diagnostic results  - Monitor all insertion sites, i.e. indwelling lines, tubes, and drains  - Monitor endotracheal if appropriate and nasal secretions for changes in amount and color  - [place] appropriate cooling/warming therapies per order  - Administer medications as ordered  - Instruct and encourage patient and family to use good hand hygiene technique  - Identify and instruct in appropriate isolation precautions for identified infection/condition  Outcome: Progressing  Goal: Absence of fever/infection during neutropenic period  Description: INTERVENTIONS:  - Monitor WBC    Outcome: Progressing     Problem: SAFETY ADULT  Goal: Patient will remain free of falls  Description: INTERVENTIONS:  - Educate patient/family on patient safety including physical limitations  - Instruct patient to call for assistance with activity   - Consult OT/PT to assist with strengthening/mobility   - Keep Call bell within reach  - Keep bed low and locked with side rails adjusted as appropriate  - Keep care items and personal belongings within reach  - Initiate and maintain comfort rounds  - Make Fall Risk Sign visible to staff  - Offer Toileting every  Hours,  in advance of need  - Initiate/Maintain alarm  - Obtain necessary fall risk management equipment:   - Apply yellow socks and bracelet for high fall risk patients  - Consider moving patient to room near nurses station  Outcome: Progressing  Goal: Maintain or return to baseline ADL function  Description: INTERVENTIONS:  -  Assess patient's ability to carry out ADLs; assess patient's baseline for ADL function and identify physical deficits which impact ability to perform ADLs (bathing, care of mouth/teeth, toileting, grooming, dressing, etc.)  - Assess/evaluate cause of self-care deficits   - Assess range of motion  - Assess patient's mobility; develop plan if impaired  - Assess patient's need for assistive devices and provide as appropriate  - Encourage maximum independence but intervene and supervise when necessary  - Involve family in performance of ADLs  - Assess for home care needs following discharge   - Consider OT consult to assist with ADL evaluation and planning for discharge  - Provide patient education as appropriate  Outcome: Progressing  Goal: Maintains/Returns to pre admission functional level  Description: INTERVENTIONS:  - Perform AM-PAC 6 Click Basic Mobility/ Daily Activity assessment daily.  - Set and communicate daily mobility goal to care team and patient/family/caregiver.   - Collaborate with rehabilitation services on mobility goals if consulted  - Perform Range of Motion  times a day.  - Reposition patient every  hours.  - Dangle patient  times a day  - Stand patient  times a day  - Ambulate patient  times a day  - Out of bed to chair  times a day   - Out of bed for meal times a day  - Out of bed for toileting  - Record patient progress and toleration of activity level   Outcome: Progressing     Problem: DISCHARGE PLANNING  Goal: Discharge to home or other facility with appropriate resources  Description: INTERVENTIONS:  - Identify barriers to discharge w/patient and caregiver  - Arrange for  needed discharge resources and transportation as appropriate  - Identify discharge learning needs (meds, wound care, etc.)  - Arrange for interpretive services to assist at discharge as needed  - Refer to Case Management Department for coordinating discharge planning if the patient needs post-hospital services based on physician/advanced practitioner order or complex needs related to functional status, cognitive ability, or social support system  Outcome: Progressing     Problem: Knowledge Deficit  Goal: Patient/family/caregiver demonstrates understanding of disease process, treatment plan, medications, and discharge instructions  Description: Complete learning assessment and assess knowledge base.  Interventions:  - Provide teaching at level of understanding  - Provide teaching via preferred learning methods  Outcome: Progressing     Problem: Nutrition/Hydration-ADULT  Goal: Nutrient/Hydration intake appropriate for improving, restoring or maintaining nutritional needs  Description: Monitor and assess patient's nutrition/hydration status for malnutrition. Collaborate with interdisciplinary team and initiate plan and interventions as ordered.  Monitor patient's weight and dietary intake as ordered or per policy. Utilize nutrition screening tool and intervene as necessary. Determine patient's food preferences and provide high-protein, high-caloric foods as appropriate.     INTERVENTIONS:  - Monitor oral intake, urinary output, labs, and treatment plans  - Assess nutrition and hydration status and recommend course of action  - Evaluate amount of meals eaten  - Assist patient with eating if necessary   - Allow adequate time for meals  - Recommend/ encourage appropriate diets, oral nutritional supplements, and vitamin/mineral supplements  - Order, calculate, and assess calorie counts as needed  - Recommend, monitor, and adjust tube feedings and TPN/PPN based on assessed needs  - Assess need for intravenous fluids  -  Provide specific nutrition/hydration education as appropriate  - Include patient/family/caregiver in decisions related to nutrition  Outcome: Progressing

## 2025-01-18 NOTE — ASSESSMENT & PLAN NOTE
Lab Results   Component Value Date    EGFR 16 01/18/2025    EGFR 17 (L) 11/18/2024    EGFR 17 09/11/2024    CREATININE 3.08 (H) 01/18/2025    CREATININE 3.31 (H) 11/18/2024    CREATININE 3.00 (H) 09/11/2024   Monitor Hgb in setting of TPA  Transfuse <7

## 2025-01-18 NOTE — ACP (ADVANCE CARE PLANNING)
Had GOC discussion in the ED with wife, Antony Almanzar, and 2 sons present.  Pt was made aware of the situation, that the PE was massive, and prognosis was poor.  Mrs. Almanzar noted they had yet to have GOC conversations and wanted to see her  and hold his hand [facility].     The family along with a third son were brought up to the waiting room. Fentanyl was DC to gauge neuro response.  Family was brought to the patient room.  They were told that even without sedation patient was dependant on the ventilator.  They were re informed that the heart had stopped in the ED and CPR was administered.    Julienne VASQUEZ  came into the room to discuss the plan.  Everyone agreed that it was reasonable to wait and see how the patient responded to treatment and see if he gained any purposeful movement on exam. Wife agreed that another round of CPR was not appropriate and should be transitioned to Level 2.     Patients family was given time to ask questions which were all answered and will continue to monitor the situation throughout the night.

## 2025-01-18 NOTE — PLAN OF CARE
Problem: PAIN - ADULT  Goal: Verbalizes/displays adequate comfort level or baseline comfort level  Description: Interventions:  - Encourage patient to monitor pain and request assistance  - Assess pain using appropriate pain scale  - Administer analgesics based on type and severity of pain and evaluate response  - Implement non-pharmacological measures as appropriate and evaluate response  - Consider cultural and social influences on pain and pain management  - Notify physician/advanced practitioner if interventions unsuccessful or patient reports new pain  Outcome: Progressing     Problem: INFECTION - ADULT  Goal: Absence or prevention of progression during hospitalization  Description: INTERVENTIONS:  - Assess and monitor for signs and symptoms of infection  - Monitor lab/diagnostic results  - Monitor all insertion sites, i.e. indwelling lines, tubes, and drains  - Monitor endotracheal if appropriate and nasal secretions for changes in amount and color  - [place] appropriate cooling/warming therapies per order  - Administer medications as ordered  - Instruct and encourage patient and family to use good hand hygiene technique  - Identify and instruct in appropriate isolation precautions for identified infection/condition  Outcome: Progressing  Goal: Absence of fever/infection during neutropenic period  Description: INTERVENTIONS:  - Monitor WBC    Outcome: Progressing     Problem: SAFETY ADULT  Goal: Patient will remain free of falls  Description: INTERVENTIONS:  - Educate patient/family on patient safety including physical limitations  - Instruct patient to call for assistance with activity   - Consult OT/PT to assist with strengthening/mobility   - Keep Call bell within reach  - Keep bed low and locked with side rails adjusted as appropriate  - Keep care items and personal belongings within reach  - Initiate and maintain comfort rounds  - Make Fall Risk Sign visible to staff  - Offer Toileting every  Hours,  in advance of need  - Initiate/Maintain alarm  - Obtain necessary fall risk management equipment:   Problem: SAFETY,RESTRAINT: NV/NON-SELF DESTRUCTIVE BEHAVIOR  Goal: Remains free of harm/injury (restraint for non violent/non self-detsructive behavior)  Description: INTERVENTIONS:  - Instruct patient/family regarding restraint use   - Assess and monitor physiologic and psychological status   - Provide interventions and comfort measures to meet assessed patient needs   - Identify and implement measures to help patient regain control  - Assess readiness for release of restraint   Outcome: Progressing  Goal: Returns to optimal restraint-free functioning  Description: INTERVENTIONS:  - Assess the patient's behavior and symptoms that indicate continued need for restraint  - Identify and implement measures to help patient regain control  - Assess readiness for release of restraint   Outcome: Progressing     - Apply yellow socks and bracelet for high fall risk patients  - Consider moving patient to room near nurses station  Outcome: Progressing  Goal: Maintain or return to baseline ADL function  Description: INTERVENTIONS:  -  Assess patient's ability to carry out ADLs; assess patient's baseline for ADL function and identify physical deficits which impact ability to perform ADLs (bathing, care of mouth/teeth, toileting, grooming, dressing, etc.)  - Assess/evaluate cause of self-care deficits   - Assess range of motion  - Assess patient's mobility; develop plan if impaired  - Assess patient's need for assistive devices and provide as appropriate  - Encourage maximum independence but intervene and supervise when necessary  - Involve family in performance of ADLs  - Assess for home care needs following discharge   - Consider OT consult to assist with ADL evaluation and planning for discharge  - Provide patient education as appropriate  Outcome: Progressing  Goal: Maintains/Returns to pre admission functional  level  Description: INTERVENTIONS:  - Perform AM-PAC 6 Click Basic Mobility/ Daily Activity assessment daily.  - Set and communicate daily mobility goal to care team and patient/family/caregiver.   - Collaborate with rehabilitation services on mobility goals if consulted  - Perform Range of Motion  times a day.  - Reposition patient every  hours.  - Dangle patient  times a day  - Stand patient  times a day  - Ambulate patient  times a day  - Out of bed to chair  times a day   - Out of bed for meals   Problem: Nutrition/Hydration-ADULT  Goal: Nutrient/Hydration intake appropriate for improving, restoring or maintaining nutritional needs  Description: Monitor and assess patient's nutrition/hydration status for malnutrition. Collaborate with interdisciplinary team and initiate plan and interventions as ordered.  Monitor patient's weight and dietary intake as ordered or per policy. Utilize nutrition screening tool and intervene as necessary. Determine patient's food preferences and provide high-protein, high-caloric foods as appropriate.     INTERVENTIONS:  - Monitor oral intake, urinary output, labs, and treatment plans  - Assess nutrition and hydration status and recommend course of action  - Evaluate amount of meals eaten  - Assist patient with eating if necessary   - Allow adequate time for meals  - Recommend/ encourage appropriate diets, oral nutritional supplements, and vitamin/mineral supplements  - Order, calculate, and assess calorie counts as needed  - Recommend, monitor, and adjust tube feedings and TPN/PPN based on assessed needs  - Assess need for intravenous fluids  - Provide specific nutrition/hydration education as appropriate  - Include patient/family/caregiver in decisions related to nutrition  Outcome: Progressing     Problem: Prexisting or High Potential for Compromised Skin Integrity  Goal: Skin integrity is maintained or improved  Description: INTERVENTIONS:  - Identify patients at risk for skin  breakdown  - Assess and monitor skin integrity  - Assess and monitor nutrition and hydration status  - Monitor labs   - Assess for incontinence   - Turn and reposition patient  - Assist with mobility/ambulation  - Relieve pressure over bony prominences  - Avoid friction and shearing  - Provide appropriate hygiene as needed including keeping skin clean and dry  - Evaluate need for skin moisturizer/barrier cream  - Collaborate with interdisciplinary team   - Patient/family teaching  - Consider wound care consult   Outcome: Progressing    times a day  - Out of bed for toileting  - Record patient progress and toleration of activity level   Outcome: Progressing

## 2025-01-18 NOTE — ASSESSMENT & PLAN NOTE
Lab Results   Component Value Date    HGBA1C 6.7 (H) 09/09/2024       Blood Sugar Average: Last 72 hrs:  (P) 260  Recent Labs     01/18/25  0041 01/18/25  0101   POCGLU  --  260*   GLUC 446*  --        Hold home regimen while inpatient and resume on discharge Dapagliflozin   Diabetic diet  Insulin regimen  Glucose checks and Insulin correction ACHS  Goal -180 while admitted, adjusting insulin regimen as appropriate  Monitor for hypoglycemia and treat per protocol

## 2025-01-18 NOTE — H&P
"H&P - Critical Care/ICU   Name: Jeffry Almanzar 89 y.o. 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  ([facility])  Lab Results   Component Value Date    EGFR 16 01/18/2025    EGFR 17 (L) 11/18/2024    EGFR 17 09/11/2024    CREATININE 3.08 (H) 01/18/2025    CREATININE 3.31 (H) 11/18/2024    CREATININE 3.00 (H) 09/11/2024   Monitor Hgb in setting of TPA  Transfuse <7  Benign 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 ([facility])  Lab Results   Component Value Date    EGFR 16 01/18/2025    EGFR 17 (L) 11/18/2024    EGFR 17 09/11/2024    CREATININE 3.08 (H) 01/18/2025    CREATININE 3.31 (H) 11/18/2024    CREATININE 3.00 (H) 09/11/2024     Continue to monitor   Diabetic nephropathy associated with type 2 diabetes mellitus ([facility])  Lab Results   Component Value Date    HGBA1C 6.7 (H) 09/09/2024       Blood Sugar Average: Last 72 hrs:  (P) 260  Recent Labs     01/18/25  0041 01/18/25  0101   POCGLU  --  260*   GLUC 446*  --        Hold home regimen while inpatient and resume on discharge Dapagliflozin   Diabetic diet  Insulin regimen  Glucose checks and Insulin correction ACHS  Goal -180 while admitted, adjusting insulin regimen as appropriate  Monitor for hypoglycemia and treat per protocol    IgM lambda paraproteinemia    Lacunar cerebrovascular accident (CVA) ([facility])    Cardiac arrest ([facility])  No results found for: \"HSTNI0\", \"HSTNI2\", \"HSTNID2\", \"HSTNI4\", \"HSTNID4\"  Recent Labs     01/18/25  0041 01/18/25  0300   LACTICACID 9.0* 5.6*     Pt went into CA in ED 2/2 PE    PLAN:  [facility]  Tele  S/P TPA changed to Heparin drip  ECHO when possible   Acute saddle pulmonary embolism with acute cor pulmonale (HCC)  CLASSIFICATIONS Acute unstable Saddle  RISK FACTORS: hx of stroke, hx of cancer    DX: CTA   Recent Labs     01/18/25  0041 01/18/25  0300   LACTICACID 9.0* 5.6*       TXT: "   S/P TPA  Epinephrine gtt  Started Heparin when PTT<90  Target INR 2-3  ECHO Priority   TTM not indicated  Disposition: Critical care    History of Present Illness   Jeffry Almanzar is a 89 y.o.w pmhx of CKDIV, CVA, Bladder Ca, presented [facility] ED via EMS on [id number]. Pt had dinner, started to feel unwell and family noted he was unresponsive.  Pt was normal prior to the even.  EMS arrived pt was in agonal respiration and started on bag ventilation w/ intermittent episodes of responsiveness. Mentation deteriorated as soon as pt was taken off the bag.  In the ED imaging showed large saddle PE. After imaging pt went into cardiac arrest and was intubated.     History obtained from chart review.  Review of Systems: Review of Systems not obtainable due to Clinical Condition    Historical Information   Past Medical History:  No date: Cataracts, bilateral Past Surgical History:  07/15/2012: CATARACT EXTRACTION; Bilateral  No date: COLONOSCOPY  01/15/2018: CYSTOSCOPY      Comment:  Last assessed 9/14/17, diagnostic  No date: HERNIA REPAIR  10/25/2017: INSTILLATION MYTOMYCIN; N/A      Comment:  Procedure: INSTILLATION OF MITOMYCIN C;  Surgeon: Danish Esposito MD;  [facility];  Service: Urology  10/25/2017: IA CYSTO W/REMOVAL OF LESIONS SMALL; N/A      Comment:  Procedure: CYSTOSCOPY; BLADDER BIOPSY WITH FULGERATION;                Surgeon: Danish Esposito MD;  Location: [facility];                 Service: Urology  No date: TONSILLECTOMY  10/25/2017: TRANSURETHRAL RESECTION OF BLADDER TUMOR; N/A      Comment:  Procedure: TUR BLADDER TUMOR;  Surgeon: Danish Esposito MD;  [facility]: [facility];  Service: Urology  No date: WISDOM TOOTH EXTRACTION   Current Outpatient Medications   Medication Instructions    atorvastatin (LIPITOR) 40 mg, Oral, Daily    Blood Glucose Monitoring Suppl (OneTouch Verio Reflect) w/Device KIT Check blood sugars twice daily. Please substitute with appropriate  alternative as covered by patient's insurance. Dx: E11.65    Blood Glucose Monitoring Suppl (OneTouch Verio Reflect) w/Device KIT Check blood sugars once daily. Please substitute with appropriate alternative as covered by patient's insurance. Dx: E11.65    brimonidine (ALPHAGAN P) 0.15 % ophthalmic solution     clopidogrel (PLAVIX) 75 mg, Oral, Daily    Cyanocobalamin (Vitamin B-12) 1000 MCG/15ML LIQD Daily    Dorzolamide HCl-Timolol Mal PF 2-0.5 % SOLN INSTILL 1 DROP INTO EACH EYE TWICE DAILY    Farxiga 10 mg, Oral, Daily    glucose blood (OneTouch Verio) test strip Check blood sugars once daily. Please substitute with appropriate alternative as covered by patient's insurance. Dx: E11.65    losartan (COZAAR) 25 mg, Oral, Daily    OneTouch Delica Lancets 33G MISC Check blood sugars once daily. Please substitute with appropriate alternative as covered by patient's insurance. Dx: E11.65    prednisoLONE acetate (PRED FORTE) 1 % ophthalmic suspension INSTILL 1 DROP INTO LEFT EYE TWICE DAILY    Vyzulta 0.024 % SOLN 1 drop, Daily at bedtime    Allergies   Allergen Reactions    Ace Inhibitors Cough     Other reaction(s): hyperkalemia    Penicillins GI Intolerance      Social History     Tobacco Use    Smoking status: Former     Current packs/day: 0.00     Types: Cigarettes     Quit date: 1964     Years since quittin.0    Smokeless tobacco: Never   Vaping Use    Vaping status: Never Used   Substance Use Topics    Alcohol use: Not Currently     Comment: quit 23 years ago    Drug use: No    Family History   Problem Relation Age of Onset    Diabetes Mother           Objective :                   Vitals I/O      Most Recent Min/Max in 24hrs   Temp 99.2 °F (37.3 °C) Temp  Min: 99.2 °F (37.3 °C)  Max: 99.2 °F (37.3 °C)   Pulse 87 Pulse  Min: 45  Max: 93   Resp 20 Resp  Min: 16  Max: 20   BP (!) 166/111 BP  Min: 77/58  Max: 186/118   O2 Sat 100 % SpO2  Min: 82 %  Max: 100 %    No intake or output data in the 24 hours  ending 01/18/25 0345    Diet NPO    Invasive Monitoring           Physical Exam   Physical Exam  Vitals and nursing note reviewed.   Eyes:      Comments: Pupils non reactive bilaterally    Skin:     Coloration: Skin is not jaundiced.   HENT:      Head: Normocephalic.   Cardiovascular:      Rate and Rhythm: Normal rate and regular rhythm.   Constitutional:       Appearance: He is ill-appearing.      Interventions: He is intubated.   Pulmonary:      Effort: He is intubated.   Neurological:      Mental Status: He is unresponsive.      GCS: GCS eye subscore is 1. GCS verbal subscore is 1. GCS motor subscore is 1.        Corneal reflex absent, no cough reflex and gag reflex not intact.   Genitourinary/Anorectal:  [place] present.        Diagnostic Studies        Lab Results: I have reviewed the following results:     Medications:  Scheduled PRN   atropine, ,   chlorhexidine, 15 mL, Q12H MARQUIS  insulin lispro, 1-5 Units, Q6H MARQUIS  LORazepam, 2 mg, Once  multi-electrolyte, 1,000 mL, Once      atropine, ,   heparin (porcine), 2,400 Units, Q6H PRN  heparin (porcine), 4,800 Units, Q6H PRN       Continuous    epinephrine, 1-20 mcg/min, Last Rate: Stopped (01/18/25 0256)  fentaNYL, 100 mcg/hr, Last Rate: Stopped (01/18/25 0117)  heparin (porcine), 3-30 Units/kg/hr (Order-Specific), Last Rate: 18 Units/kg/hr (01/18/25 0253)         Labs:   CBC    Recent Labs     01/17/25 2337 01/18/25 0041   WBC  --  9.70   HGB 8.8* 8.2*   HCT 26* 27.0*   PLT  --  93*     BMP    Recent Labs     01/17/25 2337 01/18/25 0041   SODIUM  --  139   K  --  4.4   CL  --  107   CO2 15* 15*   AGAP  --  17*   BUN  --  39*   CREATININE  --  3.08*   CALCIUM  --  8.6       Coags    Recent Labs     01/18/25 0041 01/18/25  0128   INR 1.36* 3.44*   PTT 38* 88*        Additional Electrolytes  Recent Labs     01/17/25 2337 01/18/25  0041   MG  --  2.4   CAIONIZED 1.24  --           Blood Gas    Recent Labs     01/18/25  0142   PHART 7.236*   OCY7ARD 27.5*   PO2ART  476.1*   UGJ7UFR 11.4*   BEART -14.6   SOURCE Radial, Right     Recent Labs     01/18/25  0142   SOURCE Radial, Right    LFTs  Recent Labs     01/18/25  0041   ALT 83*   AST 92*   ALKPHOS 68   ALB 3.0*   TBILI 0.34       Infectious  Recent Labs     01/18/25  0041   PROCALCITONI 0.23     Glucose  Recent Labs     01/18/25  0041   GLUC 446*

## 2025-01-19 ENCOUNTER — APPOINTMENT (INPATIENT)
Dept: RADIOLOGY | Facility: HOSPITAL | Age: OVER 89
DRG: 208 | End: 2025-01-19
Payer: MEDICARE

## 2025-01-19 PROBLEM — R57.9 SHOCK (HCC): Status: ACTIVE | Noted: 2025-01-19

## 2025-01-19 LAB
ABO GROUP BLD: NORMAL
ABO GROUP BLD: NORMAL
ALBUMIN SERPL BCG-MCNC: 3.1 G/DL (ref 3.5–5)
ALP SERPL-CCNC: 65 U/L (ref 34–104)
ALT SERPL W P-5'-P-CCNC: 388 U/L (ref 7–52)
ANION GAP SERPL CALCULATED.3IONS-SCNC: 14 MMOL/L (ref 4–13)
APTT PPP: 118 SECONDS (ref 23–34)
APTT PPP: 45 SECONDS (ref 23–34)
APTT PPP: >210 SECONDS (ref 23–34)
AST SERPL W P-5'-P-CCNC: 189 U/L (ref 13–39)
ATRIAL RATE: 80 BPM
BASE EXCESS BLDA CALC-SCNC: -13.3 MMOL/L
BILIRUB SERPL-MCNC: 0.64 MG/DL (ref 0.2–1)
BLD GP AB SCN SERPL QL: NEGATIVE
BUN SERPL-MCNC: 45 MG/DL (ref 5–25)
CA-I BLD-SCNC: 1.08 MMOL/L (ref 1.12–1.32)
CALCIUM ALBUM COR SERPL-MCNC: 8.4 MG/DL (ref 8.3–10.1)
CALCIUM SERPL-MCNC: 7.7 MG/DL (ref 8.4–10.2)
CHLORIDE SERPL-SCNC: 111 MMOL/L (ref 96–108)
CO2 SERPL-SCNC: 15 MMOL/L (ref 21–32)
CORTIS SERPL-MCNC: 29.4 UG/DL
CREAT SERPL-MCNC: 3.38 MG/DL (ref 0.6–1.3)
ERYTHROCYTE [DISTWIDTH] IN BLOOD BY AUTOMATED COUNT: 13.5 % (ref 11.6–15.1)
GFR SERPL CREATININE-BSD FRML MDRD: 15 ML/MIN/1.73SQ M
GLUCOSE SERPL-MCNC: 118 MG/DL (ref 65–140)
GLUCOSE SERPL-MCNC: 133 MG/DL (ref 65–140)
GLUCOSE SERPL-MCNC: 158 MG/DL (ref 65–140)
GLUCOSE SERPL-MCNC: 197 MG/DL (ref 65–140)
GLUCOSE SERPL-MCNC: 217 MG/DL (ref 65–140)
HCO3 BLDA-SCNC: 13.4 MMOL/L (ref 22–28)
HCT VFR BLD AUTO: 18.3 % (ref 36.5–49.3)
HCT VFR BLD AUTO: 18.8 % (ref 36.5–49.3)
HCT VFR BLD AUTO: 21.5 % (ref 36.5–49.3)
HCT VFR BLD AUTO: 22.1 % (ref 36.5–49.3)
HGB BLD-MCNC: 6 G/DL (ref 12–17)
HGB BLD-MCNC: 6 G/DL (ref 12–17)
HGB BLD-MCNC: 6.8 G/DL (ref 12–17)
HGB BLD-MCNC: 7.1 G/DL (ref 12–17)
INR PPP: 1.61 (ref 0.85–1.19)
LACTATE SERPL-SCNC: 0.8 MMOL/L (ref 0.5–2)
LACTATE SERPL-SCNC: 2.3 MMOL/L (ref 0.5–2)
MAGNESIUM SERPL-MCNC: 2.2 MG/DL (ref 1.9–2.7)
MCH RBC QN AUTO: 31.4 PG (ref 26.8–34.3)
MCHC RBC AUTO-ENTMCNC: 31.9 G/DL (ref 31.4–37.4)
MCV RBC AUTO: 98 FL (ref 82–98)
O2 CT BLDA-SCNC: 8.9 ML/DL (ref 16–23)
OXYHGB MFR BLDA: 94.5 % (ref 94–97)
P AXIS: 29 DEGREES
PCO2 BLDA: 34.3 MM HG (ref 36–44)
PH BLDA: 7.21 [PH] (ref 7.35–7.45)
PHOSPHATE SERPL-MCNC: 4 MG/DL (ref 2.3–4.1)
PLATELET # BLD AUTO: 99 THOUSANDS/UL (ref 149–390)
PMV BLD AUTO: 11.5 FL (ref 8.9–12.7)
PO2 BLDA: 109 MM HG (ref 75–129)
POTASSIUM SERPL-SCNC: 4 MMOL/L (ref 3.5–5.3)
PR INTERVAL: 148 MS
PROT SERPL-MCNC: 5.4 G/DL (ref 6.4–8.4)
PROTHROMBIN TIME: 19.9 SECONDS (ref 12.3–15)
QRS AXIS: 13 DEGREES
QRSD INTERVAL: 102 MS
QT INTERVAL: 416 MS
QTC INTERVAL: 479 MS
RBC # BLD AUTO: 1.91 MILLION/UL (ref 3.88–5.62)
RH BLD: POSITIVE
RH BLD: POSITIVE
SODIUM SERPL-SCNC: 140 MMOL/L (ref 135–147)
SPECIMEN EXPIRATION DATE: NORMAL
SPECIMEN SOURCE: ABNORMAL
T WAVE AXIS: 19 DEGREES
VENTRICULAR RATE: 80 BPM
WBC # BLD AUTO: 16.8 THOUSAND/UL (ref 4.31–10.16)

## 2025-01-19 PROCEDURE — 85027 COMPLETE CBC AUTOMATED: CPT

## 2025-01-19 PROCEDURE — 30233N1 TRANSFUSION OF NONAUTOLOGOUS RED BLOOD CELLS INTO PERIPHERAL VEIN, PERCUTANEOUS APPROACH: ICD-10-PCS | Performed by: INTERNAL MEDICINE

## 2025-01-19 PROCEDURE — 86901 BLOOD TYPING SEROLOGIC RH(D): CPT

## 2025-01-19 PROCEDURE — 82533 TOTAL CORTISOL: CPT

## 2025-01-19 PROCEDURE — 83605 ASSAY OF LACTIC ACID: CPT

## 2025-01-19 PROCEDURE — 85730 THROMBOPLASTIN TIME PARTIAL: CPT | Performed by: NURSE PRACTITIONER

## 2025-01-19 PROCEDURE — 85730 THROMBOPLASTIN TIME PARTIAL: CPT

## 2025-01-19 PROCEDURE — 94150 VITAL CAPACITY TEST: CPT

## 2025-01-19 PROCEDURE — P9016 RBC LEUKOCYTES REDUCED: HCPCS

## 2025-01-19 PROCEDURE — 85018 HEMOGLOBIN: CPT

## 2025-01-19 PROCEDURE — NC001 PR NO CHARGE: Performed by: INTERNAL MEDICINE

## 2025-01-19 PROCEDURE — 4A133B1 MONITORING OF ARTERIAL PRESSURE, PERIPHERAL, PERCUTANEOUS APPROACH: ICD-10-PCS | Performed by: INTERNAL MEDICINE

## 2025-01-19 PROCEDURE — 83735 ASSAY OF MAGNESIUM: CPT

## 2025-01-19 PROCEDURE — 80053 COMPREHEN METABOLIC PANEL: CPT

## 2025-01-19 PROCEDURE — 03HY32Z INSERTION OF MONITORING DEVICE INTO UPPER ARTERY, PERCUTANEOUS APPROACH: ICD-10-PCS | Performed by: INTERNAL MEDICINE

## 2025-01-19 PROCEDURE — 85610 PROTHROMBIN TIME: CPT

## 2025-01-19 PROCEDURE — 82948 REAGENT STRIP/BLOOD GLUCOSE: CPT

## 2025-01-19 PROCEDURE — 30233H1 TRANSFUSION OF NONAUTOLOGOUS WHOLE BLOOD INTO PERIPHERAL VEIN, PERCUTANEOUS APPROACH: ICD-10-PCS | Performed by: OTOLARYNGOLOGY

## 2025-01-19 PROCEDURE — 82805 BLOOD GASES W/O2 SATURATION: CPT

## 2025-01-19 PROCEDURE — 93010 ELECTROCARDIOGRAM REPORT: CPT | Performed by: INTERNAL MEDICINE

## 2025-01-19 PROCEDURE — 99291 CRITICAL CARE FIRST HOUR: CPT | Performed by: INTERNAL MEDICINE

## 2025-01-19 PROCEDURE — 85014 HEMATOCRIT: CPT

## 2025-01-19 PROCEDURE — 86850 RBC ANTIBODY SCREEN: CPT

## 2025-01-19 PROCEDURE — 86923 COMPATIBILITY TEST ELECTRIC: CPT

## 2025-01-19 PROCEDURE — 94760 N-INVAS EAR/PLS OXIMETRY 1: CPT

## 2025-01-19 PROCEDURE — 71045 X-RAY EXAM CHEST 1 VIEW: CPT

## 2025-01-19 PROCEDURE — 02HV33Z INSERTION OF INFUSION DEVICE INTO SUPERIOR VENA CAVA, PERCUTANEOUS APPROACH: ICD-10-PCS | Performed by: INTERNAL MEDICINE

## 2025-01-19 PROCEDURE — 86900 BLOOD TYPING SEROLOGIC ABO: CPT

## 2025-01-19 PROCEDURE — 36556 INSERT NON-TUNNEL CV CATH: CPT | Performed by: INTERNAL MEDICINE

## 2025-01-19 PROCEDURE — 94003 VENT MGMT INPAT SUBQ DAY: CPT

## 2025-01-19 PROCEDURE — 84100 ASSAY OF PHOSPHORUS: CPT

## 2025-01-19 PROCEDURE — 4A133J1 MONITORING OF ARTERIAL PULSE, PERIPHERAL, PERCUTANEOUS APPROACH: ICD-10-PCS | Performed by: INTERNAL MEDICINE

## 2025-01-19 PROCEDURE — P9010 WHOLE BLOOD FOR TRANSFUSION: HCPCS

## 2025-01-19 PROCEDURE — 82330 ASSAY OF CALCIUM: CPT

## 2025-01-19 PROCEDURE — 36620 INSERTION CATHETER ARTERY: CPT

## 2025-01-19 PROCEDURE — NC001 PR NO CHARGE

## 2025-01-19 PROCEDURE — 30233N1 TRANSFUSION OF NONAUTOLOGOUS RED BLOOD CELLS INTO PERIPHERAL VEIN, PERCUTANEOUS APPROACH: ICD-10-PCS | Performed by: OTOLARYNGOLOGY

## 2025-01-19 RX ORDER — PANTOPRAZOLE SODIUM 40 MG/10ML
40 INJECTION, POWDER, LYOPHILIZED, FOR SOLUTION INTRAVENOUS EVERY 12 HOURS SCHEDULED
Status: DISCONTINUED | OUTPATIENT
Start: 2025-01-19 | End: 2025-01-20

## 2025-01-19 RX ORDER — ALBUMIN HUMAN 50 G/1000ML
12.5 SOLUTION INTRAVENOUS ONCE
Status: COMPLETED | OUTPATIENT
Start: 2025-01-19 | End: 2025-01-20

## 2025-01-19 RX ORDER — CALCIUM GLUCONATE 20 MG/ML
1 INJECTION, SOLUTION INTRAVENOUS ONCE
Status: COMPLETED | OUTPATIENT
Start: 2025-01-19 | End: 2025-01-20

## 2025-01-19 RX ORDER — FENTANYL CITRATE 50 UG/ML
25 INJECTION, SOLUTION INTRAMUSCULAR; INTRAVENOUS EVERY 2 HOUR PRN
Refills: 0 | Status: DISCONTINUED | OUTPATIENT
Start: 2025-01-19 | End: 2025-01-20

## 2025-01-19 RX ORDER — SODIUM CHLORIDE, SODIUM GLUCONATE, SODIUM ACETATE, POTASSIUM CHLORIDE, MAGNESIUM CHLORIDE, SODIUM PHOSPHATE, DIBASIC, AND POTASSIUM PHOSPHATE .53; .5; .37; .037; .03; .012; .00082 G/100ML; G/100ML; G/100ML; G/100ML; G/100ML; G/100ML; G/100ML
500 INJECTION, SOLUTION INTRAVENOUS ONCE
Status: COMPLETED | OUTPATIENT
Start: 2025-01-19 | End: 2025-01-19

## 2025-01-19 RX ORDER — MIDAZOLAM HYDROCHLORIDE 2 MG/2ML
2 INJECTION, SOLUTION INTRAMUSCULAR; INTRAVENOUS ONCE
Status: COMPLETED | OUTPATIENT
Start: 2025-01-19 | End: 2025-01-19

## 2025-01-19 RX ADMIN — BRIMONIDINE TARTRATE 1 DROP: 2 SOLUTION/ DROPS OPHTHALMIC at 11:02

## 2025-01-19 RX ADMIN — BRIMONIDINE TARTRATE 1 DROP: 2 SOLUTION/ DROPS OPHTHALMIC at 16:44

## 2025-01-19 RX ADMIN — BRIMONIDINE TARTRATE 1 DROP: 2 SOLUTION/ DROPS OPHTHALMIC at 20:54

## 2025-01-19 RX ADMIN — CHLORHEXIDINE GLUCONATE 15 ML: 1.2 RINSE ORAL at 13:56

## 2025-01-19 RX ADMIN — PANTOPRAZOLE SODIUM 40 MG: 40 INJECTION, POWDER, FOR SOLUTION INTRAVENOUS at 21:53

## 2025-01-19 RX ADMIN — PREDNISOLONE ACETATE 1 DROP: 10 SUSPENSION/ DROPS OPHTHALMIC at 16:44

## 2025-01-19 RX ADMIN — NOREPINEPHRINE BITARTRATE 3 MCG/MIN: 1 INJECTION, SOLUTION, CONCENTRATE INTRAVENOUS at 20:53

## 2025-01-19 RX ADMIN — CHLORHEXIDINE GLUCONATE 15 ML: 1.2 RINSE ORAL at 21:53

## 2025-01-19 RX ADMIN — DORZOLAMIDE HYDROCHLORIDE AND TIMOLOL MALEATE 1 DROP: 20; 5 SOLUTION OPHTHALMIC at 18:19

## 2025-01-19 RX ADMIN — FENTANYL CITRATE 50 MCG: 50 INJECTION INTRAMUSCULAR; INTRAVENOUS at 01:49

## 2025-01-19 RX ADMIN — PREDNISOLONE ACETATE 1 DROP: 10 SUSPENSION/ DROPS OPHTHALMIC at 21:50

## 2025-01-19 RX ADMIN — PREDNISOLONE ACETATE 1 DROP: 10 SUSPENSION/ DROPS OPHTHALMIC at 11:02

## 2025-01-19 RX ADMIN — LATANOPROSTENE BUNOD 1 DROP: 0.24 SOLUTION/ DROPS OPHTHALMIC at 21:50

## 2025-01-19 RX ADMIN — FENTANYL CITRATE 50 MCG: 50 INJECTION INTRAMUSCULAR; INTRAVENOUS at 07:45

## 2025-01-19 RX ADMIN — SODIUM CHLORIDE, SODIUM GLUCONATE, SODIUM ACETATE, POTASSIUM CHLORIDE, MAGNESIUM CHLORIDE, SODIUM PHOSPHATE, DIBASIC, AND POTASSIUM PHOSPHATE 500 ML: .53; .5; .37; .037; .03; .012; .00082 INJECTION, SOLUTION INTRAVENOUS at 01:35

## 2025-01-19 RX ADMIN — MINERAL OIL, WHITE PETROLATUM: .03; .94 OINTMENT OPHTHALMIC at 21:57

## 2025-01-19 RX ADMIN — Medication 50 MCG/HR: at 16:44

## 2025-01-19 RX ADMIN — DORZOLAMIDE HYDROCHLORIDE AND TIMOLOL MALEATE 1 DROP: 20; 5 SOLUTION OPHTHALMIC at 11:02

## 2025-01-19 RX ADMIN — MIDAZOLAM 2 MG: 1 INJECTION INTRAMUSCULAR; INTRAVENOUS at 12:30

## 2025-01-19 RX ADMIN — INSULIN LISPRO 2 UNITS: 100 INJECTION, SOLUTION INTRAVENOUS; SUBCUTANEOUS at 15:05

## 2025-01-19 RX ADMIN — CALCIUM GLUCONATE 1 G: 20 INJECTION, SOLUTION INTRAVENOUS at 06:39

## 2025-01-19 RX ADMIN — FENTANYL CITRATE 25 MCG: 50 INJECTION INTRAMUSCULAR; INTRAVENOUS at 20:04

## 2025-01-19 RX ADMIN — FENTANYL CITRATE 50 MCG: 50 INJECTION INTRAMUSCULAR; INTRAVENOUS at 06:11

## 2025-01-19 RX ADMIN — HEPARIN SODIUM 9 UNITS/KG/HR: 10000 INJECTION, SOLUTION INTRAVENOUS at 04:30

## 2025-01-19 RX ADMIN — INSULIN LISPRO 2 UNITS: 100 INJECTION, SOLUTION INTRAVENOUS; SUBCUTANEOUS at 18:19

## 2025-01-19 RX ADMIN — ALBUMIN (HUMAN) 12.5 G: 12.5 INJECTION, SOLUTION INTRAVENOUS at 08:30

## 2025-01-19 RX ADMIN — PANTOPRAZOLE SODIUM 40 MG: 40 INJECTION, POWDER, FOR SOLUTION INTRAVENOUS at 13:56

## 2025-01-19 NOTE — ASSESSMENT & PLAN NOTE
Lab Results   Component Value Date    HSTNI0 533 (H) 01/18/2025    HSTNI2 777 (H) 01/18/2025    HSTNID2 244 (H) 01/18/2025    HSTNI4 1,070 (H) 01/18/2025    HSTNID4 537 (H) 01/18/2025     Recent Labs     01/18/25  0451 01/18/25  0742 01/19/25  0818   LACTICACID 3.9* 3.0* 2.3*     Pt went into CA in ED 2/2 PE    PLAN:  [facility]  Tele  S/P TPA changed to Heparin drip  ECHO when possible

## 2025-01-19 NOTE — PROGRESS NOTES
Progress Note - Critical Care/ICU   Name: Jeffry Almanzar 89 y.o. male I MRN: [id number]  Unit/Bed#: ICU 12 I Date of Admission: 1/17/2025   Date of Service: 1/19/2025 I Hospital Day: 1       Assessment & Plan  Acute saddle pulmonary embolism with acute cor pulmonale (HCC)  CLASSIFICATIONS Acute unstable Saddle  RISK FACTORS: hx of stroke, hx of cancer    DX: CTA   Recent Labs     01/18/25  0451 01/18/25  0742 01/19/25  0818   LACTICACID 3.9* 3.0* 2.3*       TXT:   S/P TPA  Levophed    Heparin when PTT<90  Target INR 2-3  ECHO Priority   TTM not indicated  Shock (HCC)  Obstructive shock vs hemorrhagic vs cardiogenic   With shock liver and acute renal failure   Lab Results   Component Value Date    HGB 6.0 (L) 01/19/2025     Lab Results   Component Value Date     06/05/2017    SODIUM 140 01/19/2025    K 4.0 01/19/2025     (H) 01/19/2025    CO2 15 (L) 01/19/2025    AGAP 14 (H) 01/19/2025    BUN 45 (H) 01/19/2025    CREATININE 3.38 (H) 01/19/2025    GLUC 158 (H) 01/19/2025    CALCIUM 7.7 (L) 01/19/2025     (H) 01/19/2025     (H) 01/19/2025    ALKPHOS 65 01/19/2025    PROT 7.0 10/25/2016    TP 5.4 (L) 01/19/2025    BILITOT 0.6 10/25/2016    TBILI 0.64 01/19/2025    EGFR 15 01/19/2025   ECHO- poor visualization     Plan-   On Levophed   Will add Epinephrine   Will transfuse 1U PRBC   Follow up H&H  GOC discussion   Continue heparin gtt for now   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  (HCC)  Lab Results   Component Value Date    EGFR 15 01/19/2025    EGFR 19 01/18/2025    EGFR 16 01/18/2025    CREATININE 3.38 (H) 01/19/2025    CREATININE 2.73 (H) 01/18/2025    CREATININE 3.08 (H) 01/18/2025   Monitor Hgb in setting of TPA  Transfuse <7  Benign 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 (HCC)  Lab Results   Component Value Date    EGFR 15 01/19/2025    EGFR 19 01/18/2025    EGFR 16 01/18/2025    CREATININE 3.38 (H) 01/19/2025    CREATININE 2.73 (H) 01/18/2025    CREATININE 3.08 (H)  01/18/2025     Continue to monitor   Diabetic nephropathy associated with type 2 diabetes mellitus (HCC)  Lab Results   Component Value Date    HGBA1C 6.9 (H) 01/18/2025       Blood Sugar Average: Last 72 hrs:  (P) 183.2576526698436061  Recent Labs     01/18/25  0041 01/18/25  0101 01/18/25  0451 01/18/25  0539 01/18/25  1747 01/19/25  0050 01/19/25  0531 01/19/25  0608   POCGLU  --    < >  --    < > 102 118  --  133   GLUC 446*  --  327*  --   --   --  158*  --     < > = values in this interval not displayed.       Hold home regimen while inpatient and resume on discharge Dapagliflozin   Diabetic diet  Insulin regimen  Glucose checks and Insulin correction ACHS  Goal -180 while admitted, adjusting insulin regimen as appropriate  Monitor for hypoglycemia and treat per protocol    IgM lambda paraproteinemia    Lacunar cerebrovascular accident (CVA) ([facility]ly [facility])    Cardiac arrest (Formerly [facility])  Lab Results   Component Value Date    HSTNI0 533 (H) 01/18/2025    HSTNI2 777 (H) 01/18/2025    HSTNID2 244 (H) 01/18/2025    HSTNI4 1,070 (H) 01/18/2025    HSTNID4 537 (H) 01/18/2025     Recent Labs     01/18/25  0451 01/18/25  0742 01/19/25  0818   LACTICACID 3.9* 3.0* 2.3*     Pt went into [place] in ED 2/2 PE    PLAN:  [place]  Tele  S/P TPA changed to Heparin drip  ECHO when possible   Disposition: Critical care    ICU Core Measures     Vented Patient  VAP Bundle  VAP bundle ordered     A: Assess, Prevent, and Manage Pain Has pain been assessed? Yes  Need for changes to pain regimen? No   B: Both Spontaneous Awakening Trials (SATs) and Spontaneous Breathing Trials (SBTs) Plan to perform spontaneous awakening trial today? N/A   Plan to perform spontaneous breathing trial today? N/A   Obvious barriers to extubation? NA   C: Choice of Sedation RASS Goal: 0 Alert and Calm  Need for changes to sedation or analgesia regimen? No   D: Delirium CAM-ICU: Unable to perform secondary to Acute cognitive dysfunction   E: Early Mobility  Plan for  early mobility? Yes   F: Family Engagement Plan for family engagement today? Yes       Review of Invasive Devices:    [place] Plan: Continue for accurate I/O monitoring for 48 hours    [place] Plan: Keep arterial line for hemodynamic monitoring, frequent ABGs, and frequent labs    Prophylaxis:  VTE VTE covered by:  heparin (porcine), Intravenous, 9 Units/kg/hr at 01/19/25 0430  heparin (porcine), Intravenous  heparin (porcine), Intravenous       Stress Ulcer  not ordered         24 Hour Events : Patient was adequately responding and following commands, received fentanyl bolus and drip as well as 500 cc bolus for hypotension.  In the early morning patient became progressively more hypotensive and PTTs were supratherapeutic.    Subjective   Review of Systems: See HPI for Review of Systems    Objective :                   Vitals I/O      Most Recent Min/Max in 24hrs   Temp 99.1 °F (37.3 °C) Temp  Min: 97.2 °F (36.2 °C)  Max: 100.6 °F (38.1 °C)   Pulse 99 Pulse  Min: 48  Max: 111   Resp 21 Resp  Min: 0  Max: 41   BP (!) 138/42 BP  Min: 57/37  Max: 191/93   O2 Sat 98 % SpO2  Min: 91 %  Max: 100 %      Intake/Output Summary (Last 24 hours) at 1/19/2025 1035  Last data filed at 1/19/2025 0400  Gross per 24 hour   Intake 1940.7 ml   Output 685 ml   Net 1255.7 ml       Diet NPO    Invasive Monitoring   Arterial Line  Estephania /37  Arterial Line BP  Min: 33/22  Max: 166/50   MAP (!) 51 mmHg  Arterial Line MAP (mmHg)  Min: 28 mmHg  Max: 76 mmHg           Physical Exam   Physical Exam  Vitals and nursing note reviewed.   Eyes:      Extraocular Movements: Extraocular movements intact.      Pupils: Pupils are equal, round, and reactive to light.   HENT:      Mouth/Throat:      Mouth: Mucous membranes are moist.   Cardiovascular:      Rate and Rhythm: Tachycardia present.      Pulses: Normal pulses.      Heart sounds: Normal heart sounds.   Musculoskeletal:      Right lower leg: No edema.      Left lower leg: No edema.   Abdominal:  General: Bowel sounds are normal.      Palpations: Abdomen is soft.   Constitutional:       Appearance: He is well-developed and well-nourished. He is ill-appearing.      Interventions: He is intubated and restrained.   Pulmonary:      Effort: Pulmonary effort is normal. He is intubated.      Breath sounds: Normal breath sounds. No wheezing or rhonchi.   Neurological:      Comments: Was initially following commands, no overt focal deficits    Genitourinary/Anorectal:  [place] present.        Diagnostic Studies        Lab Results: I have reviewed the following results:     Medications:  Scheduled PRN   artificial tear, , HS  brimonidine tartrate, 1 drop, TID  chlorhexidine, 15 mL, Q12H MARQUIS  dorzolamide-timolol, 1 drop, BID  insulin lispro, 1-6 Units, Q6H MARQUIS  Latanoprostene Bunod, 1 drop, HS  prednisoLONE acetate, 1 drop, TID      fentaNYL, 50 mcg, Q2H PRN  heparin (porcine), 2,400 Units, Q6H PRN  heparin (porcine), 4,800 Units, Q6H PRN       Continuous    epinephrine, 1-10 mcg/min, Last Rate: Stopped (01/19/25 0830)  fentaNYL, 50 mcg/hr, Last Rate: 50 mcg/hr (01/18/25 2252)  heparin (porcine), 3-30 Units/kg/hr (Order-Specific), Last Rate: 9 Units/kg/hr (01/19/25 0430)  norepinephrine, 1-30 mcg/min, Last Rate: 14 mcg/min (01/19/25 0926)  vasopressin, 0.04 Units/min         Labs:   CBC    Recent Labs     01/18/25  0451 01/19/25  0531 01/19/25  0726   WBC 12.09* 16.80*  --    HGB 8.6* 6.0* 6.0*   HCT 27.7* 18.8* 18.3*   PLT 95* 99*  --      BMP    Recent Labs     01/18/25  0451 01/19/25  0531   SODIUM 134* 140   K 4.9 4.0    111*   CO2 18* 15*   AGAP 9 14*   BUN 41* 45*   CREATININE 2.73* 3.38*   CALCIUM 8.2* 7.7*       Coags    Recent Labs     01/18/25  0041 01/18/25  0128 01/18/25  0915 01/18/25  1747 01/19/25  0145   INR 1.36* 3.44*  --   --   --    PTT 38* 88*   < > >210* >210*    < > = values in this interval not displayed.        Additional Electrolytes  Recent Labs     01/17/25  2337 01/18/25  0041  01/18/25 0451 01/19/25  0531   MG  --    < > 2.4 2.2   PHOS  --   --  3.8 4.0   CAIONIZED 1.24  --   --  1.08*    < > = values in this interval not displayed.          Blood Gas    Recent Labs     01/19/25 0818   PHART 7.210*   NOL2NUL 34.3*   PO2ART 109.0   MLD5NIU 13.4*   BEART -13.3   SOURCE Line, Arterial     Recent Labs     01/18/25 0451 01/19/25 0818   PHVEN 7.252*  --    IJU6QOD 39.2*  --    PO2VEN 19.9*  --    DVN9UAG 16.9*  --    BEVEN -9.6  --    W6XMCFZ 24.6*  --    SOURCE  --  Line, Arterial    LFTs  Recent Labs     01/18/25 0451 01/19/25  0531   * 388*   * 189*   ALKPHOS 108* 65   ALB 3.1* 3.1*   TBILI 0.69 0.64       Infectious  Recent Labs     01/18/25 0041   PROCALCITONI 0.23     Glucose  Recent Labs     01/18/25 0041 01/18/25 0451 01/19/25  0531   GLUC 446* 327* 158*

## 2025-01-19 NOTE — ASSESSMENT & PLAN NOTE
Obstructive shock vs hemorrhagic vs cardiogenic   With shock liver and acute renal failure   Lab Results   Component Value Date    HGB 6.0 (L) 01/19/2025     Lab Results   Component Value Date     06/05/2017    SODIUM 140 01/19/2025    K 4.0 01/19/2025     (H) 01/19/2025    CO2 15 (L) 01/19/2025    AGAP 14 (H) 01/19/2025    BUN 45 (H) 01/19/2025    CREATININE 3.38 (H) 01/19/2025    GLUC 158 (H) 01/19/2025    CALCIUM 7.7 (L) 01/19/2025     (H) 01/19/2025     (H) 01/19/2025    ALKPHOS 65 01/19/2025    PROT 7.0 10/25/2016    TP 5.4 (L) 01/19/2025    BILITOT 0.6 10/25/2016    TBILI 0.64 01/19/2025    EGFR 15 01/19/2025   ECHO- poor visualization     Plan-   On Levophed   Will add Epinephrine   Will transfuse 1U PRBC   Follow up H&H  GOC discussion   Continue heparin gtt for now

## 2025-01-19 NOTE — ASSESSMENT & PLAN NOTE
CLASSIFICATIONS Acute unstable Saddle  RISK FACTORS: hx of stroke, hx of cancer    DX: CTA   Recent Labs     01/18/25  0451 01/18/25  0742 01/19/25  0818   LACTICACID 3.9* 3.0* 2.3*       TXT:   S/P TPA  Levophed    Heparin when PTT<90  Target INR 2-3  ECHO Priority   TTM not indicated

## 2025-01-19 NOTE — ASSESSMENT & PLAN NOTE
Lab Results   Component Value Date    HGBA1C 6.9 (H) 01/18/2025       Blood Sugar Average: Last 72 hrs:  (P) [id number]  Recent Labs     01/18/25  0041 01/18/25  0101 01/18/25  0451 01/18/25  0539 01/18/25  1747 01/19/25  0050 01/19/25  0531 01/19/25  0608   POCGLU  --    < >  --    < > 102 118  --  133   GLUC 446*  --  327*  --   --   --  158*  --     < > = values in this interval not displayed.       Hold home regimen while inpatient and resume on discharge Dapagliflozin   Diabetic diet  Insulin regimen  Glucose checks and Insulin correction ACHS  Goal -180 while admitted, adjusting insulin regimen as appropriate  Monitor for hypoglycemia and treat per protocol

## 2025-01-19 NOTE — ASSESSMENT & PLAN NOTE
Lab Results   Component Value Date    EGFR 15 01/19/2025    EGFR 19 01/18/2025    EGFR 16 01/18/2025    CREATININE 3.38 (H) 01/19/2025    CREATININE 2.73 (H) 01/18/2025    CREATININE 3.08 (H) 01/18/2025   Monitor Hgb in setting of TPA  Transfuse <7

## 2025-01-19 NOTE — ASSESSMENT & PLAN NOTE
CLASSIFICATIONS Acute unstable Saddle  RISK FACTORS: hx of stroke, hx of cancer    DX: CTA   Recent Labs     01/18/25  0300 01/18/25  0451 01/18/25  0742   LACTICACID 5.6* 3.9* 3.0*       TXT:   S/P TPA  Epinephrine gtt  Continue heparin gtt  Target INR 2-3  ECHO 1/18: poor study quality consider repeat study    TTM not indicated

## 2025-01-19 NOTE — PROCEDURES
Central Line Insertion    Date/Time: 1/19/2025 1:17 PM    Performed by: Lety Post MD  Authorized by: Lety Post MD    Other Assisting Provider: Yes (comment) (Young Roberts)    Consent:     Consent obtained:  Written    Consent given by:  Spouse    Risks discussed:  Arterial puncture, incorrect placement, bleeding, infection and pneumothorax    Alternatives discussed:  Delayed treatment  Universal protocol:     Procedure explained and questions answered to patient or proxy's satisfaction: yes      Relevant documents present and verified: yes      Test results available and properly labeled: yes      Radiology Images displayed and confirmed.  If images not available, report reviewed: yes      Required blood products, implants, devices, and special equipment available: yes      Site/side marked: yes      Immediately prior to procedure, a time out was called: yes      Patient identity confirmed:  Arm band and hospital-assigned identification number  Pre-procedure details:     Hand hygiene: Hand hygiene performed prior to insertion      Sterile barrier technique: All elements of maximal sterile technique followed      Skin preparation:  2% chlorhexidine    Skin preparation agent: Skin preparation agent completely dried prior to procedure    Indications:     Central line indications: medications requiring central line and hemodynamic monitoring    Sedation:     Sedation type:  Anxiolysis  Anesthesia (see MAR for exact dosages):     Anesthesia method:  Local infiltration    Local anesthetic:  Lidocaine 1% w/o epi  Procedure details:     Location:  Left internal jugular    Vessel type: vein      Laterality:  Left    Approach: percutaneous technique used      Patient position:  Trendelenburg    Catheter type:  Triple lumen    Catheter size:  7 Fr    Landmarks identified: yes      Ultrasound guidance: yes      Ultrasound image availability:  Not saved    Sterile ultrasound techniques: Sterile gel and sterile probe covers  were used      Number of attempts:  1    Successful placement: yes      Catheter tip vessel location: atriocaval junction    Post-procedure details:     Post-procedure:  Dressing applied and line sutured    Assessment:  Blood return through all ports, no pneumothorax on x-ray and placement verified by x-ray    Post-procedure complications: none      Patient tolerance of procedure:  Tolerated well, no immediate complications

## 2025-01-19 NOTE — ASSESSMENT & PLAN NOTE
Lab Results   Component Value Date    HGBA1C 6.9 (H) 01/18/2025       Blood Sugar Average: Last 72 hrs:  (P) [id number]  Recent Labs     01/18/25  0041 01/18/25  0101 01/18/25  0451 01/18/25  0539 01/18/25  1747 01/19/25  0050 01/19/25  0531 01/19/25  0608   POCGLU  --    < >  --    < > 102 118  --  133   GLUC 446*  --  327*  --   --   --  158*  --     < > = values in this interval not displayed.       Hold home regimen while inpatient and resume on discharge Dapagliflozin   Diabetic diet  Insulin regimen  Glucose checks and Insulin correction ACHS  Goal -180 while admitted, adjusting insulin regimen as appropriate  Monitor for hypoglycemia and treat per protocol

## 2025-01-19 NOTE — ASSESSMENT & PLAN NOTE
Lab Results   Component Value Date    HSTNI0 533 (H) 01/18/2025    HSTNI2 777 (H) 01/18/2025    HSTNID2 244 (H) 01/18/2025    HSTNI4 1,070 (H) 01/18/2025    HSTNID4 537 (H) 01/18/2025     Recent Labs     01/18/25  0300 01/18/25  0451 01/18/25  0742   LACTICACID 5.6* 3.9* 3.0*     Pt went into [place] in ED 2/2 PE    PLAN:  [place]  Tele  S/P TPA changed to Heparin drip  ECHO when possible

## 2025-01-19 NOTE — PLAN OF CARE
Problem: PAIN - ADULT  Goal: Verbalizes/displays adequate comfort level or baseline comfort level  Description: Interventions:  - Encourage patient to monitor pain and request assistance  - Assess pain using appropriate pain scale  - Administer analgesics based on type and severity of pain and evaluate response  - Implement non-pharmacological measures as appropriate and evaluate response  - Consider cultural and social influences on pain and pain management  - Notify physician/advanced practitioner if interventions unsuccessful or patient reports new pain  Outcome: Progressing     Problem: INFECTION - ADULT  Goal: Absence or prevention of progression during hospitalization  Description: INTERVENTIONS:  - Assess and monitor for signs and symptoms of infection  - Monitor lab/diagnostic results  - Monitor all insertion sites, i.e. indwelling lines, tubes, and drains  - Monitor endotracheal if appropriate and nasal secretions for changes in amount and color  - [place] appropriate cooling/warming therapies per order  - Administer medications as ordered  - Instruct and encourage patient and family to use good hand hygiene technique  - Identify and instruct in appropriate isolation precautions for identified infection/condition  Outcome: Progressing  Goal: Absence of fever/infection during neutropenic period  Description: INTERVENTIONS:  - Monitor WBC    Outcome: Progressing     Problem: SAFETY ADULT  Goal: Patient will remain free of falls  Description: INTERVENTIONS:  - Educate patient/family on patient safety including physical limitations  - Instruct patient to call for assistance with activity   - Consult OT/PT to assist with strengthening/mobility   - Keep Call bell within reach  - Keep bed low and locked with side rails adjusted as appropriate  - Keep care items and personal belongings within reach  - Initiate and maintain comfort rounds  - Make Fall Risk Sign visible to staff  - Offer Toileting every  Hours,  in advance of need  - Initiate/Maintain alarm  - Obtain necessary fall risk management equipment:   - Apply yellow socks and bracelet for high fall risk patients  - Consider moving patient to room near nurses station  Outcome: Progressing  Goal: Maintain or return to baseline ADL function  Description: INTERVENTIONS:  -  Assess patient's ability to carry out ADLs; assess patient's baseline for ADL function and identify physical deficits which impact ability to perform ADLs (bathing, care of mouth/teeth, toileting, grooming, dressing, etc.)  - Assess/evaluate cause of self-care deficits   - Assess range of motion  - Assess patient's mobility; develop plan if impaired  - Assess patient's need for assistive devices and provide as appropriate  - Encourage maximum independence but intervene and supervise when necessary  - Involve family in performance of ADLs  - Assess for home care needs following discharge   - Consider OT consult to assist with ADL evaluation and planning for discharge  - Provide patient education as appropriate  Outcome: Progressing  Goal: Maintains/Returns to pre admission functional level  Description: INTERVENTIONS:  - Perform AM-PAC 6 Click Basic Mobility/ Daily Activity assessment daily.  - Set and communicate daily mobility goal to care team and patient/family/caregiver.   - Collaborate with rehabilitation services on mobility goals if consulted  - Perform Range of Motion  times a day.  - Reposition patient every  hours.  - Dangle patient  times a day  - Stand patient  times a day  - Ambulate patient  times a day  - Out of bed to chair  times a day   - Out of bed for meal times a day  - Out of bed for toileting  - Record patient progress and toleration of activity level   Outcome: Progressing     Problem: DISCHARGE PLANNING  Goal: Discharge to home or other facility with appropriate resources  Description: INTERVENTIONS:  - Identify barriers to discharge w/patient and caregiver  - Arrange for  needed discharge resources and transportation as appropriate  - Identify discharge learning needs (meds, wound care, etc.)  - Arrange for interpretive services to assist at discharge as needed  - Refer to Case Management Department for coordinating discharge planning if the patient needs post-hospital services based on physician/advanced practitioner order or complex needs related to functional status, cognitive ability, or social support system  Outcome: Progressing     Problem: Knowledge Deficit  Goal: Patient/family/caregiver demonstrates understanding of disease process, treatment plan, medications, and discharge instructions  Description: Complete learning assessment and assess knowledge base.  Interventions:  - Provide teaching at level of understanding  - Provide teaching via preferred learning methods  Outcome: Progressing     Problem: Nutrition/Hydration-ADULT  Goal: Nutrient/Hydration intake appropriate for improving, restoring or maintaining nutritional needs  Description: Monitor and assess patient's nutrition/hydration status for malnutrition. Collaborate with interdisciplinary team and initiate plan and interventions as ordered.  Monitor patient's weight and dietary intake as ordered or per policy. Utilize nutrition screening tool and intervene as necessary. Determine patient's food preferences and provide high-protein, high-caloric foods as appropriate.     INTERVENTIONS:  - Monitor oral intake, urinary output, labs, and treatment plans  - Assess nutrition and hydration status and recommend course of action  - Evaluate amount of meals eaten  - Assist patient with eating if necessary   - Allow adequate time for meals  - Recommend/ encourage appropriate diets, oral nutritional supplements, and vitamin/mineral supplements  - Order, calculate, and assess calorie counts as needed  - Recommend, monitor, and adjust tube feedings and TPN/PPN based on assessed needs  - Assess need for intravenous fluids  -  Provide specific nutrition/hydration education as appropriate  - Include patient/family/caregiver in decisions related to nutrition  Outcome: Progressing     Problem: Prexisting or High Potential for Compromised Skin Integrity  Goal: Skin integrity is maintained or improved  Description: INTERVENTIONS:  - Identify patients at risk for skin breakdown  - Assess and monitor skin integrity  - Assess and monitor nutrition and hydration status  - Monitor labs   - Assess for incontinence   - Turn and reposition patient  - Assist with mobility/ambulation  - Relieve pressure over bony prominences  - Avoid friction and shearing  - Provide appropriate hygiene as needed including keeping skin clean and dry  - Evaluate need for skin moisturizer/barrier cream  - Collaborate with interdisciplinary team   - Patient/family teaching  - Consider wound care consult   Outcome: Progressing     Problem: SAFETY,RESTRAINT: NV/NON-SELF DESTRUCTIVE BEHAVIOR  Goal: Remains free of harm/injury (restraint for non violent/non self-detsructive behavior)  Description: INTERVENTIONS:  - Instruct patient/family regarding restraint use   - Assess and monitor physiologic and psychological status   - Provide interventions and comfort measures to meet assessed patient needs   - Identify and implement measures to help patient regain control  - Assess readiness for release of restraint   Outcome: Progressing  Goal: Returns to optimal restraint-free functioning  Description: INTERVENTIONS:  - Assess the patient's behavior and symptoms that indicate continued need for restraint  - Identify and implement measures to help patient regain control  - Assess readiness for release of restraint   Outcome: Progressing

## 2025-01-19 NOTE — PROCEDURES
Arterial Line Insertion    Date/Time: 1/19/2025 5:20 AM    Performed by: ISRAEL Carvajal  Authorized by: ISRAEL Carvajal    Patient location:  Bedside and ICU  Other Assisting Provider: No    Consent:     Consent obtained:  Verbal    Consent given by:  Spouse    Risks discussed:  Bleeding, ischemia, repeat procedure, infection and pain  Universal protocol:     Procedure explained and questions answered to patient or proxy's satisfaction: yes      Relevant documents present and verified: yes      Test results available and properly labeled: yes      Radiology Images displayed and confirmed.  If images not available, report reviewed: yes      Required blood products, implants, devices, and special equipment available: yes      Site/side marked: yes      Immediately prior to procedure a time out was called: yes      Patient identity confirmed:  Hospital-assigned identification number and arm band  Indications:     Indications: hemodynamic monitoring, multiple ABGs, continuous blood pressure monitoring and frequent labs / infusion    Pre-procedure details:     Skin preparation:  Chlorhexidine  Sedation:     Sedation type:  Moderate (conscious) sedation  Anesthesia (see MAR for exact dosages):     Anesthesia method:  Local infiltration    Local anesthetic:  Lidocaine 1% w/o epi  Procedure details:     Location / Tip of Catheter:  Radial    Laterality:  Right    Needle gauge:  18 G    Placement technique:  Percutaneous and ultrasound guided    Ultrasound image availability:  Not saved    Sterile ultrasound techniques: Sterile gel and sterile probe covers were used      Number of attempts:  2    Successful placement: yes      Transducer: waveform confirmed    Post-procedure details:     Post-procedure:  Biopatch applied    CMS:  Normal    Patient tolerance of procedure:  Tolerated well, no immediate complications

## 2025-01-19 NOTE — ASSESSMENT & PLAN NOTE
Lab Results   Component Value Date    EGFR 15 01/19/2025    EGFR 19 01/18/2025    EGFR 16 01/18/2025    CREATININE 3.38 (H) 01/19/2025    CREATININE 2.73 (H) 01/18/2025    CREATININE 3.08 (H) 01/18/2025     Continue to monitor

## 2025-01-20 ENCOUNTER — APPOINTMENT (INPATIENT)
Dept: NON INVASIVE DIAGNOSTICS | Facility: HOSPITAL | Age: OVER 89
DRG: 208 | End: 2025-01-20
Payer: MEDICARE

## 2025-01-20 LAB
ABO GROUP BLD BPU: NORMAL
ALBUMIN SERPL BCG-MCNC: 3 G/DL (ref 3.5–5)
ALP SERPL-CCNC: 53 U/L (ref 34–104)
ALT SERPL W P-5'-P-CCNC: 208 U/L (ref 7–52)
ANION GAP SERPL CALCULATED.3IONS-SCNC: 8 MMOL/L (ref 4–13)
ANISOCYTOSIS BLD QL SMEAR: PRESENT
AORTIC ROOT: 3.3 CM
APTT PPP: 52 SECONDS (ref 23–34)
APTT PPP: 90 SECONDS (ref 23–34)
APTT PPP: 93 SECONDS (ref 23–34)
APTT PPP: 95 SECONDS (ref 23–34)
ASCENDING AORTA: 3.2 CM
AST SERPL W P-5'-P-CCNC: 54 U/L (ref 13–39)
BASE EXCESS BLDA CALC-SCNC: -9.4 MMOL/L
BASOPHILS # BLD AUTO: 0.03 THOUSANDS/ΜL (ref 0–0.1)
BASOPHILS NFR BLD AUTO: 0 % (ref 0–1)
BILIRUB SERPL-MCNC: 0.93 MG/DL (ref 0.2–1)
BPU ID: NORMAL
BSA FOR ECHO PROCEDURE: 1.68 M2
BUN SERPL-MCNC: 62 MG/DL (ref 5–25)
BURR CELLS BLD QL SMEAR: PRESENT
CA-I BLD-SCNC: 1.04 MMOL/L (ref 1.12–1.32)
CALCIUM ALBUM COR SERPL-MCNC: 8.1 MG/DL (ref 8.3–10.1)
CALCIUM SERPL-MCNC: 7.3 MG/DL (ref 8.4–10.2)
CHLORIDE SERPL-SCNC: 115 MMOL/L (ref 96–108)
CO2 SERPL-SCNC: 17 MMOL/L (ref 21–32)
CREAT SERPL-MCNC: 3.85 MG/DL (ref 0.6–1.3)
CROSSMATCH: NORMAL
E WAVE DECELERATION TIME: 218 MS
E/A RATIO: 0.63
EOSINOPHIL # BLD AUTO: 0.01 THOUSAND/ΜL (ref 0–0.61)
EOSINOPHIL NFR BLD AUTO: 0 % (ref 0–6)
ERYTHROCYTE [DISTWIDTH] IN BLOOD BY AUTOMATED COUNT: 18 % (ref 11.6–15.1)
FRACTIONAL SHORTENING: 28 (ref 28–44)
GFR SERPL CREATININE-BSD FRML MDRD: 12 ML/MIN/1.73SQ M
GLUCOSE SERPL-MCNC: 140 MG/DL (ref 65–140)
GLUCOSE SERPL-MCNC: 163 MG/DL (ref 65–140)
GLUCOSE SERPL-MCNC: 209 MG/DL (ref 65–140)
GLUCOSE SERPL-MCNC: 214 MG/DL (ref 65–140)
GLUCOSE SERPL-MCNC: 223 MG/DL (ref 65–140)
HCO3 BLDA-SCNC: 14.7 MMOL/L (ref 22–28)
HCT VFR BLD AUTO: 24.6 % (ref 36.5–49.3)
HCT VFR BLD AUTO: 25.1 % (ref 36.5–49.3)
HCT VFR BLD AUTO: 25.4 % (ref 36.5–49.3)
HCT VFR BLD AUTO: 26.4 % (ref 36.5–49.3)
HGB BLD-MCNC: 8.2 G/DL (ref 12–17)
HGB BLD-MCNC: 8.4 G/DL (ref 12–17)
HGB BLD-MCNC: 8.4 G/DL (ref 12–17)
HGB BLD-MCNC: 8.7 G/DL (ref 12–17)
IMM GRANULOCYTES # BLD AUTO: 0.05 THOUSAND/UL (ref 0–0.2)
IMM GRANULOCYTES NFR BLD AUTO: 0 % (ref 0–2)
INTERVENTRICULAR SEPTUM IN DIASTOLE (PARASTERNAL SHORT AXIS VIEW): 1 CM
INTERVENTRICULAR SEPTUM: 1 CM (ref 0.6–1.1)
LEFT ATRIUM SIZE: 3.4 CM
LEFT INTERNAL DIMENSION IN SYSTOLE: 2.3 CM (ref 2.1–4)
LEFT VENTRICULAR INTERNAL DIMENSION IN DIASTOLE: 3.2 CM (ref 3.5–6)
LEFT VENTRICULAR POSTERIOR WALL IN END DIASTOLE: 1 CM
LEFT VENTRICULAR STROKE VOLUME: 21 ML
LG PLATELETS BLD QL SMEAR: PRESENT
LVSV (TEICH): 21 ML
LYMPHOCYTES # BLD AUTO: 0.7 THOUSANDS/ΜL (ref 0.6–4.47)
LYMPHOCYTES NFR BLD AUTO: 6 % (ref 14–44)
MAGNESIUM SERPL-MCNC: 2.2 MG/DL (ref 1.9–2.7)
MCH RBC QN AUTO: 29 PG (ref 26.8–34.3)
MCHC RBC AUTO-ENTMCNC: 33.3 G/DL (ref 31.4–37.4)
MCV RBC AUTO: 86 FL (ref 82–98)
MONOCYTES # BLD AUTO: 1.49 THOUSAND/ΜL (ref 0.17–1.22)
MONOCYTES NFR BLD AUTO: 12 % (ref 4–12)
MV PEAK A VEL: 0.81 M/S
MV PEAK E VEL: 51 CM/S
MV STENOSIS PRESSURE HALF TIME: 63 MS
MV VALVE AREA P 1/2 METHOD: 3.49
NEUTROPHILS # BLD AUTO: 10.37 THOUSANDS/ΜL (ref 1.85–7.62)
NEUTS SEG NFR BLD AUTO: 82 % (ref 43–75)
NRBC BLD AUTO-RTO: 0 /100 WBCS
O2 CT BLDA-SCNC: 12.4 ML/DL (ref 16–23)
OVALOCYTES BLD QL SMEAR: PRESENT
OXYHGB MFR BLDA: 97.3 % (ref 94–97)
PCO2 BLDA: 26.4 MM HG (ref 36–44)
PH BLDA: 7.37 [PH] (ref 7.35–7.45)
PLATELET # BLD AUTO: 76 THOUSANDS/UL (ref 149–390)
PLATELET BLD QL SMEAR: ABNORMAL
PMV BLD AUTO: 11.5 FL (ref 8.9–12.7)
PO2 BLDA: 109.6 MM HG (ref 75–129)
POIKILOCYTOSIS BLD QL SMEAR: PRESENT
POLYCHROMASIA BLD QL SMEAR: PRESENT
POTASSIUM SERPL-SCNC: 4.3 MMOL/L (ref 3.5–5.3)
PROT SERPL-MCNC: 5.1 G/DL (ref 6.4–8.4)
RA PRESSURE ESTIMATED: 12 MMHG
RBC # BLD AUTO: 2.83 MILLION/UL (ref 3.88–5.62)
RBC MORPH BLD: PRESENT
RV PSP: 49 MMHG
SL CV LV EF: 55
SL CV PED ECHO LEFT VENTRICLE DIASTOLIC VOLUME (MOD BIPLANE) 2D: 39 ML
SL CV PED ECHO LEFT VENTRICLE SYSTOLIC VOLUME (MOD BIPLANE) 2D: 18 ML
SODIUM SERPL-SCNC: 140 MMOL/L (ref 135–147)
SPECIMEN SOURCE: ABNORMAL
TR MAX PG: 37 MMHG
TR PEAK VELOCITY: 3 M/S
TRICUSPID ANNULAR PLANE SYSTOLIC EXCURSION: 1.7 CM
TRICUSPID VALVE PEAK REGURGITATION VELOCITY: 3.04 M/S
UNIT DISPENSE STATUS: NORMAL
UNIT PRODUCT CODE: NORMAL
UNIT PRODUCT VOLUME: 300 ML
UNIT PRODUCT VOLUME: 350 ML
UNIT PRODUCT VOLUME: 500 ML
UNIT RH: NORMAL
WBC # BLD AUTO: 12.65 THOUSAND/UL (ref 4.31–10.16)

## 2025-01-20 PROCEDURE — 85730 THROMBOPLASTIN TIME PARTIAL: CPT | Performed by: NURSE PRACTITIONER

## 2025-01-20 PROCEDURE — 85014 HEMATOCRIT: CPT | Performed by: INTERNAL MEDICINE

## 2025-01-20 PROCEDURE — 99291 CRITICAL CARE FIRST HOUR: CPT | Performed by: INTERNAL MEDICINE

## 2025-01-20 PROCEDURE — 82330 ASSAY OF CALCIUM: CPT

## 2025-01-20 PROCEDURE — 83735 ASSAY OF MAGNESIUM: CPT

## 2025-01-20 PROCEDURE — 85014 HEMATOCRIT: CPT

## 2025-01-20 PROCEDURE — 85730 THROMBOPLASTIN TIME PARTIAL: CPT

## 2025-01-20 PROCEDURE — 93306 TTE W/DOPPLER COMPLETE: CPT | Performed by: INTERNAL MEDICINE

## 2025-01-20 PROCEDURE — 80053 COMPREHEN METABOLIC PANEL: CPT

## 2025-01-20 PROCEDURE — 85018 HEMOGLOBIN: CPT

## 2025-01-20 PROCEDURE — 94760 N-INVAS EAR/PLS OXIMETRY 1: CPT

## 2025-01-20 PROCEDURE — 94003 VENT MGMT INPAT SUBQ DAY: CPT

## 2025-01-20 PROCEDURE — 85025 COMPLETE CBC W/AUTO DIFF WBC: CPT

## 2025-01-20 PROCEDURE — 82805 BLOOD GASES W/O2 SATURATION: CPT

## 2025-01-20 PROCEDURE — 94150 VITAL CAPACITY TEST: CPT

## 2025-01-20 PROCEDURE — 85018 HEMOGLOBIN: CPT | Performed by: INTERNAL MEDICINE

## 2025-01-20 PROCEDURE — 82948 REAGENT STRIP/BLOOD GLUCOSE: CPT

## 2025-01-20 PROCEDURE — 85730 THROMBOPLASTIN TIME PARTIAL: CPT | Performed by: PHYSICIAN ASSISTANT

## 2025-01-20 PROCEDURE — C8929 TTE W OR WO FOL WCON,DOPPLER: HCPCS

## 2025-01-20 RX ORDER — PANTOPRAZOLE SODIUM 40 MG/10ML
40 INJECTION, POWDER, LYOPHILIZED, FOR SOLUTION INTRAVENOUS
Status: DISCONTINUED | OUTPATIENT
Start: 2025-01-21 | End: 2025-01-21

## 2025-01-20 RX ORDER — AMOXICILLIN 250 MG
1 CAPSULE ORAL 2 TIMES DAILY
Status: DISCONTINUED | OUTPATIENT
Start: 2025-01-20 | End: 2025-02-11 | Stop reason: HOSPADM

## 2025-01-20 RX ORDER — CALCIUM GLUCONATE 20 MG/ML
2 INJECTION, SOLUTION INTRAVENOUS ONCE
Status: COMPLETED | OUTPATIENT
Start: 2025-01-20 | End: 2025-01-20

## 2025-01-20 RX ORDER — POLYETHYLENE GLYCOL 3350 17 G/17G
17 POWDER, FOR SOLUTION ORAL DAILY
Status: DISCONTINUED | OUTPATIENT
Start: 2025-01-20 | End: 2025-02-11 | Stop reason: HOSPADM

## 2025-01-20 RX ORDER — INSULIN LISPRO 100 [IU]/ML
2-12 INJECTION, SOLUTION INTRAVENOUS; SUBCUTANEOUS EVERY 6 HOURS
Status: DISCONTINUED | OUTPATIENT
Start: 2025-01-20 | End: 2025-02-06

## 2025-01-20 RX ORDER — BRIMONIDINE TARTRATE 2 MG/ML
1 SOLUTION/ DROPS OPHTHALMIC 3 TIMES DAILY
Status: DISCONTINUED | OUTPATIENT
Start: 2025-01-20 | End: 2025-01-22

## 2025-01-20 RX ORDER — PREDNISOLONE ACETATE 10 MG/ML
1 SUSPENSION/ DROPS OPHTHALMIC 3 TIMES DAILY
Status: DISCONTINUED | OUTPATIENT
Start: 2025-01-20 | End: 2025-02-11 | Stop reason: HOSPADM

## 2025-01-20 RX ADMIN — PREDNISOLONE ACETATE 1 DROP: 10 SUSPENSION/ DROPS OPHTHALMIC at 20:23

## 2025-01-20 RX ADMIN — CHLORHEXIDINE GLUCONATE 15 ML: 1.2 RINSE ORAL at 08:03

## 2025-01-20 RX ADMIN — FENTANYL CITRATE 25 MCG: 50 INJECTION INTRAMUSCULAR; INTRAVENOUS at 03:16

## 2025-01-20 RX ADMIN — INSULIN LISPRO 2 UNITS: 100 INJECTION, SOLUTION INTRAVENOUS; SUBCUTANEOUS at 13:04

## 2025-01-20 RX ADMIN — BRIMONIDINE TARTRATE 1 DROP: 2 SOLUTION/ DROPS OPHTHALMIC at 08:03

## 2025-01-20 RX ADMIN — MINERAL OIL, WHITE PETROLATUM: .03; .94 OINTMENT OPHTHALMIC at 22:16

## 2025-01-20 RX ADMIN — FENTANYL CITRATE 25 MCG: 50 INJECTION INTRAMUSCULAR; INTRAVENOUS at 09:09

## 2025-01-20 RX ADMIN — Medication 50 MCG/HR: at 05:25

## 2025-01-20 RX ADMIN — VASOPRESSIN 0.04 UNITS/MIN: 20 INJECTION INTRAVENOUS at 13:39

## 2025-01-20 RX ADMIN — PREDNISOLONE ACETATE 1 DROP: 10 SUSPENSION/ DROPS OPHTHALMIC at 16:38

## 2025-01-20 RX ADMIN — VASOPRESSIN 0.04 UNITS/MIN: 20 INJECTION INTRAVENOUS at 05:25

## 2025-01-20 RX ADMIN — BRIMONIDINE TARTRATE 1 DROP: 2 SOLUTION/ DROPS OPHTHALMIC at 20:23

## 2025-01-20 RX ADMIN — LATANOPROSTENE BUNOD 1 DROP: 0.24 SOLUTION/ DROPS OPHTHALMIC at 22:15

## 2025-01-20 RX ADMIN — PREDNISOLONE ACETATE 1 DROP: 10 SUSPENSION/ DROPS OPHTHALMIC at 08:03

## 2025-01-20 RX ADMIN — INSULIN LISPRO 4 UNITS: 100 INJECTION, SOLUTION INTRAVENOUS; SUBCUTANEOUS at 06:09

## 2025-01-20 RX ADMIN — PERFLUTREN 0.8 ML/MIN: 6.52 INJECTION, SUSPENSION INTRAVENOUS at 13:40

## 2025-01-20 RX ADMIN — DORZOLAMIDE HYDROCHLORIDE AND TIMOLOL MALEATE 1 DROP: 20; 5 SOLUTION OPHTHALMIC at 18:21

## 2025-01-20 RX ADMIN — HEPARIN SODIUM 2400 UNITS: 1000 INJECTION INTRAVENOUS; SUBCUTANEOUS at 13:43

## 2025-01-20 RX ADMIN — BRIMONIDINE TARTRATE 1 DROP: 2 SOLUTION/ DROPS OPHTHALMIC at 16:37

## 2025-01-20 RX ADMIN — POLYETHYLENE GLYCOL 3350 17 G: 17 POWDER, FOR SOLUTION ORAL at 15:05

## 2025-01-20 RX ADMIN — CALCIUM GLUCONATE 2 G: 20 INJECTION, SOLUTION INTRAVENOUS at 08:22

## 2025-01-20 RX ADMIN — CHLORHEXIDINE GLUCONATE 15 ML: 1.2 RINSE ORAL at 20:23

## 2025-01-20 RX ADMIN — DORZOLAMIDE HYDROCHLORIDE AND TIMOLOL MALEATE 1 DROP: 20; 5 SOLUTION OPHTHALMIC at 08:03

## 2025-01-20 RX ADMIN — INSULIN LISPRO 2 UNITS: 100 INJECTION, SOLUTION INTRAVENOUS; SUBCUTANEOUS at 00:11

## 2025-01-20 RX ADMIN — PANTOPRAZOLE SODIUM 40 MG: 40 INJECTION, POWDER, FOR SOLUTION INTRAVENOUS at 08:03

## 2025-01-20 NOTE — ASSESSMENT & PLAN NOTE
Lab Results   Component Value Date    HGBA1C 6.9 (H) 01/18/2025       Blood Sugar Average: Last 72 hrs:  (P) 179.7296684311456340  Recent Labs     01/19/25  0531 01/19/25  0608 01/20/25  0454 01/20/25  0548 01/20/25  1805 01/21/25  0014 01/21/25  0505   POCGLU  --    < >  --    < > 140 129 133   GLUC 158*  --  214*  --   --   --  129    < > = values in this interval not displayed.       Hold home regimen while inpatient and resume on discharge Dapagliflozin   Diabetic diet  Insulin regimen  Glucose checks and Insulin correction ACHS  Goal -180 while admitted, adjusting insulin regimen as appropriate  Monitor for hypoglycemia and treat per protocol  Continue SSI

## 2025-01-20 NOTE — ASSESSMENT & PLAN NOTE
CLASSIFICATIONS Acute unstable Saddle  RISK FACTORS: hx of stroke, hx of cancer    DX: CTA   Recent Labs     01/18/25  0742 01/19/25  0818 01/19/25  1408   LACTICACID 3.0* 2.3* 0.8     ECHO unable to determine EF    TXT:   S/P TPA  Levophed, vasopressin     Heparin when PTT<90  Target INR 2-3  TTM not indicated

## 2025-01-20 NOTE — ASSESSMENT & PLAN NOTE
Lab Results   Component Value Date    EGFR 12 01/20/2025    EGFR 15 01/19/2025    EGFR 19 01/18/2025    CREATININE 3.85 (H) 01/20/2025    CREATININE 3.38 (H) 01/19/2025    CREATININE 2.73 (H) 01/18/2025         Continue to monitor

## 2025-01-20 NOTE — ASSESSMENT & PLAN NOTE
Lab Results   Component Value Date    HGBA1C 6.9 (H) 01/18/2025       Blood Sugar Average: Last 72 hrs:  (P) 193.6866788818533046  Recent Labs     01/18/25  0451 01/18/25  0539 01/19/25  0531 01/19/25  0608 01/19/25  1807 01/20/25  0001 01/20/25  0454 01/20/25  0548   POCGLU  --    < >  --    < > 217* 223*  --  209*   GLUC 327*  --  158*  --   --   --  214*  --     < > = values in this interval not displayed.       Hold home regimen while inpatient and resume on discharge Dapagliflozin   Diabetic diet  Insulin regimen  Glucose checks and Insulin correction ACHS  Goal -180 while admitted, adjusting insulin regimen as appropriate  Monitor for hypoglycemia and treat per protocol  Escalate from SSI to insulin drip

## 2025-01-20 NOTE — ASSESSMENT & PLAN NOTE
Obstructive shock vs hemorrhagic vs cardiogenic   With shock liver and acute renal failure   Lab Results   Component Value Date    HGB 8.2 (L) 01/20/2025     Lab Results   Component Value Date     06/05/2017    SODIUM 140 01/20/2025    K 4.3 01/20/2025     (H) 01/20/2025    CO2 17 (L) 01/20/2025    AGAP 8 01/20/2025    BUN 62 (H) 01/20/2025    CREATININE 3.85 (H) 01/20/2025    GLUC 214 (H) 01/20/2025    CALCIUM 7.3 (L) 01/20/2025    AST 54 (H) 01/20/2025     (H) 01/20/2025    ALKPHOS 53 01/20/2025    PROT 7.0 10/25/2016    TP 5.1 (L) 01/20/2025    BILITOT 0.6 10/25/2016    TBILI 0.93 01/20/2025    EGFR 12 01/20/2025   ECHO- poor visualization   - LFTs improving  - worsening Creatinine    Plan-   On Levophed and vasopressin  Follow up H&H  GOC discussion   Continue heparin gtt for now

## 2025-01-20 NOTE — ASSESSMENT & PLAN NOTE
Lab Results   Component Value Date    EGFR 12 01/20/2025    EGFR 15 01/19/2025    EGFR 19 01/18/2025    CREATININE 3.85 (H) 01/20/2025    CREATININE 3.38 (H) 01/19/2025    CREATININE 2.73 (H) 01/18/2025   Monitor Hgb in setting of TPA  Transfuse <7

## 2025-01-20 NOTE — PROGRESS NOTES
Progress Note - Critical Care/ICU   Name: Jeffry Almanzar 89 y.o. male I MRN: [id number]  Unit/Bed#: ICU 12 I Date of Admission: 1/17/2025   Date of Service: 1/20/2025 I Hospital Day: 2       Assessment & Plan  Acute saddle pulmonary embolism with acute cor pulmonale (HCC)  CLASSIFICATIONS Acute unstable Saddle  RISK FACTORS: hx of stroke, hx of cancer    DX: CTA   Recent Labs     01/18/25  0742 01/19/25  0818 01/19/25  1408   LACTICACID 3.0* 2.3* 0.8     ECHO unable to determine EF    TXT:   S/P TPA  Levophed, vasopressin     Heparin when PTT<90  Target INR 2-3  TTM not indicated  Shock (HCC)  Obstructive shock vs hemorrhagic vs cardiogenic   With shock liver and acute renal failure   Lab Results   Component Value Date    HGB 8.2 (L) 01/20/2025     Lab Results   Component Value Date     06/05/2017    SODIUM 140 01/20/2025    K 4.3 01/20/2025     (H) 01/20/2025    CO2 17 (L) 01/20/2025    AGAP 8 01/20/2025    BUN 62 (H) 01/20/2025    CREATININE 3.85 (H) 01/20/2025    GLUC 214 (H) 01/20/2025    CALCIUM 7.3 (L) 01/20/2025    AST 54 (H) 01/20/2025     (H) 01/20/2025    ALKPHOS 53 01/20/2025    PROT 7.0 10/25/2016    TP 5.1 (L) 01/20/2025    BILITOT 0.6 10/25/2016    TBILI 0.93 01/20/2025    EGFR 12 01/20/2025   ECHO- poor visualization   - LFTs improving  - worsening Creatinine    Plan-   On Levophed and vasopressin  Follow up H&H  GOC discussion   Continue heparin gtt for now   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  (HCC)  Lab Results   Component Value Date    EGFR 12 01/20/2025    EGFR 15 01/19/2025    EGFR 19 01/18/2025    CREATININE 3.85 (H) 01/20/2025    CREATININE 3.38 (H) 01/19/2025    CREATININE 2.73 (H) 01/18/2025   Monitor Hgb in setting of TPA  Transfuse <7    Benign 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 (HCC)  Lab Results   Component Value Date    EGFR 12 01/20/2025    EGFR 15 01/19/2025    EGFR 19 01/18/2025    CREATININE 3.85 (H) 01/20/2025    CREATININE 3.38 (H)  01/19/2025    CREATININE 2.73 (H) 01/18/2025         Continue to monitor      Diabetic nephropathy associated with type 2 diabetes mellitus (HCC)  Lab Results   Component Value Date    HGBA1C 6.9 (H) 01/18/2025       Blood Sugar Average: Last 72 hrs:  (P) 193.8263716840779065  Recent Labs     01/18/25  0451 01/18/25  0539 01/19/25  0531 01/19/25  0608 01/19/25  1807 01/20/25  0001 01/20/25  0454 01/20/25  0548   POCGLU  --    < >  --    < > 217* 223*  --  209*   GLUC 327*  --  158*  --   --   --  214*  --     < > = values in this interval not displayed.       Hold home regimen while inpatient and resume on discharge Dapagliflozin   Diabetic diet  Insulin regimen  Glucose checks and Insulin correction ACHS  Goal -180 while admitted, adjusting insulin regimen as appropriate  Monitor for hypoglycemia and treat per protocol  Escalate from SSI to insulin drip    IgM lambda paraproteinemia    Lacunar cerebrovascular accident (CVA) ([facility])    Cardiac arrest ([facility])  Lab Results   Component Value Date    HSTNI0 533 (H) 01/18/2025    HSTNI2 777 (H) 01/18/2025    HSTNID2 244 (H) 01/18/2025    HSTNI4 1,070 (H) 01/18/2025    HSTNID4 537 (H) 01/18/2025     Recent Labs     01/18/25  0742 01/19/25  0818 01/19/25  1408   LACTICACID 3.0* 2.3* 0.8     Pt went into CA in ED 2/2 PE  ECHO with EF of 65% in September, ECHO in hospital unable to determine EF      PLAN:  Tele  S/P TPA changed to Heparin drip  Repeat ECHO    Disposition: Critical care    ICU Core Measures     Vented Patient  VAP Bundle  VAP bundle ordered     A: Assess, Prevent, and Manage Pain Has pain been assessed? Yes  Need for changes to pain regimen? No   B: Both Spontaneous Awakening Trials (SATs) and Spontaneous Breathing Trials (SBTs) Plan to perform spontaneous awakening trial today? N/A   Plan to perform spontaneous breathing trial today? Yes   Obvious barriers to extubation? Yes   C: Choice of Sedation RASS Goal: 0 Alert and Calm  Need for changes to sedation  or analgesia regimen? No   D: Delirium CAM-ICU: Negative   E: Early Mobility  Plan for early mobility? Yes   F: Family Engagement Plan for family engagement today? Yes       Review of Invasive Devices:    [place] Plan:  urethral cath in place  Central access plan: Medications requiring central line Hemodynamic monitoring  [place]bs Plan: Keep arterial line for hemodynamic monitoring    Prophylaxis:  VTE VTE covered by:  heparin (porcine), Intravenous, 7 Units/kg/hr at 01/20/25 0638  heparin (porcine), Intravenous  heparin (porcine), Intravenous       Stress Ulcer  covered bypantoprazole (PROTONIX) injection 40 mg [[id number]]         24 Hour Events : Received arterial line and central line yesterday.  Transfused with 2 units of packed red blood cells yesterday.  Placed on 3 pressures yesterday including Levophed, vasopressin and epi drip.     Subjective     No acute events overnight.  Currently on vasopressin 0.04.  Patient is able to follow commands.  States that he is in no pain.    Review of Systems: See HPI for Review of Systems    Objective :                   Vitals I/O      Most Recent Min/Max in 24hrs   Temp 99.1 °F (37.3 °C) Temp  Min: 97.9 °F (36.6 °C)  Max: 99.2 °F (37.3 °C)   Pulse 82 Pulse  Min: 48  Max: 101   Resp 22 Resp  Min: 10  Max: 26   /55 BP  Min: 109/60  Max: 167/61   O2 Sat 97 % SpO2  Min: 94 %  Max: 100 %      Intake/Output Summary (Last 24 hours) at 1/20/2025 0800  Last data filed at 1/20/2025 0600  Gross per 24 hour   Intake 1575.81 ml   Output 690 ml   Net 885.81 ml       Diet NPO    Invasive Monitoring   Arterial Line  Gibbs /55  Arterial Line BP  Min: 33/22  Max: 166/50   MAP 79 mmHg  Arterial Line MAP (mmHg)  Min: 28 mmHg  Max: 100 mmHg           Physical Exam   Physical Exam  Eyes:      Extraocular Movements: Extraocular movements intact.      Conjunctiva/sclera: Conjunctivae normal.      Comments: Patient with no pupillary reflex   Skin:     General: Skin is warm.      Capillary  Refill: Capillary refill takes less than 2 seconds.   HENT:      Head: Normocephalic and atraumatic.      Mouth/Throat:      Mouth: Mucous membranes are moist.   Cardiovascular:      Rate and Rhythm: Normal rate.      Heart sounds: Normal heart sounds.   Musculoskeletal:         General: No swelling.      Right lower leg: No edema.      Left lower leg: No edema.   Abdominal:      Palpations: Abdomen is soft.      Tenderness: There is no abdominal tenderness.   Constitutional:       Interventions: He is intubated. He is not sedated.  Pulmonary:      Effort: Pulmonary effort is normal. He is intubated.      Breath sounds: Normal breath sounds. No wheezing, rhonchi or rales.   Neurological:      Mental Status: He is alert.      Comments: Patient able to follow commands.  With full ocular range of motion and tracking with the exception of left side secondary to blindness.  Patient without pupillary reflex.  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   Genitourinary/Anorectal:  external catheter present.        Diagnostic Studies        Lab Results: I have reviewed the following results:     Medications:  Scheduled PRN   artificial tear, , HS  brimonidine tartrate, 1 drop, TID  calcium gluconate, 2 g, Once  chlorhexidine, 15 mL, Q12H MARQUIS  dorzolamide-timolol, 1 drop, BID  insulin lispro, 2-12 Units, Q6H  Latanoprostene Bunod, 1 drop, HS  pantoprazole, 40 mg, Q12H MARQUIS  prednisoLONE acetate, 1 drop, TID      fentaNYL, 25 mcg, Q2H PRN  heparin (porcine), 2,400 Units, Q6H PRN  heparin (porcine), 4,800 Units, Q6H PRN       Continuous    epinephrine, 1-10 mcg/min, Last Rate: Stopped (01/19/25 0830)  fentaNYL, 50 mcg/hr, Last Rate: 50 mcg/hr (01/20/25 0525)  heparin (porcine), 3-30 Units/kg/hr (Order-Specific), Last Rate: 7 Units/kg/hr (01/20/25 0638)  norepinephrine, 1-30 mcg/min, Last Rate: Stopped (01/20/25 0604)  vasopressin, 0.04 Units/min, Last Rate: 0.04  Units/min (01/20/25 0525)         Labs:   CBC    Recent Labs     01/19/25  0531 01/19/25  0726 01/20/25  0011 01/20/25  0454   WBC 16.80*  --   --  12.65*   HGB 6.0*   < > 8.4* 8.2*   HCT 18.8*   < > 25.4* 24.6*   PLT 99*  --   --  76*    < > = values in this interval not displayed.     BMP    Recent Labs     01/19/25  0531 01/20/25  0454   SODIUM 140 140   K 4.0 4.3   * 115*   CO2 15* 17*   AGAP 14* 8   BUN 45* 62*   CREATININE 3.38* 3.85*   CALCIUM 7.7* 7.3*       Coags    Recent Labs     01/19/25  1411 01/20/25  0011 01/20/25  0454   INR 1.61*  --   --    PTT  --  90* 95*        Additional Electrolytes  Recent Labs     01/19/25 0531 01/20/25  0454   MG 2.2 2.2   PHOS 4.0  --    CAIONIZED 1.08* 1.04*          Blood Gas    Recent Labs     01/20/25  0454   PHART 7.365   RII1TAW 26.4*   PO2ART 109.6   PYV0GCR 14.7*   BEART -9.4   SOURCE Line, Arterial     Recent Labs     01/20/25  0454   SOURCE Line, Arterial    LFTs  Recent Labs     01/19/25  0531 01/20/25  0454   * 208*   * 54*   ALKPHOS 65 53   ALB 3.1* 3.0*   TBILI 0.64 0.93       Infectious  No recent results  Glucose  Recent Labs     01/19/25  0531 01/20/25  0454   GLUC 158* 214*

## 2025-01-20 NOTE — ASSESSMENT & PLAN NOTE
Lab Results   Component Value Date    HSTNI0 533 (H) 01/18/2025    HSTNI2 777 (H) 01/18/2025    HSTNID2 244 (H) 01/18/2025    HSTNI4 1,070 (H) 01/18/2025    HSTNID4 537 (H) 01/18/2025     Recent Labs     01/18/25  0742 01/19/25  0818 01/19/25  1408   LACTICACID 3.0* 2.3* 0.8     Pt went into CA in ED 2/2 PE  ECHO with EF of 65% in September, ECHO in hospital unable to determine EF      PLAN:  Tele  S/P TPA changed to Heparin drip  Repeat ECHO

## 2025-01-20 NOTE — PLAN OF CARE
Problem: PAIN - ADULT  Goal: Verbalizes/displays adequate comfort level or baseline comfort level  Description: Interventions:  - Encourage patient to monitor pain and request assistance  - Assess pain using appropriate pain scale  - Administer analgesics based on type and severity of pain and evaluate response  - Implement non-pharmacological measures as appropriate and evaluate response  - Consider cultural and social influences on pain and pain management  - Notify physician/advanced practitioner if interventions unsuccessful or patient reports new pain  Outcome: Progressing     Problem: INFECTION - ADULT  Goal: Absence or prevention of progression during hospitalization  Description: INTERVENTIONS:  - Assess and monitor for signs and symptoms of infection  - Monitor lab/diagnostic results  - Monitor all insertion sites, i.e. indwelling lines, tubes, and drains  - Monitor endotracheal if appropriate and nasal secretions for changes in amount and color  - [place] appropriate cooling/warming therapies per order  - Administer medications as ordered  - Instruct and encourage patient and family to use good hand hygiene technique  - Identify and instruct in appropriate isolation precautions for identified infection/condition  Outcome: Progressing  Goal: Absence of fever/infection during neutropenic period  Description: INTERVENTIONS:  - Monitor WBC    Outcome: Progressing     Problem: SAFETY ADULT  Goal: Patient will remain free of falls  Description: INTERVENTIONS:  - Educate patient/family on patient safety including physical limitations  - Instruct patient to call for assistance with activity   - Consult OT/PT to assist with strengthening/mobility   - Keep Call bell within reach  - Keep bed low and locked with side rails adjusted as appropriate  - Keep care items and personal belongings within reach  - Initiate and maintain comfort rounds  - Make Fall Risk Sign visible to staff  - Offer Toileting every  Hours,  in advance of need  - Initiate/Maintain alarm  - Obtain necessary fall risk management equipment:   - Apply yellow socks and bracelet for high fall risk patients  - Consider moving patient to room near nurses station  Outcome: Progressing  Goal: Maintain or return to baseline ADL function  Description: INTERVENTIONS:  -  Assess patient's ability to carry out ADLs; assess patient's baseline for ADL function and identify physical deficits which impact ability to perform ADLs (bathing, care of mouth/teeth, toileting, grooming, dressing, etc.)  - Assess/evaluate cause of self-care deficits   - Assess range of motion  - Assess patient's mobility; develop plan if impaired  - Assess patient's need for assistive devices and provide as appropriate  - Encourage maximum independence but intervene and supervise when necessary  - Involve family in performance of ADLs  - Assess for home care needs following discharge   - Consider OT consult to assist with ADL evaluation and planning for discharge  - Provide patient education as appropriate  Outcome: Progressing  Goal: Maintains/Returns to pre admission functional level  Description: INTERVENTIONS:  - Perform AM-PAC 6 Click Basic Mobility/ Daily Activity assessment daily.  - Set and communicate daily mobility goal to care team and patient/family/caregiver.   - Collaborate with rehabilitation services on mobility goals if consulted  - Perform Range of Motion  times a day.  - Reposition patient every  hours.  - Dangle patient  times a day  - Stand patient  times a day  - Ambulate patient  times a day  - Out of bed to chair  times a day   - Out of bed for meal times a day  - Out of bed for toileting  - Record patient progress and toleration of activity level   Outcome: Progressing     Problem: DISCHARGE PLANNING  Goal: Discharge to home or other facility with appropriate resources  Description: INTERVENTIONS:  - Identify barriers to discharge w/patient and caregiver  - Arrange for  needed discharge resources and transportation as appropriate  - Identify discharge learning needs (meds, wound care, etc.)  - Arrange for interpretive services to assist at discharge as needed  - Refer to Case Management Department for coordinating discharge planning if the patient needs post-hospital services based on physician/advanced practitioner order or complex needs related to functional status, cognitive ability, or social support system  Outcome: Progressing     Problem: Knowledge Deficit  Goal: Patient/family/caregiver demonstrates understanding of disease process, treatment plan, medications, and discharge instructions  Description: Complete learning assessment and assess knowledge base.  Interventions:  - Provide teaching at level of understanding  - Provide teaching via preferred learning methods  Outcome: Progressing     Problem: Nutrition/Hydration-ADULT  Goal: Nutrient/Hydration intake appropriate for improving, restoring or maintaining nutritional needs  Description: Monitor and assess patient's nutrition/hydration status for malnutrition. Collaborate with interdisciplinary team and initiate plan and interventions as ordered.  Monitor patient's weight and dietary intake as ordered or per policy. Utilize nutrition screening tool and intervene as necessary. Determine patient's food preferences and provide high-protein, high-caloric foods as appropriate.     INTERVENTIONS:  - Monitor oral intake, urinary output, labs, and treatment plans  - Assess nutrition and hydration status and recommend course of action  - Evaluate amount of meals eaten  - Assist patient with eating if necessary   - Allow adequate time for meals  - Recommend/ encourage appropriate diets, oral nutritional supplements, and vitamin/mineral supplements  - Order, calculate, and assess calorie counts as needed  - Recommend, monitor, and adjust tube feedings and TPN/PPN based on assessed needs  - Assess need for intravenous fluids  -  Provide specific nutrition/hydration education as appropriate  - Include patient/family/caregiver in decisions related to nutrition  Outcome: Progressing     Problem: Prexisting or High Potential for Compromised Skin Integrity  Goal: Skin integrity is maintained or improved  Description: INTERVENTIONS:  - Identify patients at risk for skin breakdown  - Assess and monitor skin integrity  - Assess and monitor nutrition and hydration status  - Monitor labs   - Assess for incontinence   - Turn and reposition patient  - Assist with mobility/ambulation  - Relieve pressure over bony prominences  - Avoid friction and shearing  - Provide appropriate hygiene as needed including keeping skin clean and dry  - Evaluate need for skin moisturizer/barrier cream  - Collaborate with interdisciplinary team   - Patient/family teaching  - Consider wound care consult   Outcome: Progressing     Problem: SAFETY,RESTRAINT: NV/NON-SELF DESTRUCTIVE BEHAVIOR  Goal: Remains free of harm/injury (restraint for non violent/non self-detsructive behavior)  Description: INTERVENTIONS:  - Instruct patient/family regarding restraint use   - Assess and monitor physiologic and psychological status   - Provide interventions and comfort measures to meet assessed patient needs   - Identify and implement measures to help patient regain control  - Assess readiness for release of restraint   Outcome: Progressing  Goal: Returns to optimal restraint-free functioning  Description: INTERVENTIONS:  - Assess the patient's behavior and symptoms that indicate continued need for restraint  - Identify and implement measures to help patient regain control  - Assess readiness for release of restraint   Outcome: Progressing

## 2025-01-20 NOTE — CASE MANAGEMENT
Case Management Assessment & Discharge Planning Note    Patient name Jeffry Almanzar  Location ICU 12/ICU 12 MRN [id number]  : 1934 Date 2025       Current Admission Date: 2025  Current Admission Diagnosis:Acute saddle pulmonary embolism with acute cor pulmonale (Formerly [facility])   Patient Active Problem List    Diagnosis Date Noted Date Diagnosed    Shock (Formerly [facility]) 2025     Cardiac arrest (Formerly [facility]) 2025     Acute saddle pulmonary embolism with acute cor pulmonale (Formerly [facility]) 2025     Lacunar cerebrovascular accident (CVA) (Formerly [facility]) 10/02/2024     CVA (cerebral vascular accident) (Formerly [facility]) 2024     Dementia (Formerly [facility]) 2024     Type 2 diabetes mellitus with stage 4 chronic kidney disease, without long-term current use of insulin (Formerly [facility]) 10/24/2023     IgM lambda paraproteinemia 2023     Advanced care planning/counseling discussion 2023     Diabetic nephropathy associated with type 2 diabetes mellitus (Formerly [facility]) 2023     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Formerly [facility]) 2023     Stage 4 chronic kidney disease (Formerly [facility]) 2022     Persistent proteinuria 2022     Benign 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 (Formerly [facility]) 02/15/2022     Parenchymal renal hypertension 2021     Spinal stenosis of lumbar region      DDD (degenerative disc disease), lumbar      Lumbar disc disease with radiculopathy      Neurogenic claudication 2019     Low back pain 2019     Retinal vein occlusion of left eye 2019     Malignant neoplasm of urinary bladder (Formerly [facility]) 2019     Vision loss of left eye 2019     Weight loss 2018     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  (Formerly [facility]) 2018     Iliotibial band syndrome of both sides 05/15/2018     Primary osteoarthritis of both knees 03/15/2018     Asthma, mild intermittent 2017     Essential hypertension 2016     Mild vitamin D deficiency 2014     Glaucoma 2014     Organic impotence 2013      Dyslipidemia 07/10/2012       LOS (days): 2  Geometric Mean LOS (GMLOS) (days):   Days to GMLOS:     OBJECTIVE:    Risk of Unplanned Readmission Score: 33.32         Current admission status: Inpatient       Preferred Pharmacy:   [facility] [address] [place] [address] [address] [address]  Phone: [phone] Fax: [phone]    Primary Care Provider: Debbie Maloney MD    Primary Insurance: MEDICARE  Secondary Insurance: BLUE CROSS    ASSESSMENT:  Active Health Care Proxies    There are no active Health Care Proxies on file.       Patient Information  Admitted from:: Home  Mental Status: Confused  During Assessment patient was accompanied by: Spouse  Assessment information provided by:: Spouse  Primary Caregiver: Family  Caregiver's Name:: Antony Hilliard  Caregiver's Relationship to Patient:: Family Member  Support Systems: Spouse/significant other  County of Residence: [place]  What city do you live in?: [place]  Home entry access options. Select all that apply.: Stairs  Number of steps to enter home.: 2 (through the front door)  Type of Current Residence: [facility]  Living Arrangements: Lives w/ Spouse/significant other    Activities of Daily Living Prior to Admission  Functional Status: Assistance  Completes ADLs independently?: No  Level of ADL dependence: Assistance  Ambulates independently?: Yes  Does patient use assisted devices?: Yes  Assisted Devices (DME) used: Straight Cane  Does patient currently own DME?: Yes  What DME does the patient currently own?: Straight Cane, Walker  Does patient have a history of Outpatient Therapy (PT/OT)?: No  Does the patient have a history of Short-Term Rehab?: No  Does patient have a history of HHC?: Yes  Does patient currently have HHC?: No    Patient Information Continued  Income Source: Pension/penitentiary  Does patient have prescription coverage?: Yes  Does patient receive dialysis treatments?: No  Does patient have a  history of substance abuse?: No  Does patient have a history of Mental Health Diagnosis?: No    Means of Transportation  Means of Transport to Appts:: Family transport    DISCHARGE DETAILS:    Discharge planning discussed with:: Pt's wife, Antony    Contacts  Patient Contacts: Antony Almanzar  Relationship to Patient:: Family  Contact Method: In Person  Reason/Outcome: Discharge Planning, Emergency Contact, Continuity of Care    Other Referral/Resources/Interventions Provided:  Interventions: Other (Specify)  Referral Comments: CM met with pt and his wife, Antony, at bedside. Introduced self/role with dcp. Pt's wife is his primary care giver. Aware PT/OT will evaluate pt once able and CM would f/u with recommendations.

## 2025-01-20 NOTE — PLAN OF CARE
Problem: PAIN - ADULT  Goal: Verbalizes/displays adequate comfort level or baseline comfort level  Description: Interventions:  - Encourage patient to monitor pain and request assistance  - Assess pain using appropriate pain scale  - Administer analgesics based on type and severity of pain and evaluate response  - Implement non-pharmacological measures as appropriate and evaluate response  - Consider cultural and social influences on pain and pain management  - Notify physician/advanced practitioner if interventions unsuccessful or patient reports new pain  Outcome: Progressing     Problem: INFECTION - ADULT  Goal: Absence or prevention of progression during hospitalization  Description: INTERVENTIONS:  - Assess and monitor for signs and symptoms of infection  - Monitor lab/diagnostic results  - Monitor all insertion sites, i.e. indwelling lines, tubes, and drains  - Monitor endotracheal if appropriate and nasal secretions for changes in amount and color  - Erick appropriate cooling/warming therapies per order  - Administer medications as ordered  - Instruct and encourage patient and family to use good hand hygiene technique  - Identify and instruct in appropriate isolation precautions for identified infection/condition  Outcome: Progressing  Goal: Absence of fever/infection during neutropenic period  Description: INTERVENTIONS:  - Monitor WBC    Outcome: Progressing     Problem: SAFETY ADULT  Goal: Patient will remain free of falls  Description: INTERVENTIONS:  - Educate patient/family on patient safety including physical limitations  - Instruct patient to call for assistance with activity   - Consult OT/PT to assist with strengthening/mobility   - Keep Call bell within reach  - Keep bed low and locked with side rails adjusted as appropriate  - Keep care items and personal belongings within reach  - Initiate and maintain comfort rounds  - Make Fall Risk Sign visible to staff  - Offer Toileting every  Hours,  in advance of need  - Initiate/Maintain alarm  - Obtain necessary fall risk management equipment:   - Apply yellow socks and bracelet for high fall risk patients  - Consider moving patient to room near nurses station  Outcome: Progressing  Goal: Maintain or return to baseline ADL function  Description: INTERVENTIONS:  -  Assess patient's ability to carry out ADLs; assess patient's baseline for ADL function and identify physical deficits which impact ability to perform ADLs (bathing, care of mouth/teeth, toileting, grooming, dressing, etc.)  - Assess/evaluate cause of self-care deficits   - Assess range of motion  - Assess patient's mobility; develop plan if impaired  - Assess patient's need for assistive devices and provide as appropriate  - Encourage maximum independence but intervene and supervise when necessary  - Involve family in performance of ADLs  - Assess for home care needs following discharge   - Consider OT consult to assist with ADL evaluation and planning for discharge  - Provide patient education as appropriate  Outcome: Progressing  Goal: Maintains/Returns to pre admission functional level  Description: INTERVENTIONS:  - Perform AM-PAC 6 Click Basic Mobility/ Daily Activity assessment daily.  - Set and communicate daily mobility goal to care team and patient/family/caregiver.   - Collaborate with rehabilitation services on mobility goals if consulted  - Perform Range of Motion  times a day.  - Reposition patient every  hours.  - Dangle patient  times a day  - Stand patient times a day  - Ambulate patient  times a day  - Out of bed to chair  times a day   - Out of bed for meals  times a day  - Out of bed for toileting  - Record patient progress and toleration of activity level   Outcome: Progressing     Problem: DISCHARGE PLANNING  Goal: Discharge to home or other facility with appropriate resources  Description: INTERVENTIONS:  - Identify barriers to discharge w/patient and caregiver  - Arrange for  needed discharge resources and transportation as appropriate  - Identify discharge learning needs (meds, wound care, etc.)  - Arrange for interpretive services to assist at discharge as needed  - Refer to Case Management Department for coordinating discharge planning if the patient needs post-hospital services based on physician/advanced practitioner order or complex needs related to functional status, cognitive ability, or social support system  Outcome: Progressing     Problem: Knowledge Deficit  Goal: Patient/family/caregiver demonstrates understanding of disease process, treatment plan, medications, and discharge instructions  Description: Complete learning assessment and assess knowledge base.  Interventions:  - Provide teaching at level of understanding  - Provide teaching via preferred learning methods  Outcome: Progressing     Problem: Nutrition/Hydration-ADULT  Goal: Nutrient/Hydration intake appropriate for improving, restoring or maintaining nutritional needs  Description: Monitor and assess patient's nutrition/hydration status for malnutrition. Collaborate with interdisciplinary team and initiate plan and interventions as ordered.  Monitor patient's weight and dietary intake as ordered or per policy. Utilize nutrition screening tool and intervene as necessary. Determine patient's food preferences and provide high-protein, high-caloric foods as appropriate.     INTERVENTIONS:  - Monitor oral intake, urinary output, labs, and treatment plans  - Assess nutrition and hydration status and recommend course of action  - Evaluate amount of meals eaten  - Assist patient with eating if necessary   - Allow adequate time for meals  - Recommend/ encourage appropriate diets, oral nutritional supplements, and vitamin/mineral supplements  - Order, calculate, and assess calorie counts as needed  - Recommend, monitor, and adjust tube feedings and TPN/PPN based on assessed needs  - Assess need for intravenous fluids  -  Provide specific nutrition/hydration education as appropriate  - Include patient/family/caregiver in decisions related to nutrition  Outcome: Progressing     Problem: Prexisting or High Potential for Compromised Skin Integrity  Goal: Skin integrity is maintained or improved  Description: INTERVENTIONS:  - Identify patients at risk for skin breakdown  - Assess and monitor skin integrity  - Assess and monitor nutrition and hydration status  - Monitor labs   - Assess for incontinence   - Turn and reposition patient  - Assist with mobility/ambulation  - Relieve pressure over bony prominences  - Avoid friction and shearing  - Provide appropriate hygiene as needed including keeping skin clean and dry  - Evaluate need for skin moisturizer/barrier cream  - Collaborate with interdisciplinary team   - Patient/family teaching  - Consider wound care consult   Outcome: Progressing     Problem: SAFETY,RESTRAINT: NV/NON-SELF DESTRUCTIVE BEHAVIOR  Goal: Remains free of harm/injury (restraint for non violent/non self-detsructive behavior)  Description: INTERVENTIONS:  - Instruct patient/family regarding restraint use   - Assess and monitor physiologic and psychological status   - Provide interventions and comfort measures to meet assessed patient needs   - Identify and implement measures to help patient regain control  - Assess readiness for release of restraint   Outcome: Progressing  Goal: Returns to optimal restraint-free functioning  Description: INTERVENTIONS:  - Assess the patient's behavior and symptoms that indicate continued need for restraint  - Identify and implement measures to help patient regain control  - Assess readiness for release of restraint   Outcome: Progressing

## 2025-01-20 NOTE — ASSESSMENT & PLAN NOTE
Lab Results   Component Value Date    EGFR 12 01/21/2025    EGFR 12 01/20/2025    EGFR 15 01/19/2025    CREATININE 3.89 (H) 01/21/2025    CREATININE 3.85 (H) 01/20/2025    CREATININE 3.38 (H) 01/19/2025   - bleeding from IVs vs possible GI vs 2/2 CPR    PLAN  - Monitor Hgb in setting of heparin  - Look for signs of GI bleeding  - Transfuse <7

## 2025-01-21 ENCOUNTER — APPOINTMENT (INPATIENT)
Dept: RADIOLOGY | Facility: HOSPITAL | Age: OVER 89
DRG: 208 | End: 2025-01-21
Payer: MEDICARE

## 2025-01-21 ENCOUNTER — APPOINTMENT (INPATIENT)
Dept: CT IMAGING | Facility: HOSPITAL | Age: OVER 89
DRG: 208 | End: 2025-01-21
Payer: MEDICARE

## 2025-01-21 PROBLEM — N18.9 CHRONIC KIDNEY DISEASE-MINERAL AND BONE DISORDER: Status: ACTIVE | Noted: 2025-01-21

## 2025-01-21 PROBLEM — H35.62: Status: ACTIVE | Noted: 2025-01-21

## 2025-01-21 PROBLEM — M89.9 CHRONIC KIDNEY DISEASE-MINERAL AND BONE DISORDER: Status: ACTIVE | Noted: 2025-01-21

## 2025-01-21 PROBLEM — E83.9 CHRONIC KIDNEY DISEASE-MINERAL AND BONE DISORDER: Status: ACTIVE | Noted: 2025-01-21

## 2025-01-21 PROBLEM — E87.20 NORMAL ANION GAP METABOLIC ACIDOSIS: Status: ACTIVE | Noted: 2025-01-21

## 2025-01-21 LAB
ALBUMIN SERPL BCG-MCNC: 3.2 G/DL (ref 3.5–5)
ALP SERPL-CCNC: 58 U/L (ref 34–104)
ALT SERPL W P-5'-P-CCNC: 159 U/L (ref 7–52)
ANION GAP SERPL CALCULATED.3IONS-SCNC: 10 MMOL/L (ref 4–13)
ANISOCYTOSIS BLD QL SMEAR: PRESENT
APTT PPP: 58 SECONDS (ref 23–34)
APTT PPP: 61 SECONDS (ref 23–34)
APTT PPP: 73 SECONDS (ref 23–34)
AST SERPL W P-5'-P-CCNC: 36 U/L (ref 13–39)
BACTERIA UR QL AUTO: ABNORMAL /HPF
BASE EX.OXY STD BLDV CALC-SCNC: 69.1 % (ref 60–80)
BASE EXCESS BLDV CALC-SCNC: -6.8 MMOL/L
BASOPHILS # BLD MANUAL: 0 THOUSAND/UL (ref 0–0.1)
BASOPHILS NFR MAR MANUAL: 0 % (ref 0–1)
BILIRUB SERPL-MCNC: 1.02 MG/DL (ref 0.2–1)
BILIRUB UR QL STRIP: NEGATIVE
BUN SERPL-MCNC: 68 MG/DL (ref 5–25)
CALCIUM ALBUM COR SERPL-MCNC: 8.9 MG/DL (ref 8.3–10.1)
CALCIUM SERPL-MCNC: 8.3 MG/DL (ref 8.4–10.2)
CHLORIDE SERPL-SCNC: 117 MMOL/L (ref 96–108)
CLARITY UR: ABNORMAL
CO2 SERPL-SCNC: 16 MMOL/L (ref 21–32)
COLOR UR: COLORLESS
CREAT SERPL-MCNC: 3.89 MG/DL (ref 0.6–1.3)
EOSINOPHIL # BLD MANUAL: 0.12 THOUSAND/UL (ref 0–0.4)
EOSINOPHIL NFR BLD MANUAL: 1 % (ref 0–6)
ERYTHROCYTE [DISTWIDTH] IN BLOOD BY AUTOMATED COUNT: 17.8 % (ref 11.6–15.1)
GFR SERPL CREATININE-BSD FRML MDRD: 12 ML/MIN/1.73SQ M
GLUCOSE SERPL-MCNC: 122 MG/DL (ref 65–140)
GLUCOSE SERPL-MCNC: 128 MG/DL (ref 65–140)
GLUCOSE SERPL-MCNC: 129 MG/DL (ref 65–140)
GLUCOSE SERPL-MCNC: 129 MG/DL (ref 65–140)
GLUCOSE SERPL-MCNC: 133 MG/DL (ref 65–140)
GLUCOSE UR STRIP-MCNC: ABNORMAL MG/DL
HCO3 BLDV-SCNC: 18.8 MMOL/L (ref 24–30)
HCT VFR BLD AUTO: 24.5 % (ref 36.5–49.3)
HCT VFR BLD AUTO: 24.8 % (ref 36.5–49.3)
HCT VFR BLD AUTO: 27.1 % (ref 36.5–49.3)
HGB BLD-MCNC: 8.2 G/DL (ref 12–17)
HGB BLD-MCNC: 8.3 G/DL (ref 12–17)
HGB BLD-MCNC: 8.8 G/DL (ref 12–17)
HGB UR QL STRIP.AUTO: ABNORMAL
KETONES UR STRIP-MCNC: NEGATIVE MG/DL
LEUKOCYTE ESTERASE UR QL STRIP: ABNORMAL
LYMPHOCYTES # BLD AUTO: 1.09 THOUSAND/UL (ref 0.6–4.47)
LYMPHOCYTES # BLD AUTO: 9 % (ref 14–44)
MCH RBC QN AUTO: 29.7 PG (ref 26.8–34.3)
MCHC RBC AUTO-ENTMCNC: 33.9 G/DL (ref 31.4–37.4)
MCV RBC AUTO: 88 FL (ref 82–98)
MONOCYTES # BLD AUTO: 1.58 THOUSAND/UL (ref 0–1.22)
MONOCYTES NFR BLD: 13 % (ref 4–12)
MUCOUS THREADS UR QL AUTO: ABNORMAL
NASAL CANNULA: 2
NEUTROPHILS # BLD MANUAL: 9.35 THOUSAND/UL (ref 1.85–7.62)
NEUTS BAND NFR BLD MANUAL: 2 % (ref 0–8)
NEUTS SEG NFR BLD AUTO: 75 % (ref 43–75)
NITRITE UR QL STRIP: NEGATIVE
NON-SQ EPI CELLS URNS QL MICRO: ABNORMAL /HPF
O2 CT BLDV-SCNC: 11.4 ML/DL
OVALOCYTES BLD QL SMEAR: PRESENT
PCO2 BLDV: 38.1 MM HG (ref 42–50)
PH BLDV: 7.31 [PH] (ref 7.3–7.4)
PH UR STRIP.AUTO: 5 [PH]
PLATELET # BLD AUTO: 95 THOUSANDS/UL (ref 149–390)
PLATELET BLD QL SMEAR: ABNORMAL
PMV BLD AUTO: 10 FL (ref 8.9–12.7)
PO2 BLDV: 39.3 MM HG (ref 35–45)
POTASSIUM SERPL-SCNC: 3.8 MMOL/L (ref 3.5–5.3)
PROT SERPL-MCNC: 5.8 G/DL (ref 6.4–8.4)
PROT UR STRIP-MCNC: NEGATIVE MG/DL
RBC # BLD AUTO: 2.79 MILLION/UL (ref 3.88–5.62)
RBC #/AREA URNS AUTO: ABNORMAL /HPF
RBC MORPH BLD: PRESENT
SODIUM SERPL-SCNC: 143 MMOL/L (ref 135–147)
SP GR UR STRIP.AUTO: 1.01 (ref 1–1.03)
UROBILINOGEN UR STRIP-ACNC: <2 MG/DL
WBC # BLD AUTO: 12.14 THOUSAND/UL (ref 4.31–10.16)
WBC #/AREA URNS AUTO: ABNORMAL /HPF

## 2025-01-21 PROCEDURE — 82805 BLOOD GASES W/O2 SATURATION: CPT

## 2025-01-21 PROCEDURE — 70450 CT HEAD/BRAIN W/O DYE: CPT

## 2025-01-21 PROCEDURE — 99223 1ST HOSP IP/OBS HIGH 75: CPT | Performed by: INTERNAL MEDICINE

## 2025-01-21 PROCEDURE — 85027 COMPLETE CBC AUTOMATED: CPT

## 2025-01-21 PROCEDURE — 85730 THROMBOPLASTIN TIME PARTIAL: CPT | Performed by: INTERNAL MEDICINE

## 2025-01-21 PROCEDURE — 85007 BL SMEAR W/DIFF WBC COUNT: CPT

## 2025-01-21 PROCEDURE — 97530 THERAPEUTIC ACTIVITIES: CPT

## 2025-01-21 PROCEDURE — 92610 EVALUATE SWALLOWING FUNCTION: CPT

## 2025-01-21 PROCEDURE — 85018 HEMOGLOBIN: CPT | Performed by: INTERNAL MEDICINE

## 2025-01-21 PROCEDURE — 71045 X-RAY EXAM CHEST 1 VIEW: CPT

## 2025-01-21 PROCEDURE — 80053 COMPREHEN METABOLIC PANEL: CPT

## 2025-01-21 PROCEDURE — 97167 OT EVAL HIGH COMPLEX 60 MIN: CPT

## 2025-01-21 PROCEDURE — 85014 HEMATOCRIT: CPT | Performed by: INTERNAL MEDICINE

## 2025-01-21 PROCEDURE — NC001 PR NO CHARGE: Performed by: INTERNAL MEDICINE

## 2025-01-21 PROCEDURE — 97163 PT EVAL HIGH COMPLEX 45 MIN: CPT

## 2025-01-21 PROCEDURE — 81001 URINALYSIS AUTO W/SCOPE: CPT | Performed by: PHYSICIAN ASSISTANT

## 2025-01-21 PROCEDURE — 82948 REAGENT STRIP/BLOOD GLUCOSE: CPT

## 2025-01-21 PROCEDURE — 99232 SBSQ HOSP IP/OBS MODERATE 35: CPT | Performed by: INTERNAL MEDICINE

## 2025-01-21 RX ORDER — FUROSEMIDE 10 MG/ML
40 INJECTION INTRAMUSCULAR; INTRAVENOUS ONCE
Status: COMPLETED | OUTPATIENT
Start: 2025-01-21 | End: 2025-01-21

## 2025-01-21 RX ADMIN — DORZOLAMIDE HYDROCHLORIDE AND TIMOLOL MALEATE 1 DROP: 20; 5 SOLUTION OPHTHALMIC at 08:40

## 2025-01-21 RX ADMIN — HEPARIN SODIUM 9 UNITS/KG/HR: 10000 INJECTION, SOLUTION INTRAVENOUS at 11:47

## 2025-01-21 RX ADMIN — BRIMONIDINE TARTRATE 1 DROP: 2 SOLUTION/ DROPS OPHTHALMIC at 21:06

## 2025-01-21 RX ADMIN — LATANOPROSTENE BUNOD 1 DROP: 0.24 SOLUTION/ DROPS OPHTHALMIC at 21:06

## 2025-01-21 RX ADMIN — BRIMONIDINE TARTRATE 1 DROP: 2 SOLUTION/ DROPS OPHTHALMIC at 08:40

## 2025-01-21 RX ADMIN — FUROSEMIDE 40 MG: 10 INJECTION, SOLUTION INTRAVENOUS at 14:39

## 2025-01-21 RX ADMIN — PREDNISOLONE ACETATE 1 DROP: 10 SUSPENSION/ DROPS OPHTHALMIC at 08:40

## 2025-01-21 RX ADMIN — CHLORHEXIDINE GLUCONATE 15 ML: 1.2 RINSE ORAL at 21:06

## 2025-01-21 RX ADMIN — PANTOPRAZOLE SODIUM 40 MG: 40 INJECTION, POWDER, FOR SOLUTION INTRAVENOUS at 08:40

## 2025-01-21 RX ADMIN — HEPARIN SODIUM 2400 UNITS: 1000 INJECTION INTRAVENOUS; SUBCUTANEOUS at 04:04

## 2025-01-21 RX ADMIN — PREDNISOLONE ACETATE 1 DROP: 10 SUSPENSION/ DROPS OPHTHALMIC at 15:42

## 2025-01-21 RX ADMIN — BRIMONIDINE TARTRATE 1 DROP: 2 SOLUTION/ DROPS OPHTHALMIC at 15:42

## 2025-01-21 RX ADMIN — DORZOLAMIDE HYDROCHLORIDE AND TIMOLOL MALEATE 1 DROP: 20; 5 SOLUTION OPHTHALMIC at 17:48

## 2025-01-21 RX ADMIN — PREDNISOLONE ACETATE 1 DROP: 10 SUSPENSION/ DROPS OPHTHALMIC at 21:06

## 2025-01-21 RX ADMIN — MINERAL OIL, WHITE PETROLATUM: .03; .94 OINTMENT OPHTHALMIC at 21:06

## 2025-01-21 RX ADMIN — CHLORHEXIDINE GLUCONATE 15 ML: 1.2 RINSE ORAL at 08:40

## 2025-01-21 NOTE — ASSESSMENT & PLAN NOTE
Secondary to acute massive pulmonary embolus  Cardiac arrest in ED after CTA  Intubated and required 3 pressors  Now extubated, off pressors with hypertension with worsening SOFÍA  Management as outlined above

## 2025-01-21 NOTE — ASSESSMENT & PLAN NOTE
P/w unresponsiveness from home via EMS  With associated acute hypoxic respiratory failure, cardiac arrest and cardiogenic shock  CTA with saddle embolus with right heart strain  Extubated, off pressors and stable hemodynamics  On heparin drip  Management per primary team

## 2025-01-21 NOTE — ASSESSMENT & PLAN NOTE
Obstructive shock vs hemorrhagic vs cardiogenic   With shock liver and acute renal failure   Lab Results   Component Value Date    HGB 8.3 (L) 01/21/2025     Lab Results   Component Value Date     06/05/2017    SODIUM 143 01/21/2025    K 3.8 01/21/2025     (H) 01/21/2025    CO2 16 (L) 01/21/2025    AGAP 10 01/21/2025    BUN 68 (H) 01/21/2025    CREATININE 3.89 (H) 01/21/2025    GLUC 129 01/21/2025    CALCIUM 8.3 (L) 01/21/2025    AST 36 01/21/2025     (H) 01/21/2025    ALKPHOS 58 01/21/2025    PROT 7.0 10/25/2016    TP 5.8 (L) 01/21/2025    BILITOT 0.6 10/25/2016    TBILI 1.02 (H) 01/21/2025    EGFR 12 01/21/2025   ECHO- poor visualization   - LFTs improving  - worsening Creatinine  - does not require pressors    Plan-   Follow up H&H  GOC discussion   Continue heparin gtt for now

## 2025-01-21 NOTE — SPEECH THERAPY NOTE
Speech-Language Pathology Bedside Swallow Evaluation        Patient Name: Jeffry Almanzar    Today's Date: 1/21/2025     Problem List  Principal Problem:    Acute saddle pulmonary embolism with acute cor pulmonale (HCC)  Active Problems:    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  (HCC)    Benign 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 (HCC)    Diabetic nephropathy associated with type 2 diabetes mellitus (HCC)    IgM lambda paraproteinemia    SOFÍA (acute kidney injury) (HCC)    Lacunar cerebrovascular accident (CVA) (HCC)    Cardiac arrest (HCC)    Shock (HCC)         Summary    Pt presents with significant pharyngeal dysphagia symptoms- weak laryngeal excursion and multiple swallows w/ puree and HTL by tsp, occas throat clearing. Pt appears at high risk for aspiration; will follow up tomorrow and determine need for VBS to assess safety of po.      Recommendations:   Diet: NPO   Meds: non-oral if possible   Frequent Oral care: 2x/day  Aspiration precautions  Other Recommendations/ considerations: will cont to follow for ongoing assessment        Current Medical Status  Pt is a 89 y.o. male who presented to [facility]  1/18/25 with acute PE.  Pt had dinner, started to feel unwell and family noted he was unresponsive. Pt was normal prior to the even. EMS arrived pt was in agonal respiration and started on bag ventilation w/ intermittent episodes of responsiveness. Mentation deteriorated as soon as pt was taken off the bag. In the ED imaging showed large saddle PE. After imaging pt went into cardiac arrest and was intubated.     Pt was extubated 1/20/25.     Past medical history:   Please see H&P for details    Special Studies:  CXR: 1/19/25 Bilateral pleural effusions. Groundglass opacities in the lungs are nonspecific, possibly nonsegmental atelectasis or pneumonitis/edema.     Social/Education/Vocational Hx:  Pt lives with family    Swallow Information   Prior speech/swallowing tx: seen sept  2024, had +CVA, rec regular diet w/ thin liquids.   Current Risks for Dysphagia & Aspiration: recent intubation  Current Symptoms/Concerns:  recent intubation, wet gurgly voice/respirations  Current Diet: NPO   Baseline Diet: regular diet and thin liquids  Takes pills- whole w/ water     Baseline Assessment   Behavior/Cognition: alert  Speech/Language Status: able to follow commands and limited verbal output  Patient Positioning: upright in bed     Swallow Mechanism Exam   Facial: symmetrical  Labial: WFL  Lingual: WFL  Velum: unable to visualize  Mandible: adequate ROM  Dentition: edentulous and limited dentition- pt has upper denture which is not present at bedside.   Vocal quality:clear/adequate   Volitional Cough: strong/productive   Respiratory: NC    Consistencies Assessed and Performance   Consistencies Administered: honey thick and puree    Oral Stage: pt w/ adequate bolus retrieval from spoon. Appeared w/ adequate oral control. Delayed bolus manipulation and transfer.     Pharyngeal Stage: swallow initiation was variable, became more prompt toward  end of session. Multiple swallows noted per tsp w/ decreased laryngeal excursion. Occas throat clear noted.       Esophageal Concerns: none reported      Results Reviewed with: patient, RN, and MD   Dysphagia Goals: pt will participate in repeat swallow eval to determine safety of po intake and/or further instrumental testing.        Carole Aragon MA CCC-SLP  Speech Pathologist  [place] license # [id number]  [place] license # [id number]  Available via Secure Chat

## 2025-01-21 NOTE — CASE MANAGEMENT
Case Management Discharge Planning Note    Patient name Jeffry Almanzar  Location ICU 12/ICU 12 MRN [id number]  : 1934 Date 2025       Current Admission Date: 2025  Current Admission Diagnosis:Acute saddle pulmonary embolism with acute cor pulmonale (Formerly [facility])   Patient Active Problem List    Diagnosis Date Noted Date Diagnosed    Shock (Formerly [facility]) 2025     Cardiac arrest (Formerly [facility]) 2025     Acute saddle pulmonary embolism with acute cor pulmonale (Formerly [facility]) 2025     Lacunar cerebrovascular accident (CVA) (Formerly [facility]) 10/02/2024     CVA (cerebral vascular accident) (Formerly [facility]) 2024     SOFÍA (acute kidney injury) (Formerly [facility]) 2024     Dementia (Formerly [facility]) 2024     Type 2 diabetes mellitus with stage 4 chronic kidney disease, without long-term current use of insulin (Formerly [facility]) 10/24/2023     IgM lambda paraproteinemia 2023     Advanced care planning/counseling discussion 2023     Diabetic nephropathy associated with type 2 diabetes mellitus (Formerly [facility]) 2023     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Formerly [facility]) 2023     Stage 4 chronic kidney disease (Formerly [facility]) 2022     Persistent proteinuria 2022     Benign 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 (Formerly [facility]) 02/15/2022     Parenchymal renal hypertension 2021     Spinal stenosis of lumbar region      DDD (degenerative disc disease), lumbar      Lumbar disc disease with radiculopathy      Neurogenic claudication 2019     Low back pain 2019     Retinal vein occlusion of left eye 2019     Malignant neoplasm of urinary bladder ([facility]) 2019     Vision loss of left eye 2019     Weight loss 2018     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  (Formerly [facility]) 2018     Iliotibial band syndrome of both sides 05/15/2018     Primary osteoarthritis of both knees 03/15/2018     Asthma, mild intermittent 2017     Essential hypertension 2016     Mild vitamin D deficiency 2014     Glaucoma 2014      Organic impotence 02/14/2013     Dyslipidemia 07/10/2012       LOS (days): 3  Geometric Mean LOS (GMLOS) (days): 4.9  Days to GMLOS:1.3     OBJECTIVE:  Risk of Unplanned Readmission Score: 32.32         Current admission status: Inpatient   Preferred Pharmacy:   [facility] [address] [place] [id number]  Phone: [phone] Fax: [phone]    Primary Care Provider: Debbie Maloney MD    Primary Insurance: MEDICARE  Secondary Insurance: BLUE CROSS    DISCHARGE DETAILS:    Discharge planning discussed with:: Pt's wifeAntony  Freedom of Choice: Yes  Comments - Freedom of Choice: STR    Contacts  Patient Contacts: Antony Almanzar  Relationship to Patient:: Family  Contact Method: In Person  Reason/Outcome: Discharge Planning, Emergency Contact, Continuity of Care    Other Referral/Resources/Interventions Provided:  Interventions: Short Term Rehab  Referral Comments: PT/OT recs for STR. CM met with pt and his wife, Antony, at bedside. CM reviewed STR recommendation. Pt's wife is agreeable to referrals. Aware CM will f/u with a list of available options. Referrals in [place].    Treatment Team Recommendation: Short Term Rehab  Discharge Destination Plan:: Short Term Rehab

## 2025-01-21 NOTE — ASSESSMENT & PLAN NOTE
Presenting with AGMA with serum bicarb 15, AG 17 in the setting of cardiac arrest.  Gap closed  Serum bicarb 16, AG 10 today now in the setting of worsening SOFÍA  VBG 7.31/38/39/18.8/-6.8  Will hold on bicarb drip.  Unable to use bicarb tablets due to npo status  Check BMP and VBG in am  Continue to monitor

## 2025-01-21 NOTE — PLAN OF CARE
Cont NPO, pt appears at high risk for aspiration w/ po  Will follow up to re-assess and determine need for VBS

## 2025-01-21 NOTE — PLAN OF CARE
Problem: PHYSICAL THERAPY ADULT  Goal: Performs mobility at highest level of function for planned discharge setting.  See evaluation for individualized goals.  Description: Treatment/Interventions: Functional transfer training, LE strengthening/ROM, Therapeutic exercise, Endurance training, Cognitive reorientation, Patient/family training, Equipment eval/education, Bed mobility, Gait training, Compensatory technique education          See flowsheet documentation for full assessment, interventions and recommendations.  1/21/2025 1529 by Bailee Deluca PT  Note: Prognosis: Fair  Problem List: Decreased strength, Decreased endurance, Impaired balance, Decreased mobility, Decreased cognition, Impaired judgement, Decreased safety awareness  Assessment: Jeffry Almanzar is a 89 y.o. Male who presents to [facility] on 1/17/25 due to respiratory distress and diagnosis of acute saddle pulmonary embolism w/ acute cor pulmonale. Orders for PT eval and treat received. Comorbidities affecting pt's functional mobility at time of evaluation include: h/o CVA, shock, dementia, HTN, cardiac arrest, CKD. Personal factors affecting DC include: ambulating w/ assistive device, stairs to enter home, decreased cognition, inability to perform IADLs, and inability to perform ADLs. At baseline, pt mobilizes w/ cane. Upon evaluation, pt presents w/ the following deficits: impaired strength, impaired balance, impaired cognition, and decreased endurance/activity tolerance. Pt currently requires  mod Ax1 for bed mobility, max Ax2 for transfers. Pt's clinical presentation is unstable/unpredictable due to abnormal lab values, need for increased assistance w/ functional mobility compared to baseline, requiring supplemental O2 in order to maintain O2 saturation, need for input for mobility technique, need for input for task focus, need to input for safety awareness, recent drastic decline in mobility status, ongoing medical management. From a PT/mobility  standpoint given the above findings, DC recommendation is level: II (Moderate Rehab Resource Intensity). During current admission, pt will benefit from continued skilled inpatient PT in the acute care setting in order to address the above deficits and to maximize function and mobility prior to DC from acute care.  Barriers to Discharge: Inaccessible home environment     Rehab Resource Intensity Level, PT: II (Moderate Resource Intensity)    See flowsheet documentation for full assessment.

## 2025-01-21 NOTE — PHYSICAL THERAPY NOTE
PHYSICAL THERAPY EVALUATION NOTE          Patient Name: Jeffry Almanzra  Today's Date: 2025          AGE:   89 y.o.  Mrn:   [id number]  ADMIT DX:  Acute respiratory failure (HCC) [J96.00]  Respiratory distress [R06.03]  Acute saddle pulmonary embolism with acute cor pulmonale (HCC) [I26.02]    Past Medical History:  Past Medical History:   Diagnosis Date    Cataracts, bilateral        Past Surgical History:  Past Surgical History:   Procedure Laterality Date    CATARACT EXTRACTION Bilateral 07/15/2012    COLONOSCOPY      CYSTOSCOPY  01/15/2018    Last assessed 17, diagnostic    HERNIA REPAIR      INSTILLATION MYTOMYCIN N/A 10/25/2017    Procedure: INSTILLATION OF MITOMYCIN C;  Surgeon: Danish Esposito MD;  Location: [facility];  Service: Urology    NC CYSTO W/REMOVAL OF LESIONS SMALL N/A 10/25/2017    Procedure: CYSTOSCOPY; BLADDER BIOPSY WITH FULGERATION;  Surgeon: Danish Esposito MD;  Location: [facility];  Service: Urology    TONSILLECTOMY      TRANSURETHRAL RESECTION OF BLADDER TUMOR N/A 10/25/2017    Procedure: TUR BLADDER TUMOR;  Surgeon: Danish Esposito MD;  Location: [facility];  Service: Urology    WISDOM TOOTH EXTRACTION       Length Of Stay: 3        PHYSICAL THERAPY EVALUATION:    Patient's identity confirmed via 2 patient identifiers (full name and ) at start of session       25 1052   PT Last Visit   PT Visit Date 25   Note Type   Note type Evaluation   Pain Assessment   Pain Assessment Tool 0-10   Pain Score No Pain   Restrictions/Precautions   Weight Bearing Precautions Per Order No   Other Precautions Cognitive;Chair Alarm;Bed Alarm;Multiple lines;Fall Risk;O2  (RUE a-line, villatoro catheter, 2L O2 via NC)   Home Living   Type of Home House   Home Layout One level;Performs ADLs on one level;Able to live on main level with bedroom/bathroom;Stairs to enter with rails  (2 JACQUELINE through front door)   Bathroom Shower/Tub  Tub/shower unit   Bathroom Toilet Standard   Bathroom Equipment Grab bars in shower;Shower chair   Bathroom Accessibility Accessible   Home Equipment Walker;Cane;Grab bars   Additional Comments Pt's wife at bedside, provided home set-up and pt's baseline mobility   Prior Function   Level of [place] Independent with functional mobility;Needs assistance with ADLs;Needs assistance with functional mobility   Lives With Spouse   Receives Help From Family   IADLs Family/Friend/Other provides transportation;Family/Friend/Other provides meals;Family/Friend/Other provides medication management   Comments At baseline pt amb w/ SPC w/ assistance prn per wife at bedside   General   Additional Pertinent History Cardiac arrest in ED w/ pt intubated 25, extubated 25. APTT 73 at 0857   Family/Caregiver Present Yes  (pt's wife Antony)   Cognition   Overall Cognitive Status Impaired   Arousal/Participation Cooperative   Attention Attends with cues to redirect   Orientation Level Oriented to person;Oriented to place;Disoriented to time;Disoriented to situation   Memory Decreased recall of recent events;Decreased short term memory   Following Commands Follows one step commands with increased time or repetition   Comments Pt ID via name and ; pt agreeable to PT eval and mobility, pt able to follow simple 1 step commands w/ VC and increased time   Strength RLE   RLE Overall Strength   (grossly assessed w/ functional mobility, at least 3/5)   Strength LLE   LLE Overall Strength   (grossly assessed w/ functional mobility, at least 3/5)   Vision-Basic Assessment   Current Vision   (pt's wife reports pt has limited vision in his L eye)   Bed Mobility   Supine to Sit 3  Moderate assistance   Additional items Assist x 1;HOB elevated;Increased time required;Verbal cues;LE management   Additional Comments pt able to maintain static sitting balance at EOB w/ min Ax1<>close supervision   Transfers   Sit to Stand 2  Maximal  assistance   Additional items Assist x 2;Increased time required;Verbal cues   Stand to Sit 2  Maximal assistance   Additional items Assist x 2;Increased time required;Verbal cues   Additional Comments bilateral hand-held assist, knee block, pt retropuslive in standing w/ verbal and tactile cues provided to correct; pt tolerated a standing position for ~10-15 sec prior to returning to a sitting position at EOB   Balance   Static Sitting   (fluctuating fair- <> poor+)   Static Standing Zero  (Ax2)   Activity Tolerance   Activity Tolerance Patient limited by fatigue   Medical Staff Made Aware Pt benefited from PT/OT care coordination w/ OT Mayra due to signficant level of assistance required w/ mobility, cognitive/behavioral impairments affecting functional mobility, and to allow for challenge of pt's activity tolerance, PT and OT goals were addressed individually during session   Nurse Made Aware RN Latesha   Assessment   Prognosis Fair   Problem List Decreased strength;Decreased endurance;Impaired balance;Decreased mobility;Decreased cognition;Impaired judgement;Decreased safety awareness   Assessment Jeffry Almanzar is a 89 y.o. Male who presents to [facility] on 1/17/25 due to respiratory distress and diagnosis of acute saddle pulmonary embolism w/ acute cor pulmonale. Orders for PT eval and treat received. Comorbidities affecting pt's functional mobility at time of evaluation include: h/o CVA, shock, dementia, HTN, cardiac arrest, CKD. Personal factors affecting DC include: ambulating w/ assistive device, stairs to enter home, decreased cognition, inability to perform IADLs, and inability to perform ADLs. At baseline, pt mobilizes w/ cane. Upon evaluation, pt presents w/ the following deficits: impaired strength, impaired balance, impaired cognition, and decreased endurance/activity tolerance. Pt currently requires  mod Ax1 for bed mobility, max Ax2 for transfers. Pt's clinical presentation is unstable/unpredictable due  to abnormal lab values, need for increased assistance w/ functional mobility compared to baseline, requiring supplemental O2 in order to maintain O2 saturation, need for input for mobility technique, need for input for task focus, need to input for safety awareness, recent drastic decline in mobility status, ongoing medical management. From a PT/mobility standpoint given the above findings, DC recommendation is level: II (Moderate Rehab Resource Intensity). During current admission, pt will benefit from continued skilled inpatient PT in the acute care setting in order to address the above deficits and to maximize function and mobility prior to DC from acute care.   Barriers to Discharge Inaccessible home environment   Goals   STG Expiration Date 01/31/25   Short Term Goal #1 Pt will: perform bilateral rolling bed mobility w/ supervision to decrease pt's burden of care; perform supine<>sit mobility w/ supervision to increase pt's independence w/ functional mobility; sit at EOB w/ supervision for at least 20 minutes to increase pt's tolerance to an upright sitting position and prepare for OOB transfers; perform transfers w/ min Ax1 to increase pt's OOB mobility; ambulate 25' w/ LRAD and min Ax1 to increase pt's ambulatory endurance/tolerance; increase all balance ratings by at least 1 grade to decrease pt's risk of falls   PT Treatment Day 1   Plan   Treatment/Interventions Functional transfer training;LE strengthening/ROM;Therapeutic exercise;Endurance training;Cognitive reorientation;Patient/family training;Equipment eval/education;Bed mobility;Gait training;Compensatory technique education   PT Frequency 3-5x/wk   Discharge Recommendation   Rehab Resource Intensity Level, PT II (Moderate Resource Intensity)   AM-PAC Basic Mobility Inpatient   Turning in Flat Bed Without Bedrails 2   Lying on Back to Sitting on Edge of Flat Bed Without Bedrails 2   Moving Bed to Chair 1   Standing Up From Chair Using Arms 1   Walk  in Room 1   Climb 3-5 Stairs With Railing 1   Basic Mobility Inpatient Raw Score 8   [facility] Highest Level Of Mobility   -[facility] Goal 3: Sit at edge of bed   -[facility] Achieved 4: Move to chair/commode   Additional Treatment Session   Start Time 1110   End Time 1122   Treatment Assessment Additional PT intervention provided post eval including transfer, balance, and endurance training in order to challenge pt's activity tolerance and to promote increased OOB mobility. Pt agreeable to transferring OOB to the recliner chair. Pt performed stand-pivot transfer towards his L from EOB>recliner chair w/ max Ax2 via bilateral hand-held assist. Pt required assistance w/ initiation and completion of pivoting mobility. Pt continues to be functioning below baseline level, and remains limited in functional mobility due to impaired mobility, impaired balance, decreased activity tolerance. Pt will continue benefit from PT to promote independence w/ functional mobility, address mobility deficits, and progress towards set goals. Recommend DC w/ level: II (Moderate Rehab Resource Intensity) when medically cleared.   Additional Treatment Day 1   End of Consult   Patient Position at End of Consult Bedside chair;Bed/Chair alarm activated;All needs within reach       The patient's AM-PAC Basic Mobility Inpatient Short Form Raw Score is 8. A Raw score of less than or equal to 16 suggests the patient may benefit from discharge to post-acute rehabilitation services. Please also refer to the recommendation of the Physical Therapist for safe discharge planning.    Pt will benefit from skilled inpatient PT during this admission in order to facilitate progress towards goals and to maximize functional independence prior to DC      DC rec: level II (Moderate Rehab Resource Intensity)        Bailee Deluca, PT, DPT  01/21/25

## 2025-01-21 NOTE — ASSESSMENT & PLAN NOTE
Etiology: Suspect due to ATN in the setting of cardiac arrest, hypotension and IV contrast exposure with autoregulatory dysfunction with  ARB  Admission creatinine 3.08 on 1/18/2025.  Today's creatinine trending up to 3.89  Peak creatinine 3.89 on 1/21/2025  baseline creatinine low 3s since July 2024  +CTA on 1/18/2025  S/p lasix 40 mg IV x 1 this am due to concern for pulmonary congestion  nonoliguric  Renal function worsening since admission in the setting of significant hemodynamic changes and contrast exposure  No urgent indication for renal replacement therapy (dialysis).  However, patient and wife previously and continue to express that they would not be agreeable to dialysis even if it means not surviving.  Continue to hold losartan and Farxiga  Hold on bicarb for now.  Repeat VBG in am  Strict I/Os, daily weights  Avoid nephrotoxins, NSAIDs, further IV contrast if possible  Avoid hypotension.  Maintain MAP >65  Trend BMP in a.m.  Adjust meds to appropriate GFR  Optimize hemodynamic status to avoid delay in renal recovery  Discussed with daughter-in-law at bedside and wife via phone.  All questions answered.

## 2025-01-21 NOTE — PLAN OF CARE
Problem: PAIN - ADULT  Goal: Verbalizes/displays adequate comfort level or baseline comfort level  Description: Interventions:  - Encourage patient to monitor pain and request assistance  - Assess pain using appropriate pain scale  - Administer analgesics based on type and severity of pain and evaluate response  - Implement non-pharmacological measures as appropriate and evaluate response  - Consider cultural and social influences on pain and pain management  - Notify physician/advanced practitioner if interventions unsuccessful or patient reports new pain  Outcome: Progressing     Problem: INFECTION - ADULT  Goal: Absence or prevention of progression during hospitalization  Description: INTERVENTIONS:  - Assess and monitor for signs and symptoms of infection  - Monitor lab/diagnostic results  - Monitor all insertion sites, i.e. indwelling lines, tubes, and drains  - Monitor endotracheal if appropriate and nasal secretions for changes in amount and color  - [place] appropriate cooling/warming therapies per order  - Administer medications as ordered  - Instruct and encourage patient and family to use good hand hygiene technique  - Identify and instruct in appropriate isolation precautions for identified infection/condition  Outcome: Progressing  Goal: Absence of fever/infection during neutropenic period  Description: INTERVENTIONS:  - Monitor WBC    Outcome: Progressing     Problem: SAFETY ADULT  Goal: Patient will remain free of falls  Description: INTERVENTIONS:  - Educate patient/family on patient safety including physical limitations  - Instruct patient to call for assistance with activity   - Consult OT/PT to assist with strengthening/mobility   - Keep Call bell within reach  - Keep bed low and locked with side rails adjusted as appropriate  - Keep care items and personal belongings within reach  - Initiate and maintain comfort rounds  - Make Fall Risk Sign visible to staff  - Offer Toileting every 2 Hours,  in advance of need  - Initiate/Maintain bed and chair alarm  - Obtain necessary fall risk management equipment: assist to roll  - Apply yellow socks and bracelet for high fall risk patients  - Consider moving patient to room near nurses station  Outcome: Progressing  Goal: Maintain or return to baseline ADL function  Description: INTERVENTIONS:  -  Assess patient's ability to carry out ADLs; assess patient's baseline for ADL function and identify physical deficits which impact ability to perform ADLs (bathing, care of mouth/teeth, toileting, grooming, dressing, etc.)  - Assess/evaluate cause of self-care deficits   - Assess range of motion  - Assess patient's mobility; develop plan if impaired  - Assess patient's need for assistive devices and provide as appropriate  - Encourage maximum independence but intervene and supervise when necessary  - Involve family in performance of ADLs  - Assess for home care needs following discharge   - Consider OT consult to assist with ADL evaluation and planning for discharge  - Provide patient education as appropriate  Outcome: Progressing  Goal: Maintains/Returns to pre admission functional level  Description: INTERVENTIONS:  - Perform AM-PAC 6 Click Basic Mobility/ Daily Activity assessment daily.  - Set and communicate daily mobility goal to care team and patient/family/caregiver.   - Collaborate with rehabilitation services on mobility goals if consulted  - Perform Range of Motion 3 times a day.  - Reposition patient every 2 hours.  - Ambulate patient 3 times a day  - Out of bed to chair 3 times a day   - Out of bed for meals 3 times a day  - Out of bed for toileting  - Record patient progress and toleration of activity level   Outcome: Progressing     Problem: DISCHARGE PLANNING  Goal: Discharge to home or other facility with appropriate resources  Description: INTERVENTIONS:  - Identify barriers to discharge w/patient and caregiver  - Arrange for needed discharge resources  and transportation as appropriate  - Identify discharge learning needs (meds, wound care, etc.)  - Arrange for interpretive services to assist at discharge as needed  - Refer to Case Management Department for coordinating discharge planning if the patient needs post-hospital services based on physician/advanced practitioner order or complex needs related to functional status, cognitive ability, or social support system  Outcome: Progressing     Problem: Knowledge Deficit  Goal: Patient/family/caregiver demonstrates understanding of disease process, treatment plan, medications, and discharge instructions  Description: Complete learning assessment and assess knowledge base.  Interventions:  - Provide teaching at level of understanding  - Provide teaching via preferred learning methods  Outcome: Progressing     Problem: Nutrition/Hydration-ADULT  Goal: Nutrient/Hydration intake appropriate for improving, restoring or maintaining nutritional needs  Description: Monitor and assess patient's nutrition/hydration status for malnutrition. Collaborate with interdisciplinary team and initiate plan and interventions as ordered.  Monitor patient's weight and dietary intake as ordered or per policy. Utilize nutrition screening tool and intervene as necessary. Determine patient's food preferences and provide high-protein, high-caloric foods as appropriate.     INTERVENTIONS:  - Monitor oral intake, urinary output, labs, and treatment plans  - Assess nutrition and hydration status and recommend course of action  - Evaluate amount of meals eaten  - Assist patient with eating if necessary   - Allow adequate time for meals  - Recommend/ encourage appropriate diets, oral nutritional supplements, and vitamin/mineral supplements  - Order, calculate, and assess calorie counts as needed  - Recommend, monitor, and adjust tube feedings and TPN/PPN based on assessed needs  - Assess need for intravenous fluids  - Provide specific  nutrition/hydration education as appropriate  - Include patient/family/caregiver in decisions related to nutrition  Outcome: Progressing     Problem: Prexisting or High Potential for Compromised Skin Integrity  Goal: Skin integrity is maintained or improved  Description: INTERVENTIONS:  - Identify patients at risk for skin breakdown  - Assess and monitor skin integrity  - Assess and monitor nutrition and hydration status  - Monitor labs   - Assess for incontinence   - Turn and reposition patient  - Assist with mobility/ambulation  - Relieve pressure over bony prominences  - Avoid friction and shearing  - Provide appropriate hygiene as needed including keeping skin clean and dry  - Evaluate need for skin moisturizer/barrier cream  - Collaborate with interdisciplinary team   - Patient/family teaching  - Consider wound care consult   Outcome: Progressing

## 2025-01-21 NOTE — QUICK NOTE
"Went to evaluate patient for blood pressures in 180s on cuff and 200s on art line.  Began to do neurological exam when patient stated that he had left-sided tingling in his left arm a few minutes ago that resolved just before he came in.  He then stated he had some left-sided lateral calf tingling that again soon resolved.  He has some dysphagia on exam that has been present since he was extubated yesterday.  Going to have a barium swallow study tomorrow morning.  Since patient is on heparin, CT of head was obtained to ensure that there is no bleeding that is leading to the symptoms.  On head CT, no overt bleeding was seen, but left-sided retinal hemorrhage was found.  Patient already with vision loss on this side.  Spoke with slim about this new development since patient already had transfer orders when this event occurred.  Slim stated that they would like neurology consulted to see if he needs to be on stroke protocol, but that even the patient requires stroke protocol he can still go to stepdown to be transferred to medicine team.  Ophthalmology consulted.  Ophthalmology on-call stated that they will see him tomorrow in the hospital for eye exam.  Neurology consulted neurology consulted and recommended the following \"Symptoms were transient and resolved Pt currently on heparin drip Ct neg for bleed I would recommend mri brain/ mrabrain For acute cva vs intracranial thrombus/stenosis\" And if positive they will see patient in the AM  Ophthalmology stated they will see patient tomorrow in the hospital for eye exam.    "

## 2025-01-21 NOTE — CONSULTS
Consultation - Nephrology   Name: Jeffry Almanzar 89 y.o. male I MRN: [id number]  Unit/Bed#: ICU 12 I Date of Admission: 1/17/2025   Date of Service: 1/21/2025 I Hospital Day: 3   Inpatient consult to Nephrology  Consult performed by: Rimma Marshall PA-C  Consult ordered by: Axel Rodriguez MD      Physician Requesting Evaluation: Gentry Chilel,*   Reason for Evaluation / Principal Problem: SOFÍA on CKD 4    Assessment & Plan  SOFÍA (acute kidney injury) (HCC)  Etiology: Suspect due to ATN in the setting of cardiac arrest, hypotension and IV contrast exposure with autoregulatory dysfunction with  ARB  Admission creatinine 3.08 on 1/18/2025.  Today's creatinine trending up to 3.89  Peak creatinine 3.89 on 1/21/2025  baseline creatinine low 3s since July 2024  +CTA on 1/18/2025  S/p lasix 40 mg IV x 1 this am due to concern for pulmonary congestion  nonoliguric  Renal function worsening since admission in the setting of significant hemodynamic changes and contrast exposure  No urgent indication for renal replacement therapy (dialysis).  However, patient and wife previously and continue to express that they would not be agreeable to dialysis even if it means not surviving.  Continue to hold losartan and Farxiga  Hold on bicarb for now.  Repeat VBG in am  Strict I/Os, daily weights  Avoid nephrotoxins, NSAIDs, further IV contrast if possible  Avoid hypotension.  Maintain MAP >65  Trend BMP in a.m.  Adjust meds to appropriate GFR  Optimize hemodynamic status to avoid delay in renal recovery  Discussed with daughter-in-law at bedside and wife via phone.  All questions answered.    CKD (chronic kidney disease) stage 4, GFR 15-29 ml/min ([facility])  Etiology:  Suspect due to diabetic nephropathy, hypertensive nephrosclerosis, arteriolar nephrosclerosis, age-related nephron loss  Baseline creatinine previously 2.3-2.6, recently low 3s since July '24  Outpatient Nephrologist: Dr. Rey    Normal anion gap metabolic  acidosis  Presenting with AGMA with serum bicarb 15, AG 17 in the setting of cardiac arrest.  Gap closed  Serum bicarb 16, AG 10 today now in the setting of worsening SOFÍA  VBG 7.31/38/39/18.8/-6.8  Will hold on bicarb drip.  Unable to use bicarb tablets due to npo status  Check BMP and VBG in am  Continue to monitor  Acute saddle pulmonary embolism with acute cor pulmonale (HCC)  P/w unresponsiveness from home via EMS  With associated acute hypoxic respiratory failure, cardiac arrest and cardiogenic shock  CTA with saddle embolus with right heart strain  Extubated, off pressors and stable hemodynamics  On heparin drip  Management per primary team  Cardiac arrest ([facility])  Secondary to acute massive pulmonary embolus  Cardiac arrest in ED after CTA  Intubated and required 3 pressors  Now extubated, off pressors with hypertension with worsening SOFÍA  Management as outlined above  Benign 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 ([facility])  BP hypertensive  Hypotensive with arrest on presentation, requiring 3 pressors.  Now off pressors.  Repeat echo 1/20/2025: EF 55% with G1DD, mild MR, mild to moderate TR RVSP 49  Volume status euvolemic  Home Rx: Losartan 25 mg daily  Current Rx: none  Continue to hold losartan.  Avoid ACEi/ARB in setting of SOFÍA  Optimize hemodynamic status to avoid delay in renal recovery  Recommend hold parameters on antihypertensive's for SBP <130 mmHg.  Avoid hypotension or fluctuations in blood pressure.  Maintain MAP >65  Continue to trend    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  (HCC)  Hgb 8.2, below goal.  Goal >10  Iron studies 9/9/2024: Iron saturation 26%, ferritin 317  continue to trend  Transfuse for Hgb <7.0  management per primary team    Chronic kidney disease-mineral and bone disorder  Calcium stable  Phosphorus 4.0, at goal  PTH 55, at goal  continue to monitor and follow phosphorus, PTH, calcium, magnesium as outpatient   Diabetic nephropathy associated with type 2  diabetes mellitus (HCC)  Lab Results   Component Value Date    HGBA1C 6.9 (H) 01/18/2025   Stable  On Farxiga as outpatient.  Continue to hold in setting of SOFÍA  continue to optimize glycemic control  management per primary team    IgM lambda paraproteinemia  Followed as outpatient      HISTORY OF PRESENT ILLNESS:  Requesting Physician: Gentry Chilel,*  Reason for Consult: SOFÍA on CKD 4    Jeffry Almanzar is a 89 y.o. year old male with CKD 4 with baseline creatinine recently in the low 3s followed by Dr. Rey, HTN, HLD, DM2, hx CVA, bladder ca who was admitted to [facility] after presenting via EMS with unresponsiveness.  When EMS arrived to the home the patient was with agonal respirations and started on bag ventilation.  CTA in ED showed large saddle PE.  After imaging, patient sustained a cardiac arrest and was intubated and transferred to ICU.  Patient required 3 pressors including epinephrine, vasopressin and Levophed for several days now with recovery and has been extubated.  Admission creatinine 3.08 on 1/18/2024 which is trended up to 3.89 today.  Home losartan has been held on admission.  Pt initially with AGMA, now with serum bicarb 16, AG 10.  A renal consultation is requested today for assistance in the management of SOFÍA on CKD.    PAST MEDICAL HISTORY:  Past Medical History:   Diagnosis Date    Cataracts, bilateral        PAST SURGICAL HISTORY:  Past Surgical History:   Procedure Laterality Date    CATARACT EXTRACTION Bilateral 07/15/2012    COLONOSCOPY      CYSTOSCOPY  01/15/2018    Last assessed 9/14/17, diagnostic    HERNIA REPAIR      INSTILLATION MYTOMYCIN N/A 10/25/2017    Procedure: INSTILLATION OF MITOMYCIN C;  Surgeon: Danish Esposito MD;  Location: [facility];  Service: Urology    NM CYSTO W/REMOVAL OF LESIONS SMALL N/A 10/25/2017    Procedure: CYSTOSCOPY; BLADDER BIOPSY WITH FULGERATION;  Surgeon: Danish Esposito MD;  Location: [facility];  Service: Urology    TONSILLECTOMY       TRANSURETHRAL RESECTION OF BLADDER TUMOR N/A 10/25/2017    Procedure: TUR BLADDER TUMOR;  Surgeon: Danish Esposito MD;  Location: [facility];  Service: Urology    WISDOM TOOTH EXTRACTION         ALLERGIES:  Allergies   Allergen Reactions    Ace Inhibitors Cough     Other reaction(s): hyperkalemia    Penicillins GI Intolerance       SOCIAL HISTORY:  Social History     Substance and Sexual Activity   Alcohol Use Not Currently    Comment: quit 23 years ago     Social History     Substance and Sexual Activity   Drug Use No     Social History     Tobacco Use   Smoking Status Former    Current packs/day: 0.00    Types: Cigarettes    Quit date: 1964    Years since quittin.0   Smokeless Tobacco Never       FAMILY HISTORY:  Family History   Problem Relation Age of Onset    Diabetes Mother        MEDICATIONS:    Current Facility-Administered Medications:     artificial tear ophthalmic ointment, , Both Eyes, HS, Tino Avitia MD, Given at 25 2216    brimonidine tartrate 0.2 % ophthalmic solution 1 drop, 1 drop, Right Eye, TID, Eloise Rodriguez MD, 1 drop at 25 0840    chlorhexidine (PERIDEX) 0.12 % oral rinse 15 mL, 15 mL, Mouth/Throat, Q12H MARQUIS, ISRAEL Giraldo, 15 mL at 25 0840    dorzolamide-timolol (COSOPT) 2-0.5 % ophthalmic solution 1 drop, 1 drop, Both Eyes, BID, Eloise Rodriguez MD, 1 drop at 25 0840    furosemide (LASIX) injection 40 mg, 40 mg, Intravenous, Once, Axel Rodriguez MD    heparin (porcine) 25,000 units in 0.45% NaCl 250 mL infusion (premix), 3-30 Units/kg/hr (Order-Specific), Intravenous, Titrated, Alli Amaya Jr., DO, Last Rate: 5.4 mL/hr at 25 1147, 9 Units/kg/hr at 25 1147    heparin (porcine) injection 2,400 Units, 2,400 Units, Intravenous, Q6H PRN, Tino Avitia MD, 2,400 Units at 25 0404    heparin (porcine) injection 4,800 Units, 4,800 Units, Intravenous, Q6H PRN, Tino Avitia MD    insulin lispro  (HumALOG/ADMELOG) 100 units/mL subcutaneous injection 2-12 Units, 2-12 Units, Subcutaneous, Q6H, 2 Units at 01/20/25 1304 **AND** Fingerstick Glucose (POCT), , , Q6H, Tino Avitia MD    Latanoprostene Bunod 0.024 % SOLN 1 drop, 1 drop, Both Eyes, HS, Eloise Rodriguez MD, 1 drop at 01/20/25 2215    polyethylene glycol (MIRALAX) packet 17 g, 17 g, Oral, Daily, Lety Post MD, 17 g at 01/20/25 1505    prednisoLONE acetate (PRED FORTE) 1 % ophthalmic suspension 1 drop, 1 drop, Left Eye, TID, Eloise Rodriguez MD, 1 drop at 01/21/25 0840    senna-docusate sodium (SENOKOT S) 8.6-50 mg per tablet 1 tablet, 1 tablet, Oral, BID, Lety Post MD    REVIEW OF SYSTEMS:  A complete 10 point review of systems was performed and found to be negative unless otherwise noted in the history of present illness.  General: No fevers, chills.   Cardiovascular:  No chest pain, No leg edema.  Respiratory: No cough, sputum production,  No shortness of breath.  Gastrointestinal:  No nausea/vomiting,  No diarrhea,  No abdominal pain.  Genitourinary: No hematuria.  No foamy urine.  No dysuria    PHYSICAL EXAM:  Current Weight: Weight - Scale: 66 kg (145 lb 8.1 oz)  First Weight: Weight - Scale: 62.1 kg (137 lb)  Vitals:    01/21/25 1122 01/21/25 1149 01/21/25 1200 01/21/25 1300   BP:    157/77   BP Location:       Pulse: 81   82   Resp: 21   17   Temp:  98 °F (36.7 °C)     TempSrc:  Axillary     SpO2: 97% 97% 90% 91%   Weight:       Height:           Intake/Output Summary (Last 24 hours) at 1/21/2025 1425  Last data filed at 1/21/2025 1200  Gross per 24 hour   Intake 129.68 ml   Output 700 ml   Net -570.32 ml     General: Sleepy, awakens to voice and answers questions,appears comfortable and in no acute distress.  Skin:  No rash, warm, good skin turgor   Eyes:  PERRL, EOMI, sclerae nonicteric.  no periorbital edema   ENT:  Moist mucous membranes  Neck:  Trachea midline, symmetric.  No JVD.  No carotid bruits.  Chest:  Clear to  auscultation bilaterally without wheezes, crackles or rhonchi.  Diffuse decrease breath sounds throughout  CVS:  Regular rate and rhythm without murmur, gallop or rub.  S1 and S2 identified and normal.  No S3, S4.   Abdomen:  Soft, nontender, nondistended without masses.  Normal bowel sounds x 4 quadrants.  No bruit.  Extremities:  Warm, pink, motor and sensory intact and well perfused.  No cyanosis, pallor.  No BLE edema.  Neuro:  sleepy but answers questions.  Grossly intact  Psych:  Appropriate affect.  : Mcgee catheter in place draining dilute urine    Invasive Devices:   Urethral Catheter (Active)   Reasons to continue Urinary Catheter  Accurate I&O assessment in critically ill patients (48 hr. max) 01/21/25 0800   Goal for Removal Remove after 48 hrs of I/O monitoring 01/21/25 0800   Site Assessment Clean;Skin intact 01/21/25 0800   Mcgee Care Done 01/20/25 2100   Collection Container Standard drainage bag 01/21/25 0800   Securement Method Securing device (Describe) 01/21/25 0800   Output (mL) 150 mL 01/21/25 0902       Lab Results:   Results from last 7 days   Lab Units 01/21/25  0857 01/21/25  0505 01/20/25  1931 01/20/25  1305 01/20/25  0454 01/19/25  0726 01/19/25  0531 01/18/25  0451 01/18/25  0041 01/17/25  2337   WBC Thousand/uL  --  12.14*  --   --  12.65*  --  16.80* 12.09*   < >  --    HEMOGLOBIN g/dL 8.2* 8.3* 8.4*   < > 8.2*   < > 6.0* 8.6*   < >  --    I STAT HEMOGLOBIN g/dl  --   --   --   --   --   --   --   --   --  8.8*   HEMATOCRIT % 24.8* 24.5* 25.1*   < > 24.6*   < > 18.8* 27.7*   < >  --    HEMATOCRIT, ISTAT %  --   --   --   --   --   --   --   --   --  26*   PLATELETS Thousands/uL  --  95*  --   --  76*  --  99* 95*   < >  --    SODIUM mmol/L  --  143  --   --  140  --  140 134*   < >  --    POTASSIUM mmol/L  --  3.8  --   --  4.3  --  4.0 4.9   < >  --    CHLORIDE mmol/L  --  117*  --   --  115*  --  111* 107   < >  --    CO2 mmol/L  --  16*  --   --  17*  --  15* 18*   < >  --     CO2, I-STAT mmol/L  --   --   --   --   --   --   --   --   --  15*   BUN mg/dL  --  68*  --   --  62*  --  45* 41*   < >  --    CREATININE mg/dL  --  3.89*  --   --  3.85*  --  3.38* 2.73*   < >  --    CALCIUM mg/dL  --  8.3*  --   --  7.3*  --  7.7* 8.2*   < >  --    MAGNESIUM mg/dL  --   --   --   --  2.2  --  2.2 2.4   < >  --    PHOSPHORUS mg/dL  --   --   --   --   --   --  4.0 3.8  --   --    ALK PHOS U/L  --  58  --   --  53  --  65 108*   < >  --    ALT U/L  --  159*  --   --  208*  --  388* 997*   < >  --    AST U/L  --  36  --   --  54*  --  189* 909*   < >  --    GLUCOSE, ISTAT mg/dl  --   --   --   --   --   --   --   --   --  403*    < > = values in this interval not displayed.       Imaging Studies:  CTA chest PE study 1/18/2025:  Imaging study and images personally reviewed.  Acute saddle pulmonary embolus with large clot burden and CT findings of right heart strain.  RV/LV ratio 1.2    CXR 1/21/2025:  Imaging study and images personally reviewed.  Low lung volumes.  Suspected small bilateral pleural effusions.  Interstitial changes.    EMR, including Epic and [facility] Everywhere, reviewed.  I have personally reviewed the blood work as stated above and in my note.  I have personally reviewed CTA chest PE protocol, CXR imaging studies.  I have personally reviewed internal Medicine, co-consultants and prior nephrology notes.

## 2025-01-21 NOTE — PROGRESS NOTES
Progress Note - Critical Care/ICU   Name: Jeffry Almanzar 89 y.o. male I MRN: [id number]  Unit/Bed#: ICU 12 I Date of Admission: 1/17/2025   Date of Service: 1/21/2025 I Hospital Day: 3       Assessment & Plan  Acute saddle pulmonary embolism with acute cor pulmonale (HCC)  CLASSIFICATIONS Acute unstable Saddle  RISK FACTORS: hx of stroke, hx of cancer    DX: CTA   Recent Labs     01/19/25  0818 01/19/25  1408   LACTICACID 2.3* 0.8     ECHO with LVEF of 35%    TXT:   S/P TPA   Heparin when PTT<90  Target INR 2-3  TTM not indicated  Shock (HCC)  Obstructive shock vs hemorrhagic vs cardiogenic   With shock liver and acute renal failure   Lab Results   Component Value Date    HGB 8.3 (L) 01/21/2025     Lab Results   Component Value Date     06/05/2017    SODIUM 143 01/21/2025    K 3.8 01/21/2025     (H) 01/21/2025    CO2 16 (L) 01/21/2025    AGAP 10 01/21/2025    BUN 68 (H) 01/21/2025    CREATININE 3.89 (H) 01/21/2025    GLUC 129 01/21/2025    CALCIUM 8.3 (L) 01/21/2025    AST 36 01/21/2025     (H) 01/21/2025    ALKPHOS 58 01/21/2025    PROT 7.0 10/25/2016    TP 5.8 (L) 01/21/2025    BILITOT 0.6 10/25/2016    TBILI 1.02 (H) 01/21/2025    EGFR 12 01/21/2025   ECHO- poor visualization   - LFTs improving  - worsening Creatinine  - does not require pressors    Plan-   Follow up H&H  GOC discussion   Continue heparin gtt for now   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  (HCC)  Lab Results   Component Value Date    EGFR 12 01/21/2025    EGFR 12 01/20/2025    EGFR 15 01/19/2025    CREATININE 3.89 (H) 01/21/2025    CREATININE 3.85 (H) 01/20/2025    CREATININE 3.38 (H) 01/19/2025   - bleeding from IVs vs possible GI vs 2/2 CPR    PLAN  - Monitor Hgb in setting of heparin  - Look for signs of GI bleeding  - Transfuse <7    Benign 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 (HCC)  Lab Results   Component Value Date    EGFR 12 01/21/2025    EGFR 12 01/20/2025    EGFR 15 01/19/2025    CREATININE 3.89  (H) 01/21/2025    CREATININE 3.85 (H) 01/20/2025    CREATININE 3.38 (H) 01/19/2025         Continue to monitor      Diabetic nephropathy associated with type 2 diabetes mellitus ([facility])  Lab Results   Component Value Date    HGBA1C 6.9 (H) 01/18/2025       Blood Sugar Average: Last 72 hrs:  (P) [id number]  Recent Labs     01/19/25  0531 01/19/25  0608 01/20/25  0454 01/20/25  0548 01/20/25  1805 01/21/25  0014 01/21/25  0505   POCGLU  --    < >  --    < > 140 129 133   GLUC 158*  --  214*  --   --   --  129    < > = values in this interval not displayed.       Hold home regimen while inpatient and resume on discharge Dapagliflozin   Diabetic diet  Insulin regimen  Glucose checks and Insulin correction ACHS  Goal -180 while admitted, adjusting insulin regimen as appropriate  Monitor for hypoglycemia and treat per protocol  Continue SSI    IgM lambda paraproteinemia    Lacunar cerebrovascular accident (CVA) ([facility])  PMH of lacunar infarct leading to residual right sided weakness  Cardiac arrest ([facility])  Lab Results   Component Value Date    HSTNI0 533 (H) 01/18/2025    HSTNI2 777 (H) 01/18/2025    HSTNID2 244 (H) 01/18/2025    HSTNI4 1,070 (H) 01/18/2025    HSTNID4 537 (H) 01/18/2025     Recent Labs     01/19/25  0818 01/19/25  1408   LACTICACID 2.3* 0.8     Pt went into CA in ED 2/2 PE  ECHO with EF of 65% in September, ECHO with left ventricular ejection fraction is 55% (1/20)      PLAN:  Tele  S/P TPA changed to Heparin drip  ECHO with left ventricular ejection fraction is 55%     SOFÍA (acute kidney injury) ([facility])  Patient creatinine baseline of 2.3-2.6  Patient has had increasing creatinine likely secondary to contrast nephropathy and cardiogenic shock.  Will likely improve with increased intake.    Lab Results   Component Value Date    CREATININE 3.89 (H) 01/21/2025       PLAN:  Speech this morning to assess for dysphagia  Depending on speech results, we will either start tube feeds via NG tube or  increase p.o. intake.  Disposition: Med Surg    ICU Core Measures     A: Assess, Prevent, and Manage Pain Has pain been assessed? Yes  Need for changes to pain regimen? No   B: Both SAT/SAT  N/A   C: Choice of Sedation RASS Goal: 0 Alert and Calm  Need for changes to sedation or analgesia regimen? No   D: Delirium CAM-ICU: Negative   E: Early Mobility  Plan for early mobility? Yes   F: Family Engagement Plan for family engagement today? Yes       Review of Invasive Devices:    Arely Plan: Continue for accurate I/O monitoring for 48 hours  Central access plan: Medications requiring central line  Estephania Plan: Keep arterial line for hemodynamic monitoring    Prophylaxis:  VTE VTE covered by:  heparin (porcine), Intravenous, 9 Units/kg/hr at 01/21/25 0600  heparin (porcine), Intravenous, 2,400 Units at 01/21/25 0404  heparin (porcine), Intravenous       Stress Ulcer  covered bypantoprazole (PROTONIX) injection 40 mg [[id number]]         24 Hour Events : Patient extubated yesterday and has not been on any pressure support since 4 PM yesterday.    Subjective    Patient states that he is not in pain, have any chest discomfort or shortness of breath.  States he is having a little bit of difficulty coughing up some mucus.  Overall states he is comfortable.    Review of Systems: See HPI for Review of Systems    Objective :                   Vitals I/O      Most Recent Min/Max in 24hrs   Temp 98.4 °F (36.9 °C) Temp  Min: 97.7 °F (36.5 °C)  Max: 98.6 °F (37 °C)   Pulse 83 Pulse  Min: 67  Max: 88   Resp 17 Resp  Min: 13  Max: 32   /50 BP  Min: 122/55  Max: 137/50   O2 Sat 97 % SpO2  Min: 85 %  Max: 99 %      Intake/Output Summary (Last 24 hours) at 1/21/2025 0827  Last data filed at 1/21/2025 0600  Gross per 24 hour   Intake 336.4 ml   Output 765 ml   Net -428.6 ml       Diet NPO    Invasive Monitoring   Arterial Line  Estephania /59  Arterial Line BP  Min: 109/45  Max: 175/71   MAP 95 mmHg  Arterial Line MAP (mmHg)  Min:  63 mmHg  Max: 106 mmHg           Physical Exam   Physical Exam  Eyes:      Extraocular Movements: Extraocular movements intact.      Pupils: Pupils are equal, round, and reactive to light.   Skin:     General: Skin is warm.      Capillary Refill: Capillary refill takes less than 2 seconds.   HENT:      Head: Normocephalic and atraumatic.      Mouth/Throat:      Mouth: Mucous membranes are moist.   Cardiovascular:      Rate and Rhythm: Normal rate.      Heart sounds: Normal heart sounds.   Musculoskeletal:         General: No swelling or deformity.      Right lower leg: No edema.      Left lower leg: No edema.   Abdominal:      Palpations: Abdomen is soft.      Tenderness: There is no abdominal tenderness.   Constitutional:       Interventions: He is not sedated and not intubated.  Pulmonary:      Effort: Pulmonary effort is normal. He is not intubated.      Breath sounds: No wheezing, rhonchi or rales.      Comments: Upper respiratory congestion resonating to upper lung fields.  Neurological:      Mental Status: He is alert and oriented to person, place and time. Mental status is at baseline. He is calm.   Genitourinary/Anorectal:  Mcgee present.        Diagnostic Studies        Lab Results: I have reviewed the following results:     Medications:  Scheduled PRN   artificial tear, , HS  brimonidine tartrate, 1 drop, TID  chlorhexidine, 15 mL, Q12H MARQUIS  dorzolamide-timolol, 1 drop, BID  insulin lispro, 2-12 Units, Q6H  Latanoprostene Bunod, 1 drop, HS  pantoprazole, 40 mg, Q24H MARQUIS  polyethylene glycol, 17 g, Daily  prednisoLONE acetate, 1 drop, TID  senna-docusate sodium, 1 tablet, BID      heparin (porcine), 2,400 Units, Q6H PRN  heparin (porcine), 4,800 Units, Q6H PRN       Continuous    heparin (porcine), 3-30 Units/kg/hr (Order-Specific), Last Rate: 9 Units/kg/hr (01/21/25 0600)  norepinephrine, 1-30 mcg/min, Last Rate: Stopped (01/20/25 1314)         Labs:   CBC    Recent Labs     01/20/25  0454 01/20/25  1305  01/20/25 1931 01/21/25  0505   WBC 12.65*  --   --  12.14*   HGB 8.2*   < > 8.4* 8.3*   HCT 24.6*   < > 25.1* 24.5*   PLT 76*  --   --  95*   BANDSPCT  --   --   --  2    < > = values in this interval not displayed.     BMP    Recent Labs     01/20/25  0454 01/21/25  0505   SODIUM 140 143   K 4.3 3.8   * 117*   CO2 17* 16*   AGAP 8 10   BUN 62* 68*   CREATININE 3.85* 3.89*   CALCIUM 7.3* 8.3*       Coags    Recent Labs     01/19/25  1411 01/20/25  0011 01/20/25 1931 01/21/25  0226   INR 1.61*  --   --   --    PTT  --    < > 93* 58*    < > = values in this interval not displayed.        Additional Electrolytes  Recent Labs     01/20/25  0454   MG 2.2   CAIONIZED 1.04*          Blood Gas    Recent Labs     01/20/25  0454   PHART 7.365   [id number] 26.4*   PO2ART 109.6   LNI3NRL 14.7*   BEART -9.4   SOURCE Line, Arterial     Recent Labs     01/20/25  0454   SOURCE Line, Arterial    LFTs  Recent Labs     01/20/25  0454 01/21/25  0505   * 159*   AST 54* 36   ALKPHOS 53 58   ALB 3.0* 3.2*   TBILI 0.93 1.02*       Infectious  No recent results  Glucose  Recent Labs     01/20/25  0454 01/21/25  0505   GLUC 214* 129

## 2025-01-21 NOTE — ASSESSMENT & PLAN NOTE
Patient creatinine baseline of 2.3-2.6  Patient has had increasing creatinine likely secondary to contrast nephropathy and cardiogenic shock.  Will likely improve with increased intake.    Lab Results   Component Value Date    CREATININE 3.89 (H) 01/21/2025       PLAN:  Speech this morning to assess for dysphagia  Depending on speech results, we will either start tube feeds via NG tube or increase p.o. intake.

## 2025-01-21 NOTE — ASSESSMENT & PLAN NOTE
BP hypertensive  Hypotensive with arrest on presentation, requiring 3 pressors.  Now off pressors.  Repeat echo 1/20/2025: EF 55% with G1DD, mild MR, mild to moderate TR RVSP 49  Volume status euvolemic  Home Rx: Losartan 25 mg daily  Current Rx: none  Continue to hold losartan.  Avoid ACEi/ARB in setting of SOFÍA  Optimize hemodynamic status to avoid delay in renal recovery  Recommend hold parameters on antihypertensive's for SBP <130 mmHg.  Avoid hypotension or fluctuations in blood pressure.  Maintain MAP >65  Continue to trend

## 2025-01-21 NOTE — ASSESSMENT & PLAN NOTE
CLASSIFICATIONS Acute unstable Saddle  RISK FACTORS: hx of stroke, hx of cancer    DX: CTA   Recent Labs     01/19/25  0818 01/19/25  1408   LACTICACID 2.3* 0.8     ECHO with LVEF of 35%    TXT:   S/P TPA   Heparin when PTT<90  Target INR 2-3  TTM not indicated

## 2025-01-21 NOTE — ASSESSMENT & PLAN NOTE
Lab Results   Component Value Date    EGFR 12 01/21/2025    EGFR 12 01/20/2025    EGFR 15 01/19/2025    CREATININE 3.89 (H) 01/21/2025    CREATININE 3.85 (H) 01/20/2025    CREATININE 3.38 (H) 01/19/2025         Continue to monitor

## 2025-01-21 NOTE — PLAN OF CARE
Problem: OCCUPATIONAL THERAPY ADULT  Goal: Performs self-care activities at highest level of function for planned discharge setting.  See evaluation for individualized goals.  Description: Treatment Interventions: ADL retraining, Functional transfer training, UE strengthening/ROM, Endurance training, Cognitive reorientation, Patient/family training, Equipment evaluation/education, Compensatory technique education, Continued evaluation, Energy conservation, Activityengagement          See flowsheet documentation for full assessment, interventions and recommendations.   Note: Limitation: Decreased ADL status, Decreased UE ROM, Decreased UE strength, Decreased Safe judgement during ADL, Decreased cognition, Decreased endurance, Visual deficit, Decreased fine motor control, Decreased self-care trans, Decreased high-level ADLs (impaired pain, activity tolerance, sitting tolerance, sitting balance, standing tolerance, generalized weakness, forward functional reach)     Assessment: Pt is a 89yo male admitted to ICU at [facility] on 1/18/25. Pt presented w/ respiratory distress. Intubated and diagnosed w/ acute PE, brief cardiac arrest. Extubated on 1/20/25. 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 Pt lives w/ his wife and maintains first floor set- up. Pt reports I w/ basic ADLs and use of cane for functional mobility. Supportive wife assists as needed and manages IADLs. Upon eval, pt alert and responsive. Able to follow directions during ADLs and communicate wants / needs w/ + time. Therapist observed hoarse voice, slow to respond. Pt required mod A bed mobility, max HHA sit <> stand 2X, max HHA x2 SPT to L, max A LBD, mod A UBD, mod A grooming. Pt is completing ADLs below baseline level of I on acute O2. Pt  would benefit from OT In acute care to max I w/ ADLs, achieve highest level of function. Recommend level II rehab resource intensity when medically stable for discharge from acute care. Will continue to follow     Rehab Resource Intensity Level, OT: II (Moderate Resource Intensity)

## 2025-01-21 NOTE — PROGRESS NOTES
Progress Note - Critical Care/ICU   Name: Jeffry Almanzar 89 y.o. male I MRN: [id number]  Unit/Bed#: ICU 12 I Date of Admission: 1/17/2025   Date of Service: 1/21/2025 I Hospital Day: 3       Critical Care Interval Transfer Note:    Brief Hospital Summary:     1/18 Saddle PE w/ lrg clot burden ratio 1.2 W/ elevated biomarkers   1/18: Neuro: L corneal w/drawls from pain, tracking   1/18- Intubation 7.5 ETT and A-line, started on pressors  1/19- L IJ CVC  1/19 CXR: Bilateral pleural effusions, nonspecific groundglass opacities in the lungs  1/19: Transfused 2 unit prbc   1/20: extubated, pressors stopped  1/21: Patient on room air and comfortable.  Patient failed speech swallow test and will need swallow study with barium.  Patient began to have paresthesias in left arm that resolved followed by left lateral calf but also resolved.  Head CT showed left-sided retinal hemorrhage.  Neurology consulted.  Ophthalmology consulted.    Barriers to discharge:   Neurology recommendations  Ophthalmology recommendations     Consults: IP CONSULT TO NEPHROLOGY    Recommended to review admission imaging for incidental findings and document in discharge navigator: Chart reviewed, no known incidental findings noted at this time.      Discharge Plan: Anticipate discharge in 48 hrs to home.  [place] Plan: Continue for accurate I/O monitoring for 48 hours       Patient seen and evaluated by Critical Care today and deemed to be appropriate for transfer to Med Surg. Spoke to  from [facility] to accept transfer. Critical care can be contacted via SecureChat with any questions or concerns. Please use the Critical Care AP Role in Secure Chat for any provider inquires until the patient is transferred out of the ICU or until tomorrow at 0600.

## 2025-01-21 NOTE — ASSESSMENT & PLAN NOTE
Hgb 8.2, below goal.  Goal >10  Iron studies 9/9/2024: Iron saturation 26%, ferritin 317  continue to trend  Transfuse for Hgb <7.0  management per primary team

## 2025-01-21 NOTE — ASSESSMENT & PLAN NOTE
Lab Results   Component Value Date    HGBA1C 6.9 (H) 01/18/2025   Stable  On Farxiga as outpatient.  Continue to hold in setting of SOFÍA  continue to optimize glycemic control  management per primary team

## 2025-01-21 NOTE — ASSESSMENT & PLAN NOTE
Etiology:  Suspect due to diabetic nephropathy, hypertensive nephrosclerosis, arteriolar nephrosclerosis, age-related nephron loss  Baseline creatinine previously 2.3-2.6, recently low 3s since July '24  Outpatient Nephrologist: Dr. Rey

## 2025-01-21 NOTE — OCCUPATIONAL THERAPY NOTE
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  (HCC)    Benign 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 (HCC)    Diabetic nephropathy associated with type 2 diabetes mellitus (HCC)    IgM lambda paraproteinemia    SOFÍA (acute kidney injury) (HCC)    Lacunar cerebrovascular accident (CVA) (HCC)    Cardiac arrest (HCC)    Shock (HCC)    Past Medical History  Past Medical History:   Diagnosis Date    Cataracts, bilateral      Past Surgical History  Past Surgical History:   Procedure Laterality Date    CATARACT EXTRACTION Bilateral 07/15/2012    COLONOSCOPY      CYSTOSCOPY  01/15/2018    Last assessed 9/14/17, diagnostic    HERNIA REPAIR      INSTILLATION MYTOMYCIN N/A 10/25/2017    Procedure: INSTILLATION OF MITOMYCIN C;  Surgeon: Danish Esposito MD;  Location: [facility];  Service: Urology    SC CYSTO W/REMOVAL OF LESIONS SMALL N/A 10/25/2017    Procedure: CYSTOSCOPY; BLADDER BIOPSY WITH FULGERATION;  Surgeon: Danish Esposito MD;  Location: [facility];  Service: Urology    TONSILLECTOMY      TRANSURETHRAL RESECTION OF BLADDER TUMOR N/A 10/25/2017    Procedure: TUR BLADDER TUMOR;  Surgeon: Danish Esposito MD;  Location: [facility];  Service: Urology    WISDOM TOOTH EXTRACTION          01/21/25 1122   OT Last Visit   OT Visit Date 01/21/25  (Tuesday)   Note Type   Note type Evaluation  (Eval [id number])   Additional Comments Identified pt by full name, wristband and birthdate.   Pain Assessment   Pain Assessment Tool 0-10   Pain Score No Pain   Hospital Pain Intervention(s) Repositioned;Ambulation/increased activity;Emotional support   Pain Rating: FLACC (Rest) - Face 0   Pain Rating: FLACC (Rest) - Legs 0   Pain Rating: FLACC (Rest) - Activity 0   Pain Rating: FLACC (Rest) - Cry 0   Pain  Rating: FLACC (Rest) - Consolability 0   Score: FLACC (Rest) 0   Pain Rating: FLACC (Activity) - Face 1   Pain Rating: FLACC (Activity) - Legs 1   Pain Rating: FLACC (Activity) - Activity 1   Pain Rating: FLACC (Activity) - Cry 1   Pain Rating: FLACC (Activity) - Consolability 1   Score: FLACC (Activity) 5   Restrictions/Precautions   Weight Bearing Precautions Per Order No   Other Precautions Chair Alarm;Bed Alarm;Restraints;Multiple lines;Telemetry;Aspiration;O2;Fall Risk  (R UE A-line, villatoro catheter, acute O2)   Home Living   Type of Home House  (2 vs 9 steps; finished basement that pt does not access anymore per prvious OT eval (09/2024))   Home Layout Able to live on main level with bedroom/bathroom;Stairs to enter with rails  (maintains first floor set- up; 2 steps to front door per CM note vs)   Bathroom Shower/Tub Tub/shower unit   Bathroom Toilet Standard   Bathroom Equipment Grab bars in shower;Shower chair   Bathroom Accessibility Accessible   Home Equipment Grab bars;Walker;Cane  (pt's iwife present reports pt used cane prior to admission)   Additional Comments Pt lives w/ his supportive wife and maintains first floor set- up   Prior Function   Level of [place] Needs assistance with IADLS   Lives With Spouse  (supportive wifeAntony present during eval and assisted w/ social history / PLOF)   Receives Help From Family   IADLs Family/Friend/Other provides transportation;Family/Friend/Other provides meals;Family/Friend/Other provides medication management   Falls in the last 6 months 0   Vocational Retired   Comments Pt completed basic ADLs independently at baseline using cane. Has access to RW and shower chair   Lifestyle   Autonomy Pt completed basic ADLs w/ out assistance and utilized cane for functional mobility   Reciprocal Relationships Supportive wife present   Service to Others Pt is retired   Intrinsic Gratification Pt reports enjoying watch the Gospel on tv.   General   Additional Pertinent  History Pt admitted to [facility] on 1/18/25. Intubated and extubated on 1/20/25. Diagnosed w/ acute saddle pulmonary embolism w/ acute cor pulmonale.   Family/Caregiver Present Yes   Additional General Comments Personal and environmental factors supporting performance includes supportive wife, access to AD/ DME, independent w/ basic ADLs, maintains first floor set- up; barriers include advanced age, inability to complete IADLs, difficulty managing stairs.   ADL   Where Assessed Edge of bed  (vs OOB in chair)   Eating Assistance Unable to assess  (anticipate mod A based on fxal obs skills, clinical judgement when using suction)   Grooming Assistance 3  Moderate Assistance   Grooming Deficit Setup;Steadying;Supervision/safety;Increased time to complete   UB Bathing Assistance Unable to assess   LB Bathing Assistance Unable to assess   UB Dressing Assistance 3  Moderate Assistance   UB Dressing Deficit Setup;Thread RUE;Thread LUE;Pull around back;Fasteners;Verbal cueing;Supervision/safety;Increased time to complete  (seated at EOB)   LB Dressing Assistance 2  Maximal Assistance   LB Dressing Deficit Setup;Steadying;Don/doff L sock;Don/doff R sock   Toileting Assistance    (villatoro catheter)   Additional Comments on acute O2   Bed Mobility   Supine to Sit 3  Moderate assistance   Additional items Assist x 1;Increased time required;HOB elevated;Bedrails;Verbal cues;LE management  (to pt's L)   Sit to Supine Unable to assess   Additional Comments Pt supine HOB elevated upon arrival and seated OOB in chair post eval w/ needs met, call bell in reach and chair alarm activated   Transfers   Sit to Stand 2  Maximal assistance  (max HHA x2)   Additional items Assist x 2;Increased time required;Verbal cues   Stand to Sit 2  Maximal assistance  (max HHA x2)   Additional items Assist x 2;Increased time required;Verbal cues   Stand pivot 2  Maximal assistance  (max HHA x2 EOB to chair to pt's L)   Additional items Assist x 2;Increased  time required   Additional Comments Pt performed sit <> stand 2X from EOB w/ max A x2   Functional Mobility   Additional Comments Will continue to assess   Balance   Static Sitting Fair -   Static Standing Zero  (Ax2)   Ambulatory   (Will continue to assess)   Activity Tolerance   Activity Tolerance Patient limited by fatigue;Patient limited by pain;Other (Comment)  (multiple lines (O2, A-line, hard wired tele, villatoro catheter))   Medical Staff Made Aware care coordination w/ PTBailee due to decreased activity tolerance, regression from baseline and skilled physical assistance required. Spoke w/ Brii FONTANEZ   Nurse Made Aware spoke w/ Latesha LOVELL   RUE Assessment   RUE Assessment X  (generalized weakness, residual R sided weakness s/p recent CVA)   RUE Strength   RUE Overall Strength Deficits   Edema   RUE Edema Non-pitting   LUE Assessment   LUE Assessment X   LUE Strength   LUE Overall Strength Deficits  (able to participate in ADLs ; generalized weakness)   Hand Function   Gross Motor Coordination Impaired   Fine Motor Coordination Impaired   Vision-Basic Assessment   Current Vision   (visually impaired L eye)   Cognition   Overall Cognitive Status Impaired   Arousal/Participation Responsive;Arousable   Attention Attends with cues to redirect   Orientation Level Oriented to person;Oriented to place   Memory Decreased short term memory;Decreased recall of recent events   Following Commands Follows one step commands with increased time or repetition   Comments Responsive and cooperative. Able to follow 1 step directions w/ + time and cues. Able to communicate basic wants / needs w/ + time and cues w/ hoarse voice. Slow to respond   Assessment   Limitation Decreased ADL status;Decreased UE ROM;Decreased UE strength;Decreased Safe judgement during ADL;Decreased cognition;Decreased endurance;Visual deficit;Decreased fine motor control;Decreased self-care trans;Decreased high-level ADLs  (impaired pain, activity  tolerance, sitting tolerance, sitting balance, standing tolerance, generalized weakness, forward functional reach)   Assessment Pt is a 89yo male admitted to ICU at [facility] on 1/18/25. Pt presented w/ respiratory distress. Intubated and diagnosed w/ acute PE, brief cardiac arrest. Extubated on 1/20/25. 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 Pt lives w/ his wife and maintains first floor set- up. Pt reports I w/ basic ADLs and use of cane for functional mobility. Supportive wife assists as needed and manages IADLs. Upon eval, pt alert and responsive. Able to follow directions during ADLs and communicate wants / needs w/ + time. Therapist observed hoarse voice, slow to respond. Pt required mod A bed mobility, max HHA sit <> stand 2X, max HHA x2 SPT to L, max A LBD, mod A UBD, mod A grooming. Pt is completing ADLs below baseline level of I on acute O2. Pt would benefit from OT In acute care to max I w/ ADLs, achieve highest level of function. Recommend level II rehab resource intensity when medically stable for discharge from acute care. Will continue to follow   Goals   Patient Goals Will continue to assess   Plan   Treatment Interventions ADL retraining;Functional transfer training;UE strengthening/ROM;Endurance training;Cognitive reorientation;Patient/family training;Equipment evaluation/education;Compensatory technique education;Continued evaluation;Energy conservation;Activityengagement   Goal Expiration Date 02/04/25   OT Treatment Day 0  (Tuesdaty 1/21/25)   OT Frequency 3-5x/wk   Discharge Recommendation   Rehab Resource Intensity Level, OT II (Moderate Resource Intensity)   AM-PAC Daily Activity Inpatient   Lower Body Dressing 2   Bathing 2   Toileting 2   Upper Body Dressing 2   Grooming 2    Eating 2   Daily Activity Raw Score 12   Daily Activity Standardized Score (Calc for Raw Score >=11) 30.6   AM-PAC Applied Cognition Inpatient   Following a Speech/Presentation 2   Understanding Ordinary Conversation 2   Taking Medications 1   Remembering Where Things Are Placed or Put Away 2   Remembering List of 4-5 Errands 2   Taking Care of Complicated Tasks 1   Applied Cognition Raw Score 10   Applied Cognition Standardized Score 24.98   Barthel Index   Feeding 0   Bathing 0   Grooming Score 0   Dressing Score 5   Bladder Score 0   Bowels Score 5   Toilet Use Score 0   Transfers (Bed/Chair) Score 5   Mobility (Level Surface) Score 0   Stairs Score 0   Barthel Index Score 15   End of Consult   Education Provided Yes;Family or social support of family present for education by provider   Patient Position at End of Consult Bedside chair;Bed/Chair alarm activated;All needs within reach   Nurse Communication Nurse aware of consult   Licensure   [place] License Number  Mayra MAXWELL Estradafrancesco, OTR/L        The patient's raw score on the AM-PAC Daily Activity Inpatient Short Form is 12. A raw score of less than 19 suggests the patient may benefit from discharge to post-acute rehabilitation services. Please refer to the recommendation of the Occupational Therapist for safe discharge planning.       Pt goals to be met by 2/4/25 to max I w/ ADLs and improve engagement to return home w/ wife includes:    -Pt will complete bed mobility supine <> sit w/ min A to max I and minimize burden of care in preparation for ADLs    -Pt will demonstrate good attention and participation in ongoing eval of functional cognition to assist in DC Planning    -Pt will consistently follow 2 step directions during ADLs w/ good recall    -Pt will complete functional transfers to bed, chair, and toilet using LRAD, DME as needed w/ mod A x1 to max I and minimize burden of care    -Pt will demonstrate improved functional sitting tolerance OOB In chair for  all meals    -Pt will complete UBD w/ min A after set- up to minimize burden of care    -Pt will complete LBD w/ mod A to max I and minimize burden of care    -Pt will complete functional transfers to bed, chair, and toilet using LRAD, DME as needed w/ min A     -Pt will demonstrate good attention and participation in ongoing eval of functional mobility using LRAD to max I and assist in DC Planning    -Pt will complete grooming / feeding w/ S after set- up in supported sitting    -Pt will demonstrate improved sustained activity tolerance to participate in ADLs vs preferred leisure tasks for 15 minutes w/ no more than 1 rest break.       Mayra Calvo, OTR/L  [id number]  [id number]

## 2025-01-21 NOTE — ASSESSMENT & PLAN NOTE
Lab Results   Component Value Date    HSTNI0 533 (H) 01/18/2025    HSTNI2 777 (H) 01/18/2025    HSTNID2 244 (H) 01/18/2025    HSTNI4 1,070 (H) 01/18/2025    HSTNID4 537 (H) 01/18/2025     Recent Labs     01/19/25  0818 01/19/25  1408   LACTICACID 2.3* 0.8     Pt went into CA in ED 2/2 PE  ECHO with EF of 65% in September, ECHO with left ventricular ejection fraction is 55% (1/20)      PLAN:  Tele  S/P TPA changed to Heparin drip  ECHO with left ventricular ejection fraction is 55%

## 2025-01-21 NOTE — PLAN OF CARE
Problem: PAIN - ADULT  Goal: Verbalizes/displays adequate comfort level or baseline comfort level  Description: Interventions:  - Encourage patient to monitor pain and request assistance  - Assess pain using appropriate pain scale  - Administer analgesics based on type and severity of pain and evaluate response  - Implement non-pharmacological measures as appropriate and evaluate response  - Consider cultural and social influences on pain and pain management  - Notify physician/advanced practitioner if interventions unsuccessful or patient reports new pain  Outcome: Progressing     Problem: INFECTION - ADULT  Goal: Absence or prevention of progression during hospitalization  Description: INTERVENTIONS:  - Assess and monitor for signs and symptoms of infection  - Monitor lab/diagnostic results  - Monitor all insertion sites, i.e. indwelling lines, tubes, and drains  - Monitor endotracheal if appropriate and nasal secretions for changes in amount and color  - [place] appropriate cooling/warming therapies per order  - Administer medications as ordered  - Instruct and encourage patient and family to use good hand hygiene technique  - Identify and instruct in appropriate isolation precautions for identified infection/condition  Outcome: Progressing  Goal: Absence of fever/infection during neutropenic period  Description: INTERVENTIONS:  - Monitor WBC    Outcome: Progressing     Problem: SAFETY ADULT  Goal: Patient will remain free of falls  Description: INTERVENTIONS:  - Educate patient/family on patient safety including physical limitations  - Instruct patient to call for assistance with activity   - Consult OT/PT to assist with strengthening/mobility   - Keep Call bell within reach  - Keep bed low and locked with side rails adjusted as appropriate  - Keep care items and personal belongings within reach  - Initiate and maintain comfort rounds  - Make Fall Risk Sign visible to staff  - Obtain necessary fall risk  management equipment  - Apply yellow socks and bracelet for high fall risk patients  - Consider moving patient to room near nurses station  Outcome: Progressing  Goal: Maintain or return to baseline ADL function  Description: INTERVENTIONS:  -  Assess patient's ability to carry out ADLs; assess patient's baseline for ADL function and identify physical deficits which impact ability to perform ADLs (bathing, care of mouth/teeth, toileting, grooming, dressing, etc.)  - Assess/evaluate cause of self-care deficits   - Assess range of motion  - Assess patient's mobility; develop plan if impaired  - Assess patient's need for assistive devices and provide as appropriate  - Encourage maximum independence but intervene and supervise when necessary  - Involve family in performance of ADLs  - Assess for home care needs following discharge   - Consider OT consult to assist with ADL evaluation and planning for discharge  - Provide patient education as appropriate  Outcome: Progressing  Goal: Maintains/Returns to pre admission functional level  Description: INTERVENTIONS:  - Perform AM-PAC 6 Click Basic Mobility/ Daily Activity assessment daily.  - Set and communicate daily mobility goal to care team and patient/family/caregiver.   - Collaborate with rehabilitation services on mobility goals if consulted  - Perform Range of Motion   - Reposition patient   - Stand patient   - Out of bed to chair  - Out of bed for meals   - Out of bed for toileting  - Record patient progress and toleration of activity level   Outcome: Progressing     Problem: DISCHARGE PLANNING  Goal: Discharge to home or other facility with appropriate resources  Description: INTERVENTIONS:  - Identify barriers to discharge w/patient and caregiver  - Arrange for needed discharge resources and transportation as appropriate  - Identify discharge learning needs (meds, wound care, etc.)  - Arrange for interpretive services to assist at discharge as needed  - Refer to  Case Management Department for coordinating discharge planning if the patient needs post-hospital services based on physician/advanced practitioner order or complex needs related to functional status, cognitive ability, or social support system  Outcome: Progressing     Problem: Knowledge Deficit  Goal: Patient/family/caregiver demonstrates understanding of disease process, treatment plan, medications, and discharge instructions  Description: Complete learning assessment and assess knowledge base.  Interventions:  - Provide teaching at level of understanding  - Provide teaching via preferred learning methods  Outcome: Progressing     Problem: Nutrition/Hydration-ADULT  Goal: Nutrient/Hydration intake appropriate for improving, restoring or maintaining nutritional needs  Description: Monitor and assess patient's nutrition/hydration status for malnutrition. Collaborate with interdisciplinary team and initiate plan and interventions as ordered.  Monitor patient's weight and dietary intake as ordered or per policy. Utilize nutrition screening tool and intervene as necessary. Determine patient's food preferences and provide high-protein, high-caloric foods as appropriate.     INTERVENTIONS:  - Monitor oral intake, urinary output, labs, and treatment plans  - Assess nutrition and hydration status and recommend course of action  - Evaluate amount of meals eaten  - Assist patient with eating if necessary   - Allow adequate time for meals  - Recommend/ encourage appropriate diets, oral nutritional supplements, and vitamin/mineral supplements  - Order, calculate, and assess calorie counts as needed  - Recommend, monitor, and adjust tube feedings and TPN/PPN based on assessed needs  - Assess need for intravenous fluids  - Provide specific nutrition/hydration education as appropriate  - Include patient/family/caregiver in decisions related to nutrition  Outcome: Progressing     Problem: Prexisting or High Potential for  Compromised Skin Integrity  Goal: Skin integrity is maintained or improved  Description: INTERVENTIONS:  - Identify patients at risk for skin breakdown  - Assess and monitor skin integrity  - Assess and monitor nutrition and hydration status  - Monitor labs   - Assess for incontinence   - Turn and reposition patient  - Assist with mobility/ambulation  - Relieve pressure over bony prominences  - Avoid friction and shearing  - Provide appropriate hygiene as needed including keeping skin clean and dry  - Evaluate need for skin moisturizer/barrier cream  - Collaborate with interdisciplinary team   - Patient/family teaching  - Consider wound care consult   Outcome: Progressing

## 2025-01-21 NOTE — ASSESSMENT & PLAN NOTE
Calcium stable  Phosphorus 4.0, at goal  PTH 55, at goal  continue to monitor and follow phosphorus, PTH, calcium, magnesium as outpatient

## 2025-01-22 ENCOUNTER — APPOINTMENT (INPATIENT)
Dept: MRI IMAGING | Facility: HOSPITAL | Age: OVER 89
DRG: 208 | End: 2025-01-22
Payer: MEDICARE

## 2025-01-22 ENCOUNTER — APPOINTMENT (INPATIENT)
Dept: RADIOLOGY | Facility: HOSPITAL | Age: OVER 89
DRG: 208 | End: 2025-01-22
Payer: MEDICARE

## 2025-01-22 ENCOUNTER — APPOINTMENT (INPATIENT)
Dept: VASCULAR ULTRASOUND | Facility: HOSPITAL | Age: OVER 89
DRG: 208 | End: 2025-01-22
Payer: MEDICARE

## 2025-01-22 PROBLEM — R57.9 SHOCK (HCC): Status: RESOLVED | Noted: 2025-01-19 | Resolved: 2025-01-22

## 2025-01-22 PROBLEM — E87.0 HYPERNATREMIA: Status: ACTIVE | Noted: 2025-01-22

## 2025-01-22 PROBLEM — R09.02 HYPOXIA: Status: ACTIVE | Noted: 2025-01-22

## 2025-01-22 PROBLEM — Z78.9: Status: ACTIVE | Noted: 2025-01-22

## 2025-01-22 LAB
ANION GAP SERPL CALCULATED.3IONS-SCNC: 12 MMOL/L (ref 4–13)
ANION GAP SERPL CALCULATED.3IONS-SCNC: 13 MMOL/L (ref 4–13)
APTT PPP: 31 SECONDS (ref 23–34)
APTT PPP: 51 SECONDS (ref 23–34)
BASE EX.OXY STD BLDV CALC-SCNC: 92.7 % (ref 60–80)
BASE EXCESS BLDV CALC-SCNC: -5.7 MMOL/L
BASOPHILS # BLD AUTO: 0.03 THOUSANDS/ΜL (ref 0–0.1)
BASOPHILS NFR BLD AUTO: 0 % (ref 0–1)
BUN SERPL-MCNC: 69 MG/DL (ref 5–25)
BUN SERPL-MCNC: 72 MG/DL (ref 5–25)
CALCIUM SERPL-MCNC: 8.8 MG/DL (ref 8.4–10.2)
CALCIUM SERPL-MCNC: 9 MG/DL (ref 8.4–10.2)
CHLORIDE SERPL-SCNC: 117 MMOL/L (ref 96–108)
CHLORIDE SERPL-SCNC: 117 MMOL/L (ref 96–108)
CO2 SERPL-SCNC: 19 MMOL/L (ref 21–32)
CO2 SERPL-SCNC: 20 MMOL/L (ref 21–32)
CREAT SERPL-MCNC: 3.35 MG/DL (ref 0.6–1.3)
CREAT SERPL-MCNC: 3.59 MG/DL (ref 0.6–1.3)
EOSINOPHIL # BLD AUTO: 0.04 THOUSAND/ΜL (ref 0–0.61)
EOSINOPHIL NFR BLD AUTO: 0 % (ref 0–6)
ERYTHROCYTE [DISTWIDTH] IN BLOOD BY AUTOMATED COUNT: 17.7 % (ref 11.6–15.1)
GFR SERPL CREATININE-BSD FRML MDRD: 14 ML/MIN/1.73SQ M
GFR SERPL CREATININE-BSD FRML MDRD: 15 ML/MIN/1.73SQ M
GLUCOSE SERPL-MCNC: 123 MG/DL (ref 65–140)
GLUCOSE SERPL-MCNC: 131 MG/DL (ref 65–140)
GLUCOSE SERPL-MCNC: 134 MG/DL (ref 65–140)
GLUCOSE SERPL-MCNC: 137 MG/DL (ref 65–140)
GLUCOSE SERPL-MCNC: 172 MG/DL (ref 65–140)
HCO3 BLDV-SCNC: 18.4 MMOL/L (ref 24–30)
HCT VFR BLD AUTO: 26.7 % (ref 36.5–49.3)
HGB BLD-MCNC: 8.6 G/DL (ref 12–17)
IMM GRANULOCYTES # BLD AUTO: 0.05 THOUSAND/UL (ref 0–0.2)
IMM GRANULOCYTES NFR BLD AUTO: 1 % (ref 0–2)
LYMPHOCYTES # BLD AUTO: 0.69 THOUSANDS/ΜL (ref 0.6–4.47)
LYMPHOCYTES NFR BLD AUTO: 7 % (ref 14–44)
MCH RBC QN AUTO: 28.8 PG (ref 26.8–34.3)
MCHC RBC AUTO-ENTMCNC: 32.2 G/DL (ref 31.4–37.4)
MCV RBC AUTO: 89 FL (ref 82–98)
MONOCYTES # BLD AUTO: 1.46 THOUSAND/ΜL (ref 0.17–1.22)
MONOCYTES NFR BLD AUTO: 16 % (ref 4–12)
NEUTROPHILS # BLD AUTO: 7.06 THOUSANDS/ΜL (ref 1.85–7.62)
NEUTS SEG NFR BLD AUTO: 76 % (ref 43–75)
NRBC BLD AUTO-RTO: 0 /100 WBCS
O2 CT BLDV-SCNC: 12.5 ML/DL
PCO2 BLDV: 30.8 MM HG (ref 42–50)
PH BLDV: 7.39 [PH] (ref 7.3–7.4)
PLATELET # BLD AUTO: 125 THOUSANDS/UL (ref 149–390)
PMV BLD AUTO: 10.7 FL (ref 8.9–12.7)
PO2 BLDV: 109.2 MM HG (ref 35–45)
POTASSIUM SERPL-SCNC: 3.4 MMOL/L (ref 3.5–5.3)
POTASSIUM SERPL-SCNC: 3.6 MMOL/L (ref 3.5–5.3)
RBC # BLD AUTO: 2.99 MILLION/UL (ref 3.88–5.62)
SODIUM SERPL-SCNC: 149 MMOL/L (ref 135–147)
SODIUM SERPL-SCNC: 149 MMOL/L (ref 135–147)
WBC # BLD AUTO: 9.33 THOUSAND/UL (ref 4.31–10.16)

## 2025-01-22 PROCEDURE — 71045 X-RAY EXAM CHEST 1 VIEW: CPT

## 2025-01-22 PROCEDURE — 85025 COMPLETE CBC W/AUTO DIFF WBC: CPT

## 2025-01-22 PROCEDURE — 85730 THROMBOPLASTIN TIME PARTIAL: CPT | Performed by: STUDENT IN AN ORGANIZED HEALTH CARE EDUCATION/TRAINING PROGRAM

## 2025-01-22 PROCEDURE — 85730 THROMBOPLASTIN TIME PARTIAL: CPT | Performed by: INTERNAL MEDICINE

## 2025-01-22 PROCEDURE — 70551 MRI BRAIN STEM W/O DYE: CPT

## 2025-01-22 PROCEDURE — 82948 REAGENT STRIP/BLOOD GLUCOSE: CPT

## 2025-01-22 PROCEDURE — 99233 SBSQ HOSP IP/OBS HIGH 50: CPT | Performed by: INTERNAL MEDICINE

## 2025-01-22 PROCEDURE — 80048 BASIC METABOLIC PNL TOTAL CA: CPT

## 2025-01-22 PROCEDURE — 93970 EXTREMITY STUDY: CPT

## 2025-01-22 PROCEDURE — 99222 1ST HOSP IP/OBS MODERATE 55: CPT | Performed by: OPHTHALMOLOGY

## 2025-01-22 PROCEDURE — 99232 SBSQ HOSP IP/OBS MODERATE 35: CPT | Performed by: INTERNAL MEDICINE

## 2025-01-22 PROCEDURE — 92526 ORAL FUNCTION THERAPY: CPT

## 2025-01-22 PROCEDURE — 70544 MR ANGIOGRAPHY HEAD W/O DYE: CPT

## 2025-01-22 PROCEDURE — 82805 BLOOD GASES W/O2 SATURATION: CPT | Performed by: PHYSICIAN ASSISTANT

## 2025-01-22 RX ORDER — HEPARIN SODIUM 1000 [USP'U]/ML
4800 INJECTION, SOLUTION INTRAVENOUS; SUBCUTANEOUS ONCE
Status: DISCONTINUED | OUTPATIENT
Start: 2025-01-22 | End: 2025-01-22

## 2025-01-22 RX ORDER — HEPARIN SODIUM 1000 [USP'U]/ML
2400 INJECTION, SOLUTION INTRAVENOUS; SUBCUTANEOUS EVERY 6 HOURS PRN
Status: DISCONTINUED | OUTPATIENT
Start: 2025-01-22 | End: 2025-02-11 | Stop reason: HOSPADM

## 2025-01-22 RX ORDER — DEXTROSE MONOHYDRATE 50 MG/ML
50 INJECTION, SOLUTION INTRAVENOUS CONTINUOUS
Status: DISPENSED | OUTPATIENT
Start: 2025-01-22 | End: 2025-01-23

## 2025-01-22 RX ORDER — HEPARIN SODIUM 1000 [USP'U]/ML
4800 INJECTION, SOLUTION INTRAVENOUS; SUBCUTANEOUS ONCE
Status: DISCONTINUED | OUTPATIENT
Start: 2025-01-22 | End: 2025-02-11 | Stop reason: HOSPADM

## 2025-01-22 RX ORDER — HEPARIN SODIUM 10000 [USP'U]/100ML
3-30 INJECTION, SOLUTION INTRAVENOUS
Status: DISCONTINUED | OUTPATIENT
Start: 2025-01-22 | End: 2025-01-22

## 2025-01-22 RX ORDER — HEPARIN SODIUM 1000 [USP'U]/ML
4800 INJECTION, SOLUTION INTRAVENOUS; SUBCUTANEOUS EVERY 6 HOURS PRN
Status: DISCONTINUED | OUTPATIENT
Start: 2025-01-22 | End: 2025-01-22

## 2025-01-22 RX ORDER — HEPARIN SODIUM 10000 [USP'U]/100ML
3-30 INJECTION, SOLUTION INTRAVENOUS
Status: DISCONTINUED | OUTPATIENT
Start: 2025-01-22 | End: 2025-02-11 | Stop reason: HOSPADM

## 2025-01-22 RX ORDER — TETRACAINE HYDROCHLORIDE 5 MG/ML
2 SOLUTION OPHTHALMIC ONCE
Status: DISCONTINUED | OUTPATIENT
Start: 2025-01-22 | End: 2025-02-11 | Stop reason: HOSPADM

## 2025-01-22 RX ORDER — HEPARIN SODIUM 1000 [USP'U]/ML
4800 INJECTION, SOLUTION INTRAVENOUS; SUBCUTANEOUS EVERY 6 HOURS PRN
Status: DISCONTINUED | OUTPATIENT
Start: 2025-01-22 | End: 2025-02-11 | Stop reason: HOSPADM

## 2025-01-22 RX ORDER — HYDRALAZINE HYDROCHLORIDE 20 MG/ML
5 INJECTION INTRAMUSCULAR; INTRAVENOUS EVERY 6 HOURS PRN
Status: DISCONTINUED | OUTPATIENT
Start: 2025-01-22 | End: 2025-02-11 | Stop reason: HOSPADM

## 2025-01-22 RX ORDER — HEPARIN SODIUM 1000 [USP'U]/ML
2400 INJECTION, SOLUTION INTRAVENOUS; SUBCUTANEOUS EVERY 6 HOURS PRN
Status: DISCONTINUED | OUTPATIENT
Start: 2025-01-22 | End: 2025-01-22

## 2025-01-22 RX ADMIN — MINERAL OIL, WHITE PETROLATUM: .03; .94 OINTMENT OPHTHALMIC at 22:27

## 2025-01-22 RX ADMIN — DEXTROSE 50 ML/HR: 5 SOLUTION INTRAVENOUS at 17:27

## 2025-01-22 RX ADMIN — DORZOLAMIDE HYDROCHLORIDE AND TIMOLOL MALEATE 1 DROP: 20; 5 SOLUTION OPHTHALMIC at 08:37

## 2025-01-22 RX ADMIN — HEPARIN SODIUM 9 UNITS/KG/HR: 10000 INJECTION, SOLUTION INTRAVENOUS at 13:34

## 2025-01-22 RX ADMIN — HEPARIN SODIUM 9 UNITS/KG/HR: 10000 INJECTION, SOLUTION INTRAVENOUS at 17:28

## 2025-01-22 RX ADMIN — DORZOLAMIDE HYDROCHLORIDE AND TIMOLOL MALEATE 1 DROP: 20; 5 SOLUTION OPHTHALMIC at 17:28

## 2025-01-22 RX ADMIN — PREDNISOLONE ACETATE 1 DROP: 10 SUSPENSION/ DROPS OPHTHALMIC at 17:28

## 2025-01-22 RX ADMIN — BRIMONIDINE TARTRATE 1 DROP: 2 SOLUTION/ DROPS OPHTHALMIC at 08:36

## 2025-01-22 RX ADMIN — PREDNISOLONE ACETATE 1 DROP: 10 SUSPENSION/ DROPS OPHTHALMIC at 22:26

## 2025-01-22 RX ADMIN — PREDNISOLONE ACETATE 1 DROP: 10 SUSPENSION/ DROPS OPHTHALMIC at 08:36

## 2025-01-22 RX ADMIN — LATANOPROSTENE BUNOD 1 DROP: 0.24 SOLUTION/ DROPS OPHTHALMIC at 23:38

## 2025-01-22 NOTE — CONSULTS
"Consultation - Ophthalmology   Name: Jeffry Almanzar 89 y.o. 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  ([facility])  Lab Results   Component Value Date    EGFR 14 01/22/2025    EGFR 12 01/21/2025    EGFR 12 01/20/2025    CREATININE 3.59 (H) 01/22/2025    CREATININE 3.89 (H) 01/21/2025    CREATININE 3.85 (H) 01/20/2025   Mgmt per inpt team  Benign 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 ([facility])  Lab Results   Component Value Date    EGFR 14 01/22/2025    EGFR 12 01/21/2025    EGFR 12 01/20/2025    CREATININE 3.59 (H) 01/22/2025    CREATININE 3.89 (H) 01/21/2025    CREATININE 3.85 (H) 01/20/2025   Mgmt per inpt team  CKD (chronic kidney disease) stage 4, GFR 15-29 ml/min ([facility])  Lab Results   Component Value Date    EGFR 14 01/22/2025    EGFR 12 01/21/2025    EGFR 12 01/20/2025    CREATININE 3.59 (H) 01/22/2025    CREATININE 3.89 (H) 01/21/2025    CREATININE 3.85 (H) 01/20/2025   Mgmt per inpt team  Diabetic nephropathy associated with type 2 diabetes mellitus ([facility])  Lab Results   Component Value Date    HGBA1C 6.9 (H) 01/18/2025   Mgmt per inpt team    Recent Labs     01/21/25  1156 01/21/25  1800 01/22/25  0010 01/22/25  0721   POCGLU 128 122 137 134   Mgmt per inpt team    Blood Sugar Average: Last 72 hrs:  (P) [id number]  Mgmt per inpt team  IgM lambda paraproteinemia  Mgmt per inpt team  SOFÍA (acute kidney injury) ([facility])  Mgmt per inpt team  Lacunar cerebrovascular accident (CVA) (HCC)  Mgmt per inpt team  Cardiac arrest (HCC)  Mgmt per inpt team  Shock (HCC)  Mgmt per inpt team  Chronic kidney disease-mineral and bone disorder  Lab " Results   Component Value Date    EGFR 14 01/22/2025    EGFR 12 01/21/2025    EGFR 12 01/20/2025    CREATININE 3.59 (H) 01/22/2025    CREATININE 3.89 (H) 01/21/2025    CREATININE 3.85 (H) 01/20/2025   Mgmt per inpt team  Normal anion gap metabolic acidosis  Mgmt per inpt team  Retinal hemorrhage noted on examination of left eye  IMP/RECS plssee below  Ophthalmology service signing off.    History of Present Illness   HPI: Jeffry Almanzar is a 89 y.o. year old male who presents without ocular complaint noting that his vision seems stable OU.  He confirms chronic and severe vision loss OS and glaucoma.    Review of Systems  I have reviewed the patient's PMH, PSH, Social History, Family History, Meds, and Allergies  Past Ocular History: limited hx available but includes glaucoma, pseudophakia, possible retinal vein occlusion OS, and chronic poor vision/blindness OS    Objective :  Temp:  [97.5 °F (36.4 °C)-100.1 °F (37.8 °C)] 100.1 °F (37.8 °C)  HR:  [81-91] 91  BP: (151-184)/(74-97) 173/97  Resp:  [17-22] 19  SpO2:  [88 %-97 %] 95 %  O2 Device: Nasal cannula  Nasal Cannula O2 Flow Rate (L/min):  [2 L/min] 2 L/min    Base Eye Exam       Visual Acuity (Snellen - Linear)         Right Left    Near sc 20/200 NLP w indirect              Tonometry (Tonopen, 10:55 AM)         Right Left    Pressure 19 48              Pupils    Dilated pharmacologically OU by care team             Extraocular Movement         Right Left     Full, Ortho Full, Ortho                  Slit Lamp and Fundus Exam       External Exam         Right Left    External Normal Normal              Slit Lamp Exam         Right Left    Lids/Lashes Normal Normal    Conjunctiva/Sclera wq wq    Cornea cl cl    Anterior Chamber dq dq    Iris dilated, no NVI visible, atrophy and ST PI dilated, no NVI visible, atrophic    Lens ?PCIOL ?PCIOL    Anterior Vitreous clear VH, looks partially dehemoglobinized, + RR    Entire exam at bedside, limited to indirect and  "28D  Communication also difficult and limited              Fundus Exam         Right Left    Disc sharp margin, some pallor, cup?0.7 no details    Macula flat \"    Vessels attenuated generally, no heme \"    Periphery attached \"                       IMP:  Glaucoma OU, 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 The pressure in his right/seeing eye is normal.  The goal for the left eye at this point is simply comfort - and he reports he is now comfortable, so continue current management.  The vitreous hemorrhage is likely chronic, may be related to past retinal venous occlusion or DR, but in any case at this point should be observed.   Given apparent NLP vision, no aggressive therapy is warranted OS.  He should follow up with his local eye care provider after discharge.        I did my best to communicate the pertinent findings to the patient and answer questions.  "

## 2025-01-22 NOTE — ASSESSMENT & PLAN NOTE
This afternoon, patient satting at 84% on room air with labored breathing.  Has notable rales in bilateral lung fields.  Patient has wet cough without sputum production.  Unable to adequately suctioned out secretions    PLAN:  Per speech therapy, will keep patient n.p.o. and obtain VBS tomorrow  Placed patient on 2 L nasal cannula and changed sitting position which resulted in 94% O2  Will consider IV Lasix depending on results of chest x-ray and discussion with nephrology  Suction any secretions as able  Concern for volume overload versus difficulty managing secretions

## 2025-01-22 NOTE — PLAN OF CARE
Problem: PAIN - ADULT  Goal: Verbalizes/displays adequate comfort level or baseline comfort level  Description: Interventions:  - Encourage patient to monitor pain and request assistance  - Assess pain using appropriate pain scale  - Administer analgesics based on type and severity of pain and evaluate response  - Implement non-pharmacological measures as appropriate and evaluate response  - Consider cultural and social influences on pain and pain management  - Notify physician/advanced practitioner if interventions unsuccessful or patient reports new pain  Outcome: Progressing     Problem: INFECTION - ADULT  Goal: Absence or prevention of progression during hospitalization  Description: INTERVENTIONS:  - Assess and monitor for signs and symptoms of infection  - Monitor lab/diagnostic results  - Monitor all insertion sites, i.e. indwelling lines, tubes, and drains  - Monitor endotracheal if appropriate and nasal secretions for changes in amount and color  - [place] appropriate cooling/warming therapies per order  - Administer medications as ordered  - Instruct and encourage patient and family to use good hand hygiene technique  - Identify and instruct in appropriate isolation precautions for identified infection/condition  Outcome: Progressing  Goal: Absence of fever/infection during neutropenic period  Description: INTERVENTIONS:  - Monitor WBC    Outcome: Progressing     Problem: SAFETY ADULT  Goal: Patient will remain free of falls  Description: INTERVENTIONS:  - Educate patient/family on patient safety including physical limitations  - Instruct patient to call for assistance with activity   - Consult OT/PT to assist with strengthening/mobility   - Keep Call bell within reach  - Keep bed low and locked with side rails adjusted as appropriate  - Keep care items and personal belongings within reach  - Initiate and maintain comfort rounds  - Make Fall Risk Sign visible to staff  - Offer Toileting every 2 Hours,  in advance of need  - Initiate/Maintain bed and chair alarm  - Obtain necessary fall risk management equipment: assist to roll  - Apply yellow socks and bracelet for high fall risk patients  - Consider moving patient to room near nurses station  Outcome: Progressing  Goal: Maintain or return to baseline ADL function  Description: INTERVENTIONS:  -  Assess patient's ability to carry out ADLs; assess patient's baseline for ADL function and identify physical deficits which impact ability to perform ADLs (bathing, care of mouth/teeth, toileting, grooming, dressing, etc.)  - Assess/evaluate cause of self-care deficits   - Assess range of motion  - Assess patient's mobility; develop plan if impaired  - Assess patient's need for assistive devices and provide as appropriate  - Encourage maximum independence but intervene and supervise when necessary  - Involve family in performance of ADLs  - Assess for home care needs following discharge   - Consider OT consult to assist with ADL evaluation and planning for discharge  - Provide patient education as appropriate  Outcome: Progressing  Goal: Maintains/Returns to pre admission functional level  Description: INTERVENTIONS:  - Perform AM-PAC 6 Click Basic Mobility/ Daily Activity assessment daily.  - Set and communicate daily mobility goal to care team and patient/family/caregiver.   - Collaborate with rehabilitation services on mobility goals if consulted  - Perform Range of Motion 3 times a day.  - Reposition patient every 2 hours.  - Ambulate patient 3 times a day  - Out of bed to chair 3 times a day   - Out of bed for meals 3 times a day  - Out of bed for toileting  - Record patient progress and toleration of activity level   Outcome: Progressing     Problem: DISCHARGE PLANNING  Goal: Discharge to home or other facility with appropriate resources  Description: INTERVENTIONS:  - Identify barriers to discharge w/patient and caregiver  - Arrange for needed discharge resources  and transportation as appropriate  - Identify discharge learning needs (meds, wound care, etc.)  - Arrange for interpretive services to assist at discharge as needed  - Refer to Case Management Department for coordinating discharge planning if the patient needs post-hospital services based on physician/advanced practitioner order or complex needs related to functional status, cognitive ability, or social support system  Outcome: Progressing     Problem: Knowledge Deficit  Goal: Patient/family/caregiver demonstrates understanding of disease process, treatment plan, medications, and discharge instructions  Description: Complete learning assessment and assess knowledge base.  Interventions:  - Provide teaching at level of understanding  - Provide teaching via preferred learning methods  Outcome: Progressing     Problem: Nutrition/Hydration-ADULT  Goal: Nutrient/Hydration intake appropriate for improving, restoring or maintaining nutritional needs  Description: Monitor and assess patient's nutrition/hydration status for malnutrition. Collaborate with interdisciplinary team and initiate plan and interventions as ordered.  Monitor patient's weight and dietary intake as ordered or per policy. Utilize nutrition screening tool and intervene as necessary. Determine patient's food preferences and provide high-protein, high-caloric foods as appropriate.     INTERVENTIONS:  - Monitor oral intake, urinary output, labs, and treatment plans  - Assess nutrition and hydration status and recommend course of action  - Evaluate amount of meals eaten  - Assist patient with eating if necessary   - Allow adequate time for meals  - Recommend/ encourage appropriate diets, oral nutritional supplements, and vitamin/mineral supplements  - Order, calculate, and assess calorie counts as needed  - Recommend, monitor, and adjust tube feedings and TPN/PPN based on assessed needs  - Assess need for intravenous fluids  - Provide specific  nutrition/hydration education as appropriate  - Include patient/family/caregiver in decisions related to nutrition  Outcome: Progressing     Problem: Prexisting or High Potential for Compromised Skin Integrity  Goal: Skin integrity is maintained or improved  Description: INTERVENTIONS:  - Identify patients at risk for skin breakdown  - Assess and monitor skin integrity  - Assess and monitor nutrition and hydration status  - Monitor labs   - Assess for incontinence   - Turn and reposition patient  - Assist with mobility/ambulation  - Relieve pressure over bony prominences  - Avoid friction and shearing  - Provide appropriate hygiene as needed including keeping skin clean and dry  - Evaluate need for skin moisturizer/barrier cream  - Collaborate with interdisciplinary team   - Patient/family teaching  - Consider wound care consult   Outcome: Progressing

## 2025-01-22 NOTE — ASSESSMENT & PLAN NOTE
Etiology:  Suspect due to diabetic nephropathy, hypertensive nephrosclerosis, arteriolar nephrosclerosis, age-related nephron loss  Baseline creatinine previously 2.3-2.6, recently low 3s since July '24  Outpatient Nephrologist: Dr. Rey     MRN:  [id number]

## 2025-01-22 NOTE — ASSESSMENT & PLAN NOTE
Secondary to acute massive pulmonary embolus  Cardiac arrest in ED after CTA  Intubated and required 3 pressors  Now extubated, off pressors  Management as outlined above

## 2025-01-22 NOTE — ASSESSMENT & PLAN NOTE
Etiology: Suspect due to ATN in the setting of cardiogenic shock, cardiac arrest, hypotension and IV contrast exposure with autoregulatory dysfunction with ARB  Baseline creatinine low threes.  Patient given 1 dose of IV Lasix 40 mg in ICU 1/21 with good response  Per nephrology, no urgent need for dialysis, but states that patient and wife would not be agreeable to dialysis in the future  Continue to hold losartan and Farxiga per nephrology  Check CXR now.  If evidence of volume overload, may give Lasix 40 mg IV x 1 pending discussion with nephrology   Start D5W due to hypernatremia initiated by nephro  Strict I/Os, daily weights  Avoid nephrotoxins, NSAIDs, further IV contrast as able  Avoid hypotension.  Maintain MAP >65     Angelika James

## 2025-01-22 NOTE — PROGRESS NOTES
Progress Note - Hospitalist   Name: Jeffry Almanzar 89 y.o. male I MRN: [id number]  Unit/Bed#: S -01 I Date of Admission: 1/17/2025   Date of Service: 1/22/2025 I Hospital Day: 4    Assessment & Plan  Acute saddle pulmonary embolism with acute cor pulmonale (HCC)  MRI brain negative for CVA or concern for hemorrhage.  Started on Eliquis 10 mg twice daily for 7 days followed by Eliquis 5 mg twice daily indefinitely  Discussed need for anticoagulation with family at length given PE  Hypoxia  This afternoon, patient satting at 84% on room air with labored breathing.  Has notable rales in bilateral lung fields.  Patient has wet cough without sputum production.  Unable to adequately suctioned out secretions    PLAN:  Per speech therapy, will keep patient n.p.o. and obtain VBS tomorrow  Placed patient on 2 L nasal cannula and changed sitting position which resulted in 94% O2  Will consider IV Lasix depending on results of chest x-ray and discussion with nephrology  Suction any secretions as able  Concern for volume overload versus difficulty managing secretions  Disoriented to time  Per wife, patient has some degree of dementia but is unclear of specifics.  Patient oriented to self and place but unaware of year and month.  Patient incorrectly stated the number of years he has been  to his wife during time of exam  Per wife, unsure if patient is completely aware of what is going on and is unsure if patient would truly want to except intubation a second time    PLAN:  Will leave CODE STATUS at level 2 at this time  Neuropsych evaluation pending to determine patient's capacity for medical decision making  We will have further discussions with family and patient depending on overall prognosis and goals of care  SOFÍA (acute kidney injury) (HCC)  Etiology: Suspect due to ATN in the setting of cardiogenic shock, cardiac arrest, hypotension and IV contrast exposure with autoregulatory dysfunction with ARB  Baseline  creatinine low threes.  Patient given 1 dose of IV Lasix 40 mg in ICU 1/21 with good response  Per nephrology, no urgent need for dialysis, but states that patient and wife would not be agreeable to dialysis in the future  Continue to hold losartan and Farxiga per nephrology  Check CXR now.  If evidence of volume overload, may give Lasix 40 mg IV x 1 pending discussion with nephrology   Start D5W due to hypernatremia initiated by nephro  Strict I/Os, daily weights  Avoid nephrotoxins, NSAIDs, further IV contrast as able  Avoid hypotension.  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 Appreciate recommendations   Benign 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 ([facility])  Lab Results   Component Value Date    EGFR 14 01/22/2025    EGFR 12 01/21/2025    EGFR 12 01/20/2025    CREATININE 3.59 (H) 01/22/2025    CREATININE 3.89 (H) 01/21/2025    CREATININE 3.85 (H) 01/20/2025   Holding Losartan and Farxiga per nephro due to elevated Cr   Hydralazine 5 mg q 6 hrs for BP >180/110   Initially allowed for permissive hypertension due to concern for stroke.  However, patient's MRI does not show concern for infarct.  Will aim for normotension  CKD (chronic kidney disease) stage 4, GFR 15-29 ml/min ([facility])  Lab Results   Component Value Date    EGFR 14 01/22/2025    EGFR 12 01/21/2025    EGFR 12 01/20/2025    CREATININE 3.59 (H) 01/22/2025    CREATININE 3.89 (H) 01/21/2025    CREATININE 3.85 (H) 01/20/2025   Per patient and patient's wife,  would not be interested in dialysis   Nephro on board. Appreciate recommendations  Diabetic nephropathy associated with type 2 diabetes mellitus (HCC)  Lab Results   Component Value Date    HGBA1C 6.9 (H) 01/18/2025       Recent Labs     01/21/25  1800 01/22/25  0010 01/22/25  0721 01/22/25  1333   POCGLU 122 137 134 123       Blood Sugar Average: Last 72 hrs:  (P) [id number]  Recent Labs     01/20/25  0454 01/20/25  0548 01/21/25  0505 01/21/25  1156 01/22/25  0010 01/22/25  0619 01/22/25  0721 01/22/25  1333   POCGLU  --    < > 133   < > 137  --  134 123   GLUC 214*  --  129  --   --  131  --   --     < > = values in this interval not displayed.     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  MRI brain and MRA negative for infarct, high-grade stenosis, and LVO  Will aim for normotension.  As needed hydralazine for BP greater than 180/110  Avoid losartan and Farxiga per nephro given kidney function   Cardiac arrest (HCC)  Pt went into [place] in ED 2/2 PE  ECHO with EF of 65% in September, ECHO with left ventricular ejection fraction is 55% (1/20)  Secondary to acute massive pulmonary embolus  Cardiac arrest in ED after CTA evaluating for pulmonary embolus.  Asystole for 2 minutes requiring CPR and intubation s/P extubation 1/21/2025    PLAN:  Changed heparin gtt to Eliquis 10 mg for 7 days followed by Eliquis 5 mg BID indefinitely     Normal anion gap metabolic acidosis  Most recent VBG pCO2 30.8, pH 7.4, bicarb 18.4  AG closed. Continue to monitor   Retinal hemorrhage noted on examination of left eye  Found on head CT  1/21.  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  Instructed patient to follow-up with ophthalmologist in outpatient setting  Hypernatremia  Sodium elevated at 149.  Continuing to trend up.  Nephrology on board.  Started on D5 with frequent BMP checks  IV Lasix 40 mg per nephrology used judiciously pending chest x-ray    VTE Pharmacologic Prophylaxis:   High Risk (Score >/= 5) - Pharmacological DVT Prophylaxis Ordered: apixaban (Eliquis). Sequential Compression Devices Ordered.    Mobility:   Basic Mobility Inpatient Raw Score: 8  JH-HLM Goal: 3: Sit at edge of bed  JH-HLM Achieved: 2: Bed activities/Dependent transfer  JH-HLM Goal NOT achieved. Continue with multidisciplinary rounding and encourage appropriate mobility to improve upon JH-HLM goals.    Patient Centered Rounds: I performed bedside rounds with nursing staff today.   Discussions with Specialists or Other Care Team Provider: Nephrology    Education and Discussions with Family / Patient: Updated  (wife, son, daughter, and daughter in law) at bedside.    Current Length of Stay: 4 day(s)  Current Patient Status: Inpatient   Certification Statement: The patient will continue to require additional inpatient hospital stay due to SOFÍA, massive PE, increased oxygen demand  Discharge Plan: Anticipate discharge in 48-72 hrs to discharge location to be determined pending rehab evaluations.    Code Status: Level 2 - DNAR: but accepts endotracheal intubation    Subjective   No overnight events.  Discussed CODE STATUS at length with patient and patient's family.  Wife states that patient may not completely understand what is going on.  When asked patient, patient states he would except intubation, however wife states that patient had a very difficult time with intubation and extubation this past week and is not sure  if patient would truly be interested in intubation again.  Will obtain neuropsych evaluation to determine patient's capacity.    Objective :  Temp:  [97.5 °F (36.4 °C)-100.1 °F (37.8 °C)] 100.1 °F (37.8 °C)  HR:  [84-91] 87  BP: (151-180)/(74-97) 171/93  Resp:  [18-22] 19  SpO2:  [88 %-95 %] 90 %  O2 Device: Nasal cannula  Nasal Cannula O2 Flow Rate (L/min):  [2 L/min] 2 L/min    Body mass index is 23.48 kg/m².     Input and Output Summary (last 24 hours):     Intake/Output Summary (Last 24 hours) at 1/22/2025 1538  Last data filed at 1/22/2025 1201  Gross per 24 hour   Intake 62.1 ml   Output 2400 ml   Net -2337.9 ml       Physical Exam  Vitals and nursing note reviewed.   Constitutional:       General: He is not in acute distress.     Appearance: He is well-developed.   HENT:      Head: Normocephalic and atraumatic.   Eyes:      Conjunctiva/sclera: Conjunctivae normal.   Cardiovascular:      Rate and Rhythm: Normal rate and regular rhythm.      Heart sounds: No murmur heard.  Pulmonary:      Breath sounds: Rales present.   Abdominal:      Palpations: Abdomen is soft.      Tenderness: There is no abdominal tenderness.   Musculoskeletal:         General: Tenderness present. No swelling.      Cervical back: Neck supple.      Comments: Mild bilateral calf tenderness   Skin:     General: Skin is warm and dry.      Capillary Refill: Capillary refill takes less than 2 seconds.   Neurological:      Mental Status: He is alert. He is disoriented.      Motor: Weakness present.      Comments: Oriented to self and place.  Disoriented to month and year.  Residual right-sided weakness from prior CVA   Psychiatric:         Mood and Affect: Mood normal.         Lines/Drains:  Lines/Drains/Airways       Active Status       Name Placement date Placement time Site Days    Urethral Catheter 01/17/25  0000  --  5                  Urinary Catheter:  Goal for removal: Remove after 48 hrs of I/O monitoring           Telemetry:  Telemetry  Orders (From admission, onward)               24 Hour Telemetry Monitoring  Continuous x 24 Hours (Telem)        Expiring   Question:  Reason for 24 Hour Telemetry  Answer:  Pulmonary Embolism                     Telemetry Reviewed: Normal Sinus Rhythm  Indication for Continued Telemetry Use: Acute MI/Unstable Angina/Rule out ACS               Lab Results: I have reviewed the following results:   Results from last 7 days   Lab Units 01/22/25  0619 01/21/25  0857 01/21/25  0505   WBC Thousand/uL 9.33  --  12.14*   HEMOGLOBIN g/dL 8.6*   < > 8.3*   HEMATOCRIT % 26.7*   < > 24.5*   PLATELETS Thousands/uL 125*  --  95*   BANDS PCT %  --   --  2   SEGS PCT % 76*  --   --    LYMPHO PCT % 7*  --  9*   MONO PCT % 16*  --  13*   EOS PCT % 0  --  1    < > = values in this interval not displayed.     Results from last 7 days   Lab Units 01/22/25  0619 01/21/25  0505   SODIUM mmol/L 149* 143   POTASSIUM mmol/L 3.6 3.8   CHLORIDE mmol/L 117* 117*   CO2 mmol/L 19* 16*   BUN mg/dL 69* 68*   CREATININE mg/dL 3.59* 3.89*   ANION GAP mmol/L 13 10   CALCIUM mg/dL 8.8 8.3*   ALBUMIN g/dL  --  3.2*   TOTAL BILIRUBIN mg/dL  --  1.02*   ALK PHOS U/L  --  58   ALT U/L  --  159*   AST U/L  --  36   GLUCOSE RANDOM mg/dL 131 129     Results from last 7 days   Lab Units 01/19/25  1411   INR  1.61*     Results from last 7 days   Lab Units 01/22/25  1333 01/22/25  0721 01/22/25  0010 01/21/25  1800 01/21/25  1156 01/21/25  0505 01/21/25  0014 01/20/25  1805 01/20/25  1140 01/20/25  0548 01/20/25  0001 01/19/25  1807   POC GLUCOSE mg/dl 123 134 137 122 128 133 129 140 163* 209* 223* 217*     Results from last 7 days   Lab Units 01/18/25  0300   HEMOGLOBIN A1C % 6.9*     Results from last 7 days   Lab Units 01/19/25  1408 01/19/25  0818 01/18/25  0742 01/18/25  0451 01/18/25  0300 01/18/25  0041   LACTIC ACID mmol/L 0.8 2.3* 3.0* 3.9*   < > 9.0*   PROCALCITONIN ng/ml  --   --   --   --   --  0.23    < > = values in this interval not displayed.        Recent Cultures (last 7 days):               Last 24 Hours Medication List:     Current Facility-Administered Medications:     apixaban (ELIQUIS) tablet 10 mg, BID    artificial tear ophthalmic ointment, HS    chlorhexidine (PERIDEX) 0.12 % oral rinse 15 mL, Q12H MARQUIS    dextrose 5 % infusion, Continuous    dorzolamide-timolol (COSOPT) 2-0.5 % ophthalmic solution 1 drop, BID    hydrALAZINE (APRESOLINE) injection 5 mg, Q6H PRN    insulin lispro (HumALOG/ADMELOG) 100 units/mL subcutaneous injection 2-12 Units, Q6H **AND** Fingerstick Glucose (POCT), Q6H    Latanoprostene Bunod 0.024 % SOLN 1 drop, HS    polyethylene glycol (MIRALAX) packet 17 g, Daily    prednisoLONE acetate (PRED FORTE) 1 % ophthalmic suspension 1 drop, TID    senna-docusate sodium (SENOKOT S) 8.6-50 mg per tablet 1 tablet, BID    tetracaine 0.5 % ophthalmic solution 2 drop, Once    Administrative Statements   Today, Patient Was Seen By: Jamaica Jones DO      **Please Note: This note may have been constructed using a voice recognition system.**

## 2025-01-22 NOTE — ASSESSMENT & PLAN NOTE
Sodium elevated at 149.  Continuing to trend up.  Nephrology on board.  Started on D5 with frequent BMP checks  IV Lasix 40 mg per nephrology used judiciously pending chest x-ray

## 2025-01-22 NOTE — ASSESSMENT & PLAN NOTE
MRI brain negative for CVA or concern for hemorrhage.  Started on Eliquis 10 mg twice daily for 7 days followed by Eliquis 5 mg twice daily indefinitely  Discussed need for anticoagulation with family at length given PE

## 2025-01-22 NOTE — ASSESSMENT & PLAN NOTE
Lab Results   Component Value Date    EGFR 14 01/22/2025    EGFR 12 01/21/2025    EGFR 12 01/20/2025    CREATININE 3.59 (H) 01/22/2025    CREATININE 3.89 (H) 01/21/2025    CREATININE 3.85 (H) 01/20/2025   Mgmt per inpt team

## 2025-01-22 NOTE — ASSESSMENT & PLAN NOTE
Per wife, patient has some degree of dementia but is unclear of specifics.  Patient oriented to self and place but unaware of year and month.  Patient incorrectly stated the number of years he has been  to his wife during time of exam  Per wife, unsure if patient is completely aware of what is going on and is unsure if patient would truly want to except intubation a second time    PLAN:  Will leave CODE STATUS at level 2 at this time  Neuropsych evaluation pending to determine patient's capacity for medical decision making  We will have further discussions with family and patient depending on overall prognosis and goals of care

## 2025-01-22 NOTE — PROGRESS NOTES
Progress Note - Nephrology   Name: Jeffry Almanzar 89 y.o. male I MRN: [id number]  Unit/Bed#: S -01 I Date of Admission: 1/17/2025   Date of Service: 1/22/2025 I Hospital Day: 4       89 year old male with CKD 4, HTN, HLD, DM2, hx CVA, bladder ca p/w unresponsiveness by EMS with saddle PE, cardiogenic shock and cardiac arrest.  Nephrology consulted for management of SOFÍA on CKD.   Assessment & Plan  SOFÍA (acute kidney injury) (HCC)  Etiology: Suspect due to ATN in the setting of cardiogenic shock, cardiac arrest, hypotension and IV contrast exposure with autoregulatory dysfunction with  ARB  Admission creatinine 3.08 on 1/18/2025.  Today's creatinine 3.59, trending down.  Peak creatinine 3.89 on 1/21/2025  baseline creatinine low 3s since July 2024  +CTA on 1/18/2025  S/p lasix 40 mg IV 1/21 with good response  nonoliguric  Renal function stable and improving  No urgent indication for renal replacement therapy (dialysis).  However, patient and wife previously and continue to express that they would not be agreeable to dialysis even if it means not surviving.  Continue to hold losartan and Farxiga  Check CXR now.  If evidence of volume overload, may give Lasix 40 mg IV x 1  Start D5W due to hypernatremia as outlined below  Strict I/Os, daily weights  Avoid nephrotoxins, NSAIDs, further IV contrast if possible  Avoid hypotension.  Maintain MAP >65  Trend BMP in a.m.  Adjust meds to appropriate GFR  Optimize hemodynamic status to avoid delay in renal recovery  Discussed with wife and family at bedside.  All questions answered.  With primary team we agreed to consider additional dose of Lasix given respiratory status.    CKD (chronic kidney disease) stage 4, GFR 15-29 ml/min ([facility])  Etiology:  Suspect due to diabetic nephropathy, hypertensive nephrosclerosis, arteriolar nephrosclerosis, age-related nephron loss  Baseline creatinine previously 2.3-2.6, recently low 3s since July '24  Outpatient Nephrologist:   Rey    Hypernatremia  Serum sodium 149 today, trending up  Start D5W at 50 mL/h x 10 hours  Trend BMP in a.m.  Normal anion gap metabolic acidosis  Presenting with AGMA with serum bicarb 15, AG 17 in the setting of cardiac arrest.  Gap closed  Serum bicarb 19, AG 13 today  Repeat VBG today 7.39/31/109/18/-5.7, improving   Continue to monitor  Acute saddle pulmonary embolism with acute cor pulmonale (HCC)  P/w unresponsiveness from home via EMS  With associated acute hypoxic respiratory failure, cardiac arrest and cardiogenic shock  CTA with saddle embolus with right heart strain  Extubated, off pressors and stable hemodynamics, transferred out of ICU  On heparin drip  Management per primary team  Cardiac arrest ([facility])  Secondary to acute massive pulmonary embolus  Cardiac arrest in ED after CTA  Intubated and required 3 pressors  Now extubated, off pressors  Management as outlined above  Benign 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 ([facility])  BP hypertensive  Hypotensive with arrest on presentation, requiring 3 pressors.  Now off pressors.  Repeat echo 1/20/2025: EF 55% with G1DD, mild MR, mild to moderate TR RVSP 49  Volume status euvolemic  Home Rx: Losartan 25 mg daily  Current Rx: none  Continue to hold losartan.  Avoid ACEi/ARB in setting of SOFÍA  Trend BP and consider PRN doses of IV meds  Optimize hemodynamic status to avoid delay in renal recovery  Recommend hold parameters on antihypertensive's for SBP <130 mmHg.  Avoid hypotension or fluctuations in blood pressure.  Maintain MAP >65  Continue to trend    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  (HCC)  Hgb 8.6, below goal but trending up goal >10  Iron studies 9/9/2024: Iron saturation 26%, ferritin 317  continue to trend  Transfuse for Hgb <7.0  management per primary team    Chronic kidney disease-mineral and bone disorder  Calcium stable  Phosphorus 4.0, at goal  PTH 55, at goal  continue to monitor and follow phosphorus, PTH, calcium,  magnesium as outpatient   Diabetic nephropathy associated with type 2 diabetes mellitus (HCC)  Lab Results   Component Value Date    HGBA1C 6.9 (H) 01/18/2025   Stable  On Farxiga as outpatient.  Continue to hold in setting of SOFÍA  continue to optimize glycemic control  management per primary team    IgM lambda paraproteinemia  Followed as outpatient  Retinal hemorrhage noted on examination of left eye  Seen by ophthalmology    Plan Summary:  -Renal function improving  -Start D5W at 50 mL/h x 10 hours for hypernatremia  -CXR.  If evidence of volume overload, may consider giving Lasix 40 mg IV x 1  -BMP, VBG in a.m.  -Heparin drip for PE  -Discussed with primary team and we agree to proceed with D5W and CXR.    SUBJECTIVE:  Patient sleepy but awakens and answers questions appropriately.  Family at bedside.  Creatinine trending down.  Adequate urine output.  BP stable but remains hypertensive.  Serum sodium trending up and remains n.p.o. for speech eval.  VSS    A complete review of systems was performed. Pertinent positives and negatives noted in the HPI. Otherwise the review of systems was negative.  OBJECTIVE:  Current Weight: Weight - Scale: 64 kg (141 lb 1.5 oz)  Vitals:    01/22/25 0149 01/22/25 0600 01/22/25 0715 01/22/25 1325   BP: 151/95  (!) 173/97 (!) 171/93   BP Location:   Left arm    Pulse: 89  91 87   Resp: 18  19    Temp: 97.5 °F (36.4 °C)  100.1 °F (37.8 °C)    TempSrc:   Oral    SpO2: (!) 88%  95% 90%   Weight:  64 kg (141 lb 1.5 oz)     Height:           Intake/Output Summary (Last 24 hours) at 1/22/2025 1438  Last data filed at 1/22/2025 1201  Gross per 24 hour   Intake 62.1 ml   Output 2400 ml   Net -2337.9 ml       General:  Awake, alert, appears comfortable and in no acute distress.   Skin:  No rash, warm, poor skin turgor   Eyes:  PERRL, EOMI, sclerae nonicteric.  no periorbital edema   ENT: Dry mucous membranes  Neck:  Trachea midline, symmetric.  No JVD.  Chest: Coarse breath sounds  bilaterally.  + Rhonchi  CVS:  Regular rate and rhythm without murmur, gallop or rub.  S1 and S2 identified and normal.  No S3, S4.   Abdomen:  Soft, nontender, nondistended without masses.  Normal bowel sounds x 4 quadrants.  No bruit.  Extremities:  Warm, pink, motor and sensory intact and well perfused.  No cyanosis, pallor.  No BLE edema.  Neuro: Sleepy, awake, alert, oriented x3.  Grossly intact  Psych:  Appropriate affect.  Sleepy  : Mcgee catheter in place with draining dilute urine      Medications:    Current Facility-Administered Medications:     artificial tear ophthalmic ointment, , Both Eyes, HS, Eloise Rodriguez MD, Given at 01/21/25 2106    chlorhexidine (PERIDEX) 0.12 % oral rinse 15 mL, 15 mL, Mouth/Throat, Q12H MARQUIS, Eloise Rodriguez MD, 15 mL at 01/21/25 2106    dorzolamide-timolol (COSOPT) 2-0.5 % ophthalmic solution 1 drop, 1 drop, Both Eyes, BID, Eloise Rodirguez MD, 1 drop at 01/22/25 0837    heparin (porcine) 25,000 units in 0.45% NaCl 250 mL infusion (premix), 3-30 Units/kg/hr (Order-Specific), Intravenous, Titrated, Pawan Gtz MD, Last Rate: 5.4 mL/hr at 01/22/25 1334, 9 Units/kg/hr at 01/22/25 1334    heparin (porcine) injection 2,400 Units, 2,400 Units, Intravenous, Q6H PRN, Pawan Gtz MD    heparin (porcine) injection 4,800 Units, 4,800 Units, Intravenous, Once, Pawan Gtz MD    heparin (porcine) injection 4,800 Units, 4,800 Units, Intravenous, Q6H PRN, Pawan Gtz MD    [Held by provider] hydrALAZINE (APRESOLINE) injection 5 mg, 5 mg, Intravenous, Q6H PRN, Jamaica Jones DO    insulin lispro (HumALOG/ADMELOG) 100 units/mL subcutaneous injection 2-12 Units, 2-12 Units, Subcutaneous, Q6H, 2 Units at 01/20/25 1304 **AND** Fingerstick Glucose (POCT), , , Q6H, Eloise Rodriguez MD    Latanoprostene Bunod 0.024 % SOLN 1 drop, 1 drop, Both Eyes, HS, Eloise Rodriguez MD, 1 drop at 01/21/25 3000    polyethylene glycol (MIRALAX) packet 17 g, 17 g, Oral, Daily, Eloise Rodriguez MD, 17 g at 01/20/25 150     prednisoLONE acetate (PRED FORTE) 1 % ophthalmic suspension 1 drop, 1 drop, Left Eye, TID, Eloise Rodriguez MD, 1 drop at 01/22/25 0836    senna-docusate sodium (SENOKOT S) 8.6-50 mg per tablet 1 tablet, 1 tablet, Oral, BID, Eloise Rodriguez MD    tetracaine 0.5 % ophthalmic solution 2 drop, 2 drop, Both Eyes, Once, Jamaica Jones,     Laboratory Results:  Results from last 7 days   Lab Units 01/22/25  0619 01/21/25  1955 01/21/25  0857 01/21/25  0505 01/20/25  1931 01/20/25  1305 01/20/25  0454 01/19/25  0726 01/19/25  0531 01/18/25  0451 01/18/25  0041 01/18/25  0041 01/17/25  2337   WBC Thousand/uL 9.33  --   --  12.14*  --   --  12.65*  --  16.80* 12.09*  --  9.70  --    HEMOGLOBIN g/dL 8.6* 8.8* 8.2* 8.3* 8.4* 8.7* 8.2*   < > 6.0* 8.6*   < > 8.2*  --    I STAT HEMOGLOBIN g/dl  --   --   --   --   --   --   --   --   --   --   --   --  8.8*   HEMATOCRIT % 26.7* 27.1* 24.8* 24.5* 25.1* 26.4* 24.6*   < > 18.8* 27.7*   < > 27.0*  --    HEMATOCRIT, ISTAT %  --   --   --   --   --   --   --   --   --   --   --   --  26*   PLATELETS Thousands/uL 125*  --   --  95*  --   --  76*  --  99* 95*  --  93*  --    SODIUM mmol/L 149*  --   --  143  --   --  140  --  140 134*  --  139  --    POTASSIUM mmol/L 3.6  --   --  3.8  --   --  4.3  --  4.0 4.9  --  4.4  --    CHLORIDE mmol/L 117*  --   --  117*  --   --  115*  --  111* 107  --  107  --    CO2 mmol/L 19*  --   --  16*  --   --  17*  --  15* 18*  --  15*  --    CO2, I-STAT mmol/L  --   --   --   --   --   --   --   --   --   --   --   --  15*   BUN mg/dL 69*  --   --  68*  --   --  62*  --  45* 41*  --  39*  --    CREATININE mg/dL 3.59*  --   --  3.89*  --   --  3.85*  --  3.38* 2.73*  --  3.08*  --    CALCIUM mg/dL 8.8  --   --  8.3*  --   --  7.3*  --  7.7* 8.2*  --  8.6  --    MAGNESIUM mg/dL  --   --   --   --   --   --  2.2  --  2.2 2.4  --  2.4  --    PHOSPHORUS mg/dL  --   --   --   --   --   --   --   --  4.0 3.8  --   --   --    GLUCOSE, ISTAT mg/dl  --   --   --    --   --   --   --   --   --   --   --   --  403*    < > = values in this interval not displayed.       I have personally reviewed the blood work as stated above and in my note.  I have personally reviewed internal Medicine, co-consultants and previous nephrology notes.

## 2025-01-22 NOTE — ASSESSMENT & PLAN NOTE
Lab Results   Component Value Date    EGFR 14 01/22/2025    EGFR 12 01/21/2025    EGFR 12 01/20/2025    CREATININE 3.59 (H) 01/22/2025    CREATININE 3.89 (H) 01/21/2025    CREATININE 3.85 (H) 01/20/2025   Holding Losartan and Farxiga per nephro due to elevated Cr   Hydralazine 5 mg q 6 hrs for BP >180/110   Initially allowed for permissive hypertension due to concern for stroke.  However, patient's MRI does not show concern for infarct.  Will aim for normotension

## 2025-01-22 NOTE — ASSESSMENT & PLAN NOTE
Lab Results   Component Value Date    EGFR 14 01/22/2025    EGFR 12 01/21/2025    EGFR 12 01/20/2025    CREATININE 3.59 (H) 01/22/2025    CREATININE 3.89 (H) 01/21/2025    CREATININE 3.85 (H) 01/20/2025   Avoid nephrotoxic medications as able  Na elevated at 149   Consider IV diuresis given patient's poor respiratory status   S/p lasix 40 mg IV 1/21 with good response  Updated CXR pending   Continue to hold losartan and Farxiga per nephro   Nephrology on board. Appreciate recommendations

## 2025-01-22 NOTE — PHYSICAL THERAPY NOTE
Physical Therapy Cancellation Note                 01/22/25 1308   PT Last Visit   PT Visit Date 01/22/25   Note Type   Note Type Cancelled Session   Cancel Reasons Patient off floor/test   Assessment   Assessment PT intervention is cx at this time as pt is off the floor MRI. Pt will follow and attempt to see pt when PT schedule will allow         Lorne Lr

## 2025-01-22 NOTE — ASSESSMENT & PLAN NOTE
Lab Results   Component Value Date    HGBA1C 6.9 (H) 01/18/2025   Mgmt per inpt team    Recent Labs     01/21/25  1156 01/21/25  1800 01/22/25  0010 01/22/25  0721   POCGLU 128 122 137 134   Mgmt per inpt team    Blood Sugar Average: Last 72 hrs:  (P) [id number]  Mgmt per inpt team

## 2025-01-22 NOTE — ASSESSMENT & PLAN NOTE
BP hypertensive  Hypotensive with arrest on presentation, requiring 3 pressors.  Now off pressors.  Repeat echo 1/20/2025: EF 55% with G1DD, mild MR, mild to moderate TR RVSP 49  Volume status euvolemic  Home Rx: Losartan 25 mg daily  Current Rx: none  Continue to hold losartan.  Avoid ACEi/ARB in setting of SOFÍA  Trend BP and consider PRN doses of IV meds  Optimize hemodynamic status to avoid delay in renal recovery  Recommend hold parameters on antihypertensive's for SBP <130 mmHg.  Avoid hypotension or fluctuations in blood pressure.  Maintain MAP >65  Continue to trend

## 2025-01-22 NOTE — ASSESSMENT & PLAN NOTE
Presenting with AGMA with serum bicarb 15, AG 17 in the setting of cardiac arrest.  Gap closed  Serum bicarb 19, AG 13 today  Repeat VBG today 7.39/31/109/18/-5.7, improving   Continue to monitor

## 2025-01-22 NOTE — ASSESSMENT & PLAN NOTE
P/w unresponsiveness from home via EMS  With associated acute hypoxic respiratory failure, cardiac arrest and cardiogenic shock  CTA with saddle embolus with right heart strain  Extubated, off pressors and stable hemodynamics, transferred out of ICU  On heparin drip  Management per primary team

## 2025-01-22 NOTE — ASSESSMENT & PLAN NOTE
Lab Results   Component Value Date    HGBA1C 6.9 (H) 01/18/2025       Recent Labs     01/21/25  1800 01/22/25  0010 01/22/25  0721 01/22/25  1333   POCGLU 122 137 134 123       Blood Sugar Average: Last 72 hrs:  (P) [id number]  Recent Labs     01/20/25  0454 01/20/25  0548 01/21/25  0505 01/21/25  1156 01/22/25  0010 01/22/25  0619 01/22/25  0721 01/22/25  1333   POCGLU  --    < > 133   < > 137  --  134 123   GLUC 214*  --  129  --   --  131  --   --     < > = values in this interval not displayed.     Hold home regimen while inpatient and resume on discharge   Diabetic diet  Insulin regimen  SSI   Goal -180 while admitted, adjusting insulin regimen as appropriate  Monitor for hypoglycemia and treat per protocol

## 2025-01-22 NOTE — SPEECH THERAPY NOTE
"Speech Language/Pathology    Speech/Language Pathology Progress Note    Patient Name: Jeffry Almanzar  Today's Date: 1/22/2025         Subjective:  Pt seen for dysphagia tx follow up. Family present at bedside. Pt much more awake and alert. Verbalizing. Per chart review, pt had L sided tingling this am; MRI completed, no acute intracranial pathology.   Objective:  Pt trialed w/ 2 oz of HTL by tsp and 3 tsps of puree. Pt w/ adequate bolus retrieval, timely manipulation and transfer. Timely swallows w/ improved but questionable complete laryngeal excursion. Multiple swallows (x5) noted per bolus. Pt refused after 2 tsps of puree, stating \"it's hard to go down\". O2 sats remained stable and no coughing noted.   Wife asking about swabs or ice chips. Pt trialed w/ 1 ice chip, delayed moist, productive coughing noted x2.      Assessment:  Pt cont w/ pharyngeal dysphagia symptoms w/ possible aspiration risk.    Plan/Recommendations:  Cont NPO  Rec VBS to assess swallowing and determine safety of po      Carole Aragon MA CCC-SLP  Speech Pathologist  Available via SecureChat      "

## 2025-01-22 NOTE — ASSESSMENT & PLAN NOTE
Lab Results   Component Value Date    EGFR 14 01/22/2025    EGFR 12 01/21/2025    EGFR 12 01/20/2025    CREATININE 3.59 (H) 01/22/2025    CREATININE 3.89 (H) 01/21/2025    CREATININE 3.85 (H) 01/20/2025   Per patient and patient's wife, would not be interested in dialysis   Nephro on board. Appreciate recommendations

## 2025-01-22 NOTE — ASSESSMENT & PLAN NOTE
Etiology: Suspect due to ATN in the setting of cardiogenic shock, cardiac arrest, hypotension and IV contrast exposure with autoregulatory dysfunction with  ARB  Admission creatinine 3.08 on 1/18/2025.  Today's creatinine 3.59, trending down.  Peak creatinine 3.89 on 1/21/2025  baseline creatinine low 3s since July 2024  +CTA on 1/18/2025  S/p lasix 40 mg IV 1/21 with good response  nonoliguric  Renal function stable and improving  No urgent indication for renal replacement therapy (dialysis).  However, patient and wife previously and continue to express that they would not be agreeable to dialysis even if it means not surviving.  Continue to hold losartan and Farxiga  Check CXR now.  If evidence of volume overload, may give Lasix 40 mg IV x 1  Start D5W due to hypernatremia as outlined below  Strict I/Os, daily weights  Avoid nephrotoxins, NSAIDs, further IV contrast if possible  Avoid hypotension.  Maintain MAP >65  Trend BMP in a.m.  Adjust meds to appropriate GFR  Optimize hemodynamic status to avoid delay in renal recovery  Discussed with wife and family at bedside.  All questions answered.  With primary team we agreed to consider additional dose of Lasix given respiratory status.

## 2025-01-22 NOTE — CONSULTS
Consultation - Neuropsychology/Psychology Department  Jeffry Almanzar 89 y.o. male MRN: [id number]  Unit/Bed#: S -01 Encounter: [id number]        Reason for Consultation:  Jeffry Almanzar is a 89 y.o. year old male who was referred for a Neuropsychological Exam to assess cognitive functioning and comment on capacity to make informed medical decisions.      History of Present Illness  Acute saddle pulmonary embolism    Physician Requesting Consult: Danny Stout MD    PROBLEM LIST:  Patient Active Problem List   Diagnosis    Asthma, mild intermittent    Essential hypertension    Glaucoma    Dyslipidemia    Mild vitamin D deficiency    Organic impotence    Primary osteoarthritis of both knees    Iliotibial band syndrome of both sides    Weight loss    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  (HCC)    Retinal vein occlusion of left eye    Malignant neoplasm of urinary bladder (HCC)    Vision loss of left eye    Neurogenic claudication    Low back pain    Spinal stenosis of lumbar region    DDD (degenerative disc disease), lumbar    Lumbar disc disease with radiculopathy    Parenchymal renal hypertension    Benign 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 ([facility])    Persistent proteinuria    CKD (chronic kidney disease) stage 4, GFR 15-29 ml/min ([facility])    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[facility])    Diabetic nephropathy associated with type 2 diabetes mellitus ([facility])    Advanced care planning/counseling discussion    IgM lambda paraproteinemia    Type 2 diabetes mellitus with stage 4 chronic kidney disease, without long-term current use of insulin (HCC)    Dementia ([facility])    CVA (cerebral vascular accident) ([facility])    SOFÍA (acute kidney injury) ([facility])    Lacunar cerebrovascular accident (CVA) ([facility])    Cardiac arrest ([facility])    Acute saddle pulmonary embolism with acute cor pulmonale ([facility])    Shock ([facility])    Chronic kidney disease-mineral and bone disorder    Normal anion gap metabolic acidosis     Retinal hemorrhage noted on examination of left eye    Hypernatremia         Historical Information   Past Medical History:   Diagnosis Date    Cataracts, bilateral      Past Surgical History:   Procedure Laterality Date    CATARACT EXTRACTION Bilateral 07/15/2012    COLONOSCOPY      CYSTOSCOPY  01/15/2018    Last assessed 17, diagnostic    HERNIA REPAIR      INSTILLATION MYTOMYCIN N/A 10/25/2017    Procedure: INSTILLATION OF MITOMYCIN C;  Surgeon: Danish Esposito MD;  Location: [facility];  Service: Urology    CA CYSTO W/REMOVAL OF LESIONS SMALL N/A 10/25/2017    Procedure: CYSTOSCOPY; BLADDER BIOPSY WITH FULGERATION;  Surgeon: Danish Esposito MD;  Location: [facility];  Service: Urology    TONSILLECTOMY      TRANSURETHRAL RESECTION OF BLADDER TUMOR N/A 10/25/2017    Procedure: TUR BLADDER TUMOR;  Surgeon: Danish Esposito MD;  Location: [facility];  Service: Urology    WISDOM TOOTH EXTRACTION       Social History   Social History     Substance and Sexual Activity   Alcohol Use Not Currently    Comment: quit 23 years ago     Social History     Substance and Sexual Activity   Drug Use No     Social History     Tobacco Use   Smoking Status Former    Current packs/day: 0.00    Types: Cigarettes    Quit date: 1964    Years since quittin.1   Smokeless Tobacco Never     Family History:   Family History   Problem Relation Age of Onset    Diabetes Mother        Meds/Allergies   current meds:   Current Facility-Administered Medications:     apixaban (ELIQUIS) tablet 10 mg, BID    artificial tear ophthalmic ointment, HS    chlorhexidine (PERIDEX) 0.12 % oral rinse 15 mL, Q12H MARQUIS    dextrose 5 % infusion, Continuous    dorzolamide-timolol (COSOPT) 2-0.5 % ophthalmic solution 1 drop, BID    [Held by provider] hydrALAZINE (APRESOLINE) injection 5 mg, Q6H PRN    insulin lispro (HumALOG/ADMELOG) 100 units/mL subcutaneous injection 2-12 Units, Q6H **AND** Fingerstick Glucose (POCT), Q6H    Latanoprostene Bunod  0.024 % SOLN 1 drop, HS    polyethylene glycol (MIRALAX) packet 17 g, Daily    prednisoLONE acetate (PRED FORTE) 1 % ophthalmic suspension 1 drop, TID    senna-docusate sodium (SENOKOT S) 8.6-50 mg per tablet 1 tablet, BID    tetracaine 0.5 % ophthalmic solution 2 drop, Once    Allergies   Allergen Reactions    Ace Inhibitors Cough     Other reaction(s): hyperkalemia    Penicillins GI Intolerance         Family and Social Support:   Discharge planning discussed with:: Pt's wifeAntony  Freedom of Choice: Yes      Behavioral Observations: Patient was alert, UNABLE to accurately state the year, month, day/week,day/month, city and name of hospital; patient denied depressed mood and anxiety; patient was unable to describe reason for hospitalization, medical history and does not know what he is being treated for in hospital;     Cognitive Examination    General Cognitive Functioning MMSE = Impaired7/28;     Attention/Concentration Auditory Selective Attention = Impaired; Auditory Vigilance = Impaired; Information Processing Speed = Impaired    Frontal Systems/Executive Functioning Mental Flexibility/Cognitive Control = Impaired; Working Memory = Impaired Abstract Reasoning = Impaired; Generative Ability = Impaired,     Language Functioning Confrontation naming = Within Normal Limits, Phonemic Fluency = Impaired; Semantic Retrieval = Impaired; Comprehension of Complex Ideational Material = Impaired;  Praxis = Within Normal Limits; Repetition = Within Normal Limits; Basic Reading = Impaired; Following Commands = Impaired    Memory Functioning Three word recall = Impaired 0/3    Visuo-Spatial Abilities Not Assessed    Functional Knowledge  Health & Safety Knowledge = Impaired;     Summary/Impression:  Results of Neuropsychological Exam revealed diffuse cognitive dysfunction and on a measure assessing awareness of personal health status and ability to evaluate health problems, handle medical emergencies and take safety  precautions, patient performed in the IMPAIRED range of functioning. During this encounter, patient does not appear to have capacity to make fully informed medical decisions.

## 2025-01-22 NOTE — ASSESSMENT & PLAN NOTE
Pt went into CA in ED 2/2 PE  ECHO with EF of 65% in September, ECHO with left ventricular ejection fraction is 55% (1/20)  Secondary to acute massive pulmonary embolus  Cardiac arrest in ED after CTA evaluating for pulmonary embolus.  Asystole for 2 minutes requiring CPR and intubation s/P extubation 1/21/2025    PLAN:  Changed heparin gtt to Eliquis 10 mg for 7 days followed by Eliquis 5 mg BID indefinitely

## 2025-01-22 NOTE — ASSESSMENT & PLAN NOTE
Hgb 8.6, below goal but trending up goal >10  Iron studies 9/9/2024: Iron saturation 26%, ferritin 317  continue to trend  Transfuse for Hgb <7.0  management per primary team

## 2025-01-22 NOTE — ASSESSMENT & PLAN NOTE
Found on head CT 1/21.  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  Instructed patient to follow-up with ophthalmologist in outpatient setting

## 2025-01-22 NOTE — ASSESSMENT & PLAN NOTE
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  MRI brain and MRA negative for infarct, high-grade stenosis, and LVO  Will aim for normotension.  As needed hydralazine for BP greater than 180/110  Avoid losartan and Farxiga per nephro given kidney function

## 2025-01-23 ENCOUNTER — TELEPHONE (OUTPATIENT)
Age: OVER 89
End: 2025-01-23

## 2025-01-23 ENCOUNTER — APPOINTMENT (INPATIENT)
Dept: RADIOLOGY | Facility: HOSPITAL | Age: OVER 89
DRG: 208 | End: 2025-01-23
Payer: MEDICARE

## 2025-01-23 PROBLEM — Z71.89 GOALS OF CARE, COUNSELING/DISCUSSION: Status: ACTIVE | Noted: 2025-01-23

## 2025-01-23 PROBLEM — E87.6 HYPOKALEMIA: Status: ACTIVE | Noted: 2025-01-23

## 2025-01-23 PROBLEM — Z51.5 PALLIATIVE CARE ENCOUNTER: Status: ACTIVE | Noted: 2025-01-23

## 2025-01-23 LAB
ANION GAP SERPL CALCULATED.3IONS-SCNC: 10 MMOL/L (ref 4–13)
APTT PPP: 156 SECONDS (ref 23–34)
APTT PPP: 42 SECONDS (ref 23–34)
APTT PPP: 85 SECONDS (ref 23–34)
BASE EX.OXY STD BLDV CALC-SCNC: 94.8 % (ref 60–80)
BASE EXCESS BLDV CALC-SCNC: -4.7 MMOL/L
BUN SERPL-MCNC: 69 MG/DL (ref 5–25)
CALCIUM SERPL-MCNC: 9 MG/DL (ref 8.4–10.2)
CHLORIDE SERPL-SCNC: 119 MMOL/L (ref 96–108)
CO2 SERPL-SCNC: 22 MMOL/L (ref 21–32)
CREAT SERPL-MCNC: 3.13 MG/DL (ref 0.6–1.3)
ERYTHROCYTE [DISTWIDTH] IN BLOOD BY AUTOMATED COUNT: 17.6 % (ref 11.6–15.1)
GFR SERPL CREATININE-BSD FRML MDRD: 16 ML/MIN/1.73SQ M
GLUCOSE SERPL-MCNC: 148 MG/DL (ref 65–140)
GLUCOSE SERPL-MCNC: 156 MG/DL (ref 65–140)
GLUCOSE SERPL-MCNC: 164 MG/DL (ref 65–140)
GLUCOSE SERPL-MCNC: 165 MG/DL (ref 65–140)
GLUCOSE SERPL-MCNC: 167 MG/DL (ref 65–140)
GLUCOSE SERPL-MCNC: 212 MG/DL (ref 65–140)
HCO3 BLDV-SCNC: 19 MMOL/L (ref 24–30)
HCT VFR BLD AUTO: 27.8 % (ref 36.5–49.3)
HGB BLD-MCNC: 9 G/DL (ref 12–17)
MAGNESIUM SERPL-MCNC: 2.4 MG/DL (ref 1.9–2.7)
MCH RBC QN AUTO: 29.5 PG (ref 26.8–34.3)
MCHC RBC AUTO-ENTMCNC: 32.4 G/DL (ref 31.4–37.4)
MCV RBC AUTO: 91 FL (ref 82–98)
O2 CT BLDV-SCNC: 13 ML/DL
PCO2 BLDV: 30.1 MM HG (ref 42–50)
PH BLDV: 7.42 [PH] (ref 7.3–7.4)
PLATELET # BLD AUTO: 145 THOUSANDS/UL (ref 149–390)
PMV BLD AUTO: 10.7 FL (ref 8.9–12.7)
PO2 BLDV: 97.8 MM HG (ref 35–45)
POTASSIUM SERPL-SCNC: 3.3 MMOL/L (ref 3.5–5.3)
RBC # BLD AUTO: 3.05 MILLION/UL (ref 3.88–5.62)
SODIUM SERPL-SCNC: 151 MMOL/L (ref 135–147)
WBC # BLD AUTO: 8.68 THOUSAND/UL (ref 4.31–10.16)

## 2025-01-23 PROCEDURE — 99232 SBSQ HOSP IP/OBS MODERATE 35: CPT | Performed by: INTERNAL MEDICINE

## 2025-01-23 PROCEDURE — 82948 REAGENT STRIP/BLOOD GLUCOSE: CPT

## 2025-01-23 PROCEDURE — 80048 BASIC METABOLIC PNL TOTAL CA: CPT | Performed by: PHYSICIAN ASSISTANT

## 2025-01-23 PROCEDURE — 99223 1ST HOSP IP/OBS HIGH 75: CPT | Performed by: STUDENT IN AN ORGANIZED HEALTH CARE EDUCATION/TRAINING PROGRAM

## 2025-01-23 PROCEDURE — 83735 ASSAY OF MAGNESIUM: CPT

## 2025-01-23 PROCEDURE — 85730 THROMBOPLASTIN TIME PARTIAL: CPT | Performed by: INTERNAL MEDICINE

## 2025-01-23 PROCEDURE — 85027 COMPLETE CBC AUTOMATED: CPT

## 2025-01-23 PROCEDURE — 99233 SBSQ HOSP IP/OBS HIGH 50: CPT | Performed by: INTERNAL MEDICINE

## 2025-01-23 PROCEDURE — 74230 X-RAY XM SWLNG FUNCJ C+: CPT

## 2025-01-23 PROCEDURE — 82805 BLOOD GASES W/O2 SATURATION: CPT | Performed by: PHYSICIAN ASSISTANT

## 2025-01-23 PROCEDURE — 92611 MOTION FLUOROSCOPY/SWALLOW: CPT

## 2025-01-23 PROCEDURE — 97550 CAREGIVER TRAING 1ST 30 MIN: CPT

## 2025-01-23 RX ORDER — POTASSIUM CHLORIDE 14.9 MG/ML
20 INJECTION INTRAVENOUS ONCE
Status: COMPLETED | OUTPATIENT
Start: 2025-01-23 | End: 2025-01-23

## 2025-01-23 RX ORDER — DEXTROSE MONOHYDRATE 50 MG/ML
100 INJECTION, SOLUTION INTRAVENOUS CONTINUOUS
Status: DISPENSED | OUTPATIENT
Start: 2025-01-23 | End: 2025-01-24

## 2025-01-23 RX ORDER — POTASSIUM CHLORIDE 1500 MG/1
40 TABLET, EXTENDED RELEASE ORAL ONCE
Status: DISCONTINUED | OUTPATIENT
Start: 2025-01-23 | End: 2025-01-23

## 2025-01-23 RX ADMIN — HEPARIN SODIUM 13 UNITS/KG/HR: 10000 INJECTION, SOLUTION INTRAVENOUS at 00:12

## 2025-01-23 RX ADMIN — DEXTROSE 50 ML/HR: 5 SOLUTION INTRAVENOUS at 12:14

## 2025-01-23 RX ADMIN — DORZOLAMIDE HYDROCHLORIDE AND TIMOLOL MALEATE 1 DROP: 20; 5 SOLUTION OPHTHALMIC at 12:15

## 2025-01-23 RX ADMIN — PREDNISOLONE ACETATE 1 DROP: 10 SUSPENSION/ DROPS OPHTHALMIC at 17:36

## 2025-01-23 RX ADMIN — MINERAL OIL, WHITE PETROLATUM: .03; .94 OINTMENT OPHTHALMIC at 21:22

## 2025-01-23 RX ADMIN — DORZOLAMIDE HYDROCHLORIDE AND TIMOLOL MALEATE 1 DROP: 20; 5 SOLUTION OPHTHALMIC at 17:36

## 2025-01-23 RX ADMIN — PREDNISOLONE ACETATE 1 DROP: 10 SUSPENSION/ DROPS OPHTHALMIC at 21:22

## 2025-01-23 RX ADMIN — LATANOPROSTENE BUNOD 1 DROP: 0.24 SOLUTION/ DROPS OPHTHALMIC at 22:12

## 2025-01-23 RX ADMIN — HEPARIN SODIUM 13 UNITS/KG/HR: 10000 INJECTION, SOLUTION INTRAVENOUS at 06:41

## 2025-01-23 RX ADMIN — DEXTROSE 100 ML/HR: 5 SOLUTION INTRAVENOUS at 17:47

## 2025-01-23 RX ADMIN — POTASSIUM CHLORIDE 20 MEQ: 14.9 INJECTION, SOLUTION INTRAVENOUS at 15:51

## 2025-01-23 RX ADMIN — PREDNISOLONE ACETATE 1 DROP: 10 SUSPENSION/ DROPS OPHTHALMIC at 12:15

## 2025-01-23 RX ADMIN — HEPARIN SODIUM 4800 UNITS: 1000 INJECTION INTRAVENOUS; SUBCUTANEOUS at 00:10

## 2025-01-23 RX ADMIN — HEPARIN SODIUM 4800 UNITS: 1000 INJECTION INTRAVENOUS; SUBCUTANEOUS at 13:17

## 2025-01-23 NOTE — SPEECH THERAPY NOTE
Speech Language/Pathology    Speech/Language Pathology Caregiver Education Training    Patient Name: Jeffry Almanzar  Today's Date: 1/23/2025     Problem List  Principal Problem:    Acute saddle pulmonary embolism with acute cor pulmonale (HCC)  Active Problems:    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  (HCC)    Benign 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 (HCC)    CKD (chronic kidney disease) stage 4, GFR 15-29 ml/min (HCC)    Diabetic nephropathy associated with type 2 diabetes mellitus (HCC)    SOFÍA (acute kidney injury) (HCC)    Lacunar cerebrovascular accident (CVA) (HCC)    Cardiac arrest (HCC)    Normal anion gap metabolic acidosis    Retinal hemorrhage noted on examination of left eye    Hypernatremia    Hypoxia    Disoriented to time     Pt's dtr at bedside, requesting results of VBS.  Pt sleeping/ not alert and not able to participate in discussion. Dtr declined to review images in PACS. Results of swallow study explained to dtr that pt's swallow mechanics are generally weak and he is not protecting his airway, increasing risk for  aspiration and potentially pneumonia which could lead to re-intubation. Dtr stated pt would not want to be re-intubated. Dtr asked questions about nutrition and potential improvement in swallowing, which could take a long time and require intensive speech/swallow tx. Dtr reported pt is determined to eat and will do whatever exercises are needed if we push him and tell him what would be needed for him to eat again.  NGT vs PEG explained to dtr.    Informed dtr that the physicians will be discussing options w/ pt and family.     Will remain available as needed.      Carole Aragon MA CCC-SLP  Speech Pathologist  [place] license # [id number]  [place] license # [id number]  Available via Secure Chat

## 2025-01-23 NOTE — PROGRESS NOTES
Progress Note - Nephrology   Name: Jeffry Almanzar 89 y.o. male I MRN: [id number]  Unit/Bed#: S -01 I Date of Admission: 1/17/2025   Date of Service: 1/23/2025 I Hospital Day: 5       Brief Hx:  89 year old male with CKD 4, HTN, HLD, DM2, hx CVA, bladder ca p/w unresponsiveness by EMS with saddle PE, cardiogenic shock and cardiac arrest.  Nephrology consulted for management of SOFÍA on CKD.   Assessment & Plan  SOFÍA (acute kidney injury) (HCC)  Etiology: Suspect due to ATN in the setting of cardiogenic shock, cardiac arrest, hypotension and IV contrast exposure with autoregulatory dysfunction with  ARB  Admission creatinine 3.08 on 1/18/2025.  Today's creatinine 3.13, trending down and back to baseline   Peak creatinine 3.89 on 1/21/2025  Baseline creatinine low 3s since July 2024  +CTA on 1/18/2025  S/p lasix 40 mg IV 1/21 with good response  nonoliguric  Renal function stable and essentially back to baseline  Patient and wife previously and continue to express that they would not be agreeable to dialysis even if it means not surviving.  Continue to hold losartan and Farxiga  Resume D5W @ 100 due to hypernatremia with ongoing n.p.o. status  Strict I/Os, daily weights  Avoid nephrotoxins, NSAIDs, further IV contrast if possible  Avoid hypotension.  Maintain MAP >65  Trend BMP in a.m.    CKD (chronic kidney disease) stage 4, GFR 15-29 ml/min (HCC)  Etiology:  Suspect due to diabetic nephropathy, hypertensive nephrosclerosis, arteriolar nephrosclerosis, age-related nephron loss  Baseline creatinine previously 2.3-2.6, recently low 3s since July '24  Outpatient Nephrologist: Dr. Rey    Hypernatremia  Etiology: Due to lack of free water intake due to acute issues  Serum sodium 151 today, trending up  Remains npo with failed swallow eval, VBS  Resume D5W at  increased rate of 100 mL/h  Trend BMP in a.m.  Normal anion gap metabolic acidosis  Resolved   Continue to monitor  Acute saddle pulmonary embolism with acute  cor pulmonale (HCC)  P/w unresponsiveness from home via EMS  With associated acute hypoxic respiratory failure, cardiac arrest and cardiogenic shock  CTA with saddle embolus with right heart strain  Required intubation and pressors initially  On heparin drip  Management per primary team  Cardiac arrest ([facility])  Secondary to acute massive pulmonary embolus  Management per primary team  Benign 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 ([facility])  BP hypertensive.  Repeat echo 1/20/2025: EF 55% with G1DD, mild MR, mild to moderate TR RVSP 49  Volume status euvolemic  Home Rx: Losartan 25 mg daily  Current Rx: none  Continue to hold losartan.  Avoid ACEi/ARB in setting of SOFÍA  Trend BP and consider PRN doses of IV meds  Optimize hemodynamic status to avoid delay in renal recovery  Recommend hold parameters on antihypertensive's for SBP <130 mmHg.  Avoid hypotension or fluctuations in blood pressure.  Maintain MAP >65  Continue to trend    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  (HCC)  Hgb 9.0, below goal but trending up. goal >10  Iron studies 9/9/2024: Iron saturation 26%, ferritin 317  continue to trend  Transfuse for Hgb <7.0  management per primary team    Hypokalemia  K+ 3.3  Repleted  Continue to monitor  Diabetic nephropathy associated with type 2 diabetes mellitus ([facility])  Lab Results   Component Value Date    HGBA1C 6.9 (H) 01/18/2025   Stable  On Farxiga as outpatient.  Continue to hold in setting of SOFÍA  continue to optimize glycemic control  management per primary team    Retinal hemorrhage noted on examination of left eye  Seen by ophthalmology    Plan Summary:  -Resume D5W.  Will increase to 100 mL/h  -BMP in a.m.    SUBJECTIVE:  Patient sleepy, confused at times.  Serum sodium worsening to 151 today.  Remains n.p.o., s/p VBS this a.m.  Renal function improving and back to baseline.  VSS    A complete review of systems was performed. Pertinent positives and negatives noted in the HPI. Otherwise the  review of systems was negative.  OBJECTIVE:  Current Weight: Weight - Scale: 64 kg (141 lb 1.5 oz)  Vitals:    01/22/25 1734 01/22/25 2001 01/22/25 2359 01/23/25 0709   BP:  167/84 169/91 (!) 172/96   BP Location:  Left arm     Pulse:  89 89 87   Resp:  18     Temp:  99.4 °F (37.4 °C) 98.8 °F (37.1 °C) 100 °F (37.8 °C)   TempSrc:  Oral     SpO2: 90% 90% 95% 97%   Weight:       Height:           Intake/Output Summary (Last 24 hours) at 1/23/2025 1434  Last data filed at 1/23/2025 1323  Gross per 24 hour   Intake 613.33 ml   Output 1650 ml   Net -1036.67 ml       General: Sleepy. appears comfortable and in no acute distress.  Nontoxic.  Skin:  No rash, warm, good skin turgor   Eyes:  PERRL, EOMI, sclerae nonicteric.  no periorbital edema   ENT:  Moist mucous membranes  Neck:  Trachea midline, symmetric.  No JVD.  Chest:  Clear to auscultation bilaterally without wheezes, crackles or rhonchi  CVS:  Regular rate and rhythm without murmur, gallop or rub.  S1 and S2 identified and normal.  No S3, S4.   Abdomen:  Soft, nontender, nondistended without masses.  Normal bowel sounds x 4 quadrants.  No bruit.  Extremities:  Warm, pink, motor and sensory intact and well perfused.  No cyanosis, pallor.  No BLE edema.  Neuro: Sleepy.  Awakens to voice but confused at times. grossly intact  Psych: Sleepy.  Disoriented times.      Medications:    Current Facility-Administered Medications:     artificial tear ophthalmic ointment, , Both Eyes, HS, Eloise Rodriguez MD, Given at 01/22/25 2227    chlorhexidine (PERIDEX) 0.12 % oral rinse 15 mL, 15 mL, Mouth/Throat, Q12H MARQUIS, Eloise Rodriguez MD, 15 mL at 01/21/25 2106    dextrose 5 % infusion, 100 mL/hr, Intravenous, Continuous, Laury Loredo MD, Last Rate: 100 mL/hr at 01/23/25 1433, 100 mL/hr at 01/23/25 1433    dorzolamide-timolol (COSOPT) 2-0.5 % ophthalmic solution 1 drop, 1 drop, Both Eyes, BID, Eloise Rodriguez MD, 1 drop at 01/23/25 1215    heparin (porcine) 25,000 units in 0.45%  NaCl 250 mL infusion (premix), 3-30 Units/kg/hr (Order-Specific), Intravenous, Titrated, Pawan Gtz MD, Last Rate: 10.2 mL/hr at 01/23/25 1313, 17 Units/kg/hr at 01/23/25 1313    heparin (porcine) injection 2,400 Units, 2,400 Units, Intravenous, Q6H PRN, Pawan Gtz MD    heparin (porcine) injection 4,800 Units, 4,800 Units, Intravenous, Once, Pawan Gtz MD    heparin (porcine) injection 4,800 Units, 4,800 Units, Intravenous, Q6H PRN, Pawan Gtz MD, 4,800 Units at 01/23/25 1317    hydrALAZINE (APRESOLINE) injection 5 mg, 5 mg, Intravenous, Q6H PRN, Jamaica Jones DO    insulin lispro (HumALOG/ADMELOG) 100 units/mL subcutaneous injection 2-12 Units, 2-12 Units, Subcutaneous, Q6H, 2 Units at 01/20/25 1304 **AND** Fingerstick Glucose (POCT), , , Q6H, Eloise Rodriguez MD    Latanoprostene Bunod 0.024 % SOLN 1 drop, 1 drop, Both Eyes, HS, Eloise Rodriguez MD, 1 drop at 01/22/25 2338    polyethylene glycol (MIRALAX) packet 17 g, 17 g, Oral, Daily, Eloise Rodriguez MD, 17 g at 01/20/25 1505    potassium chloride 20 mEq IVPB (premix), 20 mEq, Intravenous, Once, [place] Jones DO    prednisoLONE acetate (PRED FORTE) 1 % ophthalmic suspension 1 drop, 1 drop, Left Eye, TID, Eloise Rodriguez MD, 1 drop at 01/23/25 1215    senna-docusate sodium (SENOKOT S) 8.6-50 mg per tablet 1 tablet, 1 tablet, Oral, BID, Eloise Rodriguez MD    tetracaine 0.5 % ophthalmic solution 2 drop, 2 drop, Both Eyes, Once, Jamaica Jones DO    Laboratory Results:  Results from last 7 days   Lab Units 01/23/25  0633 01/22/25 1959 01/22/25  0619 01/21/25 1955 01/21/25  0857 01/21/25  0505 01/20/25  1931 01/20/25  1305 01/20/25  0454 01/19/25  0726 01/19/25  0531 01/18/25  0451 01/18/25  0041 01/18/25  0041 01/17/25  2337   WBC Thousand/uL 8.68  --  9.33  --   --  12.14*  --   --  12.65*  --  16.80* 12.09*  --  9.70  --    HEMOGLOBIN g/dL 9.0*  --  8.6* 8.8* 8.2* 8.3* 8.4* 8.7* 8.2*   < > 6.0* 8.6*   < > 8.2*  --    I STAT HEMOGLOBIN g/dl  --   --   --   --   --   --    --   --   --   --   --   --   --   --  8.8*   HEMATOCRIT % 27.8*  --  26.7* 27.1* 24.8* 24.5* 25.1* 26.4* 24.6*   < > 18.8* 27.7*   < > 27.0*  --    HEMATOCRIT, ISTAT %  --   --   --   --   --   --   --   --   --   --   --   --   --   --  26*   PLATELETS Thousands/uL 145*  --  125*  --   --  95*  --   --  76*  --  99* 95*  --  93*  --    SODIUM mmol/L 151* 149* 149*  --   --  143  --   --  140  --  140 134*   < > 139  --    POTASSIUM mmol/L 3.3* 3.4* 3.6  --   --  3.8  --   --  4.3  --  4.0 4.9   < > 4.4  --    CHLORIDE mmol/L 119* 117* 117*  --   --  117*  --   --  115*  --  111* 107   < > 107  --    CO2 mmol/L 22 20* 19*  --   --  16*  --   --  17*  --  15* 18*   < > 15*  --    CO2, I-STAT mmol/L  --   --   --   --   --   --   --   --   --   --   --   --   --   --  15*   BUN mg/dL 69* 72* 69*  --   --  68*  --   --  62*  --  45* 41*   < > 39*  --    CREATININE mg/dL 3.13* 3.35* 3.59*  --   --  3.89*  --   --  3.85*  --  3.38* 2.73*   < > 3.08*  --    CALCIUM mg/dL 9.0 9.0 8.8  --   --  8.3*  --   --  7.3*  --  7.7* 8.2*   < > 8.6  --    MAGNESIUM mg/dL 2.4  --   --   --   --   --   --   --  2.2  --  2.2 2.4  --  2.4  --    PHOSPHORUS mg/dL  --   --   --   --   --   --   --   --   --   --  4.0 3.8  --   --   --    GLUCOSE, ISTAT mg/dl  --   --   --   --   --   --   --   --   --   --   --   --   --   --  403*    < > = values in this interval not displayed.       I have personally reviewed the blood work as stated above and in my note.  I have personally reviewed internal Medicine, co-consultants and previous nephrology notes.

## 2025-01-23 NOTE — ASSESSMENT & PLAN NOTE
Lab Results   Component Value Date    EGFR 16 01/23/2025    EGFR 15 01/22/2025    EGFR 14 01/22/2025    CREATININE 3.13 (H) 01/23/2025    CREATININE 3.35 (H) 01/22/2025    CREATININE 3.59 (H) 01/22/2025   Per patient and patient's wife, would not be interested in dialysis   Nephro on board. Appreciate recommendations

## 2025-01-23 NOTE — ASSESSMENT & PLAN NOTE
This afternoon, patient satting at 84% on room air with labored breathing.  Has notable rales in bilateral lung fields.  Patient has wet cough without sputum production.  Unable to adequately suctioned out secretions    PLAN:  VBS moderate oral/severe pharyngeal dysphagia characterized by weak swallow mechanics, multiple swallows noted to clear pharyngeal retention with overflow penetration and aspiration observed after the swallow   Placed patient on 2 L nasal cannula and changed sitting position which resulted in 94% O2  Suction any secretions as able

## 2025-01-23 NOTE — ASSESSMENT & PLAN NOTE
P/w unresponsiveness from home via EMS  With associated acute hypoxic respiratory failure, cardiac arrest and cardiogenic shock  CTA with saddle embolus with right heart strain  Required intubation and pressors initially  On heparin drip  Management per primary team

## 2025-01-23 NOTE — ASSESSMENT & PLAN NOTE
Etiology: Suspect due to ATN in the setting of cardiogenic shock, cardiac arrest, hypotension and IV contrast exposure with autoregulatory dysfunction with  ARB  Admission creatinine 3.08 on 1/18/2025.  Today's creatinine 3.13, trending down and back to baseline   Peak creatinine 3.89 on 1/21/2025  Baseline creatinine low 3s since July 2024  +CTA on 1/18/2025  S/p lasix 40 mg IV 1/21 with good response  nonoliguric  Renal function stable and essentially back to baseline  Patient and wife previously and continue to express that they would not be agreeable to dialysis even if it means not surviving.  Continue to hold losartan and Farxiga  Resume D5W @ 100 due to hypernatremia with ongoing n.p.o. status  Strict I/Os, daily weights  Avoid nephrotoxins, NSAIDs, further IV contrast if possible  Avoid hypotension.  Maintain MAP >65  Trend BMP in a.m.

## 2025-01-23 NOTE — CONSULTS
Consultation - Palliative Care   Name: Jeffry Almanzar 89 y.o. male I MRN: [id number]  Unit/Bed#: S -01 I Date of Admission: 1/17/2025   Date of Service: 1/23/2025 I Hospital Day: 5   Inpatient consult to Palliative Care  Consult performed by: Osei Morocho MD  Consult ordered by: Jamaica Jones DO        Physician Requesting Evaluation: Danny Stout MD   Reason for Evaluation / Principal Problem: goals of care support    Assessment & Plan  Acute saddle pulmonary embolism with acute cor pulmonale (HCC)  PESI Class V, Very High Risk [age, male, h/o CA, h/o asthma, hypotensive, AMS]    CT PE [01/18/2025] acute saddle PE with large clot burden, R heart strain, calculated RV:LV ratio is 1.2.    Hs-cTn 533->777->1,070      TTE [01/20/2025] LVEF 55%, grade I diastolic dysfunction; RV moderately dilated, moderately reduced RV systolic function, hypokinesis of the basal to mid free wall; mild MR; moderate TR, RVSP 49 mmHg.  CKD (chronic kidney disease) stage 4, GFR 15-29 ml/min ([facility])  Lab Results   Component Value Date    EGFR 16 01/23/2025    EGFR 15 01/22/2025    EGFR 14 01/22/2025    CREATININE 3.13 (H) 01/23/2025    CREATININE 3.35 (H) 01/22/2025    CREATININE 3.59 (H) 01/22/2025      - SOFÍA on CKD IV, likely in the setting of severe ATN   - baseline SCr 2.0   - follows OP Nephrology   - IP Nephrology following   - no HD desired per patient's previously communicated wishes, supported/reaffirmed by family  Cardiac arrest ([facility])   - initially presented via EMS on 01/17 after patient found unresponsive with agonal respirations, bag ventilations initiated in the field. Intubated in the setting of a brief cardiac arrest with ROSC.   - in the setting of acute saddle PE with large clot burden, Cor Pulmonale  Palliative care encounter   - met with spouse and Sarah at bedside   - briefly introduced Palliative Care supports   - resides in [facility], pending clinical course, may benefit from Home Palliative  Care supports   - briefly introduced Hospice Cares philosophy    #psychosocial supports   - Antony [spouse,  65 years] [phone]   - 3 adult children   - Sarah [daughter, resides in [place], [place]]    - currently at bedside   - Jeffry [son, resides in [place]]   - Grzegorz [son, resides locally]   - 6 grandchildren, 6 great-grandchildren   - resides with spouse   - no prior  service   - retired, Maintenance work   - [employer] background   -  visiting regularly   - strong Jewish supports, prayer circles  Goals of care, counseling/discussion   - expanded discussion regarding current admission, including acute saddle PE, s/p cardiac arrest, SOFÍA on CKD IV, dysphagia and NPO status.    - overall wish to give patient supports to identify recovery/reversible etiologies, including dysphagia. Spouse clearly states that the patient would not wish to pursue NGT or PEG tube. Sarah confirms this, as known patient's values/wishes, would not wish to have a feeding tube. Concern that if pursued, patient would most likely remove in the setting of disagreement with its placement.   - family hopeful that Speech Therapy may help to optimize/coordinate swallow to advance diet for nutritional supports.   - discussed risks of aspiration, even when NPO, given requirement to tolerate own saliva/secretions. Also discussed potential life threat of aspiration events. All questions/concerns addressed.   - expanded discussion regarding advanced resuscitative supports, including  CPR/ACLS, intubation/mechanical ventilation. Spouse confirmed DNAR/DNI, Level 3, as the patient would not wish to be re-intubated nor pursue CPR/ACLS in the setting of a cardiac arrest. If such an event were to occur, focus on EOL comfort to natural death.   - will continue to follow/support as clinical course evolves.    PDMP Review         Value Time User    PDMP Reviewed  Yes 1/23/2025  1:01 PM Osei Morocho MD           I have  discussed the above management plan in detail with the primary service.   Please contact the SecureChat role for the Palliative Care service with any questions/concerns.    Decisional apparatus: Patient is not competent on my exam today. If competence is lost, patient's substitute decision maker would default to spouse by [place] Act 169.   Advance Directive / Living Will / POLST: not on file     PDMP Review: I have reviewed the patient's controlled substance dispensing history in the Prescription Drug Monitoring Program in compliance with the [facility] regulations before prescribing any controlled substances.    History of Present Illness   Jeffry Almanzar is a 89 y.o. male with a PMH of CKD IV, remote h/o bladder CA who initially presented via EMS on 01/17 after patient found unresponsive with agonal respirations, bag ventilations initiated in the field. Intubated in the setting of a brief cardiac arrest with ROSC. Hypotensive requiring vasopressor supports. Transferred to the ICU for continued management and evaluation. Transfused 2 units pRBC for a Hgb 6.0. Extubated 01/20, no further vasopressor requirement. Failed speech evaluation, NPO. LUE and LLE episodic paresthesia. CTH [01/21/2025] interval hyperdensity in the L globe worrisome for a retinal hemorrhage; no acute intracranial hemorrhage, mass effect, or edema; moderate chronic microangiopathy; stable ventricular prominence, likely 2/2 atrophy. MRI/MRA with no acute findings. Ophthalmology evaluation with likely chronic findings with no acute interventions recommended.    CT PE [01/18/2025] acute saddle PE with large clot burden, R heart strain, calculated RV:LV ratio is 1.2.    Hs-cTn 533->777->1,070      PESI Class V, Very High Risk [age, male, h/o CA, h/o asthma, hypotensive, AMS]    TTE [01/20/2025] LVEF 55%, grade I diastolic dysfunction; RV moderately dilated, moderately reduced RV systolic function, hypokinesis of the basal to mid free wall; mild MR;  moderate TR, RVSP 49 mmHg.    Lactate [] 9.0->5.6->3.9.    Today's labs significant for Na 151, K 3.3, Ca 9.0, Mg 2.4, BUN/SCr 69/3.13 [eGFR 16]; WBC 8.7, Hgb 9.0, Plt 145. VBG -> pH 7.42  pCO2 30.1  pO2 97.8  HCO3 19.0.    QTc 479 on EKG 2025  Calculated CrCl 14 mL/min   - h/o DM, new onset R-sided HF    Documented stable weight over 12 months [141 lb on 2025 and 141 lb on 2024].    Former, unknown pk-yrs history of tobacco use. Documented quit in .    Nephrology following for evaluation/management of SOFÍA on CKD IV. Likely in the setting of severe ATN. Multiple discussions with the family confirmed and consistent that the patient would not wish to pursue HD with family reiterating the support of this decision.      Review of Systems   Unable to perform ROS: Mental status change     I have reviewed the patient's PMH, PSH, Social History, Family History, Meds, and Allergies  Historical Information   Past Medical History:   Diagnosis Date    Cataracts, bilateral      Past Surgical History:   Procedure Laterality Date    CATARACT EXTRACTION Bilateral 07/15/2012    COLONOSCOPY      CYSTOSCOPY  01/15/2018    Last assessed 17, diagnostic    HERNIA REPAIR      INSTILLATION MYTOMYCIN N/A 10/25/2017    Procedure: INSTILLATION OF MITOMYCIN C;  Surgeon: Danish Esposito MD;  Location: [facility];  Service: Urology    NE CYSTO W/REMOVAL OF LESIONS SMALL N/A 10/25/2017    Procedure: CYSTOSCOPY; BLADDER BIOPSY WITH FULGERATION;  Surgeon: Danish Esposito MD;  Location: [facility];  Service: Urology    TONSILLECTOMY      TRANSURETHRAL RESECTION OF BLADDER TUMOR N/A 10/25/2017    Procedure: TUR BLADDER TUMOR;  Surgeon: Danish Esposito MD;  Location: [facility];  Service: Urology    WISDOM TOOTH EXTRACTION       Social History     Tobacco Use    Smoking status: Former     Current packs/day: 0.00     Types: Cigarettes     Quit date: 1964     Years since quittin.1    Smokeless tobacco:  Never   Vaping Use    Vaping status: Never Used   Substance and Sexual Activity    Alcohol use: Not Currently     Comment: quit 23 years ago    Drug use: No    Sexual activity: Not on file     E-Cigarette/Vaping    E-Cigarette Use Never User      E-Cigarette/Vaping Substances    Nicotine No     THC No     CBD No     Flavoring No     Other No     Unknown No      Social History     Tobacco Use    Smoking status: Former     Current packs/day: 0.00     Types: Cigarettes     Quit date: 1964     Years since quittin.1    Smokeless tobacco: Never   Vaping Use    Vaping status: Never Used   Substance and Sexual Activity    Alcohol use: Not Currently     Comment: quit 23 years ago    Drug use: No    Sexual activity: Not on file       Current Facility-Administered Medications:     artificial tear ophthalmic ointment, HS    chlorhexidine (PERIDEX) 0.12 % oral rinse 15 mL, Q12H MARQUIS    dextrose 5 % infusion, Continuous, Last Rate: 50 mL/hr (25 1214)    dorzolamide-timolol (COSOPT) 2-0.5 % ophthalmic solution 1 drop, BID    heparin (porcine) 25,000 units in 0.45% NaCl 250 mL infusion (premix), Titrated, Last Rate: 13 Units/kg/hr (25 0641)    heparin (porcine) injection 2,400 Units, Q6H PRN    heparin (porcine) injection 4,800 Units, Once    heparin (porcine) injection 4,800 Units, Q6H PRN    hydrALAZINE (APRESOLINE) injection 5 mg, Q6H PRN    insulin lispro (HumALOG/ADMELOG) 100 units/mL subcutaneous injection 2-12 Units, Q6H **AND** Fingerstick Glucose (POCT), Q6H    Latanoprostene Bunod 0.024 % SOLN 1 drop, HS    polyethylene glycol (MIRALAX) packet 17 g, Daily    potassium chloride (Klor-Con M20) CR tablet 40 mEq, Once    prednisoLONE acetate (PRED FORTE) 1 % ophthalmic suspension 1 drop, TID    senna-docusate sodium (SENOKOT S) 8.6-50 mg per tablet 1 tablet, BID    tetracaine 0.5 % ophthalmic solution 2 drop, Once  Prior to Admission Medications   Prescriptions Last Dose Informant Patient Reported? Taking?    Blood Glucose Monitoring Suppl (OneTouch Verio Reflect) w/Device KIT  Self No No   Sig: Check blood sugars twice daily. Please substitute with appropriate alternative as covered by patient's insurance. Dx: E11.65   Blood Glucose Monitoring Suppl (OneTouch Verio Reflect) w/Device KIT  Self No No   Sig: Check blood sugars once daily. Please substitute with appropriate alternative as covered by patient's insurance. Dx: E11.65   Cyanocobalamin (Vitamin B-12) 1000 MCG/15ML LIQD  Self Yes No   Sig: Take by mouth daily   Dorzolamide HCl-Timolol Mal PF 2-0.5 % SOLN  Self Yes No   Sig: INSTILL 1 DROP INTO EACH EYE TWICE DAILY   Farxiga 10 MG tablet   No No   Sig: Take 1 tablet (10 mg total) by mouth daily   OneTouch Delica Lancets 33G MISC  Self No No   Sig: Check blood sugars once daily. Please substitute with appropriate alternative as covered by patient's insurance. Dx: E11.65   Vyzulta 0.024 % SOLN  Self Yes No   Sig: Administer 1 drop to both eyes daily at bedtime   atorvastatin (LIPITOR) 40 mg tablet   No No   Sig: Take 1 tablet (40 mg total) by mouth daily   brimonidine (ALPHAGAN P) 0.15 % ophthalmic solution  Self Yes No   clopidogrel (PLAVIX) 75 mg tablet   No No   Sig: Take 1 tablet (75 mg total) by mouth daily   glucose blood (OneTouch Verio) test strip  Self No No   Sig: Check blood sugars once daily. Please substitute with appropriate alternative as covered by patient's insurance. Dx: E11.65   losartan (COZAAR) 25 mg tablet   No No   Sig: Take 1 tablet (25 mg total) by mouth daily   prednisoLONE acetate (PRED FORTE) 1 % ophthalmic suspension  Self Yes No   Sig: INSTILL 1 DROP INTO LEFT EYE TWICE DAILY      Facility-Administered Medications: None     Ace inhibitors and Penicillins    Objective :  Temp:  [98.8 °F (37.1 °C)-100 °F (37.8 °C)] 100 °F (37.8 °C)  HR:  [87-89] 87  BP: (152-172)/(84-96) 172/96  Resp:  [18] 18  SpO2:  [90 %-97 %] 97 %  O2 Device: None (Room air)    Physical Exam  Vitals and nursing note  "reviewed.   Constitutional:       Appearance: He is not diaphoretic.      Comments: Chronically ill appearing in NAD  BMI 23.5   HENT:      Head: Normocephalic and atraumatic.      Right Ear: External ear normal.      Left Ear: External ear normal.   Eyes:      Comments: No gaze preference   Cardiovascular:      Rate and Rhythm: Normal rate.   Pulmonary:      Effort: No tachypnea or respiratory distress.   Genitourinary:     Comments: Mcgee in place  Skin:     Coloration: Skin is pale.   Neurological:      Mental Status: He is lethargic.   Psychiatric:      Comments: Unable to assess           Lab Results: I have reviewed the following results:  Lab Results   Component Value Date/Time    SODIUM 151 (H) 01/23/2025 06:33 AM    SODIUM 140 11/18/2024 01:32 PM    K 3.3 (L) 01/23/2025 06:33 AM    K 4.1 11/18/2024 01:32 PM    BUN 69 (H) 01/23/2025 06:33 AM    BUN 64 (H) 11/18/2024 01:32 PM    CREATININE 3.13 (H) 01/23/2025 06:33 AM    CREATININE 2.20 (H) 06/05/2017 12:55 PM    GLUC 165 (H) 01/23/2025 06:33 AM    GLUC 165 (H) 11/18/2024 01:32 PM    CALCIUM 9.0 01/23/2025 06:33 AM    CALCIUM 9.6 11/18/2024 01:32 PM    CALCIUM 9.6 11/18/2024 01:32 PM    AST 36 01/21/2025 05:05 AM    AST 14 02/16/2024 12:42 PM     (H) 01/21/2025 05:05 AM    ALT 11 02/16/2024 12:42 PM    ALB 3.2 (L) 01/21/2025 05:05 AM    ALB 4.0 10/25/2016 11:09 AM    TP 5.8 (L) 01/21/2025 05:05 AM    TP 7.2 02/16/2024 12:42 PM    EGFR 16 01/23/2025 06:33 AM     Lab Results   Component Value Date/Time    HGB 9.0 (L) 01/23/2025 06:33 AM    WBC 8.68 01/23/2025 06:33 AM     (L) 01/23/2025 06:33 AM    INR 1.61 (H) 01/19/2025 02:11 PM    PTT 85 (H) 01/23/2025 06:33 AM     No results found for: \"[id number]\"    Imaging Results Review: I personally reviewed the following image studies/reports in PACS and discussed pertinent findings with Radiology: CT chest, CT head, MRI brain, xray(s), and Echocardiogram.  Other Study Results Review: EKG was reviewed. "     Code Status: Level 2 - DNAR: but accepts endotracheal intubation  Advance Directive and Living Will:      Power of :    POLST:      Administrative Statements   I have spent a total time of 75 minutes in caring for this patient on the day of the visit/encounter including Prognosis, Risks and benefits of tx options, Counseling / Coordination of care, Documenting in the medical record, Reviewing / ordering tests, medicine, procedures  , and Communicating with other healthcare professionals . Topics discussed with the patient / family include psychosocial support, goals of care, hospice services, supportive listening, and anticipatory guidance.

## 2025-01-23 NOTE — ASSESSMENT & PLAN NOTE
Hgb 9.0, below goal but trending up. goal >10  Iron studies 9/9/2024: Iron saturation 26%, ferritin 317  continue to trend  Transfuse for Hgb <7.0  management per primary team

## 2025-01-23 NOTE — ASSESSMENT & PLAN NOTE
Secondary to acute massive pulmonary embolus  Management per primary team   See HPI remote h/o SA via OD as a teenager per siblings

## 2025-01-23 NOTE — TELEPHONE ENCOUNTER
Patients wife calling in to let the provider know that her  has been admitted to [facility] and if she needs to speak with her she can call her mobile number

## 2025-01-23 NOTE — ASSESSMENT & PLAN NOTE
Lab Results   Component Value Date    EGFR 16 01/23/2025    EGFR 15 01/22/2025    EGFR 14 01/22/2025    CREATININE 3.13 (H) 01/23/2025    CREATININE 3.35 (H) 01/22/2025    CREATININE 3.59 (H) 01/22/2025   Holding Losartan and Farxiga per nephro due to elevated Cr   Hydralazine 5 mg q 6 hrs for BP >180/110   Initially allowed for permissive hypertension due to concern for stroke.  However, patient's MRI does not show concern for infarct.  Will aim for normotension

## 2025-01-23 NOTE — PROGRESS NOTES
Progress Note - Hospitalist   Name: Jeffry Almanzar 89 y.o. male I MRN: [id number]  Unit/Bed#: S -01 I Date of Admission: 1/17/2025   Date of Service: 1/23/2025 I Hospital Day: 5    Assessment & Plan  Acute saddle pulmonary embolism with acute cor pulmonale (HCC)  MRI brain negative for CVA or concern for hemorrhage.  Continue heparin gtt  Hypoxia  This afternoon, patient satting at 84% on room air with labored breathing.  Has notable rales in bilateral lung fields.  Patient has wet cough without sputum production.  Unable to adequately suctioned out secretions    PLAN:  VBS moderate oral/severe pharyngeal dysphagia characterized by weak swallow mechanics, multiple swallows noted to clear pharyngeal retention with overflow penetration and aspiration observed after the swallow   Placed patient on 2 L nasal cannula and changed sitting position which resulted in 94% O2  Suction any secretions as able  Disoriented to time  Per wife, patient has some degree of dementia but is unclear of specifics.  Patient oriented to self and place but unaware of year and month.  Patient incorrectly stated the number of years he has been  to his wife during time of exam  Per wife, unsure if patient is completely aware of what is going on and is unsure if patient would truly want to except intubation a second time    PLAN:  Will leave CODE STATUS at level 2 at this time  Neuropsych evaluation determined patient does NOT have capacity for MDM  We will have further discussions with family and patient depending on overall prognosis and goals of care  Palliative on board. Appreciate recommendations  SOFÍA (acute kidney injury) ([facility])  Etiology: Suspect due to ATN in the setting of cardiogenic shock, cardiac arrest, hypotension and IV contrast exposure with autoregulatory dysfunction with ARB  Baseline creatinine low threes.  Per nephrology, no urgent need for dialysis, but states that patient and wife would not be agreeable to  dialysis in the future  Continue to hold losartan and Farxiga per nephrology  Start D5W due to hypernatremia initiated by nephro  Strict I/Os, daily weights  Avoid nephrotoxins, NSAIDs, further IV contrast as able  Avoid hypotension.  Maintain MAP >65    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  ([facility])  Lab Results   Component Value Date    EGFR 16 01/23/2025    EGFR 15 01/22/2025    EGFR 14 01/22/2025    CREATININE 3.13 (H) 01/23/2025    CREATININE 3.35 (H) 01/22/2025    CREATININE 3.59 (H) 01/22/2025   Avoid nephrotoxic medications as able  Na elevated at 151  Currently on D5 50 ml/hr - reached out to nephro for possible rate adjustment   Continue to hold losartan and Farxiga per nephro   Nephrology on board. Appreciate recommendations   Benign 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 ([facility])  Lab Results   Component Value Date    EGFR 16 01/23/2025    EGFR 15 01/22/2025    EGFR 14 01/22/2025    CREATININE 3.13 (H) 01/23/2025    CREATININE 3.35 (H) 01/22/2025    CREATININE 3.59 (H) 01/22/2025   Holding Losartan and Farxiga per nephro due to elevated Cr   Hydralazine 5 mg q 6 hrs for BP >180/110   Initially allowed for permissive hypertension due to concern for stroke.  However, patient's MRI does not show concern for infarct.  Will aim for normotension  CKD (chronic kidney disease) stage 4, GFR 15-29 ml/min ([facility])  Lab Results   Component Value Date    EGFR 16 01/23/2025    EGFR 15 01/22/2025    EGFR 14 01/22/2025    CREATININE 3.13 (H) 01/23/2025    CREATININE 3.35 (H) 01/22/2025    CREATININE 3.59 (H) 01/22/2025   Per patient and patient's wife, would not be interested in dialysis   Nephro on board. Appreciate recommendations  Diabetic nephropathy associated with type 2 diabetes mellitus ([facility])  Lab Results   Component Value Date    HGBA1C 6.9 (H) 01/18/2025       Recent Labs     01/23/25  0006 01/23/25  0644 01/23/25  0717 01/23/25  1212   POCGLU 167* 164* 148* 156*       Blood Sugar Average: Last 72  hrs:  (P) [id number]  Recent Labs     01/22/25  0619 01/22/25  0721 01/22/25  1959 01/23/25  0006 01/23/25  0633 01/23/25  0644 01/23/25  0717 01/23/25  1212   POCGLU  --    < >  --    < >  --  164* 148* 156*   GLUC 131  --  172*  --  165*  --   --   --     < > = values in this interval not displayed.     Hold home regimen while inpatient and resume on discharge   NPO due to results of VBS   Insulin regimen  SSI   On D5 per nephro  Goal -180 while admitted, adjusting insulin regimen as appropriate  Monitor for hypoglycemia and treat per protocol   Lacunar cerebrovascular accident (CVA) (HCC)  History of lacunar infarct resulting in residual right-sided weakness  Initially concern for possible TIA versus CVA due to transient left arm weakness 1/21/2025.  MRI brain and MRA negative for infarct, high-grade stenosis, and LVO  Will aim for normotension.  As needed hydralazine for BP greater than 180/110  Avoid losartan and Farxiga per nephro given kidney function   Cardiac arrest (HCC)  Pt went into CA in ED 2/2 PE  ECHO with EF of 65% in September, ECHO with left ventricular ejection fraction is 55% (1/20)  Secondary to acute massive pulmonary embolus  Cardiac arrest in ED after CTA evaluating for pulmonary embolus.  Asystole for 2 minutes requiring CPR and intubation s/P extubation 1/21/2025    PLAN:  Continue heparin gtt    Normal anion gap metabolic acidosis  Most recent VBG pCO2 30.8, pH 7.4, bicarb 18.4  AG closed. Continue to monitor   Retinal hemorrhage noted on examination of left eye  Found on head CT 1/21.  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  Instructed patient to follow-up with ophthalmologist in outpatient setting  Hypernatremia  Sodium elevated at 151.  Continuing to trend up.  Nephrology on board.  Started on D5 with frequent BMP checks    VTE  Pharmacologic Prophylaxis:   High Risk (Score >/= 5) - Pharmacological DVT Prophylaxis Ordered: heparin drip. Sequential Compression Devices Ordered.    Mobility:   Basic Mobility Inpatient Raw Score: 8  JH-HLM Goal: 3: Sit at edge of bed  JH-HLM Achieved: 2: Bed activities/Dependent transfer  JH-HLM Goal NOT achieved. Continue with multidisciplinary rounding and encourage appropriate mobility to improve upon JH-HLM goals.    Patient Centered Rounds: I performed bedside rounds with nursing staff today.   Discussions with Specialists or Other Care Team Provider: palliative, ophthalmology, nephrology    Education and Discussions with Family / Patient: Updated  (wife, son, and daughter) at bedside.    Current Length of Stay: 5 day(s)  Current Patient Status: Inpatient   Certification Statement: The patient will continue to require additional inpatient hospital stay due to GOC discussions  Discharge Plan: Anticipate discharge in 48-72 hrs to discharge location to be determined pending rehab evaluations.    Code Status: Level 2 - DNAR: but accepts endotracheal intubation    Subjective   No overnight events. Patient continues to have difficulty managing his secretions and was lethargic at time of exam. Palliative on board regarding GOC discussion. Patient continues to be NPO.     Objective :  Temp:  [98.8 °F (37.1 °C)-100 °F (37.8 °C)] 100 °F (37.8 °C)  HR:  [87-89] 87  BP: (152-172)/(84-96) 172/96  Resp:  [18] 18  SpO2:  [90 %-97 %] 97 %  O2 Device: None (Room air)    Body mass index is 23.48 kg/m².     Input and Output Summary (last 24 hours):     Intake/Output Summary (Last 24 hours) at 1/23/2025 1314  Last data filed at 1/23/2025 0641  Gross per 24 hour   Intake 613.33 ml   Output 1375 ml   Net -761.67 ml       Physical Exam  Vitals and nursing note reviewed.   Constitutional:       General: He is not in acute distress.     Appearance: He is well-developed.   HENT:      Head: Normocephalic and  atraumatic.   Eyes:      Conjunctiva/sclera: Conjunctivae normal.   Cardiovascular:      Rate and Rhythm: Normal rate and regular rhythm.      Heart sounds: No murmur heard.  Pulmonary:      Breath sounds: Rales present.   Abdominal:      Palpations: Abdomen is soft.      Tenderness: There is no abdominal tenderness.   Musculoskeletal:         General: No swelling.      Cervical back: Neck supple.      Right lower leg: No edema.      Left lower leg: No edema.   Skin:     General: Skin is warm and dry.      Capillary Refill: Capillary refill takes less than 2 seconds.   Neurological:      Mental Status: He is alert. He is disoriented.      Comments: Disoriented to time   Psychiatric:         Mood and Affect: Mood normal.         Lines/Drains:  Lines/Drains/Airways       Active Status       Name Placement date Placement time Site Days    Urethral Catheter 01/17/25  0000  --  6                  Urinary Catheter:  Goal for removal: Remove after 48 hrs of I/O monitoring           Telemetry:  Telemetry Orders (From admission, onward)               24 Hour Telemetry Monitoring  Continuous x 24 Hours (Telem)        Expiring   Question:  Reason for 24 Hour Telemetry  Answer:  Pulmonary Embolism                     Telemetry Reviewed: Normal Sinus Rhythm  Indication for Continued Telemetry Use: Arrthymias requiring medical therapy               Lab Results: I have reviewed the following results:   Results from last 7 days   Lab Units 01/23/25  0633 01/22/25  0619 01/21/25  0857 01/21/25  0505   WBC Thousand/uL 8.68 9.33  --  12.14*   HEMOGLOBIN g/dL 9.0* 8.6*   < > 8.3*   HEMATOCRIT % 27.8* 26.7*   < > 24.5*   PLATELETS Thousands/uL 145* 125*  --  95*   BANDS PCT %  --   --   --  2   SEGS PCT %  --  76*  --   --    LYMPHO PCT %  --  7*  --  9*   MONO PCT %  --  16*  --  13*   EOS PCT %  --  0  --  1    < > = values in this interval not displayed.     Results from last 7 days   Lab Units 01/23/25 0633 01/22/25  0619  01/21/25  0505   SODIUM mmol/L 151*   < > 143   POTASSIUM mmol/L 3.3*   < > 3.8   CHLORIDE mmol/L 119*   < > 117*   CO2 mmol/L 22   < > 16*   BUN mg/dL 69*   < > 68*   CREATININE mg/dL 3.13*   < > 3.89*   ANION GAP mmol/L 10   < > 10   CALCIUM mg/dL 9.0   < > 8.3*   ALBUMIN g/dL  --   --  3.2*   TOTAL BILIRUBIN mg/dL  --   --  1.02*   ALK PHOS U/L  --   --  58   ALT U/L  --   --  159*   AST U/L  --   --  36   GLUCOSE RANDOM mg/dL 165*   < > 129    < > = values in this interval not displayed.     Results from last 7 days   Lab Units 01/19/25  1411   INR  1.61*     Results from last 7 days   Lab Units 01/23/25  1212 01/23/25  0717 01/23/25  0644 01/23/25  0006 01/22/25  1333 01/22/25  0721 01/22/25  0010 01/21/25  1800 01/21/25  1156 01/21/25  0505 01/21/25  0014 01/20/25  1805   POC GLUCOSE mg/dl 156* 148* 164* 167* 123 134 137 122 128 133 129 140     Results from last 7 days   Lab Units 01/18/25  0300   HEMOGLOBIN A1C % 6.9*     Results from last 7 days   Lab Units 01/19/25  1408 01/19/25  0818 01/18/25  0742 01/18/25  0451 01/18/25  0300 01/18/25  0041   LACTIC ACID mmol/L 0.8 2.3* 3.0* 3.9*   < > 9.0*   PROCALCITONIN ng/ml  --   --   --   --   --  0.23    < > = values in this interval not displayed.       Recent Cultures (last 7 days):               Last 24 Hours Medication List:     Current Facility-Administered Medications:     artificial tear ophthalmic ointment, HS    chlorhexidine (PERIDEX) 0.12 % oral rinse 15 mL, Q12H MARQUIS    dextrose 5 % infusion, Continuous, Last Rate: 50 mL/hr (01/23/25 1214)    dorzolamide-timolol (COSOPT) 2-0.5 % ophthalmic solution 1 drop, BID    heparin (porcine) 25,000 units in 0.45% NaCl 250 mL infusion (premix), Titrated, Last Rate: 17 Units/kg/hr (01/23/25 1313)    heparin (porcine) injection 2,400 Units, Q6H PRN    heparin (porcine) injection 4,800 Units, Once    heparin (porcine) injection 4,800 Units, Q6H PRN    hydrALAZINE (APRESOLINE) injection 5 mg, Q6H PRN    insulin lispro  (HumALOG/ADMELOG) 100 units/mL subcutaneous injection 2-12 Units, Q6H **AND** Fingerstick Glucose (POCT), Q6H    Latanoprostene Bunod 0.024 % SOLN 1 drop, HS    polyethylene glycol (MIRALAX) packet 17 g, Daily    potassium chloride (Klor-Con M20) CR tablet 40 mEq, Once    prednisoLONE acetate (PRED FORTE) 1 % ophthalmic suspension 1 drop, TID    senna-docusate sodium (SENOKOT S) 8.6-50 mg per tablet 1 tablet, BID    tetracaine 0.5 % ophthalmic solution 2 drop, Once    Administrative Statements   Today, Patient Was Seen By: Jamaica Jones DO      **Please Note: This note may have been constructed using a voice recognition system.**

## 2025-01-23 NOTE — ASSESSMENT & PLAN NOTE
Lab Results   Component Value Date    EGFR 16 01/23/2025    EGFR 15 01/22/2025    EGFR 14 01/22/2025    CREATININE 3.13 (H) 01/23/2025    CREATININE 3.35 (H) 01/22/2025    CREATININE 3.59 (H) 01/22/2025      - SOFÍA on CKD IV, likely in the setting of severe ATN   - baseline SCr 2.0   - follows OP Nephrology   - IP Nephrology following   - no HD desired per patient's previously communicated wishes, supported/reaffirmed by family

## 2025-01-23 NOTE — ASSESSMENT & PLAN NOTE
Etiology: Due to lack of free water intake due to acute issues  Serum sodium 151 today, trending up  Remains npo with failed swallow eval, VBS  Resume D5W at  increased rate of 100 mL/h  Trend BMP in a.m.

## 2025-01-23 NOTE — ASSESSMENT & PLAN NOTE
Per wife, patient has some degree of dementia but is unclear of specifics.  Patient oriented to self and place but unaware of year and month.  Patient incorrectly stated the number of years he has been  to his wife during time of exam  Per wife, unsure if patient is completely aware of what is going on and is unsure if patient would truly want to except intubation a second time    PLAN:  Will leave CODE STATUS at level 2 at this time  Neuropsych evaluation determined patient does NOT have capacity for MDM  We will have further discussions with family and patient depending on overall prognosis and goals of care  Palliative on board. Appreciate recommendations

## 2025-01-23 NOTE — PLAN OF CARE
VBS completed- significant pharyngeal dysphagia   Rec cont NPO, consider GOC, ? Alternate means of nutrition

## 2025-01-23 NOTE — ASSESSMENT & PLAN NOTE
Etiology: Suspect due to ATN in the setting of cardiogenic shock, cardiac arrest, hypotension and IV contrast exposure with autoregulatory dysfunction with ARB  Baseline creatinine low threes.  Per nephrology, no urgent need for dialysis, but states that patient and wife would not be agreeable to dialysis in the future  Continue to hold losartan and Farxiga per nephrology  Start D5W due to hypernatremia initiated by nephro  Strict I/Os, daily weights  Avoid nephrotoxins, NSAIDs, further IV contrast as able  Avoid hypotension.  Maintain MAP >65

## 2025-01-23 NOTE — ASSESSMENT & PLAN NOTE
Sodium elevated at 151.  Continuing to trend up.  Nephrology on board.  Started on D5 with frequent BMP checks

## 2025-01-23 NOTE — ASSESSMENT & PLAN NOTE
Lab Results   Component Value Date    HGBA1C 6.9 (H) 01/18/2025       Recent Labs     01/23/25  0006 01/23/25  0644 01/23/25  0717 01/23/25  1212   POCGLU 167* 164* 148* 156*       Blood Sugar Average: Last 72 hrs:  (P) [id number]  Recent Labs     01/22/25  0619 01/22/25  0721 01/22/25 1959 01/23/25  0006 01/23/25  0633 01/23/25  0644 01/23/25  0717 01/23/25  1212   POCGLU  --    < >  --    < >  --  164* 148* 156*   GLUC 131  --  172*  --  165*  --   --   --     < > = values in this interval not displayed.     Hold home regimen while inpatient and resume on discharge   NPO due to results of VBS   Insulin regimen  SSI   On D5 per nephro  Goal -180 while admitted, adjusting insulin regimen as appropriate  Monitor for hypoglycemia and treat per protocol

## 2025-01-23 NOTE — PROCEDURES
Video Swallow Study      Patient Name: Jeffry Almanzar  Today's Date: 1/23/2025          Assessment Summary: pt presented with moderate oral/severe pharyngeal dysphagia characterized by weak swallow mechanics, multiple swallows noted to clear pharyngeal retention with overflow penetration and aspiration observed after the swallow; coughing noted with episodes of penetration.          Recommendations: continue NPO, consider GOC discussion for alternate means of nutrition, probably long term needed.   Meds non- oral  Will follow peripherally and remain available as needed.            General Information:   89 yo gentleman referred for a VBS by Dr. Jones for dysphagia, assess for aspiration and pharyngeal dysphagia . See bedside swallow eval and follow up tx note. Per wife, pt has been having trouble swallowing at home- decreasing po intake and coughing w/ liquids.     Cognition:  alert, cooperative, able to answer questions    Speech/Swallow Mech:   Oral motor movements appeared  WFL;   Dentition was part natural;   Cough was strong.   Respiratory Status: RA;     Current diet: NPO.    Prior VBS none     Pt was seen in radiology for a Video Barium Swallow Study, seated in the upright position and viewed laterally with the following consistencies: puree, honey thick liquids by tsp      Results are as follows:     **Images are available for review on PACS        Oral Stage: Moderately impaired   Pt w/ adequate bolus retrieval from spoon. Decreased bolus hold with liquids- spill to pyriform sinuses. Slow bolus manipulation and transfer of puree and HTL by tsp. Mildly decreased velopharyngeal closure, small column of contrast noted between velum and oropharynx.  Oral residue noted.          Lip Closure: adequate  Tongue Control During Bolus Hold: posterior escape of bolus to pyriform sinuses  Bolus Preparation/Mastication: NA  Bolus Transport/Lingual Motion: slow  Oral  Residue:present  Velopharyngeal Closure:  mildly reduced           Pharyngeal Stage: Severely impaired    Spillage of HTL to pyriform sinuses with delayed swallow initiation with minimal hyolaryngeal elevation and anterior mvt. Poor tongue base retraction resulting in full vallecular retention of puree. Multiple swallows noted per bolus of puree and HTL. Decreased airway entrance closure observed. Penetration with eventual aspiration from pharyngeal residue after the swallow occurred. Pt coughing throughout study.        Initiation of the Pharyngeal Swallow: mildly delayed  Laryngeal Elevation: decreased  Anterior Hyoid Excursion: minimal anterior mvt  Epiglottic Movement/Inversion: minimal mvt/inversion  Laryngeal Vestibular Closure: minimal closure  Pharyngeal Stripping Wave: adequate  Pharyngeal Contraction (from AP view): NA  Pharyngoesophageal segment Opening: incomplete distension and duration  Base of Tongue Retraction: minimal, wide column of contrast  Pharyngeal residue: present, puree > HTL        Penetration/Aspiration:  Thin:  NA  Nectar: NA  Honey: 7  Puree: 3  Solid:  NA  Response to Aspiration: coughing  Strategies/Efficacy:  NA    8-Point Penetration-Aspiration Scale   1 Material does not enter the airway   2 Material enters the airway, remains above the vocal folds, and is ejected  from the  airway    3 Material enters the airway, remains above the vocal folds, and is not ejected from the airway   4 Material enters the airway, contacts the vocal folds, and is ejected from the airway   5 Material enters the airway, contacts the vocal folds, and is not ejected from the airway    6 Material enters the airway, passes below the vocal folds and is ejected into the larynx or out of the airway    7 Material enters the airway, passes below the vocal folds, and is not ejected from the trachea despite effort    8 Material enters the airway, passes below the vocal folds, and no effort is made to eject                 Esophageal Stage:  briefly assessed, mild retention noted in the cervical esophagus. Slow, delayed clearance.         Carole Aragon MA [facility]-SL[facility]  Speech Pathologist  [place] license # [id number]  [place] license # [id number]  Available via Secure Chat

## 2025-01-23 NOTE — ASSESSMENT & PLAN NOTE
BP hypertensive.  Repeat echo 1/20/2025: EF 55% with G1DD, mild MR, mild to moderate TR RVSP 49  Volume status euvolemic  Home Rx: Losartan 25 mg daily  Current Rx: none  Continue to hold losartan.  Avoid ACEi/ARB in setting of SOFÍA  Trend BP and consider PRN doses of IV meds  Optimize hemodynamic status to avoid delay in renal recovery  Recommend hold parameters on antihypertensive's for SBP <130 mmHg.  Avoid hypotension or fluctuations in blood pressure.  Maintain MAP >65  Continue to trend

## 2025-01-23 NOTE — OCCUPATIONAL THERAPY NOTE
Occupational Therapy Cancellation Note        Patient Name: Jeffry Almanzar  Today's Date: 1/23/2025 01/23/25 1520   Note Type   Note type Cancelled Session   Cancel Reasons Other   Additional Comments Pt with active OT orders. Chart reviewed and tx session attempted. Palliative care provider at bedside for care conversation c pt and family. Will cancel and f/u as available and schedule allows     Ngoc Price OT

## 2025-01-23 NOTE — PLAN OF CARE
Problem: PAIN - ADULT  Goal: Verbalizes/displays adequate comfort level or baseline comfort level  Description: Interventions:  - Encourage patient to monitor pain and request assistance  - Assess pain using appropriate pain scale  - Administer analgesics based on type and severity of pain and evaluate response  - Implement non-pharmacological measures as appropriate and evaluate response  - Consider cultural and social influences on pain and pain management  - Notify physician/advanced practitioner if interventions unsuccessful or patient reports new pain  Outcome: Progressing     Problem: INFECTION - ADULT  Goal: Absence or prevention of progression during hospitalization  Description: INTERVENTIONS:  - Assess and monitor for signs and symptoms of infection  - Monitor lab/diagnostic results  - Monitor all insertion sites, i.e. indwelling lines, tubes, and drains  - Monitor endotracheal if appropriate and nasal secretions for changes in amount and color  - [place] appropriate cooling/warming therapies per order  - Administer medications as ordered  - Instruct and encourage patient and family to use good hand hygiene technique  - Identify and instruct in appropriate isolation precautions for identified infection/condition  Outcome: Progressing  Goal: Absence of fever/infection during neutropenic period  Description: INTERVENTIONS:  - Monitor WBC    Outcome: Progressing     Problem: SAFETY ADULT  Goal: Patient will remain free of falls  Description: INTERVENTIONS:  - Educate patient/family on patient safety including physical limitations  - Instruct patient to call for assistance with activity   - Consult OT/PT to assist with strengthening/mobility   - Keep Call bell within reach  - Keep bed low and locked with side rails adjusted as appropriate  - Keep care items and personal belongings within reach  - Initiate and maintain comfort rounds  - Make Fall Risk Sign visible to staff  - Offer Toileting every 2 Hours,  in advance of need  - Initiate/Maintain bed alarm  - Obtain necessary fall risk management equipment  - Apply yellow socks and bracelet for high fall risk patients  - Consider moving patient to room near nurses station  Outcome: Progressing  Goal: Maintain or return to baseline ADL function  Description: INTERVENTIONS:  -  Assess patient's ability to carry out ADLs; assess patient's baseline for ADL function and identify physical deficits which impact ability to perform ADLs (bathing, care of mouth/teeth, toileting, grooming, dressing, etc.)  - Assess/evaluate cause of self-care deficits   - Assess range of motion  - Assess patient's mobility; develop plan if impaired  - Assess patient's need for assistive devices and provide as appropriate  - Encourage maximum independence but intervene and supervise when necessary  - Involve family in performance of ADLs  - Assess for home care needs following discharge   - Consider OT consult to assist with ADL evaluation and planning for discharge  - Provide patient education as appropriate  Outcome: Progressing  Goal: Maintains/Returns to pre admission functional level  Description: INTERVENTIONS:  - Perform AM-PAC 6 Click Basic Mobility/ Daily Activity assessment daily.  - Set and communicate daily mobility goal to care team and patient/family/caregiver.   - Collaborate with rehabilitation services on mobility goals if consulted  - Reposition patient every 2 hours.  - Record patient progress and toleration of activity level   Outcome: Progressing     Problem: DISCHARGE PLANNING  Goal: Discharge to home or other facility with appropriate resources  Description: INTERVENTIONS:  - Identify barriers to discharge w/patient and caregiver  - Arrange for needed discharge resources and transportation as appropriate  - Identify discharge learning needs (meds, wound care, etc.)  - Arrange for interpretive services to assist at discharge as needed  - Refer to Case Management Department  for coordinating discharge planning if the patient needs post-hospital services based on physician/advanced practitioner order or complex needs related to functional status, cognitive ability, or social support system  Outcome: Progressing     Problem: Knowledge Deficit  Goal: Patient/family/caregiver demonstrates understanding of disease process, treatment plan, medications, and discharge instructions  Description: Complete learning assessment and assess knowledge base.  Interventions:  - Provide teaching at level of understanding  - Provide teaching via preferred learning methods  Outcome: Progressing     Problem: Nutrition/Hydration-ADULT  Goal: Nutrient/Hydration intake appropriate for improving, restoring or maintaining nutritional needs  Description: Monitor and assess patient's nutrition/hydration status for malnutrition. Collaborate with interdisciplinary team and initiate plan and interventions as ordered.  Monitor patient's weight and dietary intake as ordered or per policy. Utilize nutrition screening tool and intervene as necessary. Determine patient's food preferences and provide high-protein, high-caloric foods as appropriate.     INTERVENTIONS:  - Monitor oral intake, urinary output, labs, and treatment plans  - Assess nutrition and hydration status and recommend course of action  - Evaluate amount of meals eaten  - Assist patient with eating if necessary   - Allow adequate time for meals  - Recommend/ encourage appropriate diets, oral nutritional supplements, and vitamin/mineral supplements  - Order, calculate, and assess calorie counts as needed  - Recommend, monitor, and adjust tube feedings and TPN/PPN based on assessed needs  - Assess need for intravenous fluids  - Provide specific nutrition/hydration education as appropriate  - Include patient/family/caregiver in decisions related to nutrition  Outcome: Progressing     Problem: Prexisting or High Potential for Compromised Skin Integrity  Goal:  Skin integrity is maintained or improved  Description: INTERVENTIONS:  - Identify patients at risk for skin breakdown  - Assess and monitor skin integrity  - Assess and monitor nutrition and hydration status  - Monitor labs   - Assess for incontinence   - Turn and reposition patient  - Assist with mobility/ambulation  - Relieve pressure over bony prominences  - Avoid friction and shearing  - Provide appropriate hygiene as needed including keeping skin clean and dry  - Evaluate need for skin moisturizer/barrier cream  - Collaborate with interdisciplinary team   - Patient/family teaching  - Consider wound care consult   Outcome: Progressing

## 2025-01-23 NOTE — ASSESSMENT & PLAN NOTE
Lab Results   Component Value Date    EGFR 16 01/23/2025    EGFR 15 01/22/2025    EGFR 14 01/22/2025    CREATININE 3.13 (H) 01/23/2025    CREATININE 3.35 (H) 01/22/2025    CREATININE 3.59 (H) 01/22/2025   Avoid nephrotoxic medications as able  Na elevated at 151  Currently on D5 50 ml/hr - reached out to nephro for possible rate adjustment   Continue to hold losartan and Farxiga per nephro   Nephrology on board. Appreciate recommendations

## 2025-01-23 NOTE — ASSESSMENT & PLAN NOTE
Pt went into CA in ED 2/2 PE  ECHO with EF of 65% in September, ECHO with left ventricular ejection fraction is 55% (1/20)  Secondary to acute massive pulmonary embolus  Cardiac arrest in ED after CTA evaluating for pulmonary embolus.  Asystole for 2 minutes requiring CPR and intubation s/P extubation 1/21/2025    PLAN:  Continue heparin gtt

## 2025-01-23 NOTE — ASSESSMENT & PLAN NOTE
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  MRI brain and MRA negative for infarct, high-grade stenosis, and LVO  Will aim for normotension.  As needed hydralazine for BP greater than 180/110  Avoid losartan and Farxiga per nephro given kidney function

## 2025-01-24 LAB
ANION GAP SERPL CALCULATED.3IONS-SCNC: 9 MMOL/L (ref 4–13)
APTT PPP: 66 SECONDS (ref 23–34)
APTT PPP: 73 SECONDS (ref 23–34)
BASE EX.OXY STD BLDV CALC-SCNC: 94.3 % (ref 60–80)
BASE EXCESS BLDV CALC-SCNC: -3.2 MMOL/L
BUN SERPL-MCNC: 58 MG/DL (ref 5–25)
CALCIUM SERPL-MCNC: 8.6 MG/DL (ref 8.4–10.2)
CHLORIDE SERPL-SCNC: 119 MMOL/L (ref 96–108)
CO2 SERPL-SCNC: 22 MMOL/L (ref 21–32)
CREAT SERPL-MCNC: 2.61 MG/DL (ref 0.6–1.3)
ERYTHROCYTE [DISTWIDTH] IN BLOOD BY AUTOMATED COUNT: 17.3 % (ref 11.6–15.1)
GFR SERPL CREATININE-BSD FRML MDRD: 20 ML/MIN/1.73SQ M
GLUCOSE SERPL-MCNC: 125 MG/DL (ref 65–140)
GLUCOSE SERPL-MCNC: 130 MG/DL (ref 65–140)
GLUCOSE SERPL-MCNC: 172 MG/DL (ref 65–140)
GLUCOSE SERPL-MCNC: 176 MG/DL (ref 65–140)
GLUCOSE SERPL-MCNC: 190 MG/DL (ref 65–140)
GLUCOSE SERPL-MCNC: 194 MG/DL (ref 65–140)
HCO3 BLDV-SCNC: 20.1 MMOL/L (ref 24–30)
HCT VFR BLD AUTO: 25.9 % (ref 36.5–49.3)
HGB BLD-MCNC: 8.1 G/DL (ref 12–17)
MAGNESIUM SERPL-MCNC: 2.3 MG/DL (ref 1.9–2.7)
MCH RBC QN AUTO: 28.5 PG (ref 26.8–34.3)
MCHC RBC AUTO-ENTMCNC: 31.3 G/DL (ref 31.4–37.4)
MCV RBC AUTO: 91 FL (ref 82–98)
O2 CT BLDV-SCNC: 12.2 ML/DL
PCO2 BLDV: 29.7 MM HG (ref 42–50)
PH BLDV: 7.45 [PH] (ref 7.3–7.4)
PLATELET # BLD AUTO: 160 THOUSANDS/UL (ref 149–390)
PMV BLD AUTO: 10.8 FL (ref 8.9–12.7)
PO2 BLDV: 165.7 MM HG (ref 35–45)
POTASSIUM SERPL-SCNC: 3.3 MMOL/L (ref 3.5–5.3)
RBC # BLD AUTO: 2.84 MILLION/UL (ref 3.88–5.62)
SODIUM SERPL-SCNC: 150 MMOL/L (ref 135–147)
WBC # BLD AUTO: 9.2 THOUSAND/UL (ref 4.31–10.16)

## 2025-01-24 PROCEDURE — 99233 SBSQ HOSP IP/OBS HIGH 50: CPT | Performed by: INTERNAL MEDICINE

## 2025-01-24 PROCEDURE — 97530 THERAPEUTIC ACTIVITIES: CPT

## 2025-01-24 PROCEDURE — 82948 REAGENT STRIP/BLOOD GLUCOSE: CPT

## 2025-01-24 PROCEDURE — 80048 BASIC METABOLIC PNL TOTAL CA: CPT | Performed by: PHYSICIAN ASSISTANT

## 2025-01-24 PROCEDURE — 85027 COMPLETE CBC AUTOMATED: CPT

## 2025-01-24 PROCEDURE — 99232 SBSQ HOSP IP/OBS MODERATE 35: CPT | Performed by: INTERNAL MEDICINE

## 2025-01-24 PROCEDURE — 93970 EXTREMITY STUDY: CPT | Performed by: SURGERY

## 2025-01-24 PROCEDURE — 97110 THERAPEUTIC EXERCISES: CPT

## 2025-01-24 PROCEDURE — 85730 THROMBOPLASTIN TIME PARTIAL: CPT | Performed by: INTERNAL MEDICINE

## 2025-01-24 PROCEDURE — 82805 BLOOD GASES W/O2 SATURATION: CPT

## 2025-01-24 PROCEDURE — 83735 ASSAY OF MAGNESIUM: CPT

## 2025-01-24 PROCEDURE — 99233 SBSQ HOSP IP/OBS HIGH 50: CPT | Performed by: STUDENT IN AN ORGANIZED HEALTH CARE EDUCATION/TRAINING PROGRAM

## 2025-01-24 RX ORDER — FUROSEMIDE 10 MG/ML
20 INJECTION INTRAMUSCULAR; INTRAVENOUS ONCE
Status: COMPLETED | OUTPATIENT
Start: 2025-01-24 | End: 2025-01-24

## 2025-01-24 RX ORDER — POTASSIUM CHLORIDE 14.9 MG/ML
20 INJECTION INTRAVENOUS ONCE
Status: COMPLETED | OUTPATIENT
Start: 2025-01-24 | End: 2025-01-25

## 2025-01-24 RX ORDER — DEXTROSE MONOHYDRATE 50 MG/ML
100 INJECTION, SOLUTION INTRAVENOUS CONTINUOUS
Status: DISPENSED | OUTPATIENT
Start: 2025-01-24 | End: 2025-01-25

## 2025-01-24 RX ORDER — POTASSIUM CHLORIDE 14.9 MG/ML
20 INJECTION INTRAVENOUS ONCE
Status: COMPLETED | OUTPATIENT
Start: 2025-01-24 | End: 2025-01-24

## 2025-01-24 RX ADMIN — POTASSIUM CHLORIDE 20 MEQ: 14.9 INJECTION, SOLUTION INTRAVENOUS at 15:32

## 2025-01-24 RX ADMIN — INSULIN LISPRO 2 UNITS: 100 INJECTION, SOLUTION INTRAVENOUS; SUBCUTANEOUS at 19:05

## 2025-01-24 RX ADMIN — LATANOPROSTENE BUNOD 1 DROP: 0.24 SOLUTION/ DROPS OPHTHALMIC at 21:51

## 2025-01-24 RX ADMIN — HEPARIN SODIUM 14 UNITS/KG/HR: 10000 INJECTION, SOLUTION INTRAVENOUS at 14:45

## 2025-01-24 RX ADMIN — DORZOLAMIDE HYDROCHLORIDE AND TIMOLOL MALEATE 1 DROP: 20; 5 SOLUTION OPHTHALMIC at 19:02

## 2025-01-24 RX ADMIN — PREDNISOLONE ACETATE 1 DROP: 10 SUSPENSION/ DROPS OPHTHALMIC at 21:49

## 2025-01-24 RX ADMIN — INSULIN LISPRO 2 UNITS: 100 INJECTION, SOLUTION INTRAVENOUS; SUBCUTANEOUS at 13:03

## 2025-01-24 RX ADMIN — DEXTROSE 100 ML/HR: 5 SOLUTION INTRAVENOUS at 10:16

## 2025-01-24 RX ADMIN — MINERAL OIL, WHITE PETROLATUM: .03; .94 OINTMENT OPHTHALMIC at 21:51

## 2025-01-24 RX ADMIN — INSULIN LISPRO 2 UNITS: 100 INJECTION, SOLUTION INTRAVENOUS; SUBCUTANEOUS at 01:29

## 2025-01-24 RX ADMIN — PREDNISOLONE ACETATE 1 DROP: 10 SUSPENSION/ DROPS OPHTHALMIC at 10:17

## 2025-01-24 RX ADMIN — DORZOLAMIDE HYDROCHLORIDE AND TIMOLOL MALEATE 1 DROP: 20; 5 SOLUTION OPHTHALMIC at 10:18

## 2025-01-24 RX ADMIN — FUROSEMIDE 20 MG: 10 INJECTION, SOLUTION INTRAMUSCULAR; INTRAVENOUS at 10:16

## 2025-01-24 RX ADMIN — PREDNISOLONE ACETATE 1 DROP: 10 SUSPENSION/ DROPS OPHTHALMIC at 16:15

## 2025-01-24 RX ADMIN — DEXTROSE 100 ML/HR: 5 SOLUTION INTRAVENOUS at 21:50

## 2025-01-24 RX ADMIN — POTASSIUM CHLORIDE 20 MEQ: 14.9 INJECTION, SOLUTION INTRAVENOUS at 10:18

## 2025-01-24 NOTE — ASSESSMENT & PLAN NOTE
Lab Results   Component Value Date    HGBA1C 6.9 (H) 01/18/2025       Recent Labs     01/23/25  1212 01/23/25  1938 01/24/25  0015 01/24/25  0638   POCGLU 156* 212* 194* 125       Blood Sugar Average: Last 72 hrs:  (P) 148  Recent Labs     01/22/25 1959 01/23/25  0006 01/23/25  0633 01/23/25  0644 01/23/25 1938 01/24/25  0015 01/24/25  0536 01/24/25  0638   POCGLU  --    < >  --    < > 212* 194*  --  125   GLUC 172*  --  165*  --   --   --  130  --     < > = values in this interval not displayed.     Hold home regimen while inpatient and resume on discharge   NPO due to results of VBS   Insulin regimen  SSI   On D5 per nephro  Goal -180 while admitted, adjusting insulin regimen as appropriate  Monitor for hypoglycemia and treat per protocol

## 2025-01-24 NOTE — ASSESSMENT & PLAN NOTE
Potassium decreased at 3.3. Unable to take oral medications. IV potassium 20 meq given. Will give a second dose this afternoon 1/24

## 2025-01-24 NOTE — PLAN OF CARE
Problem: PAIN - ADULT  Goal: Verbalizes/displays adequate comfort level or baseline comfort level  Description: Interventions:  - Encourage patient to monitor pain and request assistance  - Assess pain using appropriate pain scale  - Administer analgesics based on type and severity of pain and evaluate response  - Implement non-pharmacological measures as appropriate and evaluate response  - Consider cultural and social influences on pain and pain management  - Notify physician/advanced practitioner if interventions unsuccessful or patient reports new pain  Outcome: Progressing     Problem: INFECTION - ADULT  Goal: Absence or prevention of progression during hospitalization  Description: INTERVENTIONS:  - Assess and monitor for signs and symptoms of infection  - Monitor lab/diagnostic results  - Monitor all insertion sites, i.e. indwelling lines, tubes, and drains  - Monitor endotracheal if appropriate and nasal secretions for changes in amount and color  - [place] appropriate cooling/warming therapies per order  - Administer medications as ordered  - Instruct and encourage patient and family to use good hand hygiene technique  - Identify and instruct in appropriate isolation precautions for identified infection/condition  Outcome: Progressing  Goal: Absence of fever/infection during neutropenic period  Description: INTERVENTIONS:  - Monitor WBC    Outcome: Progressing     Problem: SAFETY ADULT  Goal: Patient will remain free of falls  Description: INTERVENTIONS:  - Educate patient/family on patient safety including physical limitations  - Instruct patient to call for assistance with activity   - Consult OT/PT to assist with strengthening/mobility   - Keep Call bell within reach  - Keep bed low and locked with side rails adjusted as appropriate  - Keep care items and personal belongings within reach  - Initiate and maintain comfort rounds  - Make Fall Risk Sign visible to staff  - Offer Toileting every  Hours,  in advance of need  - Initiate/Maintain alarm  - Obtain necessary fall risk management equipment:   - Apply yellow socks and bracelet for high fall risk patients  - Consider moving patient to room near nurses station  Outcome: Progressing  Goal: Maintain or return to baseline ADL function  Description: INTERVENTIONS:  -  Assess patient's ability to carry out ADLs; assess patient's baseline for ADL function and identify physical deficits which impact ability to perform ADLs (bathing, care of mouth/teeth, toileting, grooming, dressing, etc.)  - Assess/evaluate cause of self-care deficits   - Assess range of motion  - Assess patient's mobility; develop plan if impaired  - Assess patient's need for assistive devices and provide as appropriate  - Encourage maximum independence but intervene and supervise when necessary  - Involve family in performance of ADLs  - Assess for home care needs following discharge   - Consider OT consult to assist with ADL evaluation and planning for discharge  - Provide patient education as appropriate  Outcome: Not Progressing  Goal: Maintains/Returns to pre admission functional level  Description: INTERVENTIONS:  - Perform AM-PAC 6 Click Basic Mobility/ Daily Activity assessment daily.  - Set and communicate daily mobility goal to care team and patient/family/caregiver.   - Collaborate with rehabilitation services on mobility goals if consulted  - Perform Range of Motion  times a day.  - Reposition patient every  hours.  - Dangle patient  times a day  - Stand patient  times a day  - Ambulate patient  times a day  - Out of bed to chair  times a day   - Out of bed for meals times a day  - Out of bed for toileting  - Record patient progress and toleration of activity level   Outcome: Not Progressing     Problem: DISCHARGE PLANNING  Goal: Discharge to home or other facility with appropriate resources  Description: INTERVENTIONS:  - Identify barriers to discharge w/patient and caregiver  -  Arrange for needed discharge resources and transportation as appropriate  - Identify discharge learning needs (meds, wound care, etc.)  - Arrange for interpretive services to assist at discharge as needed  - Refer to Case Management Department for coordinating discharge planning if the patient needs post-hospital services based on physician/advanced practitioner order or complex needs related to functional status, cognitive ability, or social support system  Outcome: Not Progressing     Problem: Knowledge Deficit  Goal: Patient/family/caregiver demonstrates understanding of disease process, treatment plan, medications, and discharge instructions  Description: Complete learning assessment and assess knowledge base.  Interventions:  - Provide teaching at level of understanding  - Provide teaching via preferred learning methods  Outcome: Not Progressing     Problem: Nutrition/Hydration-ADULT  Goal: Nutrient/Hydration intake appropriate for improving, restoring or maintaining nutritional needs  Description: Monitor and assess patient's nutrition/hydration status for malnutrition. Collaborate with interdisciplinary team and initiate plan and interventions as ordered.  Monitor patient's weight and dietary intake as ordered or per policy. Utilize nutrition screening tool and intervene as necessary. Determine patient's food preferences and provide high-protein, high-caloric foods as appropriate.     INTERVENTIONS:  - Monitor oral intake, urinary output, labs, and treatment plans  - Assess nutrition and hydration status and recommend course of action  - Evaluate amount of meals eaten  - Assist patient with eating if necessary   - Allow adequate time for meals  - Recommend/ encourage appropriate diets, oral nutritional supplements, and vitamin/mineral supplements  - Order, calculate, and assess calorie counts as needed  - Recommend, monitor, and adjust tube feedings and TPN/PPN based on assessed needs  - Assess need for  intravenous fluids  - Provide specific nutrition/hydration education as appropriate  - Include patient/family/caregiver in decisions related to nutrition  Outcome: Not Progressing     Problem: Prexisting or High Potential for Compromised Skin Integrity  Goal: Skin integrity is maintained or improved  Description: INTERVENTIONS:  - Identify patients at risk for skin breakdown  - Assess and monitor skin integrity  - Assess and monitor nutrition and hydration status  - Monitor labs   - Assess for incontinence   - Turn and reposition patient  - Assist with mobility/ambulation  - Relieve pressure over bony prominences  - Avoid friction and shearing  - Provide appropriate hygiene as needed including keeping skin clean and dry  - Evaluate need for skin moisturizer/barrier cream  - Collaborate with interdisciplinary team   - Patient/family teaching  - Consider wound care consult   Outcome: Progressing

## 2025-01-24 NOTE — PROGRESS NOTES
Progress Note - Palliative Care   Name: Jeffry Almanzar 89 y.o. male I MRN: [id number]  Unit/Bed#: S -01 I Date of Admission: 1/17/2025   Date of Service: 1/24/2025 I Hospital Day: 6    Assessment & Plan  Acute saddle pulmonary embolism with acute cor pulmonale (HCC)  PESI Class V, Very High Risk [age, male, h/o CA, h/o asthma, hypotensive, AMS]    CT PE [01/18/2025] acute saddle PE with large clot burden, R heart strain, calculated RV:LV ratio is 1.2.    Hs-cTn 533->777->1,070      TTE [01/20/2025] LVEF 55%, grade I diastolic dysfunction; RV moderately dilated, moderately reduced RV systolic function, hypokinesis of the basal to mid free wall; mild MR; moderate TR, RVSP 49 mmHg.  CKD (chronic kidney disease) stage 4, GFR 15-29 ml/min ([facility])  Lab Results   Component Value Date    EGFR 20 01/24/2025    EGFR 16 01/23/2025    EGFR 15 01/22/2025    CREATININE 2.61 (H) 01/24/2025    CREATININE 3.13 (H) 01/23/2025    CREATININE 3.35 (H) 01/22/2025      - SOFÍA on CKD IV, likely in the setting of severe ATN   - baseline SCr 2.0   - follows OP Nephrology   - IP Nephrology following   - no HD desired per patient's previously communicated wishes, supported/reaffirmed by family  Cardiac arrest ([facility])   - initially presented via EMS on 01/17 after patient found unresponsive with agonal respirations, bag ventilations initiated in the field. Intubated in the setting of a brief cardiac arrest with ROSC.   - in the setting of acute saddle PE with large clot burden, Cor Pulmonale  Palliative care encounter   - met with spouse and Sarah at bedside   - briefly introduced Palliative Care supports   - resides in [facility], pending clinical course, may benefit from Home Palliative Care supports   - briefly introduced [facility]osp[facility] philosophy    #psychosocial supports   - Antony [spouse,  65 years] [phone]   - 3 adult children   - Sarah [daughter, resides in [place], [place]]    - currently at bedside   - Jeffry [son, resides in  [place]]   - Grzegorz [son, resides locally]   - 6 grandchildren, 6 great-grandchildren   - resides with spouse   - no prior  service   - retired, Maintenance work   - [employer] background   -  visiting regularly   - strong [employer] supports, prayer circles  Goals of care, counseling/discussion   - overall wish to give patient supports to identify recovery/reversible etiologies, including dysphagia. Spouse clearly states that the patient would not wish to pursue NGT or PEG tube. Sarah confirms this, as known patient's values/wishes, would not wish to have a feeding tube. Concern that if pursued, patient would most likely remove in the setting of disagreement with its placement.   - family hopeful that Speech Therapy may help to optimize/coordinate swallow to advance diet for nutritional supports.   - discussed risks of aspiration, even when NPO, given requirement to tolerate own saliva/secretions. Also discussed potential life threat of aspiration events. All questions/concerns addressed.   - spouse confirmed DNAR/DNI, Level 3, as the patient would not wish to be re-intubated nor pursue CPR/ACLS in the setting of a cardiac arrest. If such an event were to occur, focus on EOL comfort to natural death.   - will continue to follow/support as clinical course evolves.    Decisional apparatus: Patient is not competent on my exam today. If competence is lost, patient's substitute decision maker would default to spouse by [place] Act 169.   Advance Directive / Living Will / POLST: not on file     PDMP Review: I have reviewed the patient's controlled substance dispensing history in the Prescription Drug Monitoring Program in compliance with the [facility] regulations before prescribing any controlled substances.    Subjective   NAEO. Remains NPO, failed speech eval.    Objective :  Temp:  [98.5 °F (36.9 °C)-99.6 °F (37.6 °C)] 99.6 °F (37.6 °C)  HR:  [75-86] 86  BP: (113-183)/(64-96) 113/64  Resp:  [18] 18  SpO2:   "[95 %-98 %] 97 %  O2 Device: None (Room air)    Physical Exam  Vitals and nursing note reviewed.   Constitutional:       Appearance: He is not diaphoretic.      Comments: Lying in bed in NAD  BMI 23.3   HENT:      Head: Normocephalic and atraumatic.      Right Ear: External ear normal.      Left Ear: External ear normal.   Eyes:      Comments: No gaze preference   Cardiovascular:      Rate and Rhythm: Normal rate.   Pulmonary:      Effort: No tachypnea or respiratory distress.      Comments: Moist respirations  Genitourinary:     Comments: Mcgee in place  Skin:     Coloration: Skin is pale.   Neurological:      Comments: Minimally interactive   Psychiatric:      Comments: Unable to assess            Lab Results: I have reviewed the following results:  Lab Results   Component Value Date/Time    SODIUM 150 (H) 01/24/2025 05:36 AM    SODIUM 140 11/18/2024 01:32 PM    K 3.3 (L) 01/24/2025 05:36 AM    K 4.1 11/18/2024 01:32 PM    BUN 58 (H) 01/24/2025 05:36 AM    BUN 64 (H) 11/18/2024 01:32 PM    CREATININE 2.61 (H) 01/24/2025 05:36 AM    CREATININE 2.20 (H) 06/05/2017 12:55 PM    GLUC 130 01/24/2025 05:36 AM    GLUC 165 (H) 11/18/2024 01:32 PM    CALCIUM 8.6 01/24/2025 05:36 AM    CALCIUM 9.6 11/18/2024 01:32 PM    CALCIUM 9.6 11/18/2024 01:32 PM    AST 36 01/21/2025 05:05 AM    AST 14 02/16/2024 12:42 PM     (H) 01/21/2025 05:05 AM    ALT 11 02/16/2024 12:42 PM    ALB 3.2 (L) 01/21/2025 05:05 AM    ALB 4.0 10/25/2016 11:09 AM    TP 5.8 (L) 01/21/2025 05:05 AM    TP 7.2 02/16/2024 12:42 PM    EGFR 20 01/24/2025 05:36 AM     Lab Results   Component Value Date/Time    HGB 8.1 (L) 01/24/2025 05:36 AM    WBC 9.20 01/24/2025 05:36 AM     01/24/2025 05:36 AM    INR 1.61 (H) 01/19/2025 02:11 PM    PTT 73 (H) 01/24/2025 02:51 AM     No results found for: \"[id number]\"    Code Status: Level 3 - DNAR and DNI  Advance Directive and Living Will:      Power of :    POLST:      Administrative Statements   I " have spent a total time of 25 minutes in caring for this patient on the day of the visit/encounter including Communicating with other healthcare professionals . Topics discussed with the patient / family include psychosocial support and supportive listening.

## 2025-01-24 NOTE — ASSESSMENT & PLAN NOTE
PESI Class V, Very High Risk [age, male, h/o CA, h/o asthma, hypotensive, AMS]    CT PE [01/18/2025] acute saddle PE with large clot burden, R heart strain, calculated RV:LV ratio is 1.2.    Hs-cTn 533->777->1,070      TTE [01/20/2025] LVEF 55%, grade I diastolic dysfunction; RV moderately dilated, moderately reduced RV systolic function, hypokinesis of the basal to mid free wall; mild MR; moderate TR, RVSP 49 mmHg.

## 2025-01-24 NOTE — ASSESSMENT & PLAN NOTE
Etiology: Suspect due to ATN in the setting of cardiogenic shock, cardiac arrest, hypotension and IV contrast exposure with autoregulatory dysfunction with ARB  Baseline creatinine low threes.  Per nephrology, no urgent need for dialysis, but states that patient and wife would not be agreeable to dialysis in the future  Continue to hold losartan and Farxiga per nephrology  Start D5W due to hypernatremia initiated by nephro 1/23. Sodium improved from 151 to 150  Will continue D5 100 ml/hr per nephro for one full day  Replete potassium   IV Lasix 20 mg per nephro  Will consider voiding trial depending on ongoing GOC discussions with family   Strict I/Os, daily weights  Avoid nephrotoxins, NSAIDs, further IV contrast as able  Avoid hypotension.  Maintain MAP >65

## 2025-01-24 NOTE — PROGRESS NOTES
Progress Note - Nephrology   Name: Jeffry Almanzar 89 y.o. male I MRN: [id number]  Unit/Bed#: S -01 I Date of Admission: 1/17/2025   Date of Service: 1/24/2025 I Hospital Day: 6    Assessment & Plan  SOFÍA (acute kidney injury) (HCC)    acute on chronic kidney injury stage IV  - Outpatient nephrologist Dr. Rey  - Baseline creatinine progressively worsening to low 3  - Admission creatinine 3.1 mg/dL on January 18  - Creatinine rising to 3.9 mg/dL on January 21 prompting nephrology consultation  - Patient received contrast study on January 18 and also presented with cardiogenic shock and cor pulmonale  - Etiology-likely ATN in the setting of hypotension/shock/IV contrast/cardiac arrest/autoregulatory failure in setting of ARB at home  - Received Lasix IV one-time January 21 by primary team  - January 22-creatinine slightly improved 3.6 mg/dL.  Electrolytes are appropriate.  With the exception of sodium rising and we will start D5W  - January 23-sodium continues to be elevated.  Creatinine improving to 3.1 mg/dL back to baseline.  - January 24-sodium slowly improving to 150.  Bicarbonate appropriate.  Potassium low being repleted.  Creatinine improving 2.6 mg/dL.  Will continue D5W.  Replete potassium.  Will give Lasix one-time due to developing slight volume overload which is not impacting patient's respiratory status currently and therefore we will start with low-dose Lasix as the patient is also hypokalemic and hypernatremic and this could worsen these issues     Plan  - I/os; avoid nephrotoxic agents  - Avoid hypotension  - Agree with [id number] dose to 100 cc/h and continue for 1 full day  - Lab work in a.m.  - I/os; avoid nephrotoxic agents  - Agree with replating potassium  - Give Lasix IV one-time  - Patient would not want dialysis if ever required  - Continue speech eval and therapy with speech team-Per primary team  - Consider void trial if no objection from primary team  CKD (chronic kidney disease)  stage 4, GFR 15-29 ml/min (formerly [facility])  -See discussion above  Hypernatremia  -Due to lack of free water intake due to dysphagia/swallowing issues  - See plan above  Normal anion gap metabolic acidosis  - Initially presented with elevated anion gap and decreased serum bicarbonate. Anion gap has improved. Serum bicarbonate level is slowly improving. VBG pH reviewed 7.39. Will continue to monitor. And improving bicarbonate to 22 on January 23   Acute saddle pulmonary embolism with acute cor pulmonale (HCC)  saddle pulmonary embolus with cor pulmonale with unresponsiveness from home and cardiac arrest     - Initially with cardiac arrest and cardiogenic shock  - Is now extubated  - Is now off pressors  - Management per primary team-transfer to [facility] floor evening of January 21  Cardiac arrest (formerly [facility])  -In the setting of  saddle embolus.  Is now out of ICU on [facility] floor  Benign 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 (formerly [facility])  - Initially with cardiogenic shock  - Repeat echocardiogram January 20 with EF 55%, grade 1 diastolic dysfunction  - At home is on losartan  - evening on January 21 developed sudden acute hypertension-see discussion below  - Continue as needed hydralazine     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  (HCC)  -Trend per primary team    Hypokalemia  -Agree with repletion today  Retinal hemorrhage noted on examination of left eye  - seen by ophthalmology-chronic issue likely    I have reviewed the nephrology recommendations including continue D5W, replete potassium, Lasix one-time, with primary team resident, and we are in agreement with renal plan including the information outlined above.     Subjective   Brief History of Admission - 89-year-old male with a past medical history of CKD stage IV, hypertension, hyperlipidemia, CVA, diabetes, IgM paraprotein, bladder cancer who initially presents on January 18 with unresponsiveness. Patient was found to have massive PE and given tPA, intubated,  brief cardiac arrest. Initially required vasopressors for shock. Had acute kidney injury. Troponin elevation. Initial lactic acidosis which was slowly improving. Was started on anticoagulation with heparin drip and echocardiograms were followed serially. Echocardiogram in January 18 with EF could not be assessed, RV dilated and moderately reduced function. Patient is also required transfusion this admission for anemia. Briefly had worsening pressor requirements but by January 20, was off all pressors. Patient was extubated January 20. Nephrology is consulted January 21 due to elevated creatinine.     Overnight no acute issues.  Wife is present at bedside this morning.  Blood pressures 1 13-1 80 systolic.  Labile overall.  Villatoro with 750 cc.    Objective :  Temp:  [98.5 °F (36.9 °C)-99.6 °F (37.6 °C)] 99.6 °F (37.6 °C)  HR:  [75-86] 86  BP: (113-183)/(64-96) 113/64  Resp:  [18] 18  SpO2:  [95 %-98 %] 97 %  O2 Device: None (Room air)    Current Weight: Weight - Scale: 63.6 kg (140 lb 3.4 oz)  First Weight: Weight - Scale: 62.1 kg (137 lb)  I/O         01/22 0701  01/23 0700 01/23 0701  01/24 0700 01/24 0701  01/25 0700    P.O. 0 0 0    I.V. (mL/kg) 613.3 (9.6)      Total Intake(mL/kg) 613.3 (9.6) 0 (0) 0 (0)    Urine (mL/kg/hr) 1775 (1.2) 750 (0.5) 0 (0)    Stool 0      Total Output 1775 750 0    Net -1161.7 -750 0           Unmeasured Stool Occurrence 0 x            Physical Exam  General: NAD  Skin: no rash  Eyes: anicteric sclera  ENT: Dry mucous membrane  Neck: supple  Chest: Diminished intake bases with fine lateral rales  CVS: s1s2, no murmur, no gallop, no rub  Abdomen: soft, nontender, nl sounds  Extremities: Trace edema LE b/l  : no villatoro  Neuro: AAOX3 but difficult to understand the patient at times  Psych: normal affect    Medications:    Current Facility-Administered Medications:     artificial tear ophthalmic ointment, , Both Eyes, HS, Eloise Rodriguez MD, Given at 01/23/25 2122    chlorhexidine (PERIDEX)  0.12 % oral rinse 15 mL, 15 mL, Mouth/Throat, Q12H MARQUIS, Eloise Rodriguez MD, 15 mL at 01/21/25 2106    dextrose 5 % infusion, 100 mL/hr, Intravenous, Continuous, Laury Loredo MD    dorzolamide-timolol (COSOPT) 2-0.5 % ophthalmic solution 1 drop, 1 drop, Both Eyes, BID, Eloise Rodriguez MD, 1 drop at 01/23/25 1736    furosemide (LASIX) injection 20 mg, 20 mg, Intravenous, Once, Laury Loredo MD    heparin (porcine) 25,000 units in 0.45% NaCl 250 mL infusion (premix), 3-30 Units/kg/hr (Order-Specific), Intravenous, Titrated, Pawan Gtz MD, Last Rate: 8.4 mL/hr at 01/23/25 2200, 14 Units/kg/hr at 01/23/25 2200    heparin (porcine) injection 2,400 Units, 2,400 Units, Intravenous, Q6H PRN, Pawan Gtz MD    heparin (porcine) injection 4,800 Units, 4,800 Units, Intravenous, Once, Pawan Gtz MD    heparin (porcine) injection 4,800 Units, 4,800 Units, Intravenous, Q6H PRN, Pawan Gtz MD, 4,800 Units at 01/23/25 1317    hydrALAZINE (APRESOLINE) injection 5 mg, 5 mg, Intravenous, Q6H PRN, Jamaica Jones DO    insulin lispro (HumALOG/ADMELOG) 100 units/mL subcutaneous injection 2-12 Units, 2-12 Units, Subcutaneous, Q6H, 2 Units at 01/24/25 0129 **AND** Fingerstick Glucose (POCT), , , Q6H, Eloise Rodriguez MD    Latanoprostene Bunod 0.024 % SOLN 1 drop, 1 drop, Both Eyes, HS, Eloise Rodriguez MD, 1 drop at 01/23/25 2212    polyethylene glycol (MIRALAX) packet 17 g, 17 g, Oral, Daily, Eloise Rodriguez MD, 17 g at 01/20/25 1505    potassium chloride 20 mEq IVPB (premix), 20 mEq, Intravenous, Once, Jamaica Jones DO    potassium chloride 20 mEq IVPB (premix), 20 mEq, Intravenous, Once, Laury Loredo MD    prednisoLONE acetate (PRED FORTE) 1 % ophthalmic suspension 1 drop, 1 drop, Left Eye, TID, Eloise Rodriguez MD, 1 drop at 01/23/25 2122    [Held by provider] senna-docusate sodium (SENOKOT S) 8.6-50 mg per tablet 1 tablet, 1 tablet, Oral, BID, Eloise Rodriguez MD    tetracaine 0.5 % ophthalmic solution 2 drop, 2 drop, Both Eyes,  Once, Jamaica Jones, DO      Lab Results: I have reviewed the following results:  Results from last 7 days   Lab Units 01/24/25  0536 01/23/25  0633 01/22/25 1959 01/22/25  0619 01/21/25 1955 01/21/25  0857 01/21/25  0505 01/20/25  1931 01/20/25  1305 01/20/25  0454 01/19/25  0726 01/19/25  0531 01/18/25  0451 01/18/25  0041 01/18/25  0041 01/17/25  2337   WBC Thousand/uL 9.20 8.68  --  9.33  --   --  12.14*  --   --  12.65*  --  16.80* 12.09*   < > 9.70  --    HEMOGLOBIN g/dL 8.1* 9.0*  --  8.6* 8.8* 8.2* 8.3* 8.4*   < > 8.2*   < > 6.0* 8.6*   < > 8.2*  --    I STAT HEMOGLOBIN g/dl  --   --   --   --   --   --   --   --   --   --   --   --   --   --   --  8.8*   HEMATOCRIT % 25.9* 27.8*  --  26.7* 27.1* 24.8* 24.5* 25.1*   < > 24.6*   < > 18.8* 27.7*   < > 27.0*  --    HEMATOCRIT, ISTAT %  --   --   --   --   --   --   --   --   --   --   --   --   --   --   --  26*   PLATELETS Thousands/uL 160 145*  --  125*  --   --  95*  --   --  76*  --  99* 95*   < > 93*  --    POTASSIUM mmol/L 3.3* 3.3* 3.4* 3.6  --   --  3.8  --   --  4.3  --  4.0 4.9   < > 4.4  --    CHLORIDE mmol/L 119* 119* 117* 117*  --   --  117*  --   --  115*  --  111* 107   < > 107  --    CO2 mmol/L 22 22 20* 19*  --   --  16*  --   --  17*  --  15* 18*   < > 15*  --    CO2, I-STAT mmol/L  --   --   --   --   --   --   --   --   --   --   --   --   --   --   --  15*   BUN mg/dL 58* 69* 72* 69*  --   --  68*  --   --  62*  --  45* 41*   < > 39*  --    CREATININE mg/dL 2.61* 3.13* 3.35* 3.59*  --   --  3.89*  --   --  3.85*  --  3.38* 2.73*   < > 3.08*  --    CALCIUM mg/dL 8.6 9.0 9.0 8.8  --   --  8.3*  --   --  7.3*  --  7.7* 8.2*   < > 8.6  --    MAGNESIUM mg/dL 2.3 2.4  --   --   --   --   --   --   --  2.2  --  2.2 2.4  --  2.4  --    PHOSPHORUS mg/dL  --   --   --   --   --   --   --   --   --   --   --  4.0 3.8  --   --   --    ALBUMIN g/dL  --   --   --   --   --   --  3.2*  --   --  3.0*  --  3.1* 3.1*  --  3.0*  --    GLUCOSE, ISTAT mg/dl  " --   --   --   --   --   --   --   --   --   --   --   --   --   --   --  403*    < > = values in this interval not displayed.       Administrative Statements     Portions of the record may have been created with voice recognition software. Occasional wrong word or \"sound a like\" substitutions may have occurred due to the inherent limitations of voice recognition software. Read the chart carefully and recognize, using context, where substitutions have occurred.If you have any questions, please contact the dictating provider.  "

## 2025-01-24 NOTE — ASSESSMENT & PLAN NOTE
- met with spouse and Sarah at bedside   - briefly introduced Palliative Care supports   - resides in [facility], pending clinical course, may benefit from Home Palliative Care supports   - briefly introduced [facility] philosophy    #psychosocial supports   - Antony [spouse,  65 years] [phone]   - 3 adult children   - Sarah [daughter, resides in [place], [place]]    - currently at bedside   - Jeffry [son, resides in [place]]   - Grzegorz [son, resides locally]   - 6 grandchildren, 6 great-grandchildren   - resides with spouse   - no prior  service   - retired, Maintenance work   - Buddhism meme background   -  visiting regularly   - strong Sikh supports, prayer circles

## 2025-01-24 NOTE — ASSESSMENT & PLAN NOTE
Per wife, patient has some degree of dementia but is unclear of specifics.  Patient oriented to self and place but unaware of year and month.  Patient incorrectly stated the number of years he has been  to his wife during time of exam  Per wife, unsure if patient is completely aware of what is going on and is unsure if patient would truly want to except intubation a second time    PLAN:  Neuropsych evaluation determined patient does NOT have capacity for MDM  We will have further discussions with family and patient depending on overall prognosis and goals of care  Palliative on board. Appreciate recommendations. As of 1/23/2025, family made the decision to make patient's code status LEVEL 3 DNR/DNI

## 2025-01-24 NOTE — ASSESSMENT & PLAN NOTE
This afternoon, patient satting at 84% on room air with labored breathing.  Has notable rales in bilateral lung fields.  Patient has wet cough without sputum production.  Unable to adequately suctioned out secretions    PLAN:  VBS moderate oral/severe pharyngeal dysphagia characterized by weak swallow mechanics, multiple swallows noted to clear pharyngeal retention with overflow penetration and aspiration observed after the swallow   Suction any secretions as able

## 2025-01-24 NOTE — OCCUPATIONAL THERAPY NOTE
Occupational Therapy Treatment Note     Patient Name: Jeffry Almanzar  Today's Date: 1/24/2025  Problem List  Principal Problem:    Acute saddle pulmonary embolism with acute cor pulmonale (HCC)  Active Problems:    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  (HCC)    Benign 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 (HCC)    CKD (chronic kidney disease) stage 4, GFR 15-29 ml/min (HCC)    Diabetic nephropathy associated with type 2 diabetes mellitus (HCC)    SOFÍA (acute kidney injury) (HCC)    Lacunar cerebrovascular accident (CVA) (HCC)    Cardiac arrest (HCC)    Normal anion gap metabolic acidosis    Retinal hemorrhage noted on examination of left eye    Hypernatremia    Hypoxia    Disoriented to time    Hypokalemia    Palliative care encounter    Goals of care, counseling/discussion        01/24/25 1122   OT Last Visit   OT Visit Date 01/24/25   Note Type   Note Type Treatment   Pain Assessment   Pain Assessment Tool 0-10   Pain Score No Pain   Restrictions/Precautions   Weight Bearing Precautions Per Order No   Other Precautions Chair Alarm;Bed Alarm;Cognitive;Multiple lines;Fall Risk;Aspiration   Lifestyle   Autonomy Pt completed basic ADLs w/ out assistance and utilized cane for functional mobility   Reciprocal Relationships Supportive wife present   Service to Others Pt is retired   Intrinsic Gratification Pt reports enjoying watch the [employer] on tv, having family visit   ADL   Where Assessed Edge of bed   Grooming Assistance 2  Maximal Assistance   Grooming Deficit Increased time to complete;Steadying;Setup;Brushing hair  (pt brushed hair while seated at EOB- requires intermittent Min A for trunk support + elbow support for BUE to achieve overhead reaching. fatigues following ~20s of task and requires dependent A to complete task.)   Functional Standing Tolerance   Time 45s + 10-15s   Activity transfer training   Comments mod A x2 progressing to Mod A x 1 to maintain static standing c BUE on RW,  cueing for weight shifting and postural corrections. Denies lightheadedness   Bed Mobility   Supine to Sit 2  Maximal assistance   Additional items Assist x 1;Increased time required;Verbal cues;LE management   Sit to Supine 3  Moderate assistance   Additional items Assist x 1;Increased time required;Verbal cues;LE management   Additional Comments seated at EOB for participation in functional tasks- approx 12-15 minutes. Intermittent R lateral leaning requiring min A for corrections, otherwise maintains upright static sitting c supervision. Min A during dynamic trunk tasks.   Transfers   Sit to Stand 3  Moderate assistance   Additional items Assist x 2;Increased time required;Verbal cues  (RW)   Stand to Sit 3  Moderate assistance   Additional items Assist x 2;Increased time required;Verbal cues   Additional Comments sit<>stand transfers x completed at EOB. RW used- pt pulling on RW with UEs to initiate transfers. Intermittent R lean and retropulsion noted. SpO2 93-95% on RA   Functional Mobility   Functional Mobility   (deferred 2/2 limited activity tolerance, fatigue)   Subjective   Subjective Agreeable to participation in session. Difficulty to understand speech at times d/t garbled speech from secretions- increased time needed for communication of needs during session. Family at bedside encouraging patient   Cognition   Overall Cognitive Status Impaired   Arousal/Participation Cooperative   Attention Attends with cues to redirect   Orientation Level Oriented to person;Oriented to place;Disoriented to situation;Disoriented to time   Memory Decreased recall of recent events;Decreased recall of precautions;Decreased short term memory   Following Commands Follows one step commands with increased time or repetition   Comments limited insight to deficits , impaired memory, attention. However alert and engaged appropriately throughout session.   Activity Tolerance   Activity Tolerance Patient limited by fatigue    Medical Staff Made Aware PT LILIYA Wayne pre/post session. Present for suctioning pt as needed   Assessment   Assessment Patient seen for OT treatment on 1/24/2025 s/p admission for Acute saddle pulmonary embolism with acute cor pulmonale (HCC) Patient agreeable to OT session. Patient participated in sitting balance, trunk control, transfer training, bed mobility with intervention focus on maximizing functional independence, activity tolerance. Jeffry Almanzar is showing improvements in transfers, standing tolerance but is continuing to perform below baseline due to the following deficits: endurance ,  decreased muscular strength , decreased balance , decreased standing tolerance for self care tasks , decreased trunk control , decreased activity tolerance , impaired memory , and attention to task. Personal factors continuing to impact D/C include: Assistance needed for ADLs and functional mobility From OT standpoint, patient would benefit from skilled intervention to maximize independence with ADLs and functional mobility. Goals remain appropriate, continue POC. At this time, recommending D/C to: Level 2: moderate resource intensity   Plan   Treatment Interventions ADL retraining;Functional transfer training;UE strengthening/ROM;Endurance training;Patient/family training;Equipment evaluation/education;Compensatory technique education;Neuromuscular reeducation;Cognitive reorientation;Activityengagement   Goal Expiration Date 02/04/25   OT Treatment Day 1   OT Frequency 3-5x/wk   Discharge Recommendation   Rehab Resource Intensity Level, OT II (Moderate Resource Intensity)   Additional Comments  The patient's raw score on the AM-PAC Daily Activity Inpatient Short Form is 13. A raw score of less than 19 suggests the patient may benefit from discharge to post-acute rehabilitation services. Please refer to the recommendation of the Occupational Therapist for safe discharge planning.   -PAC Daily Activity Inpatient    Lower Body Dressing 2   Bathing 2   Toileting 2   Upper Body Dressing 2   Grooming 2   Eating 3   Daily Activity Raw Score 13   Daily Activity Standardized Score (Calc for Raw Score >=11) 32.03   AM-PAC Applied Cognition Inpatient   Following a Speech/Presentation 1   Understanding Ordinary Conversation 3   Taking Medications 1   Remembering Where Things Are Placed or Put Away 2   Remembering List of 4-5 Errands 2   Taking Care of Complicated Tasks 2   Applied Cognition Raw Score 11   Applied Cognition Standardized Score 27.03   End of Consult   Education Provided Yes;Family or social support of family present for education by provider  (Kg and spouse)   Patient Position at End of Consult Supine;Bed/Chair alarm activated;All needs within reach   Nurse Communication Nurse aware of consult     Pt goals to be met by 2/4/25 to max I w/ ADLs and improve engagement to return home w/ wife includes:     -Pt will complete bed mobility supine <> sit w/ min A to max I and minimize burden of care in preparation for ADLs     -Pt will demonstrate good attention and participation in ongoing eval of functional cognition to assist in DC Planning PROGRESSING      -Pt will consistently follow 2 step directions during ADLs w/ good recall PROGRESSING      -Pt will complete functional transfers to bed, chair, and toilet using LRAD, DME as needed w/ mod A x1 to max I and minimize burden of care PROGRESSING      -Pt will demonstrate improved functional sitting tolerance OOB In chair for all meals     -Pt will complete UBD w/ min A after set- up to minimize burden of care     -Pt will complete LBD w/ mod A to max I and minimize burden of care     -Pt will complete functional transfers to bed, chair, and toilet using LRAD, DME as needed w/ min A      -Pt will demonstrate good attention and participation in ongoing eval of functional mobility using LRAD to max I and assist in DC Planning     -Pt will complete grooming / feeding w/ S  after set- up in supported sitting NOT PROGRESSING      -Pt will demonstrate improved sustained activity tolerance to participate in ADLs vs preferred leisure tasks for 15 minutes w/ no more than 1 rest break.       Ngoc Price, OT

## 2025-01-24 NOTE — ASSESSMENT & PLAN NOTE
Lab Results   Component Value Date    EGFR 20 01/24/2025    EGFR 16 01/23/2025    EGFR 15 01/22/2025    CREATININE 2.61 (H) 01/24/2025    CREATININE 3.13 (H) 01/23/2025    CREATININE 3.35 (H) 01/22/2025   Avoid nephrotoxic medications as able  Na elevated at 150  Continue to hold losartan and Farxiga per nephro   Nephrology on board. Appreciate recommendations

## 2025-01-24 NOTE — ASSESSMENT & PLAN NOTE
-In the setting of  saddle embolus.  Is now out of ICU on [facility] floor

## 2025-01-24 NOTE — ASSESSMENT & PLAN NOTE
- Initially with cardiogenic shock  - Repeat echocardiogram January 20 with EF 55%, grade 1 diastolic dysfunction  - At home is on losartan  - evening on January 21 developed sudden acute hypertension-see discussion below  - Continue as needed hydralazine

## 2025-01-24 NOTE — ASSESSMENT & PLAN NOTE
- expanded discussion regarding current admission, including acute saddle PE, s/p cardiac arrest, SOFÍA on CKD IV, dysphagia and NPO status.    - overall wish to give patient supports to identify recovery/reversible etiologies, including dysphagia. Spouse clearly states that the patient would not wish to pursue NGT or PEG tube. Sarah confirms this, as known patient's values/wishes, would not wish to have a feeding tube. Concern that if pursued, patient would most likely remove in the setting of disagreement with its placement.   - family hopeful that Speech Therapy may help to optimize/coordinate swallow to advance diet for nutritional supports.   - discussed risks of aspiration, even when NPO, given requirement to tolerate own saliva/secretions. Also discussed potential life threat of aspiration events. All questions/concerns addressed.   - expanded discussion regarding advanced resuscitative supports, including  CPR/ACLS, intubation/mechanical ventilation. Spouse confirmed DNAR/DNI, Level 3, as the patient would not wish to be re-intubated nor pursue CPR/ACLS in the setting of a cardiac arrest. If such an event were to occur, focus on EOL comfort to natural death.   - will continue to follow/support as clinical course evolves.

## 2025-01-24 NOTE — ASSESSMENT & PLAN NOTE
acute on chronic kidney injury stage IV  - Outpatient nephrologist Dr. Rey  - Baseline creatinine progressively worsening to low 3  - Admission creatinine 3.1 mg/dL on January 18  - Creatinine rising to 3.9 mg/dL on January 21 prompting nephrology consultation  - Patient received contrast study on January 18 and also presented with cardiogenic shock and cor pulmonale  - Etiology-likely ATN in the setting of hypotension/shock/IV contrast/cardiac arrest/autoregulatory failure in setting of ARB at home  - Received Lasix IV one-time January 21 by primary team  - January 22-creatinine slightly improved 3.6 mg/dL.  Electrolytes are appropriate.  With the exception of sodium rising and we will start D5W  - January 23-sodium continues to be elevated.  Creatinine improving to 3.1 mg/dL back to baseline.  - January 24-sodium slowly improving to 150.  Bicarbonate appropriate.  Potassium low being repleted.  Creatinine improving 2.6 mg/dL.  Will continue D5W.  Replete potassium.  Will give Lasix one-time due to developing slight volume overload which is not impacting patient's respiratory status currently and therefore we will start with low-dose Lasix as the patient is also hypokalemic and hypernatremic and this could worsen these issues     Plan  - I/os; avoid nephrotoxic agents  - Avoid hypotension  - Agree with [id number] dose to 100 cc/h and continue for 1 full day  - Lab work in a.m.  - I/os; avoid nephrotoxic agents  - Agree with replating potassium  - Give Lasix IV one-time  - Patient would not want dialysis if ever required  - Continue speech eval and therapy with speech team-Per primary team  - Consider void trial if no objection from primary team

## 2025-01-24 NOTE — ASSESSMENT & PLAN NOTE
saddle pulmonary embolus with cor pulmonale with unresponsiveness from home and cardiac arrest     - Initially with cardiac arrest and cardiogenic shock  - Is now extubated  - Is now off pressors  - Management per primary team-transfer to [facility] floor evening of January 21

## 2025-01-24 NOTE — ASSESSMENT & PLAN NOTE
Lab Results   Component Value Date    EGFR 20 01/24/2025    EGFR 16 01/23/2025    EGFR 15 01/22/2025    CREATININE 2.61 (H) 01/24/2025    CREATININE 3.13 (H) 01/23/2025    CREATININE 3.35 (H) 01/22/2025      - SOFÍA on CKD IV, likely in the setting of severe ATN   - baseline SCr 2.0   - follows OP Nephrology   - IP Nephrology following   - no HD desired per patient's previously communicated wishes, supported/reaffirmed by family

## 2025-01-24 NOTE — ASSESSMENT & PLAN NOTE
Lab Results   Component Value Date    EGFR 20 01/24/2025    EGFR 16 01/23/2025    EGFR 15 01/22/2025    CREATININE 2.61 (H) 01/24/2025    CREATININE 3.13 (H) 01/23/2025    CREATININE 3.35 (H) 01/22/2025   Per patient and patient's wife, would not be interested in dialysis   Nephro on board. Appreciate recommendations

## 2025-01-24 NOTE — ASSESSMENT & PLAN NOTE
- Initially presented with elevated anion gap and decreased serum bicarbonate. Anion gap has improved. Serum bicarbonate level is slowly improving. VBG pH reviewed 7.39. Will continue to monitor. And improving bicarbonate to 22 on January 23

## 2025-01-24 NOTE — ASSESSMENT & PLAN NOTE
- overall wish to give patient supports to identify recovery/reversible etiologies, including dysphagia. Spouse clearly states that the patient would not wish to pursue NGT or PEG tube. Sarah confirms this, as known patient's values/wishes, would not wish to have a feeding tube. Concern that if pursued, patient would most likely remove in the setting of disagreement with its placement.   - family hopeful that Speech Therapy may help to optimize/coordinate swallow to advance diet for nutritional supports.   - discussed risks of aspiration, even when NPO, given requirement to tolerate own saliva/secretions. Also discussed potential life threat of aspiration events. All questions/concerns addressed.   - spouse confirmed DNAR/DNI, Level 3, as the patient would not wish to be re-intubated nor pursue CPR/ACLS in the setting of a cardiac arrest. If such an event were to occur, focus on EOL comfort to natural death.   - will continue to follow/support as clinical course evolves.

## 2025-01-24 NOTE — ASSESSMENT & PLAN NOTE
Lab Results   Component Value Date    EGFR 20 01/24/2025    EGFR 16 01/23/2025    EGFR 15 01/22/2025    CREATININE 2.61 (H) 01/24/2025    CREATININE 3.13 (H) 01/23/2025    CREATININE 3.35 (H) 01/22/2025   Holding Losartan and Farxiga per nephro due to elevated Cr   Hydralazine 5 mg q 6 hrs for BP >180/110   Initially allowed for permissive hypertension due to concern for stroke.  However, patient's MRI does not show concern for infarct.  Will aim for normotension

## 2025-01-24 NOTE — CASE MANAGEMENT
Case Management Discharge Planning Note    Patient name Jeffry Almanzar  Location S /S -01 MRN [id number]  : 1934 Date 2025       Current Admission Date: 2025  Current Admission Diagnosis:Acute saddle pulmonary embolism with acute cor pulmonale ([facility])   Patient Active Problem List    Diagnosis Date Noted Date Diagnosed    Hypokalemia 2025     Palliative care encounter 2025     Goals of care, counseling/discussion 2025     Hypernatremia 2025     Hypoxia 2025     Disoriented to time 2025     Chronic kidney disease-mineral and bone disorder 2025     Normal anion gap metabolic acidosis 2025     Retinal hemorrhage noted on examination of left eye 2025     Cardiac arrest ([facility]) 2025     Acute saddle pulmonary embolism with acute cor pulmonale ([facility]) 2025     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     CVA (cerebral vascular accident) ([facility]) 2024     SOFÍA (acute kidney injury) ([facility]) 2024     Dementia ([facility]) 2024     Type 2 diabetes mellitus with stage 4 chronic kidney disease, without long-term current use of insulin ([facility]) 10/24/2023     IgM lambda paraproteinemia 2023     Advanced care planning/counseling discussion 2023     Diabetic nephropathy associated with type 2 diabetes mellitus ([facility]) 2023     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[facility]) 2023     CKD (chronic kidney disease) stage 4, GFR 15-29 ml/min ([facility]) 2022     Persistent proteinuria 2022     Benign 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 ([facility]) 02/15/2022     Parenchymal renal hypertension 2021     Spinal stenosis of lumbar region      DDD (degenerative disc disease), lumbar      Lumbar disc disease with radiculopathy      Neurogenic claudication 2019     Low back pain 2019     Retinal vein occlusion of left eye 2019     Malignant neoplasm of urinary bladder  (HCC) 03/07/2019     Vision loss of left eye 03/07/2019     Weight loss 08/17/2018     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  (HCC) 08/17/2018     Iliotibial band syndrome of both sides 05/15/2018     Primary osteoarthritis of both knees 03/15/2018     Asthma, mild intermittent 08/23/2017     Essential hypertension 01/19/2016     Mild vitamin D deficiency 08/25/2014     Glaucoma 04/22/2014     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 Patient's wife Lang, dtr Sarah and brother Amrik at bedside  Freedom of Choice: Yes  Comments - Freedom of Choice: CM f/u with family re: STR options available - STR patient choice list provided to family for their review. CM to f/u with pt and family re: STR choice.  CM contacted family/caregiver?: Yes  Were Treatment Team discharge recommendations reviewed with patient/caregiver?: Yes  Did patient/caregiver verbalize understanding of patient care needs?: N/A- going to facility  Were patient/caregiver advised of the risks associated with not following Treatment Team discharge recommendations?: Yes    Contacts  Patient Contacts: Antony (wife), Sarah (dtr), and Amrik (brother)  Relationship to Patient:: Family  Contact Method: In Person  Reason/Outcome: Discharge Planning, Emergency Contact, Continuity of Care, Referral    Requested Home Health Care         Is the patient interested in HHC at discharge?: No    DME Referral  Provided  Referral made for DME?: No    Other Referral/Resources/Interventions Provided:  Interventions: Short Term Rehab  Referral Comments: STR patient choice list provided to family for their review.         Treatment Team Recommendation: Short Term Rehab  Discharge Destination Plan:: Short Term Rehab  Transport at Discharge : BLS Ambulance                          IMM reviewed with patient's family, IMM copy provided to family.  IMM Given (Date):: 01/24/25  IMM Given to:: Family  Family notified:: Lang (wife), Sarah (dtr) and Amrik (brother)

## 2025-01-24 NOTE — PLAN OF CARE
Problem: OCCUPATIONAL THERAPY ADULT  Goal: Performs self-care activities at highest level of function for planned discharge setting.  See evaluation for individualized goals.  Description: Treatment Interventions: ADL retraining, Functional transfer training, UE strengthening/ROM, Endurance training, Cognitive reorientation, Patient/family training, Equipment evaluation/education, Compensatory technique education, Continued evaluation, Energy conservation, Activityengagement          See flowsheet documentation for full assessment, interventions and recommendations.   Note: Limitation: Decreased ADL status, Decreased UE ROM, Decreased UE strength, Decreased Safe judgement during ADL, Decreased cognition, Decreased endurance, Visual deficit, Decreased fine motor control, Decreased self-care trans, Decreased high-level ADLs (impaired pain, activity tolerance, sitting tolerance, sitting balance, standing tolerance, generalized weakness, forward functional reach)     Assessment: Patient seen for OT treatment on 1/24/2025 s/p admission for Acute saddle pulmonary embolism with acute cor pulmonale (HCC) Patient agreeable to OT session. Patient participated in sitting balance, trunk control, transfer training, bed mobility with intervention focus on maximizing functional independence, activity tolerance. Jeffry Almanzar is showing improvements in transfers, standing tolerance but is continuing to perform below baseline due to the following deficits: endurance ,  decreased muscular strength , decreased balance , decreased standing tolerance for self care tasks , decreased trunk control , decreased activity tolerance , impaired memory , and attention to task. Personal factors continuing to impact D/C include: Assistance needed for ADLs and functional mobility From OT standpoint, patient would benefit from skilled intervention to maximize independence with ADLs and functional mobility. Goals remain appropriate, continue POC.  At this time, recommending D/C to: Level 2: moderate resource intensity     Rehab Resource Intensity Level, OT: II (Moderate Resource Intensity)        Ngoc Price, OT

## 2025-01-24 NOTE — ASSESSMENT & PLAN NOTE
Patient seen and evaluated by palliative care 1/23. Palliative care had an in-depth discussion with family regarding patient's GOC and overall prognosis. Family would NOT like to pursue a PEG tube at this time.   CODE STATUS changed from Level 2 to Level 3 DNR/DNI

## 2025-01-24 NOTE — PROGRESS NOTES
Progress Note - Hospitalist   Name: Jeffry Almanzar 89 y.o. male I MRN: [id number]  Unit/Bed#: S -01 I Date of Admission: 1/17/2025   Date of Service: 1/24/2025 I Hospital Day: 6    Assessment & Plan  Acute saddle pulmonary embolism with acute cor pulmonale (HCC)  MRI brain negative for CVA or concern for hemorrhage.  Continue heparin gtt  Hypoxia  This afternoon, patient satting at 84% on room air with labored breathing.  Has notable rales in bilateral lung fields.  Patient has wet cough without sputum production.  Unable to adequately suctioned out secretions    PLAN:  VBS moderate oral/severe pharyngeal dysphagia characterized by weak swallow mechanics, multiple swallows noted to clear pharyngeal retention with overflow penetration and aspiration observed after the swallow   Suction any secretions as able  Disoriented to time  Per wife, patient has some degree of dementia but is unclear of specifics.  Patient oriented to self and place but unaware of year and month.  Patient incorrectly stated the number of years he has been  to his wife during time of exam  Per wife, unsure if patient is completely aware of what is going on and is unsure if patient would truly want to except intubation a second time    PLAN:  Neuropsych evaluation determined patient does NOT have capacity for MDM  We will have further discussions with family and patient depending on overall prognosis and goals of care  Palliative on board. Appreciate recommendations. As of 1/23/2025, family made the decision to make patient's code status LEVEL 3 DNR/DNI  SOFÍA (acute kidney injury) (HCC)  Etiology: Suspect due to ATN in the setting of cardiogenic shock, cardiac arrest, hypotension and IV contrast exposure with autoregulatory dysfunction with ARB  Baseline creatinine low threes.  Per nephrology, no urgent need for dialysis, but states that patient and wife would not be agreeable to dialysis in the future  Continue to hold losartan and  Farxiga per nephrology  Start D5W due to hypernatremia initiated by nephro 1/23. Sodium improved from 151 to 150  Will continue D5 100 ml/hr per nephro for one full day  Replete potassium   IV Lasix 20 mg per nephro  Will consider voiding trial depending on ongoing [facility] discussions with family   Strict I/Os, daily weights  Avoid nephrotoxins, NSAIDs, further IV contrast as able  Avoid hypotension.  Maintain MAP >65    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  ([facility])  Lab Results   Component Value Date    EGFR 20 01/24/2025    EGFR 16 01/23/2025    EGFR 15 01/22/2025    CREATININE 2.61 (H) 01/24/2025    CREATININE 3.13 (H) 01/23/2025    CREATININE 3.35 (H) 01/22/2025   Avoid nephrotoxic medications as able  Na elevated at 150  Continue to hold losartan and Farxiga per nephro   Nephrology on board. Appreciate recommendations   Benign 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 ([facility])  Lab Results   Component Value Date    EGFR 20 01/24/2025    EGFR 16 01/23/2025    EGFR 15 01/22/2025    CREATININE 2.61 (H) 01/24/2025    CREATININE 3.13 (H) 01/23/2025    CREATININE 3.35 (H) 01/22/2025   Holding Losartan and Farxiga per nephro due to elevated Cr   Hydralazine 5 mg q 6 hrs for BP >180/110   Initially allowed for permissive hypertension due to concern for stroke.  However, patient's MRI does not show concern for infarct.  Will aim for normotension  CKD (chronic kidney disease) stage 4, GFR 15-29 ml/min ([facility])  Lab Results   Component Value Date    EGFR 20 01/24/2025    EGFR 16 01/23/2025    EGFR 15 01/22/2025    CREATININE 2.61 (H) 01/24/2025    CREATININE 3.13 (H) 01/23/2025    CREATININE 3.35 (H) 01/22/2025   Per patient and patient's wife, would not be interested in dialysis   Nephro on board. Appreciate recommendations  Diabetic nephropathy associated with type 2 diabetes mellitus ([facility])  Lab Results   Component Value Date    HGBA1C 6.9 (H) 01/18/2025       Recent Labs     01/23/25  1212 01/23/25  1938  01/24/25  0015 01/24/25  0638   POCGLU 156* 212* 194* 125       Blood Sugar Average: Last 72 hrs:  (P) 148  Recent Labs     01/22/25  1959 01/23/25  0006 01/23/25  0633 01/23/25  0644 01/23/25  1938 01/24/25  0015 01/24/25  0536 01/24/25  0638   POCGLU  --    < >  --    < > 212* 194*  --  125   GLUC 172*  --  165*  --   --   --  130  --     < > = values in this interval not displayed.     Hold home regimen while inpatient and resume on discharge   NPO due to results of VBS   Insulin regimen  SSI   On D5 per nephro  Goal -180 while admitted, adjusting insulin regimen as appropriate  Monitor for hypoglycemia and treat per protocol   Lacunar cerebrovascular accident (CVA) (HCC)  History of lacunar infarct resulting in residual right-sided weakness  Initially concern for possible TIA versus CVA due to transient left arm weakness 1/21/2025.  MRI brain and MRA negative for infarct, high-grade stenosis, and LVO  Will aim for normotension.  As needed hydralazine for BP greater than 180/110  Avoid losartan and Farxiga per nephro given kidney function   Cardiac arrest (HCC)  Pt went into CA in ED 2/2 PE  ECHO with EF of 65% in September, ECHO with left ventricular ejection fraction is 55% (1/20)  Secondary to acute massive pulmonary embolus  Cardiac arrest in ED after CTA evaluating for pulmonary embolus.  Asystole for 2 minutes requiring CPR and intubation s/P extubation 1/21/2025    PLAN:  Continue heparin gtt  Continue to monitor on tele due to recent PE, recent MI, and electrolyte abnormalities     Normal anion gap metabolic acidosis  Most recent VBG pCO2 30.8, pH 7.4, bicarb 18.4  AG closed. Continue to monitor   Retinal hemorrhage noted on examination of left eye  Found on head CT 1/21.  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  Instructed patient to follow-up with  ophthalmologist in outpatient setting  Hypernatremia  Sodium elevated at 150  Improved from 151 to 150 overnight  Nephrology on board.    Hypokalemia  Potassium decreased at 3.3. Unable to take oral medications. IV potassium 20 meq given. Will give a second dose this afternoon 1/24  Palliative care encounter  Patient seen and evaluated by palliative care 1/23. Palliative care had an in-depth discussion with family regarding patient's GOC and overall prognosis. Family would NOT like to pursue a PEG tube at this time.   CODE STATUS changed from Level 2 to Level 3 DNR/DNI  Goals of care, counseling/discussion  Palliative on board. Appreciate recommendations     VTE Pharmacologic Prophylaxis:   High Risk (Score >/= 5) - Pharmacological DVT Prophylaxis Ordered: heparin drip. Sequential Compression Devices Ordered.    Mobility:   Basic Mobility Inpatient Raw Score: 8  -HLM Goal: 3: Sit at edge of bed  JH-HLM Achieved: 2: Bed activities/Dependent transfer  JH-HLM Goal NOT achieved. Continue with multidisciplinary rounding and encourage appropriate mobility to improve upon JH-HLM goals.    Patient Centered Rounds: I performed bedside rounds with nursing staff today.   Discussions with Specialists or Other Care Team Provider: palliative care, nephro, ophthalmology, CC    Education and Discussions with Family / Patient: Updated  (wife) at bedside.    Current Length of Stay: 6 day(s)  Current Patient Status: Inpatient   Certification Statement: The patient will continue to require additional inpatient hospital stay due to GOC   Discharge Plan: Anticipate discharge in 24-48 hrs to discharge location to be determined pending rehab evaluations.    Code Status: Level 3 - DNAR and DNI    Subjective   No overnight events. Patient suctioned by nurse this morning with improvement in oxygenation. Will continue to keep NPO per ST at this time.     Objective :  Temp:  [98.5 °F (36.9 °C)-99.6 °F (37.6 °C)] 99.6 °F (37.6  °C)  HR:  [75-86] 86  BP: (113-183)/(64-96) 113/64  Resp:  [18] 18  SpO2:  [95 %-98 %] 97 %  O2 Device: None (Room air)    Body mass index is 23.33 kg/m².     Input and Output Summary (last 24 hours):     Intake/Output Summary (Last 24 hours) at 1/24/2025 0842  Last data filed at 1/24/2025 0706  Gross per 24 hour   Intake 0 ml   Output 750 ml   Net -750 ml       Physical Exam  Vitals and nursing note reviewed.   Constitutional:       General: He is not in acute distress.     Appearance: He is well-developed. He is ill-appearing.   HENT:      Head: Normocephalic and atraumatic.   Eyes:      Conjunctiva/sclera: Conjunctivae normal.   Cardiovascular:      Rate and Rhythm: Regular rhythm. Tachycardia present.      Heart sounds: No murmur heard.  Pulmonary:      Breath sounds: Rales present.   Abdominal:      Palpations: Abdomen is soft.      Tenderness: There is no abdominal tenderness.   Musculoskeletal:         General: Tenderness present. No swelling.      Cervical back: Neck supple.      Right lower leg: No edema.      Left lower leg: No edema.      Comments: Mild calf tenderness on palpation b/l    Skin:     General: Skin is warm and dry.      Capillary Refill: Capillary refill takes less than 2 seconds.   Neurological:      Mental Status: He is alert.   Psychiatric:         Mood and Affect: Mood normal.       Lines/Drains:  Lines/Drains/Airways       Active Status       Name Placement date Placement time Site Days    Urethral Catheter 01/17/25  0000  --  7                  Urinary Catheter:  Goal for removal: Voiding trial when ambulation improves           Telemetry:  Telemetry Orders (From admission, onward)               24 Hour Telemetry Monitoring  Continuous x 24 Hours (Telem)        Expiring   Question:  Reason for 24 Hour Telemetry  Answer:  Pulmonary Embolism                     Telemetry Reviewed: Normal Sinus Rhythm  Indication for Continued Telemetry Use: Arrthymias requiring medical therapy                Lab Results: I have reviewed the following results:   Results from last 7 days   Lab Units 01/24/25  0536 01/23/25  0633 01/22/25  0619 01/21/25  0857 01/21/25  0505   WBC Thousand/uL 9.20   < > 9.33  --  12.14*   HEMOGLOBIN g/dL 8.1*   < > 8.6*   < > 8.3*   HEMATOCRIT % 25.9*   < > 26.7*   < > 24.5*   PLATELETS Thousands/uL 160   < > 125*  --  95*   BANDS PCT %  --   --   --   --  2   SEGS PCT %  --   --  76*  --   --    LYMPHO PCT %  --   --  7*  --  9*   MONO PCT %  --   --  16*  --  13*   EOS PCT %  --   --  0  --  1    < > = values in this interval not displayed.     Results from last 7 days   Lab Units 01/24/25  0536 01/22/25  0619 01/21/25  0505   SODIUM mmol/L 150*   < > 143   POTASSIUM mmol/L 3.3*   < > 3.8   CHLORIDE mmol/L 119*   < > 117*   CO2 mmol/L 22   < > 16*   BUN mg/dL 58*   < > 68*   CREATININE mg/dL 2.61*   < > 3.89*   ANION GAP mmol/L 9   < > 10   CALCIUM mg/dL 8.6   < > 8.3*   ALBUMIN g/dL  --   --  3.2*   TOTAL BILIRUBIN mg/dL  --   --  1.02*   ALK PHOS U/L  --   --  58   ALT U/L  --   --  159*   AST U/L  --   --  36   GLUCOSE RANDOM mg/dL 130   < > 129    < > = values in this interval not displayed.     Results from last 7 days   Lab Units 01/19/25  1411   INR  1.61*     Results from last 7 days   Lab Units 01/24/25  0638 01/24/25  0015 01/23/25  1938 01/23/25  1212 01/23/25  0717 01/23/25  0644 01/23/25  0006 01/22/25  1333 01/22/25  0721 01/22/25  0010 01/21/25  1800 01/21/25  1156   POC GLUCOSE mg/dl 125 194* 212* 156* 148* 164* 167* 123 134 137 122 128     Results from last 7 days   Lab Units 01/18/25  0300   HEMOGLOBIN A1C % 6.9*     Results from last 7 days   Lab Units 01/19/25  1408 01/19/25  0818 01/18/25  0742 01/18/25  0451 01/18/25  0300 01/18/25  0041   LACTIC ACID mmol/L 0.8 2.3* 3.0* 3.9*   < > 9.0*   PROCALCITONIN ng/ml  --   --   --   --   --  0.23    < > = values in this interval not displayed.       Recent Cultures (last 7 days):               Last 24 Hours  Medication List:     Current Facility-Administered Medications:     artificial tear ophthalmic ointment, HS    chlorhexidine (PERIDEX) 0.12 % oral rinse 15 mL, Q12H MARQUIS    dorzolamide-timolol (COSOPT) 2-0.5 % ophthalmic solution 1 drop, BID    heparin (porcine) 25,000 units in 0.45% NaCl 250 mL infusion (premix), Titrated, Last Rate: 14 Units/kg/hr (01/23/25 2200)    heparin (porcine) injection 2,400 Units, Q6H PRN    heparin (porcine) injection 4,800 Units, Once    heparin (porcine) injection 4,800 Units, Q6H PRN    hydrALAZINE (APRESOLINE) injection 5 mg, Q6H PRN    insulin lispro (HumALOG/ADMELOG) 100 units/mL subcutaneous injection 2-12 Units, Q6H **AND** Fingerstick Glucose (POCT), Q6H    Latanoprostene Bunod 0.024 % SOLN 1 drop, HS    polyethylene glycol (MIRALAX) packet 17 g, Daily    potassium chloride 20 mEq IVPB (premix), Once    prednisoLONE acetate (PRED FORTE) 1 % ophthalmic suspension 1 drop, TID    senna-docusate sodium (SENOKOT S) 8.6-50 mg per tablet 1 tablet, BID    tetracaine 0.5 % ophthalmic solution 2 drop, Once    Administrative Statements   Today, Patient Was Seen By: Jamaica Jones DO      **Please Note: This note may have been constructed using a voice recognition system.**

## 2025-01-24 NOTE — ASSESSMENT & PLAN NOTE
Lab Results   Component Value Date    EGFR 20 01/24/2025    EGFR 16 01/23/2025    EGFR 15 01/22/2025    CREATININE 2.61 (H) 01/24/2025    CREATININE 3.13 (H) 01/23/2025    CREATININE 3.35 (H) 01/22/2025

## 2025-01-24 NOTE — ASSESSMENT & PLAN NOTE
Pt went into CA in ED 2/2 PE  ECHO with EF of 65% in September, ECHO with left ventricular ejection fraction is 55% (1/20)  Secondary to acute massive pulmonary embolus  Cardiac arrest in ED after CTA evaluating for pulmonary embolus.  Asystole for 2 minutes requiring CPR and intubation s/P extubation 1/21/2025    PLAN:  Continue heparin gtt  Continue to monitor on tele due to recent PE, recent MI, and electrolyte abnormalities

## 2025-01-24 NOTE — PLAN OF CARE
Problem: PHYSICAL THERAPY ADULT  Goal: Performs mobility at highest level of function for planned discharge setting.  See evaluation for individualized goals.  Description: Treatment/Interventions: Functional transfer training, LE strengthening/ROM, Therapeutic exercise, Endurance training, Cognitive reorientation, Patient/family training, Equipment eval/education, Bed mobility, Gait training, Compensatory technique education          See flowsheet documentation for full assessment, interventions and recommendations.  Outcome: Progressing  Note: Prognosis: Fair  Problem List: Decreased strength, Decreased endurance, Impaired balance, Decreased mobility, Decreased coordination, Decreased cognition, Impaired judgement, Decreased safety awareness  Assessment: Pt agreeable to PT session this am. Pt with good cooperation and participation with bed mob, sitting EOB working on static sitting balance and LE ex and sit to stand/standing trials. Pt cont to have limited tolerance to overall activity and needs frequent therapeutic rest breaks. Pt will cont to benefit from skilled PT services to prevent loss of strength and to cont to increase pt's balance, endurance and mobility as able/tolerated with progression of gait as able. Per EMR, ongoing GOC discussions occuring. When medically stable for dc, pt remains appropriate for Level II Moderate Resource Intensity.  Barriers to Discharge: Inaccessible home environment     Rehab Resource Intensity Level, PT: II (Moderate Resource Intensity)    See flowsheet documentation for full assessment.

## 2025-01-24 NOTE — ASSESSMENT & PLAN NOTE
Lab Results   Component Value Date    HGBA1C 6.9 (H) 01/18/2025       Recent Labs     01/23/25  1212 01/23/25  1938 01/24/25  0015 01/24/25  0638   POCGLU 156* 212* 194* 125       Blood Sugar Average: Last 72 hrs:  (P) 148

## 2025-01-24 NOTE — ASSESSMENT & PLAN NOTE
- initially presented via EMS on 01/17 after patient found unresponsive with agonal respirations, bag ventilations initiated in the field. Intubated in the setting of a brief cardiac arrest with ROSC.   - in the setting of acute saddle PE with large clot burden, Cor Pulmonale

## 2025-01-24 NOTE — ASSESSMENT & PLAN NOTE
- met with spouse and Sarah at bedside   - briefly introduced Palliative Care supports   - resides in [facility], pending clinical course, may benefit from Home Palliative Care supports   - briefly introduced [facility] philosophy    #psychosocial supports   - Antony [spouse,  65 years] [phone]   - 3 adult children   - Sarah [daughter, resides in [place], [place]]    - currently at bedside   - Jeffry [son, resides in [place]]   - Grzegorz [son, resides locally]   - 6 grandchildren, 6 great-grandchildren   - resides with spouse   - no prior  service   - retired, Maintenance work   - [employer] mem[employer] background   -  visiting regularly   - strong Sikh supports, prayer circles

## 2025-01-24 NOTE — PHYSICAL THERAPY NOTE
PT TREATMENT     25 1147   PT Last Visit   PT Visit Date 25   Note Type   Note Type Treatment   Pain Assessment   Pain Assessment Tool 0-10   Pain Score No Pain   Restrictions/Precautions   Other Precautions Fall Risk;Bed Alarm;Chair Alarm;Multiple lines  (IV)   General   Chart Reviewed Yes   Family/Caregiver Present Yes  (Pt's spouse and friend of family)   Cognition   Overall Cognitive Status Impaired   Arousal/Participation Cooperative   Attention Attends with cues to redirect   Orientation Level Oriented to person;Oriented to place;Disoriented to time;Disoriented to situation   Memory Decreased recall of precautions;Decreased recall of recent events;Decreased short term memory   Following Commands Follows one step commands with increased time or repetition   Comments At least 2 pt identifiers including name and    Subjective   Subjective Pt agreeable to work with PT   Bed Mobility   Supine to Sit 2  Maximal assistance   Additional items Assist x 1;Verbal cues;Increased time required;LE management;HOB elevated;Bedrails  (trunk management)   Sit to Supine 3  Moderate assistance   Additional items Assist x 1;Verbal cues;Increased time required;LE management   Additional Comments While seated EOB, pt performed LE as noted below. Pt sat EOB ~12 to 15 mins working on static sitting balance - with fatigue, pt noted with increased lean to R side and increasing retropulsion   Transfers   Sit to Stand 3  Moderate assistance   Additional items Assist x 2;Verbal cues;Increased time required  (pt pulling up on the RW)   Stand to Sit 3  Moderate assistance   Additional items Assist x 2;Verbal cues;Increased time required  (pt holding onto the RW)   Additional Comments Pt trans sit to stand x 2 reps. On the 1st attempt, pt stood for ~45 seconds with RW and mod A + 1;pt with mod R lean and retropulsion. On 2nd attempt, pt stood with RW and mod A + 1 for 15 seconds. Pt did not take any steps    Ambulation/Elevation   Gait pattern Not appropriate;Not tested   Balance   Static Sitting   (Varies from P to F-)   Static Standing Poor -  (with RW)   Endurance Deficit   Endurance Deficit Yes   Endurance Deficit Description Limited overall activity, sitting and standing endurance   Activity Tolerance   Activity Tolerance Patient limited by fatigue;Treatment limited secondary to medical complications (Comment)  (weakness and deconditioning;impaired cognition)   Medical Staff Made Aware MARK Grossman   Nurse Made Aware LILIYA Grossman   Exercises   Hip Flexion 5 reps;Bilateral;Sitting  (altenating)   Knee AROM Long Arc Quad 10 reps;Sitting;Bilateral   Ankle Pumps 10 reps;Sitting;Bilateral   Assessment   Problem List Decreased strength;Decreased endurance;Impaired balance;Decreased mobility;Decreased coordination;Decreased cognition;Impaired judgement;Decreased safety awareness   Assessment Pt agreeable to PT session this am. Pt with good cooperation and participation with bed mob, sitting EOB working on static sitting balance and LE ex and sit to stand/standing trials. Pt cont to have limited tolerance to overall activity and needs frequent therapeutic rest breaks. Pt will cont to benefit from skilled PT services to prevent loss of strength and to cont to increase pt's balance, endurance and mobility as able/tolerated with progression of gait as able. Per EMR, ongoing GOC discussions occuring. When medically stable for dc, pt remains appropriate for Level II Moderate Resource Intensity.    The patient's AM-PAC Basic Mobility Inpatient Short Form Raw Score is 8. A Raw score of less than or equal to 16 suggests the patient may benefit from discharge to post-acute rehabilitation services. Please also refer to the recommendation of the Physical Therapist for safe discharge planning.   Goals   Patient Goals Pt unable to state 2/2 to impaired cognition   STG Expiration Date 01/31/25   Short Term Goal #1 Pt will: perform  bilateral rolling bed mobility w/ supervision to decrease pt's burden of care; perform supine<>sit mobility w/ supervision to increase pt's independence w/ functional mobility; sit at EOB w/ supervision for at least 20 minutes to increase pt's tolerance to an upright sitting position and prepare for OOB transfers; perform transfers w/ min Ax1 to increase pt's OOB mobility; ambulate 25' w/ LRAD and min Ax1 to increase pt's ambulatory endurance/tolerance; increase all balance ratings by at least 1 grade to decrease pt's risk of falls   Plan   Treatment/Interventions ADL retraining;Functional transfer training;LE strengthening/ROM;Therapeutic exercise;Endurance training;Cognitive reorientation;Patient/family training;Equipment eval/education;Bed mobility;Gait training;Compensatory technique education;Spoke to nursing;OT;Family   PT Frequency 3-5x/wk   Discharge Recommendation   Rehab Resource Intensity Level, PT II (Moderate Resource Intensity)   AM-PAC Basic Mobility Inpatient   Turning in Flat Bed Without Bedrails 2   Lying on Back to Sitting on Edge of Flat Bed Without Bedrails 2   Moving Bed to Chair 1   Standing Up From Chair Using Arms 1   Walk in Room 1   Climb 3-5 Stairs With Railing 1   Basic Mobility Inpatient Raw Score 8   Turning Head Towards Sound 3   Follow Simple Instructions 2   Low Function Basic Mobility Raw Score  13   Low Function Basic Mobility Standardized Score  20.14   [facility] Highest Level Of Mobility   -HLM Goal 3: Sit at edge of bed   -HLM Achieved 3: Sit at edge of bed   Education   Education Provided Mobility training;Assistive device   Patient Reinforcement needed   End of Consult   Patient Position at End of Consult Supine;All needs within reach;Bed/Chair alarm activated   Licensure   [place] License Number  Citlali Schreiber, PT

## 2025-01-25 PROBLEM — E87.0 HYPERNATREMIA: Status: RESOLVED | Noted: 2025-01-22 | Resolved: 2025-01-25

## 2025-01-25 LAB
ANION GAP SERPL CALCULATED.3IONS-SCNC: 7 MMOL/L (ref 4–13)
APTT PPP: 65 SECONDS (ref 23–34)
BASE EX.OXY STD BLDV CALC-SCNC: 93.3 % (ref 60–80)
BASE EXCESS BLDV CALC-SCNC: -2.1 MMOL/L
BUN SERPL-MCNC: 51 MG/DL (ref 5–25)
CALCIUM SERPL-MCNC: 8.5 MG/DL (ref 8.4–10.2)
CHLORIDE SERPL-SCNC: 115 MMOL/L (ref 96–108)
CO2 SERPL-SCNC: 22 MMOL/L (ref 21–32)
CREAT SERPL-MCNC: 2.5 MG/DL (ref 0.6–1.3)
ERYTHROCYTE [DISTWIDTH] IN BLOOD BY AUTOMATED COUNT: 17.4 % (ref 11.6–15.1)
GFR SERPL CREATININE-BSD FRML MDRD: 21 ML/MIN/1.73SQ M
GLUCOSE SERPL-MCNC: 141 MG/DL (ref 65–140)
GLUCOSE SERPL-MCNC: 150 MG/DL (ref 65–140)
GLUCOSE SERPL-MCNC: 207 MG/DL (ref 65–140)
GLUCOSE SERPL-MCNC: 218 MG/DL (ref 65–140)
HCO3 BLDV-SCNC: 20.3 MMOL/L (ref 24–30)
HCT VFR BLD AUTO: 27.6 % (ref 36.5–49.3)
HGB BLD-MCNC: 8.5 G/DL (ref 12–17)
MAGNESIUM SERPL-MCNC: 2.1 MG/DL (ref 1.9–2.7)
MCH RBC QN AUTO: 28.6 PG (ref 26.8–34.3)
MCHC RBC AUTO-ENTMCNC: 30.8 G/DL (ref 31.4–37.4)
MCV RBC AUTO: 93 FL (ref 82–98)
O2 CT BLDV-SCNC: 12.4 ML/DL
PCO2 BLDV: 26.6 MM HG (ref 42–50)
PH BLDV: 7.5 [PH] (ref 7.3–7.4)
PHOSPHATE SERPL-MCNC: 2.2 MG/DL (ref 2.3–4.1)
PLATELET # BLD AUTO: 187 THOUSANDS/UL (ref 149–390)
PMV BLD AUTO: 12 FL (ref 8.9–12.7)
PO2 BLDV: 157 MM HG (ref 35–45)
POTASSIUM SERPL-SCNC: 3.4 MMOL/L (ref 3.5–5.3)
RBC # BLD AUTO: 2.97 MILLION/UL (ref 3.88–5.62)
SODIUM SERPL-SCNC: 144 MMOL/L (ref 135–147)
WBC # BLD AUTO: 8.27 THOUSAND/UL (ref 4.31–10.16)

## 2025-01-25 PROCEDURE — 84100 ASSAY OF PHOSPHORUS: CPT | Performed by: INTERNAL MEDICINE

## 2025-01-25 PROCEDURE — 85730 THROMBOPLASTIN TIME PARTIAL: CPT | Performed by: INTERNAL MEDICINE

## 2025-01-25 PROCEDURE — 82948 REAGENT STRIP/BLOOD GLUCOSE: CPT

## 2025-01-25 PROCEDURE — 99233 SBSQ HOSP IP/OBS HIGH 50: CPT | Performed by: INTERNAL MEDICINE

## 2025-01-25 PROCEDURE — 82805 BLOOD GASES W/O2 SATURATION: CPT | Performed by: INTERNAL MEDICINE

## 2025-01-25 PROCEDURE — 99232 SBSQ HOSP IP/OBS MODERATE 35: CPT | Performed by: INTERNAL MEDICINE

## 2025-01-25 PROCEDURE — 80048 BASIC METABOLIC PNL TOTAL CA: CPT | Performed by: INTERNAL MEDICINE

## 2025-01-25 PROCEDURE — 83735 ASSAY OF MAGNESIUM: CPT | Performed by: INTERNAL MEDICINE

## 2025-01-25 PROCEDURE — 85027 COMPLETE CBC AUTOMATED: CPT | Performed by: INTERNAL MEDICINE

## 2025-01-25 RX ORDER — SCOPOLAMINE 1 MG/3D
1 PATCH, EXTENDED RELEASE TRANSDERMAL
Status: DISCONTINUED | OUTPATIENT
Start: 2025-01-25 | End: 2025-01-29

## 2025-01-25 RX ORDER — FUROSEMIDE 10 MG/ML
10 INJECTION INTRAMUSCULAR; INTRAVENOUS ONCE
Status: COMPLETED | OUTPATIENT
Start: 2025-01-25 | End: 2025-01-25

## 2025-01-25 RX ORDER — POTASSIUM CHLORIDE 14.9 MG/ML
20 INJECTION INTRAVENOUS ONCE
Status: COMPLETED | OUTPATIENT
Start: 2025-01-25 | End: 2025-01-25

## 2025-01-25 RX ADMIN — POTASSIUM CHLORIDE 20 MEQ: 14.9 INJECTION, SOLUTION INTRAVENOUS at 18:52

## 2025-01-25 RX ADMIN — PREDNISOLONE ACETATE 1 DROP: 10 SUSPENSION/ DROPS OPHTHALMIC at 17:46

## 2025-01-25 RX ADMIN — INSULIN LISPRO 4 UNITS: 100 INJECTION, SOLUTION INTRAVENOUS; SUBCUTANEOUS at 05:03

## 2025-01-25 RX ADMIN — LATANOPROSTENE BUNOD 1 DROP: 0.24 SOLUTION/ DROPS OPHTHALMIC at 21:27

## 2025-01-25 RX ADMIN — INSULIN LISPRO 2 UNITS: 100 INJECTION, SOLUTION INTRAVENOUS; SUBCUTANEOUS at 00:00

## 2025-01-25 RX ADMIN — MINERAL OIL, WHITE PETROLATUM: .03; .94 OINTMENT OPHTHALMIC at 21:27

## 2025-01-25 RX ADMIN — HEPARIN SODIUM 14 UNITS/KG/HR: 10000 INJECTION, SOLUTION INTRAVENOUS at 17:49

## 2025-01-25 RX ADMIN — PREDNISOLONE ACETATE 1 DROP: 10 SUSPENSION/ DROPS OPHTHALMIC at 10:11

## 2025-01-25 RX ADMIN — DORZOLAMIDE HYDROCHLORIDE AND TIMOLOL MALEATE 1 DROP: 20; 5 SOLUTION OPHTHALMIC at 10:11

## 2025-01-25 RX ADMIN — POTASSIUM PHOSPHATE, MONOBASIC AND POTASSIUM PHOSPHATE, DIBASIC 21 MMOL: 224; 236 INJECTION, SOLUTION, CONCENTRATE INTRAVENOUS at 10:20

## 2025-01-25 RX ADMIN — FUROSEMIDE 10 MG: 10 INJECTION, SOLUTION INTRAMUSCULAR; INTRAVENOUS at 12:17

## 2025-01-25 RX ADMIN — SCOPOLAMINE 1 PATCH: 1.5 PATCH, EXTENDED RELEASE TRANSDERMAL at 13:34

## 2025-01-25 RX ADMIN — PREDNISOLONE ACETATE 1 DROP: 10 SUSPENSION/ DROPS OPHTHALMIC at 21:28

## 2025-01-25 RX ADMIN — DORZOLAMIDE HYDROCHLORIDE AND TIMOLOL MALEATE 1 DROP: 20; 5 SOLUTION OPHTHALMIC at 17:46

## 2025-01-25 NOTE — PROGRESS NOTES
Progress Note - Nephrology   Name: Jeffry Almanzar 89 y.o. male I MRN: [id number]  Unit/Bed#: S -01 I Date of Admission: 1/17/2025   Date of Service: 1/25/2025 I Hospital Day: 7    Assessment & Plan  SOFÍA (acute kidney injury) (HCC)    acute on chronic kidney injury stage IV  - Outpatient nephrologist Dr. Rey  - Baseline creatinine progressively worsening to low 3  - Admission creatinine 3.1 mg/dL on January 18  - Creatinine rising to 3.9 mg/dL on January 21 prompting nephrology consultation  - Patient received contrast study on January 18 and also presented with cardiogenic shock and cor pulmonale  - Etiology-likely ATN in the setting of hypotension/shock/IV contrast/cardiac arrest/autoregulatory failure in setting of ARB at home  - Received Lasix IV one-time January 21 by primary team  - January 22-creatinine slightly improved 3.6 mg/dL.  Electrolytes are appropriate.  With the exception of sodium rising and we will start D5W  - January 23-sodium continues to be elevated.  Creatinine improving to 3.1 mg/dL back to baseline.  - January 24-sodium slowly improving to 150.  Bicarbonate appropriate.  Potassium low being repleted.  Creatinine improving 2.6 mg/dL.  Will continue D5W.  Replete potassium.  Will give Lasix one-time due to developing slight volume overload which is not impacting patient's respiratory status currently and therefore we will start with low-dose Lasix as the patient is also hypokalemic and hypernatremic and this could worsen these issues  - January 25-sodium level improving appropriately to 144 with D5W.  Potassium borderline low but improved 3.4 and phosphorus low being repleted by primary team.  Will give Lasix at slightly lower dose 10 mg one-time today as patient still has signs of pulmonary edema on exam.  Will hold on starting D5W but anticipate will need D5W again tomorrow     Plan  - I/os; avoid nephrotoxic agents  - Avoid hypotension  - Agree with potassium phosphate repletion  this morning and give another 20 mill equivalent potassium chloride IV this evening  - Hold D5W as doing  - Give Lasix IV one-time 10 mg  - Lab work in a.m.  - Goals of care discussions are in progress by primary team  - Consider void trial depending on goals of care    CKD (chronic kidney disease) stage 4, GFR 15-29 ml/min (formerly [facility])  -See discussion above  Normal anion gap metabolic acidosis  - Initially presented with elevated anion gap and decreased serum bicarbonate. Anion gap has improved. Serum bicarbonate level is slowly improving. VBG pH reviewed 7.39. Will continue to monitor. And improving bicarbonate on January 24  Acute saddle pulmonary embolism with acute cor pulmonale (formerly [facility])  saddle pulmonary embolus with cor pulmonale with unresponsiveness from home and cardiac arrest     - Initially with cardiac arrest and cardiogenic shock  - Is now extubated  - Is now off pressors  - Management per primary team-transfer to MedSurg floor evening of January 21  Cardiac arrest (formerly [facility])  -In the setting of  saddle embolus.  Is now out of ICU on MedSurg floor  Benign 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 (formerly [facility])  - Initially with cardiogenic shock  - Repeat echocardiogram January 20 with EF 55%, grade 1 diastolic dysfunction  - At home is on losartan  - evening on January 21 developed sudden acute hypertension-see discussion below  - Continue as needed hydralazine     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  (HCC)  -Trend per primary team    Hypokalemia  -Agree with repletion today  Retinal hemorrhage noted on examination of left eye  - seen by ophthalmology-chronic issue likely    I have reviewed the nephrology recommendations including IV Lasix one-time, with primary team resident, and we are in agreement with renal plan including the information outlined above.     Subjective   Brief History of Admission - 89-year-old male with a past medical history of CKD stage IV, hypertension, hyperlipidemia, CVA,  diabetes, IgM paraprotein, bladder cancer who initially presents on January 18 with unresponsiveness. Patient was found to have massive PE and given tPA, intubated, brief cardiac arrest. Initially required vasopressors for shock. Had acute kidney injury. Troponin elevation. Initial lactic acidosis which was slowly improving. Was started on anticoagulation with heparin drip and echocardiograms were followed serially. Echocardiogram in January 18 with EF could not be assessed, RV dilated and moderately reduced function. Patient is also required transfusion this admission for anemia. Briefly had worsening pressor requirements but by January 20, was off all pressors. Patient was extubated January 20. Nephrology is consulted January 21 due to elevated creatinine.  Renal function is slowly improving.  Likely contrast nephropathy contributing to initial poor flow in the setting of shock.  Started IV diuretic January 24 low-dose to assist with pulmonary edema which is mild.  Patient has significant dysphagia preventing swallowing and all medications currently are IV.  Goals of care discussion in progress with primary team in progress    Overnight no acute issues reported.  Vital stable.  Urine output 1.3 L.  Which is improved with Lasix yesterday.  Patient denies complaints.  Wife at bedside.    Objective :  Temp:  [98.9 °F (37.2 °C)-100.4 °F (38 °C)] 99.3 °F (37.4 °C)  HR:  [78-86] 86  BP: (132-143)/(75-87) 132/75  Resp:  [18] 18  SpO2:  [94 %-99 %] 99 %  O2 Device: None (Room air)    Current Weight: Weight - Scale: 63.6 kg (140 lb 3.4 oz)  First Weight: Weight - Scale: 62.1 kg (137 lb)  I/O         01/23 0701 01/24 0700 01/24 0701  01/25 0700 01/25 0701 01/26 0700    P.O. 0 0     I.V. (mL/kg)       Total Intake(mL/kg) 0 (0) 0 (0)     Urine (mL/kg/hr) 750 (0.5) 1300 (0.9)     Stool  0     Total Output 750 1300     Net -750 -1300            Unmeasured Stool Occurrence  1 x           Physical Exam  General: NAD  Skin: no  rash  Eyes: anicteric sclera  ENT: moist mucous membrane  Neck: supple  Chest: CTA b/l, no ronchii, no wheeze, no rubs,  unchanged fine lateral bilateral rales  CVS: s1s2, no murmur, no gallop, no rub  Abdomen: soft, nontender, nl sounds  Extremities: Trace unchanged edema LE b/l  : Positive villatoro  Neuro: AAOX2-3  Psych: normal affect    Medications:    Current Facility-Administered Medications:     artificial tear ophthalmic ointment, , Both Eyes, HS, Eloise Rodriguez MD, Given at 01/24/25 2151    chlorhexidine (PERIDEX) 0.12 % oral rinse 15 mL, 15 mL, Mouth/Throat, Q12H MARQUIS, Eloise Rodriguez MD, 15 mL at 01/21/25 2106    dorzolamide-timolol (COSOPT) 2-0.5 % ophthalmic solution 1 drop, 1 drop, Both Eyes, BID, Eloise Rodriguez MD, 1 drop at 01/25/25 1011    heparin (porcine) 25,000 units in 0.45% NaCl 250 mL infusion (premix), 3-30 Units/kg/hr (Order-Specific), Intravenous, Titrated, Pawan Gtz MD, Last Rate: 8.4 mL/hr at 01/24/25 1445, 14 Units/kg/hr at 01/24/25 1445    heparin (porcine) injection 2,400 Units, 2,400 Units, Intravenous, Q6H PRN, Pawan Gtz MD    heparin (porcine) injection 4,800 Units, 4,800 Units, Intravenous, Once, Pawan Gtz MD    heparin (porcine) injection 4,800 Units, 4,800 Units, Intravenous, Q6H PRN, Pawan Gtz MD, 4,800 Units at 01/23/25 1317    hydrALAZINE (APRESOLINE) injection 5 mg, 5 mg, Intravenous, Q6H PRN, [place] Jones DO    insulin lispro (HumALOG/ADMELOG) 100 units/mL subcutaneous injection 2-12 Units, 2-12 Units, Subcutaneous, Q6H, 4 Units at 01/25/25 0503 **AND** Fingerstick Glucose (POCT), , , Q6H, Eloise Rodriguez MD    Latanoprostene Bunod 0.024 % SOLN 1 drop, 1 drop, Both Eyes, HS, Eloise Rodriguez MD, 1 drop at 01/24/25 4821    polyethylene glycol (MIRALAX) packet 17 g, 17 g, Oral, Daily, Eloise Rodriguez MD, 17 g at 01/20/25 1505    potassium phosphates 21 mmol in sodium chloride 0.9 % 250 mL infusion, 21 mmol, Intravenous, Once, Jamaica Jones DO, Last Rate: 62.5 mL/hr at 01/25/25 1020,  "21 mmol at 01/25/25 1020    prednisoLONE acetate (PRED FORTE) 1 % ophthalmic suspension 1 drop, 1 drop, Left Eye, TID, Eloise Rodriguez MD, 1 drop at 01/25/25 1011    [Held by provider] senna-docusate sodium (SENOKOT S) 8.6-50 mg per tablet 1 tablet, 1 tablet, Oral, BID, Eloise Rodriguez MD    tetracaine 0.5 % ophthalmic solution 2 drop, 2 drop, Both Eyes, Once, Jamaica Jones DO      Lab Results: I have reviewed the following results:  Results from last 7 days   Lab Units 01/25/25  0433 01/24/25  0536 01/23/25  0633 01/22/25  1959 01/22/25  0619 01/21/25  1955 01/21/25  0857 01/21/25  0505 01/20/25  1305 01/20/25  0454 01/19/25  0726 01/19/25  0531   WBC Thousand/uL 8.27 9.20 8.68  --  9.33  --   --  12.14*  --  12.65*  --  16.80*   HEMOGLOBIN g/dL 8.5* 8.1* 9.0*  --  8.6* 8.8* 8.2* 8.3*   < > 8.2*   < > 6.0*   HEMATOCRIT % 27.6* 25.9* 27.8*  --  26.7* 27.1* 24.8* 24.5*   < > 24.6*   < > 18.8*   PLATELETS Thousands/uL 187 160 145*  --  125*  --   --  95*  --  76*  --  99*   POTASSIUM mmol/L 3.4* 3.3* 3.3* 3.4* 3.6  --   --  3.8  --  4.3  --  4.0   CHLORIDE mmol/L 115* 119* 119* 117* 117*  --   --  117*  --  115*  --  111*   CO2 mmol/L 22 22 22 20* 19*  --   --  16*  --  17*  --  15*   BUN mg/dL 51* 58* 69* 72* 69*  --   --  68*  --  62*  --  45*   CREATININE mg/dL 2.50* 2.61* 3.13* 3.35* 3.59*  --   --  3.89*  --  3.85*  --  3.38*   CALCIUM mg/dL 8.5 8.6 9.0 9.0 8.8  --   --  8.3*  --  7.3*  --  7.7*   MAGNESIUM mg/dL 2.1 2.3 2.4  --   --   --   --   --   --  2.2  --  2.2   PHOSPHORUS mg/dL 2.2*  --   --   --   --   --   --   --   --   --   --  4.0   ALBUMIN g/dL  --   --   --   --   --   --   --  3.2*  --  3.0*  --  3.1*    < > = values in this interval not displayed.       Administrative Statements     Portions of the record may have been created with voice recognition software. Occasional wrong word or \"sound a like\" substitutions may have occurred due to the inherent limitations of voice recognition software. Read " the chart carefully and recognize, using context, where substitutions have occurred.If you have any questions, please contact the dictating provider.

## 2025-01-25 NOTE — ASSESSMENT & PLAN NOTE
Etiology: Suspect due to ATN in the setting of cardiogenic shock, cardiac arrest, hypotension and IV contrast exposure with autoregulatory dysfunction with ARB  Baseline creatinine low threes.  Per nephrology, no urgent need for dialysis, but states that patient and wife would not be agreeable to dialysis in the future  Continue to hold losartan and Farxiga per nephrology  Start D5W due to hypernatremia initiated by nephro 1/23. Sodium improved from 151 to 150  Will continue D5 100 ml/hr per nephro for one full day  Replete potassium.  Phosphorus also low.  Repleted with potassium phosphate  Will consider voiding trial depending on ongoing GOC discussions with family   Strict I/Os, daily weights  Avoid nephrotoxins, NSAIDs, further IV contrast as able  Avoid hypotension.  Maintain MAP >65

## 2025-01-25 NOTE — ASSESSMENT & PLAN NOTE
-In the setting of  saddle embolus.  Is now out of ICU on [facility] floor

## 2025-01-25 NOTE — ASSESSMENT & PLAN NOTE
Lab Results   Component Value Date    HGBA1C 6.9 (H) 01/18/2025       Recent Labs     01/24/25  1301 01/24/25  1800 01/24/25  2357 01/25/25  0506   POCGLU 172* 190* 176* 207*       Blood Sugar Average: Last 72 hrs:  (P) [id number]  Recent Labs     01/23/25  0633 01/23/25  0644 01/24/25  0536 01/24/25  0638 01/24/25  1800 01/24/25  2357 01/25/25  0433 01/25/25  0506   POCGLU  --    < >  --    < > 190* 176*  --  207*   GLUC 165*  --  130  --   --   --  218*  --     < > = values in this interval not displayed.     Hold home regimen while inpatient and resume on discharge   NPO due to results of VBS   Insulin regimen  SSI   On D5 per nephro  Goal -180 while admitted, adjusting insulin regimen as appropriate  Monitor for hypoglycemia and treat per protocol

## 2025-01-25 NOTE — ASSESSMENT & PLAN NOTE
Potassium decreased at 3.3. Unable to take oral medications. IV potassium 20 meq given. Will give a second dose this afternoon 1/24  Phosphorus also found to be low at 2.2.  Repleted with potassium phosphate

## 2025-01-25 NOTE — ASSESSMENT & PLAN NOTE
This afternoon, patient satting at 84% on room air with labored breathing.  Has notable rales in bilateral lung fields.  Patient has wet cough without sputum production.  Unable to adequately suctioned out secretions    PLAN:  VBS moderate oral/severe pharyngeal dysphagia characterized by weak swallow mechanics, multiple swallows noted to clear pharyngeal retention with overflow penetration and aspiration observed after the swallow   Suction any secretions as able  Consider addition of scopolamine patch to manage secretions.  Risks versus benefits regarding anticholinergic effects

## 2025-01-25 NOTE — ASSESSMENT & PLAN NOTE
Per wife, patient has some degree of dementia but is unclear of specifics.  Patient oriented to self and place but unaware of year and month.  Patient incorrectly stated the number of years he has been  to his wife during time of exam  Per wife, unsure if patient is completely aware of what is going on and is unsure if patient would truly want to except intubation a second time    PLAN:  Neuropsych evaluation determined patient does NOT have capacity for MDM  We will have further discussions with family and patient depending on overall prognosis and goals of care  Palliative on board. Appreciate recommendations. As of 1/23/2025, family made the decision to make patient's code status LEVEL 3 DNR/DNI.  Ongoing discussions regarding palliative care

## 2025-01-25 NOTE — PLAN OF CARE
Problem: PAIN - ADULT  Goal: Verbalizes/displays adequate comfort level or baseline comfort level  Description: Interventions:  - Encourage patient to monitor pain and request assistance  - Assess pain using appropriate pain scale  - Administer analgesics based on type and severity of pain and evaluate response  - Implement non-pharmacological measures as appropriate and evaluate response  - Consider cultural and social influences on pain and pain management  - Notify physician/advanced practitioner if interventions unsuccessful or patient reports new pain  Outcome: Progressing     Problem: INFECTION - ADULT  Goal: Absence or prevention of progression during hospitalization  Description: INTERVENTIONS:  - Assess and monitor for signs and symptoms of infection  - Monitor lab/diagnostic results  - Monitor all insertion sites, i.e. indwelling lines, tubes, and drains  - Monitor endotracheal if appropriate and nasal secretions for changes in amount and color  - [place] appropriate cooling/warming therapies per order  - Administer medications as ordered  - Instruct and encourage patient and family to use good hand hygiene technique  - Identify and instruct in appropriate isolation precautions for identified infection/condition  Outcome: Progressing  Goal: Absence of fever/infection during neutropenic period  Description: INTERVENTIONS:  - Monitor WBC    Outcome: Progressing     Problem: SAFETY ADULT  Goal: Patient will remain free of falls  Description: INTERVENTIONS:  - Educate patient/family on patient safety including physical limitations  - Instruct patient to call for assistance with activity   - Consult OT/PT to assist with strengthening/mobility   - Keep Call bell within reach  - Keep bed low and locked with side rails adjusted as appropriate  - Keep care items and personal belongings within reach  - Initiate and maintain comfort rounds  - Make Fall Risk Sign visible to staff  - Offer Toileting every 2 Hours,  in advance of need  - Initiate/Maintain bed alarm  - Obtain necessary fall risk management equipment  - Apply yellow socks and bracelet for high fall risk patients  - Consider moving patient to room near nurses station  Outcome: Progressing  Goal: Maintain or return to baseline ADL function  Description: INTERVENTIONS:  -  Assess patient's ability to carry out ADLs; assess patient's baseline for ADL function and identify physical deficits which impact ability to perform ADLs (bathing, care of mouth/teeth, toileting, grooming, dressing, etc.)  - Assess/evaluate cause of self-care deficits   - Assess range of motion  - Assess patient's mobility; develop plan if impaired  - Assess patient's need for assistive devices and provide as appropriate  - Encourage maximum independence but intervene and supervise when necessary  - Involve family in performance of ADLs  - Assess for home care needs following discharge   - Consider OT consult to assist with ADL evaluation and planning for discharge  - Provide patient education as appropriate  Outcome: Progressing  Goal: Maintains/Returns to pre admission functional level  Description: INTERVENTIONS:  - Perform AM-PAC 6 Click Basic Mobility/ Daily Activity assessment daily.  - Set and communicate daily mobility goal to care team and patient/family/caregiver.   - Collaborate with rehabilitation services on mobility goals if consulted  - Reposition patient every 2 hours.  - Record patient progress and toleration of activity level   Outcome: Progressing     Problem: DISCHARGE PLANNING  Goal: Discharge to home or other facility with appropriate resources  Description: INTERVENTIONS:  - Identify barriers to discharge w/patient and caregiver  - Arrange for needed discharge resources and transportation as appropriate  - Identify discharge learning needs (meds, wound care, etc.)  - Arrange for interpretive services to assist at discharge as needed  - Refer to Case Management Department  for coordinating discharge planning if the patient needs post-hospital services based on physician/advanced practitioner order or complex needs related to functional status, cognitive ability, or social support system  Outcome: Progressing     Problem: Knowledge Deficit  Goal: Patient/family/caregiver demonstrates understanding of disease process, treatment plan, medications, and discharge instructions  Description: Complete learning assessment and assess knowledge base.  Interventions:  - Provide teaching at level of understanding  - Provide teaching via preferred learning methods  Outcome: Progressing     Problem: Nutrition/Hydration-ADULT  Goal: Nutrient/Hydration intake appropriate for improving, restoring or maintaining nutritional needs  Description: Monitor and assess patient's nutrition/hydration status for malnutrition. Collaborate with interdisciplinary team and initiate plan and interventions as ordered.  Monitor patient's weight and dietary intake as ordered or per policy. Utilize nutrition screening tool and intervene as necessary. Determine patient's food preferences and provide high-protein, high-caloric foods as appropriate.     INTERVENTIONS:  - Monitor oral intake, urinary output, labs, and treatment plans  - Assess nutrition and hydration status and recommend course of action  - Evaluate amount of meals eaten  - Assist patient with eating if necessary   - Allow adequate time for meals  - Recommend/ encourage appropriate diets, oral nutritional supplements, and vitamin/mineral supplements  - Order, calculate, and assess calorie counts as needed  - Recommend, monitor, and adjust tube feedings and TPN/PPN based on assessed needs  - Assess need for intravenous fluids  - Provide specific nutrition/hydration education as appropriate  - Include patient/family/caregiver in decisions related to nutrition  Outcome: Progressing     Problem: Prexisting or High Potential for Compromised Skin Integrity  Goal:  Skin integrity is maintained or improved  Description: INTERVENTIONS:  - Identify patients at risk for skin breakdown  - Assess and monitor skin integrity  - Assess and monitor nutrition and hydration status  - Monitor labs   - Assess for incontinence   - Turn and reposition patient  - Assist with mobility/ambulation  - Relieve pressure over bony prominences  - Avoid friction and shearing  - Provide appropriate hygiene as needed including keeping skin clean and dry  - Evaluate need for skin moisturizer/barrier cream  - Collaborate with interdisciplinary team   - Patient/family teaching  - Consider wound care consult   Outcome: Progressing

## 2025-01-25 NOTE — ASSESSMENT & PLAN NOTE
Patient seen and evaluated by palliative care 1/23. Palliative care had an in-depth discussion with family regarding patient's GOC and overall prognosis. Family would NOT like to pursue a PEG tube at this time.   CODE STATUS changed from Level 2 to Level 3 DNR/DNI  Ongoing goals of care discussions with consideration for comfort measures only

## 2025-01-25 NOTE — PROGRESS NOTES
Progress Note - Hospitalist   Name: Jeffry Almanzar 89 y.o. male I MRN: [id number]  Unit/Bed#: S -01 I Date of Admission: 1/17/2025   Date of Service: 1/25/2025 I Hospital Day: 7    Assessment & Plan  Acute saddle pulmonary embolism with acute cor pulmonale (HCC)  MRI brain negative for CVA or concern for hemorrhage.  Continue heparin gtt  Hypoxia  This afternoon, patient satting at 84% on room air with labored breathing.  Has notable rales in bilateral lung fields.  Patient has wet cough without sputum production.  Unable to adequately suctioned out secretions    PLAN:  VBS moderate oral/severe pharyngeal dysphagia characterized by weak swallow mechanics, multiple swallows noted to clear pharyngeal retention with overflow penetration and aspiration observed after the swallow   Suction any secretions as able  Consider addition of scopolamine patch to manage secretions.  Risks versus benefits regarding anticholinergic effects  Disoriented to time  Per wife, patient has some degree of dementia but is unclear of specifics.  Patient oriented to self and place but unaware of year and month.  Patient incorrectly stated the number of years he has been  to his wife during time of exam  Per wife, unsure if patient is completely aware of what is going on and is unsure if patient would truly want to except intubation a second time    PLAN:  Neuropsych evaluation determined patient does NOT have capacity for MDM  We will have further discussions with family and patient depending on overall prognosis and goals of care  Palliative on board. Appreciate recommendations. As of 1/23/2025, family made the decision to make patient's code status LEVEL 3 DNR/DNI.  Ongoing discussions regarding palliative care  SOFÍA (acute kidney injury) (HCC)  Etiology: Suspect due to ATN in the setting of cardiogenic shock, cardiac arrest, hypotension and IV contrast exposure with autoregulatory dysfunction with ARB  Baseline creatinine low  threes.  Per nephrology, no urgent need for dialysis, but states that patient and wife would not be agreeable to dialysis in the future  Continue to hold losartan and Farxiga per nephrology  Start D5W due to hypernatremia initiated by nephro 1/23. Sodium improved from 151 to 150  Will continue D5 100 ml/hr per nephro for one full day  Replete potassium.  Phosphorus also low.  Repleted with potassium phosphate  Will consider voiding trial depending on ongoing GOC discussions with family   Strict I/Os, daily weights  Avoid nephrotoxins, NSAIDs, further IV contrast as able  Avoid hypotension.  Maintain MAP >65    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  ([facility])  Lab Results   Component Value Date    EGFR 21 01/25/2025    EGFR 20 01/24/2025    EGFR 16 01/23/2025    CREATININE 2.50 (H) 01/25/2025    CREATININE 2.61 (H) 01/24/2025    CREATININE 3.13 (H) 01/23/2025   Avoid nephrotoxic medications as able  Na elevated at 150  Continue to hold losartan and Farxiga per nephro   Nephrology on board. Appreciate recommendations   Benign 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 ([facility])  Lab Results   Component Value Date    EGFR 21 01/25/2025    EGFR 20 01/24/2025    EGFR 16 01/23/2025    CREATININE 2.50 (H) 01/25/2025    CREATININE 2.61 (H) 01/24/2025    CREATININE 3.13 (H) 01/23/2025   Holding Losartan and Farxiga per nephro due to elevated Cr   Hydralazine 5 mg q 6 hrs for BP >180/110   Initially allowed for permissive hypertension due to concern for stroke.  However, patient's MRI does not show concern for infarct.  Will aim for normotension  CKD (chronic kidney disease) stage 4, GFR 15-29 ml/min ([facility])  Lab Results   Component Value Date    EGFR 21 01/25/2025    EGFR 20 01/24/2025    EGFR 16 01/23/2025    CREATININE 2.50 (H) 01/25/2025    CREATININE 2.61 (H) 01/24/2025    CREATININE 3.13 (H) 01/23/2025   Per patient and patient's wife, would not be interested in dialysis   Nephro on board. Appreciate  recommendations  Diabetic nephropathy associated with type 2 diabetes mellitus (HCC)  Lab Results   Component Value Date    HGBA1C 6.9 (H) 01/18/2025       Recent Labs     01/24/25  1301 01/24/25  1800 01/24/25  2357 01/25/25  0506   POCGLU 172* 190* 176* 207*       Blood Sugar Average: Last 72 hrs:  (P) [id number]  Recent Labs     01/23/25  0633 01/23/25  0644 01/24/25  0536 01/24/25  0638 01/24/25  1800 01/24/25  2357 01/25/25  0433 01/25/25  0506   POCGLU  --    < >  --    < > 190* 176*  --  207*   GLUC 165*  --  130  --   --   --  218*  --     < > = values in this interval not displayed.     Hold home regimen while inpatient and resume on discharge   NPO due to results of VBS   Insulin regimen  SSI   On D5 per nephro  Goal -180 while admitted, adjusting insulin regimen as appropriate  Monitor for hypoglycemia and treat per protocol   Lacunar cerebrovascular accident (CVA) ([facility])  History of lacunar infarct resulting in residual right-sided weakness  Initially concern for possible TIA versus CVA due to transient left arm weakness 1/21/2025.  MRI brain and MRA negative for infarct, high-grade stenosis, and LVO  Will aim for normotension.  As needed hydralazine for BP greater than 180/110  Avoid losartan and Farxiga per nephro given kidney function   Cardiac arrest (HCC)  Pt went into CA in ED 2/2 PE  ECHO with EF of 65% in September, ECHO with left ventricular ejection fraction is 55% (1/20)  Secondary to acute massive pulmonary embolus  Cardiac arrest in ED after CTA evaluating for pulmonary embolus.  Asystole for 2 minutes requiring CPR and intubation s/P extubation 1/21/2025    PLAN:  Continue heparin gtt  Continue to monitor on tele due to recent PE, recent MI, and electrolyte abnormalities     Normal anion gap metabolic acidosis  Most recent VBG pCO2 30.8, pH 7.4, bicarb 18.4  AG closed. Continue to monitor   Retinal hemorrhage noted on examination of left eye  Found on head CT 1/21.   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  Instructed patient to follow-up with ophthalmologist in outpatient setting  Hypernatremia (Resolved: 1/25/2025)  Improved to 144 on D5 water at 100 mL/hr.  Nephrology on board.  Appreciate further recommendations  potassium chloride this evening      Lasix 10 mg one-time IV today.   Hypokalemia  Potassium decreased at 3.3. Unable to take oral medications. IV potassium 20 meq given. Will give a second dose this afternoon 1/24  Phosphorus also found to be low at 2.2.  Repleted with potassium phosphate  Palliative care encounter  Patient seen and evaluated by palliative care 1/23. Palliative care had an in-depth discussion with family regarding patient's GOC and overall prognosis. Family would NOT like to pursue a PEG tube at this time.   CODE STATUS changed from Level 2 to Level 3 DNR/DNI  Ongoing goals of care discussions with consideration for comfort measures only  Goals of care, counseling/discussion  Palliative on board. Appreciate recommendations     VTE Pharmacologic Prophylaxis:   High Risk (Score >/= 5) - Pharmacological DVT Prophylaxis Ordered: heparin drip. Sequential Compression Devices Ordered.    Mobility:   Basic Mobility Inpatient Raw Score: 8  JH-HLM Goal: 3: Sit at edge of bed  JH-HLM Achieved: 1: Laying in bed  JH-HLM Goal NOT achieved. Continue with multidisciplinary rounding and encourage appropriate mobility to improve upon JH-HLM goals.    Patient Centered Rounds: I performed bedside rounds with nursing staff today.   Discussions with Specialists or Other Care Team Provider: Nephrology, CC, palliative care    Education and Discussions with Family / Patient: Updated  (wife) at bedside.    Current Length of Stay: 7 day(s)  Current Patient Status: Inpatient   Certification Statement: The patient will  continue to require additional inpatient hospital stay due to ongoing goals of care discussions  Discharge Plan: Anticipate discharge in 48-72 hrs to discharge location to be determined pending rehab evaluations.    Code Status: Level 3 - DNAR and DNI    Subjective   Overnight, nursing staff reached out regarding possible scopolamine patch due to patient's difficulty managing secretions.  Patient continues to deny any pain, nausea, vomiting, or new symptomology.  However, during exam patient is only briefly able to keep his eyes open and answer with occasional one-word responses.  Unable to participate in orientation questions this morning.  Will attempt again this afternoon when patient is more awake.  Ongoing conversations with palliative care appreciated.    Objective :  Temp:  [98.6 °F (37 °C)-100.4 °F (38 °C)] 99.3 °F (37.4 °C)  HR:  [78-86] 86  BP: (132-143)/(75-87) 132/75  Resp:  [18] 18  SpO2:  [94 %-99 %] 99 %  O2 Device: None (Room air)    Body mass index is 23.33 kg/m².     Input and Output Summary (last 24 hours):     Intake/Output Summary (Last 24 hours) at 1/25/2025 0844  Last data filed at 1/25/2025 0449  Gross per 24 hour   Intake --   Output 1300 ml   Net -1300 ml       Physical Exam  Vitals and nursing note reviewed.   Constitutional:       General: He is not in acute distress.     Appearance: He is well-developed. He is ill-appearing.   HENT:      Head: Normocephalic and atraumatic.   Eyes:      Conjunctiva/sclera: Conjunctivae normal.   Cardiovascular:      Rate and Rhythm: Normal rate.      Pulses: Normal pulses.      Heart sounds: No murmur heard.  Pulmonary:      Breath sounds: Rales present.   Abdominal:      Palpations: Abdomen is soft.      Tenderness: There is no abdominal tenderness.   Musculoskeletal:         General: No swelling.      Cervical back: Neck supple.      Right lower leg: No edema.      Left lower leg: No edema.   Skin:     General: Skin is warm and dry.      Capillary  Refill: Capillary refill takes less than 2 seconds.   Neurological:      Mental Status: He is alert.      Motor: Weakness present.      Comments: Generalized weakness   Psychiatric:         Mood and Affect: Mood normal.         Lines/Drains:  Lines/Drains/Airways       Active Status       Name Placement date Placement time Site Days    Urethral Catheter 01/17/25  0000  --  8                  Urinary Catheter:  Goal for removal: Voiding trial when ambulation improves           Telemetry:  Telemetry Orders (From admission, onward)               24 Hour Telemetry Monitoring  Continuous x 24 Hours (Telem)        Expiring   Question:  Reason for 24 Hour Telemetry  Answer:  Pulmonary Embolism                     Telemetry Reviewed: Normal Sinus Rhythm  Indication for Continued Telemetry Use: Metabolic/electrolyte disturbance with high probability of dysrhythmia               Lab Results: I have reviewed the following results:   Results from last 7 days   Lab Units 01/25/25  0433 01/23/25  0633 01/22/25  0619 01/21/25  0857 01/21/25  0505   WBC Thousand/uL 8.27   < > 9.33  --  12.14*   HEMOGLOBIN g/dL 8.5*   < > 8.6*   < > 8.3*   HEMATOCRIT % 27.6*   < > 26.7*   < > 24.5*   PLATELETS Thousands/uL 187   < > 125*  --  95*   BANDS PCT %  --   --   --   --  2   SEGS PCT %  --   --  76*  --   --    LYMPHO PCT %  --   --  7*  --  9*   MONO PCT %  --   --  16*  --  13*   EOS PCT %  --   --  0  --  1    < > = values in this interval not displayed.     Results from last 7 days   Lab Units 01/25/25  0433 01/22/25  0619 01/21/25  0505   SODIUM mmol/L 144   < > 143   POTASSIUM mmol/L 3.4*   < > 3.8   CHLORIDE mmol/L 115*   < > 117*   CO2 mmol/L 22   < > 16*   BUN mg/dL 51*   < > 68*   CREATININE mg/dL 2.50*   < > 3.89*   ANION GAP mmol/L 7   < > 10   CALCIUM mg/dL 8.5   < > 8.3*   ALBUMIN g/dL  --   --  3.2*   TOTAL BILIRUBIN mg/dL  --   --  1.02*   ALK PHOS U/L  --   --  58   ALT U/L  --   --  159*   AST U/L  --   --  36   GLUCOSE  RANDOM mg/dL 218*   < > 129    < > = values in this interval not displayed.     Results from last 7 days   Lab Units 01/19/25  1411   INR  1.61*     Results from last 7 days   Lab Units 01/25/25  0506 01/24/25  2357 01/24/25  1800 01/24/25  1301 01/24/25  0638 01/24/25  0015 01/23/25  1938 01/23/25  1212 01/23/25  0717 01/23/25  0644 01/23/25  0006 01/22/25  1333   POC GLUCOSE mg/dl 207* 176* 190* 172* 125 194* 212* 156* 148* 164* 167* 123         Results from last 7 days   Lab Units 01/19/25  1408 01/19/25  0818   LACTIC ACID mmol/L 0.8 2.3*       Recent Cultures (last 7 days):               Last 24 Hours Medication List:     Current Facility-Administered Medications:     artificial tear ophthalmic ointment, HS    chlorhexidine (PERIDEX) 0.12 % oral rinse 15 mL, Q12H MARQUIS    dextrose 5 % infusion, Continuous, Last Rate: 100 mL/hr (01/24/25 2150)    dorzolamide-timolol (COSOPT) 2-0.5 % ophthalmic solution 1 drop, BID    heparin (porcine) 25,000 units in 0.45% NaCl 250 mL infusion (premix), Titrated, Last Rate: 14 Units/kg/hr (01/24/25 1445)    heparin (porcine) injection 2,400 Units, Q6H PRN    heparin (porcine) injection 4,800 Units, Once    heparin (porcine) injection 4,800 Units, Q6H PRN    hydrALAZINE (APRESOLINE) injection 5 mg, Q6H PRN    insulin lispro (HumALOG/ADMELOG) 100 units/mL subcutaneous injection 2-12 Units, Q6H **AND** Fingerstick Glucose (POCT), Q6H    Latanoprostene Bunod 0.024 % SOLN 1 drop, HS    polyethylene glycol (MIRALAX) packet 17 g, Daily    potassium phosphates 21 mmol in sodium chloride 0.9 % 250 mL infusion, Once    prednisoLONE acetate (PRED FORTE) 1 % ophthalmic suspension 1 drop, TID    [Held by provider] senna-docusate sodium (SENOKOT S) 8.6-50 mg per tablet 1 tablet, BID    tetracaine 0.5 % ophthalmic solution 2 drop, Once    Administrative Statements   Today, Patient Was Seen By: Jamaica Jones DO      **Please Note: This note may have been constructed using a voice recognition  system.**

## 2025-01-25 NOTE — ASSESSMENT & PLAN NOTE
Lab Results   Component Value Date    EGFR 21 01/25/2025    EGFR 20 01/24/2025    EGFR 16 01/23/2025    CREATININE 2.50 (H) 01/25/2025    CREATININE 2.61 (H) 01/24/2025    CREATININE 3.13 (H) 01/23/2025   Per patient and patient's wife, would not be interested in dialysis   Nephro on board. Appreciate recommendations

## 2025-01-25 NOTE — ASSESSMENT & PLAN NOTE
acute on chronic kidney injury stage IV  - Outpatient nephrologist Dr. Rey  - Baseline creatinine progressively worsening to low 3  - Admission creatinine 3.1 mg/dL on January 18  - Creatinine rising to 3.9 mg/dL on January 21 prompting nephrology consultation  - Patient received contrast study on January 18 and also presented with cardiogenic shock and cor pulmonale  - Etiology-likely ATN in the setting of hypotension/shock/IV contrast/cardiac arrest/autoregulatory failure in setting of ARB at home  - Received Lasix IV one-time January 21 by primary team  - January 22-creatinine slightly improved 3.6 mg/dL.  Electrolytes are appropriate.  With the exception of sodium rising and we will start D5W  - January 23-sodium continues to be elevated.  Creatinine improving to 3.1 mg/dL back to baseline.  - January 24-sodium slowly improving to 150.  Bicarbonate appropriate.  Potassium low being repleted.  Creatinine improving 2.6 mg/dL.  Will continue D5W.  Replete potassium.  Will give Lasix one-time due to developing slight volume overload which is not impacting patient's respiratory status currently and therefore we will start with low-dose Lasix as the patient is also hypokalemic and hypernatremic and this could worsen these issues  - January 25-sodium level improving appropriately to 144 with D5W.  Potassium borderline low but improved 3.4 and phosphorus low being repleted by primary team.  Will give Lasix at slightly lower dose 10 mg one-time today as patient still has signs of pulmonary edema on exam.  Will hold on starting D5W but anticipate will need D5W again tomorrow     Plan  - I/os; avoid nephrotoxic agents  - Avoid hypotension  - Agree with potassium phosphate repletion this morning and give another 20 mill equivalent potassium chloride IV this evening  - Hold D5W as doing  - Give Lasix IV one-time 10 mg  - Lab work in a.m.  - Goals of care discussions are in progress by primary team  - Consider void trial  depending on goals of care

## 2025-01-25 NOTE — ASSESSMENT & PLAN NOTE
Lab Results   Component Value Date    EGFR 21 01/25/2025    EGFR 20 01/24/2025    EGFR 16 01/23/2025    CREATININE 2.50 (H) 01/25/2025    CREATININE 2.61 (H) 01/24/2025    CREATININE 3.13 (H) 01/23/2025   Holding Losartan and Farxiga per nephro due to elevated Cr   Hydralazine 5 mg q 6 hrs for BP >180/110   Initially allowed for permissive hypertension due to concern for stroke.  However, patient's MRI does not show concern for infarct.  Will aim for normotension

## 2025-01-25 NOTE — ASSESSMENT & PLAN NOTE
Lab Results   Component Value Date    EGFR 21 01/25/2025    EGFR 20 01/24/2025    EGFR 16 01/23/2025    CREATININE 2.50 (H) 01/25/2025    CREATININE 2.61 (H) 01/24/2025    CREATININE 3.13 (H) 01/23/2025   Avoid nephrotoxic medications as able  Na elevated at 150  Continue to hold losartan and Farxiga per nephro   Nephrology on board. Appreciate recommendations

## 2025-01-25 NOTE — ASSESSMENT & PLAN NOTE
- Initially presented with elevated anion gap and decreased serum bicarbonate. Anion gap has improved. Serum bicarbonate level is slowly improving. VBG pH reviewed 7.39. Will continue to monitor. And improving bicarbonate on January 24

## 2025-01-25 NOTE — ASSESSMENT & PLAN NOTE
Improved to 144 on D5 water at 100 mL/hr.  Nephrology on board.  Appreciate further recommendations  potassium chloride this evening      Lasix 10 mg one-time IV today.

## 2025-01-25 NOTE — ASSESSMENT & PLAN NOTE
saddle pulmonary embolus with cor pulmonale with unresponsiveness from home and cardiac arrest     - Initially with cardiac arrest and cardiogenic shock  - Is now extubated  - Is now off pressors  - Management per primary team-transfer to [facility] floor evening of January 21

## 2025-01-26 PROBLEM — R13.10 DYSPHAGIA: Status: ACTIVE | Noted: 2025-01-26

## 2025-01-26 LAB
ANION GAP SERPL CALCULATED.3IONS-SCNC: 8 MMOL/L (ref 4–13)
APTT PPP: 85 SECONDS (ref 23–34)
BASE EX.OXY STD BLDV CALC-SCNC: 92.9 % (ref 60–80)
BASE EXCESS BLDV CALC-SCNC: -3.1 MMOL/L
BUN SERPL-MCNC: 48 MG/DL (ref 5–25)
CALCIUM SERPL-MCNC: 8.8 MG/DL (ref 8.4–10.2)
CHLORIDE SERPL-SCNC: 117 MMOL/L (ref 96–108)
CO2 SERPL-SCNC: 21 MMOL/L (ref 21–32)
CREAT SERPL-MCNC: 2.51 MG/DL (ref 0.6–1.3)
ERYTHROCYTE [DISTWIDTH] IN BLOOD BY AUTOMATED COUNT: 17.1 % (ref 11.6–15.1)
GFR SERPL CREATININE-BSD FRML MDRD: 21 ML/MIN/1.73SQ M
GLUCOSE SERPL-MCNC: 112 MG/DL (ref 65–140)
GLUCOSE SERPL-MCNC: 113 MG/DL (ref 65–140)
GLUCOSE SERPL-MCNC: 117 MG/DL (ref 65–140)
GLUCOSE SERPL-MCNC: 121 MG/DL (ref 65–140)
GLUCOSE SERPL-MCNC: 122 MG/DL (ref 65–140)
GLUCOSE SERPL-MCNC: 142 MG/DL (ref 65–140)
HCO3 BLDV-SCNC: 20.4 MMOL/L (ref 24–30)
HCT VFR BLD AUTO: 28.4 % (ref 36.5–49.3)
HGB BLD-MCNC: 9 G/DL (ref 12–17)
MAGNESIUM SERPL-MCNC: 2.1 MG/DL (ref 1.9–2.7)
MCH RBC QN AUTO: 28.8 PG (ref 26.8–34.3)
MCHC RBC AUTO-ENTMCNC: 31.7 G/DL (ref 31.4–37.4)
MCV RBC AUTO: 91 FL (ref 82–98)
O2 CT BLDV-SCNC: 12.9 ML/DL
PCO2 BLDV: 31.2 MM HG (ref 42–50)
PH BLDV: 7.43 [PH] (ref 7.3–7.4)
PHOSPHATE SERPL-MCNC: 3.8 MG/DL (ref 2.3–4.1)
PLATELET # BLD AUTO: 221 THOUSANDS/UL (ref 149–390)
PMV BLD AUTO: 11 FL (ref 8.9–12.7)
PO2 BLDV: 81.1 MM HG (ref 35–45)
POTASSIUM SERPL-SCNC: 4 MMOL/L (ref 3.5–5.3)
RBC # BLD AUTO: 3.13 MILLION/UL (ref 3.88–5.62)
SODIUM SERPL-SCNC: 146 MMOL/L (ref 135–147)
WBC # BLD AUTO: 8.18 THOUSAND/UL (ref 4.31–10.16)

## 2025-01-26 PROCEDURE — 85730 THROMBOPLASTIN TIME PARTIAL: CPT | Performed by: INTERNAL MEDICINE

## 2025-01-26 PROCEDURE — 84100 ASSAY OF PHOSPHORUS: CPT

## 2025-01-26 PROCEDURE — 99233 SBSQ HOSP IP/OBS HIGH 50: CPT | Performed by: INTERNAL MEDICINE

## 2025-01-26 PROCEDURE — 80048 BASIC METABOLIC PNL TOTAL CA: CPT

## 2025-01-26 PROCEDURE — 83735 ASSAY OF MAGNESIUM: CPT

## 2025-01-26 PROCEDURE — 82948 REAGENT STRIP/BLOOD GLUCOSE: CPT

## 2025-01-26 PROCEDURE — 85027 COMPLETE CBC AUTOMATED: CPT

## 2025-01-26 PROCEDURE — 99232 SBSQ HOSP IP/OBS MODERATE 35: CPT | Performed by: INTERNAL MEDICINE

## 2025-01-26 PROCEDURE — 82805 BLOOD GASES W/O2 SATURATION: CPT

## 2025-01-26 RX ORDER — DEXTROSE MONOHYDRATE 50 MG/ML
50 INJECTION, SOLUTION INTRAVENOUS CONTINUOUS
Status: DISCONTINUED | OUTPATIENT
Start: 2025-01-26 | End: 2025-01-26

## 2025-01-26 RX ORDER — FUROSEMIDE 10 MG/ML
10 INJECTION INTRAMUSCULAR; INTRAVENOUS ONCE
Status: COMPLETED | OUTPATIENT
Start: 2025-01-26 | End: 2025-01-26

## 2025-01-26 RX ADMIN — PREDNISOLONE ACETATE 1 DROP: 10 SUSPENSION/ DROPS OPHTHALMIC at 15:32

## 2025-01-26 RX ADMIN — FUROSEMIDE 10 MG: 10 INJECTION, SOLUTION INTRAMUSCULAR; INTRAVENOUS at 15:31

## 2025-01-26 RX ADMIN — DORZOLAMIDE HYDROCHLORIDE AND TIMOLOL MALEATE 1 DROP: 20; 5 SOLUTION OPHTHALMIC at 18:34

## 2025-01-26 RX ADMIN — LATANOPROSTENE BUNOD 1 DROP: 0.24 SOLUTION/ DROPS OPHTHALMIC at 22:56

## 2025-01-26 RX ADMIN — DEXTROSE 50 ML/HR: 5 SOLUTION INTRAVENOUS at 11:45

## 2025-01-26 RX ADMIN — DORZOLAMIDE HYDROCHLORIDE AND TIMOLOL MALEATE 1 DROP: 20; 5 SOLUTION OPHTHALMIC at 09:09

## 2025-01-26 RX ADMIN — PREDNISOLONE ACETATE 1 DROP: 10 SUSPENSION/ DROPS OPHTHALMIC at 09:09

## 2025-01-26 RX ADMIN — PREDNISOLONE ACETATE 1 DROP: 10 SUSPENSION/ DROPS OPHTHALMIC at 22:59

## 2025-01-26 RX ADMIN — MINERAL OIL, WHITE PETROLATUM: .03; .94 OINTMENT OPHTHALMIC at 22:56

## 2025-01-26 RX ADMIN — HEPARIN SODIUM 14 UNITS/KG/HR: 10000 INJECTION, SOLUTION INTRAVENOUS at 22:56

## 2025-01-26 NOTE — ASSESSMENT & PLAN NOTE
"Pt on room air with labored breathing. Has rales in bilateral lung fields. Continues to have wet cough without sputum production.     Please see plan as under \"Dysphagia.\"   "

## 2025-01-26 NOTE — ASSESSMENT & PLAN NOTE
Etiology: Suspect due to ATN in the setting of cardiogenic shock, cardiac arrest, hypotension and IV contrast exposure with autoregulatory dysfunction with ARB  Baseline creatinine low threes.  Per nephrology, no urgent need for dialysis, but states that patient and wife would not be agreeable to dialysis in the future  Continue to hold losartan and Farxiga per nephrology  Per Nephrology:  D5W was initiated on 1/23 by Nephro. Will hold D5W today.   Give IV Lasix 10 mg today   Replete potassium.  Phosphorus also low.  Repleted with potassium phosphate.   Consider voiding trial depending on ongoing GOC discussions with family   Repeat BMP in AM  Strict I/Os, daily weights  Avoid nephrotoxins, NSAIDs, further IV contrast as able  Avoid hypotension.  Maintain MAP >65

## 2025-01-26 NOTE — PROGRESS NOTES
"Progress Note - Hospitalist   Name: Jeffry Almanzar 89 y.o. male I MRN: [id number]  Unit/Bed#: S -01 I Date of Admission: 1/17/2025   Date of Service: 1/26/2025 I Hospital Day: 8    Assessment & Plan  Dysphagia  Pt failed VBS -- showed moderate oral/severe pharyngeal dysphagia characterized by weak swallow mechanics, multiple swallows noted to clear pharyngeal retention with overflow penetration and aspiration observed after the swallow   Suction any secretions as able  Pt given scopolamine patch to manage secretions  Hypoxia  Pt on room air with labored breathing. Has rales in bilateral lung fields. Continues to have wet cough without sputum production.     Please see plan as under \"Dysphagia.\"   Acute saddle pulmonary embolism with acute cor pulmonale (HCC)  Initially with cardiac arrest and cardiogenic shock, is now extubated off pressors. Was transferred  to med/surg 1/21.   MRI brain negative for CVA or concern for hemorrhage.      Continue heparin gtt.  Disoriented to time  Per wife, patient has some degree of dementia but is unclear of specifics.  Patient oriented to self and place but unaware of year and month.  Patient incorrectly stated the number of years he has been  to his wife during time of exam  Per wife, unsure if patient is completely aware of what is going on and is unsure if patient would truly want to except intubation a second time    PLAN:  Neuropsych evaluation determined patient does NOT have capacity for MDM  We will have further discussions with family and patient depending on overall prognosis and goals of care  Palliative on board. Appreciate recommendations. As of 1/23/2025, family made the decision to make patient's code status LEVEL 3 DNR/DNI.  Ongoing discussions regarding palliative care.  SOFÍA (acute kidney injury) (HCC)  Etiology: Suspect due to ATN in the setting of cardiogenic shock, cardiac arrest, hypotension and IV contrast exposure with autoregulatory dysfunction " with ARB  Baseline creatinine low threes.  Per nephrology, no urgent need for dialysis, but states that patient and wife would not be agreeable to dialysis in the future  Continue to hold losartan and Farxiga per nephrology  Per Nephrology:  D5W was initiated on 1/23 by Nephro. Will hold D5W today.   Give IV Lasix 10 mg today   Replete potassium.  Phosphorus also low.  Repleted with potassium phosphate.   Consider voiding trial depending on ongoing GOC discussions with family   Repeat BMP in AM  Strict I/Os, daily weights  Avoid nephrotoxins, NSAIDs, further IV contrast as able  Avoid hypotension.  Maintain MAP >65  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  (HCC)  Lab Results   Component Value Date    EGFR 21 01/26/2025    EGFR 21 01/25/2025    EGFR 20 01/24/2025    CREATININE 2.51 (H) 01/26/2025    CREATININE 2.50 (H) 01/25/2025    CREATININE 2.61 (H) 01/24/2025   Avoid nephrotoxic medications as able  Na elevated at 150  Continue to hold losartan and Farxiga per nephro   Nephrology on board. Appreciate recommendations   Cardiac arrest (HCC)  Cardiac arrest in ED after CTA evaluating for pulmonary embolus.  Asystole for 2 minutes requiring CPR and intubation s/P extubation 1/21/2025  ECHO with EF of 65% in September, ECHO with left ventricular ejection fraction is 55% (1/20)    PLAN:  Continue heparin gtt  Continue to monitor on tele due to recent PE, recent MI, and electrolyte abnormalities   Benign 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 ([facility])  Lab Results   Component Value Date    EGFR 21 01/26/2025    EGFR 21 01/25/2025    EGFR 20 01/24/2025    CREATININE 2.51 (H) 01/26/2025    CREATININE 2.50 (H) 01/25/2025    CREATININE 2.61 (H) 01/24/2025   Holding Losartan and Farxiga per nephro due to elevated Cr   Hydralazine 5 mg q 6 hrs for BP >180/110   Initially allowed for permissive hypertension due to concern for stroke.  However, patient's MRI does not show concern for infarct.  Will aim for  normotension  CKD (chronic kidney disease) stage 4, GFR 15-29 ml/min ([facility])  Lab Results   Component Value Date    EGFR 21 01/26/2025    EGFR 21 01/25/2025    EGFR 20 01/24/2025    CREATININE 2.51 (H) 01/26/2025    CREATININE 2.50 (H) 01/25/2025    CREATININE 2.61 (H) 01/24/2025   Per patient and patient's wife, would not be interested in dialysis   Nephro on board. Appreciate recommendations  Diabetic nephropathy associated with type 2 diabetes mellitus ([facility])  Lab Results   Component Value Date    HGBA1C 6.9 (H) 01/18/2025       Recent Labs     01/26/25  0154 01/26/25  0555 01/26/25  0727 01/26/25  1020   POCGLU 113 112 122 121       Blood Sugar Average: Last 72 hrs:  (P) 157.5101304384897218  Recent Labs     01/24/25  0536 01/24/25  0638 01/25/25  0433 01/25/25  0506 01/26/25  0446 01/26/25  0555 01/26/25  0727 01/26/25  1020   POCGLU  --    < >  --    < >  --  112 122 121   GLUC 130  --  218*  --  117  --   --   --     < > = values in this interval not displayed.     Hold home regimen while inpatient and resume on discharge   NPO due to results of VBS   Insulin regimen  SSI   On D5 per nephro  Goal -180 while admitted, adjusting insulin regimen as appropriate  Monitor for hypoglycemia and treat per protocol   Lacunar cerebrovascular accident (CVA) ([facility])  History of lacunar infarct resulting in residual right-sided weakness  Initially concern for possible TIA versus CVA due to transient left arm weakness 1/21/2025.  MRI brain and MRA negative for infarct, high-grade stenosis, and LVO  Will aim for normotension.  As needed hydralazine for BP greater than 180/110  Avoid losartan and Farxiga per nephro given kidney function   Normal anion gap metabolic acidosis  Most recent VBG pCO2 30.8, pH 7.4, bicarb 18.4  AG closed. Continue to monitor   Retinal hemorrhage noted on examination of left eye  Found on head CT 1/21.  Left-sided hemorrhage noted in eye where patient already has complete vision loss  Patient seen  and evaluated by ophthalmology who offered no additional recommendations at this time.  Instructed patient to follow-up with ophthalmologist in outpatient setting  Hypokalemia  Potassium decreased at 3.3. Unable to take oral medications. IV potassium 20 meq given. Will give a second dose this afternoon 1/24  Phosphorus also found to be low at 2.2.  Repleted with potassium phosphate  Palliative care encounter  Patient seen and evaluated by palliative care 1/23. Palliative care had an in-depth discussion with family regarding patient's GOC and overall prognosis. Family would NOT like to pursue a PEG tube at this time.   CODE STATUS changed from Level 2 to Level 3 DNR/DNI  Ongoing goals of care discussions with consideration for comfort measures only  Goals of care, counseling/discussion  Palliative on board. Appreciate recommendations     VTE Pharmacologic Prophylaxis:   High Risk (Score >/= 5) - Pharmacological DVT Prophylaxis Ordered: heparin drip. Sequential Compression Devices Ordered.    Mobility:   Basic Mobility Inpatient Raw Score: 8  JH-HLM Goal: 3: Sit at edge of bed  JH-HLM Achieved: 3: Sit at edge of bed  JH-HLM Goal achieved. Continue to encourage appropriate mobility.    Patient Centered Rounds: I performed bedside rounds with nursing staff today.   Discussions with Specialists or Other Care Team Provider: Nephrology, Palliative Care    Education and Discussions with Family / Patient: Updated  (wife) at bedside.    Current Length of Stay: 8 day(s)  Current Patient Status: Inpatient   Certification Statement: The patient will continue to require additional inpatient hospital stay due to ongoing goals of care discussion.  Discharge Plan: Anticipate discharge in 48-72 hrs to TBD.    Code Status: Level 3 - DNAR and DNI    Subjective   Pt seen and evaluated at bedside. Per report, no acute events overnight. Pt's wife is at bedside who states pt slept intermittently throughout the night, and is  more sleepy this morning as a result. He is able to follow commands and responds to questions with a few words and head movements before falling asleep. He denies any new symptoms. Pt's wife reports pt expresses hunger however is aware of strict NPO status given VBS results.     Objective :  Temp:  [99 °F (37.2 °C)-99.6 °F (37.6 °C)] 99.6 °F (37.6 °C)  HR:  [85-87] 87  BP: (144-152)/(65-88) 151/86  Resp:  [18] 18  SpO2:  [90 %-96 %] 96 %  O2 Device: None (Room air)    Body mass index is 23.63 kg/m².     Input and Output Summary (last 24 hours):     Intake/Output Summary (Last 24 hours) at 1/26/2025 1113  Last data filed at 1/26/2025 0900  Gross per 24 hour   Intake 0 ml   Output 1800 ml   Net -1800 ml       Physical Exam  Vitals reviewed.   Constitutional:       Appearance: He is ill-appearing.   HENT:      Head: Normocephalic and atraumatic.      Right Ear: External ear normal.      Left Ear: External ear normal.      Mouth/Throat:      Mouth: Mucous membranes are dry.   Eyes:      General: No scleral icterus.     Extraocular Movements: Extraocular movements intact.   Cardiovascular:      Rate and Rhythm: Normal rate.      Pulses: Normal pulses.   Pulmonary:      Effort: Accessory muscle usage present.      Breath sounds: Rales (bilateral) present.   Chest:      Chest wall: No tenderness.   Abdominal:      Tenderness: There is no abdominal tenderness. There is no guarding.   Skin:     General: Skin is warm.      Capillary Refill: Capillary refill takes 2 to 3 seconds.      Coloration: Skin is not jaundiced.   Neurological:      Motor: Weakness present.         Lines/Drains:  Lines/Drains/Airways       Active Status       Name Placement date Placement time Site Days    Urethral Catheter 01/17/25  0000  --  9                  Urinary Catheter:  Goal for removal: Voiding trial when ambulation improves           Telemetry:  Telemetry Orders (From admission, onward)               24 Hour Telemetry Monitoring  Continuous  x 24 Hours (Telem)        Expiring   Question:  Reason for 24 Hour Telemetry  Answer:  Pulmonary Embolism                     Telemetry Reviewed: Normal Sinus Rhythm  Indication for Continued Telemetry Use: Metabolic/electrolyte disturbance with high probability of dysrhythmia               Lab Results: I have reviewed the following results:   Results from last 7 days   Lab Units 01/26/25  0446 01/23/25  0633 01/22/25  0619 01/21/25  0857 01/21/25  0505   WBC Thousand/uL 8.18   < > 9.33  --  12.14*   HEMOGLOBIN g/dL 9.0*   < > 8.6*   < > 8.3*   HEMATOCRIT % 28.4*   < > 26.7*   < > 24.5*   PLATELETS Thousands/uL 221   < > 125*  --  95*   BANDS PCT %  --   --   --   --  2   SEGS PCT %  --   --  76*  --   --    LYMPHO PCT %  --   --  7*  --  9*   MONO PCT %  --   --  16*  --  13*   EOS PCT %  --   --  0  --  1    < > = values in this interval not displayed.     Results from last 7 days   Lab Units 01/26/25  0446 01/22/25  0619 01/21/25  0505   SODIUM mmol/L 146   < > 143   POTASSIUM mmol/L 4.0   < > 3.8   CHLORIDE mmol/L 117*   < > 117*   CO2 mmol/L 21   < > 16*   BUN mg/dL 48*   < > 68*   CREATININE mg/dL 2.51*   < > 3.89*   ANION GAP mmol/L 8   < > 10   CALCIUM mg/dL 8.8   < > 8.3*   ALBUMIN g/dL  --   --  3.2*   TOTAL BILIRUBIN mg/dL  --   --  1.02*   ALK PHOS U/L  --   --  58   ALT U/L  --   --  159*   AST U/L  --   --  36   GLUCOSE RANDOM mg/dL 117   < > 129    < > = values in this interval not displayed.     Results from last 7 days   Lab Units 01/19/25  1411   INR  1.61*     Results from last 7 days   Lab Units 01/26/25  1020 01/26/25  0727 01/26/25  0555 01/26/25  0154 01/25/25  1556 01/25/25  1338 01/25/25  0506 01/24/25  2357 01/24/25  1800 01/24/25  1301 01/24/25  0638 01/24/25  0015   POC GLUCOSE mg/dl 121 122 112 113 141* 150* 207* 176* 190* 172* 125 194*         Results from last 7 days   Lab Units 01/19/25  1408   LACTIC ACID mmol/L 0.8       Recent Cultures (last 7 days):         Last 24 Hours  Medication List:     Current Facility-Administered Medications:     artificial tear ophthalmic ointment, HS    chlorhexidine (PERIDEX) 0.12 % oral rinse 15 mL, Q12H MARQUIS    dextrose 5 % infusion, Continuous    dorzolamide-timolol (COSOPT) 2-0.5 % ophthalmic solution 1 drop, BID    heparin (porcine) 25,000 units in 0.45% NaCl 250 mL infusion (premix), Titrated, Last Rate: 14 Units/kg/hr (01/25/25 2642)    heparin (porcine) injection 2,400 Units, Q6H PRN    heparin (porcine) injection 4,800 Units, Once    heparin (porcine) injection 4,800 Units, Q6H PRN    hydrALAZINE (APRESOLINE) injection 5 mg, Q6H PRN    insulin lispro (HumALOG/ADMELOG) 100 units/mL subcutaneous injection 2-12 Units, Q6H **AND** Fingerstick Glucose (POCT), Q6H    Latanoprostene Bunod 0.024 % SOLN 1 drop, HS    polyethylene glycol (MIRALAX) packet 17 g, Daily    prednisoLONE acetate (PRED FORTE) 1 % ophthalmic suspension 1 drop, TID    scopolamine (TRANSDERM-SCOP) 1 mg/3 days TD 72 hr patch 1 patch, Q72H    [Held by provider] senna-docusate sodium (SENOKOT S) 8.6-50 mg per tablet 1 tablet, BID    tetracaine 0.5 % ophthalmic solution 2 drop, Once    Administrative Statements   Today, Patient Was Seen By: Mercedes Nunez DO  PGY-[id number]      **Please Note: This note may have been constructed using a voice recognition system.**

## 2025-01-26 NOTE — ASSESSMENT & PLAN NOTE
saddle pulmonary embolus with cor pulmonale with unresponsiveness from home and cardiac arrest     - Initially with cardiac arrest and cardiogenic shock  - Is now extubated  - Is now off pressors  - Management per primary team-transfer to [facility] floor evening of January 21

## 2025-01-26 NOTE — ASSESSMENT & PLAN NOTE
Lab Results   Component Value Date    EGFR 21 01/26/2025    EGFR 21 01/25/2025    EGFR 20 01/24/2025    CREATININE 2.51 (H) 01/26/2025    CREATININE 2.50 (H) 01/25/2025    CREATININE 2.61 (H) 01/24/2025   Holding Losartan and Farxiga per nephro due to elevated Cr   Hydralazine 5 mg q 6 hrs for BP >180/110   Initially allowed for permissive hypertension due to concern for stroke.  However, patient's MRI does not show concern for infarct.  Will aim for normotension

## 2025-01-26 NOTE — ASSESSMENT & PLAN NOTE
acute on chronic kidney injury stage IV  - Outpatient nephrologist Dr. Rey  - Baseline creatinine progressively worsening to low 3  - Admission creatinine 3.1 mg/dL on January 18  - Creatinine rising to 3.9 mg/dL on January 21 prompting nephrology consultation  - Patient received contrast study on January 18 and also presented with cardiogenic shock and cor pulmonale  - Etiology-likely ATN in the setting of hypotension/shock/IV contrast/cardiac arrest/autoregulatory failure in setting of ARB at home  - Received Lasix IV one-time January 21 by primary team  - January 22-creatinine slightly improved 3.6 mg/dL.  Electrolytes are appropriate.  With the exception of sodium rising and we will start D5W  - January 23-sodium continues to be elevated.  Creatinine improving to 3.1 mg/dL back to baseline.  - January 24-sodium slowly improving to 150.  Bicarbonate appropriate.  Potassium low being repleted.  Creatinine improving 2.6 mg/dL.  Will continue D5W.  Replete potassium.  Will give Lasix one-time due to developing slight volume overload which is not impacting patient's respiratory status currently and therefore we will start with low-dose Lasix as the patient is also hypokalemic and hypernatremic and this could worsen these issues  - January 25-sodium level improving appropriately to 144 with D5W.  Potassium borderline low but improved 3.4 and phosphorus low being repleted by primary team.  Will give Lasix at slightly lower dose 10 mg one-time today as patient still has signs of pulmonary edema on exam.  Will hold on starting D5W but anticipate will need D5W again tomorrow  - January 26-creatinine stable 2.5 mg/dL.  Sodium level slightly higher 146 and will give D5W.  Bicarbonate stable.  Phosphorus and magnesium stable.  Primary team is evaluating goals of care.  Will give Lasix one-time also.Will give short course of D5W as the patient is still NPO.    Plan  - I/os; avoid nephrotoxic agents  - Avoid  hypotension  - Trend lab work in a.m.  - D5W short course  - Lasix IV one-time  - Will likely need D5W again tomorrow  - Goals of care discussions in progress by primary team  - Once goals of cares are finalized, primary team will determine timing of void trial  - I had a detailed discussion with the patient's wife at bedside.  She was appreciative of our discussion.  We reviewed the renal standpoint.  She understands.  She will be discussing with her family regarding nutrition plans

## 2025-01-26 NOTE — CONSULTS
"Consults    Consult received for PICC placement for TPN use for this patient.  Chart reviewed, this RN reached out to nephrology for appropriateness of palcement, they are in agreement for bedside picc placement. This RN to bedside for concent, spoke to wife. Currently wife wishes not to proceed with placement today, would like to \"take time and speak with my daughter.\"  Offered information concerning picc placement/procedure, pt currently does not have any further questions concerning picc placement.  Advised to reach out to VAS service for additional questions, team will keep consult in place if wife/family/patient wish to continue. Spoke with primary rn concerning this, will reach out to slim to advise.   " "Critical Care Note     LOS: 15 days   Patient Care Team:  Dora Loya PA as PCP - General (Internal Medicine)    Chief Complaint/Reason for visit:    Chief Complaint   Patient presents with   • Shortness of Breath     Acute strep pyogenes (group A strep) epiglottitis with airway obstruction     Acute respiratory failure (HCC)    Essential hypertension      Subjective     Interval History:     Remains on trach collar.  When he has his Passy-Ventura valve in place he is on room air.  He remains afebrile.  He is voiding without a Damon catheter.  Tube feeding was changed to bolus feedings and he is tolerating it without nausea.  Slept well last night    Review of Systems:    All systems were reviewed and negative except as noted in subjective.    Medical history, surgical history, social history, family history reviewed    Objective     Intake/Output:    Intake/Output Summary (Last 24 hours) at 3/31/2023 1358  Last data filed at 3/31/2023 0600  Gross per 24 hour   Intake 1066.8 ml   Output 250 ml   Net 816.8 ml       Nutrition:  NPO Diet NPO Type: Strict NPO    Infusions:       Mechanical Ventilator Settings:            Resp Rate (Set): 12  Pressure Support (cm H2O): 10 cm H20  FiO2 (%): 30 %  PEEP/CPAP (cm H2O): 6 cm H20    Minute Ventilation (L/min) (Obs): 7.56 L/min  Resp Rate (Observed) Vent: 65  I:E Ratio (Set): 1:3.55  I:E Ratio (Obs): 1:3.5    PIP Observed (cm H2O): 18 cm H2O  Plateau Pressure (cm H2O): (S) 21 cm H2O    Telemetry: Sinus rhythm, sinus tachycardia             Vital Signs  Blood pressure 109/74, pulse 73, temperature 99.4 °F (37.4 °C), temperature source Axillary, resp. rate 17, height 185.4 cm (73\"), weight 91.8 kg (202 lb 6.1 oz), SpO2 95 %.    Physical Exam:  General Appearance:   Middle-aged gentleman upright in bed   Head:   Atraumatic   Eyes:          No jaundice, conjunctiva pink   Ears:     Throat:  No thrush   Neck:  Trachea midline, tracheostomy in place, no palpable thyroid   Back:    "   Lungs:    Symmetric chest expansion with scattered bilateral rhonchi    Heart:   Regular rhythm, S1, S2 auscultated   Abdomen:    PEG tube in place.  Bowel sounds present, soft   Rectal:   Deferred   Extremities:  No pretibial edema   Pulses:  Palpable radial pulses   Skin:  Warm and dry   Lymph nodes:    Neurologic:  Awake, follows commands, no focal weakness, speaking with Passy-Kacie valve      Results Review:     I reviewed the patient's new clinical results.   Results from last 7 days   Lab Units 03/31/23 0455 03/30/23 0338 03/28/23  0823 03/27/23  0506 03/26/23  0324 03/25/23  0425   SODIUM mmol/L  --  132* 132* 134*   < > 136   POTASSIUM mmol/L 4.3 4.0 4.1 4.0   < > 4.3   CHLORIDE mmol/L  --  102 102 100   < > 102   CO2 mmol/L  --  23.0 21.0* 28.0   < > 28.0   BUN mg/dL  --  16 14 17   < > 19   CREATININE mg/dL  --  0.59* 0.61* 0.68*   < > 0.56*   CALCIUM mg/dL  --  9.5 8.7 9.1   < > 8.6   BILIRUBIN mg/dL  --  0.4  --  0.4  --  0.4   ALK PHOS U/L  --  55  --  65  --  51   ALT (SGPT) U/L  --  13  --  17  --  22   AST (SGOT) U/L  --  16  --  23  --  27   GLUCOSE mg/dL  --  166* 140* 141*   < > 194*    < > = values in this interval not displayed.     Results from last 7 days   Lab Units 03/31/23 0455 03/30/23 0338 03/28/23  0623 03/27/23  0506   WBC 10*3/mm3  --  7.59 9.39 7.85   HEMOGLOBIN g/dL 12.6* 12.3* 12.0* 12.1*   HEMATOCRIT % 39.3 38.5 36.8* 37.9   PLATELETS 10*3/mm3  --  408 341 271     Results from last 7 days   Lab Units 03/28/23  0343   PH, ARTERIAL pH units 7.502*   PO2 ART mm Hg 100.0   PCO2, ARTERIAL mm Hg 32.0*   HCO3 ART mmol/L 25.0     Lab Results   Component Value Date    BLOODCX No growth at 5 days 03/20/2023     No results found for: URINECX    I reviewed the patient's new imaging including images and reports.    No new x-rays    All medications reviewed.   amoxicillin-clavulanate, 400 mg, Per PEG Tube, Q12H  carvedilol, 12.5 mg, Per PEG Tube, Q12H  chlorhexidine, 15 mL, Mouth/Throat,  Q12H  heparin (porcine), 5,000 Units, Subcutaneous, Q8H  insulin regular, 0-7 Units, Subcutaneous, Q6H  [START ON 4/1/2023] lansoprazole, 15 mg, Per PEG Tube, Q AM  latanoprost, 1 drop, Both Eyes, Daily  polyethylene glycol, 17 g, Per PEG Tube, Daily  ProSource No Carb, 30 mL, Per PEG Tube, BID  risperiDONE, 1 mg, Per PEG Tube, Nightly  senna-docusate sodium, 2 tablet, Per PEG Tube, BID  sodium chloride, 10 mL, Intravenous, Q12H  valsartan, 160 mg, Per PEG Tube, Q24H          Assessment & Plan       Acute strep pyogenes (group A strep) epiglottitis with airway obstruction     Essential hypertension    Acute respiratory failure (HCC)    66-year-old male who presented to [facility] ER 3/16/2023 c/o dyspnea and sore throat. Apparently, first noticed a sore throat around noon the day of admission and became increasingly symptomatic with dyspnea and stridor throughout the day before presenting to the ER.  He was given epinephrine, steroids, and H2 blockers in the ER but despite this he began tripoding and drooling and had audible stridor.  Per Dr. Eaton (ER physician) intubation was difficult due to edema and erythema. Imaging revealed abnormal appearance of the soft tissues of the laryngeal area with some edema and swelling of the uvula but no evidence of a fluid collection or abscess.  Bibasilar infiltrates were present possibly related to atelectasis or retained secretions but there was no evidence of consolidative pneumonia.  He was given Zosyn and vancomycin empirically as well as steroids.  Blood culture subsequently grew Streptococcus pyogenes.  Patient underwent bronchoscopy 3/20/2023 and was noted to have persistent edematous changes in the posterior oropharynx with copious mucopurulent secretions and repeat CT neck 3/21/2023 revealed worsening edema but no sign of abscess.  Antimicrobial coverage was changed by ID to a continuous penicillin infusion.  Because of persistent findings and inability to safely extubate  patient, he underwent tracheostomy and PEG feeding tube 3/23/2023.  Patient subsequently has begun trach collar trials as of 3/25/2023.     He has been off mechanical ventilation for 4 days.  Oxygenation remains adequate.  He does have some secretions.  He has good phonation with his Passy-Kacie valve.    Blood pressure is better controlled with Diovan, carvedilol.         PLAN:    Antibiotics per infectious disease,  Rocephin and Flagyl transition to Augmentin  Leave on trach collar as tolerates  Occasionally requires tracheal suctioning   Risperdal 1 mg at bedtime  Atarax as needed anxiety  Continue tube feeding,  PT, OT  Decrease Diovan to 80 mg daily  Continue carvedilol to 12.5 mg every 12 hours    I contemplated changing him to a Andi trach.  His tracheostomy is only 7 days so I will hold off    [facility] for rehab    Telemetry      VTE Prophylaxis: subcutaneous heparin    Stress Ulcer Prophylaxis: Protonix    Monika Louis MD  03/31/23  13:58 EDT      Time: 25 minutes

## 2025-01-26 NOTE — ASSESSMENT & PLAN NOTE
Per wife, patient has some degree of dementia but is unclear of specifics.  Patient oriented to self and place but unaware of year and month.  Patient incorrectly stated the number of years he has been  to his wife during time of exam  Per wife, unsure if patient is completely aware of what is going on and is unsure if patient would truly want to except intubation a second time    PLAN:  Neuropsych evaluation determined patient does NOT have capacity for MDM  We will have further discussions with family and patient depending on overall prognosis and goals of care  Palliative on board. Appreciate recommendations. As of 1/23/2025, family made the decision to make patient's code status LEVEL 3 DNR/DNI.  Ongoing discussions regarding palliative care.

## 2025-01-26 NOTE — ASSESSMENT & PLAN NOTE
Lab Results   Component Value Date    EGFR 21 01/26/2025    EGFR 21 01/25/2025    EGFR 20 01/24/2025    CREATININE 2.51 (H) 01/26/2025    CREATININE 2.50 (H) 01/25/2025    CREATININE 2.61 (H) 01/24/2025   Per patient and patient's wife, would not be interested in dialysis   Nephro on board. Appreciate recommendations

## 2025-01-26 NOTE — ASSESSMENT & PLAN NOTE
Cardiac arrest in ED after CTA evaluating for pulmonary embolus.  Asystole for 2 minutes requiring CPR and intubation s/P extubation 1/21/2025  ECHO with EF of 65% in September, ECHO with left ventricular ejection fraction is 55% (1/20)    PLAN:  Continue heparin gtt  Continue to monitor on tele due to recent PE, recent MI, and electrolyte abnormalities

## 2025-01-26 NOTE — ASSESSMENT & PLAN NOTE
Initially with cardiac arrest and cardiogenic shock, is now extubated off pressors. Was transferred  to med/surg 1/21.   MRI brain negative for CVA or concern for hemorrhage.      Continue heparin gtt.

## 2025-01-26 NOTE — ASSESSMENT & PLAN NOTE
Lab Results   Component Value Date    HGBA1C 6.9 (H) 01/18/2025       Recent Labs     01/26/25  0154 01/26/25  0555 01/26/25  0727 01/26/25  1020   POCGLU 113 112 122 121       Blood Sugar Average: Last 72 hrs:  (P) [id number]  Recent Labs     01/24/25  0536 01/24/25  0638 01/25/25  0433 01/25/25  0506 01/26/25  0446 01/26/25  0555 01/26/25  0727 01/26/25  1020   POCGLU  --    < >  --    < >  --  112 122 121   GLUC 130  --  218*  --  117  --   --   --     < > = values in this interval not displayed.     Hold home regimen while inpatient and resume on discharge   NPO due to results of VBS   Insulin regimen  SSI   On D5 per nephro  Goal -180 while admitted, adjusting insulin regimen as appropriate  Monitor for hypoglycemia and treat per protocol

## 2025-01-26 NOTE — ASSESSMENT & PLAN NOTE
Lab Results   Component Value Date    EGFR 21 01/26/2025    EGFR 21 01/25/2025    EGFR 20 01/24/2025    CREATININE 2.51 (H) 01/26/2025    CREATININE 2.50 (H) 01/25/2025    CREATININE 2.61 (H) 01/24/2025   Avoid nephrotoxic medications as able  Na elevated at 150  Continue to hold losartan and Farxiga per nephro   Nephrology on board. Appreciate recommendations

## 2025-01-26 NOTE — ASSESSMENT & PLAN NOTE
- Initially presented with elevated anion gap and decreased serum bicarbonate. Anion gap has improved. Serum bicarbonate level is slowly improving. VBG pH reviewed 7.39. Will continue to monitor. And improving bicarbonate on January 25

## 2025-01-26 NOTE — ASSESSMENT & PLAN NOTE
Pt failed VBS -- showed moderate oral/severe pharyngeal dysphagia characterized by weak swallow mechanics, multiple swallows noted to clear pharyngeal retention with overflow penetration and aspiration observed after the swallow   Suction any secretions as able  Pt given scopolamine patch to manage secretions

## 2025-01-26 NOTE — PROGRESS NOTES
Progress Note - Nephrology   Name: Jeffry Almanzar 89 y.o. male I MRN: [id number]  Unit/Bed#: S -01 I Date of Admission: 1/17/2025   Date of Service: 1/26/2025 I Hospital Day: 8    Assessment & Plan  SOFÍA (acute kidney injury) (HCC)    acute on chronic kidney injury stage IV  - Outpatient nephrologist Dr. Rey  - Baseline creatinine progressively worsening to low 3  - Admission creatinine 3.1 mg/dL on January 18  - Creatinine rising to 3.9 mg/dL on January 21 prompting nephrology consultation  - Patient received contrast study on January 18 and also presented with cardiogenic shock and cor pulmonale  - Etiology-likely ATN in the setting of hypotension/shock/IV contrast/cardiac arrest/autoregulatory failure in setting of ARB at home  - Received Lasix IV one-time January 21 by primary team  - January 22-creatinine slightly improved 3.6 mg/dL.  Electrolytes are appropriate.  With the exception of sodium rising and we will start D5W  - January 23-sodium continues to be elevated.  Creatinine improving to 3.1 mg/dL back to baseline.  - January 24-sodium slowly improving to 150.  Bicarbonate appropriate.  Potassium low being repleted.  Creatinine improving 2.6 mg/dL.  Will continue D5W.  Replete potassium.  Will give Lasix one-time due to developing slight volume overload which is not impacting patient's respiratory status currently and therefore we will start with low-dose Lasix as the patient is also hypokalemic and hypernatremic and this could worsen these issues  - January 25-sodium level improving appropriately to 144 with D5W.  Potassium borderline low but improved 3.4 and phosphorus low being repleted by primary team.  Will give Lasix at slightly lower dose 10 mg one-time today as patient still has signs of pulmonary edema on exam.  Will hold on starting D5W but anticipate will need D5W again tomorrow  - January 26-creatinine stable 2.5 mg/dL.  Sodium level slightly higher 146 and will give D5W.  Bicarbonate  stable.  Phosphorus and magnesium stable.  Primary team is evaluating goals of care.  Will give Lasix one-time also.Will give short course of D5W as the patient is still NPO.    Plan  - I/os; avoid nephrotoxic agents  - Avoid hypotension  - Trend lab work in a.m.  - D5W short course  - Lasix IV one-time  - Will likely need D5W again tomorrow  - Goals of care discussions in progress by primary team  - Once goals of cares are finalized, primary team will determine timing of void trial  - I had a detailed discussion with the patient's wife at bedside.  She was appreciative of our discussion.  We reviewed the renal standpoint.  She understands.  She will be discussing with her family regarding nutrition plans    CKD (chronic kidney disease) stage 4, GFR 15-29 ml/min ([facility])  -See discussion above  Normal anion gap metabolic acidosis  - Initially presented with elevated anion gap and decreased serum bicarbonate. Anion gap has improved. Serum bicarbonate level is slowly improving. VBG pH reviewed 7.39. Will continue to monitor. And improving bicarbonate on January 25  Acute saddle pulmonary embolism with acute cor pulmonale ([facility])  saddle pulmonary embolus with cor pulmonale with unresponsiveness from home and cardiac arrest     - Initially with cardiac arrest and cardiogenic shock  - Is now extubated  - Is now off pressors  - Management per primary team-transfer to MedSurg floor evening of January 21  Cardiac arrest (HCC)  -In the setting of  saddle embolus.  Is now out of ICU on MedSurg floor  Benign 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 ([facility])  - Initially with cardiogenic shock  - Repeat echocardiogram January 20 with EF 55%, grade 1 diastolic dysfunction  - At home is on losartan  - evening on January 21 developed sudden acute hypertension-see discussion below  - Continue as needed hydralazine     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  (HCC)  -Trend per primary team    Hypokalemia  - repleted and  resolved January 26.  Monitor for now.  Retinal hemorrhage noted on examination of left eye  - seen by ophthalmology-chronic issue likely  Dysphagia  Evaluation in progress by primary team    I have reviewed the nephrology recommendations including Lasix one-time and D5W, with primary team resident, and we are in agreement with renal plan including the information outlined above.     Subjective   Brief History of Admission - 89-year-old male with a past medical history of CKD stage IV, hypertension, hyperlipidemia, CVA, diabetes, IgM paraprotein, bladder cancer who initially presents on January 18 with unresponsiveness. Patient was found to have massive PE and given tPA, intubated, brief cardiac arrest. Initially required vasopressors for shock. Had acute kidney injury. Troponin elevation. Initial lactic acidosis which was slowly improving. Was started on anticoagulation with heparin drip and echocardiograms were followed serially. Echocardiogram in January 18 with EF could not be assessed, RV dilated and moderately reduced function. Patient is also required transfusion this admission for anemia. Briefly had worsening pressor requirements but by January 20, was off all pressors. Patient was extubated January 20. Nephrology is consulted January 21 due to elevated creatinine.  Renal function is slowly improving.  Likely contrast nephropathy contributing to initial poor flow in the setting of shock.  Started IV diuretic January 24 low-dose to assist with pulmonary edema which is mild.  Patient has significant dysphagia preventing swallowing and all medications currently are IV.  Goals of care discussion in progress with primary team in progress     Patient seen at bedside with wife present.  Was sleeping but arousable.  Denies complaints.    Objective :  Temp:  [99 °F (37.2 °C)-99.6 °F (37.6 °C)] 99.6 °F (37.6 °C)  HR:  [85-87] 87  BP: (144-152)/(65-88) 151/86  Resp:  [18] 18  SpO2:  [90 %-96 %] 96 %  O2 Device: None  (Room air)    Current Weight: Weight - Scale: 64.4 kg (141 lb 15.6 oz)  First Weight: Weight - Scale: 62.1 kg (137 lb)  I/O         01/24 0701 01/25 0700 01/25 0701 01/26 0700 01/26 0701 01/27 0700    P.O. 0 0     Total Intake(mL/kg) 0 (0) 0 (0)     Urine (mL/kg/hr) 1300 (0.9) 1050 (0.7) 750 (1.9)    Stool 0      Total Output 1300 1050 750    Net -1300 -1050 -750           Unmeasured Stool Occurrence 1 x            Physical Exam  General: NAD but chronically ill-appearing  Skin: no rash  Eyes: anicteric sclera  ENT: Dry mucous membrane  Neck: supple  Chest: no ronchii, no wheeze, no rubs, fine lateral rales bases rales but coarse upper airway sounds  CVS: s1s2, no murmur, no gallop, no rub  Abdomen: soft, nontender, nl sounds  Extremities: Trace edema LE b/l  : Positive villatoro with clear yellow urine  Neuro: Unclear orientation.  But is awake.  Psych: normal affect    Medications:    Current Facility-Administered Medications:     artificial tear ophthalmic ointment, , Both Eyes, HS, Eloise Rodriguez MD, Given at 01/25/25 2127    chlorhexidine (PERIDEX) 0.12 % oral rinse 15 mL, 15 mL, Mouth/Throat, Q12H MARQUIS, Eloise Rodriguez MD, 15 mL at 01/21/25 2106    dextrose 5 % infusion, 50 mL/hr, Intravenous, Continuous, Laury Loredo MD    dorzolamide-timolol (COSOPT) 2-0.5 % ophthalmic solution 1 drop, 1 drop, Both Eyes, BID, Eloise Rodriguez MD, 1 drop at 01/26/25 0909    heparin (porcine) 25,000 units in 0.45% NaCl 250 mL infusion (premix), 3-30 Units/kg/hr (Order-Specific), Intravenous, Titrated, Pawan Gtz MD, Last Rate: 8.4 mL/hr at 01/25/25 1749, 14 Units/kg/hr at 01/25/25 1749    heparin (porcine) injection 2,400 Units, 2,400 Units, Intravenous, Q6H PRN, Pawan Gtz MD    heparin (porcine) injection 4,800 Units, 4,800 Units, Intravenous, Once, Pawan Gtz MD    heparin (porcine) injection 4,800 Units, 4,800 Units, Intravenous, Q6H PRN, Pawan Gtz MD, 4,800 Units at 01/23/25 1317    hydrALAZINE (APRESOLINE) injection 5 mg,  5 mg, Intravenous, Q6H PRN, Jamaica Jones DO    insulin lispro (HumALOG/ADMELOG) 100 units/mL subcutaneous injection 2-12 Units, 2-12 Units, Subcutaneous, Q6H, 4 Units at 01/25/25 0503 **AND** Fingerstick Glucose (POCT), , , Q6H, Eloise Rodriguez MD    Latanoprostene Bunod 0.024 % SOLN 1 drop, 1 drop, Both Eyes, HS, Eloise Rodriguez MD, 1 drop at 01/25/25 2127    polyethylene glycol (MIRALAX) packet 17 g, 17 g, Oral, Daily, Eloise Rodriguez MD, 17 g at 01/20/25 1505    prednisoLONE acetate (PRED FORTE) 1 % ophthalmic suspension 1 drop, 1 drop, Left Eye, TID, Eloise Rodriguez MD, 1 drop at 01/26/25 0909    scopolamine (TRANSDERM-SCOP) 1 mg/3 days TD 72 hr patch 1 patch, 1 patch, Transdermal, Q72H, Jamaica Jones DO, 1 patch at 01/25/25 1334    [Held by provider] senna-docusate sodium (SENOKOT S) 8.6-50 mg per tablet 1 tablet, 1 tablet, Oral, BID, Eloise Rodriguez MD    tetracaine 0.5 % ophthalmic solution 2 drop, 2 drop, Both Eyes, Once, Jamaica Jones DO      Lab Results: I have reviewed the following results:  Results from last 7 days   Lab Units 01/26/25  0446 01/25/25  0433 01/24/25  0536 01/23/25  0633 01/22/25  1959 01/22/25  0619 01/21/25  1955 01/21/25  0857 01/21/25  0505 01/20/25  1305 01/20/25  0454   WBC Thousand/uL 8.18 8.27 9.20 8.68  --  9.33  --   --  12.14*  --  12.65*   HEMOGLOBIN g/dL 9.0* 8.5* 8.1* 9.0*  --  8.6* 8.8* 8.2* 8.3*   < > 8.2*   HEMATOCRIT % 28.4* 27.6* 25.9* 27.8*  --  26.7* 27.1* 24.8* 24.5*   < > 24.6*   PLATELETS Thousands/uL 221 187 160 145*  --  125*  --   --  95*  --  76*   POTASSIUM mmol/L 4.0 3.4* 3.3* 3.3* 3.4* 3.6  --   --  3.8  --  4.3   CHLORIDE mmol/L 117* 115* 119* 119* 117* 117*  --   --  117*  --  115*   CO2 mmol/L 21 22 22 22 20* 19*  --   --  16*  --  17*   BUN mg/dL 48* 51* 58* 69* 72* 69*  --   --  68*  --  62*   CREATININE mg/dL 2.51* 2.50* 2.61* 3.13* 3.35* 3.59*  --   --  3.89*  --  3.85*   CALCIUM mg/dL 8.8 8.5 8.6 9.0 9.0 8.8  --   --  8.3*  --  7.3*   MAGNESIUM mg/dL  "2.1 2.1 2.3 2.4  --   --   --   --   --   --  2.2   PHOSPHORUS mg/dL 3.8 2.2*  --   --   --   --   --   --   --   --   --    ALBUMIN g/dL  --   --   --   --   --   --   --   --  3.2*  --  3.0*    < > = values in this interval not displayed.       Administrative Statements     Portions of the record may have been created with voice recognition software. Occasional wrong word or \"sound a like\" substitutions may have occurred due to the inherent limitations of voice recognition software. Read the chart carefully and recognize, using context, where substitutions have occurred.If you have any questions, please contact the dictating provider.  "

## 2025-01-27 PROBLEM — I12.9 PARENCHYMAL RENAL HYPERTENSION: Status: RESOLVED | Noted: 2021-11-09 | Resolved: 2025-01-27

## 2025-01-27 PROBLEM — M89.9 CHRONIC KIDNEY DISEASE-MINERAL AND BONE DISORDER: Status: RESOLVED | Noted: 2025-01-21 | Resolved: 2025-01-27

## 2025-01-27 PROBLEM — E11.21 DIABETIC NEPHROPATHY ASSOCIATED WITH TYPE 2 DIABETES MELLITUS (HCC): Status: RESOLVED | Noted: 2023-01-22 | Resolved: 2025-01-01

## 2025-01-27 PROBLEM — N18.9 CHRONIC KIDNEY DISEASE-MINERAL AND BONE DISORDER: Status: RESOLVED | Noted: 2025-01-21 | Resolved: 2025-01-27

## 2025-01-27 PROBLEM — E43 SEVERE PROTEIN-CALORIE MALNUTRITION (HCC): Status: ACTIVE | Noted: 2025-01-01

## 2025-01-27 PROBLEM — E83.9 CHRONIC KIDNEY DISEASE-MINERAL AND BONE DISORDER: Status: RESOLVED | Noted: 2025-01-21 | Resolved: 2025-01-27

## 2025-01-27 LAB
ANION GAP SERPL CALCULATED.3IONS-SCNC: 10 MMOL/L (ref 4–13)
APTT PPP: 76 SECONDS (ref 23–34)
BUN SERPL-MCNC: 54 MG/DL (ref 5–25)
CALCIUM SERPL-MCNC: 8.9 MG/DL (ref 8.4–10.2)
CHLORIDE SERPL-SCNC: 117 MMOL/L (ref 96–108)
CO2 SERPL-SCNC: 21 MMOL/L (ref 21–32)
CREAT SERPL-MCNC: 2.62 MG/DL (ref 0.6–1.3)
ERYTHROCYTE [DISTWIDTH] IN BLOOD BY AUTOMATED COUNT: 17.1 % (ref 11.6–15.1)
FLUAV RNA RESP QL NAA+PROBE: NEGATIVE
FLUBV RNA RESP QL NAA+PROBE: NEGATIVE
GFR SERPL CREATININE-BSD FRML MDRD: 20 ML/MIN/1.73SQ M
GLUCOSE SERPL-MCNC: 129 MG/DL (ref 65–140)
GLUCOSE SERPL-MCNC: 133 MG/DL (ref 65–140)
GLUCOSE SERPL-MCNC: 133 MG/DL (ref 65–140)
GLUCOSE SERPL-MCNC: 145 MG/DL (ref 65–140)
GLUCOSE SERPL-MCNC: 148 MG/DL (ref 65–140)
HCT VFR BLD AUTO: 28.1 % (ref 36.5–49.3)
HGB BLD-MCNC: 8.9 G/DL (ref 12–17)
MAGNESIUM SERPL-MCNC: 2.2 MG/DL (ref 1.9–2.7)
MCH RBC QN AUTO: 28.9 PG (ref 26.8–34.3)
MCHC RBC AUTO-ENTMCNC: 31.7 G/DL (ref 31.4–37.4)
MCV RBC AUTO: 91 FL (ref 82–98)
PHOSPHATE SERPL-MCNC: 3.7 MG/DL (ref 2.3–4.1)
PLATELET # BLD AUTO: 247 THOUSANDS/UL (ref 149–390)
PMV BLD AUTO: 10.9 FL (ref 8.9–12.7)
POTASSIUM SERPL-SCNC: 3.7 MMOL/L (ref 3.5–5.3)
RBC # BLD AUTO: 3.08 MILLION/UL (ref 3.88–5.62)
RSV RNA RESP QL NAA+PROBE: NEGATIVE
SARS-COV-2 RNA RESP QL NAA+PROBE: NEGATIVE
SODIUM SERPL-SCNC: 148 MMOL/L (ref 135–147)
WBC # BLD AUTO: 8.61 THOUSAND/UL (ref 4.31–10.16)

## 2025-01-27 PROCEDURE — 80048 BASIC METABOLIC PNL TOTAL CA: CPT | Performed by: INTERNAL MEDICINE

## 2025-01-27 PROCEDURE — 85027 COMPLETE CBC AUTOMATED: CPT | Performed by: INTERNAL MEDICINE

## 2025-01-27 PROCEDURE — 99233 SBSQ HOSP IP/OBS HIGH 50: CPT | Performed by: STUDENT IN AN ORGANIZED HEALTH CARE EDUCATION/TRAINING PROGRAM

## 2025-01-27 PROCEDURE — 84100 ASSAY OF PHOSPHORUS: CPT | Performed by: INTERNAL MEDICINE

## 2025-01-27 PROCEDURE — 83735 ASSAY OF MAGNESIUM: CPT | Performed by: INTERNAL MEDICINE

## 2025-01-27 PROCEDURE — 0241U HB NFCT DS VIR RESP RNA 4 TRGT: CPT

## 2025-01-27 PROCEDURE — 99233 SBSQ HOSP IP/OBS HIGH 50: CPT | Performed by: INTERNAL MEDICINE

## 2025-01-27 PROCEDURE — 82948 REAGENT STRIP/BLOOD GLUCOSE: CPT

## 2025-01-27 PROCEDURE — 85730 THROMBOPLASTIN TIME PARTIAL: CPT | Performed by: INTERNAL MEDICINE

## 2025-01-27 PROCEDURE — 99232 SBSQ HOSP IP/OBS MODERATE 35: CPT | Performed by: INTERNAL MEDICINE

## 2025-01-27 RX ORDER — DEXTROSE MONOHYDRATE 50 MG/ML
50 INJECTION, SOLUTION INTRAVENOUS CONTINUOUS
Status: DISPENSED | OUTPATIENT
Start: 2025-01-27 | End: 2025-01-28

## 2025-01-27 RX ADMIN — DORZOLAMIDE HYDROCHLORIDE AND TIMOLOL MALEATE 1 DROP: 20; 5 SOLUTION OPHTHALMIC at 18:50

## 2025-01-27 RX ADMIN — DORZOLAMIDE HYDROCHLORIDE AND TIMOLOL MALEATE 1 DROP: 20; 5 SOLUTION OPHTHALMIC at 10:48

## 2025-01-27 RX ADMIN — MINERAL OIL, WHITE PETROLATUM: .03; .94 OINTMENT OPHTHALMIC at 22:06

## 2025-01-27 RX ADMIN — LATANOPROSTENE BUNOD 1 DROP: 0.24 SOLUTION/ DROPS OPHTHALMIC at 22:05

## 2025-01-27 RX ADMIN — PREDNISOLONE ACETATE 1 DROP: 10 SUSPENSION/ DROPS OPHTHALMIC at 22:01

## 2025-01-27 RX ADMIN — DEXTROSE 50 ML/HR: 5 SOLUTION INTRAVENOUS at 16:46

## 2025-01-27 RX ADMIN — PREDNISOLONE ACETATE 1 DROP: 10 SUSPENSION/ DROPS OPHTHALMIC at 16:45

## 2025-01-27 NOTE — ASSESSMENT & PLAN NOTE
Lab Results   Component Value Date    EGFR 20 01/27/2025    EGFR 21 01/26/2025    EGFR 21 01/25/2025    CREATININE 2.62 (H) 01/27/2025    CREATININE 2.51 (H) 01/26/2025    CREATININE 2.50 (H) 01/25/2025      - SOFÍA on CKD IV, likely in the setting of severe ATN   - baseline SCr 2.0   - follows OP Nephrology   - IP Nephrology following   - no HD desired per patient's previously communicated wishes, supported/reaffirmed by family

## 2025-01-27 NOTE — PROGRESS NOTES
"Progress Note - Hospitalist   Name: Jeffry Almanzar 89 y.o. male I MRN: [id number]  Unit/Bed#: S -01 I Date of Admission: 1/17/2025   Date of Service: 1/27/2025 I Hospital Day: 9    Assessment & Plan  Dysphagia  Pt failed VBS -- showed moderate oral/severe pharyngeal dysphagia characterized by weak swallow mechanics, multiple swallows noted to clear pharyngeal retention with overflow penetration and aspiration observed after the swallow   Suction any secretions as able  Pt given scopolamine patch to manage secretions  Maintain strict NPO  Pt's wife/family not interested in tube feeds    Had numerous discussions regarding goals of care with family and multidisciplinary team. Explained risks versus benefits of PICC line and subsequent TPN, versus pleasure feeds on comfort care. Today 1/27 pt's wife decided on obtaining PICC for patient and signed informed consent form. Process, risks, and outcomes were reviewed at length with the patient's wife at bedside, alongside Dr. Gtz. All questions/concerns answered. Pt's wife verbalizes understanding and wishes to proceed with PICC line.     Will reach out to vascular access team to schedule this.   Hypoxia  Pt on room air with labored breathing. Has rales in bilateral lung fields. Continues to have wet cough without sputum production.     Please see plan as under \"Dysphagia.\"   Acute saddle pulmonary embolism with acute cor pulmonale (HCC)  Initially with cardiac arrest and cardiogenic shock, is now extubated off pressors. Was transferred  to med/surg 1/21.   MRI brain negative for CVA or concern for hemorrhage.      Continue heparin gtt.  Disoriented to time  Per wife, patient has some degree of dementia but is unclear of specifics.  Patient oriented to self and place but unaware of year and month.  Patient incorrectly stated the number of years he has been  to his wife during time of exam  Per wife, unsure if patient is completely aware of what is going on and " is unsure if patient would truly want to except intubation a second time    PLAN:  Neuropsych evaluation determined patient does NOT have capacity for MDM  We will have further discussions with family and patient depending on overall prognosis and goals of care  Palliative on board. Appreciate recommendations. As of 1/23/2025, family made the decision to make patient's code status LEVEL 3 DNR/DNI.  Ongoing discussions regarding palliative care.  SOFÍA (acute kidney injury) (HCC)  Etiology: Suspect due to ATN in the setting of cardiogenic shock, cardiac arrest, hypotension and IV contrast exposure with autoregulatory dysfunction with ARB  Baseline creatinine low threes.  Per nephrology, no urgent need for dialysis, but states that patient and wife would not be agreeable to dialysis in the future  Continue to hold losartan and Farxiga per nephrology  Per Nephrology:  Pt will likely need D5W again tomorrow   Replete potassium.  Phosphorus also low.  Repleted with potassium phosphate.   Consider voiding trial depending on ongoing GOC discussions with family   Repeat BMP in AM  Strict I/Os, daily weights  Avoid nephrotoxins, NSAIDs, further IV contrast as able  Avoid hypotension.  Maintain MAP >65  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  (HCC)  Lab Results   Component Value Date    EGFR 20 01/27/2025    EGFR 21 01/26/2025    EGFR 21 01/25/2025    CREATININE 2.62 (H) 01/27/2025    CREATININE 2.51 (H) 01/26/2025    CREATININE 2.50 (H) 01/25/2025   Avoid nephrotoxic medications as able  Na elevated at 150  Continue to hold losartan and Farxiga per nephro   Nephrology on board. Appreciate recommendations   Cardiac arrest (HCC)  Cardiac arrest in ED after CTA evaluating for pulmonary embolus.  Asystole for 2 minutes requiring CPR and intubation s/P extubation 1/21/2025  ECHO with EF of 65% in September, ECHO with left ventricular ejection fraction is 55% (1/20)    PLAN:  Continue heparin gtt  Continue to monitor on tele due  to recent PE, recent MI, and electrolyte abnormalities   Benign 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 ([facility])  Lab Results   Component Value Date    EGFR 20 01/27/2025    EGFR 21 01/26/2025    EGFR 21 01/25/2025    CREATININE 2.62 (H) 01/27/2025    CREATININE 2.51 (H) 01/26/2025    CREATININE 2.50 (H) 01/25/2025   Holding Losartan and Farxiga per nephro due to elevated Cr   Hydralazine 5 mg q 6 hrs for BP >180/110   Initially allowed for permissive hypertension due to concern for stroke.  However, patient's MRI does not show concern for infarct.  Will aim for normotension  CKD (chronic kidney disease) stage 4, GFR 15-29 ml/min ([facility])  Lab Results   Component Value Date    EGFR 20 01/27/2025    EGFR 21 01/26/2025    EGFR 21 01/25/2025    CREATININE 2.62 (H) 01/27/2025    CREATININE 2.51 (H) 01/26/2025    CREATININE 2.50 (H) 01/25/2025   Per patient and patient's wife, would not be interested in dialysis   Nephro on board. 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  MRI brain and MRA negative for infarct, high-grade stenosis, and LVO  Will aim for normotension.  As needed hydralazine for BP greater than 180/110  Avoid losartan and Farxiga per nephro given kidney function   Normal anion gap metabolic acidosis  Most recent VBG pCO2 30.8, pH 7.4, bicarb 18.4  AG closed. Continue to monitor   Hypokalemia  Potassium decreased at 3.3. Unable to take oral medications. IV potassium 20 meq given. Will give a second dose this afternoon 1/24  Phosphorus also found to be low at 2.2.  Repleted with potassium phosphate  Potassium wnl this morning.   Palliative care encounter  Patient seen and evaluated by palliative care 1/23. Palliative care had an in-depth discussion with family regarding patient's GOC and overall prognosis.  Family would NOT like to pursue a PEG tube at this time.   CODE STATUS changed from Level 2 to Level 3 DNR/DNI  Ongoing goals of care discussions with consideration for comfort measures only  Goals of care, counseling/discussion  Palliative on board. Appreciate recommendations   Retinal hemorrhage noted on examination of left eye  Found on head CT 1/21.  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  Instructed patient to follow-up with ophthalmologist in outpatient setting  Severe protein-calorie malnutrition (HCC)  Malnutrition Findings:   Adult Malnutrition type: Acute illness  Adult Degree of Malnutrition: Other severe protein calorie malnutrition  Malnutrition Characteristics: Muscle loss, Inadequate energy, Weight loss, Fat loss                  360 Statement: related to dysphagia with recommendation for NPO, pt with > 7 days of less than 50% energy intake compared to estimated needs, muscle, fat wasting, wasted buccal pads, temporalis, supraclavicular space, interosseous. Treatment. Enteral nutrition via feeding tube is indicated for nutrition support- wife is declining nutrition via NGT or PEG at this time. Ongoing goals of care discussion noted.    BMI Findings:           Body mass index is 19.99 kg/m².       VTE Pharmacologic Prophylaxis:   High Risk (Score >/= 5) - Pharmacological DVT Prophylaxis Ordered: heparin drip. Sequential Compression Devices Ordered.    Mobility:   Basic Mobility Inpatient Raw Score: 8  JH-HLM Goal: 3: Sit at edge of bed  JH-HLM Achieved: 2: Bed activities/Dependent transfer  JH-HLM Goal achieved. Continue to encourage appropriate mobility.    Patient Centered Rounds: I performed bedside rounds with nursing staff today.   Discussions with Specialists or Other Care Team Provider: Nephrology, Palliative Care    Education and Discussions with Family  / Patient: Updated  (wife) at bedside.    Current Length of Stay: 9 day(s)  Current Patient Status: Inpatient   Certification Statement: The patient will continue to require additional inpatient hospital stay due to ongoing goals of care discussion.  Discharge Plan: Anticipate discharge in 48-72 hrs to D.    Code Status: Level 3 - DNAR and DNI    Subjective   Pt seen and evaluated at bedside. Per report, pt required deep suctioning overnight. Pt's wife at bedside this morning, states pt continues to sound congested throughout the night. Pt asleep throughout most of this encounter and continues to have labored breathing. Pt's wife to speak with her family regarding pursue of PICC line and TPN versus not.  Later this afternoon, pt's wife states they have decided to move forward with PICC line. Explained risks versus benefits of PICC line and subsequent TPN, versus pleasure feeds on comfort care. Process, risks, and outcomes were reviewed at length with the patient's wife at bedside, alongside Dr. Gtz. All questions/concerns answered. Pt's wife verbalizes understanding and wishes to proceed with PICC line.     Objective :  Temp:  [98.9 °F (37.2 °C)-100.1 °F (37.8 °C)] 100.1 °F (37.8 °C)  HR:  [91] 91  BP: (102-153)/(70-92) 142/70  Resp:  [16-18] 18  SpO2:  [96 %] 96 %    Body mass index is 19.99 kg/m².     Input and Output Summary (last 24 hours):     Intake/Output Summary (Last 24 hours) at 1/27/2025 1539  Last data filed at 1/27/2025 0900  Gross per 24 hour   Intake 0 ml   Output 850 ml   Net -850 ml       Physical Exam  Vitals reviewed.   Constitutional:       Appearance: He is ill-appearing.   HENT:      Head: Normocephalic and atraumatic.      Right Ear: External ear normal.      Left Ear: External ear normal.      Mouth/Throat:      Mouth: Mucous membranes are dry.   Eyes:      General: No scleral icterus.     Extraocular Movements: Extraocular movements intact.   Cardiovascular:      Rate and  Rhythm: Normal rate.      Pulses: Normal pulses.   Pulmonary:      Effort: Accessory muscle usage present.      Comments: Significant congestion/pulmonary secretions  Chest:      Chest wall: No tenderness.   Abdominal:      Tenderness: There is no abdominal tenderness. There is no guarding.   Skin:     General: Skin is warm.      Capillary Refill: Capillary refill takes 2 to 3 seconds.      Coloration: Skin is not jaundiced.   Neurological:      Motor: Weakness present.         Lines/Drains:  Lines/Drains/Airways       Active Status       Name Placement date Placement time Site Days    Urethral Catheter 01/17/25  0000  --  10                  Urinary Catheter:  Goal for removal: Voiding trial when ambulation improves           Telemetry:  Telemetry Orders (From admission, onward)               24 Hour Telemetry Monitoring  Continuous x 24 Hours (Telem)        Expiring   Question:  Reason for 24 Hour Telemetry  Answer:  Pulmonary Embolism                     Telemetry Reviewed: Normal Sinus Rhythm  Indication for Continued Telemetry Use: Metabolic/electrolyte disturbance with high probability of dysrhythmia               Lab Results: I have reviewed the following results:   Results from last 7 days   Lab Units 01/27/25  0529 01/23/25  0633 01/22/25  0619 01/21/25  0857 01/21/25  0505   WBC Thousand/uL 8.61   < > 9.33  --  12.14*   HEMOGLOBIN g/dL 8.9*   < > 8.6*   < > 8.3*   HEMATOCRIT % 28.1*   < > 26.7*   < > 24.5*   PLATELETS Thousands/uL 247   < > 125*  --  95*   BANDS PCT %  --   --   --   --  2   SEGS PCT %  --   --  76*  --   --    LYMPHO PCT %  --   --  7*  --  9*   MONO PCT %  --   --  16*  --  13*   EOS PCT %  --   --  0  --  1    < > = values in this interval not displayed.     Results from last 7 days   Lab Units 01/27/25  0529 01/22/25  0619 01/21/25  0505   SODIUM mmol/L 148*   < > 143   POTASSIUM mmol/L 3.7   < > 3.8   CHLORIDE mmol/L 117*   < > 117*   CO2 mmol/L 21   < > 16*   BUN mg/dL 54*   < > 68*    CREATININE mg/dL 2.62*   < > 3.89*   ANION GAP mmol/L 10   < > 10   CALCIUM mg/dL 8.9   < > 8.3*   ALBUMIN g/dL  --   --  3.2*   TOTAL BILIRUBIN mg/dL  --   --  1.02*   ALK PHOS U/L  --   --  58   ALT U/L  --   --  159*   AST U/L  --   --  36   GLUCOSE RANDOM mg/dL 133   < > 129    < > = values in this interval not displayed.           Results from last 7 days   Lab Units 01/27/25  1123 01/27/25  0017 01/26/25  1531 01/26/25  1020 01/26/25  0727 01/26/25  0555 01/26/25  0154 01/25/25  1556 01/25/25  1338 01/25/25  0506 01/24/25  2357 01/24/25  1800   POC GLUCOSE mg/dl 129 133 142* 121 122 112 113 141* 150* 207* 176* 190*                 Recent Cultures (last 7 days):         Last 24 Hours Medication List:     Current Facility-Administered Medications:     artificial tear ophthalmic ointment, HS    chlorhexidine (PERIDEX) 0.12 % oral rinse 15 mL, Q12H MARQUIS    dorzolamide-timolol (COSOPT) 2-0.5 % ophthalmic solution 1 drop, BID    heparin (porcine) 25,000 units in 0.45% NaCl 250 mL infusion (premix), Titrated, Last Rate: 14 Units/kg/hr (01/26/25 0026)    heparin (porcine) injection 2,400 Units, Q6H PRN    heparin (porcine) injection 4,800 Units, Once    heparin (porcine) injection 4,800 Units, Q6H PRN    hydrALAZINE (APRESOLINE) injection 5 mg, Q6H PRN    insulin lispro (HumALOG/ADMELOG) 100 units/mL subcutaneous injection 2-12 Units, Q6H **AND** Fingerstick Glucose (POCT), Q6H    Latanoprostene Bunod 0.024 % SOLN 1 drop, HS    polyethylene glycol (MIRALAX) packet 17 g, Daily    prednisoLONE acetate (PRED FORTE) 1 % ophthalmic suspension 1 drop, TID    scopolamine (TRANSDERM-SCOP) 1 mg/3 days TD 72 hr patch 1 patch, Q72H    [Held by provider] senna-docusate sodium (SENOKOT S) 8.6-50 mg per tablet 1 tablet, BID    tetracaine 0.5 % ophthalmic solution 2 drop, Once    Administrative Statements   Today, Patient Was Seen By: Mercedes Nunez DO  PGY-1      **Please Note: This note may have been constructed using a voice  recognition system.**

## 2025-01-27 NOTE — ASSESSMENT & PLAN NOTE
Lab Results   Component Value Date    EGFR 20 01/27/2025    EGFR 21 01/26/2025    EGFR 21 01/25/2025    CREATININE 2.62 (H) 01/27/2025    CREATININE 2.51 (H) 01/26/2025    CREATININE 2.50 (H) 01/25/2025   Holding Losartan and Farxiga per nephro due to elevated Cr   Hydralazine 5 mg q 6 hrs for BP >180/110   Initially allowed for permissive hypertension due to concern for stroke.  However, patient's MRI does not show concern for infarct.  Will aim for normotension

## 2025-01-27 NOTE — ASSESSMENT & PLAN NOTE
Pt failed VBS -- showed moderate oral/severe pharyngeal dysphagia characterized by weak swallow mechanics, multiple swallows noted to clear pharyngeal retention with overflow penetration and aspiration observed after the swallow   Suction any secretions as able  Pt given scopolamine patch to manage secretions  Maintain strict NPO  Pt's wife/family not interested in tube feeds    Had numerous discussions regarding goals of care with family and multidisciplinary team. Explained risks versus benefits of PICC line and subsequent TPN, versus pleasure feeds on comfort care. Today 1/27 pt's wife decided on obtaining PICC for patient and signed informed consent form. Process, risks, and outcomes were reviewed at length with the patient's wife at bedside, alongside Dr. Gtz. All questions/concerns answered. Pt's wife verbalizes understanding and wishes to proceed with PICC line.     Will reach out to vascular access team to schedule this.

## 2025-01-27 NOTE — ASSESSMENT & PLAN NOTE
- Initially with cardiogenic shock  - Repeat echocardiogram January 20 with EF 55%, grade 1 diastolic dysfunction  - Continue to hold losartan  - Continue as needed hydralazine

## 2025-01-27 NOTE — ASSESSMENT & PLAN NOTE
Lab Results   Component Value Date    EGFR 20 01/27/2025    EGFR 21 01/26/2025    EGFR 21 01/25/2025    CREATININE 2.62 (H) 01/27/2025    CREATININE 2.51 (H) 01/26/2025    CREATININE 2.50 (H) 01/25/2025   Avoid nephrotoxic medications as able  Na elevated at 150  Continue to hold losartan and Farxiga per nephro   Nephrology on board. Appreciate recommendations

## 2025-01-27 NOTE — ASSESSMENT & PLAN NOTE
Etiology: Suspect due to ATN in the setting of cardiogenic shock, cardiac arrest, hypotension and IV contrast exposure with autoregulatory dysfunction with ARB  Baseline creatinine low threes.  Per nephrology, no urgent need for dialysis, but states that patient and wife would not be agreeable to dialysis in the future  Continue to hold losartan and Farxiga per nephrology  Per Nephrology:  Pt will likely need D5W again tomorrow   Replete potassium.  Phosphorus also low.  Repleted with potassium phosphate.   Consider voiding trial depending on ongoing GOC discussions with family   Repeat BMP in AM  Strict I/Os, daily weights  Avoid nephrotoxins, NSAIDs, further IV contrast as able  Avoid hypotension.  Maintain MAP >65

## 2025-01-27 NOTE — ASSESSMENT & PLAN NOTE
- overall wish to give patient supports to identify recovery/reversible etiologies, including dysphagia. Spouse clearly states that the patient would not wish to pursue NGT or PEG tube. Sarah confirms this, as known patient's values/wishes, would not wish to have a feeding tube. Concern that if pursued, patient would most likely remove in the setting of disagreement with its placement.   - SLP evaluated patient, with failing of VBS   - TPN and PICC offered by [facility] team-->reviewed significant risks associated with this (electrolyte disturbance, high Tgs, liver damage, and high chance of serious infection)-->they would still like to trial time-limited course of TPN with the hope he recovers overtime with return in baseline food consumption (which reportedly was normal prior to hospitalization).    -Counseled and educated on uncertain prognosis with hope for improvement, but high likelihood of things not getting better or getting worse   - spouse confirmed DNAR/DNI, Level 3, as the patient would not wish to be re-intubated nor pursue CPR/ACLS in the setting of a cardiac arrest. If such an event were to occur, focus on EOL comfort to natural death.   - will continue to follow/support as clinical course evolves.

## 2025-01-27 NOTE — ASSESSMENT & PLAN NOTE
Lab Results   Component Value Date    EGFR 20 01/27/2025    EGFR 21 01/26/2025    EGFR 21 01/25/2025    CREATININE 2.62 (H) 01/27/2025    CREATININE 2.51 (H) 01/26/2025    CREATININE 2.50 (H) 01/25/2025

## 2025-01-27 NOTE — MALNUTRITION/BMI
This medical record reflects one or more clinical indicators suggestive of malnutrition.    Malnutrition Findings:   Adult Malnutrition type: Acute illness  Adult Degree of Malnutrition: Other severe protein calorie malnutrition  Malnutrition Characteristics: Muscle loss, Inadequate energy, Weight loss, Fat loss                360 Statement: related to dysphagia with recommendation for NPO, pt with > 7 days of less than 50% energy intake compared to estimated needs, muscle, fat wasting, wasted buccal pads, temporalis, supraclavicular space, interosseous. Treatment. Enteral nutrition via feeding tube is indicated for nutrition support- wife is declining nutrition via NGT or PEG at this time. Ongoing goals of care discussion noted.    BMI Findings:           Body mass index is 19.99 kg/m².     See Nutrition note dated 1/27/25 for additional details.  Completed nutrition assessment is viewable in the nutrition documentation.

## 2025-01-27 NOTE — ASSESSMENT & PLAN NOTE
Potassium decreased at 3.3. Unable to take oral medications. IV potassium 20 meq given. Will give a second dose this afternoon 1/24  Phosphorus also found to be low at 2.2.  Repleted with potassium phosphate  Potassium wnl this morning.

## 2025-01-27 NOTE — ASSESSMENT & PLAN NOTE
Malnutrition Findings:   Adult Malnutrition type: Acute illness  Adult Degree of Malnutrition: Other severe protein calorie malnutrition  Malnutrition Characteristics: Muscle loss, Inadequate energy, Weight loss, Fat loss                  360 Statement: related to dysphagia with recommendation for NPO, pt with > 7 days of less than 50% energy intake compared to estimated needs, muscle, fat wasting, wasted buccal pads, temporalis, supraclavicular space, interosseous. Treatment. Enteral nutrition via feeding tube is indicated for nutrition support- wife is declining nutrition via NGT or PEG at this time. Ongoing goals of care discussion noted.    BMI Findings:           Body mass index is 19.99 kg/m².      2 and 1 gm

## 2025-01-27 NOTE — ASSESSMENT & PLAN NOTE
Lab Results   Component Value Date    EGFR 20 01/27/2025    EGFR 21 01/26/2025    EGFR 21 01/25/2025    CREATININE 2.62 (H) 01/27/2025    CREATININE 2.51 (H) 01/26/2025    CREATININE 2.50 (H) 01/25/2025   Per patient and patient's wife, would not be interested in dialysis   Nephro on board. Appreciate recommendations

## 2025-01-27 NOTE — ASSESSMENT & PLAN NOTE
Seen by palliative care.  Discussion regarding home palliative services.  Also hospice services were discussed briefly with family.

## 2025-01-27 NOTE — ASSESSMENT & PLAN NOTE
saddle pulmonary embolus with cor pulmonale with unresponsiveness from home and cardiac arrest     - Initially with cardiac arrest and cardiogenic shock  - Status post extubation  - Initially requiring pressor support  - Management per primary team-transfer to [facility] floor evening of January 21

## 2025-01-27 NOTE — ASSESSMENT & PLAN NOTE
- met with spouse and Sarah at bedside   - briefly introduced Palliative Care supports   - resides in [facility], pending clinical course, may benefit from Home Palliative Care supports   - briefly introduced [facility]ospice [facility] philosophy    #psychosocial supports   - Antony [spouse,  65 years] [phone]   - 3 adult children   - Sarah [daughter, resides in [place], [place]]    - currently at bedside   - Jeffry [son, resides in [place]]   - Grzegorz [son, resides locally]   - 6 grandchildren, 6 great-grandchildren   - resides with spouse   - no prior  service   - retired, Maintenance work   - [employer] background   -  visiting regularly   - strong Zoroastrianism supports, prayer circles

## 2025-01-27 NOTE — ASSESSMENT & PLAN NOTE
Malnutrition Findings:   Adult Malnutrition type: Acute illness  Adult Degree of Malnutrition: Other severe protein calorie malnutrition  Malnutrition Characteristics: Muscle loss, Inadequate energy, Weight loss, Fat loss                  360 Statement: related to dysphagia with recommendation for NPO, pt with > 7 days of less than 50% energy intake compared to estimated needs, muscle, fat wasting, wasted buccal pads, temporalis, supraclavicular space, interosseous. Treatment. Enteral nutrition via feeding tube is indicated for nutrition support- wife is declining nutrition via NGT or PEG at this time. Ongoing goals of care discussion noted.    BMI Findings:           Body mass index is 19.99 kg/m².

## 2025-01-27 NOTE — PLAN OF CARE
Problem: PAIN - ADULT  Goal: Verbalizes/displays adequate comfort level or baseline comfort level  Description: Interventions:  - Encourage patient to monitor pain and request assistance  - Assess pain using appropriate pain scale  - Administer analgesics based on type and severity of pain and evaluate response  - Implement non-pharmacological measures as appropriate and evaluate response  - Consider cultural and social influences on pain and pain management  - Notify physician/advanced practitioner if interventions unsuccessful or patient reports new pain  Outcome: Progressing     Problem: INFECTION - ADULT  Goal: Absence or prevention of progression during hospitalization  Description: INTERVENTIONS:  - Assess and monitor for signs and symptoms of infection  - Monitor lab/diagnostic results  - Monitor all insertion sites, i.e. indwelling lines, tubes, and drains  - Monitor endotracheal if appropriate and nasal secretions for changes in amount and color  - [place] appropriate cooling/warming therapies per order  - Administer medications as ordered  - Instruct and encourage patient and family to use good hand hygiene technique  - Identify and instruct in appropriate isolation precautions for identified infection/condition  Outcome: Progressing  Goal: Absence of fever/infection during neutropenic period  Description: INTERVENTIONS:  - Monitor WBC    Outcome: Progressing     Problem: SAFETY ADULT  Goal: Patient will remain free of falls  Description: INTERVENTIONS:  - Educate patient/family on patient safety including physical limitations  - Instruct patient to call for assistance with activity   - Consult OT/PT to assist with strengthening/mobility   - Keep Call bell within reach  - Keep bed low and locked with side rails adjusted as appropriate  - Keep care items and personal belongings within reach  - Initiate and maintain comfort rounds  - Make Fall Risk Sign visible to staff  - Offer Toileting every 2 Hours,  in advance of need  - Initiate/Maintain bed alarm  - Obtain necessary fall risk management equipment  - Apply yellow socks and bracelet for high fall risk patients  - Consider moving patient to room near nurses station  Outcome: Progressing  Goal: Maintain or return to baseline ADL function  Description: INTERVENTIONS:  -  Assess patient's ability to carry out ADLs; assess patient's baseline for ADL function and identify physical deficits which impact ability to perform ADLs (bathing, care of mouth/teeth, toileting, grooming, dressing, etc.)  - Assess/evaluate cause of self-care deficits   - Assess range of motion  - Assess patient's mobility; develop plan if impaired  - Assess patient's need for assistive devices and provide as appropriate  - Encourage maximum independence but intervene and supervise when necessary  - Involve family in performance of ADLs  - Assess for home care needs following discharge   - Consider OT consult to assist with ADL evaluation and planning for discharge  - Provide patient education as appropriate  Outcome: Progressing  Goal: Maintains/Returns to pre admission functional level  Description: INTERVENTIONS:  - Perform AM-PAC 6 Click Basic Mobility/ Daily Activity assessment daily.  - Set and communicate daily mobility goal to care team and patient/family/caregiver.   - Collaborate with rehabilitation services on mobility goals if consulted  - Reposition patient every 2 hours.  - Record patient progress and toleration of activity level   Outcome: Progressing     Problem: DISCHARGE PLANNING  Goal: Discharge to home or other facility with appropriate resources  Description: INTERVENTIONS:  - Identify barriers to discharge w/patient and caregiver  - Arrange for needed discharge resources and transportation as appropriate  - Identify discharge learning needs (meds, wound care, etc.)  - Arrange for interpretive services to assist at discharge as needed  - Refer to Case Management Department  for coordinating discharge planning if the patient needs post-hospital services based on physician/advanced practitioner order or complex needs related to functional status, cognitive ability, or social support system  Outcome: Progressing     Problem: Knowledge Deficit  Goal: Patient/family/caregiver demonstrates understanding of disease process, treatment plan, medications, and discharge instructions  Description: Complete learning assessment and assess knowledge base.  Interventions:  - Provide teaching at level of understanding  - Provide teaching via preferred learning methods  Outcome: Progressing     Problem: Nutrition/Hydration-ADULT  Goal: Nutrient/Hydration intake appropriate for improving, restoring or maintaining nutritional needs  Description: Monitor and assess patient's nutrition/hydration status for malnutrition. Collaborate with interdisciplinary team and initiate plan and interventions as ordered.  Monitor patient's weight and dietary intake as ordered or per policy. Utilize nutrition screening tool and intervene as necessary. Determine patient's food preferences and provide high-protein, high-caloric foods as appropriate.     INTERVENTIONS:  - Monitor oral intake, urinary output, labs, and treatment plans  - Assess nutrition and hydration status and recommend course of action  - Evaluate amount of meals eaten  - Assist patient with eating if necessary   - Allow adequate time for meals  - Recommend/ encourage appropriate diets, oral nutritional supplements, and vitamin/mineral supplements  - Order, calculate, and assess calorie counts as needed  - Recommend, monitor, and adjust tube feedings and TPN/PPN based on assessed needs  - Assess need for intravenous fluids  - Provide specific nutrition/hydration education as appropriate  - Include patient/family/caregiver in decisions related to nutrition  Outcome: Progressing     Problem: Prexisting or High Potential for Compromised Skin Integrity  Goal:  Skin integrity is maintained or improved  Description: INTERVENTIONS:  - Identify patients at risk for skin breakdown  - Assess and monitor skin integrity  - Assess and monitor nutrition and hydration status  - Monitor labs   - Assess for incontinence   - Turn and reposition patient  - Assist with mobility/ambulation  - Relieve pressure over bony prominences  - Avoid friction and shearing  - Provide appropriate hygiene as needed including keeping skin clean and dry  - Evaluate need for skin moisturizer/barrier cream  - Collaborate with interdisciplinary team   - Patient/family teaching  - Consider wound care consult   Outcome: Progressing

## 2025-01-27 NOTE — PROGRESS NOTES
Progress Note - Palliative Care   Name: Jeffry Almanzar 89 y.o. male I MRN: [id number]  Unit/Bed#: S -01 I Date of Admission: 1/17/2025   Date of Service: 1/27/2025 I Hospital Day: 9     Assessment & Plan  Acute saddle pulmonary embolism with acute cor pulmonale (HCC)  PESI Class V, Very High Risk [age, male, h/o CA, h/o asthma, hypotensive, AMS]    CT PE [01/18/2025] acute saddle PE with large clot burden, R heart strain, calculated RV:LV ratio is 1.2.    Hs-cTn 533->777->1,070      TTE [01/20/2025] LVEF 55%, grade I diastolic dysfunction; RV moderately dilated, moderately reduced RV systolic function, hypokinesis of the basal to mid free wall; mild MR; moderate TR, RVSP 49 mmHg.  CKD (chronic kidney disease) stage 4, GFR 15-29 ml/min ([facility])  Lab Results   Component Value Date    EGFR 20 01/27/2025    EGFR 21 01/26/2025    EGFR 21 01/25/2025    CREATININE 2.62 (H) 01/27/2025    CREATININE 2.51 (H) 01/26/2025    CREATININE 2.50 (H) 01/25/2025      - SOFÍA on CKD IV, likely in the setting of severe ATN   - baseline SCr 2.0   - follows OP Nephrology   - IP Nephrology following   - no HD desired per patient's previously communicated wishes, supported/reaffirmed by family  Cardiac arrest ([facility])   - initially presented via EMS on 01/17 after patient found unresponsive with agonal respirations, bag ventilations initiated in the field. Intubated in the setting of a brief cardiac arrest with ROSC.   - in the setting of acute saddle PE with large clot burden, Cor Pulmonale  Dysphagia  See A/P Goals of Care  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  ([facility])  Lab Results   Component Value Date    EGFR 20 01/27/2025    EGFR 21 01/26/2025    EGFR 21 01/25/2025    CREATININE 2.62 (H) 01/27/2025    CREATININE 2.51 (H) 01/26/2025    CREATININE 2.50 (H) 01/25/2025     Benign 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 (HCC)  Lab Results   Component Value Date    EGFR 20 01/27/2025    EGFR 21 01/26/2025    EGFR 21  01/25/2025    CREATININE 2.62 (H) 01/27/2025    CREATININE 2.51 (H) 01/26/2025    CREATININE 2.50 (H) 01/25/2025     Palliative care encounter   - met with spouse and Sarah at bedside   - briefly introduced Palliative Care supports   - resides in [facility], pending clinical course, may benefit from Home Palliative Care supports   - briefly introduced Hospice Cares philosophy    #psychosocial supports   - Antony [spouse,  65 years] [phone]   - 9 adult children   - Sarah [daughter, resides in [place], [place]]    - currently at bedside   - Jeffry [son, resides in [place]]   - Grzegorz [son, resides locally]   - 6 grandchildren, 6 great-grandchildren   - resides with spouse   - no prior  service   - retired, Maintenance work   - Jew meme background   -  visiting regularly   - strong Jewish supports, prayer circles  Goals of care, counseling/discussion   - overall wish to give patient supports to identify recovery/reversible etiologies, including dysphagia. Spouse clearly states that the patient would not wish to pursue NGT or PEG tube. Sarah confirms this, as known patient's values/wishes, would not wish to have a feeding tube. Concern that if pursued, patient would most likely remove in the setting of disagreement with its placement.   - SLP evaluated patient, with failing of VBS   - TPN and PICC offered by [facility] team-->reviewed significant risks associated with this (electrolyte disturbance, high Tgs, liver damage, and high chance of serious infection)-->they would still like to trial time-limited course of TPN with the hope he recovers overtime with return in baseline food consumption (which reportedly was normal prior to hospitalization).    -Counseled and educated on uncertain prognosis with hope for improvement, but high likelihood of things not getting better or getting worse   - spouse confirmed DNAR/DNI, Level 3, as the patient would not wish to be re-intubated nor pursue CPR/ACLS in  the setting of a cardiac arrest. If such an event were to occur, focus on EOL comfort to natural death.   - will continue to follow/support as clinical course evolves.      NARRATIVE AND INTERVAL HISTORY:      Over the weekend, patient had increased difficulty with chronic dysphagia.  Failed VBS study, and deferred any kind of PEG tube or NG tube.  Hospitalist team pushed for PICC placement with TPN.  In speaking with the patient and spouse this morning, the patient was unable to reliably communicate with me.  He was feeling a lot of mucus in his throat and having trouble clearing it.  Otherwise, we spent today speaking about artificial hydration and nutrition, TPN, and future outlook for his clinical situation.  Communication sent to patient's RN.    MEDICATIONS / ALLERGIES:     all current active meds have been reviewed, current meds:   Current Facility-Administered Medications:     artificial tear ophthalmic ointment, HS    chlorhexidine (PERIDEX) 0.12 % oral rinse 15 mL, Q12H MAQRUIS    dorzolamide-timolol (COSOPT) 2-0.5 % ophthalmic solution 1 drop, BID    heparin (porcine) 25,000 units in 0.45% NaCl 250 mL infusion (premix), Titrated, Last Rate: 14 Units/kg/hr (01/26/25 8968)    heparin (porcine) injection 2,400 Units, Q6H PRN    heparin (porcine) injection 4,800 Units, Once    heparin (porcine) injection 4,800 Units, Q6H PRN    hydrALAZINE (APRESOLINE) injection 5 mg, Q6H PRN    insulin lispro (HumALOG/ADMELOG) 100 units/mL subcutaneous injection 2-12 Units, Q6H **AND** Fingerstick Glucose (POCT), Q6H    Latanoprostene Bunod 0.024 % SOLN 1 drop, HS    polyethylene glycol (MIRALAX) packet 17 g, Daily    prednisoLONE acetate (PRED FORTE) 1 % ophthalmic suspension 1 drop, TID    scopolamine (TRANSDERM-SCOP) 1 mg/3 days TD 72 hr patch 1 patch, Q72H    [Held by provider] senna-docusate sodium (SENOKOT S) 8.6-50 mg per tablet 1 tablet, BID    tetracaine 0.5 % ophthalmic solution 2 drop, Once, and PTA meds:   Prior to  Admission Medications   Prescriptions Last Dose Informant Patient Reported? Taking?   Blood Glucose Monitoring Suppl (OneTouch Verio Reflect) w/Device KIT  Self No No   Sig: Check blood sugars twice daily. Please substitute with appropriate alternative as covered by patient's insurance. Dx: E11.65   Blood Glucose Monitoring Suppl (OneTouch Verio Reflect) w/Device KIT  Self No No   Sig: Check blood sugars once daily. Please substitute with appropriate alternative as covered by patient's insurance. Dx: E11.65   Cyanocobalamin (Vitamin B-12) 1000 MCG/15ML LIQD  Self Yes No   Sig: Take by mouth daily   Dorzolamide HCl-Timolol Mal PF 2-0.5 % SOLN  Self Yes No   Sig: INSTILL 1 DROP INTO EACH EYE TWICE DAILY   Farxiga 10 MG tablet   No No   Sig: Take 1 tablet (10 mg total) by mouth daily   OneTouch Delica Lancets 33G MISC  Self No No   Sig: Check blood sugars once daily. Please substitute with appropriate alternative as covered by patient's insurance. Dx: E11.65   Vyzulta 0.024 % SOLN  Self Yes No   Sig: Administer 1 drop to both eyes daily at bedtime   atorvastatin (LIPITOR) 40 mg tablet   No No   Sig: Take 1 tablet (40 mg total) by mouth daily   brimonidine (ALPHAGAN P) 0.15 % ophthalmic solution  Self Yes No   clopidogrel (PLAVIX) 75 mg tablet   No No   Sig: Take 1 tablet (75 mg total) by mouth daily   glucose blood (OneTouch Verio) test strip  Self No No   Sig: Check blood sugars once daily. Please substitute with appropriate alternative as covered by patient's insurance. Dx: E11.65   losartan (COZAAR) 25 mg tablet   No No   Sig: Take 1 tablet (25 mg total) by mouth daily   prednisoLONE acetate (PRED FORTE) 1 % ophthalmic suspension  Self Yes No   Sig: INSTILL 1 DROP INTO LEFT EYE TWICE DAILY      Facility-Administered Medications: None       Allergies   Allergen Reactions    Ace Inhibitors Cough     Other reaction(s): hyperkalemia    Penicillins GI Intolerance       OBJECTIVE:    Physical Exam  Physical  Exam  Constitutional:       General: He is not in acute distress.     Appearance: He is ill-appearing.      Interventions: Nasal cannula in place.      Comments: Frail   HENT:      Head: Normocephalic and atraumatic.      Right Ear: External ear normal.      Left Ear: External ear normal.      Nose: Nose normal.      Mouth/Throat:      Mouth: Mucous membranes are moist.      Pharynx: Oropharynx is clear.   Eyes:      General: No scleral icterus.     Conjunctiva/sclera: Conjunctivae normal.   Cardiovascular:      Rate and Rhythm: Normal rate and regular rhythm.      Pulses: Normal pulses.   Pulmonary:      Effort: Pulmonary effort is normal. No respiratory distress.      Comments: Significant pulmonary secretions and mucus present  Abdominal:      General: There is no distension.      Palpations: Abdomen is soft.      Tenderness: There is no abdominal tenderness.   Musculoskeletal:      Right lower leg: No edema.      Left lower leg: No edema.   Skin:     Coloration: Skin is pale. Skin is not jaundiced.   Neurological:      General: No focal deficit present.      Comments: Unclear baseline   Psychiatric:      Comments: Unable to evaluate         Lab Results: I have personally reviewed pertinent labs., CBC:   Lab Results   Component Value Date    WBC 8.61 01/27/2025    HGB 8.9 (L) 01/27/2025    HCT 28.1 (L) 01/27/2025    MCV 91 01/27/2025     01/27/2025    RBC 3.08 (L) 01/27/2025    MCH 28.9 01/27/2025    MCHC 31.7 01/27/2025    RDW 17.1 (H) 01/27/2025    MPV 10.9 01/27/2025   , CMP:   Lab Results   Component Value Date    SODIUM 148 (H) 01/27/2025    K 3.7 01/27/2025     (H) 01/27/2025    CO2 21 01/27/2025    BUN 54 (H) 01/27/2025    CREATININE 2.62 (H) 01/27/2025    CALCIUM 8.9 01/27/2025    EGFR 20 01/27/2025   , BMP:  Lab Results   Component Value Date    SODIUM 148 (H) 01/27/2025    K 3.7 01/27/2025     (H) 01/27/2025    CO2 21 01/27/2025    BUN 54 (H) 01/27/2025    CREATININE 2.62 (H)  01/27/2025    GLUC 133 01/27/2025    CALCIUM 8.9 01/27/2025    AGAP 10 01/27/2025    EGFR 20 01/27/2025   , PT/PTT:  Lab Results   Component Value Date    PTT 76 (H) 01/27/2025     Imaging Studies: Reviewed and interpreted the pertinent studies  EKG, Pathology, and Other Studies: Reviewed and interpreted the pertinent studies    I have spent a total time of 50 minutes in caring for this patient on the day of the visit/encounter including Prognosis, Risks and benefits of tx options, Patient and family education, Impressions, Counseling / Coordination of care, Documenting in the medical record, Reviewing / ordering tests, medicine, procedures  , Obtaining or reviewing history  , and Communicating with other healthcare professionals .

## 2025-01-27 NOTE — ASSESSMENT & PLAN NOTE
Acute on chronic kidney disease stage IV  - Outpatient nephrologist Dr. Rey  - Baseline creatinine progressively worsening to low 3  - Admission creatinine 3.1 mg/dL on January 18  - Creatinine rising to 3.9 mg/dL on January 21 prompting nephrology consultation  - Patient received contrast study on January 18 and also presented with cardiogenic shock/cardiac arrest and cor pulmonale  - Etiology-likely ATN in the setting of hypotension/shock/IV contrast/cardiac arrest/autoregulatory failure in setting of ARB at home  - Renal function improving.  Required intermittent dosing of Lasix.  Also intermittently receiving D5W.  Sodium level up to 148 today  - Current creatinine 2.62  - Palliative care following    Plan  - I/os; avoid nephrotoxic agents  - Avoid hypotension  - Trend lab work in a.m.  - Short course of D5W planned for today.  Hypernatremic  -Diuretic as needed.  Significant congestion which is primarily related to secretions as opposed to volume  -Seen by palliative care.

## 2025-01-27 NOTE — ASSESSMENT & PLAN NOTE
"Pt on room air with labored breathing. Has rales in bilateral lung fields. Continues to have wet cough without sputum production.     Please see plan as under \"Dysphagia.\"   " I refilled patient's generic adderall XR and eszopiclone both for 1 mth but we need to get her controlled med refill follow up scheduled next month because nothing is scheduled right now.

## 2025-01-28 PROBLEM — E87.6 HYPOKALEMIA: Status: RESOLVED | Noted: 2025-01-23 | Resolved: 2025-01-28

## 2025-01-28 LAB
ANION GAP SERPL CALCULATED.3IONS-SCNC: 9 MMOL/L (ref 4–13)
APTT PPP: 90 SECONDS (ref 23–34)
BUN SERPL-MCNC: 56 MG/DL (ref 5–25)
CALCIUM SERPL-MCNC: 9.1 MG/DL (ref 8.4–10.2)
CHLORIDE SERPL-SCNC: 120 MMOL/L (ref 96–108)
CO2 SERPL-SCNC: 23 MMOL/L (ref 21–32)
CREAT SERPL-MCNC: 2.75 MG/DL (ref 0.6–1.3)
ERYTHROCYTE [DISTWIDTH] IN BLOOD BY AUTOMATED COUNT: 17.2 % (ref 11.6–15.1)
GFR SERPL CREATININE-BSD FRML MDRD: 19 ML/MIN/1.73SQ M
GLUCOSE SERPL-MCNC: 108 MG/DL (ref 65–140)
GLUCOSE SERPL-MCNC: 113 MG/DL (ref 65–140)
GLUCOSE SERPL-MCNC: 167 MG/DL (ref 65–140)
GLUCOSE SERPL-MCNC: 248 MG/DL (ref 65–140)
GLUCOSE SERPL-MCNC: 95 MG/DL (ref 65–140)
HCT VFR BLD AUTO: 27.1 % (ref 36.5–49.3)
HGB BLD-MCNC: 8.2 G/DL (ref 12–17)
MAGNESIUM SERPL-MCNC: 2.4 MG/DL (ref 1.9–2.7)
MCH RBC QN AUTO: 28.6 PG (ref 26.8–34.3)
MCHC RBC AUTO-ENTMCNC: 30.3 G/DL (ref 31.4–37.4)
MCV RBC AUTO: 94 FL (ref 82–98)
PHOSPHATE SERPL-MCNC: 3.4 MG/DL (ref 2.3–4.1)
PLATELET # BLD AUTO: 290 THOUSANDS/UL (ref 149–390)
PMV BLD AUTO: 10.8 FL (ref 8.9–12.7)
POTASSIUM SERPL-SCNC: 3.7 MMOL/L (ref 3.5–5.3)
RBC # BLD AUTO: 2.87 MILLION/UL (ref 3.88–5.62)
SODIUM SERPL-SCNC: 152 MMOL/L (ref 135–147)
TRIGL SERPL-MCNC: 70 MG/DL (ref ?–150)
WBC # BLD AUTO: 9.62 THOUSAND/UL (ref 4.31–10.16)

## 2025-01-28 PROCEDURE — C1751 CATH, INF, PER/CENT/MIDLINE: HCPCS

## 2025-01-28 PROCEDURE — 83735 ASSAY OF MAGNESIUM: CPT

## 2025-01-28 PROCEDURE — 85027 COMPLETE CBC AUTOMATED: CPT

## 2025-01-28 PROCEDURE — 99233 SBSQ HOSP IP/OBS HIGH 50: CPT | Performed by: STUDENT IN AN ORGANIZED HEALTH CARE EDUCATION/TRAINING PROGRAM

## 2025-01-28 PROCEDURE — 02HV33Z INSERTION OF INFUSION DEVICE INTO SUPERIOR VENA CAVA, PERCUTANEOUS APPROACH: ICD-10-PCS | Performed by: INTERNAL MEDICINE

## 2025-01-28 PROCEDURE — 99233 SBSQ HOSP IP/OBS HIGH 50: CPT | Performed by: INTERNAL MEDICINE

## 2025-01-28 PROCEDURE — 85730 THROMBOPLASTIN TIME PARTIAL: CPT | Performed by: INTERNAL MEDICINE

## 2025-01-28 PROCEDURE — 84478 ASSAY OF TRIGLYCERIDES: CPT

## 2025-01-28 PROCEDURE — 99232 SBSQ HOSP IP/OBS MODERATE 35: CPT | Performed by: INTERNAL MEDICINE

## 2025-01-28 PROCEDURE — 84100 ASSAY OF PHOSPHORUS: CPT

## 2025-01-28 PROCEDURE — 80048 BASIC METABOLIC PNL TOTAL CA: CPT

## 2025-01-28 PROCEDURE — 82948 REAGENT STRIP/BLOOD GLUCOSE: CPT

## 2025-01-28 PROCEDURE — 92526 ORAL FUNCTION THERAPY: CPT

## 2025-01-28 PROCEDURE — 3E0436Z INTRODUCTION OF NUTRITIONAL SUBSTANCE INTO CENTRAL VEIN, PERCUTANEOUS APPROACH: ICD-10-PCS | Performed by: INTERNAL MEDICINE

## 2025-01-28 RX ORDER — DEXTROSE MONOHYDRATE 50 MG/ML
84 INJECTION, SOLUTION INTRAVENOUS CONTINUOUS
Status: DISCONTINUED | OUTPATIENT
Start: 2025-01-28 | End: 2025-01-29

## 2025-01-28 RX ADMIN — POTASSIUM PHOSPHATE, MONOBASIC POTASSIUM PHOSPHATE, DIBASIC: 224; 236 INJECTION, SOLUTION, CONCENTRATE INTRAVENOUS at 22:34

## 2025-01-28 RX ADMIN — DORZOLAMIDE HYDROCHLORIDE AND TIMOLOL MALEATE 1 DROP: 20; 5 SOLUTION OPHTHALMIC at 10:26

## 2025-01-28 RX ADMIN — HEPARIN SODIUM 14 UNITS/KG/HR: 10000 INJECTION, SOLUTION INTRAVENOUS at 10:52

## 2025-01-28 RX ADMIN — INSULIN LISPRO 4 UNITS: 100 INJECTION, SOLUTION INTRAVENOUS; SUBCUTANEOUS at 23:46

## 2025-01-28 RX ADMIN — HEPARIN SODIUM 14 UNITS/KG/HR: 10000 INJECTION, SOLUTION INTRAVENOUS at 04:17

## 2025-01-28 RX ADMIN — INSULIN LISPRO 2 UNITS: 100 INJECTION, SOLUTION INTRAVENOUS; SUBCUTANEOUS at 00:50

## 2025-01-28 RX ADMIN — PREDNISOLONE ACETATE 1 DROP: 10 SUSPENSION/ DROPS OPHTHALMIC at 22:35

## 2025-01-28 RX ADMIN — MINERAL OIL, WHITE PETROLATUM: .03; .94 OINTMENT OPHTHALMIC at 22:36

## 2025-01-28 RX ADMIN — PREDNISOLONE ACETATE 1 DROP: 10 SUSPENSION/ DROPS OPHTHALMIC at 10:26

## 2025-01-28 RX ADMIN — SCOPOLAMINE 1 PATCH: 1.5 PATCH, EXTENDED RELEASE TRANSDERMAL at 12:20

## 2025-01-28 RX ADMIN — PREDNISOLONE ACETATE 1 DROP: 10 SUSPENSION/ DROPS OPHTHALMIC at 17:29

## 2025-01-28 RX ADMIN — LATANOPROSTENE BUNOD 1 DROP: 0.24 SOLUTION/ DROPS OPHTHALMIC at 22:36

## 2025-01-28 RX ADMIN — DORZOLAMIDE HYDROCHLORIDE AND TIMOLOL MALEATE 1 DROP: 20; 5 SOLUTION OPHTHALMIC at 17:29

## 2025-01-28 RX ADMIN — DEXTROSE 60 ML/HR: 5 SOLUTION INTRAVENOUS at 12:20

## 2025-01-28 NOTE — PROCEDURES
Venous Access Line Insertion    Date/Time: 1/28/2025 10:04 AM    Performed by: Jesenia Nguyen RN  Authorized by: Tyler Wagner MD    Patient location:  Bedside  Other Assisting Provider: No    Consent:     Consent obtained:  Written    Consent given by:  Spouse    Risks discussed:  Arterial puncture, incorrect placement, nerve damage, infection and bleeding (discussed  by  physician  obtaining  consent)    Alternatives discussed:  No treatment  Universal protocol:     Procedure explained and questions answered to patient or proxy's satisfaction: yes      Immediately prior to procedure, a time out was called: yes      Relevant documents present and verified: yes      Test results available and properly labeled: yes      Radiology Images displayed and confirmed.  If images not available, report reviewed: yes      Required blood products, implants, devices, and special equipment available: yes      Site/side marked: yes      Patient identity confirmed:  Verbally with patient and arm band  Pre-procedure details:     Hand hygiene: Hand hygiene performed prior to insertion      Sterile barrier technique: All elements of maximal sterile technique followed      Skin preparation:  ChloraPrep    Skin preparation agent: Skin preparation agent completely dried prior to procedure    Procedure details:     Complex Venous Access Line Type: PICC      Complex Venous Access Line Indications: total parenteral nutrition      Orientation:  Right    Location:  Brachial    Procedural supplies:  Double lumen    Catheter size:  5 Fr ([id number])    Total catheter length (cm):  39    Catheter out on skin (cm):  0    Max flow rate:  999ml/hr    Patient evaluated for contraindications to access (i.e. fistula, thrombosis, etc): Yes      Approach: percutaneous technique used      Patient position:  Flat    Ultrasound image availability:  Images available in PACS and still images obtained    Sterile ultrasound techniques: Sterile gel  and sterile probe covers were used      Number of attempts:  1    Successful placement: yes      Landmarks identified: yes      Vessel of catheter tip end:  [place] 3CG confirmed  Anesthesia (see MAR for exact dosages):     Anesthesia method:  Local infiltration    Local anesthetic:  Lidocaine 1% w/o epi (2ml)    Sedation type: none.  Post-procedure details:     Post-procedure:  Dressing applied and securement device placed    Assessment:  Blood return through all ports    Patient tolerance of procedure:  Tolerated well, no immediate complications    Primary  RN  aware  PICC ok  to use

## 2025-01-28 NOTE — ASSESSMENT & PLAN NOTE
Cardiac arrest in ED after CTA evaluating for pulmonary embolus.  Asystole for 2 minutes requiring CPR and intubation s/P extubation 1/21/2025  ECHO with EF of 65% in September, ECHO with left ventricular ejection fraction is 55% (1/20)    Plan  Continue heparin gtt  Continue to monitor on tele due to recent PE, recent MI, and electrolyte abnormalities

## 2025-01-28 NOTE — ASSESSMENT & PLAN NOTE
Lab Results   Component Value Date    EGFR 19 01/28/2025    EGFR 20 01/27/2025    EGFR 21 01/26/2025    CREATININE 2.75 (H) 01/28/2025    CREATININE 2.62 (H) 01/27/2025    CREATININE 2.51 (H) 01/26/2025   Per patient and patient's wife, would not be interested in dialysis   Nephro on board. Appreciate recommendations.

## 2025-01-28 NOTE — PROGRESS NOTES
Progress Note - Hospitalist   Name: Jeffry Almanzar 89 y.o. male I MRN: [id number]  Unit/Bed#: S -01 I Date of Admission: 1/17/2025   Date of Service: 1/28/2025 I Hospital Day: 10    Assessment & Plan  Dysphagia  Pt failed VBS 1/23/25: moderate oral/severe pharyngeal dysphagia characterized by weak swallow mechanics, multiple swallows noted to clear pharyngeal retention with overflow penetration and aspiration observed after the swallow   Had numerous discussions regarding goals of care with family and multidisciplinary team. Explained risks versus benefits of PICC line and subsequent TPN, versus pleasure feeds on comfort care. 1/27 pt's wife decided on obtaining PICC for patient and signed informed consent form. Process, risks, and outcomes were reviewed at length with the patient's wife at bedside, alongside Dr. Gtz. All questions/concerns answered. Pt's wife verbalizes understanding and wishes to proceed with PICC line.     Plan  Reached out to vascular access team regarding PICC -- pt is s/p PICC today 1/28 RD on board regarding TPN   BMP, TG, Phosphorous labs obtained this morning  Nephrology will continue to monitor electrolytes as requirements adjust after TPN  Per RD recommendations: 750ml D20, 400ml 15% AA, 100ml 20% lipids, 950kcal (16kcal/kg), 1320ml (22ml/kg), 50mEq Na acetate (~1/4 NS equivalent), 30mEq K acetate, 20mmol K phosphate, 4.6mEq Ca, 8mEq Mg, 100mg thiamine  Suction any secretions as able  Pt given scopolamine patch to manage secretions  Maintain strict NPO  Acute saddle pulmonary embolism with acute cor pulmonale (HCC)  Initially with cardiac arrest and cardiogenic shock, is now extubated off pressors. Was transferred  to med/surg 1/21.   MRI brain negative for CVA or concern for hemorrhage.      Continue heparin gtt.  Disoriented to time  Per wife, patient has some degree of dementia but is unclear of specifics.  Patient oriented to self and place but unaware of year and  month.  Patient incorrectly stated the number of years he has been  to his wife during time of exam  Per wife, unsure if patient is completely aware of what is going on and is unsure if patient would truly want to accept intubation a second time    Plan  Neuropsych evaluation determined patient does NOT have capacity for MDM  We will have further discussions with family and patient depending on overall prognosis and goals of care  Palliative on board. Appreciate recommendations. As of 1/23/2025, family made the decision to make patient's code status LEVEL 3 DNR/DNI.  Ongoing discussions regarding palliative care.   SOFÍA (acute kidney injury) (HCC)  Etiology: Suspect due to ATN in the setting of cardiogenic shock, cardiac arrest, hypotension and IV contrast exposure with autoregulatory dysfunction with ARB    Plan  Per Nephrology:  D5W at 60cc/h  Continue to hold losartan and Farxiga  Strict I/Os, daily weights  Avoid nephrotoxins, NSAIDs, further IV contrast as able  Avoid hypotension.  Maintain MAP >65  Hypernatremia  Na this morning 152. Nephrology has been following closely, adjusting D5W as necessary. Today restarted D5W at 60 cc/hr.  Cardiac arrest (HCC)  Cardiac arrest in ED after CTA evaluating for pulmonary embolus.  Asystole for 2 minutes requiring CPR and intubation s/P extubation 1/21/2025  ECHO with EF of 65% in September, ECHO with left ventricular ejection fraction is 55% (1/20)    Plan  Continue heparin gtt  Continue to monitor on tele due to recent PE, recent MI, and electrolyte abnormalities   Benign 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 ([facility])  Lab Results   Component Value Date    EGFR 19 01/28/2025    EGFR 20 01/27/2025    EGFR 21 01/26/2025    CREATININE 2.75 (H) 01/28/2025    CREATININE 2.62 (H) 01/27/2025    CREATININE 2.51 (H) 01/26/2025   Holding Losartan and Farxiga per nephro due to elevated Cr   Hydralazine 5 mg q 6 hrs for BP >180/110   Initially allowed for  permissive hypertension due to concern for stroke.  However, patient's MRI does not show concern for infarct.  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  MRI brain and MRA negative for infarct, high-grade stenosis, and LVO  Will aim for normotension.  As needed hydralazine for BP greater than 180/110  Avoid losartan and Farxiga per nephro given kidney function   Palliative care encounter  Patient seen and evaluated by palliative care 1/23. Palliative care had an in-depth discussion with family regarding patient's GOC and overall prognosis. Family would NOT like to pursue a PEG tube at this time.   CODE STATUS changed from Level 2 to Level 3 DNR/DNI  Ongoing goals of care discussions with consideration for comfort measures only  Goals of care, counseling/discussion  Palliative on board. Appreciate recommendations   CKD (chronic kidney disease) stage 4, GFR 15-29 ml/min ([facility])  Lab Results   Component Value Date    EGFR 19 01/28/2025    EGFR 20 01/27/2025    EGFR 21 01/26/2025    CREATININE 2.75 (H) 01/28/2025    CREATININE 2.62 (H) 01/27/2025    CREATININE 2.51 (H) 01/26/2025   Per patient and patient's wife, would not be interested in dialysis   Nephro on board. Appreciate recommendations.  Retinal hemorrhage noted on examination of left eye  Found on head CT 1/21.  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  Instructed patient to follow-up with ophthalmologist in outpatient setting  Severe protein-calorie malnutrition (HCC)  Malnutrition Findings:   Adult Malnutrition type: Acute illness  Adult Degree of Malnutrition: Other severe protein calorie malnutrition  Malnutrition  Characteristics: Muscle loss, Inadequate energy, Weight loss, Fat loss                  360 Statement: related to dysphagia with recommendation for NPO, pt with > 7 days of less than 50% energy intake compared to estimated needs, muscle, fat wasting, wasted buccal pads, temporalis, supraclavicular space, interosseous. Treatment. Enteral nutrition via feeding tube is indicated for nutrition support- wife is declining nutrition via NGT or PEG at this time. Ongoing goals of care discussion noted.    BMI Findings:           Body mass index is 21.53 kg/m².     Wife agreeable to PICC and TPN. Per RD, TPN as above under principal problem.     VTE Pharmacologic Prophylaxis: VTE Score: 6 High Risk (Score >/= 5) - Pharmacological DVT Prophylaxis Ordered: heparin drip. Sequential Compression Devices Ordered.    Mobility:   Basic Mobility Inpatient Raw Score: 7  JH-HLM Goal: 2: Bed activities/Dependent transfer  JH-HLM Achieved: 2: Bed activities/Dependent transfer  JH-HLM Goal NOT achieved. Continue with multidisciplinary rounding and encourage appropriate mobility to improve upon JH-HLM goals.    Patient Centered Rounds: I performed bedside rounds with nursing staff today.   Discussions with Specialists or Other Care Team Provider: Nephrology, RD, vascular access, CM    Education and Discussions with Family / Patient: Updated  (wife) at bedside.    Current Length of Stay: 10 day(s)  Current Patient Status: Inpatient   Certification Statement: The patient will continue to require additional inpatient hospital stay due to PICC placement and initiation of TPN.  Discharge Plan: Anticipate discharge in 48-72 hrs to TBD, pending initiation of TPN and pt tolerance.    Code Status: Level 3 - DNAR and DNI    Subjective   Pt seen and evaluated at bedside. Per report, no acute events overnight. Pt's wife at bedside reports pt slept well throughout the night and did not require as frequent suctioning. Mr. Almanzar is more  awake this morning, able to answer questions with head nod/shake and whispers. Pt's wife signed consent for PICC placement yesterday and is aware of the plan to obtain access today.   Later this morning, pt is seen alongside treatment team. Indications/risks/benefits of TPN were discussed at length, and wife verbalized understanding.     Objective :  Temp:  [99.2 °F (37.3 °C)-100.2 °F (37.9 °C)] 99.2 °F (37.3 °C)  HR:  [83-98] 98  BP: (142-152)/(73-88) 147/88  Resp:  [20-21] 21  SpO2:  [89 %-94 %] 89 %  O2 Device: None (Room air)    Body mass index is 21.53 kg/m².     Input and Output Summary (last 24 hours):     Intake/Output Summary (Last 24 hours) at 1/28/2025 1314  Last data filed at 1/28/2025 0705  Gross per 24 hour   Intake 0 ml   Output 600 ml   Net -600 ml       Physical Exam  Vitals reviewed.   Constitutional:       General: He is awake.      Appearance: He is underweight. He is ill-appearing.   HENT:      Head: Normocephalic and atraumatic.      Right Ear: External ear normal.      Left Ear: External ear normal.      Mouth/Throat:      Mouth: Mucous membranes are dry.   Eyes:      General: No scleral icterus.     Extraocular Movements: Extraocular movements intact.   Cardiovascular:      Rate and Rhythm: Normal rate.      Pulses: Normal pulses.   Pulmonary:      Breath sounds: Rales (bilateral) present.      Comments: Significant congestion/pulmonary secretions  Chest:      Chest wall: No tenderness.   Abdominal:      Tenderness: There is no abdominal tenderness. There is no guarding.   Skin:     General: Skin is warm.      Capillary Refill: Capillary refill takes 2 to 3 seconds.      Coloration: Skin is not jaundiced.   Neurological:      Motor: Weakness present.         Lines/Drains:  Lines/Drains/Airways       Active Status       Name Placement date Placement time Site Days    PICC Line 01/28/25 Right Brachial 01/28/25  0930  Brachial  less than 1    Urethral Catheter 01/17/25  0000  --  11                   Telemetry:  Telemetry Orders (From admission, onward)               24 Hour Telemetry Monitoring  Continuous x 24 Hours ([facility])        Expiring   Question:  Reason for 24 Hour Telemetry  Answer:  Pulmonary Embolism                     Telemetry Reviewed: Sinus Tachycardia  Indication for Continued Telemetry Use: PE               Lab Results: I have reviewed the following results:   Results from last 7 days   Lab Units 01/28/25  0950 01/23/25  0633 01/22/25  0619   WBC Thousand/uL 9.62   < > 9.33   HEMOGLOBIN g/dL 8.2*   < > 8.6*   HEMATOCRIT % 27.1*   < > 26.7*   PLATELETS Thousands/uL 290   < > 125*   SEGS PCT %  --   --  76*   LYMPHO PCT %  --   --  7*   MONO PCT %  --   --  16*   EOS PCT %  --   --  0    < > = values in this interval not displayed.     Results from last 7 days   Lab Units 01/28/25  0950   SODIUM mmol/L 152*   POTASSIUM mmol/L 3.7   CHLORIDE mmol/L 120*   CO2 mmol/L 23   BUN mg/dL 56*   CREATININE mg/dL 2.75*   ANION GAP mmol/L 9   CALCIUM mg/dL 9.1   GLUCOSE RANDOM mg/dL 108         Results from last 7 days   Lab Units 01/28/25  1211 01/28/25  0620 01/28/25  0047 01/27/25  1849 01/27/25  1557 01/27/25  1123 01/27/25  0017 01/26/25  1531 01/26/25  1020 01/26/25  0727 01/26/25  0555 01/26/25  0154   POC GLUCOSE mg/dl 113 95 167* 148* 145* 129 133 142* 121 122 112 113               Recent Cultures (last 7 days):         Imaging Results Review: I reviewed radiology reports from this admission including: CT head, CTA PE, CXR, MRI brain, MRA head, LL venous duplex, VBS.  Other Study Results Review: EKG was reviewed.     Last 24 Hours Medication List:     Current Facility-Administered Medications:     artificial tear ophthalmic ointment, HS    chlorhexidine (PERIDEX) 0.12 % oral rinse 15 mL, Q12H MARQUIS    dextrose 5 % infusion, Continuous, Last Rate: 60 mL/hr (01/28/25 1220)    dorzolamide-timolol (COSOPT) 2-0.5 % ophthalmic solution 1 drop, BID    heparin (porcine) 25,000 units in 0.45% NaCl 250 mL  infusion (premix), Titrated, Last Rate: 14 Units/kg/hr (01/28/25 1052)    heparin (porcine) injection 2,400 Units, Q6H PRN    heparin (porcine) injection 4,800 Units, Once    heparin (porcine) injection 4,800 Units, Q6H PRN    hydrALAZINE (APRESOLINE) injection 5 mg, Q6H PRN    insulin lispro (HumALOG/ADMELOG) 100 units/mL subcutaneous injection 2-12 Units, Q6H **AND** Fingerstick Glucose (POCT), Q6H    Latanoprostene Bunod 0.024 % SOLN 1 drop, HS    polyethylene glycol (MIRALAX) packet 17 g, Daily    prednisoLONE acetate (PRED FORTE) 1 % ophthalmic suspension 1 drop, TID    scopolamine (TRANSDERM-SCOP) 1 mg/3 days TD 72 hr patch 1 patch, Q72H    [Held by provider] senna-docusate sodium (SENOKOT S) 8.6-50 mg per tablet 1 tablet, BID    tetracaine 0.5 % ophthalmic solution 2 drop, Once      Administrative Statements   Today, Patient Was Seen By: Mercedes Nunez DO  PGY-1    I have spent a total time of >35 minutes in caring for this patient on the day of the visit/encounter including Prognosis, Risks and benefits of tx options, Patient and family education, Importance of tx compliance, Impressions, Counseling / Coordination of care, Documenting in the medical record, Reviewing / ordering tests, medicine, procedures  , Obtaining or reviewing history  , and Communicating with other healthcare professionals .    **Please Note: This note may have been constructed using a voice recognition system.**

## 2025-01-28 NOTE — ASSESSMENT & PLAN NOTE
Na this morning 152. Nephrology has been following closely, adjusting D5W as necessary. Today restarted D5W at 60 cc/hr.

## 2025-01-28 NOTE — ASSESSMENT & PLAN NOTE
Per wife, patient has some degree of dementia but is unclear of specifics.  Patient oriented to self and place but unaware of year and month.  Patient incorrectly stated the number of years he has been  to his wife during time of exam  Per wife, unsure if patient is completely aware of what is going on and is unsure if patient would truly want to accept intubation a second time    Plan  Neuropsych evaluation determined patient does NOT have capacity for MDM  We will have further discussions with family and patient depending on overall prognosis and goals of care  Palliative on board. Appreciate recommendations. As of 1/23/2025, family made the decision to make patient's code status LEVEL 3 DNR/DNI.  Ongoing discussions regarding palliative care.

## 2025-01-28 NOTE — SPEECH THERAPY NOTE
"Speech Language/Pathology    Speech/Language Pathology Progress Note    Patient Name: Jeffry Almanzar  Today's Date: 1/28/2025       Message rec'd that dtr was asking about swallowing exercises over the weekend for pt to improve swallow function.   Chart reviewed, pt remains NPO, pt/family refusing TF. Pt will be started on TPN. Pt on scopalomine patch for secretions, nsg reported pt requiring less suctioning. Oral care and suctioning just completed prior to my arrival.    Pt's wife at bedside. Discussed swallowing exercises, intensity needed for pt to improve swallowing w/ just exercises. Wife stated, \"well we'll see if he's motivated or not. Sometimes he is and sometimes he's not\".   Reviewed potential exercises w/ wife that family could encourage pt to do and practice w/ him.  Wife agreeable.  Then reviewed exercises w/ pt who was alert and attentive. Weak voice w/ congested cough. Suctioned thick yellow mucous from oropharynx.   Reviewed effortful swallow, wilbert and \"sniff,cough\"- pt had difficulty initiating a swallow but eventually able to w/ decreased laryngeal excursion and resulting in coughing x1 (suspect 2* pharyngeal mucous). Pt not able to close teeth/lips on tongue protrusion for wilbert. Also pt had difficulty coordinating sniff and cough- pt not closing mouth for nasal inhalation. Cough was fairly strong but congested and productive at times.     Overall prognosis for swallowing recovery is poor, pt not able to fully participate  and complete swallowing exercises, however basic written exercises provided to family as requested.     Continued speech tx not indicated at this time, will remain available as needed for questions.      Carole Aragon MA CCC-SLP  Speech Pathologist  Available via SecureChat   "

## 2025-01-28 NOTE — ASSESSMENT & PLAN NOTE
Malnutrition Findings:   Adult Malnutrition type: Acute illness  Adult Degree of Malnutrition: Other severe protein calorie malnutrition  Malnutrition Characteristics: Muscle loss, Inadequate energy, Weight loss, Fat loss                  360 Statement: related to dysphagia with recommendation for NPO, pt with > 7 days of less than 50% energy intake compared to estimated needs, muscle, fat wasting, wasted buccal pads, temporalis, supraclavicular space, interosseous. Treatment. Enteral nutrition via feeding tube is indicated for nutrition support- wife is declining nutrition via NGT or PEG at this time. Ongoing goals of care discussion noted.    BMI Findings:           Body mass index is 21.53 kg/m².

## 2025-01-28 NOTE — QUICK NOTE
Sepsis Alert    Received sepsis alert for this patient due to the following: HR 98, RR 21.  Afebrile, no leucocytosis. No suspected source of infection. Pt only meets SIRS criteria.   Tachypnea 2/2 hypoxia, not infectious etiology. No indication for abx or sepsis protocol at this time.   Continue to monitor.

## 2025-01-28 NOTE — ASSESSMENT & PLAN NOTE
Lab Results   Component Value Date    EGFR 19 01/28/2025    EGFR 20 01/27/2025    EGFR 21 01/26/2025    CREATININE 2.75 (H) 01/28/2025    CREATININE 2.62 (H) 01/27/2025    CREATININE 2.51 (H) 01/26/2025   Holding Losartan and Farxiga per nephro due to elevated Cr   Hydralazine 5 mg q 6 hrs for BP >180/110   Initially allowed for permissive hypertension due to concern for stroke.  However, patient's MRI does not show concern for infarct.  Will aim for normotension

## 2025-01-28 NOTE — ASSESSMENT & PLAN NOTE
Pt failed VBS 1/23/25: moderate oral/severe pharyngeal dysphagia characterized by weak swallow mechanics, multiple swallows noted to clear pharyngeal retention with overflow penetration and aspiration observed after the swallow   Had numerous discussions regarding goals of care with family and multidisciplinary team. Explained risks versus benefits of PICC line and subsequent TPN, versus pleasure feeds on comfort care. 1/27 pt's wife decided on obtaining PICC for patient and signed informed consent form. Process, risks, and outcomes were reviewed at length with the patient's wife at bedside, alongside Dr. Gtz. All questions/concerns answered. Pt's wife verbalizes understanding and wishes to proceed with PICC line.     Plan  Reached out to vascular access team regarding PICC -- pt is s/p PICC today 1/28 RD on board regarding TPN   BMP, TG, Phosphorous labs obtained this morning  Nephrology will continue to monitor electrolytes as requirements adjust after TPN  Per RD recommendations: 750ml D20, 400ml 15% AA, 100ml 20% lipids, 950kcal (16kcal/kg), 1320ml (22ml/kg), 50mEq Na acetate (~1/4 NS equivalent), 30mEq K acetate, 20mmol K phosphate, 4.6mEq Ca, 8mEq Mg, 100mg thiamine  Suction any secretions as able  Pt given scopolamine patch to manage secretions  Maintain strict NPO

## 2025-01-28 NOTE — ASSESSMENT & PLAN NOTE
- Initially with cardiogenic shock  - Repeat echocardiogram January 20 with EF 55%, grade 1 diastolic dysfunction  - Continue to hold losartan  - Continue as needed hydralazine  - Avoid hypotension

## 2025-01-28 NOTE — PROGRESS NOTES
Progress Note - Palliative Care   Name: Jeffry Almanzar 89 y.o. male I MRN: [id number]  Unit/Bed#: S -01 I Date of Admission: 1/17/2025   Date of Service: 1/28/2025 I Hospital Day: 10     Assessment & Plan  Goals of care, counseling/discussion   - overall wish to give patient supports to identify recovery/reversible etiologies, including dysphagia. Spouse clearly states that the patient would not wish to pursue NGT or PEG tube. Sarah confirms this, as known patient's values/wishes, would not wish to have a feeding tube. Concern that if pursued, patient would most likely remove in the setting of disagreement with its placement.   - SLP evaluated patient, with failing of VBS   - TPN and PICC offered by [facility] team-->reviewed significant risks associated with this (electrolyte disturbance, high Tgs, liver damage, and high chance of serious infection)-->they would still like to trial time-limited course of TPN with the hope he recovers overtime with return in baseline food consumption (which reportedly was normal prior to hospitalization).  Warranted that if no improvement over the course of the next 2-4 weeks, that further conversations should be had in regards to overall decline due to risks outweighing benefits of long-term TPN use.   -Counseled and educated on uncertain prognosis with hope for improvement, but high likelihood of things not getting better or getting worse   - spouse confirmed DNAR/DNI, Level 3, as the patient would not wish to be re-intubated nor pursue CPR/ACLS in the setting of a cardiac arrest. If such an event were to occur, focus on EOL comfort to natural death.   - will continue to follow/support as clinical course evolves.  Palliative care encounter   - met with spouse and Sarah at bedside  -Supportive listening and presence provided  -Anticipatory guidance provided     - [place] [id number], pending clinical course, may benefit from Home Palliative Care supports    #psychosocial supports   -  Antony [spouse,  65 years] [phone]   - 3 adult children   - Sarah [daughter, resides in [place], [place]]    - currently at bedside   - Jeffry [son, resides in [place]]   - Grzegorz [son, resides locally]   - 6 grandchildren, 6 great-grandchildren   - resides with spouse   - no prior  service   - retired, Maintenance work   - [employer] background   -  visiting regularly   - strong Buddhism supports, prayer circles  Acute saddle pulmonary embolism with acute cor pulmonale (HCC)  PESI Class V, Very High Risk [age, male, h/o CA, h/o asthma, hypotensive, AMS]    CT PE [01/18/2025] acute saddle PE with large clot burden, R heart strain, calculated RV:LV ratio is 1.2.    Hs-cTn 533->777->1,070      TTE [01/20/2025] LVEF 55%, grade I diastolic dysfunction; RV moderately dilated, moderately reduced RV systolic function, hypokinesis of the basal to mid free wall; mild MR; moderate TR, RVSP 49 mmHg.  CKD (chronic kidney disease) stage 4, GFR 15-29 ml/min ([facility])  Lab Results   Component Value Date    EGFR 20 01/27/2025    EGFR 21 01/26/2025    EGFR 21 01/25/2025    CREATININE 2.62 (H) 01/27/2025    CREATININE 2.51 (H) 01/26/2025    CREATININE 2.50 (H) 01/25/2025      - SOFÍA on CKD IV, likely in the setting of severe ATN   - baseline SCr 2.0   - follows OP Nephrology   - IP Nephrology following   - no HD desired per patient's previously communicated wishes, supported/reaffirmed by family  Cardiac arrest ([facility])   - initially presented via EMS on 01/17 after patient found unresponsive with agonal respirations, bag ventilations initiated in the field. Intubated in the setting of a brief cardiac arrest with ROSC.   - in the setting of acute saddle PE with large clot burden, Cor Pulmonale  Dysphagia  See A/P Goals of Care  Severe protein-calorie malnutrition (HCC)  Malnutrition Findings:   Adult Malnutrition type: Acute illness  Adult Degree of Malnutrition: Other severe protein calorie  malnutrition  Malnutrition Characteristics: Muscle loss, Inadequate energy, Weight loss, Fat loss                  360 Statement: related to dysphagia with recommendation for NPO, pt with > 7 days of less than 50% energy intake compared to estimated needs, muscle, fat wasting, wasted buccal pads, temporalis, supraclavicular space, interosseous. Treatment. Enteral nutrition via feeding tube is indicated for nutrition support- wife is declining nutrition via NGT or PEG at this time. Ongoing goals of care discussion noted.    BMI Findings:           Body mass index is 21.53 kg/m².     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  (HCC)  Lab Results   Component Value Date    EGFR 20 01/27/2025    EGFR 21 01/26/2025    EGFR 21 01/25/2025    CREATININE 2.62 (H) 01/27/2025    CREATININE 2.51 (H) 01/26/2025    CREATININE 2.50 (H) 01/25/2025     Benign 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 ([facility])  Lab Results   Component Value Date    EGFR 20 01/27/2025    EGFR 21 01/26/2025    EGFR 21 01/25/2025    CREATININE 2.62 (H) 01/27/2025    CREATININE 2.51 (H) 01/26/2025    CREATININE 2.50 (H) 01/25/2025           NARRATIVE AND INTERVAL HISTORY:       Patient seen and examined at hospital bed.  Spouse present.  Speech-language pathologist present (communicating coordinated with her).  They were working on swallowing exercises.  Still not cleared for any oral diet.  Patient having trouble clearing mucus and pulmonary secretions.  Unable to provide ROS due to condition.     MEDICATIONS / ALLERGIES:     all current active meds have been reviewed, current meds:   Current Facility-Administered Medications:     artificial tear ophthalmic ointment, HS    chlorhexidine (PERIDEX) 0.12 % oral rinse 15 mL, Q12H MARQUIS    dorzolamide-timolol (COSOPT) 2-0.5 % ophthalmic solution 1 drop, BID    heparin (porcine) 25,000 units in 0.45% NaCl 250 mL infusion (premix), Titrated, Last Rate: 14 Units/kg/hr (01/28/25 0417)    heparin (porcine)  injection 2,400 Units, Q6H PRN    heparin (porcine) injection 4,800 Units, Once    heparin (porcine) injection 4,800 Units, Q6H PRN    hydrALAZINE (APRESOLINE) injection 5 mg, Q6H PRN    insulin lispro (HumALOG/ADMELOG) 100 units/mL subcutaneous injection 2-12 Units, Q6H **AND** Fingerstick Glucose (POCT), Q6H    Latanoprostene Bunod 0.024 % SOLN 1 drop, HS    polyethylene glycol (MIRALAX) packet 17 g, Daily    prednisoLONE acetate (PRED FORTE) 1 % ophthalmic suspension 1 drop, TID    scopolamine (TRANSDERM-SCOP) 1 mg/3 days TD 72 hr patch 1 patch, Q72H    [Held by provider] senna-docusate sodium (SENOKOT S) 8.6-50 mg per tablet 1 tablet, BID    tetracaine 0.5 % ophthalmic solution 2 drop, Once, and PTA meds:   Prior to Admission Medications   Prescriptions Last Dose Informant Patient Reported? Taking?   Blood Glucose Monitoring Suppl (OneTouch Verio Reflect) w/Device KIT  Self No No   Sig: Check blood sugars twice daily. Please substitute with appropriate alternative as covered by patient's insurance. Dx: E11.65   Blood Glucose Monitoring Suppl (OneTouch Verio Reflect) w/Device KIT  Self No No   Sig: Check blood sugars once daily. Please substitute with appropriate alternative as covered by patient's insurance. Dx: E11.65   Cyanocobalamin (Vitamin B-12) 1000 MCG/15ML LIQD  Self Yes No   Sig: Take by mouth daily   Dorzolamide HCl-Timolol Mal PF 2-0.5 % SOLN  Self Yes No   Sig: INSTILL 1 DROP INTO EACH EYE TWICE DAILY   Farxiga 10 MG tablet   No No   Sig: Take 1 tablet (10 mg total) by mouth daily   OneTouch Delica Lancets 33G MISC  Self No No   Sig: Check blood sugars once daily. Please substitute with appropriate alternative as covered by patient's insurance. Dx: E11.65   Vyzulta 0.024 % SOLN  Self Yes No   Sig: Administer 1 drop to both eyes daily at bedtime   atorvastatin (LIPITOR) 40 mg tablet   No No   Sig: Take 1 tablet (40 mg total) by mouth daily   brimonidine (ALPHAGAN P) 0.15 % ophthalmic solution  Self  Yes No   clopidogrel (PLAVIX) 75 mg tablet   No No   Sig: Take 1 tablet (75 mg total) by mouth daily   glucose blood (OneTouch Verio) test strip  Self No No   Sig: Check blood sugars once daily. Please substitute with appropriate alternative as covered by patient's insurance. Dx: E11.65   losartan (COZAAR) 25 mg tablet   No No   Sig: Take 1 tablet (25 mg total) by mouth daily   prednisoLONE acetate (PRED FORTE) 1 % ophthalmic suspension  Self Yes No   Sig: INSTILL 1 DROP INTO LEFT EYE TWICE DAILY      Facility-Administered Medications: None       Allergies   Allergen Reactions    Ace Inhibitors Cough     Other reaction(s): hyperkalemia    Penicillins GI Intolerance       OBJECTIVE:    Physical Exam  Physical Exam  Constitutional:       General: He is not in acute distress.     Appearance: He is ill-appearing.      Interventions: Nasal cannula in place.      Comments: Frail   HENT:      Head: Normocephalic and atraumatic.      Right Ear: External ear normal.      Left Ear: External ear normal.      Nose: Nose normal.      Mouth/Throat:      Mouth: Mucous membranes are moist.      Pharynx: Oropharynx is clear.   Eyes:      General: No scleral icterus.     Conjunctiva/sclera: Conjunctivae normal.   Cardiovascular:      Rate and Rhythm: Normal rate and regular rhythm.      Pulses: Normal pulses.   Pulmonary:      Effort: Pulmonary effort is normal. No respiratory distress.      Comments: Audible pulmonary secretions  Abdominal:      General: There is no distension.      Palpations: Abdomen is soft.      Tenderness: There is no abdominal tenderness.   Musculoskeletal:      Right lower leg: No edema.      Left lower leg: No edema.   Skin:     Coloration: Skin is pale. Skin is not jaundiced.   Neurological:      General: No focal deficit present.      Mental Status: Mental status is at baseline.   Psychiatric:      Comments: Unable to assess         Lab Results: I have personally reviewed pertinent labs., CBC: No results  "found for: \"WBC\", \"HGB\", \"HCT\", \"MCV\", \"PLT\", \"ADJUSTEDWBC\", \"RBC\", \"MCH\", \"MCHC\", \"RDW\", \"MPV\", \"NRBC\", CMP: No results found for: \"SODIUM\", \"K\", \"CL\", \"CO2\", \"ANIONGAP\", \"BUN\", \"CREATININE\", \"GLUCOSE\", \"CALCIUM\", \"AST\", \"ALT\", \"ALKPHOS\", \"PROT\", \"BILITOT\", \"EGFR\", BMP:No results found for: \"SODIUM\", \"K\", \"CL\", \"CO2\", \"BUN\", \"CREATININE\", \"GLUC\", \"GLUF\", \"CALCIUM\", \"AGAP\", \"EGFR\", PT/PTT:  Lab Results   Component Value Date    PTT 90 (H) 01/28/2025     Imaging Studies: Reviewed and interpreted the pertinent studies  EKG, Pathology, and Other Studies: Reviewed and interpreted the pertinent studies    I have spent a total time of 50 minutes in caring for this patient on the day of the visit/encounter including Prognosis, Risks and benefits of tx options, Instructions for management, Patient and family education, Impressions, Counseling / Coordination of care, Documenting in the medical record, Reviewing / ordering tests, medicine, procedures  , Obtaining or reviewing history  , and Communicating with other healthcare professionals . Topics discussed with the patient / family include symptom assessment and management, medication review, psychosocial support, goals of care, supportive listening, and anticipatory guidance.   "

## 2025-01-28 NOTE — PLAN OF CARE
Problem: PAIN - ADULT  Goal: Verbalizes/displays adequate comfort level or baseline comfort level  Description: Interventions:  - Encourage patient to monitor pain and request assistance  - Assess pain using appropriate pain scale  - Administer analgesics based on type and severity of pain and evaluate response  - Implement non-pharmacological measures as appropriate and evaluate response  - Consider cultural and social influences on pain and pain management  - Notify physician/advanced practitioner if interventions unsuccessful or patient reports new pain  Outcome: Progressing     Problem: INFECTION - ADULT  Goal: Absence or prevention of progression during hospitalization  Description: INTERVENTIONS:  - Assess and monitor for signs and symptoms of infection  - Monitor lab/diagnostic results  - Monitor all insertion sites, i.e. indwelling lines, tubes, and drains  - Monitor endotracheal if appropriate and nasal secretions for changes in amount and color  - [place] appropriate cooling/warming therapies per order  - Administer medications as ordered  - Instruct and encourage patient and family to use good hand hygiene technique  - Identify and instruct in appropriate isolation precautions for identified infection/condition  Outcome: Progressing  Goal: Absence of fever/infection during neutropenic period  Description: INTERVENTIONS:  - Monitor WBC    Outcome: Progressing     Problem: SAFETY ADULT  Goal: Patient will remain free of falls  Description: INTERVENTIONS:  - Educate patient/family on patient safety including physical limitations  - Instruct patient to call for assistance with activity   - Consult OT/PT to assist with strengthening/mobility   - Keep Call bell within reach  - Keep bed low and locked with side rails adjusted as appropriate  - Keep care items and personal belongings within reach  - Initiate and maintain comfort rounds  - Make Fall Risk Sign visible to staff  - Offer Toileting every 2 Hours,  in advance of need  - Initiate/Maintain bed alarm  - Obtain necessary fall risk management equipment  - Apply yellow socks and bracelet for high fall risk patients  - Consider moving patient to room near nurses station  Outcome: Progressing  Goal: Maintain or return to baseline ADL function  Description: INTERVENTIONS:  -  Assess patient's ability to carry out ADLs; assess patient's baseline for ADL function and identify physical deficits which impact ability to perform ADLs (bathing, care of mouth/teeth, toileting, grooming, dressing, etc.)  - Assess/evaluate cause of self-care deficits   - Assess range of motion  - Assess patient's mobility; develop plan if impaired  - Assess patient's need for assistive devices and provide as appropriate  - Encourage maximum independence but intervene and supervise when necessary  - Involve family in performance of ADLs  - Assess for home care needs following discharge   - Consider OT consult to assist with ADL evaluation and planning for discharge  - Provide patient education as appropriate  Outcome: Progressing  Goal: Maintains/Returns to pre admission functional level  Description: INTERVENTIONS:  - Perform AM-PAC 6 Click Basic Mobility/ Daily Activity assessment daily.  - Set and communicate daily mobility goal to care team and patient/family/caregiver.   - Collaborate with rehabilitation services on mobility goals if consulted  - Reposition patient every 2 hours.  - Record patient progress and toleration of activity level   Outcome: Progressing     Problem: DISCHARGE PLANNING  Goal: Discharge to home or other facility with appropriate resources  Description: INTERVENTIONS:  - Identify barriers to discharge w/patient and caregiver  - Arrange for needed discharge resources and transportation as appropriate  - Identify discharge learning needs (meds, wound care, etc.)  - Arrange for interpretive services to assist at discharge as needed  - Refer to Case Management Department  for coordinating discharge planning if the patient needs post-hospital services based on physician/advanced practitioner order or complex needs related to functional status, cognitive ability, or social support system  Outcome: Progressing     Problem: Knowledge Deficit  Goal: Patient/family/caregiver demonstrates understanding of disease process, treatment plan, medications, and discharge instructions  Description: Complete learning assessment and assess knowledge base.  Interventions:  - Provide teaching at level of understanding  - Provide teaching via preferred learning methods  Outcome: Progressing     Problem: Nutrition/Hydration-ADULT  Goal: Nutrient/Hydration intake appropriate for improving, restoring or maintaining nutritional needs  Description: Monitor and assess patient's nutrition/hydration status for malnutrition. Collaborate with interdisciplinary team and initiate plan and interventions as ordered.  Monitor patient's weight and dietary intake as ordered or per policy. Utilize nutrition screening tool and intervene as necessary. Determine patient's food preferences and provide high-protein, high-caloric foods as appropriate.     INTERVENTIONS:  - Monitor oral intake, urinary output, labs, and treatment plans  - Assess nutrition and hydration status and recommend course of action  - Evaluate amount of meals eaten  - Assist patient with eating if necessary   - Allow adequate time for meals  - Recommend/ encourage appropriate diets, oral nutritional supplements, and vitamin/mineral supplements  - Order, calculate, and assess calorie counts as needed  - Recommend, monitor, and adjust tube feedings and TPN/PPN based on assessed needs  - Assess need for intravenous fluids  - Provide specific nutrition/hydration education as appropriate  - Include patient/family/caregiver in decisions related to nutrition  Outcome: Progressing     Problem: Prexisting or High Potential for Compromised Skin Integrity  Goal:  Skin integrity is maintained or improved  Description: INTERVENTIONS:  - Identify patients at risk for skin breakdown  - Assess and monitor skin integrity  - Assess and monitor nutrition and hydration status  - Monitor labs   - Assess for incontinence   - Turn and reposition patient  - Assist with mobility/ambulation  - Relieve pressure over bony prominences  - Avoid friction and shearing  - Provide appropriate hygiene as needed including keeping skin clean and dry  - Evaluate need for skin moisturizer/barrier cream  - Collaborate with interdisciplinary team   - Patient/family teaching  - Consider wound care consult   Outcome: Progressing

## 2025-01-28 NOTE — ASSESSMENT & PLAN NOTE
Etiology: Suspect due to ATN in the setting of cardiogenic shock, cardiac arrest, hypotension and IV contrast exposure with autoregulatory dysfunction with ARB    Plan  Per Nephrology:  D5W at 60cc/h  Continue to hold losartan and Farxiga  Strict I/Os, daily weights  Avoid nephrotoxins, NSAIDs, further IV contrast as able  Avoid hypotension.  Maintain MAP >65

## 2025-01-28 NOTE — ASSESSMENT & PLAN NOTE
"Pt on room air with labored breathing, pulmonary congestion, secretions.    Please see plan as under \"Dysphagia.\"   "

## 2025-01-28 NOTE — ASSESSMENT & PLAN NOTE
-In the setting of  saddle embolus.  Initially requiring critical care services subsequently transferred to acute care.

## 2025-01-28 NOTE — CASE MANAGEMENT
Case Management Progress Note    Patient name Jeffry Almanzar  Location S /S -01 MRN [id number]  : 1934 Date 2025       LOS (days): 10  Geometric Mean LOS (GMLOS) (days): 4.9  Days to GMLOS:-5.6        OBJECTIVE:        Current admission status: Inpatient  Preferred Pharmacy:   [facility] [address] [place] [id number]  Phone: [phone] Fax: [phone]    Primary Care Provider: Debbie Maloney MD    Primary Insurance: MEDICARE  Secondary Insurance: BLUE CROSS    PROGRESS NOTE:  Patient discussed in LOS meeting held today,  PICC line placement pending, patient for d/c to [facility] when medically cleared.  Palliative care involved, family declining comfort care at this time.

## 2025-01-28 NOTE — ASSESSMENT & PLAN NOTE
Malnutrition Findings:   Adult Malnutrition type: Acute illness  Adult Degree of Malnutrition: Other severe protein calorie malnutrition  Malnutrition Characteristics: Muscle loss, Inadequate energy, Weight loss, Fat loss                  360 Statement: related to dysphagia with recommendation for NPO, pt with > 7 days of less than 50% energy intake compared to estimated needs, muscle, fat wasting, wasted buccal pads, temporalis, supraclavicular space, interosseous. Treatment. Enteral nutrition via feeding tube is indicated for nutrition support- wife is declining nutrition via NGT or PEG at this time. Ongoing goals of care discussion noted.    BMI Findings:           Body mass index is 21.53 kg/m².     Wife agreeable to PICC and TPN. Per RD, TPN as above under principal problem.

## 2025-01-28 NOTE — ASSESSMENT & PLAN NOTE
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

## 2025-01-28 NOTE — ASSESSMENT & PLAN NOTE
PICC line in place.  Patient due to start TPN today  Malnutrition Findings:   Adult Malnutrition type: Acute illness  Adult Degree of Malnutrition: Other severe protein calorie malnutrition  Malnutrition Characteristics: Muscle loss, Inadequate energy, Weight loss, Fat loss                  360 Statement: related to dysphagia with recommendation for NPO, pt with > 7 days of less than 50% energy intake compared to estimated needs, muscle, fat wasting, wasted buccal pads, temporalis, supraclavicular space, interosseous. Treatment. Enteral nutrition via feeding tube is indicated for nutrition support- wife is declining nutrition via NGT or PEG at this time. Ongoing goals of care discussion noted.    BMI Findings:           Body mass index is 21.53 kg/m².

## 2025-01-28 NOTE — ASSESSMENT & PLAN NOTE
Cardiac arrest/saddle pulmonary embolus with cor pulmonale      - Initially with cardiogenic shock  - Status post extubation  - Initially requiring pressor support  - On heparin infusion

## 2025-01-28 NOTE — PLAN OF CARE
Cont NPO- pt not safe for po  Poor prognosis for swallowing recovery- attempted indirect swallowing exercises, pt not able to fully participate and complete exercises, however basic written exercises provided to family as requested.   No further speech tx indicated at this level of care.

## 2025-01-28 NOTE — CONSULTS
01/28/25 1140   Recommendations/Interventions   Recommendations to Provider 750ml D20, 400ml 15% AA, 100ml 20% lipids, 950kcal (16kcal/kg), 1320ml (22ml/kg), 50mEq Na acetate (~1/4 NS equivalent), 30mEq K acetate, 20mmol K phosphate, 4.6mEq Ca, 8mEq Mg, 100mg thiamine

## 2025-01-28 NOTE — PROGRESS NOTES
NEPHROLOGY HOSPITAL PROGRESS NOTE   Jeffry Almanzar 89 y.o. male MRN: [id number]  Unit/Bed#: S -01 Encounter: [id number]  Reason for Consult: Acute kidney injury on CKD    Brief History of Admission - 89-year-old who presented postcardiac arrest.  Nephrology consulted for management of SOFÍA       Assessment & Plan  SOFÍA (acute kidney injury) (HCC)    Acute on chronic kidney disease stage IV  - Outpatient nephrologist Dr. Rey  - Baseline creatinine progressively worsening to low 3's.  Last creatinine at steady state 3.3 in November 2024  - Admission creatinine 3.1 mg/dL on January 18  - Creatinine rising to 3.9 mg/dL on January 21 prompting nephrology consultation  - Patient received contrast study on January 18 and also presented with cardiogenic shock/cardiac arrest and cor pulmonale  - Etiology-likely ATN in the setting of hypotension/shock/IV contrast/cardiac arrest/autoregulatory failure in setting of ARB at home  - Urinalysis with negative protein, 1-2 RBCs  - Renal ultrasound in 2021: Bilateral diffuse echogenicity.  Mild to moderate renal atrophy bilaterally  - Renal function improving, below baseline  - Current creatinine 2.75.  Slight increase from yesterday but remains below baseline  - Palliative care following  - On hypotonic IV fluids due to n.p.o. status, hypernatremia    Plan  - Resume D5W-84 mL/h.  - Avoid hypotension  - Trend lab work in a.m.  -No indication for diuretic  -Seen by palliative care.    CKD (chronic kidney disease) stage 4, GFR 15-29 ml/min ([facility])  -See discussion above  Normal anion gap metabolic acidosis  - Bicarbonate improving currently up to 23  Acute saddle pulmonary embolism with acute cor pulmonale (HCC)  Cardiac arrest/saddle pulmonary embolus with cor pulmonale      - Initially with cardiogenic shock  - Status post extubation  - Initially requiring pressor support  - On heparin infusion  Cardiac arrest (HCC)  -In the setting of  saddle embolus.  Initially requiring  critical care services subsequently transferred to acute care.  Benign 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 (HCC)  - Initially with cardiogenic shock  - Repeat echocardiogram January 20 with EF 55%, grade 1 diastolic dysfunction  - Continue to hold losartan  - Continue as needed hydralazine  - Avoid hypotension     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  (HCC)  -Trend per primary team  - Current creatinine 8.2    Hypokalemia  - Resolved  Retinal hemorrhage noted on examination of left eye  - seen by ophthalmology-chronic issue likely  Dysphagia  -Patient and family do not want feeding tube placement  - TPN being started    Palliative care encounter  Seen by palliative care.  Discussion regarding home palliative services.  Also hospice services were discussed briefly with family.  Goals of care, counseling/discussion  Patient DNR/DNI  Severe protein-calorie malnutrition (HCC)  PICC line in place.  Patient due to start TPN today  Malnutrition Findings:   Adult Malnutrition type: Acute illness  Adult Degree of Malnutrition: Other severe protein calorie malnutrition  Malnutrition Characteristics: Muscle loss, Inadequate energy, Weight loss, Fat loss                  360 Statement: related to dysphagia with recommendation for NPO, pt with > 7 days of less than 50% energy intake compared to estimated needs, muscle, fat wasting, wasted buccal pads, temporalis, supraclavicular space, interosseous. Treatment. Enteral nutrition via feeding tube is indicated for nutrition support- wife is declining nutrition via NGT or PEG at this time. Ongoing goals of care discussion noted.    BMI Findings:           Body mass index is 21.53 kg/m².         SUBJECTIVE / 24H INTERVAL HISTORY:  No verbal response.  Lethargic.  Arouses slightly to touch.  Wife visiting.    OBJECTIVE:  Current Weight: Weight - Scale: 58.7 kg (129 lb 6.6 oz) (removed everything from bed)  Vitals:    01/27/25 1544 01/27/25 2030 01/28/25 0600  01/28/25 0705   BP: 142/75 152/73  147/88   BP Location:       Pulse:  83  98   Resp:  20  21   Temp: 100.2 °F (37.9 °C) 99.8 °F (37.7 °C)  99.2 °F (37.3 °C)   TempSrc:       SpO2:  94%  (!) 89%   Weight:   58.7 kg (129 lb 6.6 oz)    Height:           Intake/Output Summary (Last 24 hours) at 1/28/2025 1302  Last data filed at 1/28/2025 0705  Gross per 24 hour   Intake 0 ml   Output 600 ml   Net -600 ml     General: Acutely/chronically ill-appearing gentleman  Skin: no rash.  Skin warm and dry  Eyes: anicteric sclera  ENT: Oropharynx appears dry  Neck: supple  Chest: Comfortably breathing.  Normal effort.  Much less congestion today.  Anterior chest clear.  Decreased at the bases  CVS: s1s2, no murmur, no gallop, no rub.  Normal rate  Abdomen: soft, nontender, nl sounds.  Nondistended  Extremities: no edema LE b/l  : Urethral catheter for urine measurement  Neuro: Nonfocal  Psych: Calmly lying in bed sleeping  Medications:    Current Facility-Administered Medications:     artificial tear ophthalmic ointment, , Both Eyes, HS, Eloise Rodriguez MD, Given at 01/27/25 2206    chlorhexidine (PERIDEX) 0.12 % oral rinse 15 mL, 15 mL, Mouth/Throat, Q12H MARQUIS, Eloise Rodriguez MD, 15 mL at 01/21/25 2106    dextrose 5 % infusion, 60 mL/hr, Intravenous, Continuous, ISRAEL Meza, Last Rate: 60 mL/hr at 01/28/25 1220, 60 mL/hr at 01/28/25 1220    dorzolamide-timolol (COSOPT) 2-0.5 % ophthalmic solution 1 drop, 1 drop, Both Eyes, BID, Eloise Rodriguez MD, 1 drop at 01/28/25 1026    heparin (porcine) 25,000 units in 0.45% NaCl 250 mL infusion (premix), 3-30 Units/kg/hr (Order-Specific), Intravenous, Titrated, Pawan Gtz MD, Last Rate: 8.4 mL/hr at 01/28/25 1052, 14 Units/kg/hr at 01/28/25 1052    heparin (porcine) injection 2,400 Units, 2,400 Units, Intravenous, Q6H PRN, Pawan Gtz MD    heparin (porcine) injection 4,800 Units, 4,800 Units, Intravenous, Once, Pawan Gtz MD    heparin (porcine) injection 4,800 Units, 4,800 Units,  Intravenous, Q6H PRN, Pawan Gtz MD, 4,800 Units at 01/23/25 1317    hydrALAZINE (APRESOLINE) injection 5 mg, 5 mg, Intravenous, Q6H PRN, Jamaica Jones DO    insulin lispro (HumALOG/ADMELOG) 100 units/mL subcutaneous injection 2-12 Units, 2-12 Units, Subcutaneous, Q6H, 2 Units at 01/28/25 0050 **AND** Fingerstick Glucose (POCT), , , Q6H, Eloise Rodriguez MD    Latanoprostene Bunod 0.024 % SOLN 1 drop, 1 drop, Both Eyes, HS, Eloise Rodriguez MD, 1 drop at 01/27/25 2205    polyethylene glycol (MIRALAX) packet 17 g, 17 g, Oral, Daily, Eloise Rodriguez MD, 17 g at 01/20/25 1505    prednisoLONE acetate (PRED FORTE) 1 % ophthalmic suspension 1 drop, 1 drop, Left Eye, TID, Eloise Rodriguez MD, 1 drop at 01/28/25 1026    scopolamine (TRANSDERM-SCOP) 1 mg/3 days TD 72 hr patch 1 patch, 1 patch, Transdermal, Q72H, Jamaica Jones DO, 1 patch at 01/28/25 1220    [Held by provider] senna-docusate sodium (SENOKOT S) 8.6-50 mg per tablet 1 tablet, 1 tablet, Oral, BID, Eloise Rodriguez MD    tetracaine 0.5 % ophthalmic solution 2 drop, 2 drop, Both Eyes, Once, Jamaica Jones DO    Laboratory Results:  Results from last 7 days   Lab Units 01/28/25  0950 01/27/25  0529 01/26/25  0446 01/25/25  0433 01/24/25  0536 01/23/25  0633 01/22/25  1959 01/22/25  0619   WBC Thousand/uL 9.62 8.61 8.18 8.27 9.20 8.68  --  9.33   HEMOGLOBIN g/dL 8.2* 8.9* 9.0* 8.5* 8.1* 9.0*  --  8.6*   HEMATOCRIT % 27.1* 28.1* 28.4* 27.6* 25.9* 27.8*  --  26.7*   PLATELETS Thousands/uL 290 247 221 187 160 145*  --  125*   POTASSIUM mmol/L 3.7 3.7 4.0 3.4* 3.3* 3.3* 3.4* 3.6   CHLORIDE mmol/L 120* 117* 117* 115* 119* 119* 117* 117*   CO2 mmol/L 23 21 21 22 22 22 20* 19*   BUN mg/dL 56* 54* 48* 51* 58* 69* 72* 69*   CREATININE mg/dL 2.75* 2.62* 2.51* 2.50* 2.61* 3.13* 3.35* 3.59*   CALCIUM mg/dL 9.1 8.9 8.8 8.5 8.6 9.0 9.0 8.8   MAGNESIUM mg/dL 2.4 2.2 2.1 2.1 2.3 2.4  --   --    PHOSPHORUS mg/dL 3.4 3.7 3.8 2.2*  --   --   --   --        Portions of the record may have  "been created with voice recognition software. Occasional wrong word or \"sound a like\" substitutions may have occurred due to the inherent limitations of voice recognition software. Read the chart carefully and recognize, using context, where substitutions have occurred.If you have any questions, please contact the dictating provider.   "

## 2025-01-28 NOTE — CASE MANAGEMENT
Case Management Discharge Planning Note    Patient name Jeffry Almanzar  Location S /S -01 MRN [id number]  : 1934 Date 2025       Current Admission Date: 2025  Current Admission Diagnosis:Acute saddle pulmonary embolism with acute cor pulmonale ([facility])   Patient Active Problem List    Diagnosis Date Noted Date Diagnosed    Severe protein-calorie malnutrition ([facility]) 2025     Dysphagia 2025     Palliative care encounter 2025     Goals of care, counseling/discussion 2025     Hypernatremia 2025     Hypoxia 2025     Disoriented to time 2025     Normal anion gap metabolic acidosis 2025     Retinal hemorrhage noted on examination of left eye 2025     Cardiac arrest ([facility]) 2025     Acute saddle pulmonary embolism with acute cor pulmonale ([facility]) 2025     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     CVA (cerebral vascular accident) ([facility]) 2024     SOFÍA (acute kidney injury) ([facility]) 2024     Dementia ([facility]) 2024     Type 2 diabetes mellitus with stage 4 chronic kidney disease, without long-term current use of insulin ([facility]) 10/24/2023     IgM lambda paraproteinemia 2023     Advanced care planning/counseling discussion 2023     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[facility]) 2023     CKD (chronic kidney disease) stage 4, GFR 15-29 ml/min ([facility]) 2022     Persistent proteinuria 2022     Benign 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 ([facility]) 02/15/2022     Spinal stenosis of lumbar region      DDD (degenerative disc disease), lumbar      Lumbar disc disease with radiculopathy      Neurogenic claudication 2019     Low back pain 2019     Retinal vein occlusion of left eye 2019     Malignant neoplasm of urinary bladder ([facility]) 2019     Vision loss of left eye 2019     Weight loss 2018     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  ([facility])  08/17/2018     Iliotibial band syndrome of both sides 05/15/2018     Primary osteoarthritis of both knees 03/15/2018     Asthma, mild intermittent 08/23/2017     Essential hypertension 01/19/2016     Mild vitamin D deficiency 08/25/2014     Glaucoma 04/22/2014     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 [place] [address] [facility]  [address] [place] [id number]  Phone: [phone] Fax: [phone]    Primary Care Provider: Debbie Maloney MD    Primary Insurance: MEDICARE  Secondary Insurance: BLUE CROSS    DISCHARGE DETAILS:    Discharge planning discussed with:: patient sleeping at time of visit. pt's spouse Levie at bedside  Freedom of Choice: Yes  Comments - Freedom of Choice: CM f/u with pt's wife at bedside re: STR choice - wife relayed 1st choice for [facility] and 2nd choice for [facility] if [facility] not available. CM f/u with [facility] via [facility]in - per facility can accomodate pt TPN need however pt cannot remain on TPN for longer than 30 days. Message also sent to [facility] - inquiring into bed availability in the event [facility] unable to accept - awaiting response.  CM contacted family/caregiver?: Yes  Were Treatment Team discharge recommendations reviewed with patient/caregiver?: Yes  Did patient/caregiver verbalize understanding of patient care needs?: N/A- going to facility  Were patient/caregiver advised of the risks associated with not following Treatment Team discharge recommendations?: Yes    Contacts  Patient Contacts: Antony (wife)  Relationship to Patient:: Family  Contact Method: In Person  Reason/Outcome: Discharge Planning, Emergency Contact, Continuity of Care, Referral

## 2025-01-28 NOTE — ASSESSMENT & PLAN NOTE
- overall wish to give patient supports to identify recovery/reversible etiologies, including dysphagia. Spouse clearly states that the patient would not wish to pursue NGT or PEG tube. Sarah confirms this, as known patient's values/wishes, would not wish to have a feeding tube. Concern that if pursued, patient would most likely remove in the setting of disagreement with its placement.   - SLP evaluated patient, with failing of VBS   - TPN and PICC offered by [facility] team-->reviewed significant risks associated with this (electrolyte disturbance, high Tgs, liver damage, and high chance of serious infection)-->they would still like to trial time-limited course of TPN with the hope he recovers overtime with return in baseline food consumption (which reportedly was normal prior to hospitalization).  Warranted that if no improvement over the course of the next 2-4 weeks, that further conversations should be had in regards to overall decline due to risks outweighing benefits of long-term TPN use.   -Counseled and educated on uncertain prognosis with hope for improvement, but high likelihood of things not getting better or getting worse   - spouse confirmed DNAR/DNI, Level 3, as the patient would not wish to be re-intubated nor pursue CPR/ACLS in the setting of a cardiac arrest. If such an event were to occur, focus on EOL comfort to natural death.   - will continue to follow/support as clinical course evolves.

## 2025-01-29 ENCOUNTER — APPOINTMENT (INPATIENT)
Dept: RADIOLOGY | Facility: HOSPITAL | Age: OVER 89
DRG: 208 | End: 2025-01-29
Payer: MEDICARE

## 2025-01-29 PROBLEM — R41.89 COGNITIVE IMPAIRMENT: Status: ACTIVE | Noted: 2025-01-22

## 2025-01-29 PROBLEM — E87.0 HYPERNATREMIA: Status: RESOLVED | Noted: 2025-01-22 | Resolved: 2025-01-29

## 2025-01-29 LAB
ANION GAP SERPL CALCULATED.3IONS-SCNC: 10 MMOL/L (ref 4–13)
APTT PPP: 63 SECONDS (ref 23–34)
BUN SERPL-MCNC: 54 MG/DL (ref 5–25)
CALCIUM SERPL-MCNC: 8.7 MG/DL (ref 8.4–10.2)
CHLORIDE SERPL-SCNC: 111 MMOL/L (ref 96–108)
CO2 SERPL-SCNC: 22 MMOL/L (ref 21–32)
CREAT SERPL-MCNC: 2.43 MG/DL (ref 0.6–1.3)
ERYTHROCYTE [DISTWIDTH] IN BLOOD BY AUTOMATED COUNT: 16.7 % (ref 11.6–15.1)
GFR SERPL CREATININE-BSD FRML MDRD: 22 ML/MIN/1.73SQ M
GLUCOSE SERPL-MCNC: 157 MG/DL (ref 65–140)
GLUCOSE SERPL-MCNC: 168 MG/DL (ref 65–140)
GLUCOSE SERPL-MCNC: 226 MG/DL (ref 65–140)
GLUCOSE SERPL-MCNC: 325 MG/DL (ref 65–140)
GLUCOSE SERPL-MCNC: 576 MG/DL (ref 65–140)
HCT VFR BLD AUTO: 27.6 % (ref 36.5–49.3)
HGB BLD-MCNC: 8.7 G/DL (ref 12–17)
LACTATE SERPL-SCNC: 1.5 MMOL/L (ref 0.5–2)
MAGNESIUM SERPL-MCNC: 2.5 MG/DL (ref 1.9–2.7)
MCH RBC QN AUTO: 29.5 PG (ref 26.8–34.3)
MCHC RBC AUTO-ENTMCNC: 31.5 G/DL (ref 31.4–37.4)
MCV RBC AUTO: 94 FL (ref 82–98)
PHOSPHATE SERPL-MCNC: 4.2 MG/DL (ref 2.3–4.1)
PLATELET # BLD AUTO: 242 THOUSANDS/UL (ref 149–390)
PMV BLD AUTO: 10.9 FL (ref 8.9–12.7)
POTASSIUM SERPL-SCNC: 4.9 MMOL/L (ref 3.5–5.3)
PROCALCITONIN SERPL-MCNC: 1.21 NG/ML
RBC # BLD AUTO: 2.95 MILLION/UL (ref 3.88–5.62)
SODIUM SERPL-SCNC: 143 MMOL/L (ref 135–147)
WBC # BLD AUTO: 11.04 THOUSAND/UL (ref 4.31–10.16)

## 2025-01-29 PROCEDURE — 85027 COMPLETE CBC AUTOMATED: CPT

## 2025-01-29 PROCEDURE — 82948 REAGENT STRIP/BLOOD GLUCOSE: CPT

## 2025-01-29 PROCEDURE — 83605 ASSAY OF LACTIC ACID: CPT

## 2025-01-29 PROCEDURE — 80048 BASIC METABOLIC PNL TOTAL CA: CPT

## 2025-01-29 PROCEDURE — 84145 PROCALCITONIN (PCT): CPT

## 2025-01-29 PROCEDURE — 99233 SBSQ HOSP IP/OBS HIGH 50: CPT | Performed by: INTERNAL MEDICINE

## 2025-01-29 PROCEDURE — 83735 ASSAY OF MAGNESIUM: CPT

## 2025-01-29 PROCEDURE — 97530 THERAPEUTIC ACTIVITIES: CPT

## 2025-01-29 PROCEDURE — 84100 ASSAY OF PHOSPHORUS: CPT

## 2025-01-29 PROCEDURE — 99232 SBSQ HOSP IP/OBS MODERATE 35: CPT | Performed by: INTERNAL MEDICINE

## 2025-01-29 PROCEDURE — 71045 X-RAY EXAM CHEST 1 VIEW: CPT

## 2025-01-29 PROCEDURE — 85730 THROMBOPLASTIN TIME PARTIAL: CPT | Performed by: INTERNAL MEDICINE

## 2025-01-29 RX ORDER — ACETAMINOPHEN 650 MG/1
650 SUPPOSITORY RECTAL EVERY 4 HOURS PRN
Status: DISCONTINUED | OUTPATIENT
Start: 2025-01-29 | End: 2025-02-11 | Stop reason: HOSPADM

## 2025-01-29 RX ORDER — GLYCOPYRROLATE 0.2 MG/ML
0.1 INJECTION INTRAMUSCULAR; INTRAVENOUS ONCE
Status: DISCONTINUED | OUTPATIENT
Start: 2025-01-29 | End: 2025-01-29

## 2025-01-29 RX ORDER — SCOPOLAMINE 1 MG/3D
1 PATCH, EXTENDED RELEASE TRANSDERMAL
Status: DISCONTINUED | OUTPATIENT
Start: 2025-01-29 | End: 2025-02-11 | Stop reason: HOSPADM

## 2025-01-29 RX ADMIN — INSULIN LISPRO 4 UNITS: 100 INJECTION, SOLUTION INTRAVENOUS; SUBCUTANEOUS at 18:47

## 2025-01-29 RX ADMIN — INSULIN LISPRO 2 UNITS: 100 INJECTION, SOLUTION INTRAVENOUS; SUBCUTANEOUS at 13:55

## 2025-01-29 RX ADMIN — HEPARIN SODIUM 14 UNITS/KG/HR: 10000 INJECTION, SOLUTION INTRAVENOUS at 05:14

## 2025-01-29 RX ADMIN — SCOPOLAMINE 1 PATCH: 1.5 PATCH, EXTENDED RELEASE TRANSDERMAL at 02:11

## 2025-01-29 RX ADMIN — INSULIN HUMAN 10 UNITS: 100 INJECTION, SOLUTION PARENTERAL at 06:43

## 2025-01-29 RX ADMIN — DORZOLAMIDE HYDROCHLORIDE AND TIMOLOL MALEATE 1 DROP: 20; 5 SOLUTION OPHTHALMIC at 18:47

## 2025-01-29 RX ADMIN — INSULIN LISPRO 12 UNITS: 100 INJECTION, SOLUTION INTRAVENOUS; SUBCUTANEOUS at 06:36

## 2025-01-29 RX ADMIN — PREDNISOLONE ACETATE 1 DROP: 10 SUSPENSION/ DROPS OPHTHALMIC at 21:21

## 2025-01-29 RX ADMIN — POTASSIUM PHOSPHATE, MONOBASIC POTASSIUM PHOSPHATE, DIBASIC: 224; 236 INJECTION, SOLUTION, CONCENTRATE INTRAVENOUS at 21:22

## 2025-01-29 RX ADMIN — PREDNISOLONE ACETATE 1 DROP: 10 SUSPENSION/ DROPS OPHTHALMIC at 15:30

## 2025-01-29 RX ADMIN — CEFTRIAXONE SODIUM 1000 MG: 10 INJECTION, POWDER, FOR SOLUTION INTRAVENOUS at 16:13

## 2025-01-29 RX ADMIN — DORZOLAMIDE HYDROCHLORIDE AND TIMOLOL MALEATE 1 DROP: 20; 5 SOLUTION OPHTHALMIC at 09:41

## 2025-01-29 RX ADMIN — LATANOPROSTENE BUNOD 1 DROP: 0.24 SOLUTION/ DROPS OPHTHALMIC at 21:33

## 2025-01-29 RX ADMIN — ACETAMINOPHEN 650 MG: 650 SUPPOSITORY RECTAL at 10:24

## 2025-01-29 RX ADMIN — MINERAL OIL, WHITE PETROLATUM: .03; .94 OINTMENT OPHTHALMIC at 21:33

## 2025-01-29 RX ADMIN — PREDNISOLONE ACETATE 1 DROP: 10 SUSPENSION/ DROPS OPHTHALMIC at 09:41

## 2025-01-29 NOTE — ASSESSMENT & PLAN NOTE
Acute on chronic kidney disease stage IV  - Outpatient nephrologist Dr. Rey  - Baseline creatinine progressively worsening to low 3's.  Last creatinine at steady state 3.3 in November 2024  - Admission creatinine 3.1 mg/dL on January 18  - Creatinine rising to 3.9 mg/dL on January 21 prompting nephrology consultation  - Patient received contrast study on January 18 and also presented with cardiogenic shock/cardiac arrest and cor pulmonale  - Etiology-likely ATN in the setting of hypotension/shock/IV contrast/cardiac arrest/autoregulatory failure in setting of ARB at home  - Urinalysis with negative protein, 1-2 RBCs  - Renal ultrasound in 2021: Bilateral diffuse echogenicity.  Mild to moderate renal atrophy bilaterally  - Renal function improved, stable, below baseline.  Current creatinine 2.43  - Palliative care following  - On hypotonic fluids for several days due to hypernatremia.    Plan  - Discontinue D5W.  Severe hyperglycemia corrected sodium level likely near 150 depending on correction factor.  Unfortunately no ability to give free water other than IV fluid  -TPN infusing.  - For further fluids are needed may need to give quarter normal saline --the combination of D5W and TPN, severe hyperglycemia

## 2025-01-29 NOTE — ASSESSMENT & PLAN NOTE
Malnutrition Findings:   Adult Malnutrition type: Acute illness  Adult Degree of Malnutrition: Other severe protein calorie malnutrition  Malnutrition Characteristics: Muscle loss, Inadequate energy, Weight loss, Fat loss                  360 Statement: related to dysphagia with recommendation for NPO, pt with > 7 days of less than 50% energy intake compared to estimated needs, muscle, fat wasting, wasted buccal pads, temporalis, supraclavicular space, interosseous. Treatment. Enteral nutrition via feeding tube is indicated for nutrition support- wife is declining nutrition via NGT or PEG at this time. Ongoing goals of care discussion noted.    BMI Findings:           Body mass index is 22.31 kg/m².     Wife agreeable to PICC and TPN. Per RD, TPN as above under principal problem.

## 2025-01-29 NOTE — ASSESSMENT & PLAN NOTE
Lab Results   Component Value Date    HGBA1C 6.9 (H) 01/18/2025     Recent Labs     01/28/25  1211 01/28/25  2328 01/29/25  0732 01/29/25  0951   POCGLU 113 248* 325* 157*     Blood Sugar Average: Last 72 hrs:  (P) [id number]    Per AM BMP: BG this morning was 576. Pt was given 10u regular insulin.    Remains on sliding scale.   Can consider initiating Lantus based on daily insulin requirement moving forward upon initiation of TPN.

## 2025-01-29 NOTE — PROGRESS NOTES
NEPHROLOGY HOSPITAL PROGRESS NOTE   Jeffry Almanzar 89 y.o. male MRN: [id number]  Unit/Bed#: S -01 Encounter: [id number]  Reason for Consult: Acute kidney injury on CKD    Brief History of Admission -89-year-old male who was admitted postcardiac arrest.  Initially required intensive care with pressor support and intubation.  Subsequent improvement was transferred to acute care.  Nephrology consulted for acute kidney injury on CKD    Assessment & Plan  SOFÍA (acute kidney injury) ([facility])    Acute on chronic kidney disease stage IV  - Outpatient nephrologist Dr. Rey  - Baseline creatinine progressively worsening to low 3's.  Last creatinine at steady state 3.3 in November 2024  - Admission creatinine 3.1 mg/dL on January 18  - Creatinine rising to 3.9 mg/dL on January 21 prompting nephrology consultation  - Patient received contrast study on January 18 and also presented with cardiogenic shock/cardiac arrest and cor pulmonale  - Etiology-likely ATN in the setting of hypotension/shock/IV contrast/cardiac arrest/autoregulatory failure in setting of ARB at home  - Urinalysis with negative protein, 1-2 RBCs  - Renal ultrasound in 2021: Bilateral diffuse echogenicity.  Mild to moderate renal atrophy bilaterally  - Renal function improved, stable, below baseline.  Current creatinine 2.43  - Palliative care following  - On hypotonic fluids for several days due to hypernatremia.    Plan  - Discontinue D5W.  Severe hyperglycemia corrected sodium level likely near 150 depending on correction factor.  Unfortunately no ability to give free water other than IV fluid  -TPN infusing.  - For further fluids are needed may need to give quarter normal saline --the combination of D5W and TPN, severe hyperglycemia    CKD (chronic kidney disease) stage 4, GFR 15-29 ml/min ([facility])  -See discussion above  Normal anion gap metabolic acidosis  - Bicarbonate stable, 22  Acute saddle pulmonary embolism with acute cor pulmonale ([facility])  Cardiac  arrest/saddle pulmonary embolus with cor pulmonale      - Initially with cardiogenic shock  - Status post extubation  - Initially requiring pressor support  - On heparin infusion  Cardiac arrest (HCC)  -In the setting of  saddle embolus.  Initially requiring critical care services subsequently transferred to acute care.  Benign 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 ([facility])  - Initially with cardiogenic shock  - Repeat echocardiogram January 20 with EF 55%, grade 1 diastolic dysfunction  - Continue to hold losartan  - Continue as needed hydralazine  - Avoid hypotension     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  (HCC)  -Trend per primary team  - Current creatinine 8.2    Retinal hemorrhage noted on examination of left eye  - seen by ophthalmology-chronic issue likely  Dysphagia  -Patient and family do not want feeding tube placement  - TPN started    Palliative care encounter  Seen by palliative care.  Discussion regarding home palliative services.  Also hospice services were discussed briefly with family.  Goals of care, counseling/discussion  Patient DNR/DNI  Severe protein-calorie malnutrition ([facility])  PICC line in place.  Started on TPN      Type 2 diabetes mellitus with stage 4 chronic kidney disease, without long-term current use of insulin ([facility])  Lab Results   Component Value Date    HGBA1C 6.9 (H) 01/18/2025       Recent Labs     01/28/25  2328 01/29/25  0732 01/29/25  0951 01/29/25  1354   POCGLU 248* 325* 157* 168*       Blood Sugar Average: Last 72 hrs:  (P) 152.375        SUBJECTIVE / 24H INTERVAL HISTORY:  Patient was assisted out of bed to the chair.  Profoundly weak.  Unable to speak.  Patient tried to form several words.  Family in attendance.    OBJECTIVE:  Current Weight: Weight - Scale: 60.8 kg (134 lb 0.6 oz)  Vitals:    01/29/25 0723 01/29/25 1015 01/29/25 1055 01/29/25 1316   BP: 154/84  134/72    BP Location: Left arm      Pulse: 100  85    Resp: 20      Temp: (!) 100.6 °F (38.1  °C) (!) 100.9 °F (38.3 °C)  100 °F (37.8 °C)   TempSrc: Axillary Rectal  Rectal   SpO2: 96%  95%    Weight:       Height:           Intake/Output Summary (Last 24 hours) at 1/29/2025 1451  Last data filed at 1/29/2025 0955  Gross per 24 hour   Intake --   Output 1050 ml   Net -1050 ml     General: Ill-appearing male  Skin: no rash.  Warm and dry  Eyes: anicteric sclera  ENT: Oropharynx dry appearing  Neck: supple  Chest: Crackles bilaterally.  Congestive sounds  CVS: No murmur appreciated.  Irregular rhythm  Abdomen: soft, nontender, nl sounds  Extremities: Mild edema LE b/l.  Profoundly weak  : Output adequate  Neuro: Awakens to voice.  Sitting in the chair.  Psych: Calm  Medications:    Current Facility-Administered Medications:     acetaminophen (TYLENOL) rectal suppository 650 mg, 650 mg, Rectal, Q4H PRN, Dorothy Desai MD, 650 mg at 01/29/25 1024    Adult 3-in-1 TPN (custom base / custom electrolytes), , Intravenous, Continuous TPN, Mosam Enrique, DO, Last Rate: 55 mL/hr at 01/28/25 2234, New Bag at 01/28/25 2234    Adult 3-in-1 TPN (custom base / custom electrolytes), , Intravenous, Continuous TPN, Mosam Enrique, DO    artificial tear ophthalmic ointment, , Both Eyes, HS, Eloise Rodriguez MD, Given at 01/28/25 2236    chlorhexidine (PERIDEX) 0.12 % oral rinse 15 mL, 15 mL, Mouth/Throat, Q12H MARQUIS, Eloise Rodriguez MD, 15 mL at 01/21/25 2106    dorzolamide-timolol (COSOPT) 2-0.5 % ophthalmic solution 1 drop, 1 drop, Both Eyes, BID, Eloise Rodriguez MD, 1 drop at 01/29/25 0941    heparin (porcine) 25,000 units in 0.45% NaCl 250 mL infusion (premix), 3-30 Units/kg/hr (Order-Specific), Intravenous, Titrated, Pawan Gtz MD, Last Rate: 8.4 mL/hr at 01/29/25 0514, 14 Units/kg/hr at 01/29/25 0514    heparin (porcine) injection 2,400 Units, 2,400 Units, Intravenous, Q6H PRN, Pawan Gtz MD    heparin (porcine) injection 4,800 Units, 4,800 Units, Intravenous, Once, Pawan Gtz MD    heparin (porcine) injection 4,800 Units, 4,800 Units,  Intravenous, Q6H PRN, Pawan Gtz MD, 4,800 Units at 01/23/25 1317    hydrALAZINE (APRESOLINE) injection 5 mg, 5 mg, Intravenous, Q6H PRN, Jamaica Jones DO    insulin lispro (HumALOG/ADMELOG) 100 units/mL subcutaneous injection 2-12 Units, 2-12 Units, Subcutaneous, Q6H, 2 Units at 01/29/25 1355 **AND** Fingerstick Glucose (POCT), , , Q6H, Eloise Rodriguez MD    Latanoprostene Bunod 0.024 % SOLN 1 drop, 1 drop, Both Eyes, HS, Eloise Rodriguez MD, 1 drop at 01/28/25 2236    polyethylene glycol (MIRALAX) packet 17 g, 17 g, Oral, Daily, Eloise Rodriguez MD, 17 g at 01/20/25 1505    prednisoLONE acetate (PRED FORTE) 1 % ophthalmic suspension 1 drop, 1 drop, Left Eye, TID, Eloise Rodriguez MD, 1 drop at 01/29/25 0941    scopolamine (TRANSDERM-SCOP) 1 mg/3 days TD 72 hr patch 1 patch, 1 patch, Transdermal, Q72H, Jamaica Jones DO, 1 patch at 01/29/25 0211    [Held by provider] senna-docusate sodium (SENOKOT S) 8.6-50 mg per tablet 1 tablet, 1 tablet, Oral, BID, Eloise Rodriguez MD    tetracaine 0.5 % ophthalmic solution 2 drop, 2 drop, Both Eyes, Once, Jamaica Jones DO    Laboratory Results:  Results from last 7 days   Lab Units 01/29/25  1022 01/29/25  0521 01/28/25  0950 01/27/25  0529 01/26/25  0446 01/25/25  0433 01/24/25  0536 01/23/25  0633   WBC Thousand/uL 11.04*  --  9.62 8.61 8.18 8.27 9.20 8.68   HEMOGLOBIN g/dL 8.7*  --  8.2* 8.9* 9.0* 8.5* 8.1* 9.0*   HEMATOCRIT % 27.6*  --  27.1* 28.1* 28.4* 27.6* 25.9* 27.8*   PLATELETS Thousands/uL 242  --  290 247 221 187 160 145*   POTASSIUM mmol/L  --  4.9 3.7 3.7 4.0 3.4* 3.3* 3.3*   CHLORIDE mmol/L  --  111* 120* 117* 117* 115* 119* 119*   CO2 mmol/L  --  22 23 21 21 22 22 22   BUN mg/dL  --  54* 56* 54* 48* 51* 58* 69*   CREATININE mg/dL  --  2.43* 2.75* 2.62* 2.51* 2.50* 2.61* 3.13*   CALCIUM mg/dL  --  8.7 9.1 8.9 8.8 8.5 8.6 9.0   MAGNESIUM mg/dL  --  2.5 2.4 2.2 2.1 2.1 2.3 2.4   PHOSPHORUS mg/dL  --  4.2* 3.4 3.7 3.8 2.2*  --   --        Portions of the record may have  "been created with voice recognition software. Occasional wrong word or \"sound a like\" substitutions may have occurred due to the inherent limitations of voice recognition software. Read the chart carefully and recognize, using context, where substitutions have occurred.If you have any questions, please contact the dictating provider.   "

## 2025-01-29 NOTE — ASSESSMENT & PLAN NOTE
Lab Results   Component Value Date    EGFR 22 01/29/2025    EGFR 19 01/28/2025    EGFR 20 01/27/2025    CREATININE 2.43 (H) 01/29/2025    CREATININE 2.75 (H) 01/28/2025    CREATININE 2.62 (H) 01/27/2025   Per patient and patient's wife, would not be interested in dialysis   Nephro on board. Appreciate recommendations.

## 2025-01-29 NOTE — ASSESSMENT & PLAN NOTE
Lab Results   Component Value Date    HGBA1C 6.9 (H) 01/18/2025       Recent Labs     01/28/25  2328 01/29/25  0732 01/29/25  0951 01/29/25  1354   POCGLU 248* 325* 157* 168*       Blood Sugar Average: Last 72 hrs:  (P) 152.375

## 2025-01-29 NOTE — PROGRESS NOTES
Progress Note - Hospitalist   Name: Jeffry Almanzar 89 y.o. male I MRN: [id number]  Unit/Bed#: S -01 I Date of Admission: 1/17/2025   Date of Service: 1/29/2025 I Hospital Day: 11    Assessment & Plan  Dysphagia  Pt failed VBS 1/23/25: moderate oral/severe pharyngeal dysphagia characterized by weak swallow mechanics, multiple swallows noted to clear pharyngeal retention with overflow penetration and aspiration observed after the swallow   Had numerous discussions regarding goals of care with family and multidisciplinary team. Explained risks versus benefits of PICC line and subsequent TPN, versus pleasure feeds on comfort care. 1/27 pt's wife decided on obtaining PICC for patient and signed informed consent form. Process, risks, and outcomes were reviewed at length with the patient's wife at bedside, alongside Dr. Gtz. All questions/concerns answered. Pt's wife verbalizes understanding and wishes to proceed with PICC line.   1/28 - pt received PICC line and had TPN initiated in the evening    Overnight, there were concerns for excessive secretions and yellow/green sputum. Sputum culture was sent. This morning, pt was noted to be hyperglycemic (576) for which pt was given 10u regular insulin. He was also noted to have a low-grade fever this morning (Tmax 100.9F).     Plan  F/u on lactic acid, procalcitonin, blood cx  Procal elevated 1.21. Lactic acid wnl  Leucocytosis (11.04)  Blood cx collected, pending  Ordered CXR  Per radiology interpretation: Limited examination secondary to low lung volumes. Mild pulmonary vascular congestion with small bilateral pleural effusions, not appreciably changed from prior radiograph. No evidence of new focal consolidation.  Given pt's leucocytosis, tachycardia/tachypnea, low-grade fever, elevated procal, in the setting of increased secretions -- will initiate empiric ABX for possible respiratory source   F/u on sputum culture & gram stain  Per RD recommendations for TPN: 750ml  D20, 400ml 15% AA, 100ml 20% lipids, 950kcal (16kcal/kg), 1320ml (22ml/kg), 50mEq Na acetate (~1/4 NS equivalent), 30mEq K acetate, 20mmol K phosphate, 4.6mEq Ca, 8mEq Mg, 100mg thiamine  Scopolamine patch to manage secretions  Confirmed with Pharmacy: pt's hx of glaucoma is a contraindication for glycopyrrolate (both open angle & angle closure)  Maintain strict NPO  Repeat BMP, phosphate, Mag, procal, CBC in AM  Will repeat SLP evaluation after a few days of TPN, per pt's family request  Type 2 diabetes mellitus with stage 4 chronic kidney disease, without long-term current use of insulin ([facility])  Lab Results   Component Value Date    HGBA1C 6.9 (H) 01/18/2025     Recent Labs     01/28/25  1211 01/28/25  2328 01/29/25  0732 01/29/25  0951   POCGLU 113 248* 325* 157*     Blood Sugar Average: Last 72 hrs:  (P) 151.4113752418924029    Per AM BMP: BG this morning was 576. Pt was given 10u regular insulin.    Remains on sliding scale.   Can consider initiating Lantus based on daily insulin requirement moving forward upon initiation of TPN.  Cognitive impairment  Per wife, patient has some degree of dementia but is unclear of specifics. Patient oriented to self and place but unaware of year and month.  Per wife, unsure if patient is completely aware of what is going on and is unsure if patient would truly want to accept intubation a second time    Plan  Neuropsych evaluation determined patient does NOT have capacity for MDM  We will have further discussions with family and patient depending on overall prognosis and goals of care  Palliative on board. Appreciate recommendations. As of 1/23/2025, family made the decision to make patient's code status LEVEL 3 DNR/DNI.  Ongoing discussions regarding palliative care.   Acute saddle pulmonary embolism with acute cor pulmonale (HCC)  Initially with cardiac arrest and cardiogenic shock, is now extubated off pressors. Was transferred  to med/surg 1/21.   MRI brain negative for CVA  or concern for hemorrhage.      Continue heparin gtt.  SOFÍA (acute kidney injury) ([facility])  Etiology: Suspect due to ATN in the setting of cardiogenic shock, cardiac arrest, hypotension and IV contrast exposure with autoregulatory dysfunction with ARB    Plan  Per Nephrology:  Discontinue D5W today d/t resolution of hypernatremia and initiation of TPN  Continue to hold losartan and Farxiga  Strict I/Os, daily weights  Avoid nephrotoxins, NSAIDs, further IV contrast as able  Avoid hypotension.  Maintain MAP >65  Hypernatremia (Resolved: 1/29/2025)  Na this morning 143.   Nephrology has been following closely, adjusting D5W as necessary.    Discontinued D5W today 1/29.   Cardiac arrest ([facility])  Cardiac arrest in ED after CTA evaluating for pulmonary embolus.  Asystole for 2 minutes requiring CPR and intubation s/P extubation 1/21/2025  ECHO with EF of 65% in September, ECHO with left ventricular ejection fraction is 55% (1/20)    Plan  Continue heparin gtt  Continue to monitor on tele due to recent PE, recent MI, and electrolyte abnormalities   Benign 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 ([facility])  Lab Results   Component Value Date    EGFR 22 01/29/2025    EGFR 19 01/28/2025    EGFR 20 01/27/2025    CREATININE 2.43 (H) 01/29/2025    CREATININE 2.75 (H) 01/28/2025    CREATININE 2.62 (H) 01/27/2025   Holding Losartan and Farxiga per nephro due to elevated Cr   Hydralazine 5 mg q 6 hrs for BP >180/110   Initially allowed for permissive hypertension due to concern for stroke.  However, patient's MRI does not show concern for infarct.  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  MRI brain and MRA negative for infarct, high-grade stenosis, and LVO  Will aim for normotension.  As needed hydralazine for BP greater than  180/110  Avoid losartan and Farxiga per nephro given kidney function   Palliative care encounter  Patient seen and evaluated by palliative care 1/23. Palliative care had an in-depth discussion with family regarding patient's GOC and overall prognosis. Family would NOT like to pursue a PEG tube at this time.   CODE STATUS changed from Level 2 to Level 3 DNR/DNI  Ongoing goals of care discussions with consideration for comfort measures only  Goals of care, counseling/discussion  Palliative on board. Appreciate recommendations   CKD (chronic kidney disease) stage 4, GFR 15-29 ml/min ([facility])  Lab Results   Component Value Date    EGFR 22 01/29/2025    EGFR 19 01/28/2025    EGFR 20 01/27/2025    CREATININE 2.43 (H) 01/29/2025    CREATININE 2.75 (H) 01/28/2025    CREATININE 2.62 (H) 01/27/2025   Per patient and patient's wife, would not be interested in dialysis   Nephro on board. Appreciate recommendations.  Retinal hemorrhage noted on examination of left eye  Found on head CT 1/21.  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  Instructed patient to follow-up with ophthalmologist in outpatient setting  Severe protein-calorie malnutrition (HCC)  Malnutrition Findings:   Adult Malnutrition type: Acute illness  Adult Degree of Malnutrition: Other severe protein calorie malnutrition  Malnutrition Characteristics: Muscle loss, Inadequate energy, Weight loss, Fat loss                  360 Statement: related to dysphagia with recommendation for NPO, pt with > 7 days of less than 50% energy intake compared to estimated needs, muscle, fat wasting, wasted buccal pads, temporalis, supraclavicular space, interosseous. Treatment. Enteral nutrition via feeding tube is indicated for nutrition support- wife is declining nutrition via NGT or PEG at this time. Ongoing goals of care discussion  noted.    BMI Findings:           Body mass index is 22.31 kg/m².     Wife agreeable to PICC and TPN. Per RD, TPN as above under principal problem.     VTE Pharmacologic Prophylaxis: VTE Score: 6 High Risk (Score >/= 5) - Pharmacological DVT Prophylaxis Ordered: heparin drip. Sequential Compression Devices Ordered.    Mobility:   Basic Mobility Inpatient Raw Score: 6  JH-HLM Goal: 2: Bed activities/Dependent transfer  JH-HLM Achieved: 2: Bed activities/Dependent transfer  JH-HLM Goal achieved. Continue to encourage appropriate mobility.    Patient Centered Rounds: I performed bedside rounds with nursing staff today.   Discussions with Specialists or Other Care Team Provider: Nephrology    Education and Discussions with Family / Patient: Updated  (wife) at bedside.    Current Length of Stay: 11 day(s)  Current Patient Status: Inpatient   Certification Statement: The patient will continue to require additional inpatient hospital stay due to initiation of TPN.  Discharge Plan: Anticipate discharge in 48-72 hrs to to be determined, pending TPN tolerability and prognosis.    Code Status: Level 3 - DNAR and DNI    Subjective   Pt seen and evaluated at bedside this morning. Per report, pt was noted to have excess secretions and was apparently producing yellowish green sputum; culture was sent. This morning he was noted to be hyperglycemic on AM BMP and was given 10u regular insulin. At the time of evaluation, pt is awake and able to shake head/nod head in response to questions, as well as follow movements with his eyes. Wife at bedside states he did not sleep well throughout the night due to increased secretions.     Objective :  Temp:  [99.7 °F (37.6 °C)-100.9 °F (38.3 °C)] 100.9 °F (38.3 °C)  HR:  [] 85  BP: (134-164)/(72-87) 134/72  Resp:  [20] 20  SpO2:  [92 %-96 %] 95 %  O2 Device: None (Room air)    Body mass index is 22.31 kg/m².     Input and Output Summary (last 24 hours):     Intake/Output  Summary (Last 24 hours) at 1/29/2025 1224  Last data filed at 1/29/2025 0955  Gross per 24 hour   Intake --   Output 1050 ml   Net -1050 ml       Physical Exam  Vitals reviewed.   Constitutional:       General: He is awake.      Appearance: He is underweight. He is ill-appearing.   HENT:      Head: Normocephalic and atraumatic.      Right Ear: External ear normal.      Left Ear: External ear normal.      Mouth/Throat:      Mouth: Mucous membranes are dry.   Eyes:      General: No scleral icterus.     Extraocular Movements: Extraocular movements intact.   Cardiovascular:      Rate and Rhythm: Tachycardia present.      Pulses: Normal pulses.   Pulmonary:      Effort: Tachypnea present.      Breath sounds: Rales (bilateral) present.      Comments: Increased pulmonary secretions from previous  Chest:      Chest wall: No tenderness.   Abdominal:      Tenderness: There is no abdominal tenderness. There is no guarding.   Skin:     General: Skin is warm.      Capillary Refill: Capillary refill takes 2 to 3 seconds.      Coloration: Skin is not jaundiced.   Neurological:      Motor: Weakness present.         Lines/Drains:  Lines/Drains/Airways       Active Status       Name Placement date Placement time Site Days    PICC Line 01/28/25 Right Brachial 01/28/25  0930  Brachial  1    Urethral Catheter 01/17/25  0000  --  12                       Central Line:  Goal for removal: Continuing for TPN.          Telemetry:  Telemetry Orders (From admission, onward)               24 Hour Telemetry Monitoring  Continuous x 24 Hours (Telem)        Expiring   Question:  Reason for 24 Hour Telemetry  Answer:  Pulmonary Embolism                     Telemetry Reviewed: Sinus Tachycardia  Indication for Continued Telemetry Use: PE               Lab Results: I have reviewed the following results:   Results from last 7 days   Lab Units 01/29/25  1022   WBC Thousand/uL 11.04*   HEMOGLOBIN g/dL 8.7*   HEMATOCRIT % 27.6*   PLATELETS Thousands/uL  242     Results from last 7 days   Lab Units 01/29/25  0521   SODIUM mmol/L 143   POTASSIUM mmol/L 4.9   CHLORIDE mmol/L 111*   CO2 mmol/L 22   BUN mg/dL 54*   CREATININE mg/dL 2.43*   ANION GAP mmol/L 10   CALCIUM mg/dL 8.7   GLUCOSE RANDOM mg/dL 576*         Results from last 7 days   Lab Units 01/29/25  0951 01/29/25  0732 01/28/25  2328 01/28/25  1211 01/28/25  0620 01/28/25  0047 01/27/25  1849 01/27/25  1557 01/27/25  1123 01/27/25  0017 01/26/25  1531 01/26/25  1020   POC GLUCOSE mg/dl 157* 325* 248* 113 95 167* 148* 145* 129 133 142* 121         Results from last 7 days   Lab Units 01/29/25  1022 01/29/25  0521   LACTIC ACID mmol/L 1.5  --    PROCALCITONIN ng/ml  --  1.21*       Recent Cultures (last 7 days):         Imaging Results Review: I reviewed radiology reports from this admission including: CT head, CTA PE, CXR, MRI brain, MRA head, LL venous duplex, VBS .    Last 24 Hours Medication List:     Current Facility-Administered Medications:     acetaminophen (TYLENOL) rectal suppository 650 mg, Q4H PRN    Adult 3-in-1 TPN (custom base / custom electrolytes), Continuous TPN, Last Rate: 55 mL/hr at 01/28/25 2234    artificial tear ophthalmic ointment, HS    chlorhexidine (PERIDEX) 0.12 % oral rinse 15 mL, Q12H MARQUIS    dorzolamide-timolol (COSOPT) 2-0.5 % ophthalmic solution 1 drop, BID    heparin (porcine) 25,000 units in 0.45% NaCl 250 mL infusion (premix), Titrated, Last Rate: 14 Units/kg/hr (01/29/25 0514)    heparin (porcine) injection 2,400 Units, Q6H PRN    heparin (porcine) injection 4,800 Units, Once    heparin (porcine) injection 4,800 Units, Q6H PRN    hydrALAZINE (APRESOLINE) injection 5 mg, Q6H PRN    insulin lispro (HumALOG/ADMELOG) 100 units/mL subcutaneous injection 2-12 Units, Q6H **AND** Fingerstick Glucose (POCT), Q6H    Latanoprostene Bunod 0.024 % SOLN 1 drop, HS    polyethylene glycol (MIRALAX) packet 17 g, Daily    prednisoLONE acetate (PRED FORTE) 1 % ophthalmic suspension 1 drop,  TID    scopolamine (TRANSDERM-SCOP) 1 mg/3 days TD 72 hr patch 1 patch, Q72H    [Held by provider] senna-docusate sodium (SENOKOT S) 8.6-50 mg per tablet 1 tablet, BID    tetracaine 0.5 % ophthalmic solution 2 drop, Once    Administrative Statements   Today, Patient Was Seen By: Mercedes Nunez,   PGY-I  I have spent a total time of >45 minutes in caring for this patient on the day of the visit/encounter including Prognosis, Risks and benefits of tx options, Instructions for management, Patient and family education, Importance of tx compliance, Risk factor reductions, Impressions, Counseling / Coordination of care, Documenting in the medical record, Reviewing / ordering tests, medicine, procedures  , Obtaining or reviewing history  , and Communicating with other healthcare professionals .    **Please Note: This note may have been constructed using a voice recognition system.**

## 2025-01-29 NOTE — PHYSICAL THERAPY NOTE
PHYSICAL THERAPY NOTE          Patient Name: Jeffry Almanzar  Today's Date: 1/29/2025 01/29/25 1329   PT Last Visit   PT Visit Date 01/29/25   Note Type   Note Type Treatment   Pain Assessment   Pain Assessment Tool 0-10   Pain Score No Pain   Hospital Pain Intervention(s) Repositioned;Elevated;Rest   Multiple Pain Sites No   Pain Rating: FLACC (Rest) - Face 0   Pain Rating: FLACC (Rest) - Legs 0   Pain Rating: FLACC (Rest) - Activity 0   Pain Rating: FLACC (Rest) - Cry 0   Pain Rating: FLACC (Rest) - Consolability 0   Score: FLACC (Rest) 0   Pain Rating: FLACC (Activity) - Face 0   Pain Rating: FLACC (Activity) - Legs 0   Pain Rating: FLACC (Activity) - Activity 0   Pain Rating: FLACC (Activity) - Cry 0   Pain Rating: FLACC (Activity) - Consolability 0   Score: FLACC (Activity) 0   Restrictions/Precautions   Weight Bearing Precautions Per Order No   Other Precautions Chair Alarm;Bed Alarm;Fall Risk;Multiple lines  (IV, catheter)   General   Chart Reviewed Yes   Response to Previous Treatment Patient with no complaints from previous session.   Family/Caregiver Present Yes  (spouse, family and friends)   Cognition   Overall Cognitive Status Impaired   Arousal/Participation Cooperative;Responsive   Attention Attends with cues to redirect   Orientation Level Unable to assess   Memory Decreased short term memory;Decreased recall of recent events;Decreased recall of precautions   Following Commands Follows one step commands with increased time or repetition   Comments pt was cooperative in [facility] tx session but required several VC's for safety and balance with bed mobility and functional transfers   Subjective   Subjective pt reports no pain in [facility] tx session and was agreeable to participate in PT intervention   Bed Mobility   Supine to Sit 2  Maximal assistance   Additional items Assist x 1;HOB elevated;Bedrails;Increased time  required;Verbal cues;LE management;Other  (trunk management)   Sit to Supine Unable to assess   Additional Comments pt seated OOB in ivette recliner post tx with call bell, legs elevated, chair alarm activated, family present and all pt needs met   Transfers   Sit to Stand 3  Moderate assistance   Additional items Assist x 2;Increased time required;Verbal cues  (w/ RW)   Stand to Sit 3  Moderate assistance   Additional items Assist x 2;Armrests;Increased time required;Verbal cues   Stand pivot 2  Maximal assistance   Additional items Assist x 2;Armrests;Increased time required;Verbal cues  (w/ RW pt demonstrated R sided/posterior lean)   Additional Comments pt continues to be limited with funcitonal transfgesr requiring mod ax2 to max ax2 for STS and SPT's in todays tx session with RW management and VC's for hand placement   Ambulation/Elevation   Gait pattern Not appropriate;Not tested   Balance   Static Sitting Fair -  (fair- to poor)   Static Standing Poor -  (poor- x2)   Ambulatory Zero   Endurance Deficit   Endurance Deficit Yes   Endurance Deficit Description limited activity tolerance, bed mobility, standing tolerance/balance anbd functional mobility   Activity Tolerance   Activity Tolerance Patient limited by fatigue;Other (Comment)  (generalized weakness)   Nurse Made Aware Spoke to RN   Assessment   Prognosis Fair   Problem List Decreased strength;Decreased endurance;Impaired balance;Decreased mobility;Decreased coordination;Decreased cognition;Impaired judgement;Decreased safety awareness   Assessment pt began tx session lying supine in the bed as pt was agreeable to participate in PT intervention. Care coordination with MARK Grossman due to pt anticipated level of assistance for bed mobility, functional transfers and possible ambulation. pt continues to remain consistant fro requiring max Ax1 for completing a supine<>sit EOB tranfer with LE and trunk management. While seated EOB pt level of assistance for  sitting balance varied frrom close /s to mod Ax1 as pt did demonstrate a posterior lean. pt continues to require mod ax2 for all STS w/ RW and max Ax2 for completing a SPT from EOB to recliner with RW. pt was able to increase static standing tolerance as pt stood 1 minute w/ Ax2 as RN completed a rectal temp. pt did require a seated therapeutic rest break before attempting to transfer to recliner due to fatigue and generalized weakness. While completing SPT w/ RW from EOB to recliner pt required max Ax2 with VC's for lateral stepping and mod Ax1 for assistance w/ RW maangement. pt continues to be at an increased risk for falls and injuries with all OOB activities at this time as pt would benefit from continued skilled PT intervention in order to reduce patients risk for falls and injuries. Continue to recommend DC w/ level 2 moderate rehab resource intensity when medically cleared   Barriers to Discharge Inaccessible home environment   Goals   Patient Goals none stated   STG Expiration Date 01/31/25   PT Treatment Day 2   Plan   Treatment/Interventions ADL retraining;Functional transfer training;LE strengthening/ROM;Therapeutic exercise;Endurance training;Cognitive reorientation;Patient/family training;Equipment eval/education;Bed mobility;Gait training;Spoke to nursing;Compensatory technique education   Progress No functional improvements   PT Frequency 3-5x/wk   Discharge Recommendation   Rehab Resource Intensity Level, PT II (Moderate Resource Intensity)   AM-PAC Basic Mobility Inpatient   Turning in Flat Bed Without Bedrails 2   Lying on Back to Sitting on Edge of Flat Bed Without Bedrails 2   Moving Bed to Chair 1   Standing Up From Chair Using Arms 2   Walk in Room 1   Climb 3-5 Stairs With Railing 1   Basic Mobility Inpatient Raw Score 9   [facility] Highest Level Of Mobility   -HLM Goal 3: Sit at edge of bed   -HLM Achieved 4: Move to chair/commode   Education   Education Provided Other  (bed mobility,  functional transfers)   Patient Reinforcement needed   End of Consult   Patient Position at End of Consult Bedside chair;Bed/Chair alarm activated;All needs within reach   The patient's AM-PAC Basic Mobility Inpatient Short Form Raw Score is 9. A Raw score of less than or equal to 16 suggests the patient may benefit from discharge to post-acute rehabilitation services. Please also refer to the recommendation of the Physical Therapist for safe discharge planning.    Lorne Lr

## 2025-01-29 NOTE — OCCUPATIONAL THERAPY NOTE
Occupational Therapy Treatment Note     Patient Name: Jeffry Almanzar  Today's Date: 1/29/2025  Problem List  Principal Problem:    Acute saddle pulmonary embolism with acute cor pulmonale (HCC)  Active Problems:    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  (HCC)    Benign 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 (HCC)    CKD (chronic kidney disease) stage 4, GFR 15-29 ml/min (HCC)    Type 2 diabetes mellitus with stage 4 chronic kidney disease, without long-term current use of insulin (HCC)    SOFÍA (acute kidney injury) (HCC)    Lacunar cerebrovascular accident (CVA) (HCC)    Cardiac arrest (HCC)    Normal anion gap metabolic acidosis    Retinal hemorrhage noted on examination of left eye    Hypoxia    Cognitive impairment    Palliative care encounter    Goals of care, counseling/discussion    Dysphagia    Severe protein-calorie malnutrition (HCC)        01/29/25 1301   OT Last Visit   OT Visit Date 01/29/25   Note Type   Note Type Treatment   Pain Assessment   Pain Assessment Tool 0-10   Pain Score No Pain   Restrictions/Precautions   Weight Bearing Precautions Per Order No   Other Precautions Aspiration;Fall Risk;Pain;Multiple lines;Cognitive;Chair Alarm;Bed Alarm;Hard of hearing  (PICC line to TPN,)   Lifestyle   Autonomy Pt completed basic ADLs w/ out assistance and utilized cane for functional mobility   Reciprocal Relationships Supportive wife present, family   Service to Others Pt is retired   Intrinsic Gratification Pt reports enjoying watch the [place] on tv, having family visit.   Functional Standing Tolerance   Time 1 min + 30s   Activity transfer training, nursing care, functional tasks   Comments Max A x 2 + BUE on RW, leaning towards R and retropulsive   Bed Mobility   Supine to Sit 2  Maximal assistance   Additional items Assist x 1;Increased time required;Verbal cues;LE management  (trunk management , towards R)   Sit to Supine   (seated OOB in recliner at end of session)   Additional  Comments sat EOB x 5 minutes with primarily close supervision, 1 retropulsive LOB requiring mod A to correct   Transfers   Sit to Stand 3  Moderate assistance   Additional items Assist x 2;Increased time required;Verbal cues   Stand to Sit 3  Moderate assistance   Additional items Assist x 2;Increased time required;Verbal cues   Stand pivot 2  Maximal assistance   Additional items Assist x 2;Increased time required;Verbal cues  (RW; ambulating transfer)   Additional Comments multimode cueing for hand / foot placements, initiation of transfers. Assistance to place hands onto RW once standing. in stance pt requires cues for weight shifting and widening HARIS. completes ambulating transfer EOB > recliner towards R, continued R lateral leaning. , SpO2 95% on RA   Subjective   Subjective Pt agreeable to OT session. Primarily communicates via head nods and short phrases,   Cognition   Overall Cognitive Status Impaired   Arousal/Participation Responsive;Cooperative   Attention Difficulty attending to directions   Orientation Level Oriented to person;Disoriented to place;Disoriented to time;Disoriented to situation   Memory Decreased recall of precautions;Decreased recall of recent events;Decreased short term memory   Following Commands Follows one step commands inconsistently  (approx 50% command following during session, benefits from multimode input)   Comments increased processing time, [place]. Pt with limited comprehension of education during session. Family present for carryover education   Activity Tolerance   Activity Tolerance Patient limited by fatigue   Medical Staff Made Aware Care coordination LILIYA Wei PTA   Assessment   Assessment Patient seen for OT treatment on 1/29/2025 s/p admission for Acute saddle pulmonary embolism with acute cor pulmonale (HCC) Patient agreeable to OT session. Patient participated in self-care transfers, bed mobility , and functional mobility with intervention focus on  maximizing functional independence during ADL tasks. Jeffry Almanzar does not demonstrate improvements in functional status on this date; continues to be limited by balance, generalized weakness, trunk control, and cognitive status. Command following and engagement worse on this date as compared to previous session. However pt able to progress to sitting OOB in recliner for prolonged upright positioning.  From OT standpoint, patient would benefit from skilled intervention to maximize independence with ADLs and functional mobility. Goals remain appropriate, continue POC. At this time, recommending D/C to: Level 2: moderate resource intensity   Plan   Treatment Interventions ADL retraining;Functional transfer training;UE strengthening/ROM;Endurance training;Patient/family training;Neuromuscular reeducation;Compensatory technique education;Energy conservation;Equipment evaluation/education   Goal Expiration Date 02/04/25   OT Treatment Day 2   OT Frequency 3-5x/wk   Discharge Recommendation   Rehab Resource Intensity Level, OT II (Moderate Resource Intensity)   Additional Comments  The patient's raw score on the AM-PAC Daily Activity Inpatient Short Form is 9. A raw score of less than 19 suggests the patient may benefit from discharge to post-acute rehabilitation services. Please refer to the recommendation of the Occupational Therapist for safe discharge planning.   AM-PAC Daily Activity Inpatient   Lower Body Dressing 1   Bathing 2   Toileting 1   Upper Body Dressing 2   Grooming 2   Eating 1   Daily Activity Raw Score 9   AM-PAC Applied Cognition Inpatient   Following a Speech/Presentation 1   Understanding Ordinary Conversation 2   Taking Medications 1   Remembering Where Things Are Placed or Put Away 2   Remembering List of 4-5 Errands 2   Taking Care of Complicated Tasks 1   Applied Cognition Raw Score 9   Applied Cognition Standardized Score 22.48   End of Consult   Education Provided Yes;Family or social support  of family present for education by provider   Patient Position at End of Consult Bedside chair;Bed/Chair alarm activated;All needs within reach   Nurse Communication Nurse aware of consult  (Maria Esther)     Pt goals to be met by 2/4/25 to max I w/ ADLs and improve engagement to return home w/ wife includes:     -Pt will complete bed mobility supine <> sit w/ min A to max I and minimize burden of care in preparation for ADLs     -Pt will demonstrate good attention and participation in ongoing eval of functional cognition to assist in DC Planning PROGRESSING      -Pt will consistently follow 2 step directions during ADLs w/ good recall PROGRESSING      -Pt will complete functional transfers to bed, chair, and toilet using LRAD, DME as needed w/ mod A x1 to max I and minimize burden of care PROGRESSING      -Pt will demonstrate improved functional sitting tolerance OOB In chair 2-4 hours / day.      -Pt will complete UBD w/ min A after set- up to minimize burden of care     -Pt will complete LBD w/ mod A to max I and minimize burden of care     -Pt will complete functional transfers to bed, chair, and toilet using LRAD, DME as needed w/ min A      -Pt will demonstrate good attention and participation in ongoing eval of functional mobility using LRAD to max I and assist in DC Planning NOT PROGRESSING      -Pt will complete grooming / feeding w/ S after set- up in supported sitting NOT PROGRESSING      -Pt will demonstrate improved sustained activity tolerance to participate in ADLs vs preferred leisure tasks for 15 minutes w/ no more than 1 rest break. NOT PROGRESSING       Ngoc Price, OT

## 2025-01-29 NOTE — PLAN OF CARE
Problem: OCCUPATIONAL THERAPY ADULT  Goal: Performs self-care activities at highest level of function for planned discharge setting.  See evaluation for individualized goals.  Description: Treatment Interventions: ADL retraining, Functional transfer training, UE strengthening/ROM, Endurance training, Cognitive reorientation, Patient/family training, Equipment evaluation/education, Compensatory technique education, Continued evaluation, Energy conservation, Activityengagement          See flowsheet documentation for full assessment, interventions and recommendations.   Outcome: Not Progressing  Note: Limitation: Decreased ADL status, Decreased UE ROM, Decreased UE strength, Decreased Safe judgement during ADL, Decreased cognition, Decreased endurance, Visual deficit, Decreased fine motor control, Decreased self-care trans, Decreased high-level ADLs (impaired pain, activity tolerance, sitting tolerance, sitting balance, standing tolerance, generalized weakness, forward functional reach)     Assessment: Patient seen for OT treatment on 1/29/2025 s/p admission for Acute saddle pulmonary embolism with acute cor pulmonale (HCC) Patient agreeable to OT session. Patient participated in self-care transfers, bed mobility , and functional mobility with intervention focus on maximizing functional independence during ADL tasks. Jeffry Almanzar does not demonstrate improvements in functional status on this date; continues to be limited by balance, generalized weakness, trunk control, and cognitive status. Command following and engagement worse on this date as compared to previous session. However pt able to progress to sitting OOB in recliner for prolonged upright positioning.  From OT standpoint, patient would benefit from skilled intervention to maximize independence with ADLs and functional mobility. Goals remain appropriate, continue POC. At this time, recommending D/C to: Level 2: moderate resource intensity     Rehab  Resource Intensity Level, OT: II (Moderate Resource Intensity)     Ngoc Price, OT

## 2025-01-29 NOTE — ASSESSMENT & PLAN NOTE
Etiology: Suspect due to ATN in the setting of cardiogenic shock, cardiac arrest, hypotension and IV contrast exposure with autoregulatory dysfunction with ARB    Plan  Per Nephrology:  Discontinue D5W today d/t resolution of hypernatremia and initiation of TPN  Continue to hold losartan and Farxiga  Strict I/Os, daily weights  Avoid nephrotoxins, NSAIDs, further IV contrast as able  Avoid hypotension.  Maintain MAP >65

## 2025-01-29 NOTE — PLAN OF CARE
Problem: PAIN - ADULT  Goal: Verbalizes/displays adequate comfort level or baseline comfort level  Description: Interventions:  - Encourage patient to monitor pain and request assistance  - Assess pain using appropriate pain scale  - Administer analgesics based on type and severity of pain and evaluate response  - Implement non-pharmacological measures as appropriate and evaluate response  - Consider cultural and social influences on pain and pain management  - Notify physician/advanced practitioner if interventions unsuccessful or patient reports new pain  Outcome: Progressing     Problem: INFECTION - ADULT  Goal: Absence or prevention of progression during hospitalization  Description: INTERVENTIONS:  - Assess and monitor for signs and symptoms of infection  - Monitor lab/diagnostic results  - Monitor all insertion sites, i.e. indwelling lines, tubes, and drains  - Monitor endotracheal if appropriate and nasal secretions for changes in amount and color  - [place] appropriate cooling/warming therapies per order  - Administer medications as ordered  - Instruct and encourage patient and family to use good hand hygiene technique  - Identify and instruct in appropriate isolation precautions for identified infection/condition  Outcome: Progressing  Goal: Absence of fever/infection during neutropenic period  Description: INTERVENTIONS:  - Monitor WBC    Outcome: Progressing     Problem: SAFETY ADULT  Goal: Patient will remain free of falls  Description: INTERVENTIONS:  - Educate patient/family on patient safety including physical limitations  - Instruct patient to call for assistance with activity   - Consult OT/PT to assist with strengthening/mobility   - Keep Call bell within reach  - Keep bed low and locked with side rails adjusted as appropriate  - Keep care items and personal belongings within reach  - Initiate and maintain comfort rounds  - Make Fall Risk Sign visible to staff  - Offer Toileting every 2 Hours,  in advance of need  - Initiate/Maintain bed alarm  - Obtain necessary fall risk management equipment  - Apply yellow socks and bracelet for high fall risk patients  - Consider moving patient to room near nurses station  Outcome: Progressing  Goal: Maintain or return to baseline ADL function  Description: INTERVENTIONS:  -  Assess patient's ability to carry out ADLs; assess patient's baseline for ADL function and identify physical deficits which impact ability to perform ADLs (bathing, care of mouth/teeth, toileting, grooming, dressing, etc.)  - Assess/evaluate cause of self-care deficits   - Assess range of motion  - Assess patient's mobility; develop plan if impaired  - Assess patient's need for assistive devices and provide as appropriate  - Encourage maximum independence but intervene and supervise when necessary  - Involve family in performance of ADLs  - Assess for home care needs following discharge   - Consider OT consult to assist with ADL evaluation and planning for discharge  - Provide patient education as appropriate  Outcome: Progressing  Goal: Maintains/Returns to pre admission functional level  Description: INTERVENTIONS:  - Perform AM-PAC 6 Click Basic Mobility/ Daily Activity assessment daily.  - Set and communicate daily mobility goal to care team and patient/family/caregiver.   - Collaborate with rehabilitation services on mobility goals if consulted  - Reposition patient every 2 hours.  - Record patient progress and toleration of activity level   Outcome: Progressing     Problem: DISCHARGE PLANNING  Goal: Discharge to home or other facility with appropriate resources  Description: INTERVENTIONS:  - Identify barriers to discharge w/patient and caregiver  - Arrange for needed discharge resources and transportation as appropriate  - Identify discharge learning needs (meds, wound care, etc.)  - Arrange for interpretive services to assist at discharge as needed  - Refer to Case Management Department  for coordinating discharge planning if the patient needs post-hospital services based on physician/advanced practitioner order or complex needs related to functional status, cognitive ability, or social support system  Outcome: Progressing     Problem: Knowledge Deficit  Goal: Patient/family/caregiver demonstrates understanding of disease process, treatment plan, medications, and discharge instructions  Description: Complete learning assessment and assess knowledge base.  Interventions:  - Provide teaching at level of understanding  - Provide teaching via preferred learning methods  Outcome: Progressing     Problem: Nutrition/Hydration-ADULT  Goal: Nutrient/Hydration intake appropriate for improving, restoring or maintaining nutritional needs  Description: Monitor and assess patient's nutrition/hydration status for malnutrition. Collaborate with interdisciplinary team and initiate plan and interventions as ordered.  Monitor patient's weight and dietary intake as ordered or per policy. Utilize nutrition screening tool and intervene as necessary. Determine patient's food preferences and provide high-protein, high-caloric foods as appropriate.     INTERVENTIONS:  - Monitor oral intake, urinary output, labs, and treatment plans  - Assess nutrition and hydration status and recommend course of action  - Evaluate amount of meals eaten  - Assist patient with eating if necessary   - Allow adequate time for meals  - Recommend/ encourage appropriate diets, oral nutritional supplements, and vitamin/mineral supplements  - Order, calculate, and assess calorie counts as needed  - Recommend, monitor, and adjust tube feedings and TPN/PPN based on assessed needs  - Assess need for intravenous fluids  - Provide specific nutrition/hydration education as appropriate  - Include patient/family/caregiver in decisions related to nutrition  Outcome: Progressing     Problem: Prexisting or High Potential for Compromised Skin Integrity  Goal:  Skin integrity is maintained or improved  Description: INTERVENTIONS:  - Identify patients at risk for skin breakdown  - Assess and monitor skin integrity  - Assess and monitor nutrition and hydration status  - Monitor labs   - Assess for incontinence   - Turn and reposition patient  - Assist with mobility/ambulation  - Relieve pressure over bony prominences  - Avoid friction and shearing  - Provide appropriate hygiene as needed including keeping skin clean and dry  - Evaluate need for skin moisturizer/barrier cream  - Collaborate with interdisciplinary team   - Patient/family teaching  - Consider wound care consult   Outcome: Progressing

## 2025-01-29 NOTE — ASSESSMENT & PLAN NOTE
Lab Results   Component Value Date    EGFR 22 01/29/2025    EGFR 19 01/28/2025    EGFR 20 01/27/2025    CREATININE 2.43 (H) 01/29/2025    CREATININE 2.75 (H) 01/28/2025    CREATININE 2.62 (H) 01/27/2025   Holding Losartan and Farxiga per nephro due to elevated Cr   Hydralazine 5 mg q 6 hrs for BP >180/110   Initially allowed for permissive hypertension due to concern for stroke.  However, patient's MRI does not show concern for infarct.  Will aim for normotension

## 2025-01-29 NOTE — ASSESSMENT & PLAN NOTE
Pt failed VBS 1/23/25: moderate oral/severe pharyngeal dysphagia characterized by weak swallow mechanics, multiple swallows noted to clear pharyngeal retention with overflow penetration and aspiration observed after the swallow   Had numerous discussions regarding goals of care with family and multidisciplinary team. Explained risks versus benefits of PICC line and subsequent TPN, versus pleasure feeds on comfort care. 1/27 pt's wife decided on obtaining PICC for patient and signed informed consent form. Process, risks, and outcomes were reviewed at length with the patient's wife at bedside, alongside Dr. Gtz. All questions/concerns answered. Pt's wife verbalizes understanding and wishes to proceed with PICC line.   1/28 - pt received PICC line and had TPN initiated in the evening    Overnight, there were concerns for excessive secretions and yellow/green sputum. Sputum culture was sent. This morning, pt was noted to be hyperglycemic (576) for which pt was given 10u regular insulin. He was also noted to have a low-grade fever this morning (Tmax 100.9F).     Plan  F/u on lactic acid, procalcitonin, blood cx  Procal elevated 1.21. Lactic acid wnl  Leucocytosis (11.04)  Blood cx collected, pending  Ordered CXR  Per radiology interpretation: Limited examination secondary to low lung volumes. Mild pulmonary vascular congestion with small bilateral pleural effusions, not appreciably changed from prior radiograph. No evidence of new focal consolidation.  Given pt's leucocytosis, tachycardia/tachypnea, low-grade fever, elevated procal, in the setting of increased secretions -- will initiate empiric ABX for possible respiratory source   F/u on sputum culture & gram stain  Per RD recommendations for TPN: 750ml D20, 400ml 15% AA, 100ml 20% lipids, 950kcal (16kcal/kg), 1320ml (22ml/kg), 50mEq Na acetate (~1/4 NS equivalent), 30mEq K acetate, 20mmol K phosphate, 4.6mEq Ca, 8mEq Mg, 100mg thiamine  Scopolamine patch to manage  secretions  Confirmed with Pharmacy: pt's hx of glaucoma is a contraindication for glycopyrrolate (both open angle & angle closure)  Maintain strict NPO  Repeat BMP, phosphate, Mag, procal, CBC in AM  Will repeat SLP evaluation after a few days of TPN, per pt's family request

## 2025-01-29 NOTE — ASSESSMENT & PLAN NOTE
Per wife, patient has some degree of dementia but is unclear of specifics. Patient oriented to self and place but unaware of year and month.  Per wife, unsure if patient is completely aware of what is going on and is unsure if patient would truly want to accept intubation a second time    Plan  Neuropsych evaluation determined patient does NOT have capacity for MDM  We will have further discussions with family and patient depending on overall prognosis and goals of care  Palliative on board. Appreciate recommendations. As of 1/23/2025, family made the decision to make patient's code status LEVEL 3 DNR/DNI.  Ongoing discussions regarding palliative care.

## 2025-01-30 PROBLEM — J40 TRACHEOBRONCHITIS: Status: ACTIVE | Noted: 2025-01-30

## 2025-01-30 LAB
ANION GAP SERPL CALCULATED.3IONS-SCNC: 8 MMOL/L (ref 4–13)
APTT PPP: 80 SECONDS (ref 23–34)
ATRIAL RATE: 103 BPM
BACTERIA UR QL AUTO: ABNORMAL /HPF
BILIRUB UR QL STRIP: NEGATIVE
BUN SERPL-MCNC: 58 MG/DL (ref 5–25)
CALCIUM SERPL-MCNC: 9.2 MG/DL (ref 8.4–10.2)
CHLORIDE SERPL-SCNC: 116 MMOL/L (ref 96–108)
CLARITY UR: ABNORMAL
CO2 SERPL-SCNC: 24 MMOL/L (ref 21–32)
COLOR UR: YELLOW
CREAT SERPL-MCNC: 2.44 MG/DL (ref 0.6–1.3)
ERYTHROCYTE [DISTWIDTH] IN BLOOD BY AUTOMATED COUNT: 16.6 % (ref 11.6–15.1)
FLUAV RNA RESP QL NAA+PROBE: NEGATIVE
FLUBV RNA RESP QL NAA+PROBE: NEGATIVE
GFR SERPL CREATININE-BSD FRML MDRD: 22 ML/MIN/1.73SQ M
GLUCOSE SERPL-MCNC: 194 MG/DL (ref 65–140)
GLUCOSE SERPL-MCNC: 226 MG/DL (ref 65–140)
GLUCOSE SERPL-MCNC: 246 MG/DL (ref 65–140)
GLUCOSE SERPL-MCNC: 251 MG/DL (ref 65–140)
GLUCOSE SERPL-MCNC: 255 MG/DL (ref 65–140)
GLUCOSE SERPL-MCNC: 261 MG/DL (ref 65–140)
GLUCOSE SERPL-MCNC: 265 MG/DL (ref 65–140)
GLUCOSE SERPL-MCNC: 308 MG/DL (ref 65–140)
GLUCOSE UR STRIP-MCNC: ABNORMAL MG/DL
GRAN CASTS #/AREA URNS LPF: ABNORMAL /[LPF]
HCT VFR BLD AUTO: 29.3 % (ref 36.5–49.3)
HGB BLD-MCNC: 9 G/DL (ref 12–17)
HGB UR QL STRIP.AUTO: ABNORMAL
HYALINE CASTS #/AREA URNS LPF: ABNORMAL /LPF
KETONES UR STRIP-MCNC: NEGATIVE MG/DL
LEUKOCYTE ESTERASE UR QL STRIP: ABNORMAL
MAGNESIUM SERPL-MCNC: 2.5 MG/DL (ref 1.9–2.7)
MCH RBC QN AUTO: 28.5 PG (ref 26.8–34.3)
MCHC RBC AUTO-ENTMCNC: 30.7 G/DL (ref 31.4–37.4)
MCV RBC AUTO: 93 FL (ref 82–98)
MUCOUS THREADS UR QL AUTO: ABNORMAL
NITRITE UR QL STRIP: NEGATIVE
NON-SQ EPI CELLS URNS QL MICRO: ABNORMAL /HPF
PH UR STRIP.AUTO: 5.5 [PH]
PHOSPHATE SERPL-MCNC: 2.8 MG/DL (ref 2.3–4.1)
PLATELET # BLD AUTO: 230 THOUSANDS/UL (ref 149–390)
PMV BLD AUTO: 11.4 FL (ref 8.9–12.7)
POTASSIUM SERPL-SCNC: 3.8 MMOL/L (ref 3.5–5.3)
PR INTERVAL: 222 MS
PROCALCITONIN SERPL-MCNC: 3.01 NG/ML
PROT UR STRIP-MCNC: ABNORMAL MG/DL
QRS AXIS: 57 DEGREES
QRSD INTERVAL: 78 MS
QT INTERVAL: 398 MS
QTC INTERVAL: 521 MS
RBC # BLD AUTO: 3.16 MILLION/UL (ref 3.88–5.62)
RBC #/AREA URNS AUTO: ABNORMAL /HPF
RSV RNA RESP QL NAA+PROBE: NEGATIVE
SARS-COV-2 RNA RESP QL NAA+PROBE: NEGATIVE
SODIUM SERPL-SCNC: 148 MMOL/L (ref 135–147)
SP GR UR STRIP.AUTO: 1.02 (ref 1–1.03)
T WAVE AXIS: 26 DEGREES
UROBILINOGEN UR STRIP-ACNC: <2 MG/DL
VENTRICULAR RATE: 103 BPM
WBC # BLD AUTO: 15.18 THOUSAND/UL (ref 4.31–10.16)
WBC #/AREA URNS AUTO: ABNORMAL /HPF

## 2025-01-30 PROCEDURE — 0241U HB NFCT DS VIR RESP RNA 4 TRGT: CPT

## 2025-01-30 PROCEDURE — 81001 URINALYSIS AUTO W/SCOPE: CPT

## 2025-01-30 PROCEDURE — 85730 THROMBOPLASTIN TIME PARTIAL: CPT | Performed by: INTERNAL MEDICINE

## 2025-01-30 PROCEDURE — 87205 SMEAR GRAM STAIN: CPT

## 2025-01-30 PROCEDURE — 93005 ELECTROCARDIOGRAM TRACING: CPT

## 2025-01-30 PROCEDURE — 99233 SBSQ HOSP IP/OBS HIGH 50: CPT | Performed by: INTERNAL MEDICINE

## 2025-01-30 PROCEDURE — 87070 CULTURE OTHR SPECIMN AEROBIC: CPT

## 2025-01-30 PROCEDURE — 87147 CULTURE TYPE IMMUNOLOGIC: CPT

## 2025-01-30 PROCEDURE — 87040 BLOOD CULTURE FOR BACTERIA: CPT

## 2025-01-30 PROCEDURE — 83735 ASSAY OF MAGNESIUM: CPT

## 2025-01-30 PROCEDURE — 84145 PROCALCITONIN (PCT): CPT

## 2025-01-30 PROCEDURE — 87081 CULTURE SCREEN ONLY: CPT

## 2025-01-30 PROCEDURE — 93010 ELECTROCARDIOGRAM REPORT: CPT | Performed by: INTERNAL MEDICINE

## 2025-01-30 PROCEDURE — 87186 SC STD MICRODIL/AGAR DIL: CPT

## 2025-01-30 PROCEDURE — 82948 REAGENT STRIP/BLOOD GLUCOSE: CPT

## 2025-01-30 PROCEDURE — 84100 ASSAY OF PHOSPHORUS: CPT

## 2025-01-30 PROCEDURE — 80048 BASIC METABOLIC PNL TOTAL CA: CPT

## 2025-01-30 PROCEDURE — 85027 COMPLETE CBC AUTOMATED: CPT

## 2025-01-30 RX ORDER — INSULIN ASPART 100 [IU]/ML
15 INJECTION, SUSPENSION SUBCUTANEOUS EVERY MORNING
Status: DISCONTINUED | OUTPATIENT
Start: 2025-01-31 | End: 2025-01-31

## 2025-01-30 RX ORDER — INSULIN ASPART 100 [IU]/ML
15 INJECTION, SUSPENSION SUBCUTANEOUS
Status: DISCONTINUED | OUTPATIENT
Start: 2025-01-30 | End: 2025-01-31

## 2025-01-30 RX ORDER — FUROSEMIDE 10 MG/ML
20 INJECTION INTRAMUSCULAR; INTRAVENOUS ONCE
Status: COMPLETED | OUTPATIENT
Start: 2025-01-30 | End: 2025-01-30

## 2025-01-30 RX ORDER — ACETAMINOPHEN 10 MG/ML
1000 INJECTION, SOLUTION INTRAVENOUS ONCE
Status: COMPLETED | OUTPATIENT
Start: 2025-01-30 | End: 2025-01-30

## 2025-01-30 RX ADMIN — DORZOLAMIDE HYDROCHLORIDE AND TIMOLOL MALEATE 1 DROP: 20; 5 SOLUTION OPHTHALMIC at 17:03

## 2025-01-30 RX ADMIN — FUROSEMIDE 20 MG: 10 INJECTION, SOLUTION INTRAMUSCULAR; INTRAVENOUS at 10:28

## 2025-01-30 RX ADMIN — PREDNISOLONE ACETATE 1 DROP: 10 SUSPENSION/ DROPS OPHTHALMIC at 20:57

## 2025-01-30 RX ADMIN — INSULIN LISPRO 8 UNITS: 100 INJECTION, SOLUTION INTRAVENOUS; SUBCUTANEOUS at 12:30

## 2025-01-30 RX ADMIN — DORZOLAMIDE HYDROCHLORIDE AND TIMOLOL MALEATE 1 DROP: 20; 5 SOLUTION OPHTHALMIC at 09:43

## 2025-01-30 RX ADMIN — INSULIN LISPRO 6 UNITS: 100 INJECTION, SOLUTION INTRAVENOUS; SUBCUTANEOUS at 07:30

## 2025-01-30 RX ADMIN — INSULIN ASPART 15 UNITS: 100 INJECTION, SUSPENSION SUBCUTANEOUS at 20:51

## 2025-01-30 RX ADMIN — ACETAMINOPHEN 650 MG: 650 SUPPOSITORY RECTAL at 18:16

## 2025-01-30 RX ADMIN — PREDNISOLONE ACETATE 1 DROP: 10 SUSPENSION/ DROPS OPHTHALMIC at 17:03

## 2025-01-30 RX ADMIN — CHLORHEXIDINE GLUCONATE 15 ML: 1.2 RINSE ORAL at 09:43

## 2025-01-30 RX ADMIN — INSULIN LISPRO 6 UNITS: 100 INJECTION, SOLUTION INTRAVENOUS; SUBCUTANEOUS at 00:14

## 2025-01-30 RX ADMIN — LATANOPROSTENE BUNOD 1 DROP: 0.24 SOLUTION/ DROPS OPHTHALMIC at 20:58

## 2025-01-30 RX ADMIN — INSULIN LISPRO 6 UNITS: 100 INJECTION, SOLUTION INTRAVENOUS; SUBCUTANEOUS at 18:09

## 2025-01-30 RX ADMIN — HEPARIN SODIUM 14 UNITS/KG/HR: 10000 INJECTION, SOLUTION INTRAVENOUS at 12:29

## 2025-01-30 RX ADMIN — ACETAMINOPHEN 1000 MG: 10 INJECTION INTRAVENOUS at 20:51

## 2025-01-30 RX ADMIN — CEFTRIAXONE SODIUM 1000 MG: 10 INJECTION, POWDER, FOR SOLUTION INTRAVENOUS at 16:53

## 2025-01-30 RX ADMIN — MINERAL OIL, WHITE PETROLATUM: .03; .94 OINTMENT OPHTHALMIC at 20:58

## 2025-01-30 RX ADMIN — POTASSIUM PHOSPHATE, MONOBASIC POTASSIUM PHOSPHATE, DIBASIC: 224; 236 INJECTION, SOLUTION, CONCENTRATE INTRAVENOUS at 21:55

## 2025-01-30 NOTE — ASSESSMENT & PLAN NOTE
- overall wish to give patient supports to identify recovery/reversible etiologies, including dysphagia. Spouse clearly states that the patient would not wish to pursue NGT or PEG tube. Sarah confirms this, as known patient's values/wishes, would not wish to have a feeding tube. Concern that if pursued, patient would most likely remove in the setting of disagreement with its placement.   - SLP evaluated patient, with failing of VBS   - TPN currently administered, initiated yesterday. Per primary and spouse, re-evaluation on Sunday [02/02] for on-going plan. Spouses goal is for improvement of dysphagia with hopes that the patient able to safely/reliably tolerate PO intake. No identified marker of improvement with TPN specifically. No limitation of intervention identified per spouse. Communicated strong meme in God to determine outcome of the patient's current medical condition.     - DNAR/DNI, Level 3, as the patient would not wish to be re-intubated nor pursue CPR/ACLS in the setting of a cardiac arrest. If such an event were to occur, focus on EOL comfort to natural death.   - will continue to follow/support as clinical course evolves.

## 2025-01-30 NOTE — ASSESSMENT & PLAN NOTE
Etiology: Suspect due to ATN in the setting of cardiogenic shock, cardiac arrest, hypotension and IV contrast exposure with autoregulatory dysfunction with ARB    Plan  Per Nephrology:  Discontinued D5W 1/29 d/t resolution of hypernatremia and initiation of TPN  Continue to hold losartan and Farxiga  Strict I/Os, daily weights  Avoid nephrotoxins, NSAIDs, further IV contrast as able  Avoid hypotension.  Maintain MAP >65

## 2025-01-30 NOTE — ASSESSMENT & PLAN NOTE
Pt failed VBS : moderate oral/severe pharyngeal dysphagia characterized by weak swallow mechanics, multiple swallows noted to clear pharyngeal retention with overflow penetration and aspiration observed after the swallow   Had numerous discussions regarding goals of care with family and multidisciplinary team. Explained risks versus benefits of PICC line and subsequent TPN, versus pleasure feeds on comfort care.  pt's wife decided on obtaining PICC for patient and signed informed consent form. Process, risks, and outcomes were reviewed at length with the patient's wife at bedside, alongside Dr. Gtz. All questions/concerns answered. Pt's wife verbalizes understanding and wishes to proceed with PICC line.    - pt received PICC line and had TPN initiated in the evening   - concerns for yellow/green sputum, as well as hyperglycemia in the setting of TPN initiation, low grade fever, initiated empiric ABX as pt was a hard stick and unable to obtain blood cx  Lactic acid wnl     Today, pt is on 2L O2 saturating 95-96%. He is more drowsy although rousable and follows movements/responds with head nod/shake. Tmax this morning 101.5F. WBC count increased from 11 to 15. Procalcitonin increased from 1.21 to 3.01. Consider tracheobronchitis in differential, given clinical presentation and hx of respiratory secretions and subsequent suctioning.     Plan  F/u on blood cx, sputum culture/gram stain, U/A  COVID/FLU/RSV negative   MRSA pending   1x dose IV Lasix for congestion noted on CXR   Continue empiric ABX with IV Rocephin 1g q24h. Can adjust or de-escalate as clinical course evolves.   Per RD, will change TPN to the followinL D20 (200g dextrose), 400ml 15% AA, 155ml 20% lipids, 1230kcal (20kcal/kg), 1613ml (26ml/kg), 30mEq Na acetate, 20mEq K acetate, 30mmol K phosphate  Scopolamine patch to manage secretions  Per Pharmacy, pt's hx of glaucoma is a contraindication for glycopyrrolate (both open angle & angle  closure)  Maintain strict NPO  Repeat BMP, phosphate, Mag, procal, CBC in AM  Will repeat SLP evaluation after a few days of TPN, per pt's family request

## 2025-01-30 NOTE — ASSESSMENT & PLAN NOTE
Malnutrition Findings:   Adult Malnutrition type: Acute illness  Adult Degree of Malnutrition: Other severe protein calorie malnutrition  Malnutrition Characteristics: Muscle loss, Inadequate energy, Weight loss, Fat loss                  360 Statement: related to dysphagia with recommendation for NPO, pt with > 7 days of less than 50% energy intake compared to estimated needs, muscle, fat wasting, wasted buccal pads, temporalis, supraclavicular space, interosseous. Treatment. Enteral nutrition via feeding tube is indicated for nutrition support- wife is declining nutrition via NGT or PEG at this time. Ongoing goals of care discussion noted.    BMI Findings:           Body mass index is 22.71 kg/m².     Wife agreeable to PICC and TPN. Per RD, TPN as above under principal problem.

## 2025-01-30 NOTE — PLAN OF CARE
01/30/25 1142   Recommendations/Interventions   Recommendations to Provider 1L D20 (200g dextrose), 400ml 15% AA, 155ml 20% lipids, 1230kcal (20kcal/kg), 1613ml (26ml/kg), 30mEq Na acetate, 20mEq K acetate, 30mmol K phosphate       Problem: Nutrition/Hydration-ADULT  Goal: Nutrient/Hydration intake appropriate for improving, restoring or maintaining nutritional needs  Description: Monitor and assess patient's nutrition/hydration status for malnutrition. Collaborate with interdisciplinary team and initiate plan and interventions as ordered.  Monitor patient's weight and dietary intake as ordered or per policy. Utilize nutrition screening tool and intervene as necessary. Determine patient's food preferences and provide high-protein, high-caloric foods as appropriate.     INTERVENTIONS:  - Monitor oral intake, urinary output, labs, and treatment plans  - Assess nutrition and hydration status and recommend course of action  - Evaluate amount of meals eaten  - Assist patient with eating if necessary   - Allow adequate time for meals  - Recommend/ encourage appropriate diets, oral nutritional supplements, and vitamin/mineral supplements  - Order, calculate, and assess calorie counts as needed  - Recommend, monitor, and adjust tube feedings and TPN/PPN based on assessed needs  - Assess need for intravenous fluids  - Provide specific nutrition/hydration education as appropriate  - Include patient/family/caregiver in decisions related to nutrition  Outcome: Not Progressing

## 2025-01-30 NOTE — ASSESSMENT & PLAN NOTE
Lab Results   Component Value Date    EGFR 22 01/30/2025    EGFR 22 01/29/2025    EGFR 19 01/28/2025    CREATININE 2.44 (H) 01/30/2025    CREATININE 2.43 (H) 01/29/2025    CREATININE 2.75 (H) 01/28/2025   Per patient and patient's wife, would not be interested in dialysis   Nephro on board. Appreciate recommendations.

## 2025-01-30 NOTE — ASSESSMENT & PLAN NOTE
Malnutrition Findings:   Adult Malnutrition type: Acute illness  Adult Degree of Malnutrition: Other severe protein calorie malnutrition  Malnutrition Characteristics: Muscle loss, Inadequate energy, Weight loss, Fat loss                360 Statement: related to dysphagia with recommendation for NPO, pt with > 7 days of less than 50% energy intake compared to estimated needs, muscle, fat wasting, wasted buccal pads, temporalis, supraclavicular space, interosseous. Treatment. Enteral nutrition via feeding tube is indicated for nutrition support- wife is declining nutrition via NGT or PEG at this time. Ongoing goals of care discussion noted.    BMI Findings:           Body mass index is 22.71 kg/m².

## 2025-01-30 NOTE — ASSESSMENT & PLAN NOTE
Lab Results   Component Value Date    HGBA1C 6.9 (H) 01/18/2025       Recent Labs     01/29/25  1354 01/29/25  1815 01/30/25  0012 01/30/25  0726   POCGLU 168* 226* 255* 265*       Blood Sugar Average: Last 72 hrs:  (P) [id number]

## 2025-01-30 NOTE — ASSESSMENT & PLAN NOTE
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

## 2025-01-30 NOTE — ASSESSMENT & PLAN NOTE
Lab Results   Component Value Date    EGFR 22 01/30/2025    EGFR 22 01/29/2025    EGFR 19 01/28/2025    CREATININE 2.44 (H) 01/30/2025    CREATININE 2.43 (H) 01/29/2025    CREATININE 2.75 (H) 01/28/2025      - SOFÍA on CKD IV, likely in the setting of severe ATN   - baseline SCr 2.0   - follows OP Nephrology   - IP Nephrology following   - no HD desired per patient's previously communicated wishes, supported/reaffirmed by family

## 2025-01-30 NOTE — ASSESSMENT & PLAN NOTE
Lab Results   Component Value Date    HGBA1C 6.9 (H) 01/18/2025     Recent Labs     01/30/25  0012 01/30/25  0726 01/30/25  1213 01/30/25  1451   POCGLU 255* 265* 308* 251*     Plan  Will initiate NPH insulin 15u twice daily in setting of hyperglycemia 2/2 TPN. Will adjust as necessary.   Continue sliding scale   Hypoglycemia protocol

## 2025-01-30 NOTE — ASSESSMENT & PLAN NOTE
Per wife, patient has some degree of dementia but is unclear of specifics. Patient oriented to self and place but unaware of year and month. Per wife, unsure if patient is completely aware of what is going on and is unsure if patient would truly want to accept intubation a second time    Plan  Neuropsych evaluation determined patient does NOT have capacity for MDM  Palliative on board. Appreciate recommendations. As of 1/23/2025, family made the decision to make patient's code status LEVEL 3 DNR/DNI.  Ongoing discussions regarding palliative care.

## 2025-01-30 NOTE — ASSESSMENT & PLAN NOTE
Lab Results   Component Value Date    EGFR 22 01/30/2025    EGFR 22 01/29/2025    EGFR 19 01/28/2025    CREATININE 2.44 (H) 01/30/2025    CREATININE 2.43 (H) 01/29/2025    CREATININE 2.75 (H) 01/28/2025

## 2025-01-30 NOTE — PROGRESS NOTES
Progress Note - Palliative Care   Name: Jeffry Almanzar 89 y.o. male I MRN: [id number]  Unit/Bed#: S -01 I Date of Admission: 1/17/2025   Date of Service: 1/30/2025 I Hospital Day: 12    Assessment & Plan  Goals of care, counseling/discussion   - overall wish to give patient supports to identify recovery/reversible etiologies, including dysphagia. Spouse clearly states that the patient would not wish to pursue NGT or PEG tube. Sarah confirms this, as known patient's values/wishes, would not wish to have a feeding tube. Concern that if pursued, patient would most likely remove in the setting of disagreement with its placement.   - SLP evaluated patient, with failing of VBS   - TPN currently administered, initiated yesterday. Per primary and spouse, re-evaluation on Sunday [02/02] for on-going plan. Spouses goal is for improvement of dysphagia with hopes that the patient able to safely/reliably tolerate PO intake. No identified marker of improvement with TPN specifically. No limitation of intervention identified per spouse. Communicated strong meme in God to determine outcome of the patient's current medical condition.     - DNAR/DNI, Level 3, as the patient would not wish to be re-intubated nor pursue CPR/ACLS in the setting of a cardiac arrest. If such an event were to occur, focus on EOL comfort to natural death.   - will continue to follow/support as clinical course evolves.  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    Hs-cTn 533->777->1,070      TTE [01/20/2025] LVEF 55%, grade I diastolic dysfunction; RV moderately dilated, moderately reduced RV systolic function, hypokinesis of the basal to mid free wall; mild MR; moderate TR, RVSP 49 mmHg.  CKD (chronic kidney disease) stage 4, GFR 15-29 ml/min ([facility])  Lab Results   Component Value Date    EGFR 22 01/30/2025    EGFR 22 01/29/2025    EGFR 19 01/28/2025    CREATININE 2.44 (H) 01/30/2025    CREATININE 2.43 (H) 01/29/2025    CREATININE 2.75 (H) 01/28/2025      - SOFÍA on CKD IV, likely in the setting of severe ATN   - baseline SCr 2.0   - follows OP Nephrology   - IP Nephrology following   - no HD desired per patient's previously communicated wishes, supported/reaffirmed by family  Cardiac arrest (HCC)   - initially presented via EMS on 01/17 after patient found unresponsive with agonal respirations, bag ventilations initiated in the field. Intubated in the setting of a brief cardiac arrest with ROSC.   - in the setting of acute saddle PE with large clot burden, Cor Pulmonale  Dysphagia  See A/P Goals of Care  Severe protein-calorie malnutrition (HCC)  Malnutrition Findings:   Adult Malnutrition type: Acute illness  Adult Degree of Malnutrition: Other severe protein calorie malnutrition  Malnutrition Characteristics: Muscle loss, Inadequate energy, Weight loss, Fat loss                360 Statement: related to dysphagia with recommendation for NPO, pt with > 7 days of less than 50% energy intake compared to estimated needs, muscle, fat wasting,  wasted buccal pads, temporalis, supraclavicular space, interosseous. Treatment. Enteral nutrition via feeding tube is indicated for nutrition support- wife is declining nutrition via NGT or PEG at this time. Ongoing goals of care discussion noted.    BMI Findings:           Body mass index is 22.71 kg/m².       Decisional apparatus: Patient is not competent on my exam today. If competence is lost, patient's substitute decision maker would default to spouse by [place] Act 169.   Advance Directive / Living Will / POLST: not on file     PDMP Review: I have reviewed the patient's controlled substance dispensing history in the Prescription Drug Monitoring Program in compliance with the [facility] regulations before prescribing any controlled substances.    Subjective   Febrile overnight. Remains on 2 LPM NC. Per spouse, patient slept overnight without issue. No reported concerns at this time.    Objective :  Temp:  [99.5 °F (37.5 °C)-101.5 °F (38.6 °C)] 100.6 °F (38.1 °C)  HR:  [] 105  BP: (134-161)/(72-95) 140/95  Resp:  [19] 19  SpO2:  [92 %-98 %] 92 %  O2 Device: Nasal cannula  Nasal Cannula O2 Flow Rate (L/min):  [3 L/min] 3 L/min    Physical Exam  Vitals and nursing note reviewed.   Constitutional:       Appearance: He is not diaphoretic.      Comments: Chronically ill appearing  BMI 22.7   HENT:      Head: Normocephalic and atraumatic.      Right Ear: External ear normal.      Left Ear: External ear normal.   Eyes:      Comments: No gaze preference   Cardiovascular:      Rate and Rhythm: Tachycardia present.   Pulmonary:      Effort: Tachypnea and accessory muscle usage present.   Skin:     Coloration: Skin is pale.   Neurological:      Mental Status: He is lethargic.   Psychiatric:      Comments: Unable to assess            Lab Results: I have reviewed the following results:  Lab Results   Component Value Date/Time    SODIUM 148 (H) 01/30/2025 04:28 AM    SODIUM 140 11/18/2024 01:32 PM    K 3.8 01/30/2025 04:28 AM    K  "4.1 11/18/2024 01:32 PM    BUN 58 (H) 01/30/2025 04:28 AM    BUN 64 (H) 11/18/2024 01:32 PM    CREATININE 2.44 (H) 01/30/2025 04:28 AM    CREATININE 2.20 (H) 06/05/2017 12:55 PM    GLUC 226 (H) 01/30/2025 04:28 AM    GLUC 165 (H) 11/18/2024 01:32 PM    CALCIUM 9.2 01/30/2025 04:28 AM    CALCIUM 9.6 11/18/2024 01:32 PM    CALCIUM 9.6 11/18/2024 01:32 PM    AST 36 01/21/2025 05:05 AM    AST 14 02/16/2024 12:42 PM     (H) 01/21/2025 05:05 AM    ALT 11 02/16/2024 12:42 PM    ALB 3.2 (L) 01/21/2025 05:05 AM    ALB 4.0 10/25/2016 11:09 AM    TP 5.8 (L) 01/21/2025 05:05 AM    TP 7.2 02/16/2024 12:42 PM    EGFR 22 01/30/2025 04:28 AM     Lab Results   Component Value Date/Time    HGB 9.0 (L) 01/30/2025 04:28 AM    WBC 15.18 (H) 01/30/2025 04:28 AM     01/30/2025 04:28 AM    INR 1.61 (H) 01/19/2025 02:11 PM    PTT 80 (H) 01/30/2025 04:28 AM     No results found for: \"[id number]\"    Code Status: Level 3 - DNAR and DNI  Advance Directive and Living Will:      Power of :    POLST:      Administrative Statements   I have spent a total time of 40 minutes in caring for this patient on the day of the visit/encounter including Patient and family education, Counseling / Coordination of care, Documenting in the medical record, Reviewing / ordering tests, medicine, procedures  , and Communicating with other healthcare professionals . Topics discussed with the patient / family include psychosocial support, goals of care, supportive listening, and anticipatory guidance.  " with patient

## 2025-01-30 NOTE — ASSESSMENT & PLAN NOTE
Lab Results   Component Value Date    EGFR 22 01/30/2025    EGFR 22 01/29/2025    EGFR 19 01/28/2025    CREATININE 2.44 (H) 01/30/2025    CREATININE 2.43 (H) 01/29/2025    CREATININE 2.75 (H) 01/28/2025   Holding Losartan and Farxiga per nephro due to elevated Cr   Hydralazine 5 mg q 6 hrs for BP >180/110   Initially allowed for permissive hypertension due to concern for stroke.  However, patient's MRI does not show concern for infarct.  Will aim for normotension

## 2025-01-30 NOTE — PLAN OF CARE
Problem: PAIN - ADULT  Goal: Verbalizes/displays adequate comfort level or baseline comfort level  Description: Interventions:  - Encourage patient to monitor pain and request assistance  - Assess pain using appropriate pain scale  - Administer analgesics based on type and severity of pain and evaluate response  - Implement non-pharmacological measures as appropriate and evaluate response  - Consider cultural and social influences on pain and pain management  - Notify physician/advanced practitioner if interventions unsuccessful or patient reports new pain  Outcome: Progressing     Problem: INFECTION - ADULT  Goal: Absence or prevention of progression during hospitalization  Description: INTERVENTIONS:  - Assess and monitor for signs and symptoms of infection  - Monitor lab/diagnostic results  - Monitor all insertion sites, i.e. indwelling lines, tubes, and drains  - Monitor endotracheal if appropriate and nasal secretions for changes in amount and color  - Bledsoe appropriate cooling/warming therapies per order  - Administer medications as ordered  - Instruct and encourage patient and family to use good hand hygiene technique  - Identify and instruct in appropriate isolation precautions for identified infection/condition  Outcome: Progressing  Goal: Absence of fever/infection during neutropenic period  Description: INTERVENTIONS:  - Monitor WBC    Outcome: Progressing     Problem: SAFETY ADULT  Goal: Patient will remain free of falls  Description: INTERVENTIONS:  - Educate patient/family on patient safety including physical limitations  - Instruct patient to call for assistance with activity   - Consult OT/PT to assist with strengthening/mobility   - Keep Call bell within reach  - Keep bed low and locked with side rails adjusted as appropriate  - Keep care items and personal belongings within reach  - Initiate and maintain comfort rounds  - Make Fall Risk Sign visible to staff  - Offer Toileting every  Hours,  in advance of need  - Initiate/Maintain alarm  - Obtain necessary fall risk management equipment:   - Apply yellow socks and bracelet for high fall risk patients  - Consider moving patient to room near nurses station  Outcome: Progressing  Goal: Maintain or return to baseline ADL function  Description: INTERVENTIONS:  -  Assess patient's ability to carry out ADLs; assess patient's baseline for ADL function and identify physical deficits which impact ability to perform ADLs (bathing, care of mouth/teeth, toileting, grooming, dressing, etc.)  - Assess/evaluate cause of self-care deficits   - Assess range of motion  - Assess patient's mobility; develop plan if impaired  - Assess patient's need for assistive devices and provide as appropriate  - Encourage maximum independence but intervene and supervise when necessary  - Involve family in performance of ADLs  - Assess for home care needs following discharge   - Consider OT consult to assist with ADL evaluation and planning for discharge  - Provide patient education as appropriate  Outcome: Progressing  Goal: Maintains/Returns to pre admission functional level  Description: INTERVENTIONS:  - Perform AM-PAC 6 Click Basic Mobility/ Daily Activity assessment daily.  - Set and communicate daily mobility goal to care team and patient/family/caregiver.   - Collaborate with rehabilitation services on mobility goals if consulted  - Perform Range of Motion  times a day.  - Reposition patient every  hours.  - Dangle patient  times a day  - Stand patient  times a day  - Ambulate patient  times a day  - Out of bed to chair  times a day   - Out of bed for meals  times a day  - Out of bed for toileting  - Record patient progress and toleration of activity level   Outcome: Progressing     Problem: DISCHARGE PLANNING  Goal: Discharge to home or other facility with appropriate resources  Description: INTERVENTIONS:  - Identify barriers to discharge w/patient and caregiver  - Arrange for  needed discharge resources and transportation as appropriate  - Identify discharge learning needs (meds, wound care, etc.)  - Arrange for interpretive services to assist at discharge as needed  - Refer to Case Management Department for coordinating discharge planning if the patient needs post-hospital services based on physician/advanced practitioner order or complex needs related to functional status, cognitive ability, or social support system  Outcome: Progressing     Problem: Knowledge Deficit  Goal: Patient/family/caregiver demonstrates understanding of disease process, treatment plan, medications, and discharge instructions  Description: Complete learning assessment and assess knowledge base.  Interventions:  - Provide teaching at level of understanding  - Provide teaching via preferred learning methods  Outcome: Progressing     Problem: Nutrition/Hydration-ADULT  Goal: Nutrient/Hydration intake appropriate for improving, restoring or maintaining nutritional needs  Description: Monitor and assess patient's nutrition/hydration status for malnutrition. Collaborate with interdisciplinary team and initiate plan and interventions as ordered.  Monitor patient's weight and dietary intake as ordered or per policy. Utilize nutrition screening tool and intervene as necessary. Determine patient's food preferences and provide high-protein, high-caloric foods as appropriate.     INTERVENTIONS:  - Monitor oral intake, urinary output, labs, and treatment plans  - Assess nutrition and hydration status and recommend course of action  - Evaluate amount of meals eaten  - Assist patient with eating if necessary   - Allow adequate time for meals  - Recommend/ encourage appropriate diets, oral nutritional supplements, and vitamin/mineral supplements  - Order, calculate, and assess calorie counts as needed  - Recommend, monitor, and adjust tube feedings and TPN/PPN based on assessed needs  - Assess need for intravenous fluids  -  Provide specific nutrition/hydration education as appropriate  - Include patient/family/caregiver in decisions related to nutrition  Outcome: Progressing     Problem: Prexisting or High Potential for Compromised Skin Integrity  Goal: Skin integrity is maintained or improved  Description: INTERVENTIONS:  - Identify patients at risk for skin breakdown  - Assess and monitor skin integrity  - Assess and monitor nutrition and hydration status  - Monitor labs   - Assess for incontinence   - Turn and reposition patient  - Assist with mobility/ambulation  - Relieve pressure over bony prominences  - Avoid friction and shearing  - Provide appropriate hygiene as needed including keeping skin clean and dry  - Evaluate need for skin moisturizer/barrier cream  - Collaborate with interdisciplinary team   - Patient/family teaching  - Consider wound care consult   Outcome: Progressing

## 2025-01-30 NOTE — PROGRESS NOTES
Progress Note - Hospitalist   Name: Jeffry Almanzar 89 y.o. male I MRN: [id number]  Unit/Bed#: S -01 I Date of Admission: 2025   Date of Service: 2025 I Hospital Day: 12    Assessment & Plan  Dysphagia  Pt failed VBS : moderate oral/severe pharyngeal dysphagia characterized by weak swallow mechanics, multiple swallows noted to clear pharyngeal retention with overflow penetration and aspiration observed after the swallow   Had numerous discussions regarding goals of care with family and multidisciplinary team. Explained risks versus benefits of PICC line and subsequent TPN, versus pleasure feeds on comfort care.  pt's wife decided on obtaining PICC for patient and signed informed consent form. Process, risks, and outcomes were reviewed at length with the patient's wife at bedside, alongside Dr. Gtz. All questions/concerns answered. Pt's wife verbalizes understanding and wishes to proceed with PICC line.    - pt received PICC line and had TPN initiated in the evening   - concerns for yellow/green sputum, as well as hyperglycemia in the setting of TPN initiation, low grade fever, initiated empiric ABX as pt was a hard stick and unable to obtain blood cx  Lactic acid wnl     Today, pt is on 2L O2 saturating 95-96%. He is more drowsy although rousable and follows movements/responds with head nod/shake. Tmax this morning 101.5F. WBC count increased from 11 to 15. Procalcitonin increased from 1.21 to 3.01. Consider tracheobronchitis in differential, given clinical presentation and hx of respiratory secretions and subsequent suctioning.     Plan  F/u on blood cx, sputum culture/gram stain, U/A  COVID/FLU/RSV negative   MRSA pending   1x dose IV Lasix for congestion noted on CXR   Continue empiric ABX with IV Rocephin 1g q24h. Can adjust or de-escalate as clinical course evolves.   Per RD, will change TPN to the followinL D20 (200g dextrose), 400ml 15% AA, 155ml 20% lipids, 1230kcal  (20kcal/kg), 1613ml (26ml/kg), 30mEq Na acetate, 20mEq K acetate, 30mmol K phosphate  Scopolamine patch to manage secretions  Per Pharmacy, pt's hx of glaucoma is a contraindication for glycopyrrolate (both open angle & angle closure)  Maintain strict NPO  Repeat BMP, phosphate, Mag, procal, CBC in AM  Will repeat SLP evaluation after a few days of TPN, per pt's family request  Type 2 diabetes mellitus with stage 4 chronic kidney disease, without long-term current use of insulin ([facility])  Lab Results   Component Value Date    HGBA1C 6.9 (H) 01/18/2025     Recent Labs     01/30/25  0012 01/30/25  0726 01/30/25  1213 01/30/25  1451   POCGLU 255* 265* 308* 251*     Plan  Will initiate NPH insulin 15u twice daily in setting of hyperglycemia 2/2 TPN. Will adjust as necessary.   Continue sliding scale   Hypoglycemia protocol   Cognitive impairment  Per wife, patient has some degree of dementia but is unclear of specifics. Patient oriented to self and place but unaware of year and month. Per wife, unsure if patient is completely aware of what is going on and is unsure if patient would truly want to accept intubation a second time    Plan  Neuropsych evaluation determined patient does NOT have capacity for [facility]  Palliative on board. Appreciate recommendations. As of 1/23/2025, family made the decision to make patient's code status LEVEL 3 DNR/DNI.  Ongoing discussions regarding palliative care.   Acute saddle pulmonary embolism with acute cor pulmonale (HCC)  Initially with cardiac arrest and cardiogenic shock, is now extubated off pressors. Was transferred  to med/surg 1/21.   MRI brain negative for CVA or concern for hemorrhage.      Continue heparin gtt.  SOFÍA (acute kidney injury) (HCC)  Etiology: Suspect due to ATN in the setting of cardiogenic shock, cardiac arrest, hypotension and IV contrast exposure with autoregulatory dysfunction with ARB    Plan  Per Nephrology:  Discontinued D5W 1/29 d/t resolution of hypernatremia  and initiation of TPN  Continue to hold losartan and Farxiga  Strict I/Os, daily weights  Avoid nephrotoxins, NSAIDs, further IV contrast as able  Avoid hypotension.  Maintain MAP >65  Hypernatremia  Nephrology has been following closely, adjusting D5W as necessary.    Discontinued D5W 1/29.   Cardiac arrest (HCC)  Cardiac arrest in ED after CTA evaluating for pulmonary embolus.  Asystole for 2 minutes requiring CPR and intubation s/P extubation 1/21/2025  ECHO with EF of 65% in September, ECHO with left ventricular ejection fraction is 55% (1/20)    Plan  Continue heparin gtt  Continue to monitor on tele due to recent PE, recent MI, and electrolyte abnormalities   Benign 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 ([facility])  Lab Results   Component Value Date    EGFR 22 01/30/2025    EGFR 22 01/29/2025    EGFR 19 01/28/2025    CREATININE 2.44 (H) 01/30/2025    CREATININE 2.43 (H) 01/29/2025    CREATININE 2.75 (H) 01/28/2025   Holding Losartan and Farxiga per nephro due to elevated Cr   Hydralazine 5 mg q 6 hrs for BP >180/110   Initially allowed for permissive hypertension due to concern for stroke.  However, patient's MRI does not show concern for infarct.  Will aim for normotension  Palliative care encounter  Patient seen and evaluated by palliative care 1/23. Palliative care had an in-depth discussion with family regarding patient's GOC and overall prognosis. Family would NOT like to pursue a PEG tube at this time.   CODE STATUS changed from Level 2 to Level 3 DNR/DNI  Ongoing goals of care discussions with consideration for comfort measures only  Goals of care, counseling/discussion  Palliative on board. Appreciate recommendations   CKD (chronic kidney disease) stage 4, GFR 15-29 ml/min ([facility])  Lab Results   Component Value Date    EGFR 22 01/30/2025    EGFR 22 01/29/2025    EGFR 19 01/28/2025    CREATININE 2.44 (H) 01/30/2025    CREATININE 2.43 (H) 01/29/2025    CREATININE 2.75 (H) 01/28/2025   Per  patient and patient's wife, would not be interested in dialysis   Nephro on board. Appreciate recommendations.  Severe protein-calorie malnutrition (HCC)  Malnutrition Findings:   Adult Malnutrition type: Acute illness  Adult Degree of Malnutrition: Other severe protein calorie malnutrition  Malnutrition Characteristics: Muscle loss, Inadequate energy, Weight loss, Fat loss                  360 Statement: related to dysphagia with recommendation for NPO, pt with > 7 days of less than 50% energy intake compared to estimated needs, muscle, fat wasting, wasted buccal pads, temporalis, supraclavicular space, interosseous. Treatment. Enteral nutrition via feeding tube is indicated for nutrition support- wife is declining nutrition via NGT or PEG at this time. Ongoing goals of care discussion noted.    BMI Findings:           Body mass index is 22.71 kg/m².     Wife agreeable to PICC and TPN. Per RD, TPN as above under principal problem.     VTE Pharmacologic Prophylaxis: VTE Score: 6 High Risk (Score >/= 5) - Pharmacological DVT Prophylaxis Ordered: heparin drip. Sequential Compression Devices Ordered.    Mobility:   Basic Mobility Inpatient Raw Score: 9  JH-HLM Goal: 3: Sit at edge of bed  JH-HLM Achieved: 2: Bed activities/Dependent transfer  JH-HLM Goal achieved. Continue to encourage appropriate mobility.    Patient Centered Rounds: I performed bedside rounds with nursing staff today.   Discussions with Specialists or Other Care Team Provider: Nephrology, Palliative     Education and Discussions with Family / Patient: Updated  (wife) at bedside.    Current Length of Stay: 12 day(s)  Current Patient Status: Inpatient   Certification Statement: The patient will continue to require additional inpatient hospital stay due to initiation of TPN.  Discharge Plan: Anticipate discharge in 48-72 hrs to to be determined, pending TPN tolerability and prognosis.    Code Status: Level 3 - DNAR and DNI    Subjective    Per report, pt was noted to have a fever this morning. Pt seen and evaluated at bedside, wife present in the room. She states he slept throughout the night although would wake up due to excess secretions/cough, at which time pt would not permit RT to suction him. He did however produce sputum and afterwards was able to fall back asleep. At the time of the encounter with the team he is on 2L O2 via NC, saturating 95-96%.  Pt is noted to be tachycardic, and EKG was obtained which showed sinus tachycardia without rhythm irregularity.     Objective :  Temp:  [99.4 °F (37.4 °C)-101.5 °F (38.6 °C)] 99.4 °F (37.4 °C)  HR:  [] 105  BP: (140-161)/(84-95) 150/84  Resp:  [19] 19  SpO2:  [92 %-98 %] 96 %  O2 Device: Nasal cannula  Nasal Cannula O2 Flow Rate (L/min):  [3 L/min] 3 L/min    Body mass index is 22.71 kg/m².     Input and Output Summary (last 24 hours):     Intake/Output Summary (Last 24 hours) at 1/30/2025 1513  Last data filed at 1/30/2025 0638  Gross per 24 hour   Intake 0 ml   Output 250 ml   Net -250 ml       Physical Exam  Vitals reviewed.   Constitutional:       General: He is awake.      Appearance: He is underweight. He is ill-appearing.      Interventions: Nasal cannula in place.   HENT:      Head: Normocephalic and atraumatic.      Right Ear: External ear normal.      Left Ear: External ear normal.      Mouth/Throat:      Mouth: Mucous membranes are dry.   Eyes:      General: No scleral icterus.     Extraocular Movements: Extraocular movements intact.   Cardiovascular:      Rate and Rhythm: Tachycardia present.      Pulses: Normal pulses.   Pulmonary:      Effort: Tachypnea and accessory muscle usage present.      Breath sounds: Rales (bilateral) present.      Comments: Pulmonary secretions/congestion  Chest:      Chest wall: No tenderness.   Abdominal:      Tenderness: There is no abdominal tenderness. There is no guarding.   Skin:     General: Skin is warm.      Capillary Refill: Capillary refill  takes 2 to 3 seconds.      Coloration: Skin is not jaundiced.   Neurological:      Motor: Weakness present.         Lines/Drains:  Lines/Drains/Airways       Active Status       Name Placement date Placement time Site Days    PICC Line 01/28/25 Right Brachial 01/28/25  0930  Brachial  2    Urethral Catheter 01/17/25  0000  --  13                       Central Line:  Goal for removal: Continuing for TPN.          Telemetry:  Telemetry Orders (From admission, onward)               24 Hour Telemetry Monitoring  Continuous x 24 Hours (Telem)        Expiring   Question:  Reason for 24 Hour Telemetry  Answer:  Pulmonary Embolism                     Telemetry Reviewed: Sinus Tachycardia  Indication for Continued Telemetry Use: PE               Lab Results: I have reviewed the following results:   Results from last 7 days   Lab Units 01/30/25  0428   WBC Thousand/uL 15.18*   HEMOGLOBIN g/dL 9.0*   HEMATOCRIT % 29.3*   PLATELETS Thousands/uL 230     Results from last 7 days   Lab Units 01/30/25  0428   SODIUM mmol/L 148*   POTASSIUM mmol/L 3.8   CHLORIDE mmol/L 116*   CO2 mmol/L 24   BUN mg/dL 58*   CREATININE mg/dL 2.44*   ANION GAP mmol/L 8   CALCIUM mg/dL 9.2   GLUCOSE RANDOM mg/dL 226*         Results from last 7 days   Lab Units 01/30/25  1451 01/30/25  1213 01/30/25  0726 01/30/25  0012 01/29/25  1815 01/29/25  1354 01/29/25  0951 01/29/25  0732 01/28/25  2328 01/28/25  1211 01/28/25  0620 01/28/25  0047   POC GLUCOSE mg/dl 251* 308* 265* 255* 226* 168* 157* 325* 248* 113 95 167*         Results from last 7 days   Lab Units 01/30/25  0428 01/29/25  1022 01/29/25  0521   LACTIC ACID mmol/L  --  1.5  --    PROCALCITONIN ng/ml 3.01*  --  1.21*       Recent Cultures (last 7 days):   Results from last 7 days   Lab Units 01/30/25  1047   BLOOD CULTURE  Received in Microbiology Lab. Culture in Progress.  Received in Microbiology Lab. Culture in Progress.       Imaging Results Review: I reviewed radiology reports from this  admission including: CT head, CTA PE, CXR, MRI brain, MRA head, LL venous duplex, VBS .    Last 24 Hours Medication List:     Current Facility-Administered Medications:     acetaminophen (TYLENOL) rectal suppository 650 mg, Q4H PRN    Adult 3-in-1 TPN (custom base / custom electrolytes), Continuous TPN, Last Rate: 55 mL/hr at 01/29/25 2122    Adult 3-in-1 TPN (custom base / custom electrolytes), Continuous TPN    artificial tear ophthalmic ointment, HS    ceftriaxone (ROCEPHIN) 1 g/50 mL in dextrose IVPB, Q24H, Last Rate: 1,000 mg (01/29/25 1613)    chlorhexidine (PERIDEX) 0.12 % oral rinse 15 mL, Q12H MARQUIS    dorzolamide-timolol (COSOPT) 2-0.5 % ophthalmic solution 1 drop, BID    heparin (porcine) 25,000 units in 0.45% NaCl 250 mL infusion (premix), Titrated, Last Rate: 14 Units/kg/hr (01/30/25 1229)    heparin (porcine) injection 2,400 Units, Q6H PRN    heparin (porcine) injection 4,800 Units, Once    heparin (porcine) injection 4,800 Units, Q6H PRN    hydrALAZINE (APRESOLINE) injection 5 mg, Q6H PRN    [START ON 1/31/2025] insulin aspart protamine-insulin aspart (NovoLOG 70/30) 100 units/mL subcutaneous injection 15 Units, QAM **AND** insulin aspart protamine-insulin aspart (NovoLOG 70/30) 100 units/mL subcutaneous injection 15 Units, HS    insulin lispro (HumALOG/ADMELOG) 100 units/mL subcutaneous injection 2-12 Units, Q6H **AND** Fingerstick Glucose (POCT), Q6H    Latanoprostene Bunod 0.024 % SOLN 1 drop, HS    polyethylene glycol (MIRALAX) packet 17 g, Daily    prednisoLONE acetate (PRED FORTE) 1 % ophthalmic suspension 1 drop, TID    scopolamine (TRANSDERM-SCOP) 1 mg/3 days TD 72 hr patch 1 patch, Q72H    [Held by provider] senna-docusate sodium (SENOKOT S) 8.6-50 mg per tablet 1 tablet, BID    tetracaine 0.5 % ophthalmic solution 2 drop, Once    Administrative Statements   Today, Patient Was Seen By: Mercedes Nunez DO  PGY-I  I have spent a total time of >45 minutes in caring for this patient on the day of the  visit/encounter including Prognosis, Risks and benefits of tx options, Instructions for management, Patient and family education, Importance of tx compliance, Risk factor reductions, Impressions, Counseling / Coordination of care, Documenting in the medical record, Reviewing / ordering tests, medicine, procedures  , Obtaining or reviewing history  , and Communicating with other healthcare professionals .    **Please Note: This note may have been constructed using a voice recognition system.**

## 2025-01-31 LAB
ANION GAP SERPL CALCULATED.3IONS-SCNC: 7 MMOL/L (ref 4–13)
APTT PPP: 62 SECONDS (ref 23–34)
BUN SERPL-MCNC: 68 MG/DL (ref 5–25)
CALCIUM SERPL-MCNC: 9 MG/DL (ref 8.4–10.2)
CHLORIDE SERPL-SCNC: 115 MMOL/L (ref 96–108)
CO2 SERPL-SCNC: 26 MMOL/L (ref 21–32)
CREAT SERPL-MCNC: 2.79 MG/DL (ref 0.6–1.3)
ERYTHROCYTE [DISTWIDTH] IN BLOOD BY AUTOMATED COUNT: 16.9 % (ref 11.6–15.1)
GFR SERPL CREATININE-BSD FRML MDRD: 19 ML/MIN/1.73SQ M
GLUCOSE SERPL-MCNC: 116 MG/DL (ref 65–140)
GLUCOSE SERPL-MCNC: 192 MG/DL (ref 65–140)
GLUCOSE SERPL-MCNC: 208 MG/DL (ref 65–140)
GLUCOSE SERPL-MCNC: 232 MG/DL (ref 65–140)
GLUCOSE SERPL-MCNC: 234 MG/DL (ref 65–140)
GLUCOSE SERPL-MCNC: 249 MG/DL (ref 65–140)
GLUCOSE SERPL-MCNC: 250 MG/DL (ref 65–140)
GLUCOSE SERPL-MCNC: 267 MG/DL (ref 65–140)
HCT VFR BLD AUTO: 26.7 % (ref 36.5–49.3)
HGB BLD-MCNC: 8.3 G/DL (ref 12–17)
MAGNESIUM SERPL-MCNC: 2.6 MG/DL (ref 1.9–2.7)
MCH RBC QN AUTO: 28.8 PG (ref 26.8–34.3)
MCHC RBC AUTO-ENTMCNC: 31.1 G/DL (ref 31.4–37.4)
MCV RBC AUTO: 93 FL (ref 82–98)
MRSA NOSE QL CULT: NORMAL
PHOSPHATE SERPL-MCNC: 4.4 MG/DL (ref 2.3–4.1)
PLATELET # BLD AUTO: 188 THOUSANDS/UL (ref 149–390)
PMV BLD AUTO: 11.4 FL (ref 8.9–12.7)
POTASSIUM SERPL-SCNC: 3.7 MMOL/L (ref 3.5–5.3)
PROCALCITONIN SERPL-MCNC: 4.61 NG/ML
RBC # BLD AUTO: 2.88 MILLION/UL (ref 3.88–5.62)
SODIUM SERPL-SCNC: 148 MMOL/L (ref 135–147)
WBC # BLD AUTO: 15.9 THOUSAND/UL (ref 4.31–10.16)

## 2025-01-31 PROCEDURE — 80048 BASIC METABOLIC PNL TOTAL CA: CPT

## 2025-01-31 PROCEDURE — 99233 SBSQ HOSP IP/OBS HIGH 50: CPT | Performed by: INTERNAL MEDICINE

## 2025-01-31 PROCEDURE — 85730 THROMBOPLASTIN TIME PARTIAL: CPT | Performed by: INTERNAL MEDICINE

## 2025-01-31 PROCEDURE — 82948 REAGENT STRIP/BLOOD GLUCOSE: CPT

## 2025-01-31 PROCEDURE — 97530 THERAPEUTIC ACTIVITIES: CPT

## 2025-01-31 PROCEDURE — 84100 ASSAY OF PHOSPHORUS: CPT

## 2025-01-31 PROCEDURE — 84145 PROCALCITONIN (PCT): CPT

## 2025-01-31 PROCEDURE — 83735 ASSAY OF MAGNESIUM: CPT

## 2025-01-31 PROCEDURE — 85027 COMPLETE CBC AUTOMATED: CPT

## 2025-01-31 RX ORDER — INSULIN ASPART 100 [IU]/ML
20 INJECTION, SUSPENSION SUBCUTANEOUS EVERY MORNING
Status: DISCONTINUED | OUTPATIENT
Start: 2025-01-31 | End: 2025-02-01

## 2025-01-31 RX ORDER — METRONIDAZOLE 500 MG/100ML
500 INJECTION, SOLUTION INTRAVENOUS EVERY 8 HOURS
Status: DISCONTINUED | OUTPATIENT
Start: 2025-01-31 | End: 2025-01-31

## 2025-01-31 RX ORDER — INSULIN ASPART 100 [IU]/ML
20 INJECTION, SUSPENSION SUBCUTANEOUS
Status: DISCONTINUED | OUTPATIENT
Start: 2025-01-31 | End: 2025-02-01

## 2025-01-31 RX ORDER — INSULIN ASPART 100 [IU]/ML
20 INJECTION, SUSPENSION SUBCUTANEOUS EVERY MORNING
Status: DISCONTINUED | OUTPATIENT
Start: 2025-01-31 | End: 2025-01-31

## 2025-01-31 RX ORDER — INSULIN ASPART 100 [IU]/ML
20 INJECTION, SUSPENSION SUBCUTANEOUS
Status: DISCONTINUED | OUTPATIENT
Start: 2025-01-31 | End: 2025-01-31

## 2025-01-31 RX ADMIN — PREDNISOLONE ACETATE 1 DROP: 10 SUSPENSION/ DROPS OPHTHALMIC at 18:28

## 2025-01-31 RX ADMIN — PREDNISOLONE ACETATE 1 DROP: 10 SUSPENSION/ DROPS OPHTHALMIC at 08:16

## 2025-01-31 RX ADMIN — DORZOLAMIDE HYDROCHLORIDE AND TIMOLOL MALEATE 1 DROP: 20; 5 SOLUTION OPHTHALMIC at 18:29

## 2025-01-31 RX ADMIN — INSULIN ASPART 20 UNITS: 100 INJECTION, SUSPENSION SUBCUTANEOUS at 08:25

## 2025-01-31 RX ADMIN — CEFEPIME 1000 MG: 1 INJECTION, POWDER, FOR SOLUTION INTRAMUSCULAR; INTRAVENOUS at 21:53

## 2025-01-31 RX ADMIN — LATANOPROSTENE BUNOD 1 DROP: 0.24 SOLUTION/ DROPS OPHTHALMIC at 21:42

## 2025-01-31 RX ADMIN — CEFEPIME 1000 MG: 1 INJECTION, POWDER, FOR SOLUTION INTRAMUSCULAR; INTRAVENOUS at 12:21

## 2025-01-31 RX ADMIN — PREDNISOLONE ACETATE 1 DROP: 10 SUSPENSION/ DROPS OPHTHALMIC at 22:00

## 2025-01-31 RX ADMIN — INSULIN ASPART 20 UNITS: 100 INJECTION, SUSPENSION SUBCUTANEOUS at 21:39

## 2025-01-31 RX ADMIN — POTASSIUM PHOSPHATE, MONOBASIC POTASSIUM PHOSPHATE, DIBASIC: 224; 236 INJECTION, SOLUTION, CONCENTRATE INTRAVENOUS at 21:31

## 2025-01-31 RX ADMIN — CHLORHEXIDINE GLUCONATE 15 ML: 1.2 RINSE ORAL at 21:40

## 2025-01-31 RX ADMIN — DORZOLAMIDE HYDROCHLORIDE AND TIMOLOL MALEATE 1 DROP: 20; 5 SOLUTION OPHTHALMIC at 08:16

## 2025-01-31 RX ADMIN — INSULIN LISPRO 4 UNITS: 100 INJECTION, SOLUTION INTRAVENOUS; SUBCUTANEOUS at 01:04

## 2025-01-31 RX ADMIN — HEPARIN SODIUM 14 UNITS/KG/HR: 10000 INJECTION, SOLUTION INTRAVENOUS at 18:27

## 2025-01-31 RX ADMIN — INSULIN LISPRO 4 UNITS: 100 INJECTION, SOLUTION INTRAVENOUS; SUBCUTANEOUS at 06:16

## 2025-01-31 RX ADMIN — INSULIN LISPRO 2 UNITS: 100 INJECTION, SOLUTION INTRAVENOUS; SUBCUTANEOUS at 12:21

## 2025-01-31 RX ADMIN — MINERAL OIL, WHITE PETROLATUM 1 APPLICATION: .03; .94 OINTMENT OPHTHALMIC at 21:41

## 2025-01-31 NOTE — PROGRESS NOTES
Progress Note - Hospitalist   Name: Jeffry Almanzar 89 y.o. male I MRN: [id number]  Unit/Bed#: S -01 I Date of Admission: 1/17/2025   Date of Service: 1/31/2025 I Hospital Day: 13    Assessment & Plan  Dysphagia  Pt failed VBS 1/23: moderate oral/severe pharyngeal dysphagia characterized by weak swallow mechanics, multiple swallows noted to clear pharyngeal retention with overflow penetration and aspiration observed after the swallow   Had numerous discussions regarding goals of care with family and multidisciplinary team. Explained risks versus benefits of PICC line and subsequent TPN, versus pleasure feeds on comfort care. 1/27 pt's wife decided on obtaining PICC for patient and signed informed consent form. Process, risks, and outcomes were reviewed at length with the patient's wife at bedside, alongside Dr. Gtz. All questions/concerns answered. Pt's wife verbalizes understanding and wishes to proceed with PICC line.   1/28 - pt received PICC line and had TPN initiated in the evening  1/29 - concerns for yellow/green sputum, as well as hyperglycemia in the setting of TPN initiation, low grade fever, initiated empiric ABX as pt was a hard stick and unable to obtain blood cx  Lactic acid wnl   1/30 - 1x dose IV Lasix for congestion noted on CXR. COVID/FLU/RSV negative.    Around 7PM last night pt was noted to be febrile at 101.3F and was given Tylenol with improvement. This AM pt has been afebrile. He was saturating 90-91% on 2L O2, so this was increased to 3L on which he was saturating 95%. He is awake throughout exam, follows movements/responds with head nod/shake. Procalcitonin increased from 3.01 to 4.61. Consider tracheobronchitis in differential, given clinical presentation and hx of respiratory secretions, frequent suctioning, hx of intubation, and prolonged hospitalization.     Plan  Will change ABX to Cefepime renally dosed at 1 g q12h for broader coverage.   Will hold off on metronidazole in lieu of  prolonged QTc (per last EKG, 521 ms)  Will hold off on Zosyn or Unasyn at this time d/t unclear allergy to penicillin; will re-evaluate/reassess risks vs benefits as clinical course evolves  F/u on blood cx, sputum culture/gram stain   Sputum culture preliminary report - 2+ gram positive cocci in pairs/chains   Blood cx pending  MRSA pending   Per RD, TPN formula as follows: 1L D20 (200g dextrose), 400ml 15% AA, 155ml 20% lipids, 1230kcal (20kcal/kg), 1613ml (26ml/kg), 30mEq Na acetate, 20mEq K acetate, 30mmol K phosphate  Scopolamine patch to manage secretions  Per Pharmacy, pt's hx of glaucoma is a contraindication for glycopyrrolate (both open angle & angle closure)  Maintain strict NPO  Repeat BMP & CBC in AM  Will repeat SLP evaluation after a few days of TPN, likely Sunday, per pt's family request  Type 2 diabetes mellitus with stage 4 chronic kidney disease, without long-term current use of insulin ([facility])  Lab Results   Component Value Date    HGBA1C 6.9 (H) 01/18/2025     Recent Labs     01/31/25  0004 01/31/25  0613 01/31/25  0815 01/31/25  1152   POCGLU 208* 249* 234* 192*     Plan  Increase to NPH insulin 20u twice daily in setting of hyperglycemia 2/2 TPN. Adjust as necessary.   Continue sliding scale   Hypoglycemia protocol   Cognitive impairment  Per wife, patient has some degree of dementia but is unclear of specifics. Patient oriented to self and place but unaware of year and month. Per wife, unsure if patient is completely aware of what is going on and is unsure if patient would truly want to accept intubation a second time    Plan  Neuropsych evaluation determined patient does NOT have capacity for MDM  Palliative on board. Appreciate recommendations. As of 1/23/2025, family made the decision to make patient's code status LEVEL 3 DNR/DNI.  Ongoing discussions regarding palliative care.   Acute saddle pulmonary embolism with acute cor pulmonale (HCC)  Initially with cardiac arrest and cardiogenic  shock, is now extubated off pressors. Was transferred  to med/surg 1/21.   MRI brain negative for CVA or concern for hemorrhage.      Continue heparin gtt.  SOFÍA (acute kidney injury) (HCC)  Etiology: Suspect due to ATN in the setting of cardiogenic shock, cardiac arrest, hypotension and IV contrast exposure with autoregulatory dysfunction with ARB  Will discuss with Nutrition and Nephrology regarding adjustment to free water component of TPN, in light of pt's BUN & creatinine this morning.   Plan  Per Nephrology:  Discontinued D5W 1/29 d/t resolution of hypernatremia and initiation of TPN  Continue to hold losartan and Farxiga  Strict I/Os, daily weights  Avoid nephrotoxins, NSAIDs, further IV contrast as able  Avoid hypotension.  Maintain MAP >65  Hypernatremia  Nephrology has been following closely, adjusting D5W as necessary.    Discontinued D5W 1/29.   Cardiac arrest (HCC)  Cardiac arrest in ED after CTA evaluating for pulmonary embolus.  Asystole for 2 minutes requiring CPR and intubation s/P extubation 1/21/2025  ECHO with EF of 65% in September, ECHO with left ventricular ejection fraction is 55% (1/20)    Plan  Continue heparin gtt  Continue to monitor on tele due to recent PE, recent MI, and electrolyte abnormalities   Benign 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 (HCC)  Lab Results   Component Value Date    EGFR 19 01/31/2025    EGFR 22 01/30/2025    EGFR 22 01/29/2025    CREATININE 2.79 (H) 01/31/2025    CREATININE 2.44 (H) 01/30/2025    CREATININE 2.43 (H) 01/29/2025   Holding Losartan and Farxiga per nephro due to elevated Cr   Hydralazine 5 mg q 6 hrs for BP >180/110   Initially allowed for permissive hypertension due to concern for stroke.  However, patient's MRI does not show concern for infarct.  Will aim for normotension  Palliative care encounter  Patient seen and evaluated by palliative care 1/23. Palliative care had an in-depth discussion with family regarding patient's GOC and  overall prognosis. Family would NOT like to pursue a PEG tube at this time.   CODE STATUS changed from Level 2 to Level 3 DNR/DNI  Ongoing goals of care discussions with consideration for comfort measures only  Goals of care, counseling/discussion  Palliative on board. Appreciate recommendations   CKD (chronic kidney disease) stage 4, GFR 15-29 ml/min ([facility])  Lab Results   Component Value Date    EGFR 19 01/31/2025    EGFR 22 01/30/2025    EGFR 22 01/29/2025    CREATININE 2.79 (H) 01/31/2025    CREATININE 2.44 (H) 01/30/2025    CREATININE 2.43 (H) 01/29/2025   Per patient and patient's wife, would not be interested in dialysis   Nephro on board. Appreciate recommendations.  Severe protein-calorie malnutrition (HCC)  Malnutrition Findings:   Adult Malnutrition type: Acute illness  Adult Degree of Malnutrition: Other severe protein calorie malnutrition  Malnutrition Characteristics: Muscle loss, Inadequate energy, Weight loss, Fat loss                  360 Statement: related to dysphagia with recommendation for NPO, pt with > 7 days of less than 50% energy intake compared to estimated needs, muscle, fat wasting, wasted buccal pads, temporalis, supraclavicular space, interosseous. Treatment. Enteral nutrition via feeding tube is indicated for nutrition support- wife is declining nutrition via NGT or PEG at this time. Ongoing goals of care discussion noted.    BMI Findings:           Body mass index is 21.42 kg/m².     Wife agreeable to PICC and TPN. Per RD, TPN as above under principal problem.     VTE Pharmacologic Prophylaxis: VTE Score: 6 High Risk (Score >/= 5) - Pharmacological DVT Prophylaxis Ordered: heparin drip. Sequential Compression Devices Ordered.    Mobility:   Basic Mobility Inpatient Raw Score: 10  JH-HLM Goal: 4: Move to chair/commode  JH-HLM Achieved: 5: Stand (1 or more minutes)  JH-HLM Goal achieved. Continue to encourage appropriate mobility.    Patient Centered Rounds: I performed bedside rounds  with nursing staff today.   Discussions with Specialists or Other Care Team Provider: Nephrology, Palliative, Nutrition    Education and Discussions with Family / Patient: Updated  (wife) at bedside.    Current Length of Stay: 13 day(s)  Current Patient Status: Inpatient   Certification Statement: The patient will continue to require additional inpatient hospital stay due to evaluation of fever and adjustment of ABX & TPN.  Discharge Plan: Anticipate discharge in 48-72 hrs to to be determined, pending TPN tolerability and prognosis.    Code Status: Level 3 - DNAR and DNI    Subjective   Around 7PM last night pt was noted to be febrile at 101.3F and was given Tylenol with improvement. This morning, pt is seen and evaluated at bedside. He is awake,  increased to 3L O2 via NC on which he was saturating 95%. Per report, pt was suctioned once overnight without issue. He shakes/nods his head in response to questions.     Objective :  Temp:  [98 °F (36.7 °C)-101.4 °F (38.6 °C)] 98 °F (36.7 °C)  HR:  [] 90  BP: (129-153)/(74-79) 152/76  Resp:  [18-22] 18  SpO2:  [93 %-98 %] 95 %  O2 Device: None (Room air)  Nasal Cannula O2 Flow Rate (L/min):  [3 L/min] 3 L/min    Body mass index is 21.42 kg/m².     Input and Output Summary (last 24 hours):     Intake/Output Summary (Last 24 hours) at 1/31/2025 1302  Last data filed at 1/31/2025 0823  Gross per 24 hour   Intake 0 ml   Output 1750 ml   Net -1750 ml       Physical Exam  Vitals reviewed.   Constitutional:       General: He is awake.      Appearance: He is underweight. He is ill-appearing.      Interventions: Nasal cannula in place.   HENT:      Head: Normocephalic and atraumatic.      Right Ear: External ear normal.      Left Ear: External ear normal.      Mouth/Throat:      Mouth: Mucous membranes are dry.   Eyes:      General: No scleral icterus.     Extraocular Movements: Extraocular movements intact.   Cardiovascular:      Rate and Rhythm: Tachycardia  present.      Pulses: Normal pulses.   Pulmonary:      Effort: Tachypnea and accessory muscle usage present.      Breath sounds: Rales (bilateral) present.      Comments: Pulmonary congestion  Chest:      Chest wall: No tenderness.   Abdominal:      Tenderness: There is no abdominal tenderness. There is no guarding.   Skin:     General: Skin is warm.      Capillary Refill: Capillary refill takes 2 to 3 seconds.      Coloration: Skin is not jaundiced.   Neurological:      Motor: Weakness present.         Lines/Drains:  Lines/Drains/Airways       Active Status       Name Placement date Placement time Site Days    PICC Line 01/28/25 Right Brachial 01/28/25  0930  Brachial  3    Urethral Catheter 01/17/25  0000  --  14                       Central Line:  Goal for removal: Continuing for TPN.          Telemetry:  Telemetry Orders (From admission, onward)               24 Hour Telemetry Monitoring  Continuous x 24 Hours (Telem)        Expiring   Question:  Reason for 24 Hour Telemetry  Answer:  Pulmonary Embolism                     Telemetry Reviewed: Sinus Tachycardia  Indication for Continued Telemetry Use: PE               Lab Results: I have reviewed the following results:   Results from last 7 days   Lab Units 01/31/25  0827   WBC Thousand/uL 15.90*   HEMOGLOBIN g/dL 8.3*   HEMATOCRIT % 26.7*   PLATELETS Thousands/uL 188     Results from last 7 days   Lab Units 01/31/25  0526   SODIUM mmol/L 148*   POTASSIUM mmol/L 3.7   CHLORIDE mmol/L 115*   CO2 mmol/L 26   BUN mg/dL 68*   CREATININE mg/dL 2.79*   ANION GAP mmol/L 7   CALCIUM mg/dL 9.0   GLUCOSE RANDOM mg/dL 250*         Results from last 7 days   Lab Units 01/31/25  1152 01/31/25  0815 01/31/25  0613 01/31/25  0004 01/30/25  2055 01/30/25  1917 01/30/25  1808 01/30/25  1451 01/30/25  1213 01/30/25  0726 01/30/25  0012 01/29/25  1815   POC GLUCOSE mg/dl 192* 234* 249* 208* 194* 246* 261* 251* 308* 265* 255* 226*         Results from last 7 days   Lab Units  01/31/25  0526 01/30/25  0428 01/29/25  1022 01/29/25  0521   LACTIC ACID mmol/L  --   --  1.5  --    PROCALCITONIN ng/ml 4.61* 3.01*  --  1.21*       Recent Cultures (last 7 days):   Results from last 7 days   Lab Units 01/30/25  1556 01/30/25  1047   BLOOD CULTURE   --  Received in Microbiology Lab. Culture in Progress.  Received in Microbiology Lab. Culture in Progress.   SPUTUM CULTURE  Culture too young- will reincubate  --    GRAM STAIN RESULT  1+ Epithelial Cells*  2+ Polys*  2+ Gram positive cocci in pairs*  1+ Gram positive cocci in chains*  --        Imaging Results Review: I reviewed radiology reports from this admission including: CT head, CTA PE, CXR, MRI brain, MRA head, LL venous duplex, VBS .    Last 24 Hours Medication List:     Current Facility-Administered Medications:     acetaminophen (TYLENOL) rectal suppository 650 mg, Q4H PRN    Adult 3-in-1 TPN (custom base / custom electrolytes), Continuous TPN, Last Rate: 67.2 mL/hr at 01/30/25 2155    artificial tear ophthalmic ointment, HS    cefepime (MAXIPIME) 1,000 mg in dextrose 5 % 50 mL IVPB, Q12H, Last Rate: 1,000 mg (01/31/25 1221)    chlorhexidine (PERIDEX) 0.12 % oral rinse 15 mL, Q12H MARQUIS    dorzolamide-timolol (COSOPT) 2-0.5 % ophthalmic solution 1 drop, BID    heparin (porcine) 25,000 units in 0.45% NaCl 250 mL infusion (premix), Titrated, Last Rate: 14 Units/kg/hr (01/30/25 1229)    heparin (porcine) injection 2,400 Units, Q6H PRN    heparin (porcine) injection 4,800 Units, Once    heparin (porcine) injection 4,800 Units, Q6H PRN    hydrALAZINE (APRESOLINE) injection 5 mg, Q6H PRN    insulin aspart protamine-insulin aspart (NovoLOG 70/30) 100 units/mL subcutaneous injection 20 Units, QAM **AND** insulin aspart protamine-insulin aspart (NovoLOG 70/30) 100 units/mL subcutaneous injection 20 Units, HS    insulin lispro (HumALOG/ADMELOG) 100 units/mL subcutaneous injection 2-12 Units, Q6H **AND** Fingerstick Glucose (POCT), Q6H     Latanoprostene Bunod 0.024 % SOLN 1 drop, HS    polyethylene glycol (MIRALAX) packet 17 g, Daily    prednisoLONE acetate (PRED FORTE) 1 % ophthalmic suspension 1 drop, TID    scopolamine (TRANSDERM-SCOP) 1 mg/3 days TD 72 hr patch 1 patch, Q72H    [Held by provider] senna-docusate sodium (SENOKOT S) 8.6-50 mg per tablet 1 tablet, BID    tetracaine 0.5 % ophthalmic solution 2 drop, Once    Administrative Statements   Today, Patient Was Seen By: Mercedes Nunez DO  PGY-I  I have spent a total time of >45 minutes in caring for this patient on the day of the visit/encounter including Prognosis, Risks and benefits of tx options, Instructions for management, Patient and family education, Importance of tx compliance, Risk factor reductions, Impressions, Counseling / Coordination of care, Documenting in the medical record, Reviewing / ordering tests, medicine, procedures  , Obtaining or reviewing history  , and Communicating with other healthcare professionals .    **Please Note: This note may have been constructed using a voice recognition system.**

## 2025-01-31 NOTE — ASSESSMENT & PLAN NOTE
Etiology: Suspect due to ATN in the setting of cardiogenic shock, cardiac arrest, hypotension and IV contrast exposure with autoregulatory dysfunction with ARB  Will discuss with Nutrition and Nephrology regarding adjustment to free water component of TPN, in light of pt's BUN & creatinine this morning.   Plan  Per Nephrology:  Discontinued D5W 1/29 d/t resolution of hypernatremia and initiation of TPN  Continue to hold losartan and Farxiga  Strict I/Os, daily weights  Avoid nephrotoxins, NSAIDs, further IV contrast as able  Avoid hypotension.  Maintain MAP >65

## 2025-01-31 NOTE — ASSESSMENT & PLAN NOTE
Pt failed VBS 1/23: moderate oral/severe pharyngeal dysphagia characterized by weak swallow mechanics, multiple swallows noted to clear pharyngeal retention with overflow penetration and aspiration observed after the swallow   Had numerous discussions regarding goals of care with family and multidisciplinary team. Explained risks versus benefits of PICC line and subsequent TPN, versus pleasure feeds on comfort care. 1/27 pt's wife decided on obtaining PICC for patient and signed informed consent form. Process, risks, and outcomes were reviewed at length with the patient's wife at bedside, alongside Dr. Gtz. All questions/concerns answered. Pt's wife verbalizes understanding and wishes to proceed with PICC line.   1/28 - pt received PICC line and had TPN initiated in the evening  1/29 - concerns for yellow/green sputum, as well as hyperglycemia in the setting of TPN initiation, low grade fever, initiated empiric ABX as pt was a hard stick and unable to obtain blood cx  Lactic acid wnl   1/30 - 1x dose IV Lasix for congestion noted on CXR. COVID/FLU/RSV negative.    Around 7PM last night pt was noted to be febrile at 101.3F and was given Tylenol with improvement. This AM pt has been afebrile. He was saturating 90-91% on 2L O2, so this was increased to 3L on which he was saturating 95%. He is awake throughout exam, follows movements/responds with head nod/shake. Procalcitonin increased from 3.01 to 4.61. Consider tracheobronchitis in differential, given clinical presentation and hx of respiratory secretions, frequent suctioning, hx of intubation, and prolonged hospitalization.     Plan  Will change ABX to Cefepime renally dosed at 1 g q12h for broader coverage.   Will hold off on metronidazole in lieu of prolonged QTc (per last EKG, 521 ms)  Will hold off on Zosyn or Unasyn at this time d/t unclear allergy to penicillin; will re-evaluate/reassess risks vs benefits as clinical course evolves  F/u on blood cx, sputum  culture/gram stain   Sputum culture preliminary report - 2+ gram positive cocci in pairs/chains   Blood cx pending  MRSA pending   Per RD, TPN formula as follows: 1L D20 (200g dextrose), 400ml 15% AA, 155ml 20% lipids, 1230kcal (20kcal/kg), 1613ml (26ml/kg), 30mEq Na acetate, 20mEq K acetate, 30mmol K phosphate  Scopolamine patch to manage secretions  Per Pharmacy, pt's hx of glaucoma is a contraindication for glycopyrrolate (both open angle & angle closure)  Maintain strict NPO  Repeat BMP & CBC in AM  Will repeat SLP evaluation after a few days of TPN, likely Sunday, per pt's family request

## 2025-01-31 NOTE — PHYSICAL THERAPY NOTE
PHYSICAL THERAPY TREATMENT  DATE: 01/31/25  TIME: 9943-0643    NAME:  Jeffry Almanzar  AGE:   89 y.o.  Mrn:   [id number]  Length Of Stay: 13    ADMIT DX:  Acute respiratory failure (HCC) [J96.00]  Respiratory distress [R06.03]  Acute saddle pulmonary embolism with acute cor pulmonale (HCC) [I26.02]    Past Medical History:   Diagnosis Date    Cataracts, bilateral      Past Surgical History:   Procedure Laterality Date    CATARACT EXTRACTION Bilateral 07/15/2012    COLONOSCOPY      CYSTOSCOPY  01/15/2018    Last assessed 9/14/17, diagnostic    HERNIA REPAIR      INSTILLATION MYTOMYCIN N/A 10/25/2017    Procedure: INSTILLATION OF MITOMYCIN C;  Surgeon: Danish Esposito MD;  Location: [facility];  Service: Urology    ME CYSTO W/REMOVAL OF LESIONS SMALL N/A 10/25/2017    Procedure: CYSTOSCOPY; BLADDER BIOPSY WITH FULGERATION;  Surgeon: Danish Esposito MD;  Location: [facility];  Service: Urology    TONSILLECTOMY      TRANSURETHRAL RESECTION OF BLADDER TUMOR N/A 10/25/2017    Procedure: TUR BLADDER TUMOR;  Surgeon: Danish Esposito MD;  Location: [facility];  Service: Urology    WISDOM TOOTH EXTRACTION         Performed at least 2 patient identifiers during session: Name, ID bracelet, and Epic photo     01/31/25 1030   PT Last Visit   PT Visit Date 01/31/25   Note Type   Note Type Treatment   Pain Assessment   Pain Assessment Tool 0-10   Pain Score No Pain   Restrictions/Precautions   Weight Bearing Precautions Per Order No   Other Precautions Cognitive;Chair Alarm;Bed Alarm;Aspiration;Multiple lines;Telemetry;O2;Fall Risk;Hard of hearing   General   Chart Reviewed Yes   Additional Pertinent History Pt initially admitted 1/17/25 s/p cardiac arrest, dx: saddle PE, cor pulmonale. Intubated 1/18/25-1/20/25. Now seen on S3 for continued PT POC. No significant change in status from previous session.   Response to Previous Treatment Patient unable to report, no changes reported from family or staff   Family/Caregiver  "Present Yes  (wife present t/o session)   Cognition   Overall Cognitive Status Impaired   Arousal/Participation Responsive   Attention Difficulty attending to directions   Orientation Level Oriented to person  (responds appropriately to name)   Memory Decreased short term memory;Decreased recall of recent events;Decreased recall of precautions   Following Commands Follows one step commands with increased time or repetition   Comments Pt appears to be more alert on this date as compared to previous sessions, however continues to require extensive cues for sustained attention to task. Increased encouragement for participation in session.   Subjective   Subjective \"I want to lay in the bed.\"   Bed Mobility   Supine to Sit 3  Moderate assistance   Additional items Assist x 1;HOB elevated;Bedrails;Increased time required;Verbal cues  (trunk management; increased verbal and visual cues for body mechanics and use of bedrail)   Sit to Supine 2  Maximal assistance   Additional items Assist x 2;Increased time required;Verbal cues;LE management  (trunk management)   Additional Comments Pt needing MAXA for scooting fwd at EOB for optimal balance and positioning. Once positioned well, pt able to maintain upright sitting balance at EOB with close S. Chest PT completed while seated at EOB - pt with mucus production, however continues to be unable to fully clear (questionably due to physical vs cognitive deficit).   Transfers   Sit to Stand 3  Moderate assistance   Additional items Assist x 2;Increased time required;Verbal cues  (RW)   Stand to Sit 3  Moderate assistance   Additional items Assist x 2;Increased time required;Verbal cues  (RW)   Additional Comments STS from EOB completed x2 trials, both with RW. First needing MODA 2 person, pt pushing up from EOB after therapist cues for optimal mechanics; requiring MODA to maintain upright supported standing at RW d/t retropulsion. Pt able to improve standing balance to NAVDEEP with " cues for anterior weight shifting to avoid retropulsion. Pt showed progression to require MAXA 1 person for second STS trial from EOB, however poor application of hand placement as pt pulling up on RW to acheive standing. Again requiring MODA progressing to NAVDEEP to maintain upright, after improvement of retropulsion.   Ambulation/Elevation   Gait pattern Poor UE support;Improper Weight shift;Narrow HARIS;Forward Flexion;Decreased foot clearance;Short stride  (R LE ?ataxia vs poor coordination/proprioception (will require further eval during straight pathway ambulation))   Gait Assistance 4  Minimal assist   Additional items Assist x 2;Verbal cues;Tactile cues   Assistive Device Rolling walker   Distance 0ft (static stepping in place x5 reps); 4ft (side stepping towards R to achieve improved positioning at EOB); pt refuses further ambulation trials on this date, but is agreeable next session   Stair Management Assistance Not tested   Balance   Static Sitting Fair   Dynamic Sitting Poor +   Static Standing Poor  (w/ RW)   Dynamic Standing Poor  (w/ RW)   Ambulatory Poor  (w/ RW)   Endurance Deficit   Endurance Deficit Yes   Activity Tolerance   Activity Tolerance Patient limited by fatigue;Other (Comment)  (impaired cognition)   Medical Staff Made Aware Spoke with LILIYA Morrison, Coordinated care with OT Ngoc   Assessment   Prognosis Fair   Problem List Decreased strength;Decreased range of motion;Decreased endurance;Impaired balance;Decreased mobility;Decreased cognition;Impaired judgement;Decreased safety awareness;Impaired hearing;Decreased skin integrity   Assessment Pt seen for PT treatment 1/31/2025 with focus on: gait training and therapeutic activity, with intervention focusing on goal of increased mobility tolerance and independence. Pt presents semi-supine in bed, is agreeable to participate in session after increased encouragement from therapist and spouse. During session, pt requires MAXA 1-2 person for  bed mobility (1 person up but 2 down back to supine), MODA 2 person progressing to MAXA 1 person for STS transfers with RW, MODA progressing to NAVDEEP to maintain supported standing at RW, and NAVDEEP 2 person for pre gait activity and side stepping at EOB with RW. Pt adamantly refusing to sit OOB in recliner chair on this date, requests to return to supine in bed. Overall pt displays small but significant improvement in functional transfer ability, however continues to perform below their baseline level of functional mobility due to weakness, impaired balance, decreased endurance, impaired cognition. Pt continues to most appropriately benefit from Level II (moderate PT intensity) resources upon d/c from the acute care setting. Continue skilled PT POC as able and appropriate in order to progress towards therapy goals and maximize independence with functional mobility. Next session, plan for intervention of increased gait training as able and appropriate.   Barriers to Discharge Inaccessible home environment   Goals   Patient Goals pt's wife would like pt to improve strength and mobility   STG Expiration Date 02/14/25  (GOAL DATE EXTENDED ON THIS DATE)   Short Term Goal #1 Pt will: perform bilateral rolling bed mobility w/ supervision to decrease pt's burden of care; perform supine<>sit mobility w/ supervision to increase pt's independence w/ functional mobility; sit at EOB w/ supervision for at least 20 minutes to increase pt's tolerance to an upright sitting position and prepare for OOB transfers; perform transfers w/ min Ax1 to increase pt's OOB mobility; ambulate 25' w/ LRAD and min Ax1 to increase pt's ambulatory endurance/tolerance; increase all balance ratings by at least 1 grade to decrease pt's risk of falls  (PT GOALS REMAIN APPROPRIATE ON THIS DATE)   PT Treatment Day 3   Plan   Treatment/Interventions Functional transfer training;LE strengthening/ROM;Therapeutic exercise;Endurance training;Cognitive  reorientation;Patient/family training;Equipment eval/education;Bed mobility;Gait training;Compensatory technique education;Spoke to nursing;Spoke to case management   Progress Slow progress, decreased activity tolerance   PT Frequency 3-5x/wk   Discharge Recommendation   Rehab Resource Intensity Level, PT II (Moderate Resource Intensity)   AM-PAC Basic Mobility Inpatient   Turning in Flat Bed Without Bedrails 2   Lying on Back to Sitting on Edge of Flat Bed Without Bedrails 2   Moving Bed to Chair 2   Standing Up From Chair Using Arms 2   Walk in Room 1   Climb 3-5 Stairs With Railing 1   Basic Mobility Inpatient Raw Score 10   Turning Head Towards Sound 2   Follow Simple Instructions 2   Low Function Basic Mobility Raw Score  14   Low Function Basic Mobility Standardized Score  22.01   [facility] Highest Level Of Mobility   [facility] Goal 4: Move to chair/commode   -[facility] Achieved 5: Stand (1 or more minutes)   Education   Education Provided Mobility training;Assistive device   Patient Explanation/teachback used;Reinforcement needed       This session, pt required and most appropriately benefited from partial or full skilled PT/OT co-treat due to extensive physical assistance of SKILLED therapists, cognitive-communication impairments, significant regression from baseline level of mobility, decreased activity tolerance, and unpredictable medical and/or functional status. PT and OT goals were addressed separately as seen in documentation.    Based on patient's [facility] Highest Level of Mobility scores today, patient currently has a goal of -[facility] Levels: 5: STAND (1 OR MORE MINUTES), to be completed with RN staffing each shift, in order to improve overall activity tolerance and mobility, combat hospital related deconditioning, and maximize outcomes for d/c from the acute care setting.     The patient's AM-PAC Basic Mobility Inpatient Short Form Raw Score is 10. A Raw score of less than or equal to 16 suggests the  patient may benefit from discharge to post-acute rehabilitation services. Please also refer to the recommendation of the Physical Therapist for safe discharge planning.        Bhumi Morris PT, DPT   Available via [employer]  NPI # [id number]  [place] License - [id number]  1/31/2025

## 2025-01-31 NOTE — PLAN OF CARE
Problem: PHYSICAL THERAPY ADULT  Goal: Performs mobility at highest level of function for planned discharge setting.  See evaluation for individualized goals.  Description: Treatment/Interventions: Functional transfer training, LE strengthening/ROM, Therapeutic exercise, Endurance training, Cognitive reorientation, Patient/family training, Equipment eval/education, Bed mobility, Gait training, Compensatory technique education, Spoke to nursing, Spoke to case management     See flowsheet documentation for full assessment, interventions and recommendations.  Outcome: Progressing  Note: Prognosis: Fair  Problem List: Decreased strength, Decreased range of motion, Decreased endurance, Impaired balance, Decreased mobility, Decreased cognition, Impaired judgement, Decreased safety awareness, Impaired hearing, Decreased skin integrity  Assessment: Pt seen for PT treatment 1/31/2025 with focus on: gait training and therapeutic activity, with intervention focusing on goal of increased mobility tolerance and independence. Pt presents semi-supine in bed, is agreeable to participate in session after increased encouragement from therapist and spouse. During session, pt requires MAXA 1-2 person for bed mobility (1 person up but 2 down back to supine), MODA 2 person progressing to MAXA 1 person for STS transfers with RW, MODA progressing to NAVDEEP to maintain supported standing at RW, and NAVDEEP 2 person for pre gait activity and side stepping at EOB with RW. Pt adamantly refusing to sit OOB in recliner chair on this date, requests to return to supine in bed. Overall pt displays small but significant improvement in functional transfer ability, however continues to perform below their baseline level of functional mobility due to weakness, impaired balance, decreased endurance, impaired cognition. Pt continues to most appropriately benefit from Level II (moderate PT intensity) resources upon d/c from the acute care setting. Continue  skilled PT POC as able and appropriate in order to progress towards therapy goals and maximize independence with functional mobility. Next session, plan for intervention of increased gait training as able and appropriate.    Barriers to Discharge: Inaccessible home environment     Rehab Resource Intensity Level, PT: II (Moderate Resource Intensity)    See flowsheet documentation for full assessment.

## 2025-01-31 NOTE — PLAN OF CARE
Problem: PAIN - ADULT  Goal: Verbalizes/displays adequate comfort level or baseline comfort level  Description: Interventions:  - Encourage patient to monitor pain and request assistance  - Assess pain using appropriate pain scale  - Administer analgesics based on type and severity of pain and evaluate response  - Implement non-pharmacological measures as appropriate and evaluate response  - Consider cultural and social influences on pain and pain management  - Notify physician/advanced practitioner if interventions unsuccessful or patient reports new pain  Outcome: Progressing     Problem: INFECTION - ADULT  Goal: Absence or prevention of progression during hospitalization  Description: INTERVENTIONS:  - Assess and monitor for signs and symptoms of infection  - Monitor lab/diagnostic results  - Monitor all insertion sites, i.e. indwelling lines, tubes, and drains  - Monitor endotracheal if appropriate and nasal secretions for changes in amount and color  - Darby appropriate cooling/warming therapies per order  - Administer medications as ordered  - Instruct and encourage patient and family to use good hand hygiene technique  - Identify and instruct in appropriate isolation precautions for identified infection/condition  Outcome: Progressing  Goal: Absence of fever/infection during neutropenic period  Description: INTERVENTIONS:  - Monitor WBC    Outcome: Progressing     Problem: SAFETY ADULT  Goal: Patient will remain free of falls  Description: INTERVENTIONS:  - Educate patient/family on patient safety including physical limitations  - Instruct patient to call for assistance with activity   - Consult OT/PT to assist with strengthening/mobility   - Keep Call bell within reach  - Keep bed low and locked with side rails adjusted as appropriate  - Keep care items and personal belongings within reach  - Initiate and maintain comfort rounds  - Make Fall Risk Sign visible to staff  - Offer Toileting every 2 Hours,  in advance of need  - Initiate/Maintain bed alarm  - Obtain necessary fall risk management equipment: bed alarm  - Apply yellow socks and bracelet for high fall risk patients  - Consider moving patient to room near nurses station  Outcome: Progressing  Goal: Maintain or return to baseline ADL function  Description: INTERVENTIONS:  -  Assess patient's ability to carry out ADLs; assess patient's baseline for ADL function and identify physical deficits which impact ability to perform ADLs (bathing, care of mouth/teeth, toileting, grooming, dressing, etc.)  - Assess/evaluate cause of self-care deficits   - Assess range of motion  - Assess patient's mobility; develop plan if impaired  - Assess patient's need for assistive devices and provide as appropriate  - Encourage maximum independence but intervene and supervise when necessary  - Involve family in performance of ADLs  - Assess for home care needs following discharge   - Consider OT consult to assist with ADL evaluation and planning for discharge  - Provide patient education as appropriate  Outcome: Progressing  Goal: Maintains/Returns to pre admission functional level  Description: INTERVENTIONS:  - Perform AM-PAC 6 Click Basic Mobility/ Daily Activity assessment daily.  - Set and communicate daily mobility goal to care team and patient/family/caregiver.   - Collaborate with rehabilitation services on mobility goals if consulted  - Perform Range of Motion 2 times a day.  - Reposition patient every 2 hours.  - Dangle patient 2 times a day  - Stand patient 2 times a day  - Ambulate patient 3 times a day  - Out of bed to chair 3 times a day   - Out of bed for meals 3 times a day  - Out of bed for toileting  - Record patient progress and toleration of activity level   Outcome: Progressing     Problem: DISCHARGE PLANNING  Goal: Discharge to home or other facility with appropriate resources  Description: INTERVENTIONS:  - Identify barriers to discharge w/patient and  caregiver  - Arrange for needed discharge resources and transportation as appropriate  - Identify discharge learning needs (meds, wound care, etc.)  - Arrange for interpretive services to assist at discharge as needed  - Refer to Case Management Department for coordinating discharge planning if the patient needs post-hospital services based on physician/advanced practitioner order or complex needs related to functional status, cognitive ability, or social support system  Outcome: Progressing     Problem: Knowledge Deficit  Goal: Patient/family/caregiver demonstrates understanding of disease process, treatment plan, medications, and discharge instructions  Description: Complete learning assessment and assess knowledge base.  Interventions:  - Provide teaching at level of understanding  - Provide teaching via preferred learning methods  Outcome: Progressing     Problem: Nutrition/Hydration-ADULT  Goal: Nutrient/Hydration intake appropriate for improving, restoring or maintaining nutritional needs  Description: Monitor and assess patient's nutrition/hydration status for malnutrition. Collaborate with interdisciplinary team and initiate plan and interventions as ordered.  Monitor patient's weight and dietary intake as ordered or per policy. Utilize nutrition screening tool and intervene as necessary. Determine patient's food preferences and provide high-protein, high-caloric foods as appropriate.     INTERVENTIONS:  - Monitor oral intake, urinary output, labs, and treatment plans  - Assess nutrition and hydration status and recommend course of action  - Evaluate amount of meals eaten  - Assist patient with eating if necessary   - Allow adequate time for meals  - Recommend/ encourage appropriate diets, oral nutritional supplements, and vitamin/mineral supplements  - Order, calculate, and assess calorie counts as needed  - Recommend, monitor, and adjust tube feedings and TPN/PPN based on assessed needs  - Assess need for  intravenous fluids  - Provide specific nutrition/hydration education as appropriate  - Include patient/family/caregiver in decisions related to nutrition  Outcome: Progressing     Problem: Prexisting or High Potential for Compromised Skin Integrity  Goal: Skin integrity is maintained or improved  Description: INTERVENTIONS:  - Identify patients at risk for skin breakdown  - Assess and monitor skin integrity  - Assess and monitor nutrition and hydration status  - Monitor labs   - Assess for incontinence   - Turn and reposition patient  - Assist with mobility/ambulation  - Relieve pressure over bony prominences  - Avoid friction and shearing  - Provide appropriate hygiene as needed including keeping skin clean and dry  - Evaluate need for skin moisturizer/barrier cream  - Collaborate with interdisciplinary team   - Patient/family teaching  - Consider wound care consult   Outcome: Progressing

## 2025-01-31 NOTE — OCCUPATIONAL THERAPY NOTE
Occupational Therapy Treatment Note  Patient Name: Jeffry Almanzar  Today's Date: 1/31/2025  Problem List  Principal Problem:    Acute saddle pulmonary embolism with acute cor pulmonale (HCC)  Active Problems:    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  (HCC)    Benign 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 (HCC)    CKD (chronic kidney disease) stage 4, GFR 15-29 ml/min (HCC)    Type 2 diabetes mellitus with stage 4 chronic kidney disease, without long-term current use of insulin (HCC)    SOFÍA (acute kidney injury) (HCC)    Lacunar cerebrovascular accident (CVA) (HCC)    Cardiac arrest (HCC)    Normal anion gap metabolic acidosis    Retinal hemorrhage noted on examination of left eye    Hypernatremia    Hypoxia    Cognitive impairment    Palliative care encounter    Goals of care, counseling/discussion    Dysphagia    Severe protein-calorie malnutrition (HCC)    Tracheobronchitis          01/31/25 1001   OT Last Visit   OT Visit Date 01/31/25   Note Type   Note Type Treatment   Pain Assessment   Pain Assessment Tool 0-10   Pain Score No Pain   Restrictions/Precautions   Weight Bearing Precautions Per Order No   Other Precautions Chair Alarm;Bed Alarm;Cognitive;Hard of hearing;Fall Risk;Multiple lines;Aspiration;O2  (2L o2 via NC for comfort, titrated to RA during session with maintained 96%)   Lifestyle   Autonomy Pt completed basic ADLs w/ out assistance and utilized cane for functional mobility   Reciprocal Relationships Supportive wife present, family   Service to Others Pt is retired   Intrinsic Gratification Pt reports enjoying watch the [employer] on tv, having family visit.   Bed Mobility   Supine to Sit 3  Moderate assistance   Additional items Assist x 1;Increased time required;Verbal cues;LE management  (trunk management)   Sit to Supine 2  Maximal assistance   Additional items Assist x 2;Increased time required;Verbal cues;LE management  (trunk management)   Additional Comments Max A to  reposition hips to EOB, but Maintains static sitting at EOB c close supervision once in optimal positioning.   Transfers   Sit to Stand 3  Moderate assistance   Additional items Assist x 2;Increased time required;Verbal cues   Stand to Sit 3  Moderate assistance   Additional items Assist x 2;Increased time required;Verbal cues  (RW)   Additional Comments sit<>stand from EOB x 2 trials. Initial mod A x 2 c hands on bed, progresses to max A x 1 on 2nd trial while pulling up from RW. requires Mod A to maintain supported sititng . Retropulsive with cueing and assistance for weight shifting /correction.   Functional Mobility   Functional Mobility 4  Minimal assistance  (x2)   Additional Comments completes marching tasks at EOB. on 2nd trial completes side steps x 3 to HOB, requires tactile cueing for RLE advancement.   Additional items Rolling walker   Subjective   Subjective benefits from encouragement of therapists and spouse for participation during session. Declines sitting OOB in recliner on this date   Cognition   Overall Cognitive Status Impaired   Arousal/Participation Responsive   Attention Difficulty dividing attention   Orientation Level Oriented to person   Memory Decreased recall of precautions;Decreased recall of recent events;Decreased short term memory   Following Commands Follows one step commands with increased time or repetition   Comments appears more alert on this date but increased cueing and time for command following.   Activity Tolerance   Activity Tolerance Patient limited by fatigue   Medical Staff Made Aware PT Patricia, LILIYA   Assessment   Assessment Patient seen for OT treatment on 1/31/2025 s/p admission for Acute saddle pulmonary embolism with acute cor pulmonale (HCC) Patient agreeable to OT session. Patient participated in self-care transfers, bed mobility , and functional mobility with intervention focus on maximizing functional independence. Jeffry Almanzar is continuing to require similar  levels of assistance for transfers and bed mobility. Personal factors continuing to impact D/C include: Assistance needed for ADLs and functional mobility From OT standpoint, patient would benefit from skilled intervention to maximize independence with ADLs and functional mobility. Goals remain appropriate, continue POC. At this time, recommending D/C to: Level 2: moderate resource intensity   Plan   Treatment Interventions ADL retraining;Functional transfer training;UE strengthening/ROM;Endurance training;Cognitive reorientation;Patient/family training;Equipment evaluation/education;Compensatory technique education;Neuromuscular reeducation;Continued evaluation;Activityengagement   Goal Expiration Date 02/04/25   OT Treatment Day 3   OT Frequency 3-5x/wk   Discharge Recommendation   Rehab Resource Intensity Level, OT II (Moderate Resource Intensity)   Additional Comments  The patient's raw score on the -PAC Daily Activity Inpatient Short Form is 9. A raw score of less than 19 suggests the patient may benefit from discharge to post-acute rehabilitation services. Please refer to the recommendation of the Occupational Therapist for safe discharge planning.   AM-PAC Daily Activity Inpatient   Lower Body Dressing 1   Bathing 2   Toileting 1   Upper Body Dressing 2   Grooming 2   Eating 1   Daily Activity Raw Score 9   AM-PAC Applied Cognition Inpatient   Following a Speech/Presentation 1   Understanding Ordinary Conversation 2   Taking Medications 1   Remembering Where Things Are Placed or Put Away 1   Remembering List of 4-5 Errands 1   Taking Care of Complicated Tasks 1   Applied Cognition Raw Score 7   Applied Cognition Standardized Score 15.17   End of Consult   Education Provided Yes;Family or social support of family present for education by provider   Patient Position at End of Consult Bedside chair;All needs within reach;Bed/Chair alarm activated   Nurse Communication Nurse aware of consult     Pt goals to be  met by 2/4/25 to max I w/ ADLs and improve engagement to return home w/ wife includes:     -Pt will complete bed mobility supine <> sit w/ min A to max I and minimize burden of care in preparation for ADLs     -Pt will demonstrate good attention and participation in ongoing eval of functional cognition to assist in DC Planning PROGRESSING      -Pt will consistently follow 2 step directions during ADLs w/ good recall PROGRESSING      -Pt will complete functional transfers to bed, chair, and toilet using LRAD, DME as needed w/ mod A x1 to max I and minimize burden of care PROGRESSING      -Pt will demonstrate improved functional sitting tolerance OOB In chair 2-4 hours / day.      -Pt will complete UBD w/ min A after set- up to minimize burden of care     -Pt will complete LBD w/ mod A to max I and minimize burden of care     -Pt will complete functional transfers to bed, chair, and toilet using LRAD, DME as needed w/ min A      -Pt will demonstrate good attention and participation in ongoing eval of functional mobility using LRAD to max I and assist in DC Planning NOT PROGRESSING      -Pt will complete grooming / feeding w/ S after set- up in supported sitting NOT PROGRESSING      -Pt will demonstrate improved sustained activity tolerance to participate in ADLs vs preferred leisure tasks for 15 minutes w/ no more than 1 rest break. NOT PROGRESSING       Ngoc Price, OT

## 2025-01-31 NOTE — ASSESSMENT & PLAN NOTE
Lab Results   Component Value Date    EGFR 19 01/31/2025    EGFR 22 01/30/2025    EGFR 22 01/29/2025    CREATININE 2.79 (H) 01/31/2025    CREATININE 2.44 (H) 01/30/2025    CREATININE 2.43 (H) 01/29/2025   Per patient and patient's wife, would not be interested in dialysis   Nephro on board. Appreciate recommendations.

## 2025-01-31 NOTE — ASSESSMENT & PLAN NOTE
Malnutrition Findings:   Adult Malnutrition type: Acute illness  Adult Degree of Malnutrition: Other severe protein calorie malnutrition  Malnutrition Characteristics: Muscle loss, Inadequate energy, Weight loss, Fat loss                  360 Statement: related to dysphagia with recommendation for NPO, pt with > 7 days of less than 50% energy intake compared to estimated needs, muscle, fat wasting, wasted buccal pads, temporalis, supraclavicular space, interosseous. Treatment. Enteral nutrition via feeding tube is indicated for nutrition support- wife is declining nutrition via NGT or PEG at this time. Ongoing goals of care discussion noted.    BMI Findings:           Body mass index is 21.42 kg/m².     Wife agreeable to PICC and TPN. Per RD, TPN as above under principal problem.

## 2025-01-31 NOTE — ASSESSMENT & PLAN NOTE
Lab Results   Component Value Date    EGFR 19 01/31/2025    EGFR 22 01/30/2025    EGFR 22 01/29/2025    CREATININE 2.79 (H) 01/31/2025    CREATININE 2.44 (H) 01/30/2025    CREATININE 2.43 (H) 01/29/2025   Holding Losartan and Farxiga per nephro due to elevated Cr   Hydralazine 5 mg q 6 hrs for BP >180/110   Initially allowed for permissive hypertension due to concern for stroke.  However, patient's MRI does not show concern for infarct.  Will aim for normotension

## 2025-01-31 NOTE — ASSESSMENT & PLAN NOTE
Lab Results   Component Value Date    HGBA1C 6.9 (H) 01/18/2025     Recent Labs     01/31/25  0004 01/31/25  0613 01/31/25  0815 01/31/25  1152   POCGLU 208* 249* 234* 192*     Plan  Increase to NPH insulin 20u twice daily in setting of hyperglycemia 2/2 TPN. Adjust as necessary.   Continue sliding scale   Hypoglycemia protocol

## 2025-01-31 NOTE — PLAN OF CARE
Problem: OCCUPATIONAL THERAPY ADULT  Goal: Performs self-care activities at highest level of function for planned discharge setting.  See evaluation for individualized goals.  Description: Treatment Interventions: ADL retraining, Functional transfer training, UE strengthening/ROM, Endurance training, Cognitive reorientation, Patient/family training, Equipment evaluation/education, Compensatory technique education, Continued evaluation, Energy conservation, Activityengagement          See flowsheet documentation for full assessment, interventions and recommendations.   1/31/2025 1305 by Ngoc Price OT  Outcome: Not Progressing  Note: Limitation: Decreased ADL status, Decreased UE ROM, Decreased UE strength, Decreased Safe judgement during ADL, Decreased cognition, Decreased endurance, Visual deficit, Decreased fine motor control, Decreased self-care trans, Decreased high-level ADLs (impaired pain, activity tolerance, sitting tolerance, sitting balance, standing tolerance, generalized weakness, forward functional reach)     Assessment: Patient seen for OT treatment on 1/31/2025 s/p admission for Acute saddle pulmonary embolism with acute cor pulmonale (HCC) Patient agreeable to OT session. Patient participated in self-care transfers, bed mobility , and functional mobility with intervention focus on maximizing functional independence. Jeffry Almanzar is continuing to require similar levels of assistance for transfers and bed mobility. Personal factors continuing to impact D/C include: Assistance needed for ADLs and functional mobility From OT standpoint, patient would benefit from skilled intervention to maximize independence with ADLs and functional mobility. Goals remain appropriate, continue POC. At this time, recommending D/C to: Level 2: moderate resource intensity     Rehab Resource Intensity Level, OT: II (Moderate Resource Intensity)       1/31/2025 1305 by Ngoc Price OT  Note: Limitation:  Decreased ADL status, Decreased UE ROM, Decreased UE strength, Decreased Safe judgement during ADL, Decreased cognition, Decreased endurance, Visual deficit, Decreased fine motor control, Decreased self-care trans, Decreased high-level ADLs (impaired pain, activity tolerance, sitting tolerance, sitting balance, standing tolerance, generalized weakness, forward functional reach)     Assessment: Patient seen for OT treatment on 1/31/2025 s/p admission for Acute saddle pulmonary embolism with acute cor pulmonale (HCC) Patient agreeable to OT session. Patient participated in self-care transfers, bed mobility , and functional mobility with intervention focus on maximizing functional independence. Jeffry Almanzar is continuing to require similar levels of assistance for transfers and bed mobility. Personal factors continuing to impact D/C include: Assistance needed for ADLs and functional mobility From OT standpoint, patient would benefit from skilled intervention to maximize independence with ADLs and functional mobility. Goals remain appropriate, continue POC. At this time, recommending D/C to: Level 2: moderate resource intensity     Rehab Resource Intensity Level, OT: II (Moderate Resource Intensity)     Ngoc Price, OT

## 2025-02-01 LAB
ANION GAP SERPL CALCULATED.3IONS-SCNC: 9 MMOL/L (ref 4–13)
APTT PPP: 79 SECONDS (ref 23–34)
BUN SERPL-MCNC: 64 MG/DL (ref 5–25)
CALCIUM SERPL-MCNC: 8.7 MG/DL (ref 8.4–10.2)
CHLORIDE SERPL-SCNC: 108 MMOL/L (ref 96–108)
CO2 SERPL-SCNC: 24 MMOL/L (ref 21–32)
CREAT SERPL-MCNC: 2.25 MG/DL (ref 0.6–1.3)
ERYTHROCYTE [DISTWIDTH] IN BLOOD BY AUTOMATED COUNT: 16.9 % (ref 11.6–15.1)
GFR SERPL CREATININE-BSD FRML MDRD: 24 ML/MIN/1.73SQ M
GLUCOSE SERPL-MCNC: 107 MG/DL (ref 65–140)
GLUCOSE SERPL-MCNC: 113 MG/DL (ref 65–140)
GLUCOSE SERPL-MCNC: 173 MG/DL (ref 65–140)
GLUCOSE SERPL-MCNC: 202 MG/DL (ref 65–140)
GLUCOSE SERPL-MCNC: 396 MG/DL (ref 65–140)
HCT VFR BLD AUTO: 26.1 % (ref 36.5–49.3)
HGB BLD-MCNC: 8.2 G/DL (ref 12–17)
MCH RBC QN AUTO: 29.6 PG (ref 26.8–34.3)
MCHC RBC AUTO-ENTMCNC: 31.4 G/DL (ref 31.4–37.4)
MCV RBC AUTO: 94 FL (ref 82–98)
PLATELET # BLD AUTO: 161 THOUSANDS/UL (ref 149–390)
PMV BLD AUTO: 11.5 FL (ref 8.9–12.7)
POTASSIUM SERPL-SCNC: 5.2 MMOL/L (ref 3.5–5.3)
PROCALCITONIN SERPL-MCNC: 4.02 NG/ML
RBC # BLD AUTO: 2.77 MILLION/UL (ref 3.88–5.62)
SODIUM SERPL-SCNC: 141 MMOL/L (ref 135–147)
WBC # BLD AUTO: 13.58 THOUSAND/UL (ref 4.31–10.16)

## 2025-02-01 PROCEDURE — 84145 PROCALCITONIN (PCT): CPT | Performed by: INTERNAL MEDICINE

## 2025-02-01 PROCEDURE — 99233 SBSQ HOSP IP/OBS HIGH 50: CPT | Performed by: INTERNAL MEDICINE

## 2025-02-01 PROCEDURE — 85730 THROMBOPLASTIN TIME PARTIAL: CPT | Performed by: INTERNAL MEDICINE

## 2025-02-01 PROCEDURE — 80048 BASIC METABOLIC PNL TOTAL CA: CPT

## 2025-02-01 PROCEDURE — 85027 COMPLETE CBC AUTOMATED: CPT

## 2025-02-01 PROCEDURE — 82948 REAGENT STRIP/BLOOD GLUCOSE: CPT

## 2025-02-01 RX ORDER — VANCOMYCIN HYDROCHLORIDE 1 G/200ML
15 INJECTION, SOLUTION INTRAVENOUS EVERY 12 HOURS
Status: DISCONTINUED | OUTPATIENT
Start: 2025-02-01 | End: 2025-02-01

## 2025-02-01 RX ORDER — VANCOMYCIN HYDROCHLORIDE 1 G/200ML
15 INJECTION, SOLUTION INTRAVENOUS DAILY PRN
Status: DISCONTINUED | OUTPATIENT
Start: 2025-02-01 | End: 2025-02-02

## 2025-02-01 RX ORDER — INSULIN ASPART 100 [IU]/ML
20 INJECTION, SUSPENSION SUBCUTANEOUS EVERY MORNING
Status: DISCONTINUED | OUTPATIENT
Start: 2025-02-02 | End: 2025-02-06

## 2025-02-01 RX ORDER — INSULIN ASPART 100 [IU]/ML
25 INJECTION, SUSPENSION SUBCUTANEOUS
Status: DISCONTINUED | OUTPATIENT
Start: 2025-02-01 | End: 2025-02-06

## 2025-02-01 RX ADMIN — INSULIN LISPRO 10 UNITS: 100 INJECTION, SOLUTION INTRAVENOUS; SUBCUTANEOUS at 08:57

## 2025-02-01 RX ADMIN — DORZOLAMIDE HYDROCHLORIDE AND TIMOLOL MALEATE 1 DROP: 20; 5 SOLUTION OPHTHALMIC at 17:11

## 2025-02-01 RX ADMIN — INSULIN LISPRO 2 UNITS: 100 INJECTION, SOLUTION INTRAVENOUS; SUBCUTANEOUS at 18:10

## 2025-02-01 RX ADMIN — INSULIN LISPRO 4 UNITS: 100 INJECTION, SOLUTION INTRAVENOUS; SUBCUTANEOUS at 00:57

## 2025-02-01 RX ADMIN — CEFEPIME 1000 MG: 1 INJECTION, POWDER, FOR SOLUTION INTRAMUSCULAR; INTRAVENOUS at 09:54

## 2025-02-01 RX ADMIN — PREDNISOLONE ACETATE 1 DROP: 10 SUSPENSION/ DROPS OPHTHALMIC at 09:00

## 2025-02-01 RX ADMIN — INSULIN ASPART 20 UNITS: 100 INJECTION, SUSPENSION SUBCUTANEOUS at 09:11

## 2025-02-01 RX ADMIN — DORZOLAMIDE HYDROCHLORIDE AND TIMOLOL MALEATE 1 DROP: 20; 5 SOLUTION OPHTHALMIC at 08:59

## 2025-02-01 RX ADMIN — POTASSIUM PHOSPHATE, MONOBASIC POTASSIUM PHOSPHATE, DIBASIC: 224; 236 INJECTION, SOLUTION, CONCENTRATE INTRAVENOUS at 21:12

## 2025-02-01 RX ADMIN — SCOPOLAMINE 1 PATCH: 1.5 PATCH, EXTENDED RELEASE TRANSDERMAL at 03:39

## 2025-02-01 RX ADMIN — VANCOMYCIN HYDROCHLORIDE 1500 MG: 5 INJECTION, POWDER, LYOPHILIZED, FOR SOLUTION INTRAVENOUS at 10:54

## 2025-02-01 RX ADMIN — CEFEPIME 1000 MG: 1 INJECTION, POWDER, FOR SOLUTION INTRAMUSCULAR; INTRAVENOUS at 22:40

## 2025-02-01 RX ADMIN — INSULIN ASPART 25 UNITS: 100 INJECTION, SUSPENSION SUBCUTANEOUS at 22:40

## 2025-02-01 RX ADMIN — PREDNISOLONE ACETATE 1 DROP: 10 SUSPENSION/ DROPS OPHTHALMIC at 18:12

## 2025-02-01 RX ADMIN — HEPARIN SODIUM 14 UNITS/KG/HR: 10000 INJECTION, SOLUTION INTRAVENOUS at 22:48

## 2025-02-01 NOTE — PLAN OF CARE
Problem: PAIN - ADULT  Goal: Verbalizes/displays adequate comfort level or baseline comfort level  Description: Interventions:  - Encourage patient to monitor pain and request assistance  - Assess pain using appropriate pain scale  - Administer analgesics based on type and severity of pain and evaluate response  - Implement non-pharmacological measures as appropriate and evaluate response  - Consider cultural and social influences on pain and pain management  - Notify physician/advanced practitioner if interventions unsuccessful or patient reports new pain  Outcome: Progressing     Problem: INFECTION - ADULT  Goal: Absence or prevention of progression during hospitalization  Description: INTERVENTIONS:  - Assess and monitor for signs and symptoms of infection  - Monitor lab/diagnostic results  - Monitor all insertion sites, i.e. indwelling lines, tubes, and drains  - Monitor endotracheal if appropriate and nasal secretions for changes in amount and color  - [place] appropriate cooling/warming therapies per order  - Administer medications as ordered  - Instruct and encourage patient and family to use good hand hygiene technique  - Identify and instruct in appropriate isolation precautions for identified infection/condition  Outcome: Progressing  Goal: Absence of fever/infection during neutropenic period  Description: INTERVENTIONS:  - Monitor WBC    Outcome: Progressing     Problem: SAFETY ADULT  Goal: Patient will remain free of falls  Description: INTERVENTIONS:  - Educate patient/family on patient safety including physical limitations  - Instruct patient to call for assistance with activity   - Consult OT/PT to assist with strengthening/mobility   - Keep Call bell within reach  - Keep bed low and locked with side rails adjusted as appropriate  - Keep care items and personal belongings within reach  - Initiate and maintain comfort rounds  - Make Fall Risk Sign visible to staff  - Offer Toileting every  Hours,  in advance of need  - Initiate/Maintain alarm  - Obtain necessary fall risk management equipment:   - Apply yellow socks and bracelet for high fall risk patients  - Consider moving patient to room near nurses station  Outcome: Progressing  Goal: Maintain or return to baseline ADL function  Description: INTERVENTIONS:  -  Assess patient's ability to carry out ADLs; assess patient's baseline for ADL function and identify physical deficits which impact ability to perform ADLs (bathing, care of mouth/teeth, toileting, grooming, dressing, etc.)  - Assess/evaluate cause of self-care deficits   - Assess range of motion  - Assess patient's mobility; develop plan if impaired  - Assess patient's need for assistive devices and provide as appropriate  - Encourage maximum independence but intervene and supervise when necessary  - Involve family in performance of ADLs  - Assess for home care needs following discharge   - Consider OT consult to assist with ADL evaluation and planning for discharge  - Provide patient education as appropriate  Outcome: Progressing  Goal: Maintains/Returns to pre admission functional level  Description: INTERVENTIONS:  - Perform AM-PAC 6 Click Basic Mobility/ Daily Activity assessment daily.  - Set and communicate daily mobility goal to care team and patient/family/caregiver.   - Collaborate with rehabilitation services on mobility goals if consulted  - Perform Range of Motion  times a day.  - Reposition patient every  hours.  - Dangle patient  times a day  - Stand patient  times a day  - Ambulate patient  times a day  - Out of bed to chair  times a day   - Out of bed for meals  times a day  - Out of bed for toileting  - Record patient progress and toleration of activity level   Outcome: Progressing     Problem: SAFETY ADULT  Goal: Maintain or return to baseline ADL function  Description: INTERVENTIONS:  -  Assess patient's ability to carry out ADLs; assess patient's baseline for ADL function and  identify physical deficits which impact ability to perform ADLs (bathing, care of mouth/teeth, toileting, grooming, dressing, etc.)  - Assess/evaluate cause of self-care deficits   - Assess range of motion  - Assess patient's mobility; develop plan if impaired  - Assess patient's need for assistive devices and provide as appropriate  - Encourage maximum independence but intervene and supervise when necessary  - Involve family in performance of ADLs  - Assess for home care needs following discharge   - Consider OT consult to assist with ADL evaluation and planning for discharge  - Provide patient education as appropriate  Outcome: Progressing     Problem: SAFETY ADULT  Goal: Maintains/Returns to pre admission functional level  Description: INTERVENTIONS:  - Perform AM-PAC 6 Click Basic Mobility/ Daily Activity assessment daily.  - Set and communicate daily mobility goal to care team and patient/family/caregiver.   - Collaborate with rehabilitation services on mobility goals if consulted  - Perform Range of Motion  times a day.  - Reposition patient every  hours.  - Dangle patient  times a day  - Stand patient  times a day  - Ambulate patient  times a day  - Out of bed to chair  times a day   - Out of bed for meals  times a day  - Out of bed for toileting  - Record patient progress and toleration of activity level   Outcome: Progressing     Problem: DISCHARGE PLANNING  Goal: Discharge to home or other facility with appropriate resources  Description: INTERVENTIONS:  - Identify barriers to discharge w/patient and caregiver  - Arrange for needed discharge resources and transportation as appropriate  - Identify discharge learning needs (meds, wound care, etc.)  - Arrange for interpretive services to assist at discharge as needed  - Refer to Case Management Department for coordinating discharge planning if the patient needs post-hospital services based on physician/advanced practitioner order or complex needs related to  functional status, cognitive ability, or social support system  Outcome: Progressing     Problem: Prexisting or High Potential for Compromised Skin Integrity  Goal: Skin integrity is maintained or improved  Description: INTERVENTIONS:  - Identify patients at risk for skin breakdown  - Assess and monitor skin integrity  - Assess and monitor nutrition and hydration status  - Monitor labs   - Assess for incontinence   - Turn and reposition patient  - Assist with mobility/ambulation  - Relieve pressure over bony prominences  - Avoid friction and shearing  - Provide appropriate hygiene as needed including keeping skin clean and dry  - Evaluate need for skin moisturizer/barrier cream  - Collaborate with interdisciplinary team   - Patient/family teaching  - Consider wound care consult   Outcome: Progressing

## 2025-02-01 NOTE — ASSESSMENT & PLAN NOTE
Lab Results   Component Value Date    EGFR 24 02/01/2025    EGFR 19 01/31/2025    EGFR 22 01/30/2025    CREATININE 2.25 (H) 02/01/2025    CREATININE 2.79 (H) 01/31/2025    CREATININE 2.44 (H) 01/30/2025   Holding Losartan and Farxiga per nephro due to elevated Cr   Hydralazine 5 mg q 6 hrs for BP >180/110   Initially allowed for permissive hypertension due to concern for stroke.  However, patient's MRI does not show concern for infarct.  Will aim for normotension

## 2025-02-01 NOTE — ASSESSMENT & PLAN NOTE
Lab Results   Component Value Date    HGBA1C 6.9 (H) 01/18/2025     Recent Labs     01/31/25  1702 01/31/25  1703 01/31/25  2107 02/01/25  0057   POCGLU 232* 116 267* 202*     Plan  Increase to NPH insulin 25u at bedtime d/t early AM hyperglycemia and to avoid afternoon hypoglycemia  Continue NPH insulin 20u in the morning  Continue sliding scale   Hypoglycemia protocol

## 2025-02-01 NOTE — PROGRESS NOTES
Progress Note - Hospitalist   Name: Jeffry Almanzar 89 y.o. male I MRN: [id number]  Unit/Bed#: S -01 I Date of Admission: 1/17/2025   Date of Service: 2/1/2025 I Hospital Day: 14    Assessment & Plan  Dysphagia  Pt failed VBS 1/23: moderate oral/severe pharyngeal dysphagia characterized by weak swallow mechanics, multiple swallows noted to clear pharyngeal retention with overflow penetration and aspiration observed after the swallow   Had numerous discussions regarding goals of care with family and multidisciplinary team. Explained risks versus benefits of PICC line and subsequent TPN, versus pleasure feeds on comfort care. 1/27 pt's wife decided on obtaining PICC for patient and signed informed consent form. Process, risks, and outcomes were reviewed at length with the patient's wife at bedside, alongside Dr. Gtz. All questions/concerns answered. Pt's wife verbalizes understanding and wishes to proceed with PICC line.   1/28 - pt received PICC line and had TPN initiated in the evening  1/29 - concerns for yellow/green sputum, as well as hyperglycemia in the setting of TPN initiation, low grade fever, initiated empiric ABX as pt was a hard stick and unable to obtain blood cx  Lactic acid wnl   1/30 - 1x dose IV Lasix for congestion noted on CXR. COVID/FLU/RSV negative.  MRSA nares negative  Sputum culture: 3+ growth of Staph aureus, 2+ growth of mixed respiratory idalia  Blood cx x2: no growth at 24h    Tmax this morning 100.4F. Saturating 93-94% on 3L O2 via NC. Procalcitonin trended down from 4.61 to 4.02, and WBC trended down from 15.9 to 13.58. Consider tracheobronchitis in differential, given clinical presentation and hx of respiratory secretions, frequent suctioning, hx of intubation, and prolonged hospitalization.     Plan  Initiate IV Vancomycin dosed per Pharmacy  Can discontinue pending culture sensitivities  Continue IV Cefepime renally dosed at 1 g q12h  Will hold off on metronidazole d/t prolonged  QTc (per last EKG, 521 ms)  Will hold off on Zosyn or Unasyn at this time d/t unclear allergy to penicillin; will re-evaluate/reassess risks vs benefits as clinical course evolves  Per RD, TPN formula as follows:    dextrose 10 % Soln 1,250 mL   amino acids 15 % Soln 400 mL   fat emulsion 20 % Emul 155 mL   sodium acetate 2 mEq/mL Soln 30 mEq   potassium acetate 2 mEq/mL Soln 20 mEq   potassium phosphates 15 mmol/5 mL Soln 30 mmol   calcium gluconate 10 % Soln 4.6 mEq   magnesium sulfate 50 % Soln 8 mEq   Infuvite Adult Soln 10 mL   Trace Minerals Cu-Mn-Se-Zn 619-72- MCG/ML Soln 1 mL   thiamine 100 mg/mL Soln 100 mg   Scopolamine patch to manage secretions  Per pharmacy - pt's hx of glaucoma is a contraindication for glycopyrrolate (both open angle & angle closure)   Maintain strict NPO  Repeat BMP & CBC in AM  Will repeat SLP evaluation tomorrow, per pt's family request  Type 2 diabetes mellitus with stage 4 chronic kidney disease, without long-term current use of insulin ([facility])  Lab Results   Component Value Date    HGBA1C 6.9 (H) 01/18/2025     Recent Labs     01/31/25  1702 01/31/25  1703 01/31/25  2107 02/01/25  0057   POCGLU 232* 116 267* 202*     Plan  Increase to NPH insulin 25u at bedtime d/t early AM hyperglycemia and to avoid afternoon hypoglycemia  Continue NPH insulin 20u in the morning  Continue sliding scale   Hypoglycemia protocol   Cognitive impairment  Per wife, patient has some degree of dementia but is unclear of specifics. Patient oriented to self and place but unaware of year and month. Per wife, unsure if patient is completely aware of what is going on and is unsure if patient would truly want to accept intubation a second time    Plan  Neuropsych evaluation determined patient does NOT have capacity for MDM  Palliative on board. Appreciate recommendations. As of 1/23/2025, family made the decision to make patient's code status LEVEL 3 DNR/DNI.  Ongoing discussions regarding palliative  care.   Acute saddle pulmonary embolism with acute cor pulmonale (HCC)  Initially with cardiac arrest and cardiogenic shock, is now extubated off pressors. Was transferred  to med/surg 1/21.   MRI brain negative for CVA or concern for hemorrhage.      Continue heparin gtt.  SOFÍA (acute kidney injury) (HCC)  Etiology: Suspect due to ATN in the setting of cardiogenic shock, cardiac arrest, hypotension and IV contrast exposure with autoregulatory dysfunction with ARB  Ongoing discussion with Nutrition and Nephrology regarding adjustment to free water component of TPN, in light of pt's BUN & creatinine this morning.   Pt's creatinine and Na have improved this morning.   Plan  Per Nephrology:  Discontinued D5W 1/29 d/t resolution of hypernatremia and initiation of TPN  Continue to hold losartan and Farxiga  Strict I/Os, daily weights  Avoid nephrotoxins, NSAIDs, further IV contrast as able  Avoid hypotension.  Maintain MAP >65  Hypernatremia  Nephrology has been following closely, adjusting D5W as necessary.    Discontinued D5W 1/29.   Cardiac arrest (HCC)  Cardiac arrest in ED after CTA evaluating for pulmonary embolus.  Asystole for 2 minutes requiring CPR and intubation s/P extubation 1/21/2025  ECHO with EF of 65% in September, ECHO with left ventricular ejection fraction is 55% (1/20)    Plan  Continue heparin gtt  Continue to monitor on tele due to recent PE, recent MI, and electrolyte abnormalities   Benign 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 (HCC)  Lab Results   Component Value Date    EGFR 24 02/01/2025    EGFR 19 01/31/2025    EGFR 22 01/30/2025    CREATININE 2.25 (H) 02/01/2025    CREATININE 2.79 (H) 01/31/2025    CREATININE 2.44 (H) 01/30/2025   Holding Losartan and Farxiga per nephro due to elevated Cr   Hydralazine 5 mg q 6 hrs for BP >180/110   Initially allowed for permissive hypertension due to concern for stroke.  However, patient's MRI does not show concern for infarct.  Will aim for  normotension  Palliative care encounter  Patient seen and evaluated by palliative care 1/23. Palliative care had an in-depth discussion with family regarding patient's GOC and overall prognosis. Family would NOT like to pursue a PEG tube at this time.   CODE STATUS changed from Level 2 to Level 3 DNR/DNI  Ongoing goals of care discussions with consideration for comfort measures only  Goals of care, counseling/discussion  Palliative on board. Appreciate recommendations   CKD (chronic kidney disease) stage 4, GFR 15-29 ml/min ([facility])  Lab Results   Component Value Date    EGFR 24 02/01/2025    EGFR 19 01/31/2025    EGFR 22 01/30/2025    CREATININE 2.25 (H) 02/01/2025    CREATININE 2.79 (H) 01/31/2025    CREATININE 2.44 (H) 01/30/2025   Per patient and patient's wife, would not be interested in dialysis   Nephro on board. Appreciate recommendations.  Severe protein-calorie malnutrition (HCC)  Malnutrition Findings:   Adult Malnutrition type: Acute illness  Adult Degree of Malnutrition: Other severe protein calorie malnutrition  Malnutrition Characteristics: Muscle loss, Inadequate energy, Weight loss, Fat loss                  360 Statement: related to dysphagia with recommendation for NPO, pt with > 7 days of less than 50% energy intake compared to estimated needs, muscle, fat wasting, wasted buccal pads, temporalis, supraclavicular space, interosseous. Treatment. Enteral nutrition via feeding tube is indicated for nutrition support- wife is declining nutrition via NGT or PEG at this time. Ongoing goals of care discussion noted.    BMI Findings:           Body mass index is 22.23 kg/m².     Wife agreeable to PICC and TPN. Per RD, TPN as above under principal problem.     VTE Pharmacologic Prophylaxis: VTE Score: 6 High Risk (Score >/= 5) - Pharmacological DVT Prophylaxis Ordered: heparin drip. Sequential Compression Devices Ordered.    Mobility:   Basic Mobility Inpatient Raw Score: 10  [facility] Goal: 4: Move to  chair/commode  -HLM Achieved: 2: Bed activities/Dependent transfer  -HLM Goal achieved. Continue to encourage appropriate mobility.    Patient Centered Rounds: I performed bedside rounds with nursing staff today.   Discussions with Specialists or Other Care Team Provider: Nephrology, Palliative, Nutrition    Education and Discussions with Family / Patient: Updated  (wife) at bedside.    Current Length of Stay: 14 day(s)  Current Patient Status: Inpatient   Certification Statement: The patient will continue to require additional inpatient hospital stay due to IV antibiotic management, repeat SLP per family's request, and evolving GOC discussion.  Discharge Plan: Anticipate discharge in 48-72 hrs to TBD.     Code Status: Level 3 - DNAR and DNI    Subjective   Per report, no acute events overnight. The maximum temperature he was found to be was 100.4F. This morning, he is awake and responding to questions with head nod/shake. He initially takes the nasal cannula out of his nose, however, he is redirectable when reminded the oxygen is to help with breathing. He is saturating 93-94% on 4L O2 via NC. When asked if he is in pain, he denies. Wife at bedside, updated about current plan as outlined above.     Objective :  Temp:  [97.2 °F (36.2 °C)-100.4 °F (38 °C)] 99.7 °F (37.6 °C)  HR:  [] 98  BP: (129-152)/(71-78) 150/78  Resp:  [15-17] 15  SpO2:  [92 %-97 %] 94 %  O2 Device: None (Room air)    Body mass index is 22.23 kg/m².     Input and Output Summary (last 24 hours):     Intake/Output Summary (Last 24 hours) at 2/1/2025 0957  Last data filed at 2/1/2025 0900  Gross per 24 hour   Intake 0 ml   Output 950 ml   Net -950 ml       Physical Exam  Vitals reviewed.   Constitutional:       General: He is awake.      Appearance: He is underweight. He is ill-appearing.      Interventions: Nasal cannula in place.   HENT:      Head: Normocephalic and atraumatic.      Right Ear: External ear normal.      Left  Ear: External ear normal.      Mouth/Throat:      Mouth: Mucous membranes are dry.   Eyes:      General: No scleral icterus.     Extraocular Movements: Extraocular movements intact.   Cardiovascular:      Rate and Rhythm: Tachycardia present.      Pulses: Normal pulses.   Pulmonary:      Effort: Tachypnea and accessory muscle usage present.      Breath sounds: Rales (bilateral) present.      Comments: Pulmonary congestion  Chest:      Chest wall: No tenderness.   Abdominal:      Tenderness: There is no abdominal tenderness. There is no guarding.   Skin:     General: Skin is warm.      Capillary Refill: Capillary refill takes 2 to 3 seconds.      Coloration: Skin is not jaundiced.   Neurological:      Motor: Weakness present.         Lines/Drains:  Lines/Drains/Airways       Active Status       Name Placement date Placement time Site Days    PICC Line 01/28/25 Right Brachial 01/28/25  0930  Brachial  4    Urethral Catheter 01/17/25  0000  --  15                       Central Line:  Goal for removal: Continuing for TPN.          Telemetry:  Telemetry Orders (From admission, onward)               24 Hour Telemetry Monitoring  Continuous x 24 Hours (Telem)        Expiring   Question:  Reason for 24 Hour Telemetry  Answer:  Pulmonary Embolism                     Telemetry Reviewed: Sinus Tachycardia  Indication for Continued Telemetry Use: PE               Lab Results: I have reviewed the following results:   Results from last 7 days   Lab Units 02/01/25  0647   WBC Thousand/uL 13.58*   HEMOGLOBIN g/dL 8.2*   HEMATOCRIT % 26.1*   PLATELETS Thousands/uL 161     Results from last 7 days   Lab Units 02/01/25  0647   SODIUM mmol/L 141   POTASSIUM mmol/L 5.2   CHLORIDE mmol/L 108   CO2 mmol/L 24   BUN mg/dL 64*   CREATININE mg/dL 2.25*   ANION GAP mmol/L 9   CALCIUM mg/dL 8.7   GLUCOSE RANDOM mg/dL 396*         Results from last 7 days   Lab Units 02/01/25  0057 01/31/25  2107 01/31/25  1703 01/31/25  1702 01/31/25  1152  01/31/25  0815 01/31/25  0613 01/31/25  0004 01/30/25  2055 01/30/25  1917 01/30/25  1808 01/30/25  1451   POC GLUCOSE mg/dl 202* 267* 116 232* 192* 234* 249* 208* 194* 246* 261* 251*         Results from last 7 days   Lab Units 02/01/25  0647 01/31/25  0526 01/30/25  0428 01/29/25  1022 01/29/25  0521   LACTIC ACID mmol/L  --   --   --  1.5  --    PROCALCITONIN ng/ml 4.02* 4.61* 3.01*  --  1.21*       Recent Cultures (last 7 days):   Results from last 7 days   Lab Units 01/30/25  1556 01/30/25  1047   BLOOD CULTURE   --  No Growth at 24 hrs.  No Growth at 24 hrs.   SPUTUM CULTURE  3+ Growth of Staphylococcus aureus*  2+ Growth of  --    GRAM STAIN RESULT  1+ Epithelial Cells*  2+ Polys*  2+ Gram positive cocci in pairs*  1+ Gram positive cocci in chains*  --        Imaging Results Review: I reviewed radiology reports from this admission including: CT head, CTA PE, CXR, MRI brain, MRA head, LL venous duplex, VBS .    Last 24 Hours Medication List:     Current Facility-Administered Medications:     acetaminophen (TYLENOL) rectal suppository 650 mg, Q4H PRN    Adult 3-in-1 TPN (custom base / custom electrolytes), Continuous TPN, Last Rate: 77.7 mL/hr at 01/31/25 2131    artificial tear ophthalmic ointment, HS    cefepime (MAXIPIME) 1,000 mg in dextrose 5 % 50 mL IVPB, Q12H, Last Rate: 1,000 mg (02/01/25 0954)    chlorhexidine (PERIDEX) 0.12 % oral rinse 15 mL, Q12H MARQUIS    dorzolamide-timolol (COSOPT) 2-0.5 % ophthalmic solution 1 drop, BID    heparin (porcine) 25,000 units in 0.45% NaCl 250 mL infusion (premix), Titrated, Last Rate: 14 Units/kg/hr (01/31/25 1827)    heparin (porcine) injection 2,400 Units, Q6H PRN    heparin (porcine) injection 4,800 Units, Once    heparin (porcine) injection 4,800 Units, Q6H PRN    hydrALAZINE (APRESOLINE) injection 5 mg, Q6H PRN    [START ON 2/2/2025] insulin aspart protamine-insulin aspart (NovoLOG 70/30) 100 units/mL subcutaneous injection 20 Units, QAM **AND** insulin aspart  protamine-insulin aspart (NovoLOG 70/30) 100 units/mL subcutaneous injection 25 Units, HS    insulin lispro (HumALOG/ADMELOG) 100 units/mL subcutaneous injection 2-12 Units, Q6H **AND** Fingerstick Glucose (POCT), Q6H    Latanoprostene Bunod 0.024 % SOLN 1 drop, HS    polyethylene glycol (MIRALAX) packet 17 g, Daily    prednisoLONE acetate (PRED FORTE) 1 % ophthalmic suspension 1 drop, TID    scopolamine (TRANSDERM-SCOP) 1 mg/3 days TD 72 hr patch 1 patch, Q72H    [Held by provider] senna-docusate sodium (SENOKOT S) 8.6-50 mg per tablet 1 tablet, BID    tetracaine 0.5 % ophthalmic solution 2 drop, Once    vancomycin (VANCOCIN) 1500 mg in sodium chloride 0.9% 250 mL IVPB, Once    vancomycin (VANCOCIN) IVPB (premix in dextrose) 1,000 mg 200 mL, Daily PRN    Administrative Statements   Today, Patient Was Seen By: Mercedes Nunez DO  PGY-I  I have spent a total time of >45 minutes in caring for this patient on the day of the visit/encounter including Prognosis, Risks and benefits of tx options, Instructions for management, Patient and family education, Importance of tx compliance, Risk factor reductions, Impressions, Counseling / Coordination of care, Documenting in the medical record, Reviewing / ordering tests, medicine, procedures  , Obtaining or reviewing history  , and Communicating with other healthcare professionals .    **Please Note: This note may have been constructed using a voice recognition system.**

## 2025-02-01 NOTE — QUICK NOTE
01/31/2025 12:10 PM -  Jeffry ACRROLL Barlowey's chart and case were reviewed by Osei Morocho MD.  Mode of review included electronic chart check.      Per review, symptoms remain controlled on current regimen and no changes are made at this time. Please continue the regimen in place, and review our last note for details. For dispo plan, please review Case Management notes.       Palliative care will return on 02/03/2025. Current POC and goals clear, current plan to trial TPN until Sunday [02/02] and re-assess at that time.       For urgent issues or any questions/concerns, please notify on-call provider via [employer] Secure Chat.  You may also call our answering service 24/7 at [phone].    Osei Morocho MD  Palliative and Supportive Care  Clinic/Answering Service: [phone]  You can find me on [employer]!

## 2025-02-01 NOTE — PROGRESS NOTES
Vancomycin IV Pharmacy-to-Dose Consultation    Jeffry Almanzar is a 89 y.o. male who is currently receiving Vancomycin IV with management by the Pharmacy Consult service.    Relevant clinical data and objective / subjective history reviewed.  Vancomycin Assessment:  Indication and Goal AUC/Trough: Pneumonia (goal -600, trough >10), Other respiratory tract  Micro:       Renal Function:  SCr:  2.25 mg/dL  CrCl:  19 mL/min  Days of Therapy: 1  Current Dose: 1500mg IV loading dose    Vancomycin Plan:  New Dosing: pulse-dosing with plan to re-dose when vancomycin random level < 15  Next Level: 1030 on 2/2  Renal Function Monitoring: Daily BMP and UOP  Pharmacy will continue to follow closely for s/sx of nephrotoxicity, infusion reactions and appropriateness of therapy.  BMP and CBC will be ordered per protocol. We will continue to follow the patient’s culture results and clinical progress daily.    Josselyn Zavala, Pharmacist

## 2025-02-01 NOTE — ASSESSMENT & PLAN NOTE
Lab Results   Component Value Date    EGFR 24 02/01/2025    EGFR 19 01/31/2025    EGFR 22 01/30/2025    CREATININE 2.25 (H) 02/01/2025    CREATININE 2.79 (H) 01/31/2025    CREATININE 2.44 (H) 01/30/2025   Per patient and patient's wife, would not be interested in dialysis   Nephro on board. Appreciate recommendations.

## 2025-02-01 NOTE — ASSESSMENT & PLAN NOTE
Malnutrition Findings:   Adult Malnutrition type: Acute illness  Adult Degree of Malnutrition: Other severe protein calorie malnutrition  Malnutrition Characteristics: Muscle loss, Inadequate energy, Weight loss, Fat loss                  360 Statement: related to dysphagia with recommendation for NPO, pt with > 7 days of less than 50% energy intake compared to estimated needs, muscle, fat wasting, wasted buccal pads, temporalis, supraclavicular space, interosseous. Treatment. Enteral nutrition via feeding tube is indicated for nutrition support- wife is declining nutrition via NGT or PEG at this time. Ongoing goals of care discussion noted.    BMI Findings:           Body mass index is 22.23 kg/m².     Wife agreeable to PICC and TPN. Per RD, TPN as above under principal problem.

## 2025-02-01 NOTE — RESPIRATORY THERAPY NOTE
RT Protocol Note  Jeffry Almanzar 89 y.o. male MRN: [id number]  Unit/Bed#: S -01 Encounter: [id number]    Assessment    Principal Problem:    Acute saddle pulmonary embolism with acute cor pulmonale (HCC)  Active Problems:    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  (HCC)    Benign 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 (HCC)    CKD (chronic kidney disease) stage 4, GFR 15-29 ml/min (HCC)    Type 2 diabetes mellitus with stage 4 chronic kidney disease, without long-term current use of insulin (HCC)    SOFÍA (acute kidney injury) (HCC)    Lacunar cerebrovascular accident (CVA) (HCC)    Cardiac arrest (HCC)    Normal anion gap metabolic acidosis    Retinal hemorrhage noted on examination of left eye    Hypernatremia    Hypoxia    Cognitive impairment    Palliative care encounter    Goals of care, counseling/discussion    Dysphagia    Severe protein-calorie malnutrition (HCC)    Tracheobronchitis      Home Pulmonary Medications:  none    Past Medical History:   Diagnosis Date    Cataracts, bilateral      Social History     Socioeconomic History    Marital status: /Civil Union     Spouse name: Not on file    Number of children: Not on file    Years of education: Not on file    Highest education level: Not on file   Occupational History    Occupation: RETIRED   Tobacco Use    Smoking status: Former     Current packs/day: 0.00     Types: Cigarettes     Quit date: 1964     Years since quittin.1    Smokeless tobacco: Never   Vaping Use    Vaping status: Never Used   Substance and Sexual Activity    Alcohol use: Not Currently     Comment: quit 23 years ago    Drug use: No    Sexual activity: Not on file   Other Topics Concern    Not on file   Social History Narrative    Not on file     Social Drivers of Health     Financial Resource Strain: Low Risk  (10/24/2023)    Overall Financial Resource Strain (CARDIA)     Difficulty of Paying Living Expenses: Not hard at all   Food Insecurity: No  "Food Insecurity (9/10/2024)    Nursing - Inadequate Food Risk Classification     Worried About Running Out of Food in the Last Year: Never true     Ran Out of Food in the Last Year: Never true     Ran Out of Food in the Last Year: Not on file   Transportation Needs: No Transportation Needs (9/10/2024)    PRAPARE - Transportation     Lack of Transportation (Medical): No     Lack of Transportation (Non-Medical): No   Physical Activity: Not on file   Stress: Not on file   Social Connections: Not on file   Intimate Partner Violence: Not on file   Housing Stability: Low Risk  (9/10/2024)    Housing Stability Vital Sign     Unable to Pay for Housing in the Last Year: No     Number of Times Moved in the Last Year: 0     Homeless in the Last Year: No       Subjective         Objective    Physical Exam:   Assessment Type: Assess only  General Appearance: Awake  Respiratory Pattern: Normal  Chest Assessment: Chest expansion symmetrical  Bilateral Breath Sounds: Coarse  Cough: Weak, Congested  O2 Device: (P) 2L    Vitals:  Blood pressure 150/78, pulse 98, temperature 99.7 °F (37.6 °C), temperature source Oral, resp. rate 17, height 5' 5\" (1.651 m), weight 60.6 kg (133 lb 9.6 oz), SpO2 94%.          Imaging and other studies:     O2 Device: (P) 2L     Plan    Respiratory Plan: Mild Distress pathway  Airway Clearance Plan: Percussive Vest     Resp Comments: (P) Airway Protocol done, patient is currently on 2L, breath sound course, I will order meds and vest.   "

## 2025-02-01 NOTE — ASSESSMENT & PLAN NOTE
Pt failed VBS 1/23: moderate oral/severe pharyngeal dysphagia characterized by weak swallow mechanics, multiple swallows noted to clear pharyngeal retention with overflow penetration and aspiration observed after the swallow   Had numerous discussions regarding goals of care with family and multidisciplinary team. Explained risks versus benefits of PICC line and subsequent TPN, versus pleasure feeds on comfort care. 1/27 pt's wife decided on obtaining PICC for patient and signed informed consent form. Process, risks, and outcomes were reviewed at length with the patient's wife at bedside, alongside Dr. Gtz. All questions/concerns answered. Pt's wife verbalizes understanding and wishes to proceed with PICC line.   1/28 - pt received PICC line and had TPN initiated in the evening  1/29 - concerns for yellow/green sputum, as well as hyperglycemia in the setting of TPN initiation, low grade fever, initiated empiric ABX as pt was a hard stick and unable to obtain blood cx  Lactic acid wnl   1/30 - 1x dose IV Lasix for congestion noted on CXR. COVID/FLU/RSV negative.  MRSA nares negative  Sputum culture: 3+ growth of Staph aureus, 2+ growth of mixed respiratory idalia  Blood cx x2: no growth at 24h    Tmax this morning 100.4F. Saturating 93-94% on 3L O2 via NC. Procalcitonin trended down from 4.61 to 4.02, and WBC trended down from 15.9 to 13.58. Consider tracheobronchitis in differential, given clinical presentation and hx of respiratory secretions, frequent suctioning, hx of intubation, and prolonged hospitalization.     Plan  Initiate IV Vancomycin dosed per Pharmacy  Can discontinue pending culture sensitivities  Continue IV Cefepime renally dosed at 1 g q12h  Will hold off on metronidazole d/t prolonged QTc (per last EKG, 521 ms)  Will hold off on Zosyn or Unasyn at this time d/t unclear allergy to penicillin; will re-evaluate/reassess risks vs benefits as clinical course evolves  Per RD, TPN formula as follows:     dextrose 10 % Soln 1,250 mL   amino acids 15 % Soln 400 mL   fat emulsion 20 % Emul 155 mL   sodium acetate 2 mEq/mL Soln 30 mEq   potassium acetate 2 mEq/mL Soln 20 mEq   potassium phosphates 15 mmol/5 mL Soln 30 mmol   calcium gluconate 10 % Soln 4.6 mEq   magnesium sulfate 50 % Soln 8 mEq   Infuvite Adult Soln 10 mL   Trace Minerals Cu-Mn-Se-Zn 584-98- MCG/ML Soln 1 mL   thiamine 100 mg/mL Soln 100 mg   Scopolamine patch to manage secretions  Per pharmacy - pt's hx of glaucoma is a contraindication for glycopyrrolate (both open angle & angle closure)   Maintain strict NPO  Repeat BMP & CBC in AM  Will repeat SLP evaluation tomorrow, per pt's family request

## 2025-02-01 NOTE — PLAN OF CARE
Problem: PAIN - ADULT  Goal: Verbalizes/displays adequate comfort level or baseline comfort level  Description: Interventions:  - Encourage patient to monitor pain and request assistance  - Assess pain using appropriate pain scale  - Administer analgesics based on type and severity of pain and evaluate response  - Implement non-pharmacological measures as appropriate and evaluate response  - Consider cultural and social influences on pain and pain management  - Notify physician/advanced practitioner if interventions unsuccessful or patient reports new pain  Outcome: Progressing     Problem: INFECTION - ADULT  Goal: Absence or prevention of progression during hospitalization  Description: INTERVENTIONS:  - Assess and monitor for signs and symptoms of infection  - Monitor lab/diagnostic results  - Monitor all insertion sites, i.e. indwelling lines, tubes, and drains  - Monitor endotracheal if appropriate and nasal secretions for changes in amount and color  - [place] appropriate cooling/warming therapies per order  - Administer medications as ordered  - Instruct and encourage patient and family to use good hand hygiene technique  - Identify and instruct in appropriate isolation precautions for identified infection/condition  Outcome: Progressing  Goal: Absence of fever/infection during neutropenic period  Description: INTERVENTIONS:  - Monitor WBC    Outcome: Progressing     Problem: SAFETY ADULT  Goal: Patient will remain free of falls  Description: INTERVENTIONS:  - Educate patient/family on patient safety including physical limitations  - Instruct patient to call for assistance with activity   - Consult OT/PT to assist with strengthening/mobility   - Keep Call bell within reach  - Keep bed low and locked with side rails adjusted as appropriate  - Keep care items and personal belongings within reach  - Initiate and maintain comfort rounds  - Make Fall Risk Sign visible to staff  - Offer Toileting every  Hours,  in advance of need  - Initiate/Maintain alarm  - Obtain necessary fall risk management equipment:   - Apply yellow socks and bracelet for high fall risk patients  - Consider moving patient to room near nurses station  Outcome: Progressing  Goal: Maintain or return to baseline ADL function  Description: INTERVENTIONS:  -  Assess patient's ability to carry out ADLs; assess patient's baseline for ADL function and identify physical deficits which impact ability to perform ADLs (bathing, care of mouth/teeth, toileting, grooming, dressing, etc.)  - Assess/evaluate cause of self-care deficits   - Assess range of motion  - Assess patient's mobility; develop plan if impaired  - Assess patient's need for assistive devices and provide as appropriate  - Encourage maximum independence but intervene and supervise when necessary  - Involve family in performance of ADLs  - Assess for home care needs following discharge   - Consider OT consult to assist with ADL evaluation and planning for discharge  - Provide patient education as appropriate  Outcome: Progressing     Problem: DISCHARGE PLANNING  Goal: Discharge to home or other facility with appropriate resources  Description: INTERVENTIONS:  - Identify barriers to discharge w/patient and caregiver  - Arrange for needed discharge resources and transportation as appropriate  - Identify discharge learning needs (meds, wound care, etc.)  - Arrange for interpretive services to assist at discharge as needed  - Refer to Case Management Department for coordinating discharge planning if the patient needs post-hospital services based on physician/advanced practitioner order or complex needs related to functional status, cognitive ability, or social support system  Outcome: Progressing     Problem: Knowledge Deficit  Goal: Patient/family/caregiver demonstrates understanding of disease process, treatment plan, medications, and discharge instructions  Description: Complete learning assessment  and assess knowledge base.  Interventions:  - Provide teaching at level of understanding  - Provide teaching via preferred learning methods  Outcome: Progressing     Problem: Nutrition/Hydration-ADULT  Goal: Nutrient/Hydration intake appropriate for improving, restoring or maintaining nutritional needs  Description: Monitor and assess patient's nutrition/hydration status for malnutrition. Collaborate with interdisciplinary team and initiate plan and interventions as ordered.  Monitor patient's weight and dietary intake as ordered or per policy. Utilize nutrition screening tool and intervene as necessary. Determine patient's food preferences and provide high-protein, high-caloric foods as appropriate.     INTERVENTIONS:  - Monitor oral intake, urinary output, labs, and treatment plans  - Assess nutrition and hydration status and recommend course of action  - Evaluate amount of meals eaten  - Assist patient with eating if necessary   - Allow adequate time for meals  - Recommend/ encourage appropriate diets, oral nutritional supplements, and vitamin/mineral supplements  - Order, calculate, and assess calorie counts as needed  - Recommend, monitor, and adjust tube feedings and TPN/PPN based on assessed needs  - Assess need for intravenous fluids  - Provide specific nutrition/hydration education as appropriate  - Include patient/family/caregiver in decisions related to nutrition  Outcome: Progressing     Problem: Prexisting or High Potential for Compromised Skin Integrity  Goal: Skin integrity is maintained or improved  Description: INTERVENTIONS:  - Identify patients at risk for skin breakdown  - Assess and monitor skin integrity  - Assess and monitor nutrition and hydration status  - Monitor labs   - Assess for incontinence   - Turn and reposition patient  - Assist with mobility/ambulation  - Relieve pressure over bony prominences  - Avoid friction and shearing  - Provide appropriate hygiene as needed including keeping  skin clean and dry  - Evaluate need for skin moisturizer/barrier cream  - Collaborate with interdisciplinary team   - Patient/family teaching  - Consider wound care consult   Outcome: Progressing

## 2025-02-01 NOTE — ASSESSMENT & PLAN NOTE
Etiology: Suspect due to ATN in the setting of cardiogenic shock, cardiac arrest, hypotension and IV contrast exposure with autoregulatory dysfunction with ARB  Ongoing discussion with Nutrition and Nephrology regarding adjustment to free water component of TPN, in light of pt's BUN & creatinine this morning.   Pt's creatinine and Na have improved this morning.   Plan  Per Nephrology:  Discontinued D5W 1/29 d/t resolution of hypernatremia and initiation of TPN  Continue to hold losartan and Farxiga  Strict I/Os, daily weights  Avoid nephrotoxins, NSAIDs, further IV contrast as able  Avoid hypotension.  Maintain MAP >65

## 2025-02-02 LAB
ANION GAP SERPL CALCULATED.3IONS-SCNC: 8 MMOL/L (ref 4–13)
APTT PPP: 103 SECONDS (ref 23–34)
APTT PPP: 63 SECONDS (ref 23–34)
APTT PPP: 79 SECONDS (ref 23–34)
BACTERIA SPT RESP CULT: ABNORMAL
BACTERIA SPT RESP CULT: ABNORMAL
BUN SERPL-MCNC: 68 MG/DL (ref 5–25)
CALCIUM SERPL-MCNC: 8.9 MG/DL (ref 8.4–10.2)
CHLORIDE SERPL-SCNC: 111 MMOL/L (ref 96–108)
CO2 SERPL-SCNC: 26 MMOL/L (ref 21–32)
CREAT SERPL-MCNC: 2.55 MG/DL (ref 0.6–1.3)
ERYTHROCYTE [DISTWIDTH] IN BLOOD BY AUTOMATED COUNT: 16.7 % (ref 11.6–15.1)
GFR SERPL CREATININE-BSD FRML MDRD: 21 ML/MIN/1.73SQ M
GLUCOSE SERPL-MCNC: 145 MG/DL (ref 65–140)
GLUCOSE SERPL-MCNC: 153 MG/DL (ref 65–140)
GLUCOSE SERPL-MCNC: 155 MG/DL (ref 65–140)
GLUCOSE SERPL-MCNC: 177 MG/DL (ref 65–140)
GLUCOSE SERPL-MCNC: 195 MG/DL (ref 65–140)
GRAM STN SPEC: ABNORMAL
HCT VFR BLD AUTO: 31.9 % (ref 36.5–49.3)
HGB BLD-MCNC: 10.1 G/DL (ref 12–17)
MCH RBC QN AUTO: 28.3 PG (ref 26.8–34.3)
MCHC RBC AUTO-ENTMCNC: 31.7 G/DL (ref 31.4–37.4)
MCV RBC AUTO: 89 FL (ref 82–98)
PLATELET # BLD AUTO: 150 THOUSANDS/UL (ref 149–390)
PMV BLD AUTO: 11.1 FL (ref 8.9–12.7)
POTASSIUM SERPL-SCNC: 3.9 MMOL/L (ref 3.5–5.3)
PROCALCITONIN SERPL-MCNC: 17.97 NG/ML
RBC # BLD AUTO: 3.57 MILLION/UL (ref 3.88–5.62)
SODIUM SERPL-SCNC: 145 MMOL/L (ref 135–147)
VANCOMYCIN SERPL-MCNC: 11.3 UG/ML (ref 10–20)
WBC # BLD AUTO: 13.26 THOUSAND/UL (ref 4.31–10.16)

## 2025-02-02 PROCEDURE — 94668 MNPJ CHEST WALL SBSQ: CPT

## 2025-02-02 PROCEDURE — 99233 SBSQ HOSP IP/OBS HIGH 50: CPT | Performed by: INTERNAL MEDICINE

## 2025-02-02 PROCEDURE — 92610 EVALUATE SWALLOWING FUNCTION: CPT

## 2025-02-02 PROCEDURE — 93005 ELECTROCARDIOGRAM TRACING: CPT

## 2025-02-02 PROCEDURE — 80202 ASSAY OF VANCOMYCIN: CPT | Performed by: INTERNAL MEDICINE

## 2025-02-02 PROCEDURE — 84145 PROCALCITONIN (PCT): CPT | Performed by: INTERNAL MEDICINE

## 2025-02-02 PROCEDURE — 85730 THROMBOPLASTIN TIME PARTIAL: CPT | Performed by: INTERNAL MEDICINE

## 2025-02-02 PROCEDURE — 82948 REAGENT STRIP/BLOOD GLUCOSE: CPT

## 2025-02-02 PROCEDURE — 94669 MECHANICAL CHEST WALL OSCILL: CPT

## 2025-02-02 PROCEDURE — 85027 COMPLETE CBC AUTOMATED: CPT

## 2025-02-02 PROCEDURE — 80048 BASIC METABOLIC PNL TOTAL CA: CPT

## 2025-02-02 RX ORDER — METRONIDAZOLE 500 MG/100ML
500 INJECTION, SOLUTION INTRAVENOUS EVERY 8 HOURS
Status: DISCONTINUED | OUTPATIENT
Start: 2025-02-02 | End: 2025-02-09

## 2025-02-02 RX ORDER — CEFAZOLIN SODIUM 2 G/50ML
2000 SOLUTION INTRAVENOUS EVERY 8 HOURS
Status: DISCONTINUED | OUTPATIENT
Start: 2025-02-02 | End: 2025-02-02

## 2025-02-02 RX ORDER — CEFAZOLIN SODIUM 1 G/50ML
1000 SOLUTION INTRAVENOUS EVERY 8 HOURS
Status: DISCONTINUED | OUTPATIENT
Start: 2025-02-02 | End: 2025-02-09

## 2025-02-02 RX ADMIN — CEFEPIME 1000 MG: 1 INJECTION, POWDER, FOR SOLUTION INTRAMUSCULAR; INTRAVENOUS at 09:24

## 2025-02-02 RX ADMIN — CEFAZOLIN SODIUM 1000 MG: 1 SOLUTION INTRAVENOUS at 17:31

## 2025-02-02 RX ADMIN — CHLORHEXIDINE GLUCONATE 15 ML: 1.2 RINSE ORAL at 09:19

## 2025-02-02 RX ADMIN — MINERAL OIL, WHITE PETROLATUM: .03; .94 OINTMENT OPHTHALMIC at 22:22

## 2025-02-02 RX ADMIN — DORZOLAMIDE HYDROCHLORIDE AND TIMOLOL MALEATE 1 DROP: 20; 5 SOLUTION OPHTHALMIC at 09:20

## 2025-02-02 RX ADMIN — PREDNISOLONE ACETATE 1 DROP: 10 SUSPENSION/ DROPS OPHTHALMIC at 22:22

## 2025-02-02 RX ADMIN — PREDNISOLONE ACETATE 1 DROP: 10 SUSPENSION/ DROPS OPHTHALMIC at 09:20

## 2025-02-02 RX ADMIN — INSULIN ASPART 20 UNITS: 100 INJECTION, SUSPENSION SUBCUTANEOUS at 06:58

## 2025-02-02 RX ADMIN — LATANOPROSTENE BUNOD 1 DROP: 0.24 SOLUTION/ DROPS OPHTHALMIC at 22:22

## 2025-02-02 RX ADMIN — PREDNISOLONE ACETATE 1 DROP: 10 SUSPENSION/ DROPS OPHTHALMIC at 15:13

## 2025-02-02 RX ADMIN — INSULIN LISPRO 2 UNITS: 100 INJECTION, SOLUTION INTRAVENOUS; SUBCUTANEOUS at 18:39

## 2025-02-02 RX ADMIN — INSULIN LISPRO 2 UNITS: 100 INJECTION, SOLUTION INTRAVENOUS; SUBCUTANEOUS at 01:15

## 2025-02-02 RX ADMIN — METRONIDAZOLE 500 MG: 500 INJECTION, SOLUTION INTRAVENOUS at 17:31

## 2025-02-02 RX ADMIN — HEPARIN SODIUM 12 UNITS/KG/HR: 10000 INJECTION, SOLUTION INTRAVENOUS at 12:31

## 2025-02-02 RX ADMIN — INSULIN ASPART 25 UNITS: 100 INJECTION, SUSPENSION SUBCUTANEOUS at 21:16

## 2025-02-02 RX ADMIN — INSULIN LISPRO 2 UNITS: 100 INJECTION, SOLUTION INTRAVENOUS; SUBCUTANEOUS at 06:58

## 2025-02-02 RX ADMIN — POTASSIUM PHOSPHATE, MONOBASIC POTASSIUM PHOSPHATE, DIBASIC: 224; 236 INJECTION, SOLUTION, CONCENTRATE INTRAVENOUS at 21:07

## 2025-02-02 RX ADMIN — DORZOLAMIDE HYDROCHLORIDE AND TIMOLOL MALEATE 1 DROP: 20; 5 SOLUTION OPHTHALMIC at 17:32

## 2025-02-02 NOTE — ASSESSMENT & PLAN NOTE
Lab Results   Component Value Date    EGFR 21 02/02/2025    EGFR 24 02/01/2025    EGFR 19 01/31/2025    CREATININE 2.55 (H) 02/02/2025    CREATININE 2.25 (H) 02/01/2025    CREATININE 2.79 (H) 01/31/2025   Per patient and patient's wife, would not be interested in dialysis   Nephro on board. Appreciate recommendations.

## 2025-02-02 NOTE — PROGRESS NOTES
Progress Note - Hospitalist   Name: Jeffry Almanzar 89 y.o. male I MRN: [id number]  Unit/Bed#: S -01 I Date of Admission: 1/17/2025   Date of Service: 2/2/2025 I Hospital Day: 15    Assessment & Plan  Dysphagia  Had numerous discussions regarding goals of care with family and multidisciplinary team. Explained risks versus benefits of PICC line and subsequent TPN, versus pleasure feeds on comfort care. 1/27 pt's wife decided on obtaining PICC for patient and signed informed consent form. Process, risks, and outcomes were reviewed at length with the patient's wife at bedside, alongside Dr. Gtz. All questions/concerns answered. Pt's wife verbalizes understanding and wishes to proceed with PICC line.   1/28 - pt received PICC line and had TPN initiated in the evening  1/29 - concerns for yellow/green sputum, as well as hyperglycemia in the setting of TPN initiation, low grade fever, initiated empiric ABX as pt was a hard stick and unable to obtain blood cx  Lactic acid wnl   1/30 - 1x dose IV Lasix for congestion noted on CXR. COVID/FLU/RSV negative.  Initially failed swallow eval at 1/23, repeat eval done 2/2, patient seems to do a little better with purée by teaspoon however still has a risk of aspiration and may not take enough per speech therapist.  Wife is agreeable to starting diet and see what happens, if respiratory status worsens on p.o. diet then that would support a feeding tube.  MRSA nares negative  Sputum culture: 3+ growth of Staph aureus, 2+ growth of mixed respiratory idalia  Blood cx x2: no growth at 24h    Had an episode of fever yesterday, Pro-Campbell spiked today to 17 from 4.0 as of yesterday, consider tracheobronchitis in differential, given clinical presentation and hx of respiratory secretions, frequent suctioning, hx of intubation, and prolonged hospitalization.     Plan  DC IV Vanco/cefepime.  Start IV cefazolin as MRSA negative, culture sensitive to cefazolin.  Repeat EKG Qtc 469 which is  prolonged, will not start metronidazole.  Repeat EKG, if QTc normalized we will start patient on metronidazole.    Per RD, TPN formula as follows:    dextrose 10 % Soln 1,250 mL   amino acids 15 % Soln 400 mL   fat emulsion 20 % Emul 155 mL   sodium acetate 2 mEq/mL Soln 30 mEq   potassium acetate 2 mEq/mL Soln 20 mEq   potassium phosphates 15 mmol/5 mL Soln 30 mmol   calcium gluconate 10 % Soln 4.6 mEq   magnesium sulfate 50 % Soln 8 mEq   Infuvite Adult Soln 10 mL   Trace Minerals Cu-Mn-Se-Zn 169-64- MCG/ML Soln 1 mL   thiamine 100 mg/mL Soln 100 mg   Scopolamine patch to manage secretions  Per pharmacy - pt's hx of glaucoma is a contraindication for glycopyrrolate (both open angle & angle closure)   Dysphagia 1 purée double/HTL.  Continue TPN for now until patient is able to improve quantity of feeds.  Type 2 diabetes mellitus with stage 4 chronic kidney disease, without long-term current use of insulin ([facility])  Lab Results   Component Value Date    HGBA1C 6.9 (H) 01/18/2025     Recent Labs     02/01/25  1809 02/02/25  0017 02/02/25  0648 02/02/25  1221   POCGLU 173* 195* 155* 145*     Plan  Increase to NPH insulin 25u at bedtime d/t early AM hyperglycemia and to avoid afternoon hypoglycemia  Continue NPH insulin 20u in the morning  Continue sliding scale   Hypoglycemia protocol   Cognitive impairment  Per wife, patient has some degree of dementia but is unclear of specifics. Patient oriented to self and place but unaware of year and month. Per wife, unsure if patient is completely aware of what is going on and is unsure if patient would truly want to accept intubation a second time    Plan  Neuropsych evaluation determined patient does NOT have capacity for MDM  Palliative on board. Appreciate recommendations. As of 1/23/2025, family made the decision to make patient's code status LEVEL 3 DNR/DNI.  Ongoing discussions regarding palliative care.   Acute saddle pulmonary embolism with acute cor pulmonale  (HCC)  Initially with cardiac arrest and cardiogenic shock, is now extubated off pressors. Was transferred  to med/surg 1/21.   MRI brain negative for CVA or concern for hemorrhage.      Continue heparin gtt.  SOFÍA (acute kidney injury) (HCC)  Etiology: Suspect due to ATN in the setting of cardiogenic shock, cardiac arrest, hypotension and IV contrast exposure with autoregulatory dysfunction with ARB  Ongoing discussion with Nutrition and Nephrology regarding adjustment to free water component of TPN, in light of pt's BUN & creatinine this morning.   Pt's creatinine and Na have improved this morning.   Plan  Per Nephrology:  Discontinued D5W 1/29 d/t resolution of hypernatremia and initiation of TPN  Continue to hold losartan and Farxiga  Strict I/Os, daily weights  Avoid nephrotoxins, NSAIDs, further IV contrast as able  Avoid hypotension.  Maintain MAP >65  Hypernatremia  Nephrology has been following closely, adjusting D5W as necessary.    Discontinued D5W 1/29.   Cardiac arrest ([facility])  Cardiac arrest in ED after CTA evaluating for pulmonary embolus.  Asystole for 2 minutes requiring CPR and intubation s/P extubation 1/21/2025  ECHO with EF of 65% in September, ECHO with left ventricular ejection fraction is 55% (1/20)    Plan  Continue heparin gtt  Continue to monitor on tele due to recent PE, recent MI, and electrolyte abnormalities   Benign 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 ([facility])  Lab Results   Component Value Date    EGFR 21 02/02/2025    EGFR 24 02/01/2025    EGFR 19 01/31/2025    CREATININE 2.55 (H) 02/02/2025    CREATININE 2.25 (H) 02/01/2025    CREATININE 2.79 (H) 01/31/2025   Holding Losartan and Farxiga per nephro due to elevated Cr   Hydralazine 5 mg q 6 hrs for BP >180/110   Initially allowed for permissive hypertension due to concern for stroke.  However, patient's MRI does not show concern for infarct.  Will aim for normotension  Palliative care encounter  Patient seen and  evaluated by palliative care 1/23. Palliative care had an in-depth discussion with family regarding patient's GOC and overall prognosis. Family would NOT like to pursue a PEG tube at this time.   CODE STATUS changed from Level 2 to Level 3 DNR/DNI  Ongoing goals of care discussions with consideration for comfort measures only  Goals of care, counseling/discussion  Palliative on board. Appreciate recommendations   CKD (chronic kidney disease) stage 4, GFR 15-29 ml/min ([facility])  Lab Results   Component Value Date    EGFR 21 02/02/2025    EGFR 24 02/01/2025    EGFR 19 01/31/2025    CREATININE 2.55 (H) 02/02/2025    CREATININE 2.25 (H) 02/01/2025    CREATININE 2.79 (H) 01/31/2025   Per patient and patient's wife, would not be interested in dialysis   Nephro on board. Appreciate recommendations.  Severe protein-calorie malnutrition (HCC)  Malnutrition Findings:   Adult Malnutrition type: Acute illness  Adult Degree of Malnutrition: Other severe protein calorie malnutrition  Malnutrition Characteristics: Muscle loss, Inadequate energy, Weight loss, Fat loss                  360 Statement: related to dysphagia with recommendation for NPO, pt with > 7 days of less than 50% energy intake compared to estimated needs, muscle, fat wasting, wasted buccal pads, temporalis, supraclavicular space, interosseous. Treatment. Enteral nutrition via feeding tube is indicated for nutrition support- wife is declining nutrition via NGT or PEG at this time. Ongoing goals of care discussion noted.    BMI Findings:           Body mass index is 22.23 kg/m².     Wife agreeable to PICC and TPN. Per RD, TPN as above under principal problem.     VTE Pharmacologic Prophylaxis: VTE Score: 6 High Risk (Score >/= 5) - Pharmacological DVT Prophylaxis Ordered: heparin drip. Sequential Compression Devices Ordered.    Mobility:   Basic Mobility Inpatient Raw Score: 10  -M Goal: 4: Move to chair/commode  -HLM Achieved: 2: Bed activities/Dependent  transfer  -[facility] Goal achieved. Continue to encourage appropriate mobility.    Patient Centered Rounds: I performed bedside rounds with nursing staff today.   Discussions with Specialists or Other Care Team Provider: Attending physician/palliative    Education and Discussions with Family / Patient: Updated  (wife) at bedside.    Current Length of Stay: 15 day(s)  Current Patient Status: Inpatient   Certification Statement: The patient will continue to require additional inpatient hospital stay due to overall poor condition  Discharge Plan: Anticipate discharge in >72 hrs to discharge location to be determined pending rehab evaluations.    Code Status: Level 3 - DNAR and DNI    Subjective   Patient seen and examined today, no acute events overnight.  He declined suctioning last night, had an episode of fever yesterday with oxygen dropping to 79%.  However today he refuses oxygen but was saturating well on room air.    Objective :  Temp:  [99.2 °F (37.3 °C)-101.3 °F (38.5 °C)] 99.2 °F (37.3 °C)  HR:  [102-104] 102  BP: (128-139)/(55-70) 128/55  Resp:  [18] 18  SpO2:  [79 %-94 %] 94 %  Nasal Cannula O2 Flow Rate (L/min):  [3 L/min] 3 L/min    Body mass index is 22.23 kg/m².     Input and Output Summary (last 24 hours):     Intake/Output Summary (Last 24 hours) at 2/2/2025 1407  Last data filed at 2/2/2025 0900  Gross per 24 hour   Intake 0 ml   Output 550 ml   Net -550 ml       Physical Exam  Vitals and nursing note reviewed.   Constitutional:       General: He is not in acute distress.     Appearance: He is well-developed. He is ill-appearing.   HENT:      Head: Normocephalic and atraumatic.   Eyes:      Conjunctiva/sclera: Conjunctivae normal.   Cardiovascular:      Rate and Rhythm: Normal rate and regular rhythm.      Heart sounds: No murmur heard.  Pulmonary:      Effort: Pulmonary effort is normal. No respiratory distress.      Breath sounds: Rhonchi present.   Abdominal:      Palpations: Abdomen is  soft.      Tenderness: There is no abdominal tenderness.   Musculoskeletal:         General: No swelling.      Cervical back: Neck supple.   Skin:     General: Skin is warm and dry.      Capillary Refill: Capillary refill takes less than 2 seconds.   Neurological:      Mental Status: He is alert.   Psychiatric:         Mood and Affect: Mood normal.         Lines/Drains:  Lines/Drains/Airways       Active Status       Name Placement date Placement time Site Days    PICC Line 01/28/25 Right Brachial 01/28/25  0930  Brachial  5    Urethral Catheter 01/17/25  0000  --  16                  Urinary Catheter:  Goal for removal: N/A - Chronic [place]         Central Line:  Goal for removal: Will discontinue when meds requiring line are completed.               Lab Results: I have reviewed the following results:   Results from last 7 days   Lab Units 02/02/25  0525   WBC Thousand/uL 13.26*   HEMOGLOBIN g/dL 10.1*   HEMATOCRIT % 31.9*   PLATELETS Thousands/uL 150     Results from last 7 days   Lab Units 02/02/25  0525   SODIUM mmol/L 145   POTASSIUM mmol/L 3.9   CHLORIDE mmol/L 111*   CO2 mmol/L 26   BUN mg/dL 68*   CREATININE mg/dL 2.55*   ANION GAP mmol/L 8   CALCIUM mg/dL 8.9   GLUCOSE RANDOM mg/dL 153*         Results from last 7 days   Lab Units 02/02/25  1221 02/02/25  0648 02/02/25  0017 02/01/25  1809 02/01/25  1530 02/01/25  1154 02/01/25  0057 01/31/25  2107 01/31/25  1703 01/31/25  1702 01/31/25  1152 01/31/25  0815   POC GLUCOSE mg/dl 145* 155* 195* 173* 107 113 202* 267* 116 232* 192* 234*         Results from last 7 days   Lab Units 02/02/25  0525 02/01/25  0647 01/31/25  0526 01/30/25  0428 01/29/25  1022 01/29/25  0521   LACTIC ACID mmol/L  --   --   --   --  1.5  --    PROCALCITONIN ng/ml 17.97* 4.02* 4.61* 3.01*  --  1.21*       Recent Cultures (last 7 days):   Results from last 7 days   Lab Units 01/30/25  1556 01/30/25  1047   BLOOD CULTURE   --  No Growth at 72 hrs.  No Growth at 72 hrs.   SPUTUM CULTURE   3+ Growth of Staphylococcus aureus*  3+ Growth of  --    GRAM STAIN RESULT  1+ Epithelial Cells*  2+ Polys*  2+ Gram positive cocci in pairs*  1+ Gram positive cocci in chains*  --              Last 24 Hours Medication List:     Current Facility-Administered Medications:     acetaminophen (TYLENOL) rectal suppository 650 mg, Q4H PRN    Adult 3-in-1 TPN (custom base / custom electrolytes), Continuous TPN, Last Rate: 77.7 mL/hr at 02/01/25 2112    artificial tear ophthalmic ointment, HS    cefepime (MAXIPIME) 1,000 mg in dextrose 5 % 50 mL IVPB, Q12H, Last Rate: 1,000 mg (02/02/25 0924)    chlorhexidine (PERIDEX) 0.12 % oral rinse 15 mL, Q12H MARQUIS    dorzolamide-timolol (COSOPT) 2-0.5 % ophthalmic solution 1 drop, BID    heparin (porcine) 25,000 units in 0.45% NaCl 250 mL infusion (premix), Titrated, Last Rate: 12 Units/kg/hr (02/02/25 0601)    heparin (porcine) injection 2,400 Units, Q6H PRN    heparin (porcine) injection 4,800 Units, Once    heparin (porcine) injection 4,800 Units, Q6H PRN    hydrALAZINE (APRESOLINE) injection 5 mg, Q6H PRN    insulin aspart protamine-insulin aspart (NovoLOG 70/30) 100 units/mL subcutaneous injection 20 Units, QAM **AND** insulin aspart protamine-insulin aspart (NovoLOG 70/30) 100 units/mL subcutaneous injection 25 Units, HS    insulin lispro (HumALOG/ADMELOG) 100 units/mL subcutaneous injection 2-12 Units, Q6H **AND** Fingerstick Glucose (POCT), Q6H    Latanoprostene Bunod 0.024 % SOLN 1 drop, HS    polyethylene glycol (MIRALAX) packet 17 g, Daily    prednisoLONE acetate (PRED FORTE) 1 % ophthalmic suspension 1 drop, TID    scopolamine (TRANSDERM-SCOP) 1 mg/3 days TD 72 hr patch 1 patch, Q72H    [Held by provider] senna-docusate sodium (SENOKOT S) 8.6-50 mg per tablet 1 tablet, BID    tetracaine 0.5 % ophthalmic solution 2 drop, Once    vancomycin (VANCOCIN) IVPB (premix in dextrose) 1,000 mg 200 mL, Daily PRN    Administrative Statements   Today, Patient Was Seen By: Julianna  MD Jay Jay      **Please Note: This note may have been constructed using a voice recognition system.**

## 2025-02-02 NOTE — SPEECH THERAPY NOTE
Speech-Language Pathology Bedside Swallow Evaluation        Patient Name: Jeffry Almanzar    Today's Date: 2/2/2025     Problem List  Principal Problem:    Acute saddle pulmonary embolism with acute cor pulmonale (HCC)  Active Problems:    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  (HCC)    Benign 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 (HCC)    CKD (chronic kidney disease) stage 4, GFR 15-29 ml/min (HCC)    Type 2 diabetes mellitus with stage 4 chronic kidney disease, without long-term current use of insulin (HCC)    SOFÍA (acute kidney injury) (HCC)    Lacunar cerebrovascular accident (CVA) (HCC)    Cardiac arrest (HCC)    Normal anion gap metabolic acidosis    Retinal hemorrhage noted on examination of left eye    Hypernatremia    Hypoxia    Cognitive impairment    Palliative care encounter    Goals of care, counseling/discussion    Dysphagia    Severe protein-calorie malnutrition (HCC)    Tracheobronchitis         Summary    Pt presents with somewhat improved swallow function but continues w/ aspiration risk w/ po and secretions.  Discussed w/ pt and wife, can trial dysphagia puree and HTL and see how he tolerates w/ understanding that pt continues to be at risk for aspiration. Wife is agreeable. Suspect po intake will be suboptimal.      Recommendations:   Diet: puree/level 1 diet and honey thick liquids   Meds: crushed with puree   Frequent Oral care: 4x/day  Aspiration precautions   Other Recommendations/ considerations: will cont to follow for diet tolerance and monitor aspiration risk.        Current Medical Status  Pt is a 89 y.o. male who presented to [facility]  with acute saddle PE.  See previous clinical bedside swallow eval 1/21/25 for further background info.   Pt also had VBS 1/23/25 which showed weak swallow mechanics with pharyngeal retention resulting in overflow penetration and aspiration. NPO was recommended and GOC discussions begun.  Family has declined a feeding tube and  is not interested in hospice. Family requested swallowing exercises, see note 1/28; wife reported pt is not participating in exercises when they try to work w/ him to do them.    Pt started on TPN short term 1/28 and speech asked to re-eval for possible improvement and potential to begin a po diet.       Past medical history:   Please see H&P for details    Special Studies:  CXR: 1/29/25 Mild pulmonary vascular congestion with small bilateral pleural effusions, not appreciably changed from prior radiograph. No evidence of new focal consolidation.     VBS: 1/23/25 moderate oral/severe pharyngeal dysphagia characterized by weak swallow mechanics, multiple swallows noted to clear pharyngeal retention with overflow penetration and aspiration observed after the swallow; coughing noted with episodes of penetration       Social/Education/Vocational Hx:  Pt lives with family    Swallow Information   Current Risks for Dysphagia & Aspiration: known history of dysphagia, recent intubation, and generalized weakness- wife reported pt had occas coughing w/ liquids prior to admission.   Current Symptoms/Concerns:  sig risk for aspiration on VBS, difficulty managing secretions; weak swallows   Current Diet: NPO and TPN    Baseline Diet: regular diet and thin liquids      Baseline Assessment   Behavior/Cognition: alert  Speech/Language Status: able to follow commands inconsistently and no verbal output noted  Patient Positioning: upright in bed     Swallow Mechanism Exam oral care completed and pt suctioned for thick mucous prior to po given   Facial: symmetrical  Labial: WFL  Lingual: WFL  Velum: unable to visualize  Mandible: adequate ROM  Dentition: edentulous and limited dentition  Vocal quality:weak and dysphonic   Volitional Cough: strong/productive -suctioned thick mucous several times  Respiratory: RA 91% (pt keeps taking NC off)     Consistencies Assessed and Performance   Consistencies Administered: honey thick and puree  (4 oz HTL, 2 oz puree)     Oral Stage: pt w/ fair bolus retrieval from spoon, some labial leakage noted. Oral holding and delayed bolus manipulation and transfer noted. Verbal cues given.      Pharyngeal Stage: swallow initiation was delayed w/ fair/improved laryngeal excursion. Multiple swallows (3-4) noted per  bolus. Coughing noted x1 w/ puree. Occas throat clearing noted throughout session.       Esophageal Concerns: none reported      Results Reviewed with: patient, RN, MD, and family   Dysphagia Goals: pt will tolerate dysphagia 1 with honey thick liquids without s/s of aspiration x3-5 sessions   Discharge recommendation: ongoing speech tx may be needed post dc pending diet tolerance     Speech Therapy Prognosis   Prognosis: fair and poor    Prognosis Considerations: age, medical status, cognitive status, respiratory status, and therapeutic potential      Carole Aragon MA CCC-SLP  Speech Pathologist  [place] license # [id number]  [place] license # [id number]  Available via Secure Chat

## 2025-02-02 NOTE — ASSESSMENT & PLAN NOTE
Lab Results   Component Value Date    EGFR 21 02/02/2025    EGFR 24 02/01/2025    EGFR 19 01/31/2025    CREATININE 2.55 (H) 02/02/2025    CREATININE 2.25 (H) 02/01/2025    CREATININE 2.79 (H) 01/31/2025   Holding Losartan and Farxiga per nephro due to elevated Cr   Hydralazine 5 mg q 6 hrs for BP >180/110   Initially allowed for permissive hypertension due to concern for stroke.  However, patient's MRI does not show concern for infarct.  Will aim for normotension

## 2025-02-02 NOTE — ASSESSMENT & PLAN NOTE
Had numerous discussions regarding goals of care with family and multidisciplinary team. Explained risks versus benefits of PICC line and subsequent TPN, versus pleasure feeds on comfort care. 1/27 pt's wife decided on obtaining PICC for patient and signed informed consent form. Process, risks, and outcomes were reviewed at length with the patient's wife at bedside, alongside Dr. Gtz. All questions/concerns answered. Pt's wife verbalizes understanding and wishes to proceed with PICC line.   1/28 - pt received PICC line and had TPN initiated in the evening  1/29 - concerns for yellow/green sputum, as well as hyperglycemia in the setting of TPN initiation, low grade fever, initiated empiric ABX as pt was a hard stick and unable to obtain blood cx  Lactic acid wnl   1/30 - 1x dose IV Lasix for congestion noted on CXR. COVID/FLU/RSV negative.  Initially failed swallow eval at 1/23, repeat eval done 2/2, patient seems to do a little better with purée by teaspoon however still has a risk of aspiration and may not take enough per speech therapist.  Wife is agreeable to starting diet and see what happens, if respiratory status worsens on p.o. diet then that would support a feeding tube.  MRSA nares negative  Sputum culture: 3+ growth of Staph aureus, 2+ growth of mixed respiratory idalia  Blood cx x2: no growth at 24h    Had an episode of fever yesterday, Pro-Campbell spiked today to 17 from 4.0 as of yesterday, consider tracheobronchitis in differential, given clinical presentation and hx of respiratory secretions, frequent suctioning, hx of intubation, and prolonged hospitalization.     Plan  DC IV Vanco/cefepime.  Start IV cefazolin as MRSA negative, culture sensitive to cefazolin.  Repeat EKG Qtc 469 which is prolonged, will not start metronidazole.  Repeat EKG, if QTc normalized we will start patient on metronidazole.    Per RD, TPN formula as follows:    dextrose 10 % Soln 1,250 mL   amino acids 15 % Soln 400 mL   fat  emulsion 20 % Emul 155 mL   sodium acetate 2 mEq/mL Soln 30 mEq   potassium acetate 2 mEq/mL Soln 20 mEq   potassium phosphates 15 mmol/5 mL Soln 30 mmol   calcium gluconate 10 % Soln 4.6 mEq   magnesium sulfate 50 % Soln 8 mEq   Infuvite Adult Soln 10 mL   Trace Minerals Cu-Mn-Se-Zn 443-27- MCG/ML Soln 1 mL   thiamine 100 mg/mL Soln 100 mg   Scopolamine patch to manage secretions  Per pharmacy - pt's hx of glaucoma is a contraindication for glycopyrrolate (both open angle & angle closure)   Dysphagia 1 purée double/HTL.  Continue TPN for now until patient is able to improve quantity of feeds.

## 2025-02-02 NOTE — ASSESSMENT & PLAN NOTE
[facility]  [address]  [facility], [address]  [phone] fax    Plan of Care/ Statement of Necessity for Physical Therapy Services    Patient name: Mia Barriga Start of Care: 2022   Referral source: Joe Arrington MD : 1942    Medical Diagnosis: Pain in right shoulder [M25.511]  Payor: Myriam Vazquez / Plan: [place] MEDICARE PART A & B / Product Type: Medicare /  Onset Date: chronic, exacerbation in symptoms ~2-3 months prior to [facility]    Treatment Diagnosis: right shoulder pain   Prior Hospitalization: see medical history Provider#: [id number]   Medications: Verified on Patient summary List    Comorbidities: allergies, arthritis, back pain, BMI >30, headaches, HTN, sleep dysfunction, visual impairment   Prior Level of Function: right hand dominant, reports no limitations with daily activities prior to recent exacerbation       The Plan of Care and following information is based on the information from the initial evaluation. Assessment/ key information: Patient is an [de-identified]year old female presenting with acute on chronic right shoulder pain. She reports insidious onset ~2-3 months prior to [facility] with subjective report of \"aching\" and intermittent \"twinge\" to right shoulder with specific movements. Patient is right hand dominant and denies limitations prior to most recent exacerbation in symptoms. Patient currently with difficulty sleeping, carrying/lifting groceries, reaching overhead, and performance of self care tasks. Patient demonstrates decreased right shoulder strength grossly, decreased right shoulder ROM, poor posture with rounded shoulders, decreased scapular mobility, and (+) TTP through right upper trap, pec, and deltoid. Patient's signs and symptoms appear to be arthritic in nature with possible RTC involvement due to weakness in shoulder.  Patient would benefit from skilled PT 2x/week for 4 weeks to address the above limitations to Lab Results   Component Value Date    HGBA1C 6.9 (H) 01/18/2025     Recent Labs     02/01/25  1809 02/02/25  0017 02/02/25  0648 02/02/25  1221   POCGLU 173* 195* 155* 145*     Plan  Increase to NPH insulin 25u at bedtime d/t early AM hyperglycemia and to avoid afternoon hypoglycemia  Continue NPH insulin 20u in the morning  Continue sliding scale   Hypoglycemia protocol    promote return to PLOF. Evaluation Complexity History HIGH Complexity :3+ comorbidities / personal factors will impact the outcome/ POC ; Examination MEDIUM Complexity : 3 Standardized tests and measures addressing body structure, function, activity limitation and / or participation in recreation  ;Presentation LOW Complexity : Stable, uncomplicated  ;Clinical Decision Making MEDIUM Complexity : FOTO score of 26-74  Overall Complexity Rating: LOW   Problem List: pain affecting function, decrease ROM, decrease strength, decrease ADL/ functional abilitiies, decrease activity tolerance and decrease flexibility/ joint mobility   Treatment Plan may include any combination of the following: Therapeutic exercise, Therapeutic activities, Neuromuscular re-education, Physical agent/modality, Gait/balance training, Manual therapy, Patient education, Self Care training, Functional mobility training, Home safety training and Stair training  Patient / Family readiness to learn indicated by: interest  Persons(s) to be included in education: patient (P)  Barriers to Learning/Limitations: None  Patient Goal (s): to feel better  Patient Self Reported Health Status: fair  Rehabilitation Potential: fair    Short Term Goals: To be accomplished in 2 weeks:  1. Patient will report performance of home exercise program 4 of 7 days in the next week demonstrating compliance to therapy program and progress towards independent management of symptoms. 2. Patient will demonstrate at least 140 degrees right shoulder flexion PROM to improve ease with reaching overhead. Long Term Goals: To be accomplished in 4 weeks:  1. Patient will increase right shoulder flexion AROM to at least 130 degrees to improve ease with daily activities. 2. Patient will increase right shoulder functional ER to at least C7 to improve ease with self care tasks and fixing her hair.   3. Patient will increase right shoulder strength grossly to 4/5 to improve ease with carrying groceries. 4. Patient will increase FOTO score to at least 58 to improve ease with daily activities and household activities. Frequency / Duration: Patient to be seen 2 times per week for 4 weeks. Patient/ Caregiver education and instruction: Diagnosis, prognosis, activity modification and exercises   [x]  Plan of care has been reviewed with PTA    Certification Period: 2/7/2022 - 3/9/2022  Maximo Davey PT, DPT 2/7/2022 8:51 AM    ________________________________________________________________________    I certify that the above Therapy Services are being furnished while the patient is under my care. I agree with the treatment plan and certify that this therapy is necessary.     [de-identified] Signature:____________________  Date:____________Time: _________     Milagros Pack MD  Please sign and return to [facility] [address] [address] [address] Souza [address] [place] [address] [address]  [phone] fax

## 2025-02-02 NOTE — PLAN OF CARE
Problem: PAIN - ADULT  Goal: Verbalizes/displays adequate comfort level or baseline comfort level  Description: Interventions:  - Encourage patient to monitor pain and request assistance  - Assess pain using appropriate pain scale  - Administer analgesics based on type and severity of pain and evaluate response  - Implement non-pharmacological measures as appropriate and evaluate response  - Consider cultural and social influences on pain and pain management  - Notify physician/advanced practitioner if interventions unsuccessful or patient reports new pain  Outcome: Progressing     Problem: INFECTION - ADULT  Goal: Absence or prevention of progression during hospitalization  Description: INTERVENTIONS:  - Assess and monitor for signs and symptoms of infection  - Monitor lab/diagnostic results  - Monitor all insertion sites, i.e. indwelling lines, tubes, and drains  - Monitor endotracheal if appropriate and nasal secretions for changes in amount and color  - [place] appropriate cooling/warming therapies per order  - Administer medications as ordered  - Instruct and encourage patient and family to use good hand hygiene technique  - Identify and instruct in appropriate isolation precautions for identified infection/condition  Outcome: Progressing  Goal: Absence of fever/infection during neutropenic period  Description: INTERVENTIONS:  - Monitor WBC    Outcome: Progressing     Problem: Knowledge Deficit  Goal: Patient/family/caregiver demonstrates understanding of disease process, treatment plan, medications, and discharge instructions  Description: Complete learning assessment and assess knowledge base.  Interventions:  - Provide teaching at level of understanding  - Provide teaching via preferred learning methods  Outcome: Progressing     Problem: Nutrition/Hydration-ADULT  Goal: Nutrient/Hydration intake appropriate for improving, restoring or maintaining nutritional needs  Description: Monitor and assess  patient's nutrition/hydration status for malnutrition. Collaborate with interdisciplinary team and initiate plan and interventions as ordered.  Monitor patient's weight and dietary intake as ordered or per policy. Utilize nutrition screening tool and intervene as necessary. Determine patient's food preferences and provide high-protein, high-caloric foods as appropriate.     INTERVENTIONS:  - Monitor oral intake, urinary output, labs, and treatment plans  - Assess nutrition and hydration status and recommend course of action  - Evaluate amount of meals eaten  - Assist patient with eating if necessary   - Allow adequate time for meals  - Recommend/ encourage appropriate diets, oral nutritional supplements, and vitamin/mineral supplements  - Order, calculate, and assess calorie counts as needed  - Recommend, monitor, and adjust tube feedings and TPN/PPN based on assessed needs  - Assess need for intravenous fluids  - Provide specific nutrition/hydration education as appropriate  - Include patient/family/caregiver in decisions related to nutrition  Outcome: Progressing

## 2025-02-02 NOTE — PLAN OF CARE
Rec trial dysphagia 1 puree w/ honey thick liquids by tsp  Aspiration precautions  Meds crushed  Watch for swallows, feed when awake and alert   Speech to cont to follow

## 2025-02-02 NOTE — QUICK NOTE
QTC, 469. Discussed with pharmacy, it's ok to start on metronidazole as long as  QTC is bleow 500.  Will start metronidazole 500 mg TID IV

## 2025-02-02 NOTE — PROGRESS NOTES
Vancomycin IV Pharmacy-to-Dose Consultation     Vancomycin has been discontinued.  Pharmacy will sign off.  Please contact or re-consult with questions.    Jm Reid, Pharmacist

## 2025-02-03 LAB
ANION GAP SERPL CALCULATED.3IONS-SCNC: 10 MMOL/L (ref 4–13)
ANISOCYTOSIS BLD QL SMEAR: PRESENT
APTT PPP: 63 SECONDS (ref 23–34)
BASOPHILS # BLD MANUAL: 0 THOUSAND/UL (ref 0–0.1)
BASOPHILS NFR MAR MANUAL: 0 % (ref 0–1)
BUN SERPL-MCNC: 70 MG/DL (ref 5–25)
CALCIUM SERPL-MCNC: 8.6 MG/DL (ref 8.4–10.2)
CHLORIDE SERPL-SCNC: 109 MMOL/L (ref 96–108)
CO2 SERPL-SCNC: 24 MMOL/L (ref 21–32)
CREAT SERPL-MCNC: 2.9 MG/DL (ref 0.6–1.3)
EOSINOPHIL # BLD MANUAL: 0 THOUSAND/UL (ref 0–0.4)
EOSINOPHIL NFR BLD MANUAL: 0 % (ref 0–6)
ERYTHROCYTE [DISTWIDTH] IN BLOOD BY AUTOMATED COUNT: 16.8 % (ref 11.6–15.1)
GFR SERPL CREATININE-BSD FRML MDRD: 18 ML/MIN/1.73SQ M
GLUCOSE SERPL-MCNC: 132 MG/DL (ref 65–140)
GLUCOSE SERPL-MCNC: 151 MG/DL (ref 65–140)
GLUCOSE SERPL-MCNC: 156 MG/DL (ref 65–140)
GLUCOSE SERPL-MCNC: 171 MG/DL (ref 65–140)
GLUCOSE SERPL-MCNC: 185 MG/DL (ref 65–140)
GLUCOSE SERPL-MCNC: 200 MG/DL (ref 65–140)
HCT VFR BLD AUTO: 21.3 % (ref 36.5–49.3)
HCT VFR BLD AUTO: 21.7 % (ref 36.5–49.3)
HCT VFR BLD AUTO: 22.6 % (ref 36.5–49.3)
HGB BLD-MCNC: 6.8 G/DL (ref 12–17)
HGB BLD-MCNC: 7.1 G/DL (ref 12–17)
HGB BLD-MCNC: 7.2 G/DL (ref 12–17)
HYPERCHROMIA BLD QL SMEAR: PRESENT
LYMPHOCYTES # BLD AUTO: 0.49 THOUSAND/UL (ref 0.6–4.47)
LYMPHOCYTES # BLD AUTO: 3 % (ref 14–44)
MAGNESIUM SERPL-MCNC: 2.7 MG/DL (ref 1.9–2.7)
MCH RBC QN AUTO: 28.3 PG (ref 26.8–34.3)
MCHC RBC AUTO-ENTMCNC: 31.9 G/DL (ref 31.4–37.4)
MCV RBC AUTO: 89 FL (ref 82–98)
MONOCYTES # BLD AUTO: 0.33 THOUSAND/UL (ref 0–1.22)
MONOCYTES NFR BLD: 2 % (ref 4–12)
NEUTROPHILS # BLD MANUAL: 15.58 THOUSAND/UL (ref 1.85–7.62)
NEUTS BAND NFR BLD MANUAL: 2 % (ref 0–8)
NEUTS SEG NFR BLD AUTO: 93 % (ref 43–75)
NRBC BLD AUTO-RTO: 1 /100 WBC (ref 0–2)
OVALOCYTES BLD QL SMEAR: PRESENT
PHOSPHATE SERPL-MCNC: 5.1 MG/DL (ref 2.3–4.1)
PLATELET # BLD AUTO: 162 THOUSANDS/UL (ref 149–390)
PLATELET BLD QL SMEAR: ADEQUATE
PMV BLD AUTO: 12 FL (ref 8.9–12.7)
POTASSIUM SERPL-SCNC: 3.7 MMOL/L (ref 3.5–5.3)
PROCALCITONIN SERPL-MCNC: 18.86 NG/ML
RBC # BLD AUTO: 2.54 MILLION/UL (ref 3.88–5.62)
RBC MORPH BLD: PRESENT
SODIUM SERPL-SCNC: 143 MMOL/L (ref 135–147)
WBC # BLD AUTO: 16.4 THOUSAND/UL (ref 4.31–10.16)

## 2025-02-03 PROCEDURE — 85027 COMPLETE CBC AUTOMATED: CPT | Performed by: INTERNAL MEDICINE

## 2025-02-03 PROCEDURE — 85730 THROMBOPLASTIN TIME PARTIAL: CPT | Performed by: INTERNAL MEDICINE

## 2025-02-03 PROCEDURE — 99233 SBSQ HOSP IP/OBS HIGH 50: CPT | Performed by: INTERNAL MEDICINE

## 2025-02-03 PROCEDURE — 83735 ASSAY OF MAGNESIUM: CPT | Performed by: INTERNAL MEDICINE

## 2025-02-03 PROCEDURE — 80048 BASIC METABOLIC PNL TOTAL CA: CPT | Performed by: INTERNAL MEDICINE

## 2025-02-03 PROCEDURE — 94669 MECHANICAL CHEST WALL OSCILL: CPT

## 2025-02-03 PROCEDURE — 82948 REAGENT STRIP/BLOOD GLUCOSE: CPT

## 2025-02-03 PROCEDURE — 85014 HEMATOCRIT: CPT

## 2025-02-03 PROCEDURE — 84100 ASSAY OF PHOSPHORUS: CPT

## 2025-02-03 PROCEDURE — 94668 MNPJ CHEST WALL SBSQ: CPT

## 2025-02-03 PROCEDURE — 85018 HEMOGLOBIN: CPT

## 2025-02-03 PROCEDURE — 92526 ORAL FUNCTION THERAPY: CPT

## 2025-02-03 PROCEDURE — 97530 THERAPEUTIC ACTIVITIES: CPT

## 2025-02-03 PROCEDURE — 97535 SELF CARE MNGMENT TRAINING: CPT

## 2025-02-03 PROCEDURE — 84145 PROCALCITONIN (PCT): CPT | Performed by: INTERNAL MEDICINE

## 2025-02-03 PROCEDURE — 85007 BL SMEAR W/DIFF WBC COUNT: CPT | Performed by: INTERNAL MEDICINE

## 2025-02-03 RX ORDER — SODIUM CHLORIDE, SODIUM GLUCONATE, SODIUM ACETATE, POTASSIUM CHLORIDE, MAGNESIUM CHLORIDE, SODIUM PHOSPHATE, DIBASIC, AND POTASSIUM PHOSPHATE .53; .5; .37; .037; .03; .012; .00082 G/100ML; G/100ML; G/100ML; G/100ML; G/100ML; G/100ML; G/100ML
75 INJECTION, SOLUTION INTRAVENOUS CONTINUOUS
Status: DISPENSED | OUTPATIENT
Start: 2025-02-03 | End: 2025-02-04

## 2025-02-03 RX ADMIN — MINERAL OIL, WHITE PETROLATUM: .03; .94 OINTMENT OPHTHALMIC at 22:46

## 2025-02-03 RX ADMIN — METRONIDAZOLE 500 MG: 500 INJECTION, SOLUTION INTRAVENOUS at 23:30

## 2025-02-03 RX ADMIN — METRONIDAZOLE 500 MG: 500 INJECTION, SOLUTION INTRAVENOUS at 01:15

## 2025-02-03 RX ADMIN — DORZOLAMIDE HYDROCHLORIDE AND TIMOLOL MALEATE 1 DROP: 20; 5 SOLUTION OPHTHALMIC at 08:32

## 2025-02-03 RX ADMIN — INSULIN LISPRO 2 UNITS: 100 INJECTION, SOLUTION INTRAVENOUS; SUBCUTANEOUS at 06:50

## 2025-02-03 RX ADMIN — PREDNISOLONE ACETATE 1 DROP: 10 SUSPENSION/ DROPS OPHTHALMIC at 22:46

## 2025-02-03 RX ADMIN — LATANOPROSTENE BUNOD 1 DROP: 0.24 SOLUTION/ DROPS OPHTHALMIC at 22:46

## 2025-02-03 RX ADMIN — METRONIDAZOLE 500 MG: 500 INJECTION, SOLUTION INTRAVENOUS at 16:39

## 2025-02-03 RX ADMIN — CHLORHEXIDINE GLUCONATE 15 ML: 1.2 RINSE ORAL at 08:30

## 2025-02-03 RX ADMIN — METRONIDAZOLE 500 MG: 500 INJECTION, SOLUTION INTRAVENOUS at 08:29

## 2025-02-03 RX ADMIN — INSULIN LISPRO 2 UNITS: 100 INJECTION, SOLUTION INTRAVENOUS; SUBCUTANEOUS at 01:15

## 2025-02-03 RX ADMIN — INSULIN LISPRO 4 UNITS: 100 INJECTION, SOLUTION INTRAVENOUS; SUBCUTANEOUS at 12:10

## 2025-02-03 RX ADMIN — CEFAZOLIN SODIUM 1000 MG: 1 SOLUTION INTRAVENOUS at 09:44

## 2025-02-03 RX ADMIN — CALCIUM GLUCONATE: 98 INJECTION, SOLUTION INTRAVENOUS at 20:44

## 2025-02-03 RX ADMIN — CEFAZOLIN SODIUM 1000 MG: 1 SOLUTION INTRAVENOUS at 16:36

## 2025-02-03 RX ADMIN — INSULIN ASPART 25 UNITS: 100 INJECTION, SUSPENSION SUBCUTANEOUS at 22:45

## 2025-02-03 RX ADMIN — PREDNISOLONE ACETATE 1 DROP: 10 SUSPENSION/ DROPS OPHTHALMIC at 17:41

## 2025-02-03 RX ADMIN — CEFAZOLIN SODIUM 1000 MG: 1 SOLUTION INTRAVENOUS at 01:00

## 2025-02-03 RX ADMIN — SODIUM CHLORIDE, SODIUM GLUCONATE, SODIUM ACETATE, POTASSIUM CHLORIDE, MAGNESIUM CHLORIDE, SODIUM PHOSPHATE, DIBASIC, AND POTASSIUM PHOSPHATE 75 ML/HR: .53; .5; .37; .037; .03; .012; .00082 INJECTION, SOLUTION INTRAVENOUS at 17:36

## 2025-02-03 RX ADMIN — INSULIN ASPART 20 UNITS: 100 INJECTION, SUSPENSION SUBCUTANEOUS at 06:43

## 2025-02-03 RX ADMIN — PREDNISOLONE ACETATE 1 DROP: 10 SUSPENSION/ DROPS OPHTHALMIC at 08:32

## 2025-02-03 RX ADMIN — DORZOLAMIDE HYDROCHLORIDE AND TIMOLOL MALEATE 1 DROP: 20; 5 SOLUTION OPHTHALMIC at 19:13

## 2025-02-03 NOTE — ASSESSMENT & PLAN NOTE
Lab Results   Component Value Date    HGBA1C 6.9 (H) 01/18/2025     Recent Labs     02/03/25  0013 02/03/25  0650 02/03/25  0810 02/03/25  1149   POCGLU 156* 171* 151* 200*     Plan  Continue NPH insulin 25u at bedtime   Continue NPH insulin 20u in the morning  Continue sliding scale   Hypoglycemia protocol

## 2025-02-03 NOTE — ASSESSMENT & PLAN NOTE
Lab Results   Component Value Date    EGFR 18 02/03/2025    EGFR 21 02/02/2025    EGFR 24 02/01/2025    CREATININE 2.90 (H) 02/03/2025    CREATININE 2.55 (H) 02/02/2025    CREATININE 2.25 (H) 02/01/2025   Holding Losartan and Farxiga per nephro due to elevated Cr   Hydralazine 5 mg q 6 hrs for BP >180/110   Initially allowed for permissive hypertension due to concern for stroke.  However, patient's MRI does not show concern for infarct.  Will aim for normotension

## 2025-02-03 NOTE — PHYSICAL THERAPY NOTE
PHYSICAL THERAPY TREATMENT NOTE    Patient Name: Jeffry Almanzar  Today's Date: 2/3/2025     02/03/25 1557   PT Last Visit   PT Visit Date 02/03/25   Pain Assessment   Pain Assessment Tool FLACC   Pain Rating: FLACC (Rest) - Face 0   Pain Rating: FLACC (Rest) - Legs 0   Pain Rating: FLACC (Rest) - Activity 0   Pain Rating: FLACC (Rest) - Cry 0   Pain Rating: FLACC (Rest) - Consolability 0   Score: FLACC (Rest) 0   Pain Rating: FLACC (Activity) - Face 0   Pain Rating: FLACC (Activity) - Legs 0   Pain Rating: FLACC (Activity) - Activity 0   Pain Rating: FLACC (Activity) - Cry 0   Pain Rating: FLACC (Activity) - Consolability 0   Score: FLACC (Activity) 0   Restrictions/Precautions   Other Precautions Cognitive;Chair Alarm;Bed Alarm;Multiple lines;Fall Risk;Aspiration  (Masimo, R UE limb alert)   General   Chart Reviewed Yes   Additional Pertinent History room air resting pulse ox 95% and 84 BPM, active 92% and 90 BPM   Family/Caregiver Present No   Cognition   Arousal/Participation Lethargic   Attention Difficulty attending to directions   Orientation Level Oriented to person;Unable to assess;Other (Comment)  (pt responded to name. pt was identified in the computer and using ID bracelet)   Following Commands Follows one step commands inconsistently   Subjective   Subjective pt seen supine in bed. pt was nonverbal during session. pt nodded and shook head occasionally to simple questions. pt followed 1 step commands inconsistently.   Bed Mobility   Supine to Sit 2  Maximal assistance  (w/ 2nd staff member for safety)   Additional items Assist x 1;HOB elevated;Increased time required;Verbal cues;LE management  (for trunk/LE postioning)   Sit to Supine 2  Maximal assistance  (w/ 2nd staff member for safety)   Additional items Assist x 1;HOB elevated;Increased time required;Verbal cues;LE management  (for trunk/LE positioning)   Additional  Comments pt's static sitting required supervision to minx1 to maintain position.   Transfers   Sit to Stand 3  Moderate assistance  (progressing to maxx1)   Additional items Assist x 2;Increased time required;Verbal cues  (for LE positioning, task focus/safety)   Stand to Sit 3  Moderate assistance  (progressing to maxx1)   Additional items Assist x 2;Increased time required;Verbal cues  (for body positioning, task focus/safety)   Stand pivot Unable to assess   Additional Comments pt stood 30 seconds w/ hand hold assist w/ modx2. pt then stood 45 seconds x2 w/ hand hold w/ maxx1. seated rest breaks were required. additional standing not possible due to fatigue. pt was unable to weight shift or advance/retreat in standing.   Ambulation/Elevation   Gait pattern Not appropriate   Assistive Device Other (Comment)  (hand hold. pt lacked adequate level of participation to allow for use of roller walker.)   Balance   Static Sitting Fair -  (to poor+)   Static Standing Poor -  (to zero)   Ambulatory Zero   Activity Tolerance   Activity Tolerance Patient limited by fatigue   Nurse Made Aware spoke to Mayra Ryan OT   Assessment   Problem List Decreased strength;Decreased range of motion;Decreased endurance;Impaired balance;Decreased mobility;Decreased cognition;Impaired judgement;Decreased safety awareness;Impaired hearing;Decreased skin integrity   Assessment pt was able to tolerate additional standing from previous session but was unable to complete pregait activities. pt needed decreased level of assistance w/ some phases of activity.   Goals   Patient Goals pt did not state goals when asked. will continue to assess.   STG Expiration Date 02/14/25   Short Term Goal #1 Pt will: perform bilateral rolling bed mobility w/ supervision to decrease pt's burden of care; perform supine<>sit mobility w/ supervision to increase pt's independence w/ functional mobility; sit at EOB w/ supervision for at least 20 minutes to  increase pt's tolerance to an upright sitting position and prepare for OOB transfers; perform transfers w/ min Ax1 to increase pt's OOB mobility; ambulate 25' w/ LRAD and min Ax1 to increase pt's ambulatory endurance/tolerance; increase all balance ratings by at least 1 grade to decrease pt's risk of falls   PT Treatment Day 4   Plan   Treatment/Interventions Functional transfer training;LE strengthening/ROM;Therapeutic exercise;Endurance training;Cognitive reorientation;Patient/family training;Equipment eval/education;Bed mobility;Gait training;Compensatory technique education   Progress Slow progress, decreased activity tolerance   PT Frequency 3-5x/wk   Discharge Recommendation   Rehab Resource Intensity Level, PT II (Moderate Resource Intensity)   AM-PAC Basic Mobility Inpatient   Turning in Flat Bed Without Bedrails 2   Lying on Back to Sitting on Edge of Flat Bed Without Bedrails 2   Moving Bed to Chair 1   Standing Up From Chair Using Arms 2   Walk in Room 1   Climb 3-5 Stairs With Railing 1   Basic Mobility Inpatient Raw Score 9   Turning Head Towards Sound 3   Follow Simple Instructions 3   Low Function Basic Mobility Raw Score  15   Low Function Basic Mobility Standardized Score  23.9   [facility] Level Of Mobility   -[facility] Goal 3: Sit at edge of bed   -[facility] Achieved 3: Sit at edge of bed   End of Consult   Patient Position at End of Consult Supine;Bed/Chair alarm activated;All needs within reach     The patient's AM-PAC Basic Mobility Inpatient Short Form Raw Score is 9. A Raw score of less than or equal to 16 suggests the patient may benefit from discharge to post-acute rehabilitation services. Please also refer to the recommendation of the Physical Therapist for safe discharge planning.    Pt requires PT/OT co-treat due to signficant assistance with mobility and cognitive-behavioral impairments.    Skilled inpatient PT recommended while in hospital to progress pt toward treatment  goals.    Mina Chavez, PT

## 2025-02-03 NOTE — PLAN OF CARE
Problem: PHYSICAL THERAPY ADULT  Goal: Performs mobility at highest level of function for planned discharge setting.  See evaluation for individualized goals.  Description: Treatment/Interventions: Functional transfer training, LE strengthening/ROM, Therapeutic exercise, Endurance training, Cognitive reorientation, Patient/family training, Equipment eval/education, Bed mobility, Gait training, Compensatory technique education, Spoke to nursing, Spoke to case management     See flowsheet documentation for full assessment, interventions and recommendations.        Outcome: Progressing  Note: Prognosis: Fair  Problem List: Decreased strength, Decreased range of motion, Decreased endurance, Impaired balance, Decreased mobility, Decreased cognition, Impaired judgement, Decreased safety awareness, Impaired hearing, Decreased skin integrity  Assessment: pt was able to tolerate additional standing from previous session but was unable to complete pregait activities. pt needed decreased level of assistance w/ some phases of activity.  Barriers to Discharge: Inaccessible home environment     Rehab Resource Intensity Level, PT: II (Moderate Resource Intensity)    See flowsheet documentation for full assessment.

## 2025-02-03 NOTE — OCCUPATIONAL THERAPY NOTE
Occupational Therapy Progress Note     Patient Name: Jeffry Almanzar  Today's Date: 2/3/2025  Problem List  Principal Problem:    Acute saddle pulmonary embolism with acute cor pulmonale (HCC)  Active Problems:    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  (HCC)    Benign 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 (HCC)    CKD (chronic kidney disease) stage 4, GFR 15-29 ml/min (HCC)    Type 2 diabetes mellitus with stage 4 chronic kidney disease, without long-term current use of insulin (HCC)    SOFÍA (acute kidney injury) (HCC)    Lacunar cerebrovascular accident (CVA) (HCC)    Cardiac arrest (HCC)    Normal anion gap metabolic acidosis    Retinal hemorrhage noted on examination of left eye    Hypernatremia    Hypoxia    Cognitive impairment    Palliative care encounter    Goals of care, counseling/discussion    Dysphagia    Severe protein-calorie malnutrition (HCC)    Tracheobronchitis       02/03/25 1556   OT Last Visit   OT Visit Date 02/03/24  (Monday)   Note Type   Note Type Treatment  (tx session 2451-6633)   Pain Assessment   Pain Assessment Tool FLACC   Pain Location/Orientation Location: Generalized   Hospital Pain Intervention(s) Repositioned;Ambulation/increased activity;Emotional support;Elevated   Pain Rating: FLACC (Rest) - Face 0   Pain Rating: FLACC (Rest) - Legs 0   Pain Rating: FLACC (Rest) - Activity 0   Pain Rating: FLACC (Rest) - Cry 0   Pain Rating: FLACC (Rest) - Consolability 0   Score: FLACC (Rest) 0   Pain Rating: FLACC (Activity) - Face 0   Pain Rating: FLACC (Activity) - Legs 0   Pain Rating: FLACC (Activity) - Activity 1   Pain Rating: FLACC (Activity) - Cry 1   Pain Rating: FLACC (Activity) - Consolability 1   Score: FLACC (Activity) 3   Restrictions/Precautions   Weight Bearing Precautions Per Order No   Other Precautions Chair Alarm;Cognitive;Bed Alarm;Aspiration;Limb alert;Fall Risk;Pain  (R UE limb alert)   ADL   Where Assessed Edge of bed   Grooming Assistance 2   Maximal Assistance   Grooming Deficit Setup;Steadying;Supervision/safety;Verbal cueing;Increased time to complete   Grooming Comments seated at EOB to wash / dry hands / face using washcloth   Toileting Assistance  2  Maximal Assistance   Toileting Deficit Setup;Steadying;Supervison/safety;Increased time to complete;Verbal cueing;Perineal hygiene   Toileting Comments personal hygiene following bowel incontinence   Bed Mobility   Supine to Sit 2  Maximal assistance   Additional items Assist x 1;HOB elevated;Bedrails;Increased time required;Verbal cues;LE management  (to pt's R)   Sit to Supine 2  Maximal assistance   Additional items Assist x 1;Increased time required;Verbal cues;LE management;Bedrails   Additional Comments Pt tolerated sitting at EOB approx 10 minutes   Transfers   Sit to Stand 2  Maximal assistance  (mod HHA x2 initially progressing to max A x1)   Additional items Assist x 1;Increased time required;Verbal cues;Bedrails;Armrests   Stand to Sit 2  Maximal assistance  (max HHAx1)   Additional items Assist x 1;Increased time required;Verbal cues;Bedrails;Armrests   Stand pivot Unable to assess   Additional Comments Pt performed sit <> stand 3X w/ mod HHA x2 initially progressing to max A x1   Functional Mobility   Additional Comments Will continue to assess   Cognition   Overall Cognitive Status Impaired   Arousal/Participation Responsive;Arousable   Attention Difficulty attending to directions   Orientation Level Oriented to person  (responded to his name)   Memory Decreased short term memory;Decreased recall of recent events;Decreased recall of precautions   Following Commands Follows one step commands with increased time or repetition   Comments Identified pt by name and wristband. Sleeping upon arrival   Activity Tolerance   Activity Tolerance Patient limited by fatigue;Patient limited by pain   Medical Staff Made Aware care coordination w/ PT< RJ due to decresaed activity tolerance, regression  from baseline and skilled physical assistance required   Assessment   Assessment Pt seen for skilled OT tx session focusing on activity engagement, challenging activity tolerance, ongoing eval and pt education. Pt agreeable to participate and required less physical assistance to complete bed mobility. Pt demonstrated improved activity and sitting tolerance. Pt performed sit <>stand w/ less physical assistance. Pt engaged in ADLs while seated at EOB w/ poor <> fair - balance. Continue to recommend level II rehab resource intensity when medically stable for discharge from acute care. Pt continues to demonstrate progress in OT; goals will be extended to 2/10/25. Will continue to follow   Plan   Treatment Interventions ADL retraining;Functional transfer training;UE strengthening/ROM;Endurance training;Cognitive reorientation;Patient/family training;Equipment evaluation/education;Compensatory technique education;Continued evaluation;Energy conservation;Activityengagement   Goal Expiration Date 02/10/25   OT Treatment Day   (Monday 2/3/25)   OT Frequency 3-5x/wk   Discharge Recommendation   Rehab Resource Intensity Level, OT II (Moderate Resource Intensity)   AM-PAC Daily Activity Inpatient   Lower Body Dressing 1   Bathing 1   Toileting 1   Upper Body Dressing 2   Grooming 2   Eating 1   Daily Activity Raw Score 8   AM-PAC Applied Cognition Inpatient   Following a Speech/Presentation 1   Understanding Ordinary Conversation 2   Taking Medications 1   Remembering Where Things Are Placed or Put Away 1   Remembering List of 4-5 Errands 1   Taking Care of Complicated Tasks 1   Applied Cognition Raw Score 7   Applied Cognition Standardized Score 15.17   Barthel Index   Feeding 0   Bathing 0   Grooming Score 0   Dressing Score 5   Bladder Score 0   Bowels Score 0   Toilet Use Score 0   Transfers (Bed/Chair) Score 5   Mobility (Level Surface) Score 0   Stairs Score 0   Barthel Index Score 10   End of Consult   Education Provided  Yes;Family or social support of family present for education by provider   Patient Position at End of Consult Bed/Chair alarm activated;All needs within reach;Supine   Nurse Communication Nurse aware of consult   Licensure   [place] License Number  Mayra Calvo, OTR/L        The patient's raw score on the AM-PAC Daily Activity Inpatient Short Form is 8. A raw score of less than 19 suggests the patient may benefit from discharge to post-acute rehabilitation services. Please refer to the recommendation of the Occupational Therapist for safe discharge planning.    Mayra Calvo, OTR/L  [id number]  [id number]

## 2025-02-03 NOTE — RESPIRATORY THERAPY NOTE
RT Note  Jeffry Almanzar 89 y.o. male MRN: [id number]  Unit/Bed#: S -01 Encounter: [id number]        Resp Comments: pt vest therapy held as wife requested the pt no be woke at this time

## 2025-02-03 NOTE — QUICK NOTE
2/3/2025 2:13 PM -  Jeffry Barlowey's chart and case were reviewed by ISRAEL Jha.  Mode of review included electronic chart check,      Per review, symptoms remain controlled on current regimen and no changes are made at this time.    No acute events overnight. Patient remains on TPN and Dysphagia level 1 Pureed, honey thick liquids.  For dispo plan, please review Case Management notes.      Palliative care will return on 2/4/25 for reassessment and continued goals of care discussion.         For urgent issues or any questions/concerns, please notify on-call provider via [employer] Secure Chat.  You may also call our answering service 24/7 at [phone].    ISRAEL Jha  Palliative and Supportive Care  Clinic/Answering Service: [phone]  You can find me on [employer]!

## 2025-02-03 NOTE — SPEECH THERAPY NOTE
Speech Language/Pathology    Speech/Language Pathology Progress Note    Patient Name: Jeffry Almanzar  Today's Date: 2/3/2025         Subjective:  Pt seen for dysphagia tx follow up. Wife at bedside, stated pt is refusing suctioning. Pt only took HTL last evening, refused pureed foods.   Objective:  Pt sleeping but easily awakens. Pt w/ moist cough, on RA. Attempted to suction w/ yankaur. Pt resistant- pushing away, turning head, biting on yankaur, min amts suctioned out.   Pt fed tsps of applesauce, cream of wheat and HTL cran juice (~ 2 oz). Pt w/ decreased bolus retrieval from spoon- labial leakage/anterior spill noted. Oral holding noted, delayed transfers w/ verbal cues given. Delayed swallow initiation. Immed cough noted x1 after puree. Pt w/ occasional moist cough and increased moist upper airway, respirations-? Aspiration related. Attempted suctioning again, pt resistant.   Discussed w/ wife and physicians, pt cont w/ aspiration risk, poor po intake, becoming agitated- will stop po feeding at  this time. Educated wife to attempt to feed after pt calms down.     Assessment:  Pt cont w/ aspiration risk and poor po intake on puree and Honey thick liquids- needs frequent oral care and suctioning which pt is refusing.     Plan/Recommendations:  Cont puree w/ HTL, meds crushed   Aspiration precautions  Discussed w/ wife, pt would need to be tolerating po diet or have PEG tube feeding to go to rehab; if pt would be comfort care/hospice, he would be allowed to eat anything and have no restrictions. Wife shook her head in understanding.   Will cont to follow      Carole Aragon MA CCC-SLP  Speech Pathologist  Available via [employer]

## 2025-02-03 NOTE — PLAN OF CARE
Problem: PAIN - ADULT  Goal: Verbalizes/displays adequate comfort level or baseline comfort level  Description: Interventions:  - Encourage patient to monitor pain and request assistance  - Assess pain using appropriate pain scale  - Administer analgesics based on type and severity of pain and evaluate response  - Implement non-pharmacological measures as appropriate and evaluate response  - Consider cultural and social influences on pain and pain management  - Notify physician/advanced practitioner if interventions unsuccessful or patient reports new pain  Outcome: Progressing     Problem: INFECTION - ADULT  Goal: Absence or prevention of progression during hospitalization  Description: INTERVENTIONS:  - Assess and monitor for signs and symptoms of infection  - Monitor lab/diagnostic results  - Monitor all insertion sites, i.e. indwelling lines, tubes, and drains  - Monitor endotracheal if appropriate and nasal secretions for changes in amount and color  - [place] appropriate cooling/warming therapies per order  - Administer medications as ordered  - Instruct and encourage patient and family to use good hand hygiene technique  - Identify and instruct in appropriate isolation precautions for identified infection/condition  Outcome: Progressing  Goal: Absence of fever/infection during neutropenic period  Description: INTERVENTIONS:  - Monitor WBC    Outcome: Progressing     Problem: SAFETY ADULT  Goal: Patient will remain free of falls  Description: INTERVENTIONS:  - Educate patient/family on patient safety including physical limitations  - Instruct patient to call for assistance with activity   - Consult OT/PT to assist with strengthening/mobility   - Keep Call bell within reach  - Keep bed low and locked with side rails adjusted as appropriate  - Keep care items and personal belongings within reach  - Initiate and maintain comfort rounds  - Make Fall Risk Sign visible to staff  - Offer Toileting every 2 Hours,  in advance of need  - Initiate/Maintain alarm  - Obtain necessary fall risk management equipment:   - Apply yellow socks and bracelet for high fall risk patients  - Consider moving patient to room near nurses station  Outcome: Progressing  Goal: Maintain or return to baseline ADL function  Description: INTERVENTIONS:  -  Assess patient's ability to carry out ADLs; assess patient's baseline for ADL function and identify physical deficits which impact ability to perform ADLs (bathing, care of mouth/teeth, toileting, grooming, dressing, etc.)  - Assess/evaluate cause of self-care deficits   - Assess range of motion  - Assess patient's mobility; develop plan if impaired  - Assess patient's need for assistive devices and provide as appropriate  - Encourage maximum independence but intervene and supervise when necessary  - Involve family in performance of ADLs  - Assess for home care needs following discharge   - Consider OT consult to assist with ADL evaluation and planning for discharge  - Provide patient education as appropriate  Outcome: Progressing  Goal: Maintains/Returns to pre admission functional level  Description: INTERVENTIONS:  - Perform AM-PAC 6 Click Basic Mobility/ Daily Activity assessment daily.  - Set and communicate daily mobility goal to care team and patient/family/caregiver.   - Collaborate with rehabilitation services on mobility goals if consulted  - Perform Range of Motion 2 times a day.  - Reposition patient every 2 hours.  - Dangle patient 2 times a day  - Stand patient 2 times a day  - Ambulate patient 2 times a day  - Out of bed to chair 2 times a day   - Out of bed for meals 2 times a day  - Out of bed for toileting  - Record patient progress and toleration of activity level   Outcome: Progressing     Problem: DISCHARGE PLANNING  Goal: Discharge to home or other facility with appropriate resources  Description: INTERVENTIONS:  - Identify barriers to discharge w/patient and caregiver  -  Arrange for needed discharge resources and transportation as appropriate  - Identify discharge learning needs (meds, wound care, etc.)  - Arrange for interpretive services to assist at discharge as needed  - Refer to Case Management Department for coordinating discharge planning if the patient needs post-hospital services based on physician/advanced practitioner order or complex needs related to functional status, cognitive ability, or social support system  Outcome: Progressing     Problem: Knowledge Deficit  Goal: Patient/family/caregiver demonstrates understanding of disease process, treatment plan, medications, and discharge instructions  Description: Complete learning assessment and assess knowledge base.  Interventions:  - Provide teaching at level of understanding  - Provide teaching via preferred learning methods  Outcome: Progressing     Problem: Nutrition/Hydration-ADULT  Goal: Nutrient/Hydration intake appropriate for improving, restoring or maintaining nutritional needs  Description: Monitor and assess patient's nutrition/hydration status for malnutrition. Collaborate with interdisciplinary team and initiate plan and interventions as ordered.  Monitor patient's weight and dietary intake as ordered or per policy. Utilize nutrition screening tool and intervene as necessary. Determine patient's food preferences and provide high-protein, high-caloric foods as appropriate.     INTERVENTIONS:  - Monitor oral intake, urinary output, labs, and treatment plans  - Assess nutrition and hydration status and recommend course of action  - Evaluate amount of meals eaten  - Assist patient with eating if necessary   - Allow adequate time for meals  - Recommend/ encourage appropriate diets, oral nutritional supplements, and vitamin/mineral supplements  - Order, calculate, and assess calorie counts as needed  - Recommend, monitor, and adjust tube feedings and TPN/PPN based on assessed needs  - Assess need for intravenous  fluids  - Provide specific nutrition/hydration education as appropriate  - Include patient/family/caregiver in decisions related to nutrition  Outcome: Progressing     Problem: Prexisting or High Potential for Compromised Skin Integrity  Goal: Skin integrity is maintained or improved  Description: INTERVENTIONS:  - Identify patients at risk for skin breakdown  - Assess and monitor skin integrity  - Assess and monitor nutrition and hydration status  - Monitor labs   - Assess for incontinence   - Turn and reposition patient  - Assist with mobility/ambulation  - Relieve pressure over bony prominences  - Avoid friction and shearing  - Provide appropriate hygiene as needed including keeping skin clean and dry  - Evaluate need for skin moisturizer/barrier cream  - Collaborate with interdisciplinary team   - Patient/family teaching  - Consider wound care consult   Outcome: Progressing

## 2025-02-03 NOTE — ASSESSMENT & PLAN NOTE
Had numerous discussions regarding goals of care with family and multidisciplinary team. Explained risks versus benefits of PICC line and subsequent TPN, versus pleasure feeds on comfort care. 1/27 pt's wife decided on obtaining PICC for patient and signed informed consent form. Process, risks, and outcomes were reviewed at length with the patient's wife at bedside, alongside Dr. Gtz. All questions/concerns answered. Pt's wife verbalizes understanding and wishes to proceed with PICC line.   1/28 - pt received PICC line and had TPN initiated in the evening  1/29 - concerns for yellow/green sputum, as well as hyperglycemia in the setting of TPN initiation, low grade fever, initiated empiric ABX as pt was a hard stick and unable to obtain blood cx  Lactic acid wnl   1/30 - 1x dose IV Lasix for congestion noted on CXR. COVID/FLU/RSV negative.  Initially failed swallow eval at 1/23, repeat eval done 2/2, patient seems to do a little better with purée by teaspoon however still has a risk of aspiration and may not take enough per speech therapist.  Wife is agreeable to starting diet and see what happens, if respiratory status worsens on PO diet then that would support a feeding tube.  MRSA nares negative  Sputum culture: 3+ growth of Staph aureus, 2+ growth of mixed respiratory idalia. ABX per susceptibilities.   Blood cx x2: no growth at 24h  Likely tracheobronchitis given clinical presentation and hx of respiratory secretions, frequent suctioning, hx of intubation, and prolonged hospitalization    Tmax this .1F. WBC 16.4 this AM, up from 13.26. procalcitonin 18.86 up from 17.97. Pt's wife reports he at times refuses suctioning. Per SLP, he has had limited PO intake and weak swallow.     Plan  Continue IV cefazolin 1g q8h considering culture sensitivities & MRSA negative  Continue IV Flagyl 500 mg q8h   EKG Qtc 469 ms (per Pharmacy, this is acceptable)   Per RD, adjust TPN as follows:  Due to elevated phosphate (5.1)  today, RD recommended discontinuing potassium phosphates 30 mmol  dextrose 10 % Soln 1,250 mL   amino acids 15 % Soln 400 mL   fat emulsion 20 % Emul 155 mL   sodium acetate 2 mEq/mL Soln 30 mEq   potassium acetate 2 mEq/mL Soln 20 mEq   calcium gluconate 10 % Soln 4.6 mEq   magnesium sulfate 50 % Soln 8 mEq   Infuvite Adult Soln 10 mL   Trace Minerals Cu-Mn-Se-Zn 579-22- MCG/ML Soln 1 mL   thiamine 100 mg/mL Soln 100 mg   Scopolamine patch to manage secretions  Per pharmacy - pt's hx of glaucoma is a contraindication for glycopyrrolate (both open angle & angle closure)   Dysphagia 1 purée double/HTL.  Continue TPN for now as pt's PO intake is still limited

## 2025-02-03 NOTE — ASSESSMENT & PLAN NOTE
Pt baseline hemoglobin in range of 8-10.   This morning, Hb 7.2 down from 10.1 yesterday. Repeat H&H at 13:00 7.1.    Unlikely to be dilutional.   Per RN, no bloody stools or bloody mucus in suction.      Plan  Will repeat H&H at 18:00.  If Hb <7.0, transfuse. Will obtain consent from pt's POA (wife).  Ordered FOBT

## 2025-02-03 NOTE — ASSESSMENT & PLAN NOTE
Lab Results   Component Value Date    EGFR 18 02/03/2025    EGFR 21 02/02/2025    EGFR 24 02/01/2025    CREATININE 2.90 (H) 02/03/2025    CREATININE 2.55 (H) 02/02/2025    CREATININE 2.25 (H) 02/01/2025   Per patient and patient's wife, would not be interested in dialysis   Nephro on board. Appreciate recommendations.

## 2025-02-03 NOTE — PROGRESS NOTES
Progress Note - Hospitalist   Name: Jeffry Almanzar 89 y.o. male I MRN: [id number]  Unit/Bed#: S -01 I Date of Admission: 1/17/2025   Date of Service: 2/3/2025 I Hospital Day: 16    Assessment & Plan  Dysphagia  Had numerous discussions regarding goals of care with family and multidisciplinary team. Explained risks versus benefits of PICC line and subsequent TPN, versus pleasure feeds on comfort care. 1/27 pt's wife decided on obtaining PICC for patient and signed informed consent form. Process, risks, and outcomes were reviewed at length with the patient's wife at bedside, alongside Dr. Gtz. All questions/concerns answered. Pt's wife verbalizes understanding and wishes to proceed with PICC line.   1/28 - pt received PICC line and had TPN initiated in the evening  1/29 - concerns for yellow/green sputum, as well as hyperglycemia in the setting of TPN initiation, low grade fever, initiated empiric ABX as pt was a hard stick and unable to obtain blood cx  Lactic acid wnl   1/30 - 1x dose IV Lasix for congestion noted on CXR. COVID/FLU/RSV negative.  Initially failed swallow eval at 1/23, repeat eval done 2/2, patient seems to do a little better with purée by teaspoon however still has a risk of aspiration and may not take enough per speech therapist.  Wife is agreeable to starting diet and see what happens, if respiratory status worsens on PO diet then that would support a feeding tube.  MRSA nares negative  Sputum culture: 3+ growth of Staph aureus, 2+ growth of mixed respiratory idalia. ABX per susceptibilities.   Blood cx x2: no growth at 24h  Likely tracheobronchitis given clinical presentation and hx of respiratory secretions, frequent suctioning, hx of intubation, and prolonged hospitalization    Tmax this .1F. WBC 16.4 this AM, up from 13.26. procalcitonin 18.86 up from 17.97. Pt's wife reports he at times refuses suctioning. Per SLP, he has had limited PO intake and weak swallow.     Plan  Continue  IV cefazolin 1g q8h considering culture sensitivities & MRSA negative  Continue IV Flagyl 500 mg q8h   EKG Qtc 469 ms (per Pharmacy, this is acceptable)   Per RD, adjust TPN as follows:  Due to elevated phosphate (5.1) today, RD recommended discontinuing potassium phosphates 30 mmol  dextrose 10 % Soln 1,250 mL   amino acids 15 % Soln 400 mL   fat emulsion 20 % Emul 155 mL   sodium acetate 2 mEq/mL Soln 30 mEq   potassium acetate 2 mEq/mL Soln 20 mEq   calcium gluconate 10 % Soln 4.6 mEq   magnesium sulfate 50 % Soln 8 mEq   Infuvite Adult Soln 10 mL   Trace Minerals Cu-Mn-Se-Zn 010-10- MCG/ML Soln 1 mL   thiamine 100 mg/mL Soln 100 mg   Scopolamine patch to manage secretions  Per pharmacy - pt's hx of glaucoma is a contraindication for glycopyrrolate (both open angle & angle closure)   Dysphagia 1 purée double/HTL.  Continue TPN for now as pt's PO intake is still limited  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  (HCC)  Pt baseline hemoglobin in range of 8-10.   This morning, Hb 7.2 down from 10.1 yesterday. Repeat H&H at 13:00 7.1.    Unlikely to be dilutional.   Per RN, no bloody stools or bloody mucus in suction.      Plan  Will repeat H&H at 18:00.  If Hb <7.0, transfuse. Will obtain consent from pt's POA (wife).  Ordered FOBT  Type 2 diabetes mellitus with stage 4 chronic kidney disease, without long-term current use of insulin ([facility])  Lab Results   Component Value Date    HGBA1C 6.9 (H) 01/18/2025     Recent Labs     02/03/25  0013 02/03/25  0650 02/03/25  0810 02/03/25  1149   POCGLU 156* 171* 151* 200*     Plan  Continue NPH insulin 25u at bedtime   Continue NPH insulin 20u in the morning  Continue sliding scale   Hypoglycemia protocol   Cognitive impairment  Per wife, patient has some degree of dementia but is unclear of specifics. Patient oriented to self and place but unaware of year and month. Per wife, unsure if patient is completely aware of what is going on and is unsure if patient would truly  want to accept intubation a second time    Plan  Neuropsych evaluation determined patient does NOT have capacity for [facility]  Palliative on board. Appreciate recommendations. As of 1/23/2025, family made the decision to make patient's code status LEVEL 3 DNR/DNI.  Ongoing discussions regarding palliative care.   Acute saddle pulmonary embolism with acute cor pulmonale (HCC)  Initially with cardiac arrest and cardiogenic shock, is now extubated off pressors. Was transferred  to med/surg 1/21.   MRI brain negative for CVA or concern for hemorrhage.      Continue heparin gtt.  SOFÍA (acute kidney injury) ([facility])  Suspect due to ATN in the setting of cardiogenic shock, cardiac arrest, hypotension and IV contrast exposure with autoregulatory dysfunction with ARB  Per Nephrology:  Discontinued D5W 1/29 d/t resolution of hypernatremia and initiation of TPN  Continue to hold losartan and Farxiga    Plan  Discussed pt's increase in Cr to 2.9 today with Nephrology, who recommended continuing current TPN regimen since it is still below baseline. Can consider adjusting free water in TPN if pt becomes hypernatremic again.   Strict I/Os, daily weights  Avoid nephrotoxins, NSAIDs, further IV contrast as able  Avoid hypotension.  Maintain MAP >65  Cardiac arrest (HCC)  Cardiac arrest in ED after CTA evaluating for pulmonary embolus.  Asystole for 2 minutes requiring CPR and intubation s/P extubation 1/21/2025  ECHO with EF of 65% in September, ECHO with left ventricular ejection fraction is 55% (1/20)    Plan  Continue heparin gtt  Continue to monitor on tele due to recent PE, recent MI, and electrolyte abnormalities   Benign 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 ([facility])  Lab Results   Component Value Date    EGFR 18 02/03/2025    EGFR 21 02/02/2025    EGFR 24 02/01/2025    CREATININE 2.90 (H) 02/03/2025    CREATININE 2.55 (H) 02/02/2025    CREATININE 2.25 (H) 02/01/2025   Holding Losartan and Farxiga per nephro due to  elevated Cr   Hydralazine 5 mg q 6 hrs for BP >180/110   Initially allowed for permissive hypertension due to concern for stroke.  However, patient's MRI does not show concern for infarct.  Will aim for normotension  Palliative care encounter  Patient seen and evaluated by palliative care 1/23. Palliative care had an in-depth discussion with family regarding patient's GOC and overall prognosis. Family would NOT like to pursue a PEG tube at this time.   CODE STATUS changed from Level 2 to Level 3 DNR/DNI  Ongoing goals of care discussions with consideration for comfort measures only  Goals of care, counseling/discussion  Palliative on board. Appreciate recommendations   CKD (chronic kidney disease) stage 4, GFR 15-29 ml/min ([facility])  Lab Results   Component Value Date    EGFR 18 02/03/2025    EGFR 21 02/02/2025    EGFR 24 02/01/2025    CREATININE 2.90 (H) 02/03/2025    CREATININE 2.55 (H) 02/02/2025    CREATININE 2.25 (H) 02/01/2025   Per patient and patient's wife, would not be interested in dialysis   Nephro on board. Appreciate recommendations.  Severe protein-calorie malnutrition (HCC)  Malnutrition Findings:   Adult Malnutrition type: Acute illness  Adult Degree of Malnutrition: Other severe protein calorie malnutrition  Malnutrition Characteristics: Muscle loss, Inadequate energy, Weight loss, Fat loss                  360 Statement: related to dysphagia with recommendation for NPO, pt with > 7 days of less than 50% energy intake compared to estimated needs, muscle, fat wasting, wasted buccal pads, temporalis, supraclavicular space, interosseous. Treatment. Enteral nutrition via feeding tube is indicated for nutrition support- wife is declining nutrition via NGT or PEG at this time. Ongoing goals of care discussion noted.    BMI Findings:           Body mass index is 21.53 kg/m².     Wife agreeable to PICC and TPN. Per RD, TPN as above under principal problem.     VTE Pharmacologic Prophylaxis: VTE Score: 6 High  Risk (Score >/= 5) - Pharmacological DVT Prophylaxis Ordered: heparin drip. Sequential Compression Devices Ordered.    Mobility:   Basic Mobility Inpatient Raw Score: 8  JH-HLM Goal: 3: Sit at edge of bed  JH-HLM Achieved: 2: Bed activities/Dependent transfer  JH-HLM Goal NOT achieved. Continue with multidisciplinary rounding and encourage appropriate mobility to improve upon JH-HLM goals.    Patient Centered Rounds: I performed bedside rounds with nursing staff today.   Discussions with Specialists or Other Care Team Provider: Nephrology, Nutrition, Pharmacy    Education and Discussions with Family / Patient: Updated  (wife) at bedside.    Current Length of Stay: 16 day(s)  Current Patient Status: Inpatient   Certification Statement: The patient will continue to require additional inpatient hospital stay due to ongoing IV antibiotics, goals of care discussion, TPN, in the setting of guarded prognosis.  Discharge Plan: Anticipate discharge in >72 hrs to TBD, pending evolving GOC conversation.    Code Status: Level 3 - DNAR and DNI    Subjective   Pt seen and evaluated at bedside this morning. Per report, no acute events overnight. Wife at bedside, who states pt at times refuses suctioning. He is sleeping when seen this morning, however does awaken to verbal stimuli. He is saturating well on room air. He shakes his head when asked if he is any pain or discomfort.     Objective :  Temp:  [100.1 °F (37.8 °C)-100.4 °F (38 °C)] 100.1 °F (37.8 °C)  HR:  [103-107] 104  BP: (128-145)/(58-79) 128/63  Resp:  [17] 17  SpO2:  [91 %-93 %] 92 %  O2 Device: None (Room air)    Body mass index is 21.53 kg/m².     Input and Output Summary (last 24 hours):     Intake/Output Summary (Last 24 hours) at 2/3/2025 1332  Last data filed at 2/3/2025 0944  Gross per 24 hour   Intake 0 ml   Output 1075 ml   Net -1075 ml       Physical Exam  Vitals reviewed.   Constitutional:       General: He is awake.      Appearance: He is  underweight. He is ill-appearing.   HENT:      Head: Normocephalic and atraumatic.      Right Ear: External ear normal.      Left Ear: External ear normal.      Mouth/Throat:      Mouth: Mucous membranes are dry.   Eyes:      General: No scleral icterus.     Extraocular Movements: Extraocular movements intact.   Cardiovascular:      Rate and Rhythm: Tachycardia present.      Pulses: Normal pulses.   Pulmonary:      Effort: Tachypnea and accessory muscle usage present.      Breath sounds: Rales (bilateral) present.      Comments: Pulmonary congestion  Chest:      Chest wall: No tenderness.   Abdominal:      Tenderness: There is no abdominal tenderness. There is no guarding.   Skin:     General: Skin is warm.      Capillary Refill: Capillary refill takes 2 to 3 seconds.      Coloration: Skin is not jaundiced.   Neurological:      Motor: Weakness present.         Lines/Drains:  Lines/Drains/Airways       Active Status       Name Placement date Placement time Site Days    PICC Line 01/28/25 Right Brachial 01/28/25  0930  Brachial  6    Urethral Catheter 01/17/25  0000  --  17                  Central Line:  Goal for removal: Continuing for TPN.                Lab Results: I have reviewed the following results:   Results from last 7 days   Lab Units 02/03/25  1307 02/03/25  0755   WBC Thousand/uL  --  16.40*   HEMOGLOBIN g/dL 7.1* 7.2*   HEMATOCRIT % 21.3* 22.6*   PLATELETS Thousands/uL  --  162   BANDS PCT %  --  2   LYMPHO PCT %  --  3*   MONO PCT %  --  2*   EOS PCT %  --  0     Results from last 7 days   Lab Units 02/03/25  0644   SODIUM mmol/L 143   POTASSIUM mmol/L 3.7   CHLORIDE mmol/L 109*   CO2 mmol/L 24   BUN mg/dL 70*   CREATININE mg/dL 2.90*   ANION GAP mmol/L 10   CALCIUM mg/dL 8.6   GLUCOSE RANDOM mg/dL 185*         Results from last 7 days   Lab Units 02/03/25  1149 02/03/25  0810 02/03/25  0650 02/03/25  0013 02/02/25  1754 02/02/25  1221 02/02/25  0648 02/02/25  0017 02/01/25  1809 02/01/25  1530  02/01/25  1154 02/01/25  0057   POC GLUCOSE mg/dl 200* 151* 171* 156* 177* 145* 155* 195* 173* 107 113 202*         Results from last 7 days   Lab Units 02/03/25  0644 02/02/25  0525 02/01/25  0647 01/31/25  0526 01/30/25  0428 01/29/25  1022   LACTIC ACID mmol/L  --   --   --   --   --  1.5   PROCALCITONIN ng/ml 18.86* 17.97* 4.02* 4.61* 3.01*  --        Recent Cultures (last 7 days):   Results from last 7 days   Lab Units 01/30/25  1556 01/30/25  1047   BLOOD CULTURE   --  No Growth at 72 hrs.  No Growth at 72 hrs.   SPUTUM CULTURE  3+ Growth of Staphylococcus aureus*  3+ Growth of  --    GRAM STAIN RESULT  1+ Epithelial Cells*  2+ Polys*  2+ Gram positive cocci in pairs*  1+ Gram positive cocci in chains*  --        Imaging Results Review: I reviewed radiology reports from this admission including: CT head, CTA PE, CXR, MRI brain, MRA head, LL venous duplex, VBS.    Last 24 Hours Medication List:     Current Facility-Administered Medications:     acetaminophen (TYLENOL) rectal suppository 650 mg, Q4H PRN    Adult 3-in-1 TPN (custom base / custom electrolytes), Continuous TPN, Last Rate: 77.7 mL/hr at 02/02/25 2107    Adult 3-in-1 TPN (custom base / custom electrolytes), Continuous TPN    artificial tear ophthalmic ointment, HS    ceFAZolin (ANCEF) IVPB (premix in dextrose) 1,000 mg 50 mL, Q8H, Last Rate: 1,000 mg (02/03/25 0944)    chlorhexidine (PERIDEX) 0.12 % oral rinse 15 mL, Q12H MARQUIS    dorzolamide-timolol (COSOPT) 2-0.5 % ophthalmic solution 1 drop, BID    heparin (porcine) 25,000 units in 0.45% NaCl 250 mL infusion (premix), Titrated, Last Rate: 12 Units/kg/hr (02/03/25 0756)    heparin (porcine) injection 2,400 Units, Q6H PRN    heparin (porcine) injection 4,800 Units, Once    heparin (porcine) injection 4,800 Units, Q6H PRN    hydrALAZINE (APRESOLINE) injection 5 mg, Q6H PRN    insulin aspart protamine-insulin aspart (NovoLOG 70/30) 100 units/mL subcutaneous injection 20 Units, QAM **AND** insulin  aspart protamine-insulin aspart (NovoLOG 70/30) 100 units/mL subcutaneous injection 25 Units, HS    insulin lispro (HumALOG/ADMELOG) 100 units/mL subcutaneous injection 2-12 Units, Q6H **AND** Fingerstick Glucose (POCT), Q6H    Latanoprostene Bunod 0.024 % SOLN 1 drop, HS    metroNIDAZOLE (FLAGYL) IVPB (premix) 500 mg 100 mL, Q8H, Last Rate: 500 mg (02/03/25 0829)    polyethylene glycol (MIRALAX) packet 17 g, Daily    prednisoLONE acetate (PRED FORTE) 1 % ophthalmic suspension 1 drop, TID    scopolamine (TRANSDERM-SCOP) 1 mg/3 days TD 72 hr patch 1 patch, Q72H    [Held by provider] senna-docusate sodium (SENOKOT S) 8.6-50 mg per tablet 1 tablet, BID    tetracaine 0.5 % ophthalmic solution 2 drop, Once    Administrative Statements   Today, Patient Was Seen By: Mercedes Nunez DO  PGY-I  I have spent a total time of >30 minutes in caring for this patient on the day of the visit/encounter including Prognosis, Risks and benefits of tx options, Instructions for management, Patient and family education, Importance of tx compliance, Risk factor reductions, Impressions, Counseling / Coordination of care, Documenting in the medical record, Reviewing / ordering tests, medicine, procedures  , Obtaining or reviewing history  , and Communicating with other healthcare professionals .    **Please Note: This note may have been constructed using a voice recognition system.**

## 2025-02-03 NOTE — PLAN OF CARE
Problem: PAIN - ADULT  Goal: Verbalizes/displays adequate comfort level or baseline comfort level  Description: Interventions:  - Encourage patient to monitor pain and request assistance  - Assess pain using appropriate pain scale  - Administer analgesics based on type and severity of pain and evaluate response  - Implement non-pharmacological measures as appropriate and evaluate response  - Consider cultural and social influences on pain and pain management  - Notify physician/advanced practitioner if interventions unsuccessful or patient reports new pain  Outcome: Progressing     Problem: INFECTION - ADULT  Goal: Absence or prevention of progression during hospitalization  Description: INTERVENTIONS:  - Assess and monitor for signs and symptoms of infection  - Monitor lab/diagnostic results  - Monitor all insertion sites, i.e. indwelling lines, tubes, and drains  - Monitor endotracheal if appropriate and nasal secretions for changes in amount and color  - [place] appropriate cooling/warming therapies per order  - Administer medications as ordered  - Instruct and encourage patient and family to use good hand hygiene technique  - Identify and instruct in appropriate isolation precautions for identified infection/condition  Outcome: Progressing  Goal: Absence of fever/infection during neutropenic period  Description: INTERVENTIONS:  - Monitor WBC    Outcome: Progressing     Problem: SAFETY ADULT  Goal: Patient will remain free of falls  Description: INTERVENTIONS:  - Educate patient/family on patient safety including physical limitations  - Instruct patient to call for assistance with activity   - Consult OT/PT to assist with strengthening/mobility   - Keep Call bell within reach  - Keep bed low and locked with side rails adjusted as appropriate  - Keep care items and personal belongings within reach  - Initiate and maintain comfort rounds  - Make Fall Risk Sign visible to staff  - Offer Toileting every 2 Hours,  in advance of need  - Initiate/Maintain Bed  alarm  - Apply yellow socks and bracelet for high fall risk patients  - Consider moving patient to room near nurses station  Outcome: Progressing  Goal: Maintain or return to baseline ADL function  Description: INTERVENTIONS:  -  Assess patient's ability to carry out ADLs; assess patient's baseline for ADL function and identify physical deficits which impact ability to perform ADLs (bathing, care of mouth/teeth, toileting, grooming, dressing, etc.)  - Assess/evaluate cause of self-care deficits   - Assess range of motion  - Assess patient's mobility; develop plan if impaired  - Assess patient's need for assistive devices and provide as appropriate  - Encourage maximum independence but intervene and supervise when necessary  - Involve family in performance of ADLs  - Assess for home care needs following discharge   - Consider OT consult to assist with ADL evaluation and planning for discharge  - Provide patient education as appropriate  Outcome: Progressing  Goal: Maintains/Returns to pre admission functional level  Description: INTERVENTIONS:  - Perform AM-PAC 6 Click Basic Mobility/ Daily Activity assessment daily.  - Set and communicate daily mobility goal to care team and patient/family/caregiver.   - Collaborate with rehabilitation services on mobility goals if consulted  - Perform Range of Motion 3 times a day.  - Reposition patient every 2 hours.  - Dangle patient 3 times a day  - Stand patient 3 times a day  - Ambulate patient 3 times a day  - Out of bed to chair 3 times a day   - Out of bed for meals 3 times a day  - Out of bed for toileting  - Record patient progress and toleration of activity level   Outcome: Progressing     Problem: DISCHARGE PLANNING  Goal: Discharge to home or other facility with appropriate resources  Description: INTERVENTIONS:  - Identify barriers to discharge w/patient and caregiver  - Arrange for needed discharge resources and  transportation as appropriate  - Identify discharge learning needs (meds, wound care, etc.)  - Arrange for interpretive services to assist at discharge as needed  - Refer to Case Management Department for coordinating discharge planning if the patient needs post-hospital services based on physician/advanced practitioner order or complex needs related to functional status, cognitive ability, or social support system  Outcome: Progressing     Problem: Nutrition/Hydration-ADULT  Goal: Nutrient/Hydration intake appropriate for improving, restoring or maintaining nutritional needs  Description: Monitor and assess patient's nutrition/hydration status for malnutrition. Collaborate with interdisciplinary team and initiate plan and interventions as ordered.  Monitor patient's weight and dietary intake as ordered or per policy. Utilize nutrition screening tool and intervene as necessary. Determine patient's food preferences and provide high-protein, high-caloric foods as appropriate.     INTERVENTIONS:  - Monitor oral intake, urinary output, labs, and treatment plans  - Assess nutrition and hydration status and recommend course of action  - Evaluate amount of meals eaten  - Assist patient with eating if necessary   - Allow adequate time for meals  - Recommend/ encourage appropriate diets, oral nutritional supplements, and vitamin/mineral supplements  - Order, calculate, and assess calorie counts as needed  - Recommend, monitor, and adjust tube feedings and TPN/PPN based on assessed needs  - Assess need for intravenous fluids  - Provide specific nutrition/hydration education as appropriate  - Include patient/family/caregiver in decisions related to nutrition  Outcome: Progressing

## 2025-02-03 NOTE — PLAN OF CARE
History & Physical    SUBJECTIVE:     CC: rectal adenocarcinoma  Ref: Anjelica Saavedra MD    History of Present Illness:  Patient is a 55 y.o. male presents for evaluation of rectal cancer.  Patient reports he has had increasing pelvic/rectal pain along with decreased bowel movements over the last 6 weeks.  Reports an uncomfortable feeling in his pelvis associated with mucous discharge from his rectum as well as bloody bowel movements that are thin and less than normal. He reports associated chills, fatigue and 16 lb weight loss over the past 5 months.  He has also noticed a decrease in appetite as well. He underwent a colonoscopy on 06/06/2019 where a nonobstructing rectal mass was found with biopsy showing well-differentiated adenocarcinoma.  He was then referred to Colorectal [facility] Clinic for evaluation.  He has no family history of colorectal cancer or IBD.    8/13/19: Completed neoadj chemoXRT  2/18/2020: Laparoscopic loop sigmoid colostomy 2/2 rectal perforation on chemotx  4/9/2020: APR with extra anatomic resection of fistula tract and left gluteus muscle, omental pedicle flap and VRAM with skin paddle by plastics  2/24/22: Completion colectomy/total colectomy with end ileostomy construction    Interval history:  Since last clinic visit, the patient underwent a completion colectomy/total colectomy with end ileostomy construction on 02/24/2022. He had an uneventful recovery in hospital and has done well at home after discharge.  He is tolerating a regular diet and having good ostomy function that is not too high of output.  He denies any fever, chills, nausea, vomiting.  Final pathology showed benign adenomas.    Review of patient's allergies indicates:  No Known Allergies    Current Outpatient Medications   Medication Sig Dispense Refill    acetaminophen (TYLENOL) 325 MG tablet Take 2 tablets (650 mg total) by mouth every 6 (six) hours as needed.  0    finasteride (PROSCAR) 5 mg tablet Take 1 tablet (5 mg  Pt cont w/ aspiration risk and poor po intake- cont to rec long term alternate means of nutrition vs hospice.  Cont puree w/ HTL by tsp, watch for swallows  Meds crushed  Aspiration precautions  Encourage po  Suction as needed, as able.      total) by mouth once daily. 30 tablet 11    oxyCODONE (ROXICODONE) 10 mg Tab immediate release tablet Take 1 tablet (10 mg total) by mouth every 4 (four) hours as needed. for pain. 90 tablet 0    promethazine (PHENERGAN) 25 MG tablet Take 1 tablet (25 mg total) by mouth every 4 (four) hours. 60 tablet 4    sildenafiL (VIAGRA) 100 MG tablet Take 1 tablet (100 mg total) by mouth daily as needed for Erectile Dysfunction. 20 tablet 11    tamsulosin (FLOMAX) 0.4 mg Cap Take 1 capsule (0.4 mg total) by mouth once daily. 30 capsule 11     No current facility-administered medications for this visit.       Past Medical History:   Diagnosis Date    Anemia     Arthritis     Cancer     rectal    Renal disorder     kidney stones     Past Surgical History:   Procedure Laterality Date    ABDOMINOPERINEAL RESECTION OF RECTUM N/A 4/9/2020    Procedure: PROCTECTOMY, ABDOMINOPERINEAL;  Surgeon: Jan New MD;  Location: [facility];  Service: General;  Laterality: N/A;    COLECTOMY, TOTAL N/A 2/24/2022    Procedure: COLECTOMY, TOTAL;  Surgeon: Jan New MD;  Location: [facility];  Service: Colon and Rectal;  Laterality: N/A;    COLON SURGERY      COLONOSCOPY N/A 6/6/2019    Procedure: COLONOSCOPY;  Surgeon: Anjelica Saavedra MD;  Location: [facility];  Service: Endoscopy;  Laterality: N/A;    COLONOSCOPY N/A 12/17/2021    Procedure: COLONOSCOPY;  Surgeon: Jan New MD;  Location: [facility];  Service: General;  Laterality: N/A;    CREATION OF MUSCLE ROTATIONAL FLAP Left 4/9/2020    Procedure: CREATION, FLAP, MUSCLE ROTATION;  Surgeon: Sebastian Dan MD;  [facility];  Service: Plastics;  Laterality: Left;   VRAM    CYSTOSCOPIC LITHOLAPAXY N/A 2/24/2022    Procedure: CYSTOLITHOLAPAXY;  Surgeon: Sukhjinder Tucker MD;  Location: [facility];  Service: Urology;  Laterality: N/A;    ESOPHAGOGASTRODUODENOSCOPY N/A 6/6/2019    Procedure: EGD (ESOPHAGOGASTRODUODENOSCOPY);  Surgeon: Anjelica Saavedra MD;   Location: [facility] ENDO;  Service: Endoscopy;  Laterality: N/A;    FLAP GRAFT SURGERY N/A 4/9/2020    Procedure: FLAP GRAFT;  Surgeon: Sebastian Dan MD;  Location: [facility];  Service: Plastics;  Laterality: N/A;  left fasciocutaneous buttocks flap    HERNIA REPAIR  1995    INJECTION OF ANESTHETIC AGENT INTO TISSUE PLANE DEFINED BY TRANSVERSUS ABDOMINIS MUSCLE N/A 2/18/2020    Procedure: BLOCK, TRANSVERSUS ABDOMINIS PLANE;  Surgeon: Jan New MD;  Location: [facility];  Service: General;  Laterality: N/A;    INJECTION OF ANESTHETIC AGENT INTO TISSUE PLANE DEFINED BY TRANSVERSUS ABDOMINIS MUSCLE  2/24/2022    Procedure: BLOCK, TRANSVERSUS ABDOMINIS PLANE;  Surgeon: Jan New MD;  Location: [facility];  Service: Colon and Rectal;;    INSERTION OF TUNNELED CENTRAL VENOUS CATHETER (CVC) WITH SUBCUTANEOUS PORT N/A 10/8/2019    Procedure: [id number];  Surgeon: Jan New MD;  Location: [facility];  Service: General;  Laterality: N/A;    LAPAROSCOPIC COLOSTOMY      MEDIPORT REMOVAL N/A 8/13/2020    Procedure: REMOVAL, CATHETER, CENTRAL VENOUS, TUNNELED, WITH PORT;  Surgeon: Sebatsian Dan MD;  Location: [facility];  Service: Plastics;  Laterality: N/A;    TONSILLECTOMY      TRANSURETHRAL RESECTION OF PROSTATE N/A 6/7/2021    Procedure: TURP (TRANSURETHRAL RESECTION OF PROSTATE), CYSTOLITHALOPAXY;  Surgeon: Sukhjinder Tucker MD;  Location: [facility];  Service: Urology;  Laterality: N/A;    WOUND DEBRIDEMENT N/A 8/13/2020    Procedure: DEBRIDEMENT, WOUND;  Surgeon: Sebastian Dan MD;  Location: [facility];  Service: Plastics;  Laterality: N/A;  layered closure     Family History   Problem Relation Age of Onset    Intestinal malrotation Mother     Leukemia Father     No Known Problems Sister     Coronary artery disease Brother     Coronary artery disease Maternal Grandmother     Stomach cancer Maternal Grandfather      Social History     Tobacco Use    Smoking status: Current Every  Day Smoker     Packs/day: 1.00     Years: 35.00     Pack years: 35.00     Types: Cigarettes     Start date: 1/2/1984    Smokeless tobacco: Former User     Types: Chew    Tobacco comment: no smoking after m.n prior to sx   Substance Use Topics    Alcohol use: Yes     Alcohol/week: 46.0 standard drinks     Types: 42 Cans of beer, 4 Shots of liquor per week     Comment: segrams 7, beer daily  hold now prior to surgery    Drug use: Never        Review of Systems:  Review of Systems   Constitutional: Negative for activity change, appetite change, chills, fatigue, fever and unexpected weight change.   HENT: Negative for congestion, ear pain, sore throat and trouble swallowing.    Eyes: Negative for pain, redness and itching.   Respiratory: Negative for cough, shortness of breath and wheezing.    Cardiovascular: Negative for chest pain, palpitations and leg swelling.   Gastrointestinal: Negative for abdominal distention, abdominal pain, anal bleeding, blood in stool, constipation, diarrhea, nausea, rectal pain and vomiting.   Endocrine: Negative for cold intolerance, heat intolerance and polyuria.   Genitourinary: Negative for dysuria, flank pain, frequency and hematuria.   Musculoskeletal: Negative for gait problem, joint swelling and neck pain.   Skin: Negative for color change, rash and wound.   Allergic/Immunologic: Negative for environmental allergies and immunocompromised state.   Neurological: Negative for dizziness, speech difficulty, weakness and numbness.   Psychiatric/Behavioral: Negative for agitation, confusion and hallucinations.       OBJECTIVE:     Vital Signs (Most Recent)  Temp: 98.6 °F (37 °C) (03/14/22 0819)  Pulse: 103 (03/14/22 0819)  BP: 127/83 (03/14/22 0819)     76.1 kg (167 lb 12.3 oz)     Physical Exam:  Physical Exam  Constitutional:       Appearance: He is well-developed.   HENT:      Head: Normocephalic and atraumatic.   Eyes:      Conjunctiva/sclera: Conjunctivae normal.   Neck:       Thyroid: No thyromegaly.   Cardiovascular:      Rate and Rhythm: Regular rhythm.   Pulmonary:      Effort: Pulmonary effort is normal. No respiratory distress.   Abdominal:      Comments: Soft, nondistended; midline incision healing well with staples removed today; ostomy pink and viable with liquid bilious stool in bag   Genitourinary:     Comments: Skin flap well-perfused and well-healed without drainage or evidence of fistula tracts; no perineal hernia  Musculoskeletal:         General: No tenderness. Normal range of motion.      Cervical back: Normal range of motion.   Skin:     General: Skin is warm and dry.      Capillary Refill: Capillary refill takes less than 2 seconds.      Findings: No rash.   Neurological:      Mental Status: He is alert and oriented to person, place, and time.       Laboratory  Lab Results   Component Value Date    WBC 7.45 02/27/2022    HGB 15.1 02/27/2022    HCT 45.1 02/27/2022     02/27/2022    CHOL 158 06/01/2019    TRIG 194 (H) 06/01/2019    HDL 30 (L) 06/01/2019    ALT 11 02/21/2022    AST 19 02/21/2022     02/27/2022    K 4.7 02/27/2022     02/27/2022    CREATININE 0.9 02/27/2022    BUN 7 02/27/2022    CO2 29 02/27/2022    TSH 0.907 07/07/2020    PSA 0.58 12/02/2020    INR 1.0 06/11/2019     CEA: 1.8 (Jan '22) <-- <1.7 (Sept 2021)    Diagnostic Results:  Colonoscopy images and report reviewed which shows a rectal mass that malignant     CT June 2021:  FINDINGS:  Thoracic aorta and great vessels are unremarkable.  Heart is normal without chamber enlargement.  No pericardial or pleural effusion.  No mediastinal, hilar or axillary adenopathy.     Lungs demonstrate no acute opacity with stable 4 mm left pulmonary nodules.  No new nodule appreciated.     Within the abdomen, the liver and spleen are unchanged.  Gallbladder unremarkable.  No biliary dilatation.  Pancreas and adrenal glands are unremarkable.     Kidneys are stable in appearance with left renal lower  pole staghorn calculus unchanged.  No hydronephrosis.     Stomach is unremarkable.  Small bowel nonobstructive.  Appendix normal.  Left lower quadrant colostomy present without concerning colonic abnormality.  No ascites.  No adenopathy.     Within the pelvis, the urinary bladder demonstrates similar mild circumferential wall thickening.  Dependent intraluminal calcification/stone has decreased in size.  No pelvic mass or ascites.  Presacral postsurgical findings are stable.  Stable soft tissue scarring extending toward the left gluteus amelia muscle.     No acute osseous abnormality.     Impression:     Stable appearance of the chest, abdomen pelvis without detrimental change.    PATHOLOGY:  2. Colon, completion total colectomy:   - Tubular adenomas (x2)   - Appendix with no diagnostic histopathologic alterations   - Thirty-three benign lymph nodes   - Negative for malignancy   3. Small bowel, resection:   - Segment of small bowel with no diagnostic histopathologic alterations   - Negative for malignancy     ASSESSMENT/PLAN:     55-year-old male with rectal adenocarcinoma with likely T3N1 disease based upon preop imaging without evidence of metastatic disease now s/p neoadj chemoXRT which completed on 8/13/19 but with post-tx MRI showing continued threatened mesorectal fascia who developed rectal perforation and abscess/fistula on cycle 11/12 of neoadj chemotx in Feb 2020 requiring lap diverting sigmoid colostomy with continued fluid collection/perforation/fistula on CT/MRI now s/p APR with extra-anatomic resection of fistula tract and left gluteus muscle, omental pedicle flap and VRAM with skin paddle by plastics on 4/9/2020 and now newly found unresectable polyps in the ascending and transverse colon who is s/p open completion colectomy/total colectomy with end ileostomy construction in Feb '22 with final path showing benign adenomas    - no further intervention required at this time  - staples removed in  clinic  - continue surveillance as scheduled for previous rectal cancer  - RTC 3 months for surveillance    Jan New MD  Colon and Rectal Surgery  [facility]

## 2025-02-03 NOTE — PLAN OF CARE
Problem: OCCUPATIONAL THERAPY ADULT  Goal: Performs self-care activities at highest level of function for planned discharge setting.  See evaluation for individualized goals.  Description: Treatment Interventions: ADL retraining, Functional transfer training, UE strengthening/ROM, Endurance training, Cognitive reorientation, Patient/family training, Equipment evaluation/education, Compensatory technique education, Continued evaluation, Energy conservation, Activityengagement          See flowsheet documentation for full assessment, interventions and recommendations.   Outcome: Progressing  Note: Limitation: Decreased ADL status, Decreased UE ROM, Decreased UE strength, Decreased Safe judgement during ADL, Decreased cognition, Decreased endurance, Visual deficit, Decreased fine motor control, Decreased self-care trans, Decreased high-level ADLs (impaired pain, activity tolerance, sitting tolerance, sitting balance, standing tolerance, generalized weakness, forward functional reach)     Assessment: Pt seen for skilled OT tx session focusing on activity engagement, challenging activity tolerance, ongoing eval and pt education. Pt agreeable to participate and required less physical assistance to complete bed mobility. Pt demonstrated improved activity and sitting tolerance. Pt performed sit <>stand w/ less physical assistance. Pt engaged in ADLs while seated at EOB w/ poor <> fair - balance. Continue to recommend level II rehab resource intensity when medically stable for discharge from acute care. Pt continues to demonstrate progress in OT; goals will be extended to 2/10/25. Will continue to follow     Rehab Resource Intensity Level, OT: II (Moderate Resource Intensity)

## 2025-02-04 LAB
ABO GROUP BLD BPU: NORMAL
ABO GROUP BLD: NORMAL
ANION GAP SERPL CALCULATED.3IONS-SCNC: 8 MMOL/L (ref 4–13)
APTT PPP: 61 SECONDS (ref 23–34)
BACTERIA BLD CULT: NORMAL
BACTERIA BLD CULT: NORMAL
BACTERIA UR QL AUTO: ABNORMAL /HPF
BILIRUB UR QL STRIP: NEGATIVE
BLD GP AB SCN SERPL QL: NEGATIVE
BPU ID: NORMAL
BUN SERPL-MCNC: 70 MG/DL (ref 5–25)
CALCIUM SERPL-MCNC: 8 MG/DL (ref 8.4–10.2)
CHLORIDE SERPL-SCNC: 110 MMOL/L (ref 96–108)
CLARITY UR: CLEAR
CO2 SERPL-SCNC: 25 MMOL/L (ref 21–32)
COLOR UR: YELLOW
CREAT SERPL-MCNC: 2.84 MG/DL (ref 0.6–1.3)
CROSSMATCH: NORMAL
ERYTHROCYTE [DISTWIDTH] IN BLOOD BY AUTOMATED COUNT: 16.1 % (ref 11.6–15.1)
GFR SERPL CREATININE-BSD FRML MDRD: 18 ML/MIN/1.73SQ M
GLUCOSE SERPL-MCNC: 100 MG/DL (ref 65–140)
GLUCOSE SERPL-MCNC: 117 MG/DL (ref 65–140)
GLUCOSE SERPL-MCNC: 167 MG/DL (ref 65–140)
GLUCOSE SERPL-MCNC: 172 MG/DL (ref 65–140)
GLUCOSE SERPL-MCNC: 90 MG/DL (ref 65–140)
GLUCOSE SERPL-MCNC: 98 MG/DL (ref 65–140)
GLUCOSE UR STRIP-MCNC: ABNORMAL MG/DL
HCT VFR BLD AUTO: 26.7 % (ref 36.5–49.3)
HGB BLD-MCNC: 8.9 G/DL (ref 12–17)
HGB UR QL STRIP.AUTO: ABNORMAL
INR PPP: 1.31 (ref 0.85–1.19)
KETONES UR STRIP-MCNC: NEGATIVE MG/DL
LEUKOCYTE ESTERASE UR QL STRIP: NEGATIVE
MCH RBC QN AUTO: 29.4 PG (ref 26.8–34.3)
MCHC RBC AUTO-ENTMCNC: 33.3 G/DL (ref 31.4–37.4)
MCV RBC AUTO: 88 FL (ref 82–98)
NITRITE UR QL STRIP: NEGATIVE
NON-SQ EPI CELLS URNS QL MICRO: ABNORMAL /HPF
PH UR STRIP.AUTO: 6 [PH]
PHOSPHATE SERPL-MCNC: 4.4 MG/DL (ref 2.3–4.1)
PLATELET # BLD AUTO: 151 THOUSANDS/UL (ref 149–390)
PMV BLD AUTO: 12.4 FL (ref 8.9–12.7)
POTASSIUM SERPL-SCNC: 3.4 MMOL/L (ref 3.5–5.3)
PROCALCITONIN SERPL-MCNC: 13.13 NG/ML
PROT UR STRIP-MCNC: ABNORMAL MG/DL
PROTHROMBIN TIME: 17 SECONDS (ref 12.3–15)
RBC # BLD AUTO: 3.03 MILLION/UL (ref 3.88–5.62)
RBC #/AREA URNS AUTO: ABNORMAL /HPF
RH BLD: POSITIVE
SODIUM SERPL-SCNC: 143 MMOL/L (ref 135–147)
SP GR UR STRIP.AUTO: 1.01 (ref 1–1.03)
SPECIMEN EXPIRATION DATE: NORMAL
UNIT DISPENSE STATUS: NORMAL
UNIT PRODUCT CODE: NORMAL
UNIT PRODUCT VOLUME: 300 ML
UNIT RH: NORMAL
UROBILINOGEN UR STRIP-ACNC: 2 MG/DL
WBC # BLD AUTO: 13.71 THOUSAND/UL (ref 4.31–10.16)
WBC #/AREA URNS AUTO: ABNORMAL /HPF

## 2025-02-04 PROCEDURE — 80048 BASIC METABOLIC PNL TOTAL CA: CPT

## 2025-02-04 PROCEDURE — 86901 BLOOD TYPING SEROLOGIC RH(D): CPT

## 2025-02-04 PROCEDURE — 82948 REAGENT STRIP/BLOOD GLUCOSE: CPT

## 2025-02-04 PROCEDURE — 85610 PROTHROMBIN TIME: CPT

## 2025-02-04 PROCEDURE — 84100 ASSAY OF PHOSPHORUS: CPT

## 2025-02-04 PROCEDURE — P9016 RBC LEUKOCYTES REDUCED: HCPCS

## 2025-02-04 PROCEDURE — 85027 COMPLETE CBC AUTOMATED: CPT

## 2025-02-04 PROCEDURE — 86923 COMPATIBILITY TEST ELECTRIC: CPT

## 2025-02-04 PROCEDURE — 99233 SBSQ HOSP IP/OBS HIGH 50: CPT | Performed by: INTERNAL MEDICINE

## 2025-02-04 PROCEDURE — 86900 BLOOD TYPING SEROLOGIC ABO: CPT

## 2025-02-04 PROCEDURE — 81001 URINALYSIS AUTO W/SCOPE: CPT

## 2025-02-04 PROCEDURE — 85730 THROMBOPLASTIN TIME PARTIAL: CPT | Performed by: INTERNAL MEDICINE

## 2025-02-04 PROCEDURE — 86850 RBC ANTIBODY SCREEN: CPT

## 2025-02-04 PROCEDURE — 84145 PROCALCITONIN (PCT): CPT

## 2025-02-04 PROCEDURE — 99232 SBSQ HOSP IP/OBS MODERATE 35: CPT

## 2025-02-04 RX ORDER — PANTOPRAZOLE SODIUM 40 MG/10ML
40 INJECTION, POWDER, LYOPHILIZED, FOR SOLUTION INTRAVENOUS
Status: CANCELLED | OUTPATIENT
Start: 2025-02-04

## 2025-02-04 RX ORDER — SODIUM CHLORIDE, SODIUM GLUCONATE, SODIUM ACETATE, POTASSIUM CHLORIDE, MAGNESIUM CHLORIDE, SODIUM PHOSPHATE, DIBASIC, AND POTASSIUM PHOSPHATE .53; .5; .37; .037; .03; .012; .00082 G/100ML; G/100ML; G/100ML; G/100ML; G/100ML; G/100ML; G/100ML
100 INJECTION, SOLUTION INTRAVENOUS CONTINUOUS
Status: DISPENSED | OUTPATIENT
Start: 2025-02-04 | End: 2025-02-05

## 2025-02-04 RX ADMIN — CALCIUM GLUCONATE: 98 INJECTION, SOLUTION INTRAVENOUS at 14:39

## 2025-02-04 RX ADMIN — CEFAZOLIN SODIUM 1000 MG: 1 SOLUTION INTRAVENOUS at 17:03

## 2025-02-04 RX ADMIN — HEPARIN SODIUM 12 UNITS/KG/HR: 10000 INJECTION, SOLUTION INTRAVENOUS at 03:22

## 2025-02-04 RX ADMIN — PREDNISOLONE ACETATE 1 DROP: 10 SUSPENSION/ DROPS OPHTHALMIC at 22:50

## 2025-02-04 RX ADMIN — CALCIUM GLUCONATE: 98 INJECTION, SOLUTION INTRAVENOUS at 20:58

## 2025-02-04 RX ADMIN — DORZOLAMIDE HYDROCHLORIDE AND TIMOLOL MALEATE 1 DROP: 20; 5 SOLUTION OPHTHALMIC at 10:51

## 2025-02-04 RX ADMIN — MINERAL OIL, WHITE PETROLATUM: .03; .94 OINTMENT OPHTHALMIC at 22:51

## 2025-02-04 RX ADMIN — LATANOPROSTENE BUNOD 1 DROP: 0.24 SOLUTION/ DROPS OPHTHALMIC at 22:51

## 2025-02-04 RX ADMIN — PREDNISOLONE ACETATE 1 DROP: 10 SUSPENSION/ DROPS OPHTHALMIC at 10:50

## 2025-02-04 RX ADMIN — INSULIN LISPRO 2 UNITS: 100 INJECTION, SOLUTION INTRAVENOUS; SUBCUTANEOUS at 21:01

## 2025-02-04 RX ADMIN — CEFAZOLIN SODIUM 1000 MG: 1 SOLUTION INTRAVENOUS at 08:06

## 2025-02-04 RX ADMIN — CEFAZOLIN SODIUM 1000 MG: 1 SOLUTION INTRAVENOUS at 00:09

## 2025-02-04 RX ADMIN — INSULIN ASPART 25 UNITS: 100 INJECTION, SUSPENSION SUBCUTANEOUS at 22:49

## 2025-02-04 RX ADMIN — SCOPOLAMINE 1 PATCH: 1.5 PATCH, EXTENDED RELEASE TRANSDERMAL at 04:11

## 2025-02-04 RX ADMIN — SODIUM CHLORIDE, SODIUM GLUCONATE, SODIUM ACETATE, POTASSIUM CHLORIDE, MAGNESIUM CHLORIDE, SODIUM PHOSPHATE, DIBASIC, AND POTASSIUM PHOSPHATE 100 ML/HR: .53; .5; .37; .037; .03; .012; .00082 INJECTION, SOLUTION INTRAVENOUS at 17:03

## 2025-02-04 RX ADMIN — METRONIDAZOLE 500 MG: 500 INJECTION, SOLUTION INTRAVENOUS at 17:06

## 2025-02-04 RX ADMIN — SCOPOLAMINE 1 PATCH: 1.5 PATCH, EXTENDED RELEASE TRANSDERMAL at 17:14

## 2025-02-04 RX ADMIN — METRONIDAZOLE 500 MG: 500 INJECTION, SOLUTION INTRAVENOUS at 08:12

## 2025-02-04 RX ADMIN — DORZOLAMIDE HYDROCHLORIDE AND TIMOLOL MALEATE 1 DROP: 20; 5 SOLUTION OPHTHALMIC at 21:05

## 2025-02-04 NOTE — PROGRESS NOTES
Progress Note - Hospitalist   Name: Jeffry Almanzar 89 y.o. male I MRN: [id number]  Unit/Bed#: S -01 I Date of Admission: 1/17/2025   Date of Service: 2/4/2025 I Hospital Day: 17    Assessment & Plan  Dysphagia  Had numerous discussions regarding goals of care with family and multidisciplinary team. Explained risks versus benefits of PICC line and subsequent TPN, versus pleasure feeds on comfort care. 1/27 pt's wife decided on obtaining PICC for patient and signed informed consent form. Process, risks, and outcomes were reviewed at length with the patient's wife at bedside, alongside Dr. Gtz. All questions/concerns answered. Pt's wife verbalizes understanding and wishes to proceed with PICC line.   1/28 - pt received PICC line and had TPN initiated in the evening  1/29 - concerns for yellow/green sputum, as well as hyperglycemia in the setting of TPN initiation, low grade fever, initiated empiric ABX as pt was a hard stick and unable to obtain blood cx  Lactic acid wnl   1/30 - 1x dose IV Lasix for congestion noted on CXR. COVID/FLU/RSV negative.  Initially failed swallow eval at 1/23, repeat eval done 2/2, patient seems to do a little better with purée by teaspoon however still has a risk of aspiration and may not take enough per speech therapist.  Wife is agreeable to starting diet and see what happens, if respiratory status worsens on PO diet then that would support a feeding tube.  MRSA nares negative  Sputum culture: 3+ growth of Staph aureus, 2+ growth of mixed respiratory idalia. ABX per susceptibilities.   Blood cx x2: no growth at 24h  Likely tracheobronchitis given clinical presentation and hx of respiratory secretions, frequent suctioning, hx of intubation, and prolonged hospitalization    Tmax this AM 99.4F, HR 97. WBC this AM 13.7, down from 16.4 yesterday. Procalcitonin this AM 13, down from 18.86 yesterday.      Plan  Continue IV cefazolin 1g q8h considering culture sensitivities & MRSA  negative.  Continue IV Flagyl 500 mg q8h   EKG Qtc 469 ms (per Pharmacy, this is acceptable)   Due to lower potassium today: RD will increase potassium content in TPN  Scopolamine patch to manage secretions  Per pharmacy - pt's hx of glaucoma is a contraindication for glycopyrrolate (both open angle & angle closure)   Dysphagia 1 purée double/HTL.  Continue TPN for now as pt's PO intake is still limited  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  (HCC)  Pt baseline Hb in range of 8-10.   2/3 AM Hb 7.2 down from 10.1 on 2/2. Repeat H&H at 13:00 7.1, and at 18:00 6.8.   Pt received 1 unit pRBC  Per RN, no bloody stools or bloody mucus in suction.      Plan  FOBT pending, UA pending  Continue to monitor with CBC  If Hb <7.0, transfuse  Type 2 diabetes mellitus with stage 4 chronic kidney disease, without long-term current use of insulin ([facility])  Lab Results   Component Value Date    HGBA1C 6.9 (H) 01/18/2025     Recent Labs     02/03/25  1149 02/03/25  1856 02/04/25  0036 02/04/25  0705   POCGLU 200* 132 100 90     Plan  Continue NPH insulin 25u at bedtime   Hold NPH insulin 20u in the morning d/t AM BG of 90. Will continue to monitor/adjust as necessary.   Continue sliding scale   Hypoglycemia protocol   Cognitive impairment  Per wife, patient has some degree of dementia but is unclear of specifics. Patient oriented to self and place but unaware of year and month. Per wife, unsure if patient is completely aware of what is going on and is unsure if patient would truly want to accept intubation a second time    Plan  Neuropsych evaluation determined patient does NOT have capacity for MDM  Palliative on board. Appreciate recommendations. As of 1/23/2025, family made the decision to make patient's code status LEVEL 3 DNR/DNI.  Ongoing discussions regarding palliative care.   Acute saddle pulmonary embolism with acute cor pulmonale (HCC)  Initially with cardiac arrest and cardiogenic shock, is now extubated off pressors. Was  transferred  to med/surg 1/21.   MRI brain negative for CVA or concern for hemorrhage.      Continue heparin gtt.  SOFÍA (acute kidney injury) (HCC)  Suspect due to ATN in the setting of cardiogenic shock, cardiac arrest, hypotension and IV contrast exposure with autoregulatory dysfunction with ARB  Per Nephrology:  Discontinued D5W 1/29 d/t resolution of hypernatremia and initiation of TPN  Continue to hold losartan and Farxiga    Plan  Strict I/Os, daily weights  Avoid nephrotoxins, NSAIDs, further IV contrast as able  Avoid hypotension.  Maintain MAP >65  Cardiac arrest (HCC)  Cardiac arrest in ED after CTA evaluating for pulmonary embolus.  Asystole for 2 minutes requiring CPR and intubation s/P extubation 1/21/2025  ECHO with EF of 65% in September, ECHO with left ventricular ejection fraction is 55% (1/20)    Plan  Continue heparin gtt  Continue to monitor on tele due to recent PE, recent MI, and electrolyte abnormalities   Benign 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 (HCC)  Lab Results   Component Value Date    EGFR 18 02/03/2025    EGFR 21 02/02/2025    EGFR 24 02/01/2025    CREATININE 2.90 (H) 02/03/2025    CREATININE 2.55 (H) 02/02/2025    CREATININE 2.25 (H) 02/01/2025   Holding Losartan and Farxiga per nephro due to elevated Cr   Hydralazine 5 mg q 6 hrs for BP >180/110   Initially allowed for permissive hypertension due to concern for stroke.  However, patient's MRI does not show concern for infarct.  Will aim for normotension  Palliative care encounter  Patient seen and evaluated by palliative care 1/23. Palliative care had an in-depth discussion with family regarding patient's GOC and overall prognosis. Family would NOT like to pursue a PEG tube at this time.   CODE STATUS changed from Level 2 to Level 3 DNR/DNI  Ongoing goals of care discussions with consideration for comfort measures only  Goals of care, counseling/discussion  Palliative on board. Appreciate recommendations   CKD  (chronic kidney disease) stage 4, GFR 15-29 ml/min (formerly [facility])  Lab Results   Component Value Date    EGFR 18 02/03/2025    EGFR 21 02/02/2025    EGFR 24 02/01/2025    CREATININE 2.90 (H) 02/03/2025    CREATININE 2.55 (H) 02/02/2025    CREATININE 2.25 (H) 02/01/2025   Per patient and patient's wife, would not be interested in dialysis   Nephro on board. Appreciate recommendations.  Severe protein-calorie malnutrition (HCC)  Malnutrition Findings:   Adult Malnutrition type: Acute illness  Adult Degree of Malnutrition: Other severe protein calorie malnutrition  Malnutrition Characteristics: Muscle loss, Inadequate energy, Weight loss, Fat loss                  360 Statement: related to dysphagia with recommendation for NPO, pt with > 7 days of less than 50% energy intake compared to estimated needs, muscle, fat wasting, wasted buccal pads, temporalis, supraclavicular space, interosseous. Treatment. Enteral nutrition via feeding tube is indicated for nutrition support- wife is declining nutrition via NGT or PEG at this time. Ongoing goals of care discussion noted.    BMI Findings:           Body mass index is 21.42 kg/m².     Wife agreeable to PICC and TPN. Per RD, TPN as above under principal problem.     VTE Pharmacologic Prophylaxis: VTE Score: 6 High Risk (Score >/= 5) - Pharmacological DVT Prophylaxis Ordered: heparin drip. Sequential Compression Devices Ordered.    Mobility:   Basic Mobility Inpatient Raw Score: 9  JH-HLM Goal: 3: Sit at edge of bed  JH-HLM Achieved: 3: Sit at edge of bed  JH-HLM Goal NOT achieved. Continue with multidisciplinary rounding and encourage appropriate mobility to improve upon JH-HLM goals.    Patient Centered Rounds: I performed bedside rounds with nursing staff today.   Discussions with Specialists or Other Care Team Provider: Nephrology, Nutrition, Pharmacy    Education and Discussions with Family / Patient: Updated  (wife) at bedside.    Current Length of Stay: 17  day(s)  Current Patient Status: Inpatient   Certification Statement: The patient will continue to require additional inpatient hospital stay due to ongoing IV antibiotics, GOC, TPN and guarded prognosis.  Discharge Plan: Anticipate discharge in >72 hrs to TBD. Evolving GOC conversation.    Code Status: Level 3 - DNAR and DNI    Subjective   Pt seen and evaluated at bedside this morning. Per report, he received 1 uprbc last night d/t repeat H&H of 6.8/21.7. Wife at bedside, who states pt had some sputum production overnight. He is saturating 93% on room air. He awakens to verbal stimuli and is able to follow hand movements with his eyes.     Objective :  Temp:  [97.4 °F (36.3 °C)-100.1 °F (37.8 °C)] 97.4 °F (36.3 °C)  HR:  [] 92  BP: (109-141)/(53-79) 141/67  Resp:  [16-18] 16  SpO2:  [91 %-93 %] 93 %  O2 Device: None (Room air)    Body mass index is 21.42 kg/m².     Input and Output Summary (last 24 hours):     Intake/Output Summary (Last 24 hours) at 2/4/2025 0736  Last data filed at 2/4/2025 0600  Gross per 24 hour   Intake 352.08 ml   Output --   Net 352.08 ml       Physical Exam  Vitals reviewed.   Constitutional:       General: He is awake.      Appearance: He is underweight. He is ill-appearing.   HENT:      Head: Normocephalic and atraumatic.      Right Ear: External ear normal.      Left Ear: External ear normal.      Mouth/Throat:      Mouth: Mucous membranes are dry.   Eyes:      General: No scleral icterus.     Extraocular Movements: Extraocular movements intact.   Cardiovascular:      Rate and Rhythm: Tachycardia present.      Pulses: Normal pulses.   Pulmonary:      Effort: No accessory muscle usage.      Breath sounds: Rales (bilateral) present.      Comments: Pulmonary congestion  Chest:      Chest wall: No tenderness.   Abdominal:      Tenderness: There is no abdominal tenderness. There is no guarding.   Skin:     General: Skin is warm.      Capillary Refill: Capillary refill takes 2 to 3  seconds.      Coloration: Skin is not jaundiced.   Neurological:      Motor: Weakness present.         Lines/Drains:  Lines/Drains/Airways       Active Status       Name Placement date Placement time Site Days    PICC Line 01/28/25 Right Brachial 01/28/25  0930  Brachial  6    Urethral Catheter 01/17/25  0000  --  18                  Central Line:  Goal for removal: Continuing for TPN.                Lab Results: I have reviewed the following results:   Results from last 7 days   Lab Units 02/03/25  1829 02/03/25  1307 02/03/25  0755   WBC Thousand/uL  --   --  16.40*   HEMOGLOBIN g/dL 6.8*   < > 7.2*   HEMATOCRIT % 21.7*   < > 22.6*   PLATELETS Thousands/uL  --   --  162   BANDS PCT %  --   --  2   LYMPHO PCT %  --   --  3*   MONO PCT %  --   --  2*   EOS PCT %  --   --  0    < > = values in this interval not displayed.     Results from last 7 days   Lab Units 02/03/25  0644   SODIUM mmol/L 143   POTASSIUM mmol/L 3.7   CHLORIDE mmol/L 109*   CO2 mmol/L 24   BUN mg/dL 70*   CREATININE mg/dL 2.90*   ANION GAP mmol/L 10   CALCIUM mg/dL 8.6   GLUCOSE RANDOM mg/dL 185*         Results from last 7 days   Lab Units 02/04/25  0705 02/04/25  0036 02/03/25  1856 02/03/25  1149 02/03/25  0810 02/03/25  0650 02/03/25  0013 02/02/25  1754 02/02/25  1221 02/02/25  0648 02/02/25  0017 02/01/25  1809   POC GLUCOSE mg/dl 90 100 132 200* 151* 171* 156* 177* 145* 155* 195* 173*         Results from last 7 days   Lab Units 02/03/25  0644 02/02/25  0525 02/01/25  0647 01/31/25  0526 01/30/25  0428 01/29/25  1022   LACTIC ACID mmol/L  --   --   --   --   --  1.5   PROCALCITONIN ng/ml 18.86* 17.97* 4.02* 4.61* 3.01*  --        Recent Cultures (last 7 days):   Results from last 7 days   Lab Units 01/30/25  1556 01/30/25  1047   BLOOD CULTURE   --  No Growth After 4 Days.  No Growth After 4 Days.   SPUTUM CULTURE  3+ Growth of Staphylococcus aureus*  3+ Growth of  --    GRAM STAIN RESULT  1+ Epithelial Cells*  2+ Polys*  2+ Gram  positive cocci in pairs*  1+ Gram positive cocci in chains*  --        Imaging Results Review: I reviewed radiology reports from this admission including: CT head, CTA PE, CXR, MRI brain, MRA head, LL venous duplex, VBS.    Last 24 Hours Medication List:     Current Facility-Administered Medications:     acetaminophen (TYLENOL) rectal suppository 650 mg, Q4H PRN    Adult 3-in-1 TPN (custom base / custom electrolytes), Continuous TPN, Last Rate: 77.2 mL/hr at 02/03/25 2044    artificial tear ophthalmic ointment, HS    ceFAZolin (ANCEF) IVPB (premix in dextrose) 1,000 mg 50 mL, Q8H, Last Rate: 1,000 mg (02/04/25 0009)    chlorhexidine (PERIDEX) 0.12 % oral rinse 15 mL, Q12H MARQUIS    dorzolamide-timolol (COSOPT) 2-0.5 % ophthalmic solution 1 drop, BID    heparin (porcine) 25,000 units in 0.45% NaCl 250 mL infusion (premix), Titrated, Last Rate: 12 Units/kg/hr (02/04/25 0322)    heparin (porcine) injection 2,400 Units, Q6H PRN    heparin (porcine) injection 4,800 Units, Once    heparin (porcine) injection 4,800 Units, Q6H PRN    hydrALAZINE (APRESOLINE) injection 5 mg, Q6H PRN    insulin aspart protamine-insulin aspart (NovoLOG 70/30) 100 units/mL subcutaneous injection 20 Units, QAM **AND** insulin aspart protamine-insulin aspart (NovoLOG 70/30) 100 units/mL subcutaneous injection 25 Units, HS    insulin lispro (HumALOG/ADMELOG) 100 units/mL subcutaneous injection 2-12 Units, Q6H **AND** Fingerstick Glucose (POCT), Q6H    Latanoprostene Bunod 0.024 % SOLN 1 drop, HS    metroNIDAZOLE (FLAGYL) IVPB (premix) 500 mg 100 mL, Q8H, Last Rate: 500 mg (02/03/25 2330)    polyethylene glycol (MIRALAX) packet 17 g, Daily    prednisoLONE acetate (PRED FORTE) 1 % ophthalmic suspension 1 drop, TID    scopolamine (TRANSDERM-SCOP) 1 mg/3 days TD 72 hr patch 1 patch, Q72H    [Held by provider] senna-docusate sodium (SENOKOT S) 8.6-50 mg per tablet 1 tablet, BID    tetracaine 0.5 % ophthalmic solution 2 drop, Once    Administrative  Statements   Today, Patient Was Seen By: Mercedes Nunez, DO  PGY-I  I have spent a total time of >30 minutes in caring for this patient on the day of the visit/encounter including Prognosis, Risks and benefits of tx options, Instructions for management, Patient and family education, Importance of tx compliance, Risk factor reductions, Impressions, Counseling / Coordination of care, Documenting in the medical record, Reviewing / ordering tests, medicine, procedures  , Obtaining or reviewing history  , and Communicating with other healthcare professionals .    **Please Note: This note may have been constructed using a voice recognition system.**

## 2025-02-04 NOTE — PLAN OF CARE
Problem: PAIN - ADULT  Goal: Verbalizes/displays adequate comfort level or baseline comfort level  Description: Interventions:  - Encourage patient to monitor pain and request assistance  - Assess pain using appropriate pain scale  - Administer analgesics based on type and severity of pain and evaluate response  - Implement non-pharmacological measures as appropriate and evaluate response  - Consider cultural and social influences on pain and pain management  - Notify physician/advanced practitioner if interventions unsuccessful or patient reports new pain  Outcome: Progressing     Problem: INFECTION - ADULT  Goal: Absence or prevention of progression during hospitalization  Description: INTERVENTIONS:  - Assess and monitor for signs and symptoms of infection  - Monitor lab/diagnostic results  - Monitor all insertion sites, i.e. indwelling lines, tubes, and drains  - Monitor endotracheal if appropriate and nasal secretions for changes in amount and color  - [place] appropriate cooling/warming therapies per order  - Administer medications as ordered  - Instruct and encourage patient and family to use good hand hygiene technique  - Identify and instruct in appropriate isolation precautions for identified infection/condition  Outcome: Progressing  Goal: Absence of fever/infection during neutropenic period  Description: INTERVENTIONS:  - Monitor WBC    Outcome: Progressing     Problem: SAFETY ADULT  Goal: Patient will remain free of falls  Description: INTERVENTIONS:  - Educate patient/family on patient safety including physical limitations  - Instruct patient to call for assistance with activity   - Consult OT/PT to assist with strengthening/mobility   - Keep Call bell within reach  - Keep bed low and locked with side rails adjusted as appropriate  - Keep care items and personal belongings within reach  - Initiate and maintain comfort rounds  - Make Fall Risk Sign visible to staff  - Offer Toileting every 2 Hours,  in advance of need  - Initiate/Maintain alarm  - Obtain necessary fall risk management equipment:   - Apply yellow socks and bracelet for high fall risk patients  - Consider moving patient to room near nurses station  Outcome: Progressing  Goal: Maintain or return to baseline ADL function  Description: INTERVENTIONS:  -  Assess patient's ability to carry out ADLs; assess patient's baseline for ADL function and identify physical deficits which impact ability to perform ADLs (bathing, care of mouth/teeth, toileting, grooming, dressing, etc.)  - Assess/evaluate cause of self-care deficits   - Assess range of motion  - Assess patient's mobility; develop plan if impaired  - Assess patient's need for assistive devices and provide as appropriate  - Encourage maximum independence but intervene and supervise when necessary  - Involve family in performance of ADLs  - Assess for home care needs following discharge   - Consider OT consult to assist with ADL evaluation and planning for discharge  - Provide patient education as appropriate  Outcome: Progressing  Goal: Maintains/Returns to pre admission functional level  Description: INTERVENTIONS:  - Perform AM-PAC 6 Click Basic Mobility/ Daily Activity assessment daily.  - Set and communicate daily mobility goal to care team and patient/family/caregiver.   - Collaborate with rehabilitation services on mobility goals if consulted  - Perform Range of Motion 2 times a day.  - Reposition patient every 2 hours.  - Dangle patient 2 times a day  - Stand patient 2 times a day  - Ambulate patient 2 times a day  - Out of bed to chair 2 times a day   - Out of bed for meals 2 times a day  - Out of bed for toileting  - Record patient progress and toleration of activity level   Outcome: Progressing     Problem: DISCHARGE PLANNING  Goal: Discharge to home or other facility with appropriate resources  Description: INTERVENTIONS:  - Identify barriers to discharge w/patient and caregiver  -  Arrange for needed discharge resources and transportation as appropriate  - Identify discharge learning needs (meds, wound care, etc.)  - Arrange for interpretive services to assist at discharge as needed  - Refer to Case Management Department for coordinating discharge planning if the patient needs post-hospital services based on physician/advanced practitioner order or complex needs related to functional status, cognitive ability, or social support system  Outcome: Progressing     Problem: Knowledge Deficit  Goal: Patient/family/caregiver demonstrates understanding of disease process, treatment plan, medications, and discharge instructions  Description: Complete learning assessment and assess knowledge base.  Interventions:  - Provide teaching at level of understanding  - Provide teaching via preferred learning methods  Outcome: Progressing     Problem: Nutrition/Hydration-ADULT  Goal: Nutrient/Hydration intake appropriate for improving, restoring or maintaining nutritional needs  Description: Monitor and assess patient's nutrition/hydration status for malnutrition. Collaborate with interdisciplinary team and initiate plan and interventions as ordered.  Monitor patient's weight and dietary intake as ordered or per policy. Utilize nutrition screening tool and intervene as necessary. Determine patient's food preferences and provide high-protein, high-caloric foods as appropriate.     INTERVENTIONS:  - Monitor oral intake, urinary output, labs, and treatment plans  - Assess nutrition and hydration status and recommend course of action  - Evaluate amount of meals eaten  - Assist patient with eating if necessary   - Allow adequate time for meals  - Recommend/ encourage appropriate diets, oral nutritional supplements, and vitamin/mineral supplements  - Order, calculate, and assess calorie counts as needed  - Recommend, monitor, and adjust tube feedings and TPN/PPN based on assessed needs  - Assess need for intravenous  fluids  - Provide specific nutrition/hydration education as appropriate  - Include patient/family/caregiver in decisions related to nutrition  Outcome: Progressing     Problem: Prexisting or High Potential for Compromised Skin Integrity  Goal: Skin integrity is maintained or improved  Description: INTERVENTIONS:  - Identify patients at risk for skin breakdown  - Assess and monitor skin integrity  - Assess and monitor nutrition and hydration status  - Monitor labs   - Assess for incontinence   - Turn and reposition patient  - Assist with mobility/ambulation  - Relieve pressure over bony prominences  - Avoid friction and shearing  - Provide appropriate hygiene as needed including keeping skin clean and dry  - Evaluate need for skin moisturizer/barrier cream  - Collaborate with interdisciplinary team   - Patient/family teaching  - Consider wound care consult   Outcome: Progressing

## 2025-02-04 NOTE — ASSESSMENT & PLAN NOTE
Lab Results   Component Value Date    EGFR 18 02/04/2025    EGFR 18 02/03/2025    EGFR 21 02/02/2025    CREATININE 2.84 (H) 02/04/2025    CREATININE 2.90 (H) 02/03/2025    CREATININE 2.55 (H) 02/02/2025      - SOFÍA on CKD IV, likely in the setting of severe ATN   - baseline SCr 2.0   - follows OP Nephrology   - IP Nephrology following   - no HD desired per patient's previously communicated wishes, supported/reaffirmed by family

## 2025-02-04 NOTE — ASSESSMENT & PLAN NOTE
Suspect due to ATN in the setting of cardiogenic shock, cardiac arrest, hypotension and IV contrast exposure with autoregulatory dysfunction with ARB  Per Nephrology:  Discontinued D5W 1/29 d/t resolution of hypernatremia and initiation of TPN  Continue to hold losartan and Farxiga    Plan  Strict I/Os, daily weights  Avoid nephrotoxins, NSAIDs, further IV contrast as able  Avoid hypotension.  Maintain MAP >65

## 2025-02-04 NOTE — ASSESSMENT & PLAN NOTE
Pt baseline Hb in range of 8-10.   2/3 AM Hb 7.2 down from 10.1 on 2/2. Repeat H&H at 13:00 7.1, and at 18:00 6.8.   Pt received 1 unit pRBC  Per RN, no bloody stools or bloody mucus in suction.      Plan  FOBT pending, UA pending  Continue to monitor with CBC  If Hb <7.0, transfuse

## 2025-02-04 NOTE — ASSESSMENT & PLAN NOTE
Malnutrition Findings:   Adult Malnutrition type: Acute illness  Adult Degree of Malnutrition: Other severe protein calorie malnutrition  Malnutrition Characteristics: Muscle loss, Inadequate energy, Weight loss, Fat loss                360 Statement: related to dysphagia with recommendation for NPO, pt with > 7 days of less than 50% energy intake compared to estimated needs, muscle, fat wasting, wasted buccal pads, temporalis, supraclavicular space, interosseous. Treatment. Enteral nutrition via feeding tube is indicated for nutrition support- wife is declining nutrition via NGT or PEG at this time. Ongoing goals of care discussion noted.    BMI Findings:           Body mass index is 21.42 kg/m².

## 2025-02-04 NOTE — ASSESSMENT & PLAN NOTE
Lab Results   Component Value Date    HGBA1C 6.9 (H) 01/18/2025       Recent Labs     02/03/25  1856 02/04/25  0036 02/04/25  0705 02/04/25  1210   POCGLU 132 100 90 117       Blood Sugar Average: Last 72 hrs:  (P) 149

## 2025-02-04 NOTE — ASSESSMENT & PLAN NOTE
- overall wish to give patient supports to identify recovery/reversible etiologies, including dysphagia. Spouse clearly states that the patient would not wish to pursue NGT or PEG tube.   Tala confirms this, as known patient's values/wishes, would not wish to have a feeding tube. Concern that if pursued, patient would most likely remove in the setting of disagreement with its placement.   - SLP evaluated patient, with failing of VBS   - TPN currently administered, initiated 1/29. Per primary and spouse, re-evaluation on Sunday [02/02] for on-going plan. Spouses goal is for improvement of dysphagia with hopes that the patient able to safely/reliably tolerate PO intake. No identified marker of improvement with TPN specifically. No limitation of intervention identified per spouse. Communicated strong meme in God to determine outcome of the patient's current medical condition.    2/4- Met with spouse tala at the bedside. She reports that patient is doing ok- she would like to give him more time to work on PO intake. Patient remains on TPN. Wife did not want to discuss further at this time as again she states she feels his swallowing will improve and she wants him to continue working on oral intake.     - DNAR/DNI, Level 3, as the patient would not wish to be re-intubated nor pursue CPR/ACLS in the setting of a cardiac arrest. If such an event were to occur, focus on EOL comfort to natural death.   - will continue to follow/support as clinical course evolves.

## 2025-02-04 NOTE — CASE MANAGEMENT
Case Management Progress Note    Patient name Jeffry Almanzar  Location S /S -01 MRN [id number]  : 1934 Date 2025       LOS (days): 17  Geometric Mean LOS (GMLOS) (days): 4.9  Days to GMLOS:-12.6        OBJECTIVE:        Current admission status: Inpatient  Preferred Pharmacy:   [facility] [address] [place] [id number]  Phone: [phone] Fax: [phone]    Primary Care Provider: Debbie Maloney MD    Primary Insurance: MEDICARE  Secondary Insurance: BLUE CROSS    PROGRESS NOTE:    Weekly Care Management Length of Stay Review     Current LOS: 17 Days    Most Recent Labs:     Lab Results   Component Value Date/Time    WBC 13.71 (H) 2025 08:39 AM    HGB 8.9 (L) 2025 08:39 AM    HCT 26.7 (L) 2025 08:39 AM     2025 08:39 AM    BANDSPCT 2 2025 07:55 AM    SODIUM 143 2025 08:39 AM    K 3.4 (L) 2025 08:39 AM     (H) 2025 08:39 AM    CO2 25 2025 08:39 AM    BUN 70 (H) 2025 08:39 AM    CREATININE 2.84 (H) 2025 08:39 AM    GLUC 98 2025 08:39 AM    INR 1.31 (H) 2025 08:39 AM       Most Recent Vitals:   Vitals:    25 0758   BP: 141/69   Pulse: 93   Resp:    Temp:    SpO2: 93%        Identified Barriers to Discharge/Discharge Goals/Care Management Interventions: acute saddle PE w/ acute cor pulmonale, Nephro consutled, VBS completed. Picc line placed-TPN. C/w IV ABX. C/w heparin drip. Not medically stable    Intended Discharge Disposition: SNF referrals out. Choices established.     Expected Discharge Date:

## 2025-02-04 NOTE — ASSESSMENT & PLAN NOTE
Lab Results   Component Value Date    EGFR 18 02/04/2025    EGFR 18 02/03/2025    EGFR 21 02/02/2025    CREATININE 2.84 (H) 02/04/2025    CREATININE 2.90 (H) 02/03/2025    CREATININE 2.55 (H) 02/02/2025

## 2025-02-04 NOTE — PROGRESS NOTES
Progress Note - Palliative Care   Name: Jeffry Almanzar 89 y.o. male I MRN: [id number]  Unit/Bed#: S -01 I Date of Admission: 1/17/2025   Date of Service: 2/4/2025 I Hospital Day: 17    Assessment & Plan  Goals of care, counseling/discussion   - overall wish to give patient supports to identify recovery/reversible etiologies, including dysphagia. Spouse clearly states that the patient would not wish to pursue NGT or PEG tube.   Tala confirms this, as known patient's values/wishes, would not wish to have a feeding tube. Concern that if pursued, patient would most likely remove in the setting of disagreement with its placement.   - SLP evaluated patient, with failing of VBS   - TPN currently administered, initiated 1/29. Per primary and spouse, re-evaluation on Sunday [02/02] for on-going plan. Spouses goal is for improvement of dysphagia with hopes that the patient able to safely/reliably tolerate PO intake. No identified marker of improvement with TPN specifically. No limitation of intervention identified per spouse. Communicated strong meme in God to determine outcome of the patient's current medical condition.    2/4- Met with spouse tala at the bedside. She reports that patient is doing ok- she would like to give him more time to work on PO intake. Patient remains on TPN. Wife did not want to discuss further at this time as again she states she feels his swallowing will improve and she wants him to continue working on oral intake.     - DNAR/DNI, Level 3, as the patient would not wish to be re-intubated nor pursue CPR/ACLS in the setting of a cardiac arrest. If such an event were to occur, focus on EOL comfort to natural death.   - will continue to follow/support as clinical course evolves.  Palliative care encounter   - met with spouse and Tala at bedside   - supportive listening and presence provided   - anticipatory guidance provided     - [place] [id number], pending clinical course, may benefit from  Home Palliative Care supports    #psychosocial supports   - Antony [spouse,  65 years] [phone]   - 3 adult children   - Sarah [daughter, resides in [place], [place]]    - currently at bedside   - Jeffry [son, resides in [place]]   - Grzegorz [son, resides locally]   - 6 grandchildren, 6 great-grandchildren   - resides with spouse   - no prior  service   - retired, Maintenance work   - [employer] meme background   -  visiting regularly   - strong Tenriism supports, prayer circles  Acute saddle pulmonary embolism with acute cor pulmonale (HCC)  PESI Class V, Very High Risk [age, male, h/o CA, h/o asthma, hypotensive, AMS]    CT PE [01/18/2025] acute saddle PE with large clot burden, R heart strain, calculated RV:LV ratio is 1.2.    Hs-cTn 533->777->1,070      TTE [01/20/2025] LVEF 55%, grade I diastolic dysfunction; RV moderately dilated, moderately reduced RV systolic function, hypokinesis of the basal to mid free wall; mild MR; moderate TR, RVSP 49 mmHg.  CKD (chronic kidney disease) stage 4, GFR 15-29 ml/min ([facility])  Lab Results   Component Value Date    EGFR 18 02/04/2025    EGFR 18 02/03/2025    EGFR 21 02/02/2025    CREATININE 2.84 (H) 02/04/2025    CREATININE 2.90 (H) 02/03/2025    CREATININE 2.55 (H) 02/02/2025      - SOFÍA on CKD IV, likely in the setting of severe ATN   - baseline SCr 2.0   - follows OP Nephrology   - IP Nephrology following   - no HD desired per patient's previously communicated wishes, supported/reaffirmed by family  Cardiac arrest (HCC)   - initially presented via EMS on 01/17 after patient found unresponsive with agonal respirations, bag ventilations initiated in the field. Intubated in the setting of a brief cardiac arrest with ROSC.   - in the setting of acute saddle PE with large clot burden, Cor Pulmonale  Dysphagia  See A/P Goals of Care  Severe protein-calorie malnutrition (HCC)  Malnutrition Findings:   Adult Malnutrition type: Acute illness  Adult Degree of  Malnutrition: Other severe protein calorie malnutrition  Malnutrition Characteristics: Muscle loss, Inadequate energy, Weight loss, Fat loss                360 Statement: related to dysphagia with recommendation for NPO, pt with > 7 days of less than 50% energy intake compared to estimated needs, muscle, fat wasting, wasted buccal pads, temporalis, supraclavicular space, interosseous. Treatment. Enteral nutrition via feeding tube is indicated for nutrition support- wife is declining nutrition via NGT or PEG at this time. Ongoing goals of care discussion noted.    BMI Findings:           Body mass index is 21.42 kg/m².     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  ([facility])  Lab Results   Component Value Date    EGFR 18 02/04/2025    EGFR 18 02/03/2025    EGFR 21 02/02/2025    CREATININE 2.84 (H) 02/04/2025    CREATININE 2.90 (H) 02/03/2025    CREATININE 2.55 (H) 02/02/2025     Benign 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 ([facility])  Lab Results   Component Value Date    EGFR 18 02/04/2025    EGFR 18 02/03/2025    EGFR 21 02/02/2025    CREATININE 2.84 (H) 02/04/2025    CREATININE 2.90 (H) 02/03/2025    CREATININE 2.55 (H) 02/02/2025     Type 2 diabetes mellitus with stage 4 chronic kidney disease, without long-term current use of insulin ([facility])  Lab Results   Component Value Date    HGBA1C 6.9 (H) 01/18/2025       Recent Labs     02/03/25  1856 02/04/25  0036 02/04/25  0705 02/04/25  1210   POCGLU 132 100 90 117       Blood Sugar Average: Last 72 hrs:  (P) 149    Hypernatremia    Tracheobronchitis          PDMP Review: I have reviewed the patient's controlled substance dispensing history in the Prescription Drug Monitoring Program in compliance with the [facility] regulations before prescribing any controlled substances.    Subjective   No acute events.   Patient sleeping comfortably. Non labored breathing. NAD.  Spouse tala at the bedside.   Wife appreciative of visit, but states that she wants him to continue  "with TPN and working on his oral intake. She was not open to discussing goals further today,    Objective :  Temp:  [97.4 °F (36.3 °C)-99.4 °F (37.4 °C)] 98.7 °F (37.1 °C)  HR:  [85-97] 85  BP: (112-141)/(53-71) 136/65  Resp:  [16-17] 17  SpO2:  [91 %-94 %] 94 %  O2 Device: None (Room air)    Physical Exam  Vitals and nursing note reviewed.   Constitutional:       General: He is sleeping. He is not in acute distress.  HENT:      Head: Normocephalic and atraumatic.   Cardiovascular:      Rate and Rhythm: Normal rate.   Pulmonary:      Effort: Pulmonary effort is normal. No respiratory distress.   Skin:     General: Skin is warm and dry.   Neurological:      Comments: Pt sleeping- unable to assess            Lab Results: I have reviewed the following results:  Lab Results   Component Value Date/Time    SODIUM 143 02/04/2025 08:39 AM    SODIUM 140 11/18/2024 01:32 PM    K 3.4 (L) 02/04/2025 08:39 AM    K 4.1 11/18/2024 01:32 PM    BUN 70 (H) 02/04/2025 08:39 AM    BUN 64 (H) 11/18/2024 01:32 PM    CREATININE 2.84 (H) 02/04/2025 08:39 AM    CREATININE 2.20 (H) 06/05/2017 12:55 PM    GLUC 98 02/04/2025 08:39 AM    GLUC 165 (H) 11/18/2024 01:32 PM    CALCIUM 8.0 (L) 02/04/2025 08:39 AM    CALCIUM 9.6 11/18/2024 01:32 PM    CALCIUM 9.6 11/18/2024 01:32 PM    AST 36 01/21/2025 05:05 AM    AST 14 02/16/2024 12:42 PM     (H) 01/21/2025 05:05 AM    ALT 11 02/16/2024 12:42 PM    ALB 3.2 (L) 01/21/2025 05:05 AM    ALB 4.0 10/25/2016 11:09 AM    TP 5.8 (L) 01/21/2025 05:05 AM    TP 7.2 02/16/2024 12:42 PM    EGFR 18 02/04/2025 08:39 AM     Lab Results   Component Value Date/Time    HGB 8.9 (L) 02/04/2025 08:39 AM    WBC 13.71 (H) 02/04/2025 08:39 AM     02/04/2025 08:39 AM    INR 1.31 (H) 02/04/2025 08:39 AM    PTT 61 (H) 02/04/2025 08:39 AM     No results found for: \"[id number]\"    Code Status: Level 3 - DNAR and DNI  Advance Directive and Living Will:      Power of :    POLST:      Administrative " Statements   I have spent a total time of 25 minutes in caring for this patient on the day of the visit/encounter including Patient and family education, Counseling / Coordination of care, Documenting in the medical record, Reviewing / ordering tests, medicine, procedures  , and Obtaining or reviewing history  . Topics discussed with the patient / family include symptom assessment and management, psychosocial support, supportive listening, and anticipatory guidance.

## 2025-02-04 NOTE — ASSESSMENT & PLAN NOTE
Had numerous discussions regarding goals of care with family and multidisciplinary team. Explained risks versus benefits of PICC line and subsequent TPN, versus pleasure feeds on comfort care. 1/27 pt's wife decided on obtaining PICC for patient and signed informed consent form. Process, risks, and outcomes were reviewed at length with the patient's wife at bedside, alongside Dr. Gtz. All questions/concerns answered. Pt's wife verbalizes understanding and wishes to proceed with PICC line.   1/28 - pt received PICC line and had TPN initiated in the evening  1/29 - concerns for yellow/green sputum, as well as hyperglycemia in the setting of TPN initiation, low grade fever, initiated empiric ABX as pt was a hard stick and unable to obtain blood cx  Lactic acid wnl   1/30 - 1x dose IV Lasix for congestion noted on CXR. COVID/FLU/RSV negative.  Initially failed swallow eval at 1/23, repeat eval done 2/2, patient seems to do a little better with purée by teaspoon however still has a risk of aspiration and may not take enough per speech therapist.  Wife is agreeable to starting diet and see what happens, if respiratory status worsens on PO diet then that would support a feeding tube.  MRSA nares negative  Sputum culture: 3+ growth of Staph aureus, 2+ growth of mixed respiratory idalia. ABX per susceptibilities.   Blood cx x2: no growth at 24h  Likely tracheobronchitis given clinical presentation and hx of respiratory secretions, frequent suctioning, hx of intubation, and prolonged hospitalization    Tmax this AM 99.4F, HR 97. WBC this AM 13.7, down from 16.4 yesterday. Procalcitonin this AM 13, down from 18.86 yesterday.      Plan  Continue IV cefazolin 1g q8h considering culture sensitivities & MRSA negative.  Continue IV Flagyl 500 mg q8h   EKG Qtc 469 ms (per Pharmacy, this is acceptable)   Due to lower potassium today: RD will increase potassium content in TPN  Scopolamine patch to manage secretions  Per pharmacy - pt's  hx of glaucoma is a contraindication for glycopyrrolate (both open angle & angle closure)   Dysphagia 1 purée double/HTL.  Continue TPN for now as pt's PO intake is still limited

## 2025-02-04 NOTE — ASSESSMENT & PLAN NOTE
Lab Results   Component Value Date    HGBA1C 6.9 (H) 01/18/2025     Recent Labs     02/03/25  1149 02/03/25  1856 02/04/25  0036 02/04/25  0705   POCGLU 200* 132 100 90     Plan  Continue NPH insulin 25u at bedtime   Hold NPH insulin 20u in the morning d/t AM BG of 90. Will continue to monitor/adjust as necessary.   Continue sliding scale   Hypoglycemia protocol

## 2025-02-04 NOTE — ASSESSMENT & PLAN NOTE
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

## 2025-02-05 LAB
ALBUMIN SERPL BCG-MCNC: 2.2 G/DL (ref 3.5–5)
ALP SERPL-CCNC: 187 U/L (ref 34–104)
ALT SERPL W P-5'-P-CCNC: 7 U/L (ref 7–52)
ANION GAP SERPL CALCULATED.3IONS-SCNC: 8 MMOL/L (ref 4–13)
APTT PPP: 44 SECONDS (ref 23–34)
APTT PPP: 72 SECONDS (ref 23–34)
APTT PPP: 86 SECONDS (ref 23–34)
AST SERPL W P-5'-P-CCNC: 38 U/L (ref 13–39)
BILIRUB SERPL-MCNC: 1.27 MG/DL (ref 0.2–1)
BUN SERPL-MCNC: 70 MG/DL (ref 5–25)
CALCIUM ALBUM COR SERPL-MCNC: 9.3 MG/DL (ref 8.3–10.1)
CALCIUM SERPL-MCNC: 7.9 MG/DL (ref 8.4–10.2)
CHLORIDE SERPL-SCNC: 109 MMOL/L (ref 96–108)
CO2 SERPL-SCNC: 25 MMOL/L (ref 21–32)
CREAT SERPL-MCNC: 2.58 MG/DL (ref 0.6–1.3)
ERYTHROCYTE [DISTWIDTH] IN BLOOD BY AUTOMATED COUNT: 16.1 % (ref 11.6–15.1)
GFR SERPL CREATININE-BSD FRML MDRD: 20 ML/MIN/1.73SQ M
GLUCOSE SERPL-MCNC: 115 MG/DL (ref 65–140)
GLUCOSE SERPL-MCNC: 133 MG/DL (ref 65–140)
GLUCOSE SERPL-MCNC: 166 MG/DL (ref 65–140)
GLUCOSE SERPL-MCNC: 172 MG/DL (ref 65–140)
GLUCOSE SERPL-MCNC: 71 MG/DL (ref 65–140)
HCT VFR BLD AUTO: 24.4 % (ref 36.5–49.3)
HGB BLD-MCNC: 7.9 G/DL (ref 12–17)
INR PPP: 1.25 (ref 0.85–1.19)
MCH RBC QN AUTO: 28.8 PG (ref 26.8–34.3)
MCHC RBC AUTO-ENTMCNC: 32.4 G/DL (ref 31.4–37.4)
MCV RBC AUTO: 89 FL (ref 82–98)
PLATELET # BLD AUTO: 167 THOUSANDS/UL (ref 149–390)
PMV BLD AUTO: 12.3 FL (ref 8.9–12.7)
POTASSIUM SERPL-SCNC: 3.3 MMOL/L (ref 3.5–5.3)
PROT SERPL-MCNC: 5.5 G/DL (ref 6.4–8.4)
PROTHROMBIN TIME: 16.4 SECONDS (ref 12.3–15)
RBC # BLD AUTO: 2.74 MILLION/UL (ref 3.88–5.62)
SODIUM SERPL-SCNC: 142 MMOL/L (ref 135–147)
WBC # BLD AUTO: 12.28 THOUSAND/UL (ref 4.31–10.16)

## 2025-02-05 PROCEDURE — 82948 REAGENT STRIP/BLOOD GLUCOSE: CPT

## 2025-02-05 PROCEDURE — 80053 COMPREHEN METABOLIC PANEL: CPT

## 2025-02-05 PROCEDURE — 85730 THROMBOPLASTIN TIME PARTIAL: CPT | Performed by: INTERNAL MEDICINE

## 2025-02-05 PROCEDURE — 85610 PROTHROMBIN TIME: CPT

## 2025-02-05 PROCEDURE — 99233 SBSQ HOSP IP/OBS HIGH 50: CPT | Performed by: INTERNAL MEDICINE

## 2025-02-05 PROCEDURE — 85027 COMPLETE CBC AUTOMATED: CPT

## 2025-02-05 RX ORDER — POTASSIUM CHLORIDE 14.9 MG/ML
20 INJECTION INTRAVENOUS ONCE
Status: COMPLETED | OUTPATIENT
Start: 2025-02-05 | End: 2025-02-06

## 2025-02-05 RX ORDER — POTASSIUM CHLORIDE 14.9 MG/ML
20 INJECTION INTRAVENOUS ONCE
Status: COMPLETED | OUTPATIENT
Start: 2025-02-05 | End: 2025-02-05

## 2025-02-05 RX ORDER — ALBUMIN (HUMAN) 12.5 G/50ML
25 SOLUTION INTRAVENOUS ONCE
Status: DISCONTINUED | OUTPATIENT
Start: 2025-02-05 | End: 2025-02-09

## 2025-02-05 RX ADMIN — INSULIN ASPART 25 UNITS: 100 INJECTION, SUSPENSION SUBCUTANEOUS at 22:07

## 2025-02-05 RX ADMIN — CHLORHEXIDINE GLUCONATE 15 ML: 1.2 RINSE ORAL at 08:46

## 2025-02-05 RX ADMIN — PREDNISOLONE ACETATE 1 DROP: 10 SUSPENSION/ DROPS OPHTHALMIC at 16:39

## 2025-02-05 RX ADMIN — METRONIDAZOLE 500 MG: 500 INJECTION, SOLUTION INTRAVENOUS at 00:55

## 2025-02-05 RX ADMIN — CALCIUM GLUCONATE: 98 INJECTION, SOLUTION INTRAVENOUS at 14:22

## 2025-02-05 RX ADMIN — CEFAZOLIN SODIUM 1000 MG: 1 SOLUTION INTRAVENOUS at 00:04

## 2025-02-05 RX ADMIN — HEPARIN SODIUM 14 UNITS/KG/HR: 10000 INJECTION, SOLUTION INTRAVENOUS at 15:26

## 2025-02-05 RX ADMIN — HEPARIN SODIUM 2400 UNITS: 1000 INJECTION INTRAVENOUS; SUBCUTANEOUS at 07:36

## 2025-02-05 RX ADMIN — INSULIN LISPRO 2 UNITS: 100 INJECTION, SOLUTION INTRAVENOUS; SUBCUTANEOUS at 17:40

## 2025-02-05 RX ADMIN — POTASSIUM CHLORIDE 20 MEQ: 14.9 INJECTION, SOLUTION INTRAVENOUS at 18:45

## 2025-02-05 RX ADMIN — POTASSIUM CHLORIDE 20 MEQ: 14.9 INJECTION, SOLUTION INTRAVENOUS at 11:39

## 2025-02-05 RX ADMIN — METRONIDAZOLE 500 MG: 500 INJECTION, SOLUTION INTRAVENOUS at 16:38

## 2025-02-05 RX ADMIN — METRONIDAZOLE 500 MG: 500 INJECTION, SOLUTION INTRAVENOUS at 08:44

## 2025-02-05 RX ADMIN — CEFAZOLIN SODIUM 1000 MG: 1 SOLUTION INTRAVENOUS at 10:18

## 2025-02-05 RX ADMIN — DORZOLAMIDE HYDROCHLORIDE AND TIMOLOL MALEATE 1 DROP: 20; 5 SOLUTION OPHTHALMIC at 08:46

## 2025-02-05 RX ADMIN — CEFAZOLIN SODIUM 1000 MG: 1 SOLUTION INTRAVENOUS at 17:36

## 2025-02-05 RX ADMIN — CALCIUM GLUCONATE: 98 INJECTION, SOLUTION INTRAVENOUS at 21:00

## 2025-02-05 RX ADMIN — PREDNISOLONE ACETATE 1 DROP: 10 SUSPENSION/ DROPS OPHTHALMIC at 08:46

## 2025-02-05 NOTE — ASSESSMENT & PLAN NOTE
Malnutrition Findings:   Adult Malnutrition type: Acute illness  Adult Degree of Malnutrition: Other severe protein calorie malnutrition  Malnutrition Characteristics: Muscle loss, Inadequate energy, Weight loss, Fat loss                  360 Statement: related to dysphagia with recommendation for NPO, pt with > 7 days of less than 50% energy intake compared to estimated needs, muscle, fat wasting, wasted buccal pads, temporalis, supraclavicular space, interosseous. Treatment. Enteral nutrition via feeding tube is indicated for nutrition support- wife is declining nutrition via NGT or PEG at this time. Ongoing goals of care discussion noted.    BMI Findings:           Body mass index is 21.94 kg/m².     Pt w PICC and TPN. Per RD, TPN as above under principal problem.

## 2025-02-05 NOTE — ASSESSMENT & PLAN NOTE
Had numerous discussions regarding goals of care with family and multidisciplinary team. Explained risks versus benefits of PICC line and subsequent TPN, versus pleasure feeds on comfort care. 1/27 pt's wife decided on obtaining PICC for patient and signed informed consent form. Process, risks, and outcomes were reviewed at length with the patient's wife at bedside, alongside Dr. Gtz. All questions/concerns answered. Pt's wife verbalizes understanding and wishes to proceed with PICC line.   1/28 - pt received PICC line and had TPN initiated in the evening  1/29 - concerns for yellow/green sputum, as well as hyperglycemia in the setting of TPN initiation, low grade fever, initiated empiric ABX as pt was a hard stick and unable to obtain blood cx  Lactic acid wnl   1/30 - 1x dose IV Lasix for congestion noted on CXR. COVID/FLU/RSV negative.  Initially failed swallow eval at 1/23, repeat eval done 2/2, patient seems to do a little better with purée by teaspoon however still has a risk of aspiration and may not take enough per speech therapist.  Wife is agreeable to starting diet and see what happens, if respiratory status worsens on PO diet then that would support a feeding tube.  MRSA nares negative  Sputum culture: 3+ growth of Staph aureus, 2+ growth of mixed respiratory idalia. ABX per susceptibilities.   Blood cx x2: no growth at 24h  Likely tracheobronchitis given clinical presentation and hx of respiratory secretions, frequent suctioning, hx of intubation, and prolonged hospitalization    Tmax this AM 99.1F, HR 91. WBC this AM 12 down from 13.  Per discussion with Palliative and pt's wife, wife is currently undecided about comfort care measures.     Plan  Continue IV cefazolin 1g q8h considering culture sensitivities & MRSA negative.  Continue IV Flagyl 500 mg q8h   EKG Qtc 469 ms (per Pharmacy, this is acceptable)   TPN per RD  Supplemented 1x dose IV KCl 20 mEq today   Scopolamine patch to manage  secretions  Per pharmacy - pt's hx of glaucoma is a contraindication for glycopyrrolate (both open angle & angle closure)   Dysphagia 1 purée double/HTL.  Continue TPN for now as pt's PO intake is still limited

## 2025-02-05 NOTE — PROGRESS NOTES
Progress Note - Hospitalist   Name: Jeffry Almanzar 89 y.o. male I MRN: [id number]  Unit/Bed#: S -01 I Date of Admission: 1/17/2025   Date of Service: 2/5/2025 I Hospital Day: 18    Assessment & Plan  Dysphagia  Had numerous discussions regarding goals of care with family and multidisciplinary team. Explained risks versus benefits of PICC line and subsequent TPN, versus pleasure feeds on comfort care. 1/27 pt's wife decided on obtaining PICC for patient and signed informed consent form. Process, risks, and outcomes were reviewed at length with the patient's wife at bedside, alongside Dr. Gtz. All questions/concerns answered. Pt's wife verbalizes understanding and wishes to proceed with PICC line.   1/28 - pt received PICC line and had TPN initiated in the evening  1/29 - concerns for yellow/green sputum, as well as hyperglycemia in the setting of TPN initiation, low grade fever, initiated empiric ABX as pt was a hard stick and unable to obtain blood cx  Lactic acid wnl   1/30 - 1x dose IV Lasix for congestion noted on CXR. COVID/FLU/RSV negative.  Initially failed swallow eval at 1/23, repeat eval done 2/2, patient seems to do a little better with purée by teaspoon however still has a risk of aspiration and may not take enough per speech therapist.  Wife is agreeable to starting diet and see what happens, if respiratory status worsens on PO diet then that would support a feeding tube.  MRSA nares negative  Sputum culture: 3+ growth of Staph aureus, 2+ growth of mixed respiratory idalia. ABX per susceptibilities.   Blood cx x2: no growth at 24h  Likely tracheobronchitis given clinical presentation and hx of respiratory secretions, frequent suctioning, hx of intubation, and prolonged hospitalization    Tmax this AM 99.1F, HR 91. WBC this AM 12 down from 13.  Per discussion with Palliative and pt's wife, wife is currently undecided about comfort care measures.     Plan  Continue IV cefazolin 1g q8h considering  culture sensitivities & MRSA negative.  Continue IV Flagyl 500 mg q8h   EKG Qtc 469 ms (per Pharmacy, this is acceptable)   TPN per RD  Supplemented 1x dose IV KCl 20 mEq today   Scopolamine patch to manage secretions  Per pharmacy - pt's hx of glaucoma is a contraindication for glycopyrrolate (both open angle & angle closure)   Dysphagia 1 purée double/HTL.  Continue TPN for now as pt's PO intake is still limited  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  (HCC)  Pt baseline Hb in range of 8-10.   2/3 AM Hb 7.2 down from 10.1 on 2/2. Repeat H&H at 13:00 7.1, and at 18:00 6.8.   Pt received 1 unit pRBC  Per RN, no bloody stools or bloody mucus in suction.    UA with 30-50 RBCs, urine dorita however not gross hematuria    Plan  FOBT pending   Frequent Mcgee flushes  Continue to monitor with CBC  If Hb <7.0, transfuse  Type 2 diabetes mellitus with stage 4 chronic kidney disease, without long-term current use of insulin ([facility])  Lab Results   Component Value Date    HGBA1C 6.9 (H) 01/18/2025     Recent Labs     02/04/25  2056 02/04/25  2249 02/05/25  0052 02/05/25  1148   POCGLU 172* 167* 115 133     Plan  Continue NPH insulin 25u at bedtime   Hold NPH insulin 20u in the morning d/t AM BG of 90. Will continue to monitor/adjust as necessary.   Continue sliding scale   Hypoglycemia protocol   Cognitive impairment  Per wife, patient has some degree of dementia but is unclear of specifics. Patient oriented to self and place but unaware of year and month. Per wife, unsure if patient is completely aware of what is going on and is unsure if patient would truly want to accept intubation a second time    Plan  Neuropsych evaluation determined patient does NOT have capacity for MDM  Palliative on board. Appreciate recommendations. As of 1/23/2025, family made the decision to make patient's code status LEVEL 3 DNR/DNI.  Ongoing discussions regarding palliative care.   Acute saddle pulmonary embolism with acute cor pulmonale  (HCC)  Initially with cardiac arrest and cardiogenic shock, is now extubated off pressors. Was transferred  to med/surg 1/21.   MRI brain negative for CVA or concern for hemorrhage.      Continue heparin gtt.  SOFÍA (acute kidney injury) ([facility])  Suspect due to ATN in the setting of cardiogenic shock, cardiac arrest, hypotension and IV contrast exposure with autoregulatory dysfunction with ARB  Per Nephrology:  Discontinued D5W 1/29 d/t resolution of hypernatremia and initiation of TPN  Continue to hold losartan and Farxiga    Plan  Strict I/Os, daily weights  Avoid nephrotoxins, NSAIDs, further IV contrast as able  Avoid hypotension.  Maintain MAP >65  Cardiac arrest ([facility])  Cardiac arrest in ED after CTA evaluating for pulmonary embolus.  Asystole for 2 minutes requiring CPR and intubation s/P extubation 1/21/2025  ECHO with EF of 65% in September, ECHO with left ventricular ejection fraction is 55% (1/20)    Plan  Continue heparin gtt  Continue to monitor on tele due to recent PE, recent MI, and electrolyte abnormalities   Benign 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 ([facility])  Lab Results   Component Value Date    EGFR 20 02/05/2025    EGFR 18 02/04/2025    EGFR 18 02/03/2025    CREATININE 2.58 (H) 02/05/2025    CREATININE 2.84 (H) 02/04/2025    CREATININE 2.90 (H) 02/03/2025   Holding Losartan and Farxiga per nephro due to elevated Cr   Hydralazine 5 mg q 6 hrs for BP >180/110   Initially allowed for permissive hypertension due to concern for stroke.  However, patient's MRI does not show concern for infarct.  Will aim for normotension  Palliative care encounter  Patient seen and evaluated by palliative care 1/23. Palliative care had an in-depth discussion with family regarding patient's GOC and overall prognosis. Family would NOT like to pursue a PEG tube at this time.   CODE STATUS changed from Level 2 to Level 3 DNR/DNI  Ongoing goals of care discussions with consideration for comfort measures  only  Goals of care, counseling/discussion  Palliative on board. Appreciate recommendations   CKD (chronic kidney disease) stage 4, GFR 15-29 ml/min ([facility])  Lab Results   Component Value Date    EGFR 20 02/05/2025    EGFR 18 02/04/2025    EGFR 18 02/03/2025    CREATININE 2.58 (H) 02/05/2025    CREATININE 2.84 (H) 02/04/2025    CREATININE 2.90 (H) 02/03/2025   Per patient and patient's wife, would not be interested in dialysis   Nephro on board. Appreciate recommendations.  Severe protein-calorie malnutrition (HCC)  Malnutrition Findings:   Adult Malnutrition type: Acute illness  Adult Degree of Malnutrition: Other severe protein calorie malnutrition  Malnutrition Characteristics: Muscle loss, Inadequate energy, Weight loss, Fat loss                  360 Statement: related to dysphagia with recommendation for NPO, pt with > 7 days of less than 50% energy intake compared to estimated needs, muscle, fat wasting, wasted buccal pads, temporalis, supraclavicular space, interosseous. Treatment. Enteral nutrition via feeding tube is indicated for nutrition support- wife is declining nutrition via NGT or PEG at this time. Ongoing goals of care discussion noted.    BMI Findings:           Body mass index is 21.94 kg/m².     Pt w PICC and TPN. Per RD, TPN as above under principal problem.     VTE Pharmacologic Prophylaxis: VTE Score: 6 High Risk (Score >/= 5) - Pharmacological DVT Prophylaxis Ordered: heparin drip. Sequential Compression Devices Ordered.    Mobility:   Basic Mobility Inpatient Raw Score: 6  JH-HLM Goal: 2: Bed activities/Dependent transfer  JH-HLM Achieved: 2: Bed activities/Dependent transfer  JH-HLM Goal NOT achieved. Continue with multidisciplinary rounding and encourage appropriate mobility to improve upon JH-HLM goals.    Patient Centered Rounds: I performed bedside rounds with nursing staff today.   Discussions with Specialists or Other Care Team Provider: Nephrology, Nutrition, Pharmacy    Education and  Discussions with Family / Patient: Updated  (wife) at bedside.    Current Length of Stay: 18 day(s)  Current Patient Status: Inpatient   Certification Statement: The patient will continue to require additional inpatient hospital stay due to ongoing IV antibiotics, GOC, TPN and guarded prognosis.  Discharge Plan: Anticipate discharge in >72 hrs to TBD. Ongoing GOC conversation.    Code Status: Level 3 - DNAR and DNI    Subjective   Pt seen and evaluated at bedside this morning. Per report, no acute events overnight although he did not permit suctioning at times. He is saturating 93-94% on room air.  Mcgee in place, draining dorita urine however less reddish than yesterday. He is awake and following movements.     Objective :  Temp:  [98.5 °F (36.9 °C)-99.1 °F (37.3 °C)] 99.1 °F (37.3 °C)  HR:  [82-91] 88  BP: (136-160)/(65-81) 139/70  Resp:  [16-20] 20  SpO2:  [92 %-96 %] 96 %    Body mass index is 21.94 kg/m².     Input and Output Summary (last 24 hours):     Intake/Output Summary (Last 24 hours) at 2/5/2025 1518  Last data filed at 2/5/2025 1200  Gross per 24 hour   Intake 0 ml   Output 1500 ml   Net -1500 ml       Physical Exam  Vitals reviewed.   Constitutional:       General: He is awake.      Appearance: He is underweight. He is ill-appearing.   HENT:      Head: Normocephalic and atraumatic.      Right Ear: External ear normal.      Left Ear: External ear normal.      Mouth/Throat:      Mouth: Mucous membranes are dry.   Eyes:      General: No scleral icterus.     Extraocular Movements: Extraocular movements intact.   Cardiovascular:      Rate and Rhythm: Tachycardia present.      Pulses: Normal pulses.   Pulmonary:      Effort: No accessory muscle usage.      Breath sounds: Rales present.      Comments: Pulmonary congestion  Chest:      Chest wall: No tenderness.   Abdominal:      Tenderness: There is no abdominal tenderness. There is no guarding.   Skin:     General: Skin is warm.      Capillary  Refill: Capillary refill takes 2 to 3 seconds.      Coloration: Skin is not jaundiced.   Neurological:      Motor: Weakness present.         Lines/Drains:  Lines/Drains/Airways       Active Status       Name Placement date Placement time Site Days    PICC Line 01/28/25 Right Brachial 01/28/25  0930  Brachial  8    Urethral Catheter 01/17/25  0000  --  19                  Central Line:  Goal for removal: Continuing for TPN.                Lab Results: I have reviewed the following results:   Results from last 7 days   Lab Units 02/05/25  0511 02/03/25  1307 02/03/25  0755   WBC Thousand/uL 12.28*   < > 16.40*   HEMOGLOBIN g/dL 7.9*   < > 7.2*   HEMATOCRIT % 24.4*   < > 22.6*   PLATELETS Thousands/uL 167   < > 162   BANDS PCT %  --   --  2   LYMPHO PCT %  --   --  3*   MONO PCT %  --   --  2*   EOS PCT %  --   --  0    < > = values in this interval not displayed.     Results from last 7 days   Lab Units 02/05/25  0511   SODIUM mmol/L 142   POTASSIUM mmol/L 3.3*   CHLORIDE mmol/L 109*   CO2 mmol/L 25   BUN mg/dL 70*   CREATININE mg/dL 2.58*   ANION GAP mmol/L 8   CALCIUM mg/dL 7.9*   ALBUMIN g/dL 2.2*   TOTAL BILIRUBIN mg/dL 1.27*   ALK PHOS U/L 187*   ALT U/L 7   AST U/L 38   GLUCOSE RANDOM mg/dL 71     Results from last 7 days   Lab Units 02/05/25  0511   INR  1.25*     Results from last 7 days   Lab Units 02/05/25  1148 02/05/25  0052 02/04/25  2249 02/04/25  2056 02/04/25  1210 02/04/25  0705 02/04/25  0036 02/03/25  1856 02/03/25  1149 02/03/25  0810 02/03/25  0650 02/03/25  0013   POC GLUCOSE mg/dl 133 115 167* 172* 117 90 100 132 200* 151* 171* 156*         Results from last 7 days   Lab Units 02/04/25  0839 02/03/25  0644 02/02/25  0525 02/01/25  0647 01/31/25  0526   PROCALCITONIN ng/ml 13.13* 18.86* 17.97* 4.02* 4.61*       Recent Cultures (last 7 days):   Results from last 7 days   Lab Units 01/30/25  1556 01/30/25  1047   BLOOD CULTURE   --  No Growth After 5 Days.  No Growth After 5 Days.   SPUTUM CULTURE   3+ Growth of Staphylococcus aureus*  3+ Growth of  --    GRAM STAIN RESULT  1+ Epithelial Cells*  2+ Polys*  2+ Gram positive cocci in pairs*  1+ Gram positive cocci in chains*  --        Imaging Results Review: I reviewed radiology reports from this admission including: CT head, CTA PE, CXR, MRI brain, MRA head, LL venous duplex, VBS.    Last 24 Hours Medication List:     Current Facility-Administered Medications:     acetaminophen (TYLENOL) rectal suppository 650 mg, Q4H PRN    Adult 3-in-1 TPN (custom base / custom electrolytes), Continuous TPN    Adult 3-in-1 TPN (custom base / custom electrolytes), Continuous TPN, Last Rate: 77.7 mL/hr at 02/05/25 1422    artificial tear ophthalmic ointment, HS    ceFAZolin (ANCEF) IVPB (premix in dextrose) 1,000 mg 50 mL, Q8H, Last Rate: 1,000 mg (02/05/25 1018)    chlorhexidine (PERIDEX) 0.12 % oral rinse 15 mL, Q12H MARQUIS    dorzolamide-timolol (COSOPT) 2-0.5 % ophthalmic solution 1 drop, BID    heparin (porcine) 25,000 units in 0.45% NaCl 250 mL infusion (premix), Titrated, Last Rate: 14 Units/kg/hr (02/05/25 0735)    heparin (porcine) injection 2,400 Units, Q6H PRN    heparin (porcine) injection 4,800 Units, Once    heparin (porcine) injection 4,800 Units, Q6H PRN    hydrALAZINE (APRESOLINE) injection 5 mg, Q6H PRN    [Held by provider] insulin aspart protamine-insulin aspart (NovoLOG 70/30) 100 units/mL subcutaneous injection 20 Units, QAM **AND** insulin aspart protamine-insulin aspart (NovoLOG 70/30) 100 units/mL subcutaneous injection 25 Units, HS    insulin lispro (HumALOG/ADMELOG) 100 units/mL subcutaneous injection 2-12 Units, Q6H **AND** Fingerstick Glucose (POCT), Q6H    Latanoprostene Bunod 0.024 % SOLN 1 drop, HS    metroNIDAZOLE (FLAGYL) IVPB (premix) 500 mg 100 mL, Q8H, Last Rate: 500 mg (02/05/25 0844)    polyethylene glycol (MIRALAX) packet 17 g, Daily    prednisoLONE acetate (PRED FORTE) 1 % ophthalmic suspension 1 drop, TID    scopolamine  (TRANSDERM-SCOP) 1 mg/3 days TD 72 hr patch 1 patch, Q72H    [Held by provider] senna-docusate sodium (SENOKOT S) 8.6-50 mg per tablet 1 tablet, BID    tetracaine 0.5 % ophthalmic solution 2 drop, Once    Administrative Statements   Today, Patient Was Seen By: Mercedes Nunez DO  PGY-I  I have spent a total time of >30 minutes in caring for this patient on the day of the visit/encounter including Prognosis, Risks and benefits of tx options, Instructions for management, Patient and family education, Importance of tx compliance, Risk factor reductions, Impressions, Counseling / Coordination of care, Documenting in the medical record, Reviewing / ordering tests, medicine, procedures  , Obtaining or reviewing history  , and Communicating with other healthcare professionals .    **Please Note: This note may have been constructed using a voice recognition system.**

## 2025-02-05 NOTE — ASSESSMENT & PLAN NOTE
Lab Results   Component Value Date    EGFR 20 02/05/2025    EGFR 18 02/04/2025    EGFR 18 02/03/2025    CREATININE 2.58 (H) 02/05/2025    CREATININE 2.84 (H) 02/04/2025    CREATININE 2.90 (H) 02/03/2025   Per patient and patient's wife, would not be interested in dialysis   Nephro on board. Appreciate recommendations.

## 2025-02-05 NOTE — SPEECH THERAPY NOTE
Speech Language/Pathology    Speech/Language Pathology Cancel Note    Patient Name: Jeffry Almanzar  Today's Date: 2/5/2025     Multiple attempts to see pt for dysphagia tx, nursing/medical team at bedside tending to pt during each returned attempt. Will continue to follow and f/u as appropriate.      Ida Perez MS, CCC-SLP  Speech Pathologist  Available via Epic Chat

## 2025-02-05 NOTE — ASSESSMENT & PLAN NOTE
Pt baseline Hb in range of 8-10.   2/3 AM Hb 7.2 down from 10.1 on 2/2. Repeat H&H at 13:00 7.1, and at 18:00 6.8.   Pt received 1 unit pRBC  Per RN, no bloody stools or bloody mucus in suction.    UA with 30-50 RBCs, urine dorita however not gross hematuria    Plan  FOBT pending   Frequent Mcgee flushes  Continue to monitor with CBC  If Hb <7.0, transfuse

## 2025-02-05 NOTE — PLAN OF CARE
Problem: PAIN - ADULT  Goal: Verbalizes/displays adequate comfort level or baseline comfort level  Description: Interventions:  - Encourage patient to monitor pain and request assistance  - Assess pain using appropriate pain scale  - Administer analgesics based on type and severity of pain and evaluate response  - Implement non-pharmacological measures as appropriate and evaluate response  - Consider cultural and social influences on pain and pain management  - Notify physician/advanced practitioner if interventions unsuccessful or patient reports new pain  Outcome: Progressing     Problem: INFECTION - ADULT  Goal: Absence or prevention of progression during hospitalization  Description: INTERVENTIONS:  - Assess and monitor for signs and symptoms of infection  - Monitor lab/diagnostic results  - Monitor all insertion sites, i.e. indwelling lines, tubes, and drains  - Monitor endotracheal if appropriate and nasal secretions for changes in amount and color  - [place] appropriate cooling/warming therapies per order  - Administer medications as ordered  - Instruct and encourage patient and family to use good hand hygiene technique  - Identify and instruct in appropriate isolation precautions for identified infection/condition  Outcome: Progressing  Goal: Absence of fever/infection during neutropenic period  Description: INTERVENTIONS:  - Monitor WBC    Outcome: Progressing     Problem: SAFETY ADULT  Goal: Patient will remain free of falls  Description: INTERVENTIONS:  - Educate patient/family on patient safety including physical limitations  - Instruct patient to call for assistance with activity   - Consult OT/PT to assist with strengthening/mobility   - Keep Call bell within reach  - Keep bed low and locked with side rails adjusted as appropriate  - Keep care items and personal belongings within reach  - Initiate and maintain comfort rounds  - Make Fall Risk Sign visible to staff  - Offer Toileting every 2 Hours,  in advance of need  - Initiate/Maintain bed and chair alarm  - Obtain necessary fall risk management equipment:   - Apply yellow socks and bracelet for high fall risk patients  - Consider moving patient to room near nurses station  Outcome: Progressing  Goal: Maintain or return to baseline ADL function  Description: INTERVENTIONS:  -  Assess patient's ability to carry out ADLs; assess patient's baseline for ADL function and identify physical deficits which impact ability to perform ADLs (bathing, care of mouth/teeth, toileting, grooming, dressing, etc.)  - Assess/evaluate cause of self-care deficits   - Assess range of motion  - Assess patient's mobility; develop plan if impaired  - Assess patient's need for assistive devices and provide as appropriate  - Encourage maximum independence but intervene and supervise when necessary  - Involve family in performance of ADLs  - Assess for home care needs following discharge   - Consider OT consult to assist with ADL evaluation and planning for discharge  - Provide patient education as appropriate  Outcome: Progressing  Goal: Maintains/Returns to pre admission functional level  Description: INTERVENTIONS:  - Perform AM-PAC 6 Click Basic Mobility/ Daily Activity assessment daily.  - Set and communicate daily mobility goal to care team and patient/family/caregiver.   - Collaborate with rehabilitation services on mobility goals if consulted  - Perform Range of Motion 3 times a day.  - Reposition patient every 2 hours.  - Dangle patient 3 times a day  - Stand patient 3 times a day  - Ambulate patient 3 times a day  - Out of bed to chair 3 times a day   - Out of bed for meals 3 times a day  - Out of bed for toileting  - Record patient progress and toleration of activity level   Outcome: Progressing     Problem: DISCHARGE PLANNING  Goal: Discharge to home or other facility with appropriate resources  Description: INTERVENTIONS:  - Identify barriers to discharge w/patient and  caregiver  - Arrange for needed discharge resources and transportation as appropriate  - Identify discharge learning needs (meds, wound care, etc.)  - Arrange for interpretive services to assist at discharge as needed  - Refer to Case Management Department for coordinating discharge planning if the patient needs post-hospital services based on physician/advanced practitioner order or complex needs related to functional status, cognitive ability, or social support system  Outcome: Progressing     Problem: Knowledge Deficit  Goal: Patient/family/caregiver demonstrates understanding of disease process, treatment plan, medications, and discharge instructions  Description: Complete learning assessment and assess knowledge base.  Interventions:  - Provide teaching at level of understanding  - Provide teaching via preferred learning methods  Outcome: Progressing     Problem: Nutrition/Hydration-ADULT  Goal: Nutrient/Hydration intake appropriate for improving, restoring or maintaining nutritional needs  Description: Monitor and assess patient's nutrition/hydration status for malnutrition. Collaborate with interdisciplinary team and initiate plan and interventions as ordered.  Monitor patient's weight and dietary intake as ordered or per policy. Utilize nutrition screening tool and intervene as necessary. Determine patient's food preferences and provide high-protein, high-caloric foods as appropriate.     INTERVENTIONS:  - Monitor oral intake, urinary output, labs, and treatment plans  - Assess nutrition and hydration status and recommend course of action  - Evaluate amount of meals eaten  - Assist patient with eating if necessary   - Allow adequate time for meals  - Recommend/ encourage appropriate diets, oral nutritional supplements, and vitamin/mineral supplements  - Order, calculate, and assess calorie counts as needed  - Recommend, monitor, and adjust tube feedings and TPN/PPN based on assessed needs  - Assess need for  intravenous fluids  - Provide specific nutrition/hydration education as appropriate  - Include patient/family/caregiver in decisions related to nutrition  Outcome: Progressing     Problem: Prexisting or High Potential for Compromised Skin Integrity  Goal: Skin integrity is maintained or improved  Description: INTERVENTIONS:  - Identify patients at risk for skin breakdown  - Assess and monitor skin integrity  - Assess and monitor nutrition and hydration status  - Monitor labs   - Assess for incontinence   - Turn and reposition patient  - Assist with mobility/ambulation  - Relieve pressure over bony prominences  - Avoid friction and shearing  - Provide appropriate hygiene as needed including keeping skin clean and dry  - Evaluate need for skin moisturizer/barrier cream  - Collaborate with interdisciplinary team   - Patient/family teaching  - Consider wound care consult   Outcome: Progressing

## 2025-02-05 NOTE — QUICK NOTE
2/5/2025 9:08 AM -  Jeffry Almanzar's chart and case were reviewed by ISRAEL Cantrell.  Mode of review included electronic chart check.    Per chart review, DELANEYO. Remains on TPN dysphagia 1 diet. Ongoing GoC discussions w/ family, although, at this time, spouse would like to hold off on further GoC discussions and see how patient does w/ PO intake.     Per review, symptoms remain controlled on current regimen and no changes are made at this time.  Please continue the regimen in place, and review our last note for details.  For dispo plan, please review Case Management notes.     Palliative care will continue to follow for ongoing goals of care discussions and support.     For urgent issues or any questions/concerns, please notify on-call provider via Epic Secure Chat.  You may also call our answering service 24/7 at [phone].    ISRAEL Cantrell  Palliative and Supportive Care  Clinic/Answering Service: [phone]  You can find me on [employer]!

## 2025-02-05 NOTE — ASSESSMENT & PLAN NOTE
Lab Results   Component Value Date    HGBA1C 6.9 (H) 01/18/2025     Recent Labs     02/04/25 2056 02/04/25  2249 02/05/25  0052 02/05/25  1148   POCGLU 172* 167* 115 133     Plan  Continue NPH insulin 25u at bedtime   Hold NPH insulin 20u in the morning d/t AM BG of 90. Will continue to monitor/adjust as necessary.   Continue sliding scale   Hypoglycemia protocol

## 2025-02-06 ENCOUNTER — HOME CARE VISIT (OUTPATIENT)
Dept: HOME HEALTH SERVICES | Facility: HOME HEALTHCARE | Age: OVER 89
End: 2025-02-06
Payer: MEDICARE

## 2025-02-06 LAB
ANION GAP SERPL CALCULATED.3IONS-SCNC: 6 MMOL/L (ref 4–13)
APTT PPP: 71 SECONDS (ref 23–34)
BUN SERPL-MCNC: 68 MG/DL (ref 5–25)
CALCIUM SERPL-MCNC: 7.9 MG/DL (ref 8.4–10.2)
CHLORIDE SERPL-SCNC: 111 MMOL/L (ref 96–108)
CO2 SERPL-SCNC: 25 MMOL/L (ref 21–32)
CREAT SERPL-MCNC: 2.41 MG/DL (ref 0.6–1.3)
ERYTHROCYTE [DISTWIDTH] IN BLOOD BY AUTOMATED COUNT: 16.3 % (ref 11.6–15.1)
GFR SERPL CREATININE-BSD FRML MDRD: 22 ML/MIN/1.73SQ M
GLUCOSE SERPL-MCNC: 108 MG/DL (ref 65–140)
GLUCOSE SERPL-MCNC: 112 MG/DL (ref 65–140)
GLUCOSE SERPL-MCNC: 129 MG/DL (ref 65–140)
GLUCOSE SERPL-MCNC: 172 MG/DL (ref 65–140)
GLUCOSE SERPL-MCNC: 199 MG/DL (ref 65–140)
GLUCOSE SERPL-MCNC: 51 MG/DL (ref 65–140)
GLUCOSE SERPL-MCNC: 54 MG/DL (ref 65–140)
GLUCOSE SERPL-MCNC: 58 MG/DL (ref 65–140)
GLUCOSE SERPL-MCNC: 97 MG/DL (ref 65–140)
HCT VFR BLD AUTO: 28.8 % (ref 36.5–49.3)
HGB BLD-MCNC: 9.5 G/DL (ref 12–17)
MCH RBC QN AUTO: 29.4 PG (ref 26.8–34.3)
MCHC RBC AUTO-ENTMCNC: 33 G/DL (ref 31.4–37.4)
MCV RBC AUTO: 89 FL (ref 82–98)
PHOSPHATE SERPL-MCNC: 2.6 MG/DL (ref 2.3–4.1)
PLATELET # BLD AUTO: 164 THOUSANDS/UL (ref 149–390)
PMV BLD AUTO: 11.9 FL (ref 8.9–12.7)
POTASSIUM SERPL-SCNC: 3.8 MMOL/L (ref 3.5–5.3)
RBC # BLD AUTO: 3.23 MILLION/UL (ref 3.88–5.62)
SODIUM SERPL-SCNC: 142 MMOL/L (ref 135–147)
WBC # BLD AUTO: 10.94 THOUSAND/UL (ref 4.31–10.16)

## 2025-02-06 PROCEDURE — 84100 ASSAY OF PHOSPHORUS: CPT

## 2025-02-06 PROCEDURE — 99233 SBSQ HOSP IP/OBS HIGH 50: CPT | Performed by: INTERNAL MEDICINE

## 2025-02-06 PROCEDURE — 82948 REAGENT STRIP/BLOOD GLUCOSE: CPT

## 2025-02-06 PROCEDURE — 92526 ORAL FUNCTION THERAPY: CPT

## 2025-02-06 PROCEDURE — 85730 THROMBOPLASTIN TIME PARTIAL: CPT | Performed by: INTERNAL MEDICINE

## 2025-02-06 PROCEDURE — 99497 ADVNCD CARE PLAN 30 MIN: CPT | Performed by: INTERNAL MEDICINE

## 2025-02-06 PROCEDURE — 85027 COMPLETE CBC AUTOMATED: CPT

## 2025-02-06 PROCEDURE — 80048 BASIC METABOLIC PNL TOTAL CA: CPT

## 2025-02-06 RX ORDER — INSULIN LISPRO 100 [IU]/ML
1-5 INJECTION, SOLUTION INTRAVENOUS; SUBCUTANEOUS EVERY 6 HOURS SCHEDULED
Status: DISCONTINUED | OUTPATIENT
Start: 2025-02-06 | End: 2025-02-07

## 2025-02-06 RX ORDER — DEXTROSE MONOHYDRATE 25 G/50ML
INJECTION, SOLUTION INTRAVENOUS
Status: COMPLETED
Start: 2025-02-06 | End: 2025-02-06

## 2025-02-06 RX ORDER — DEXTROSE MONOHYDRATE 25 G/50ML
25 INJECTION, SOLUTION INTRAVENOUS ONCE
Status: DISCONTINUED | OUTPATIENT
Start: 2025-02-06 | End: 2025-02-11 | Stop reason: HOSPADM

## 2025-02-06 RX ORDER — INSULIN GLARGINE 100 [IU]/ML
8 INJECTION, SOLUTION SUBCUTANEOUS
Status: DISCONTINUED | OUTPATIENT
Start: 2025-02-06 | End: 2025-02-06

## 2025-02-06 RX ORDER — INSULIN ASPART 100 [IU]/ML
10 INJECTION, SUSPENSION SUBCUTANEOUS
Status: DISCONTINUED | OUTPATIENT
Start: 2025-02-06 | End: 2025-02-06

## 2025-02-06 RX ORDER — INSULIN ASPART 100 [IU]/ML
10 INJECTION, SUSPENSION SUBCUTANEOUS 2 TIMES DAILY
Status: DISCONTINUED | OUTPATIENT
Start: 2025-02-06 | End: 2025-02-11 | Stop reason: HOSPADM

## 2025-02-06 RX ADMIN — CALCIUM GLUCONATE: 98 INJECTION, SOLUTION INTRAVENOUS at 21:54

## 2025-02-06 RX ADMIN — DORZOLAMIDE HYDROCHLORIDE AND TIMOLOL MALEATE 1 DROP: 20; 5 SOLUTION OPHTHALMIC at 09:54

## 2025-02-06 RX ADMIN — METRONIDAZOLE 500 MG: 500 INJECTION, SOLUTION INTRAVENOUS at 01:15

## 2025-02-06 RX ADMIN — CEFAZOLIN SODIUM 1000 MG: 1 SOLUTION INTRAVENOUS at 01:15

## 2025-02-06 RX ADMIN — LATANOPROSTENE BUNOD 1 DROP: 0.24 SOLUTION/ DROPS OPHTHALMIC at 22:07

## 2025-02-06 RX ADMIN — PREDNISOLONE ACETATE 1 DROP: 10 SUSPENSION/ DROPS OPHTHALMIC at 22:06

## 2025-02-06 RX ADMIN — DEXTROSE MONOHYDRATE 25 ML: 25 INJECTION, SOLUTION INTRAVENOUS at 06:20

## 2025-02-06 RX ADMIN — PREDNISOLONE ACETATE 1 DROP: 10 SUSPENSION/ DROPS OPHTHALMIC at 09:54

## 2025-02-06 RX ADMIN — INSULIN LISPRO 1 UNITS: 100 INJECTION, SOLUTION INTRAVENOUS; SUBCUTANEOUS at 18:16

## 2025-02-06 RX ADMIN — METRONIDAZOLE 500 MG: 500 INJECTION, SOLUTION INTRAVENOUS at 11:06

## 2025-02-06 RX ADMIN — MINERAL OIL, WHITE PETROLATUM: .03; .94 OINTMENT OPHTHALMIC at 22:07

## 2025-02-06 RX ADMIN — METRONIDAZOLE 500 MG: 500 INJECTION, SOLUTION INTRAVENOUS at 18:18

## 2025-02-06 RX ADMIN — CEFAZOLIN SODIUM 1000 MG: 1 SOLUTION INTRAVENOUS at 17:16

## 2025-02-06 RX ADMIN — INSULIN ASPART 10 UNITS: 100 INJECTION, SUSPENSION SUBCUTANEOUS at 22:07

## 2025-02-06 RX ADMIN — HEPARIN SODIUM 14 UNITS/KG/HR: 10000 INJECTION, SOLUTION INTRAVENOUS at 19:39

## 2025-02-06 RX ADMIN — CEFAZOLIN SODIUM 1000 MG: 1 SOLUTION INTRAVENOUS at 09:51

## 2025-02-06 NOTE — QUICK NOTE
2/6/2025 9:45 AM -  Jeffry Almanzar's chart and case were reviewed by ISRAEL Cantrell.  Mode of review included electronic chart check.    Per chart review, patient continues to remain on TPN and dysphagia diet w/ poor PO intake. Spouse has been declining GoC conversations. She would like to give patient more time to work on PO intake. Palliative will continue to follow w/ ongoing GoC conversations.    Per review, symptoms remain controlled on current regimen and no changes are made at this time.  Please continue the regimen in place, and review our last note for details.  For dispo plan, please review Case Management notes.     Palliative care will continue to follow for ongoing goals of care discussions.     For urgent issues or any questions/concerns, please notify on-call provider via Epic Secure Chat.  You may also call our answering service 24/7 at [phone].    ISRAEL Cantrell  Palliative and Supportive Care  Clinic/Answering Service: [phone]  You can find me on [employer]uf[employer]!

## 2025-02-06 NOTE — ASSESSMENT & PLAN NOTE
Malnutrition Findings:   Adult Malnutrition type: Acute illness  Adult Degree of Malnutrition: Other severe protein calorie malnutrition  Malnutrition Characteristics: Muscle loss, Inadequate energy, Weight loss, Fat loss                  360 Statement: related to dysphagia with recommendation for NPO, pt with > 7 days of less than 50% energy intake compared to estimated needs, muscle, fat wasting, wasted buccal pads, temporalis, supraclavicular space, interosseous. Treatment. Enteral nutrition via feeding tube is indicated for nutrition support- wife is declining nutrition via NGT or PEG at this time. Ongoing goals of care discussion noted.    BMI Findings:           Body mass index is 22.31 kg/m².     Pt w PICC and TPN. Per RD, TPN as above under principal problem.

## 2025-02-06 NOTE — ASSESSMENT & PLAN NOTE
Had numerous discussions regarding goals of care with family and multidisciplinary team. Explained risks versus benefits of PICC line and subsequent TPN, versus pleasure feeds on comfort care. 1/27 pt's wife decided on obtaining PICC for patient and signed informed consent form. Process, risks, and outcomes were reviewed at length with the patient's wife at bedside, alongside Dr. Gtz. All questions/concerns answered. Pt's wife verbalizes understanding and wishes to proceed with PICC line.   1/28 - pt received PICC line and had TPN initiated in the evening  1/29 - concerns for yellow/green sputum, as well as hyperglycemia in the setting of TPN initiation, low grade fever, initiated empiric ABX as pt was a hard stick and unable to obtain blood cx  Lactic acid wnl   1/30 - 1x dose IV Lasix for congestion noted on CXR. COVID/FLU/RSV negative.  Initially failed swallow eval at 1/23, repeat eval done 2/2, patient seems to do a little better with purée by teaspoon however still has a risk of aspiration and may not take enough per speech therapist.  Wife is agreeable to starting diet and see what happens, if respiratory status worsens on PO diet then that would support a feeding tube.  MRSA nares negative  Sputum culture: 3+ growth of Staph aureus, 2+ growth of mixed respiratory idalia. ABX per susceptibilities.   Blood cx x2: no growth at 24h  Likely tracheobronchitis given clinical presentation and hx of respiratory secretions, frequent suctioning, hx of intubation, and prolonged hospitalization    Tmax this AM 99F, HR 88. WBC this AM 10 down from 12.  Patient continues to refuse suctioning.     Plan  Continue IV cefazolin 1g q8h considering culture sensitivities & MRSA negative.  Continue IV Flagyl 500 mg q8h   EKG Qtc 469 ms (per Pharmacy, this is acceptable)   TPN per RD  Scopolamine patch to manage secretions  Per pharmacy - pt's hx of glaucoma is a contraindication for glycopyrrolate (both open angle & angle closure)

## 2025-02-06 NOTE — ASSESSMENT & PLAN NOTE
Lab Results   Component Value Date    EGFR 22 02/06/2025    EGFR 20 02/05/2025    EGFR 18 02/04/2025    CREATININE 2.41 (H) 02/06/2025    CREATININE 2.58 (H) 02/05/2025    CREATININE 2.84 (H) 02/04/2025   Holding Losartan and Farxiga per nephro due to elevated Cr   Hydralazine 5 mg q 6 hrs for BP >180/110   Initially allowed for permissive hypertension due to concern for stroke.  However, patient's MRI does not show concern for infarct.  Will aim for normotension

## 2025-02-06 NOTE — PROGRESS NOTES
Progress Note - Hospitalist   Name: Jeffry Almanzar 89 y.o. male I MRN: [id number]  Unit/Bed#: S -01 I Date of Admission: 1/17/2025   Date of Service: 2/6/2025 I Hospital Day: 19    Assessment & Plan  Dysphagia  Had numerous discussions regarding goals of care with family and multidisciplinary team. Explained risks versus benefits of PICC line and subsequent TPN, versus pleasure feeds on comfort care. 1/27 pt's wife decided on obtaining PICC for patient and signed informed consent form. Process, risks, and outcomes were reviewed at length with the patient's wife at bedside, alongside Dr. Gtz. All questions/concerns answered. Pt's wife verbalizes understanding and wishes to proceed with PICC line.   1/28 - pt received PICC line and had TPN initiated in the evening  1/29 - concerns for yellow/green sputum, as well as hyperglycemia in the setting of TPN initiation, low grade fever, initiated empiric ABX as pt was a hard stick and unable to obtain blood cx  Lactic acid wnl   1/30 - 1x dose IV Lasix for congestion noted on CXR. COVID/FLU/RSV negative.  Initially failed swallow eval at 1/23, repeat eval done 2/2, patient seems to do a little better with purée by teaspoon however still has a risk of aspiration and may not take enough per speech therapist.  Wife is agreeable to starting diet and see what happens, if respiratory status worsens on PO diet then that would support a feeding tube.  MRSA nares negative  Sputum culture: 3+ growth of Staph aureus, 2+ growth of mixed respiratory idalia. ABX per susceptibilities.   Blood cx x2: no growth at 24h  Likely tracheobronchitis given clinical presentation and hx of respiratory secretions, frequent suctioning, hx of intubation, and prolonged hospitalization    Tmax this AM 99F, HR 88. WBC this AM 10 down from 12.  Patient continues to refuse suctioning.     Plan  Continue IV cefazolin 1g q8h considering culture sensitivities & MRSA negative.  Continue IV Flagyl 500 mg q8h    EKG Qtc 469 ms (per Pharmacy, this is acceptable)   TPN per RD  Scopolamine patch to manage secretions  Per pharmacy - pt's hx of glaucoma is a contraindication for glycopyrrolate (both open angle & angle closure)   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  (HCC)  Pt baseline Hb in range of 8-10.   2/3 AM Hb 7.2 down from 10.1 on 2/2. Repeat H&H at 13:00 7.1, and at 18:00 6.8.   Pt received 1 unit pRBC  Per RN, no bloody stools or bloody mucus in suction.    UA with 30-50 RBCs, urine dorita however not gross hematuria    Plan  FOBT pending   Continue frequent Mcgee flushes  Continue to monitor with CBC  If Hb <7.0, transfuse  Type 2 diabetes mellitus with stage 4 chronic kidney disease, without long-term current use of insulin ([facility])  Lab Results   Component Value Date    HGBA1C 6.9 (H) 01/18/2025     Recent Labs     02/06/25  0609 02/06/25  0648 02/06/25  0743 02/06/25  1142   POCGLU 58* 112 97 108     Plan  Decrease to NPH insulin 10u BID due to hypoglycemia. Will continue to monitor/adjust as necessary.   Continue sliding scale, algorithm 2  Hypoglycemia protocol   Cognitive impairment  Per wife, patient has some degree of dementia but is unclear of specifics. Patient oriented to self and place but unaware of year and month. Per wife, unsure if patient is completely aware of what is going on and is unsure if patient would truly want to accept intubation a second time    Plan  Neuropsych evaluation determined patient does NOT have capacity for MDM  Palliative on board. Appreciate recommendations. As of 1/23/2025, family made the decision to make patient's code status LEVEL 3 DNR/DNI.  Ongoing discussions regarding palliative care.   Acute saddle pulmonary embolism with acute cor pulmonale (HCC)  Initially with cardiac arrest and cardiogenic shock, is now extubated off pressors. Was transferred  to med/surg 1/21.   MRI brain negative for CVA or concern for hemorrhage.      Continue heparin gtt.  SOFÍA (acute kidney  injury) ([facility])  Suspect due to ATN in the setting of cardiogenic shock, cardiac arrest, hypotension and IV contrast exposure with autoregulatory dysfunction with ARB  Per Nephrology:  Discontinued D5W 1/29 d/t resolution of hypernatremia and initiation of TPN  Continue to hold losartan and Farxiga    Plan  Strict I/Os, daily weights  Avoid nephrotoxins, NSAIDs, further IV contrast as able  Avoid hypotension.  Maintain MAP >65  Cardiac arrest ([facility])  Cardiac arrest in ED after CTA evaluating for pulmonary embolus.  Asystole for 2 minutes requiring CPR and intubation s/P extubation 1/21/2025  ECHO with EF of 65% in September, ECHO with left ventricular ejection fraction is 55% (1/20)    Plan  Continue heparin gtt  Continue to monitor on tele due to recent PE, recent MI, and electrolyte abnormalities   Benign 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 ([facility])  Lab Results   Component Value Date    EGFR 22 02/06/2025    EGFR 20 02/05/2025    EGFR 18 02/04/2025    CREATININE 2.41 (H) 02/06/2025    CREATININE 2.58 (H) 02/05/2025    CREATININE 2.84 (H) 02/04/2025   Holding Losartan and Farxiga per nephro due to elevated Cr   Hydralazine 5 mg q 6 hrs for BP >180/110   Initially allowed for permissive hypertension due to concern for stroke.  However, patient's MRI does not show concern for infarct.  Will aim for normotension  Palliative care encounter  Patient seen and evaluated by palliative care 1/23. Palliative care had an in-depth discussion with family regarding patient's GOC and overall prognosis. Family would NOT like to pursue a PEG tube at this time.   CODE STATUS changed from Level 2 to Level 3 DNR/DNI  Ongoing goals of care discussions with consideration for comfort measures only  Goals of care, counseling/discussion  Palliative on board. Appreciate recommendations   CKD (chronic kidney disease) stage 4, GFR 15-29 ml/min ([facility])  Lab Results   Component Value Date    EGFR 22 02/06/2025    EGFR 20 02/05/2025     EGFR 18 02/04/2025    CREATININE 2.41 (H) 02/06/2025    CREATININE 2.58 (H) 02/05/2025    CREATININE 2.84 (H) 02/04/2025   Per patient and patient's wife, would not be interested in dialysis   Nephro on board. Appreciate recommendations.  Severe protein-calorie malnutrition (HCC)  Malnutrition Findings:   Adult Malnutrition type: Acute illness  Adult Degree of Malnutrition: Other severe protein calorie malnutrition  Malnutrition Characteristics: Muscle loss, Inadequate energy, Weight loss, Fat loss                  360 Statement: related to dysphagia with recommendation for NPO, pt with > 7 days of less than 50% energy intake compared to estimated needs, muscle, fat wasting, wasted buccal pads, temporalis, supraclavicular space, interosseous. Treatment. Enteral nutrition via feeding tube is indicated for nutrition support- wife is declining nutrition via NGT or PEG at this time. Ongoing goals of care discussion noted.    BMI Findings:           Body mass index is 22.31 kg/m².     Pt w PICC and TPN. Per RD, TPN as above under principal problem.     VTE Pharmacologic Prophylaxis: VTE Score: 6 High Risk (Score >/= 5) - Pharmacological DVT Prophylaxis Ordered: heparin drip. Sequential Compression Devices Ordered.    Mobility:   Basic Mobility Inpatient Raw Score: 6  JH-HLM Goal: 2: Bed activities/Dependent transfer  JH-HLM Achieved: 2: Bed activities/Dependent transfer  JH-HLM Goal NOT achieved. Continue with multidisciplinary rounding and encourage appropriate mobility to improve upon JH-HLM goals.    Patient Centered Rounds: I performed bedside rounds with nursing staff today.   Discussions with Specialists or Other Care Team Provider: Nephrology, Nutrition, Pharmacy    Education and Discussions with Family / Patient: Updated  (wife) at bedside.    Current Length of Stay: 19 day(s)  Current Patient Status: Inpatient   Certification Statement: The patient will continue to require additional inpatient  hospital stay due to TPN, GOC, guarded prognosis.  Discharge Plan: Anticipate discharge in >72 hrs to TBD. Ongoing goals of care conversation.    Code Status: Level 3 - DNAR and DNI    Subjective   Pt seen and evaluated at bedside this morning.  Per report BG 58 this morning.  Patient was given D5 with improvement in BG.  Wife at bedside, stating patient still continues to refuse to let staff suction.  Mcgee in place, draining yellow urine.  Patient remains asleep throughout this encounter.  He is not on any supplemental oxygen.  Today, wife states that she has been having a conversation with her children about bringing Jeffry home.    Objective :  Temp:  [99 °F (37.2 °C)-99.1 °F (37.3 °C)] 99 °F (37.2 °C)  HR:  [85-90] 88  BP: (118-164)/(55-80) 118/55  Resp:  [20] 20  SpO2:  [94 %-96 %] 94 %  O2 Device: None (Room air)    Body mass index is 22.31 kg/m².     Input and Output Summary (last 24 hours):     Intake/Output Summary (Last 24 hours) at 2/6/2025 1451  Last data filed at 2/5/2025 1746  Gross per 24 hour   Intake --   Output 150 ml   Net -150 ml       Physical Exam  Vitals reviewed.   Constitutional:       General: He is awake.      Appearance: He is underweight. He is ill-appearing.   HENT:      Head: Normocephalic and atraumatic.      Right Ear: External ear normal.      Left Ear: External ear normal.      Mouth/Throat:      Mouth: Mucous membranes are dry.   Eyes:      General: No scleral icterus.     Extraocular Movements: Extraocular movements intact.   Cardiovascular:      Rate and Rhythm: Tachycardia present.      Pulses: Normal pulses.   Pulmonary:      Effort: No accessory muscle usage.      Breath sounds: Rales present.   Chest:      Chest wall: No tenderness.   Abdominal:      Tenderness: There is no abdominal tenderness. There is no guarding.   Skin:     General: Skin is warm.      Capillary Refill: Capillary refill takes 2 to 3 seconds.      Coloration: Skin is not jaundiced.   Neurological:       Motor: Weakness present.         Lines/Drains:  Lines/Drains/Airways       Active Status       Name Placement date Placement time Site Days    PICC Line 01/28/25 Right Brachial 01/28/25  0930  Brachial  9    Urethral Catheter 01/17/25  0000  --  20                  Central Line:  Goal for removal: Continuing for TPN.                Lab Results: I have reviewed the following results:   Results from last 7 days   Lab Units 02/06/25  0600 02/03/25  1307 02/03/25  0755   WBC Thousand/uL 10.94*   < > 16.40*   HEMOGLOBIN g/dL 9.5*   < > 7.2*   HEMATOCRIT % 28.8*   < > 22.6*   PLATELETS Thousands/uL 164   < > 162   BANDS PCT %  --   --  2   LYMPHO PCT %  --   --  3*   MONO PCT %  --   --  2*   EOS PCT %  --   --  0    < > = values in this interval not displayed.     Results from last 7 days   Lab Units 02/06/25  0600 02/05/25  0511   SODIUM mmol/L 142 142   POTASSIUM mmol/L 3.8 3.3*   CHLORIDE mmol/L 111* 109*   CO2 mmol/L 25 25   BUN mg/dL 68* 70*   CREATININE mg/dL 2.41* 2.58*   ANION GAP mmol/L 6 8   CALCIUM mg/dL 7.9* 7.9*   ALBUMIN g/dL  --  2.2*   TOTAL BILIRUBIN mg/dL  --  1.27*   ALK PHOS U/L  --  187*   ALT U/L  --  7   AST U/L  --  38   GLUCOSE RANDOM mg/dL 51* 71     Results from last 7 days   Lab Units 02/05/25  0511   INR  1.25*     Results from last 7 days   Lab Units 02/06/25  1142 02/06/25  0743 02/06/25  0648 02/06/25  0609 02/06/25  0603 02/05/25  2359 02/05/25  2116 02/05/25  1646 02/05/25  1148 02/05/25  0052 02/04/25  2249 02/04/25  2056   POC GLUCOSE mg/dl 108 97 112 58* 54* 129 166* 172* 133 115 167* 172*         Results from last 7 days   Lab Units 02/04/25  0839 02/03/25  0644 02/02/25  0525 02/01/25  0647 01/31/25  0526   PROCALCITONIN ng/ml 13.13* 18.86* 17.97* 4.02* 4.61*       Recent Cultures (last 7 days):   Results from last 7 days   Lab Units 01/30/25  1556   SPUTUM CULTURE  3+ Growth of Staphylococcus aureus*  3+ Growth of   GRAM STAIN RESULT  1+ Epithelial Cells*  2+ Polys*  2+ Gram  positive cocci in pairs*  1+ Gram positive cocci in chains*       Imaging Results Review: I reviewed radiology reports from this admission including: CT head, CTA PE, CXR, MRI brain, MRA head, LL venous duplex, VBS.    Last 24 Hours Medication List:     Current Facility-Administered Medications:     acetaminophen (TYLENOL) rectal suppository 650 mg, Q4H PRN    Adult 3-in-1 TPN (custom base / custom electrolytes), Continuous TPN, Last Rate: 77.7 mL/hr at 02/05/25 2100    Adult 3-in-1 TPN (custom base / custom electrolytes), Continuous TPN    albumin human (FLEXBUMIN) 25 % injection 25 g, Once, Last Rate: Stopped (02/05/25 1636)    artificial tear ophthalmic ointment, HS    ceFAZolin (ANCEF) IVPB (premix in dextrose) 1,000 mg 50 mL, Q8H, Last Rate: 1,000 mg (02/06/25 0951)    chlorhexidine (PERIDEX) 0.12 % oral rinse 15 mL, Q12H MARQUIS    dextrose 50 % IV solution 25 mL, Once    dorzolamide-timolol (COSOPT) 2-0.5 % ophthalmic solution 1 drop, BID    heparin (porcine) 25,000 units in 0.45% NaCl 250 mL infusion (premix), Titrated, Last Rate: 14 Units/kg/hr (02/06/25 0600)    heparin (porcine) injection 2,400 Units, Q6H PRN    heparin (porcine) injection 4,800 Units, Once    heparin (porcine) injection 4,800 Units, Q6H PRN    hydrALAZINE (APRESOLINE) injection 5 mg, Q6H PRN    insulin aspart protamine-insulin aspart (NovoLOG 70/30) 100 units/mL subcutaneous injection 10 Units, BID **AND** [DISCONTINUED] insulin aspart protamine-insulin aspart (NovoLOG 70/30) 100 units/mL subcutaneous injection 25 Units, HS    insulin lispro (HumALOG/ADMELOG) 100 units/mL subcutaneous injection 1-5 Units, Q6H MARQUIS **AND** Fingerstick Glucose (POCT), 4x Daily AC and at bedtime    Latanoprostene Bunod 0.024 % SOLN 1 drop, HS    metroNIDAZOLE (FLAGYL) IVPB (premix) 500 mg 100 mL, Q8H, Last Rate: 500 mg (02/06/25 1106)    polyethylene glycol (MIRALAX) packet 17 g, Daily    prednisoLONE acetate (PRED FORTE) 1 % ophthalmic suspension 1 drop, TID     scopolamine (TRANSDERM-SCOP) 1 mg/3 days TD 72 hr patch 1 patch, Q72H    [Held by provider] senna-docusate sodium (SENOKOT S) 8.6-50 mg per tablet 1 tablet, BID    tetracaine 0.5 % ophthalmic solution 2 drop, Once    Administrative Statements   Today, Patient Was Seen By: Mercedes Nunez,   PGY-I  I have spent a total time of >30 minutes in caring for this patient on the day of the visit/encounter including Prognosis, Risks and benefits of tx options, Instructions for management, Patient and family education, Importance of tx compliance, Risk factor reductions, Impressions, Counseling / Coordination of care, Documenting in the medical record, Reviewing / ordering tests, medicine, procedures  , Obtaining or reviewing history  , and Communicating with other healthcare professionals .    **Please Note: This note may have been constructed using a voice recognition system.**

## 2025-02-06 NOTE — ASSESSMENT & PLAN NOTE
Lab Results   Component Value Date    HGBA1C 6.9 (H) 01/18/2025     Recent Labs     02/06/25  0609 02/06/25  0648 02/06/25  0743 02/06/25  1142   POCGLU 58* 112 97 108     Plan  Decrease to NPH insulin 10u BID due to hypoglycemia. Will continue to monitor/adjust as necessary.   Continue sliding scale, algorithm 2  Hypoglycemia protocol

## 2025-02-06 NOTE — SPEECH THERAPY NOTE
Speech Language/Pathology    Speech/Language Pathology Progress Note    Patient Name: Jeffry Almanzar  Today's Date: 2/6/2025         Chart reviewed, spoke w/ Dr. Mahmood- pt w/ poor po intake, not allowing suctioning, orally defensive.  Wife has opted for home w/ hospice.    Pt seen w/ wife at bedside. Pt w/ eyes closed. Attempted tsps of HTL, thin and ice chip. Pt pursing lips, not receptive to po. Wife eventually able to get pt open his mouth and give an ice chip. Swallows noted w/o coughing.     Discussed wife:  continuing ice chips, we reviewed and demonstrated thickening liquids if she wanted to continue them, but not to restrict pt if he requests regular liquids ie water or coffee;  suggested offering smoothies and pt's favorite foods, she mentioned osito ice cream.   Contact provided if needed. Will stop in tomorrow if wife has any questions before discharge home w/ hospice over the weekend.         Carole Aragon MA CCC-SLP  Speech Pathologist  Available via [employer]

## 2025-02-06 NOTE — ASSESSMENT & PLAN NOTE
Lab Results   Component Value Date    EGFR 22 02/06/2025    EGFR 20 02/05/2025    EGFR 18 02/04/2025    CREATININE 2.41 (H) 02/06/2025    CREATININE 2.58 (H) 02/05/2025    CREATININE 2.84 (H) 02/04/2025   Per patient and patient's wife, would not be interested in dialysis   Nephro on board. Appreciate recommendations.

## 2025-02-06 NOTE — ASSESSMENT & PLAN NOTE
Pt baseline Hb in range of 8-10.   2/3 AM Hb 7.2 down from 10.1 on 2/2. Repeat H&H at 13:00 7.1, and at 18:00 6.8.   Pt received 1 unit pRBC  Per RN, no bloody stools or bloody mucus in suction.    UA with 30-50 RBCs, urine dorita however not gross hematuria    Plan  FOBT pending   Continue frequent Mcgee flushes  Continue to monitor with CBC  If Hb <7.0, transfuse

## 2025-02-06 NOTE — PLAN OF CARE
Problem: PAIN - ADULT  Goal: Verbalizes/displays adequate comfort level or baseline comfort level  Description: Interventions:  - Encourage patient to monitor pain and request assistance  - Assess pain using appropriate pain scale  - Administer analgesics based on type and severity of pain and evaluate response  - Implement non-pharmacological measures as appropriate and evaluate response  - Consider cultural and social influences on pain and pain management  - Notify physician/advanced practitioner if interventions unsuccessful or patient reports new pain  Outcome: Progressing     Problem: INFECTION - ADULT  Goal: Absence or prevention of progression during hospitalization  Description: INTERVENTIONS:  - Assess and monitor for signs and symptoms of infection  - Monitor lab/diagnostic results  - Monitor all insertion sites, i.e. indwelling lines, tubes, and drains  - Monitor endotracheal if appropriate and nasal secretions for changes in amount and color  - [place] appropriate cooling/warming therapies per order  - Administer medications as ordered  - Instruct and encourage patient and family to use good hand hygiene technique  - Identify and instruct in appropriate isolation precautions for identified infection/condition  Outcome: Progressing  Goal: Absence of fever/infection during neutropenic period  Description: INTERVENTIONS:  - Monitor WBC    Outcome: Progressing     Problem: DISCHARGE PLANNING  Goal: Discharge to home or other facility with appropriate resources  Description: INTERVENTIONS:  - Identify barriers to discharge w/patient and caregiver  - Arrange for needed discharge resources and transportation as appropriate  - Identify discharge learning needs (meds, wound care, etc.)  - Arrange for interpretive services to assist at discharge as needed  - Refer to Case Management Department for coordinating discharge planning if the patient needs post-hospital services based on physician/advanced  practitioner order or complex needs related to functional status, cognitive ability, or social support system  Outcome: Progressing     Problem: Knowledge Deficit  Goal: Patient/family/caregiver demonstrates understanding of disease process, treatment plan, medications, and discharge instructions  Description: Complete learning assessment and assess knowledge base.  Interventions:  - Provide teaching at level of understanding  - Provide teaching via preferred learning methods  Outcome: Progressing     Problem: Nutrition/Hydration-ADULT  Goal: Nutrient/Hydration intake appropriate for improving, restoring or maintaining nutritional needs  Description: Monitor and assess patient's nutrition/hydration status for malnutrition. Collaborate with interdisciplinary team and initiate plan and interventions as ordered.  Monitor patient's weight and dietary intake as ordered or per policy. Utilize nutrition screening tool and intervene as necessary. Determine patient's food preferences and provide high-protein, high-caloric foods as appropriate.     INTERVENTIONS:  - Monitor oral intake, urinary output, labs, and treatment plans  - Assess nutrition and hydration status and recommend course of action  - Evaluate amount of meals eaten  - Assist patient with eating if necessary   - Allow adequate time for meals  - Recommend/ encourage appropriate diets, oral nutritional supplements, and vitamin/mineral supplements  - Order, calculate, and assess calorie counts as needed  - Recommend, monitor, and adjust tube feedings and TPN/PPN based on assessed needs  - Assess need for intravenous fluids  - Provide specific nutrition/hydration education as appropriate  - Include patient/family/caregiver in decisions related to nutrition  Outcome: Progressing     Problem: Prexisting or High Potential for Compromised Skin Integrity  Goal: Skin integrity is maintained or improved  Description: INTERVENTIONS:  - Identify patients at risk for skin  breakdown  - Assess and monitor skin integrity  - Assess and monitor nutrition and hydration status  - Monitor labs   - Assess for incontinence   - Turn and reposition patient  - Assist with mobility/ambulation  - Relieve pressure over bony prominences  - Avoid friction and shearing  - Provide appropriate hygiene as needed including keeping skin clean and dry  - Evaluate need for skin moisturizer/barrier cream  - Collaborate with interdisciplinary team   - Patient/family teaching  - Consider wound care consult   Outcome: Progressing

## 2025-02-06 NOTE — ACP (ADVANCE CARE PLANNING)
Advanced Care Planning Progress Note      2/6/2025 08:35 - extensive conversation with the patient's spouse Antony at the bedside earlier this morning concerning goals of care.  Antony acknowledges the fact that the patient has not had any significant progress since starting TPN.  Specifically he has not had any changes with his ability to swallow.  He continues to retain secretions and has mostly been declining intermittent suctioning.  Antony tells me when she asked him today if he wanted to go home, and he responded in the affirmative.  Antony also does state that she knows he will not want a feeding tube placed.  Being at home will be important for the patient at this time which Antony acknowledges.  Following our conversation earlier this morning, Antony asked for some time to be able to speak with her children prior to making any final decisions.  To this effect, we scheduled a return time this afternoon for further conversation.    2/6/2025 13:15 -I returned to speak with Antony this afternoon after she had time to reach out to her children.  On my getting to the room, Antony told me that a decision has been made for the patient to go home on hospice.  They do not want a feeding tube which Antony and children believe will be against his wishes. Antony requested that I speak with her daughter Thea Estevez and provided her number at [phone].  I called Sarah and updated her on the current clinical situation.  Sarah is in agreement with her mother's decision for home with hospice and is hopeful that she will be able to make it home on time to be with her dad as she currently lives and works in [place]. Antony however did express that she would like to continue current disease focused care plan including TPN while the patient remains in the hospital.  She expressed understanding that the patient will be taken off TPN and all active disease focused care once transitioning to hospice and is in agreement with this.   Hospice consulted through case management.  All the above was discussed in detail with the physician team, RN, and .      I have spent >50 minutes speaking with my patient on advanced care planning today or during this visit         Amarilis Mahmood MD

## 2025-02-06 NOTE — PLAN OF CARE
Problem: PAIN - ADULT  Goal: Verbalizes/displays adequate comfort level or baseline comfort level  Description: Interventions:  - Encourage patient to monitor pain and request assistance  - Assess pain using appropriate pain scale  - Administer analgesics based on type and severity of pain and evaluate response  - Implement non-pharmacological measures as appropriate and evaluate response  - Consider cultural and social influences on pain and pain management  - Notify physician/advanced practitioner if interventions unsuccessful or patient reports new pain  Outcome: Progressing     Problem: INFECTION - ADULT  Goal: Absence or prevention of progression during hospitalization  Description: INTERVENTIONS:  - Assess and monitor for signs and symptoms of infection  - Monitor lab/diagnostic results  - Monitor all insertion sites, i.e. indwelling lines, tubes, and drains  - Monitor endotracheal if appropriate and nasal secretions for changes in amount and color  - [place] appropriate cooling/warming therapies per order  - Administer medications as ordered  - Instruct and encourage patient and family to use good hand hygiene technique  - Identify and instruct in appropriate isolation precautions for identified infection/condition  Outcome: Progressing  Goal: Absence of fever/infection during neutropenic period  Description: INTERVENTIONS:  - Monitor WBC    Outcome: Progressing     Problem: SAFETY ADULT  Goal: Patient will remain free of falls  Description: INTERVENTIONS:  - Educate patient/family on patient safety including physical limitations  - Instruct patient to call for assistance with activity   - Consult OT/PT to assist with strengthening/mobility   - Keep Call bell within reach  - Keep bed low and locked with side rails adjusted as appropriate  - Keep care items and personal belongings within reach  - Initiate and maintain comfort rounds  - Make Fall Risk Sign visible to staff  - Offer Toileting every 2 Hours,  in advance of need  - Initiate/Maintain bed and chair alarm  - Obtain necessary fall risk management equipment:   - Apply yellow socks and bracelet for high fall risk patients  - Consider moving patient to room near nurses station  Outcome: Progressing  Goal: Maintain or return to baseline ADL function  Description: INTERVENTIONS:  -  Assess patient's ability to carry out ADLs; assess patient's baseline for ADL function and identify physical deficits which impact ability to perform ADLs (bathing, care of mouth/teeth, toileting, grooming, dressing, etc.)  - Assess/evaluate cause of self-care deficits   - Assess range of motion  - Assess patient's mobility; develop plan if impaired  - Assess patient's need for assistive devices and provide as appropriate  - Encourage maximum independence but intervene and supervise when necessary  - Involve family in performance of ADLs  - Assess for home care needs following discharge   - Consider OT consult to assist with ADL evaluation and planning for discharge  - Provide patient education as appropriate  Outcome: Progressing  Goal: Maintains/Returns to pre admission functional level  Description: INTERVENTIONS:  - Perform AM-PAC 6 Click Basic Mobility/ Daily Activity assessment daily.  - Set and communicate daily mobility goal to care team and patient/family/caregiver.   - Collaborate with rehabilitation services on mobility goals if consulted  - Perform Range of Motion 3 times a day.  - Reposition patient every 2 hours.  - Dangle patient 3 times a day  - Stand patient 3 times a day  - Ambulate patient 3 times a day  - Out of bed to chair 3 times a day   - Out of bed for meals 3 times a day  - Out of bed for toileting  - Record patient progress and toleration of activity level   Outcome: Progressing     Problem: DISCHARGE PLANNING  Goal: Discharge to home or other facility with appropriate resources  Description: INTERVENTIONS:  - Identify barriers to discharge w/patient and  caregiver  - Arrange for needed discharge resources and transportation as appropriate  - Identify discharge learning needs (meds, wound care, etc.)  - Arrange for interpretive services to assist at discharge as needed  - Refer to Case Management Department for coordinating discharge planning if the patient needs post-hospital services based on physician/advanced practitioner order or complex needs related to functional status, cognitive ability, or social support system  Outcome: Progressing     Problem: Knowledge Deficit  Goal: Patient/family/caregiver demonstrates understanding of disease process, treatment plan, medications, and discharge instructions  Description: Complete learning assessment and assess knowledge base.  Interventions:  - Provide teaching at level of understanding  - Provide teaching via preferred learning methods  Outcome: Progressing     Problem: Nutrition/Hydration-ADULT  Goal: Nutrient/Hydration intake appropriate for improving, restoring or maintaining nutritional needs  Description: Monitor and assess patient's nutrition/hydration status for malnutrition. Collaborate with interdisciplinary team and initiate plan and interventions as ordered.  Monitor patient's weight and dietary intake as ordered or per policy. Utilize nutrition screening tool and intervene as necessary. Determine patient's food preferences and provide high-protein, high-caloric foods as appropriate.     INTERVENTIONS:  - Monitor oral intake, urinary output, labs, and treatment plans  - Assess nutrition and hydration status and recommend course of action  - Evaluate amount of meals eaten  - Assist patient with eating if necessary   - Allow adequate time for meals  - Recommend/ encourage appropriate diets, oral nutritional supplements, and vitamin/mineral supplements  - Order, calculate, and assess calorie counts as needed  - Recommend, monitor, and adjust tube feedings and TPN/PPN based on assessed needs  - Assess need for  intravenous fluids  - Provide specific nutrition/hydration education as appropriate  - Include patient/family/caregiver in decisions related to nutrition  Outcome: Progressing     Problem: Prexisting or High Potential for Compromised Skin Integrity  Goal: Skin integrity is maintained or improved  Description: INTERVENTIONS:  - Identify patients at risk for skin breakdown  - Assess and monitor skin integrity  - Assess and monitor nutrition and hydration status  - Monitor labs   - Assess for incontinence   - Turn and reposition patient  - Assist with mobility/ambulation  - Relieve pressure over bony prominences  - Avoid friction and shearing  - Provide appropriate hygiene as needed including keeping skin clean and dry  - Evaluate need for skin moisturizer/barrier cream  - Collaborate with interdisciplinary team   - Patient/family teaching  - Consider wound care consult   Outcome: Progressing

## 2025-02-07 LAB
ALBUMIN SERPL BCG-MCNC: 2.1 G/DL (ref 3.5–5)
ALP SERPL-CCNC: 177 U/L (ref 34–104)
ALT SERPL W P-5'-P-CCNC: 10 U/L (ref 7–52)
ANION GAP SERPL CALCULATED.3IONS-SCNC: 7 MMOL/L (ref 4–13)
APTT PPP: 88 SECONDS (ref 23–34)
AST SERPL W P-5'-P-CCNC: 75 U/L (ref 13–39)
BILIRUB SERPL-MCNC: 1 MG/DL (ref 0.2–1)
BUN SERPL-MCNC: 67 MG/DL (ref 5–25)
CALCIUM ALBUM COR SERPL-MCNC: 9.4 MG/DL (ref 8.3–10.1)
CALCIUM SERPL-MCNC: 7.9 MG/DL (ref 8.4–10.2)
CHLORIDE SERPL-SCNC: 103 MMOL/L (ref 96–108)
CO2 SERPL-SCNC: 23 MMOL/L (ref 21–32)
CREAT SERPL-MCNC: 2.25 MG/DL (ref 0.6–1.3)
ERYTHROCYTE [DISTWIDTH] IN BLOOD BY AUTOMATED COUNT: 16.7 % (ref 11.6–15.1)
GFR SERPL CREATININE-BSD FRML MDRD: 24 ML/MIN/1.73SQ M
GLUCOSE SERPL-MCNC: 116 MG/DL (ref 65–140)
GLUCOSE SERPL-MCNC: 137 MG/DL (ref 65–140)
GLUCOSE SERPL-MCNC: 147 MG/DL (ref 65–140)
GLUCOSE SERPL-MCNC: 155 MG/DL (ref 65–140)
GLUCOSE SERPL-MCNC: 164 MG/DL (ref 65–140)
GLUCOSE SERPL-MCNC: 170 MG/DL (ref 65–140)
GLUCOSE SERPL-MCNC: 172 MG/DL (ref 65–140)
GLUCOSE SERPL-MCNC: 179 MG/DL (ref 65–140)
GLUCOSE SERPL-MCNC: 399 MG/DL (ref 65–140)
HCT VFR BLD AUTO: 22.3 % (ref 36.5–49.3)
HGB BLD-MCNC: 7.3 G/DL (ref 12–17)
INR PPP: 1.44 (ref 0.85–1.19)
MCH RBC QN AUTO: 30.3 PG (ref 26.8–34.3)
MCHC RBC AUTO-ENTMCNC: 32.7 G/DL (ref 31.4–37.4)
MCV RBC AUTO: 93 FL (ref 82–98)
PLATELET # BLD AUTO: 185 THOUSANDS/UL (ref 149–390)
PMV BLD AUTO: 12.3 FL (ref 8.9–12.7)
POTASSIUM SERPL-SCNC: 4.9 MMOL/L (ref 3.5–5.3)
PROT SERPL-MCNC: 5.3 G/DL (ref 6.4–8.4)
PROTHROMBIN TIME: 18.3 SECONDS (ref 12.3–15)
RBC # BLD AUTO: 2.41 MILLION/UL (ref 3.88–5.62)
SODIUM SERPL-SCNC: 133 MMOL/L (ref 135–147)
WBC # BLD AUTO: 14.23 THOUSAND/UL (ref 4.31–10.16)

## 2025-02-07 PROCEDURE — 85730 THROMBOPLASTIN TIME PARTIAL: CPT | Performed by: INTERNAL MEDICINE

## 2025-02-07 PROCEDURE — 85610 PROTHROMBIN TIME: CPT

## 2025-02-07 PROCEDURE — 82948 REAGENT STRIP/BLOOD GLUCOSE: CPT

## 2025-02-07 PROCEDURE — 99233 SBSQ HOSP IP/OBS HIGH 50: CPT

## 2025-02-07 PROCEDURE — 85027 COMPLETE CBC AUTOMATED: CPT

## 2025-02-07 PROCEDURE — 99232 SBSQ HOSP IP/OBS MODERATE 35: CPT | Performed by: INTERNAL MEDICINE

## 2025-02-07 PROCEDURE — 80053 COMPREHEN METABOLIC PANEL: CPT

## 2025-02-07 RX ORDER — ALBUMIN (HUMAN) 12.5 G/50ML
25 SOLUTION INTRAVENOUS ONCE
Status: DISCONTINUED | OUTPATIENT
Start: 2025-02-07 | End: 2025-02-07

## 2025-02-07 RX ORDER — OXYCODONE HCL 5 MG/5 ML
2.5-5 SOLUTION, ORAL ORAL EVERY 4 HOURS PRN
Qty: 15 ML | Refills: 0 | Status: SHIPPED | OUTPATIENT
Start: 2025-02-07 | End: 2025-02-11

## 2025-02-07 RX ORDER — HALOPERIDOL 2 MG/ML
0.5 SOLUTION ORAL EVERY 4 HOURS PRN
Qty: 15 ML | Refills: 0 | Status: SHIPPED | OUTPATIENT
Start: 2025-02-07 | End: 2025-02-12

## 2025-02-07 RX ORDER — INSULIN LISPRO 100 [IU]/ML
1-5 INJECTION, SOLUTION INTRAVENOUS; SUBCUTANEOUS EVERY 6 HOURS SCHEDULED
Status: DISCONTINUED | OUTPATIENT
Start: 2025-02-07 | End: 2025-02-11 | Stop reason: HOSPADM

## 2025-02-07 RX ORDER — LORAZEPAM 2 MG/ML
0.5 CONCENTRATE ORAL EVERY 4 HOURS PRN
Qty: 30 ML | Refills: 0 | Status: SHIPPED | OUTPATIENT
Start: 2025-02-07 | End: 2025-02-12

## 2025-02-07 RX ORDER — LORAZEPAM 2 MG/ML
0.5 CONCENTRATE ORAL EVERY 4 HOURS PRN
Qty: 10 ML | Refills: 0 | Status: SHIPPED | OUTPATIENT
Start: 2025-02-07 | End: 2025-02-07

## 2025-02-07 RX ADMIN — INSULIN LISPRO 1 UNITS: 100 INJECTION, SOLUTION INTRAVENOUS; SUBCUTANEOUS at 06:28

## 2025-02-07 RX ADMIN — INSULIN LISPRO 1 UNITS: 100 INJECTION, SOLUTION INTRAVENOUS; SUBCUTANEOUS at 12:01

## 2025-02-07 RX ADMIN — SCOPOLAMINE 1 PATCH: 1.5 PATCH, EXTENDED RELEASE TRANSDERMAL at 01:02

## 2025-02-07 RX ADMIN — MINERAL OIL, WHITE PETROLATUM: .03; .94 OINTMENT OPHTHALMIC at 21:14

## 2025-02-07 RX ADMIN — METRONIDAZOLE 500 MG: 500 INJECTION, SOLUTION INTRAVENOUS at 00:12

## 2025-02-07 RX ADMIN — PREDNISOLONE ACETATE 1 DROP: 10 SUSPENSION/ DROPS OPHTHALMIC at 21:15

## 2025-02-07 RX ADMIN — HEPARIN SODIUM 14 UNITS/KG/HR: 10000 INJECTION, SOLUTION INTRAVENOUS at 23:22

## 2025-02-07 RX ADMIN — INSULIN LISPRO 1 UNITS: 100 INJECTION, SOLUTION INTRAVENOUS; SUBCUTANEOUS at 01:04

## 2025-02-07 RX ADMIN — CEFAZOLIN SODIUM 1000 MG: 1 SOLUTION INTRAVENOUS at 09:51

## 2025-02-07 RX ADMIN — METRONIDAZOLE 500 MG: 500 INJECTION, SOLUTION INTRAVENOUS at 18:26

## 2025-02-07 RX ADMIN — LATANOPROSTENE BUNOD 1 DROP: 0.24 SOLUTION/ DROPS OPHTHALMIC at 21:15

## 2025-02-07 RX ADMIN — CALCIUM GLUCONATE: 98 INJECTION, SOLUTION INTRAVENOUS at 21:06

## 2025-02-07 RX ADMIN — CEFAZOLIN SODIUM 1000 MG: 1 SOLUTION INTRAVENOUS at 17:31

## 2025-02-07 RX ADMIN — INSULIN ASPART 10 UNITS: 100 INJECTION, SUSPENSION SUBCUTANEOUS at 21:07

## 2025-02-07 RX ADMIN — INSULIN ASPART 10 UNITS: 100 INJECTION, SUSPENSION SUBCUTANEOUS at 09:50

## 2025-02-07 RX ADMIN — CEFAZOLIN SODIUM 1000 MG: 1 SOLUTION INTRAVENOUS at 00:53

## 2025-02-07 RX ADMIN — METRONIDAZOLE 500 MG: 500 INJECTION, SOLUTION INTRAVENOUS at 10:58

## 2025-02-07 RX ADMIN — METRONIDAZOLE 500 MG: 500 INJECTION, SOLUTION INTRAVENOUS at 23:25

## 2025-02-07 NOTE — PLAN OF CARE
Problem: PAIN - ADULT  Goal: Verbalizes/displays adequate comfort level or baseline comfort level  Description: Interventions:  - Encourage patient to monitor pain and request assistance  - Assess pain using appropriate pain scale  - Administer analgesics based on type and severity of pain and evaluate response  - Implement non-pharmacological measures as appropriate and evaluate response  - Consider cultural and social influences on pain and pain management  - Notify physician/advanced practitioner if interventions unsuccessful or patient reports new pain  Outcome: Progressing     Problem: INFECTION - ADULT  Goal: Absence or prevention of progression during hospitalization  Description: INTERVENTIONS:  - Assess and monitor for signs and symptoms of infection  - Monitor lab/diagnostic results  - Monitor all insertion sites, i.e. indwelling lines, tubes, and drains  - Monitor endotracheal if appropriate and nasal secretions for changes in amount and color  - [place] appropriate cooling/warming therapies per order  - Administer medications as ordered  - Instruct and encourage patient and family to use good hand hygiene technique  - Identify and instruct in appropriate isolation precautions for identified infection/condition  Outcome: Progressing  Goal: Absence of fever/infection during neutropenic period  Description: INTERVENTIONS:  - Monitor WBC    Outcome: Progressing     Problem: SAFETY ADULT  Goal: Patient will remain free of falls  Description: INTERVENTIONS:  - Educate patient/family on patient safety including physical limitations  - Instruct patient to call for assistance with activity   - Consult OT/PT to assist with strengthening/mobility   - Keep Call bell within reach  - Keep bed low and locked with side rails adjusted as appropriate  - Keep care items and personal belongings within reach  - Initiate and maintain comfort rounds  - Make Fall Risk Sign visible to staff  - Offer Toileting every 2 Hours,  in advance of need  - Initiate/Maintain bed alarm  - Obtain necessary fall risk management equipment: alarm  - Apply yellow socks and bracelet for high fall risk patients  - Consider moving patient to room near nurses station  Outcome: Progressing  Goal: Maintain or return to baseline ADL function  Description: INTERVENTIONS:  -  Assess patient's ability to carry out ADLs; assess patient's baseline for ADL function and identify physical deficits which impact ability to perform ADLs (bathing, care of mouth/teeth, toileting, grooming, dressing, etc.)  - Assess/evaluate cause of self-care deficits   - Assess range of motion  - Assess patient's mobility; develop plan if impaired  - Assess patient's need for assistive devices and provide as appropriate  - Encourage maximum independence but intervene and supervise when necessary  - Involve family in performance of ADLs  - Assess for home care needs following discharge   - Consider OT consult to assist with ADL evaluation and planning for discharge  - Provide patient education as appropriate  Outcome: Progressing  Goal: Maintains/Returns to pre admission functional level  Description: INTERVENTIONS:  - Perform AM-PAC 6 Click Basic Mobility/ Daily Activity assessment daily.  - Set and communicate daily mobility goal to care team and patient/family/caregiver.   - Collaborate with rehabilitation services on mobility goals if consulted  - Perform Range of Motion 2 times a day.  - Reposition patient every 2 hours.  - Dangle patient 2 times a day  - Stand patient 2 times a day  - Ambulate patient 2 times a day  - Out of bed to chair 2 times a day   - Out of bed for meals 2 times a day  - Out of bed for toileting  - Record patient progress and toleration of activity level   Outcome: Progressing     Problem: DISCHARGE PLANNING  Goal: Discharge to home or other facility with appropriate resources  Description: INTERVENTIONS:  - Identify barriers to discharge w/patient and  caregiver  - Arrange for needed discharge resources and transportation as appropriate  - Identify discharge learning needs (meds, wound care, etc.)  - Arrange for interpretive services to assist at discharge as needed  - Refer to Case Management Department for coordinating discharge planning if the patient needs post-hospital services based on physician/advanced practitioner order or complex needs related to functional status, cognitive ability, or social support system  Outcome: Progressing     Problem: Knowledge Deficit  Goal: Patient/family/caregiver demonstrates understanding of disease process, treatment plan, medications, and discharge instructions  Description: Complete learning assessment and assess knowledge base.  Interventions:  - Provide teaching at level of understanding  - Provide teaching via preferred learning methods  Outcome: Progressing     Problem: Nutrition/Hydration-ADULT  Goal: Nutrient/Hydration intake appropriate for improving, restoring or maintaining nutritional needs  Description: Monitor and assess patient's nutrition/hydration status for malnutrition. Collaborate with interdisciplinary team and initiate plan and interventions as ordered.  Monitor patient's weight and dietary intake as ordered or per policy. Utilize nutrition screening tool and intervene as necessary. Determine patient's food preferences and provide high-protein, high-caloric foods as appropriate.     INTERVENTIONS:  - Monitor oral intake, urinary output, labs, and treatment plans  - Assess nutrition and hydration status and recommend course of action  - Evaluate amount of meals eaten  - Assist patient with eating if necessary   - Allow adequate time for meals  - Recommend/ encourage appropriate diets, oral nutritional supplements, and vitamin/mineral supplements  - Order, calculate, and assess calorie counts as needed  - Recommend, monitor, and adjust tube feedings and TPN/PPN based on assessed needs  - Assess need for  intravenous fluids  - Provide specific nutrition/hydration education as appropriate  - Include patient/family/caregiver in decisions related to nutrition  Outcome: Progressing     Problem: Prexisting or High Potential for Compromised Skin Integrity  Goal: Skin integrity is maintained or improved  Description: INTERVENTIONS:  - Identify patients at risk for skin breakdown  - Assess and monitor skin integrity  - Assess and monitor nutrition and hydration status  - Monitor labs   - Assess for incontinence   - Turn and reposition patient  - Assist with mobility/ambulation  - Relieve pressure over bony prominences  - Avoid friction and shearing  - Provide appropriate hygiene as needed including keeping skin clean and dry  - Evaluate need for skin moisturizer/barrier cream  - Collaborate with interdisciplinary team   - Patient/family teaching  - Consider wound care consult   Outcome: Progressing

## 2025-02-07 NOTE — PLAN OF CARE
Problem: PAIN - ADULT  Goal: Verbalizes/displays adequate comfort level or baseline comfort level  Description: Interventions:  - Encourage patient to monitor pain and request assistance  - Assess pain using appropriate pain scale  - Administer analgesics based on type and severity of pain and evaluate response  - Implement non-pharmacological measures as appropriate and evaluate response  - Consider cultural and social influences on pain and pain management  - Notify physician/advanced practitioner if interventions unsuccessful or patient reports new pain  Outcome: Progressing     Problem: INFECTION - ADULT  Goal: Absence or prevention of progression during hospitalization  Description: INTERVENTIONS:  - Assess and monitor for signs and symptoms of infection  - Monitor lab/diagnostic results  - Monitor all insertion sites, i.e. indwelling lines, tubes, and drains  - Monitor endotracheal if appropriate and nasal secretions for changes in amount and color  - Reynolds appropriate cooling/warming therapies per order  - Administer medications as ordered  - Instruct and encourage patient and family to use good hand hygiene technique  - Identify and instruct in appropriate isolation precautions for identified infection/condition  Outcome: Progressing  Goal: Absence of fever/infection during neutropenic period  Description: INTERVENTIONS:  - Monitor WBC    Outcome: Progressing     Problem: SAFETY ADULT  Goal: Patient will remain free of falls  Description: INTERVENTIONS:  - Educate patient/family on patient safety including physical limitations  - Instruct patient to call for assistance with activity   - Consult OT/PT to assist with strengthening/mobility   - Keep Call bell within reach  - Keep bed low and locked with side rails adjusted as appropriate  - Keep care items and personal belongings within reach  - Initiate and maintain comfort rounds  - Make Fall Risk Sign visible to staff  - Offer Toileting every 2 Hours,  in advance of need  - Initiate/Maintain bed and chair alarm  - Obtain necessary fall risk management equipment:   - Apply yellow socks and bracelet for high fall risk patients  - Consider moving patient to room near nurses station  Outcome: Progressing  Goal: Maintain or return to baseline ADL function  Description: INTERVENTIONS:  -  Assess patient's ability to carry out ADLs; assess patient's baseline for ADL function and identify physical deficits which impact ability to perform ADLs (bathing, care of mouth/teeth, toileting, grooming, dressing, etc.)  - Assess/evaluate cause of self-care deficits   - Assess range of motion  - Assess patient's mobility; develop plan if impaired  - Assess patient's need for assistive devices and provide as appropriate  - Encourage maximum independence but intervene and supervise when necessary  - Involve family in performance of ADLs  - Assess for home care needs following discharge   - Consider OT consult to assist with ADL evaluation and planning for discharge  - Provide patient education as appropriate  Outcome: Progressing  Goal: Maintains/Returns to pre admission functional level  Description: INTERVENTIONS:  - Perform AM-PAC 6 Click Basic Mobility/ Daily Activity assessment daily.  - Set and communicate daily mobility goal to care team and patient/family/caregiver.   - Collaborate with rehabilitation services on mobility goals if consulted  - Perform Range of Motion 3 times a day.  - Reposition patient every 2 hours.  - Dangle patient 3 times a day  - Stand patient 3 times a day  - Ambulate patient 3 times a day  - Out of bed to chair 3 times a day   - Out of bed for meals 3 times a day  - Out of bed for toileting  - Record patient progress and toleration of activity level   Outcome: Progressing     Problem: DISCHARGE PLANNING  Goal: Discharge to home or other facility with appropriate resources  Description: INTERVENTIONS:  - Identify barriers to discharge w/patient and  caregiver  - Arrange for needed discharge resources and transportation as appropriate  - Identify discharge learning needs (meds, wound care, etc.)  - Arrange for interpretive services to assist at discharge as needed  - Refer to Case Management Department for coordinating discharge planning if the patient needs post-hospital services based on physician/advanced practitioner order or complex needs related to functional status, cognitive ability, or social support system  Outcome: Progressing     Problem: Knowledge Deficit  Goal: Patient/family/caregiver demonstrates understanding of disease process, treatment plan, medications, and discharge instructions  Description: Complete learning assessment and assess knowledge base.  Interventions:  - Provide teaching at level of understanding  - Provide teaching via preferred learning methods  Outcome: Progressing     Problem: Nutrition/Hydration-ADULT  Goal: Nutrient/Hydration intake appropriate for improving, restoring or maintaining nutritional needs  Description: Monitor and assess patient's nutrition/hydration status for malnutrition. Collaborate with interdisciplinary team and initiate plan and interventions as ordered.  Monitor patient's weight and dietary intake as ordered or per policy. Utilize nutrition screening tool and intervene as necessary. Determine patient's food preferences and provide high-protein, high-caloric foods as appropriate.     INTERVENTIONS:  - Monitor oral intake, urinary output, labs, and treatment plans  - Assess nutrition and hydration status and recommend course of action  - Evaluate amount of meals eaten  - Assist patient with eating if necessary   - Allow adequate time for meals  - Recommend/ encourage appropriate diets, oral nutritional supplements, and vitamin/mineral supplements  - Order, calculate, and assess calorie counts as needed  - Recommend, monitor, and adjust tube feedings and TPN/PPN based on assessed needs  - Assess need for  intravenous fluids  - Provide specific nutrition/hydration education as appropriate  - Include patient/family/caregiver in decisions related to nutrition  Outcome: Progressing     Problem: Prexisting or High Potential for Compromised Skin Integrity  Goal: Skin integrity is maintained or improved  Description: INTERVENTIONS:  - Identify patients at risk for skin breakdown  - Assess and monitor skin integrity  - Assess and monitor nutrition and hydration status  - Monitor labs   - Assess for incontinence   - Turn and reposition patient  - Assist with mobility/ambulation  - Relieve pressure over bony prominences  - Avoid friction and shearing  - Provide appropriate hygiene as needed including keeping skin clean and dry  - Evaluate need for skin moisturizer/barrier cream  - Collaborate with interdisciplinary team   - Patient/family teaching  - Consider wound care consult   Outcome: Progressing

## 2025-02-07 NOTE — ASSESSMENT & PLAN NOTE
Lab Results   Component Value Date    EGFR 24 02/07/2025    EGFR 22 02/06/2025    EGFR 20 02/05/2025    CREATININE 2.25 (H) 02/07/2025    CREATININE 2.41 (H) 02/06/2025    CREATININE 2.58 (H) 02/05/2025      - SOFÍA on CKD IV, likely in the setting of severe ATN   - baseline SCr 2.0   - follows OP Nephrology   - IP Nephrology following   - no HD desired per patient's previously communicated wishes, supported/reaffirmed by family

## 2025-02-07 NOTE — ASSESSMENT & PLAN NOTE
Malnutrition Findings:   Adult Malnutrition type: Acute illness  Adult Degree of Malnutrition: Other severe protein calorie malnutrition  Malnutrition Characteristics: Muscle loss, Inadequate energy, Weight loss, Fat loss              360 Statement: related to dysphagia with recommendation for NPO, pt with > 7 days of less than 50% energy intake compared to estimated needs, muscle, fat wasting, wasted buccal pads, temporalis, supraclavicular space, interosseous. Treatment. Enteral nutrition via feeding tube is indicated for nutrition support- wife is declining nutrition via NGT or PEG at this time. Ongoing goals of care discussion noted.  BMI Findings:        Body mass index is 22.09 kg/m².     Pt w PICC and TPN. Per RD, TPN as above under principal problem.

## 2025-02-07 NOTE — ASSESSMENT & PLAN NOTE
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

## 2025-02-07 NOTE — ASSESSMENT & PLAN NOTE
Lab Results   Component Value Date    HGBA1C 6.9 (H) 01/18/2025       Recent Labs     02/06/25  2130 02/07/25  0104 02/07/25  0618 02/07/25  0741   POCGLU 199* 179* 155* 164*       Blood Sugar Average: Last 72 hrs:  (P) 132.95

## 2025-02-07 NOTE — ASSESSMENT & PLAN NOTE
Lab Results   Component Value Date    HGBA1C 6.9 (H) 01/18/2025     Recent Labs     02/07/25  0741 02/07/25  0949 02/07/25  1106 02/07/25  1508   POCGLU 164* 170* 172* 116     Plan  NPH insulin 10u BID. Will continue to monitor/adjust as necessary.   Continue sliding scale, algorithm 2  Hypoglycemia protocol

## 2025-02-07 NOTE — ASSESSMENT & PLAN NOTE
GoC discussion held yesterday by primary team  Family opted for home hospice  Comfort scripts sent to [facility] at [facility]     - DNAR/DNI, Level 3, as the patient would not wish to be re-intubated nor pursue CPR/ACLS in the setting of a cardiac arrest. If such an event were to occur, focus on EOL comfort to natural death.   - will continue to follow/support as clinical course evolves.

## 2025-02-07 NOTE — HOSPICE NOTE
RECEIVED HOSPICE REFERRAL. REVIEWED WITH DR. MARCIA COBIAN. APPROVED RLOC.  SPOKE TO DTR DINORA AND WIFE MEE VIA TPC. REVIEWED HOSPICE SERVICES AND BENEFITS PER  GUIDELINES. QUESTIONS ANSWERED. THEY ARE IN AGREEMENT WITH PT COMING HOME ON HOSPICE ONCE MEDICALLY STABLE. GENE IPCM UPDATED VIA SECURE CHAT. PLEASE KEEP HOSPICE UPDATED RE DCP.

## 2025-02-07 NOTE — ASSESSMENT & PLAN NOTE
Cardiac arrest in ED after CTA evaluating for pulmonary embolus.  Asystole for 2 minutes requiring CPR and intubation s/P extubation 1/21/2025  ECHO with EF of 65% in September, ECHO with left ventricular ejection fraction is 55% (1/20)    Plan  Continue heparin gtt

## 2025-02-07 NOTE — PROGRESS NOTES
Progress Note - Palliative Care   Name: Jeffry Almanzar 89 y.o. male I MRN: [id number]  Unit/Bed#: S -01 I Date of Admission: 1/17/2025   Date of Service: 2/7/2025 I Hospital Day: 20    Assessment & Plan  Goals of care, counseling/discussion   GoC discussion held yesterday by primary team  Family opted for home hospice  Comfort scripts sent to [facility] at [facility]     - DNAR/DNI, Level 3, as the patient would not wish to be re-intubated nor pursue CPR/ACLS in the setting of a cardiac arrest. If such an event were to occur, focus on EOL comfort to natural death.   - will continue to follow/support as clinical course evolves.  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    Hs-cTn 533->777->1,070      TTE [01/20/2025] LVEF 55%, grade I diastolic dysfunction; RV moderately dilated, moderately reduced RV systolic function, hypokinesis of the basal to mid free wall; mild MR; moderate TR, RVSP 49 mmHg.  CKD (chronic kidney disease) stage 4, GFR 15-29 ml/min ([facility])  Lab Results    Component Value Date    EGFR 24 02/07/2025    EGFR 22 02/06/2025    EGFR 20 02/05/2025    CREATININE 2.25 (H) 02/07/2025    CREATININE 2.41 (H) 02/06/2025    CREATININE 2.58 (H) 02/05/2025      - SOFÍA on CKD IV, likely in the setting of severe ATN   - baseline SCr 2.0   - follows OP Nephrology   - IP Nephrology following   - no HD desired per patient's previously communicated wishes, supported/reaffirmed by family  Cardiac arrest (HCC)   - initially presented via EMS on 01/17 after patient found unresponsive with agonal respirations, bag ventilations initiated in the field. Intubated in the setting of a brief cardiac arrest with ROSC.   - in the setting of acute saddle PE with large clot burden, Cor Pulmonale  Dysphagia  See A/P Goals of Care  Severe protein-calorie malnutrition (HCC)  Malnutrition Findings:   Adult Malnutrition type: Acute illness  Adult Degree of Malnutrition: Other severe protein calorie malnutrition  Malnutrition Characteristics: Muscle loss, Inadequate energy, Weight loss, Fat loss                360 Statement: related to dysphagia with recommendation for NPO, pt with > 7 days of less than 50% energy intake compared to estimated needs, muscle, fat wasting, wasted buccal pads, temporalis, supraclavicular space, interosseous. Treatment. Enteral nutrition via feeding tube is indicated for nutrition support- wife is declining nutrition via NGT or PEG at this time. Ongoing goals of care discussion noted.    BMI Findings:           Body mass index is 22.09 kg/m².     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  ([facility])  Lab Results   Component Value Date    EGFR 24 02/07/2025    EGFR 22 02/06/2025    EGFR 20 02/05/2025    CREATININE 2.25 (H) 02/07/2025    CREATININE 2.41 (H) 02/06/2025    CREATININE 2.58 (H) 02/05/2025     Benign 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 ([facility])  Lab Results   Component Value Date    EGFR 24 02/07/2025    EGFR 22 02/06/2025    EGFR 20 02/05/2025    CREATININE  2.25 (H) 02/07/2025    CREATININE 2.41 (H) 02/06/2025    CREATININE 2.58 (H) 02/05/2025     Type 2 diabetes mellitus with stage 4 chronic kidney disease, without long-term current use of insulin ([facility])  Lab Results   Component Value Date    HGBA1C 6.9 (H) 01/18/2025       Recent Labs     02/06/25  2130 02/07/25  0104 02/07/25  0618 02/07/25  0741   POCGLU 199* 179* 155* 164*       Blood Sugar Average: Last 72 hrs:  (P) 132.95      Subjective   Chart reviewed prior to visit, CHELLY Rendon discussion held by primary team yesterday, family has opted for home hospice  Discussed w/ [facility] team, plan is to d/c home on hospice on Tuesday  Discussed w/ patient's spouse at bedside, she is in agreement w/ hospice. Informed spouse that comfort scripts will be sent to the [facility] pharmacy.   Educated spouse on comfort medications, verbalized understanding   Patient sleeping, did not awaken, appears comfortable, NAD  Answered all questions/concerns to family's satisfaction.     Objective :  Temp:  [98 °F (36.7 °C)-99.8 °F (37.7 °C)] 99.4 °F (37.4 °C)  HR:  [] 93  BP: (139-144)/(71-86) 144/86  Resp:  [18] 18  SpO2:  [94 %-97 %] 97 %  O2 Device: Nasal cannula  Nasal Cannula O2 Flow Rate (L/min):  [2 L/min] 2 L/min    Physical Exam  Constitutional:       General: He is sleeping. He is not in acute distress.     Appearance: He is ill-appearing.   HENT:      Head: Normocephalic and atraumatic.      Mouth/Throat:      Mouth: Mucous membranes are dry.   Cardiovascular:      Rate and Rhythm: Normal rate.   Pulmonary:      Effort: Pulmonary effort is normal. No respiratory distress.   Skin:     General: Skin is warm and dry.          Lab Results: I have reviewed the following results:  Lab Results   Component Value Date/Time    SODIUM 133 (L) 02/07/2025 06:25 AM    SODIUM 140 11/18/2024 01:32 PM    K 4.9 02/07/2025 06:25 AM    K 4.1 11/18/2024 01:32 PM    BUN 67 (H) 02/07/2025 06:25 AM    BUN 64 (H) 11/18/2024 01:32 PM     CREATININE 2.25 (H) 02/07/2025 06:25 AM    CREATININE 2.20 (H) 06/05/2017 12:55 PM    GLUC 399 (H) 02/07/2025 06:25 AM    GLUC 165 (H) 11/18/2024 01:32 PM    CALCIUM 7.9 (L) 02/07/2025 06:25 AM    CALCIUM 9.6 11/18/2024 01:32 PM    CALCIUM 9.6 11/18/2024 01:32 PM    AST 75 (H) 02/07/2025 06:25 AM    AST 14 02/16/2024 12:42 PM    ALT 10 02/07/2025 06:25 AM    ALT 11 02/16/2024 12:42 PM    ALB 2.1 (L) 02/07/2025 06:25 AM    ALB 4.0 10/25/2016 11:09 AM    TP 5.3 (L) 02/07/2025 06:25 AM    TP 7.2 02/16/2024 12:42 PM    EGFR 24 02/07/2025 06:25 AM     Lab Results   Component Value Date/Time    HGB 7.3 (L) 02/07/2025 06:25 AM    WBC 14.23 (H) 02/07/2025 06:25 AM     02/07/2025 06:25 AM    INR 1.44 (H) 02/07/2025 06:25 AM    PTT 88 (H) 02/07/2025 06:25 AM     Administrative Statements   I have spent a total time of 20 minutes in caring for this patient on the day of the visit/encounter including Risks and benefits of tx options, Instructions for management, Patient and family education, Risk factor reductions, Counseling / Coordination of care, Documenting in the medical record, Reviewing / ordering tests, medicine, procedures  , Obtaining or reviewing history  , and Communicating with other healthcare professionals . Topics discussed with the patient / family include symptom assessment and management, medication review, psychosocial support, goals of care, hospice services, supportive listening, and anticipatory guidance.    Love Licona

## 2025-02-07 NOTE — ASSESSMENT & PLAN NOTE
Had numerous discussions regarding goals of care with family and multidisciplinary team. Explained risks versus benefits of PICC line and subsequent TPN, versus pleasure feeds on comfort care. 1/27 pt's wife decided on obtaining PICC for patient and signed informed consent form. Process, risks, and outcomes were reviewed at length with the patient's wife at bedside, alongside Dr. Gtz. All questions/concerns answered. Pt's wife verbalizes understanding and wishes to proceed with PICC line.   1/28 - pt received PICC line and had TPN initiated in the evening  1/29 - concerns for yellow/green sputum, as well as hyperglycemia in the setting of TPN initiation, low grade fever, initiated empiric ABX as pt was a hard stick and unable to obtain blood cx  Lactic acid wnl   1/30 - 1x dose IV Lasix for congestion noted on CXR. COVID/FLU/RSV negative.  Initially failed swallow eval at 1/23, repeat eval done 2/2, patient seems to do a little better with purée by teaspoon however still has a risk of aspiration and may not take enough per speech therapist.  Wife is agreeable to starting diet and see what happens, if respiratory status worsens on PO diet then that would support a feeding tube.  MRSA nares negative  Sputum culture: 3+ growth of Staph aureus, 2+ growth of mixed respiratory idalia. ABX per susceptibilities.   Blood cx x2: no growth at 24h  Likely tracheobronchitis given clinical presentation and hx of respiratory secretions, frequent suctioning, hx of intubation, and prolonged hospitalization    Tmax this AM 99.4F, HR 95. WBC this AM 14.  Patient refusing suctioning and oral feeds. Per review of hospice RN note and conversation with multidisciplinary team, pt's wife and daughter would like pt to come home on hospice next week however would like to continue current plan until discharge. CM aware,     Plan  Continue IV cefazolin 1g q8h considering culture sensitivities & MRSA negative.  Continue IV Flagyl 500 mg q8h   EKG  Qtc 469 ms (per Pharmacy, this is acceptable)   TPN per RD  Scopolamine patch to manage secretions

## 2025-02-07 NOTE — ASSESSMENT & PLAN NOTE
Lab Results   Component Value Date    EGFR 24 02/07/2025    EGFR 22 02/06/2025    EGFR 20 02/05/2025    CREATININE 2.25 (H) 02/07/2025    CREATININE 2.41 (H) 02/06/2025    CREATININE 2.58 (H) 02/05/2025

## 2025-02-07 NOTE — ASSESSMENT & PLAN NOTE
Malnutrition Findings:   Adult Malnutrition type: Acute illness  Adult Degree of Malnutrition: Other severe protein calorie malnutrition  Malnutrition Characteristics: Muscle loss, Inadequate energy, Weight loss, Fat loss                360 Statement: related to dysphagia with recommendation for NPO, pt with > 7 days of less than 50% energy intake compared to estimated needs, muscle, fat wasting, wasted buccal pads, temporalis, supraclavicular space, interosseous. Treatment. Enteral nutrition via feeding tube is indicated for nutrition support- wife is declining nutrition via NGT or PEG at this time. Ongoing goals of care discussion noted.    BMI Findings:           Body mass index is 22.09 kg/m².

## 2025-02-07 NOTE — PHYSICIAN ADVISOR
Current patient class: Inpatient  The patient is currently on Hospital Day: 22      The patient was admitted to the hospital at 12:36 AM on 1/18/25 for the following diagnosis:  Acute respiratory failure (HCC) [J96.00]  Respiratory distress [R06.03]  Acute saddle pulmonary embolism with acute cor pulmonale (HCC) [I26.02]     CMS OUTLIER STAY REVIEW    After review of the relevant documentation, the patient is appropriate for CONTINUED INPATIENT ADMISSION.     The patient continues to remain hospitalized receiving acute medical care.  The patient has surpassed the expected duration of stay, however given the clinical condition, need for further acute care management, the patient is appropriate to remain in an inpatient status. This review is conducted at 20 days, to help satisfy the requirements for significant outlier stay review as per CMS.  Given the current condition of this patient, the patient satisfies this review was determination for continued inpatient stay.    Rationale is as follows:    This 89-year-old patient has had an extended hospitalization with primary diagnosis of dysphagia.  He has been receiving TPN.  Extensive goals of care discussions have occurred.  Plan will be for eventual transition home with hospice.  The patient mostly declines suctioning and has significant oropharyngeal secretions.  There is likely tracheobronchitis.  At this time the date of discharge home with hospice is not clear however the patient did require at least 20 days of hospitalization for active management.        The patient’s vitals on arrival were   ED Triage Vitals   Temperature Pulse Respirations Blood Pressure SpO2   01/18/25 0324 01/17/25 2321 01/18/25 0110 01/17/25 2322 01/17/25 2322   99.2 °F (37.3 °C) (!) 45 16 (!) 77/58 (!) 82 %      Temp Source Heart Rate Source Patient Position - Orthostatic VS BP Location FiO2 (%)   01/18/25 0700 01/17/25 2321 01/18/25 0700 01/18/25 0700 01/18/25 0043   Axillary Monitor  Lying Right arm 100      Pain Score       01/18/25 2000       No Pain           Past Medical History:   Diagnosis Date    Cataracts, bilateral      Past Surgical History:   Procedure Laterality Date    CATARACT EXTRACTION Bilateral 07/15/2012    COLONOSCOPY      CYSTOSCOPY  01/15/2018    Last assessed 9/14/17, diagnostic    HERNIA REPAIR      INSTILLATION MYTOMYCIN N/A 10/25/2017    Procedure: INSTILLATION OF MITOMYCIN C;  Surgeon: Danish Esposito MD;  Location: [facility];  Service: Urology    LA CYSTO W/REMOVAL OF LESIONS SMALL N/A 10/25/2017    Procedure: CYSTOSCOPY; BLADDER BIOPSY WITH FULGERATION;  Surgeon: Danish Esposito MD;  Location: [facility];  Service: Urology    TONSILLECTOMY      TRANSURETHRAL RESECTION OF BLADDER TUMOR N/A 10/25/2017    Procedure: TUR BLADDER TUMOR;  Surgeon: Danish Esposito MD;  Location: [facility];  Service: Urology    WISDOM TOOTH EXTRACTION             Consults have been placed to:   IP CONSULT TO NEPHROLOGY  IP CONSULT TO OPHTHALMOLOGY  IP CONSULT TO NEUROPSYCHOLOGY  IP CONSULT TO PALLIATIVE CARE  IP CONSULT TO NUTRITION SERVICES  IP CONSULT TO HOSPICE    Vitals:    02/06/25 2158 02/07/25 0600 02/07/25 0731 02/07/25 0953   BP: 139/78  144/86 145/74   BP Location: Left arm      Pulse: 105  93 95   Resp: 18      Temp: 99.8 °F (37.7 °C)  99.4 °F (37.4 °C)    TempSrc: Axillary      SpO2: 94%  97% 97%   Weight:  60.2 kg (132 lb 11.5 oz)     Height:           Most recent labs:    Recent Labs     02/07/25  0625   WBC 14.23*   HGB 7.3*   HCT 22.3*      K 4.9   CALCIUM 7.9*   BUN 67*   CREATININE 2.25*   INR 1.44*   AST 75*   ALT 10   ALKPHOS 177*       Scheduled Meds:  Current Facility-Administered Medications   Medication Dose Route Frequency Provider Last Rate    acetaminophen  650 mg Rectal Q4H PRN Dorothy Desai MD      Adult 3-in-1 TPN (custom base / custom electrolytes)   Intravenous Continuous TPN Mosafelisha Nunez DO 77.7 mL/hr at 02/06/25 2154    Albumin 25%  25 g  Intravenous Once Claudio Simpson MD Stopped (02/05/25 1636)    artificial tear   Both Eyes HS Eloise Rodriguez MD      cefazolin  1,000 mg Intravenous Q8H Julianna Goyal MD 1,000 mg (02/07/25 0951)    chlorhexidine  15 mL Mouth/Throat Q12H MARQUIS Eloise Rodriguez MD      dextrose  25 mL Intravenous Once Claudio Simpson MD      dorzolamide-timolol  1 drop Both Eyes BID Elosie Rodriguez MD      heparin (porcine)  3-30 Units/kg/hr (Order-Specific) Intravenous Titrated Pawan Gtz MD 14 Units/kg/hr (02/06/25 1939)    heparin (porcine)  2,400 Units Intravenous Q6H PRN Pawan Gtz MD      heparin (porcine)  4,800 Units Intravenous Once Pawan Gtz MD      heparin (porcine)  4,800 Units Intravenous Q6H PRN Pawan Gtz MD      hydrALAZINE  5 mg Intravenous Q6H PRN Jamaica Jones DO      insulin aspart protamine-insulin aspart  10 Units Subcutaneous BID Mosam Enrique, DO      insulin lispro  1-5 Units Subcutaneous Q6H [facility] Mercedes Enrique, DO      Latanoprostene Bunod  1 drop Both Eyes HS Eloise Rodriguez MD      metroNIDAZOLE  500 mg Intravenous Q8H Julianna Goyal  mg (02/07/25 0012)    polyethylene glycol  17 g Oral Daily Eloise Rodriguez MD      prednisoLONE acetate  1 drop Left Eye TID Eloise Rodriguez MD      scopolamine  1 patch Transdermal Q72H Jamaica Jones DO      [Held by provider] senna-docusate sodium  1 tablet Oral BID Eloise Rodriguez MD      tetracaine  2 drop Both Eyes Once Jamaica Jones DO       Continuous Infusions:Adult 3-in-1 TPN (custom base / custom electrolytes), , Last Rate: 77.7 mL/hr at 02/06/25 2154  heparin (porcine), 3-30 Units/kg/hr (Order-Specific), Last Rate: 14 Units/kg/hr (02/06/25 1939)      PRN Meds:.  acetaminophen    heparin (porcine)    heparin (porcine)    hydrALAZINE    Surgical procedures (if appropriate):

## 2025-02-07 NOTE — ASSESSMENT & PLAN NOTE
Pt baseline Hb in range of 8-10.   2/3 AM Hb 7.2 down from 10.1 on 2/2. Repeat H&H at 13:00 7.1, and at 18:00 6.8.   Pt received 1 unit pRBC  Per RN, no bloody stools or bloody mucus in suction.    UA with 30-50 RBCs, urine dortia however not gross hematuria    Plan  Continue frequent Mcgee flushes

## 2025-02-07 NOTE — ASSESSMENT & PLAN NOTE
Lab Results   Component Value Date    EGFR 24 02/07/2025    EGFR 22 02/06/2025    EGFR 20 02/05/2025    CREATININE 2.25 (H) 02/07/2025    CREATININE 2.41 (H) 02/06/2025    CREATININE 2.58 (H) 02/05/2025   Per patient and patient's wife, would not be interested in dialysis   Nephro had been consulted for management; signed off.

## 2025-02-07 NOTE — ASSESSMENT & PLAN NOTE
Lab Results   Component Value Date    EGFR 24 02/07/2025    EGFR 22 02/06/2025    EGFR 20 02/05/2025    CREATININE 2.25 (H) 02/07/2025    CREATININE 2.41 (H) 02/06/2025    CREATININE 2.58 (H) 02/05/2025   Holding Losartan and Farxiga per nephro due to elevated Cr   Hydralazine 5 mg q 6 hrs for BP >180/110   Initially allowed for permissive hypertension due to concern for stroke.  However, patient's MRI does not show concern for infarct.  Will aim for normotension   DISPLAY PLAN FREE TEXT DISPLAY PLAN FREE TEXT DISPLAY PLAN FREE TEXT

## 2025-02-07 NOTE — OCCUPATIONAL THERAPY NOTE
Occupational Therapy Cancel Note     Patient Name: Jeffry Almanzar  Today's Date: 2/7/2025  Problem List  Principal Problem:    Acute saddle pulmonary embolism with acute cor pulmonale (HCC)  Active Problems:    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  (HCC)    Benign 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 (HCC)    CKD (chronic kidney disease) stage 4, GFR 15-29 ml/min (HCC)    Type 2 diabetes mellitus with stage 4 chronic kidney disease, without long-term current use of insulin (HCC)    SOFÍA (acute kidney injury) (HCC)    Lacunar cerebrovascular accident (CVA) (HCC)    Cardiac arrest (HCC)    Normal anion gap metabolic acidosis    Retinal hemorrhage noted on examination of left eye    Hypernatremia    Hypoxia    Cognitive impairment    Palliative care encounter    Goals of care, counseling/discussion    Dysphagia    Severe protein-calorie malnutrition (HCC)    Tracheobronchitis       02/07/25 1233   Note Type   Note Type Cancelled Session  (Friday 2/7/25)   Cancel Reasons Other  (spoke w/ CM, Suzanne; hospice consult placed. Will cancel and continue to follow as appropriate / schedule allows)         Mayra Calvo OTR/CARROLL  [id number]  [id number]

## 2025-02-07 NOTE — PROGRESS NOTES
Progress Note - Hospitalist   Name: Jeffry Almanzar 89 y.o. male I MRN: [id number]  Unit/Bed#: S -01 I Date of Admission: 1/17/2025   Date of Service: 2/7/2025 I Hospital Day: 20    Assessment & Plan  Dysphagia  Had numerous discussions regarding goals of care with family and multidisciplinary team. Explained risks versus benefits of PICC line and subsequent TPN, versus pleasure feeds on comfort care. 1/27 pt's wife decided on obtaining PICC for patient and signed informed consent form. Process, risks, and outcomes were reviewed at length with the patient's wife at bedside, alongside Dr. Gtz. All questions/concerns answered. Pt's wife verbalizes understanding and wishes to proceed with PICC line.   1/28 - pt received PICC line and had TPN initiated in the evening  1/29 - concerns for yellow/green sputum, as well as hyperglycemia in the setting of TPN initiation, low grade fever, initiated empiric ABX as pt was a hard stick and unable to obtain blood cx  Lactic acid wnl   1/30 - 1x dose IV Lasix for congestion noted on CXR. COVID/FLU/RSV negative.  Initially failed swallow eval at 1/23, repeat eval done 2/2, patient seems to do a little better with purée by teaspoon however still has a risk of aspiration and may not take enough per speech therapist.  Wife is agreeable to starting diet and see what happens, if respiratory status worsens on PO diet then that would support a feeding tube.  MRSA nares negative  Sputum culture: 3+ growth of Staph aureus, 2+ growth of mixed respiratory idalia. ABX per susceptibilities.   Blood cx x2: no growth at 24h  Likely tracheobronchitis given clinical presentation and hx of respiratory secretions, frequent suctioning, hx of intubation, and prolonged hospitalization    Tmax this AM 99.4F, HR 95. WBC this AM 14.  Patient refusing suctioning and oral feeds. Per review of hospice RN note and conversation with multidisciplinary team, pt's wife and daughter would like pt to come home  on hospice next week however would like to continue current plan until discharge. CM aware,     Plan  Continue IV cefazolin 1g q8h considering culture sensitivities & MRSA negative.  Continue IV Flagyl 500 mg q8h   EKG Qtc 469 ms (per Pharmacy, this is acceptable)   TPN per RD  Scopolamine patch to manage secretions  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  (HCC)  Pt baseline Hb in range of 8-10.   2/3 AM Hb 7.2 down from 10.1 on 2/2. Repeat H&H at 13:00 7.1, and at 18:00 6.8.   Pt received 1 unit pRBC  Per RN, no bloody stools or bloody mucus in suction.    UA with 30-50 RBCs, urine dorita however not gross hematuria    Plan  Continue frequent Mcgee flushes  Type 2 diabetes mellitus with stage 4 chronic kidney disease, without long-term current use of insulin ([facility])  Lab Results   Component Value Date    HGBA1C 6.9 (H) 01/18/2025     Recent Labs     02/07/25  0741 02/07/25  0949 02/07/25  1106 02/07/25  1508   POCGLU 164* 170* 172* 116     Plan  NPH insulin 10u BID. Will continue to monitor/adjust as necessary.   Continue sliding scale, algorithm 2  Hypoglycemia protocol   Cognitive impairment  Per wife, patient has some degree of dementia but is unclear of specifics. Patient oriented to self and place but unaware of year and month. Per wife, unsure if patient is completely aware of what is going on and is unsure if patient would truly want to accept intubation a second time    Plan  Neuropsych evaluation determined patient does NOT have capacity for MDM  Palliative on board. Appreciate recommendations. As of 1/23/2025, family made the decision to make patient's code status LEVEL 3 DNR/DNI.   Acute saddle pulmonary embolism with acute cor pulmonale (HCC)  Initially with cardiac arrest and cardiogenic shock, is now extubated off pressors. Was transferred  to med/surg 1/21.   MRI brain negative for CVA or concern for hemorrhage.      Continue heparin gtt.  SOFÍA (acute kidney injury) (HCC)  Suspect due to ATN in the  setting of cardiogenic shock, cardiac arrest, hypotension and IV contrast exposure with autoregulatory dysfunction with ARB  Per Nephrology:  Discontinued D5W 1/29 d/t resolution of hypernatremia and initiation of TPN  Continue to hold losartan and Farxiga    Plan  Strict I/Os, daily weights  Avoid nephrotoxins, NSAIDs, further IV contrast as able  Avoid hypotension.  Maintain MAP >65  Cardiac arrest (HCC)  Cardiac arrest in ED after CTA evaluating for pulmonary embolus.  Asystole for 2 minutes requiring CPR and intubation s/P extubation 1/21/2025  ECHO with EF of 65% in September, ECHO with left ventricular ejection fraction is 55% (1/20)    Plan  Continue heparin gtt  Benign 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 ([facility])  Lab Results   Component Value Date    EGFR 24 02/07/2025    EGFR 22 02/06/2025    EGFR 20 02/05/2025    CREATININE 2.25 (H) 02/07/2025    CREATININE 2.41 (H) 02/06/2025    CREATININE 2.58 (H) 02/05/2025   Holding Losartan and Farxiga per nephro due to elevated Cr   Hydralazine 5 mg q 6 hrs for BP >180/110   Initially allowed for permissive hypertension due to concern for stroke.  However, patient's MRI does not show concern for infarct.  Will aim for normotension  Palliative care encounter  Patient seen and evaluated by palliative care 1/23. Palliative care had an in-depth discussion with family regarding patient's GOC and overall prognosis. Family would NOT like to pursue a PEG tube at this time.   CODE STATUS changed from Level 2 to Level 3 DNR/DNI  Ongoing goals of care discussions with consideration for comfort measures only -- CM aware of family agreement to pursue hospice  Goals of care, counseling/discussion  Palliative on board. Appreciate recommendations   CKD (chronic kidney disease) stage 4, GFR 15-29 ml/min ([facility])  Lab Results   Component Value Date    EGFR 24 02/07/2025    EGFR 22 02/06/2025    EGFR 20 02/05/2025    CREATININE 2.25 (H) 02/07/2025    CREATININE 2.41 (H)  02/06/2025    CREATININE 2.58 (H) 02/05/2025   Per patient and patient's wife, would not be interested in dialysis   Nephro had been consulted for management; signed off.  Severe protein-calorie malnutrition (HCC)  Malnutrition Findings:   Adult Malnutrition type: Acute illness  Adult Degree of Malnutrition: Other severe protein calorie malnutrition  Malnutrition Characteristics: Muscle loss, Inadequate energy, Weight loss, Fat loss              360 Statement: related to dysphagia with recommendation for NPO, pt with > 7 days of less than 50% energy intake compared to estimated needs, muscle, fat wasting, wasted buccal pads, temporalis, supraclavicular space, interosseous. Treatment. Enteral nutrition via feeding tube is indicated for nutrition support- wife is declining nutrition via NGT or PEG at this time. Ongoing goals of care discussion noted.  BMI Findings:        Body mass index is 22.09 kg/m².     Pt w PICC and TPN. Per RD, TPN as above under principal problem.     VTE Pharmacologic Prophylaxis: VTE Score: 6 High Risk (Score >/= 5) - Pharmacological DVT Prophylaxis Ordered: heparin drip. Sequential Compression Devices Ordered.    Mobility:   Basic Mobility Inpatient Raw Score: 6  JH-HLM Goal: 2: Bed activities/Dependent transfer  JH-HLM Achieved: 2: Bed activities/Dependent transfer  JH-HLM Goal NOT achieved. Continue with multidisciplinary rounding and encourage appropriate mobility to improve upon JH-HLM goals.    Patient Centered Rounds: I performed bedside rounds with nursing staff today.   Discussions with Specialists or Other Care Team Provider: Nephrology, Nutrition, Pharmacy    Education and Discussions with Family / Patient: Updated  (wife) at bedside.    Current Length of Stay: 20 day(s)  Current Patient Status: Inpatient   Certification Statement: The patient will continue to require additional inpatient hospital stay due to TPN, guarded prognosis, and anticipated  discharge.  Discharge Plan: Anticipate discharge in >72 hrs to home with hospice, per family's wishes.    Code Status: Level 3 - DNAR and DNI    Subjective   Pt seen and evaluated at bedside this morning. He is asleep throughout this encounter. He is on 1L O2 saturating 97-98%. Per report, he has not been permitting suctioning. Wife at bedside. Mcgee in place, draining urine, no hematuria appreciated.     Objective :  Temp:  [98 °F (36.7 °C)-99.8 °F (37.7 °C)] 99.2 °F (37.3 °C)  HR:  [] 89  BP: (139-158)/(71-88) 158/88  Resp:  [18] 18  SpO2:  [94 %-97 %] 94 %  O2 Device: Nasal cannula  Nasal Cannula O2 Flow Rate (L/min):  [2 L/min] 2 L/min    Body mass index is 22.09 kg/m².     Input and Output Summary (last 24 hours):     Intake/Output Summary (Last 24 hours) at 2/7/2025 1538  Last data filed at 2/7/2025 1255  Gross per 24 hour   Intake 460 ml   Output 1600 ml   Net -1140 ml       Physical Exam  Vitals reviewed.   Constitutional:       General: He is sleeping.      Appearance: He is cachectic. He is ill-appearing.      Interventions: Nasal cannula in place.   HENT:      Head: Normocephalic and atraumatic.      Right Ear: External ear normal.      Left Ear: External ear normal.      Mouth/Throat:      Mouth: Mucous membranes are dry.   Eyes:      General: No scleral icterus.     Extraocular Movements: Extraocular movements intact.   Cardiovascular:      Rate and Rhythm: Tachycardia present.      Pulses: Normal pulses.   Pulmonary:      Effort: No accessory muscle usage.      Breath sounds: Rales present.   Chest:      Chest wall: No tenderness.   Abdominal:      Tenderness: There is no abdominal tenderness. There is no guarding.   Skin:     General: Skin is warm.      Capillary Refill: Capillary refill takes 2 to 3 seconds.      Coloration: Skin is not jaundiced.   Neurological:      Motor: Weakness present.         Lines/Drains:  Lines/Drains/Airways       Active Status       Name Placement date Placement  time Site Days    PICC Line 01/28/25 Right Brachial 01/28/25  0930  Brachial  10    Urethral Catheter 01/17/25  0000  --  21                  Central Line:  Goal for removal: Continuing for TPN.                Lab Results: I have reviewed the following results:   Results from last 7 days   Lab Units 02/07/25  0625 02/03/25  1307 02/03/25  0755   WBC Thousand/uL 14.23*   < > 16.40*   HEMOGLOBIN g/dL 7.3*   < > 7.2*   HEMATOCRIT % 22.3*   < > 22.6*   PLATELETS Thousands/uL 185   < > 162   BANDS PCT %  --   --  2   LYMPHO PCT %  --   --  3*   MONO PCT %  --   --  2*   EOS PCT %  --   --  0    < > = values in this interval not displayed.     Results from last 7 days   Lab Units 02/07/25  0625   SODIUM mmol/L 133*   POTASSIUM mmol/L 4.9   CHLORIDE mmol/L 103   CO2 mmol/L 23   BUN mg/dL 67*   CREATININE mg/dL 2.25*   ANION GAP mmol/L 7   CALCIUM mg/dL 7.9*   ALBUMIN g/dL 2.1*   TOTAL BILIRUBIN mg/dL 1.00   ALK PHOS U/L 177*   ALT U/L 10   AST U/L 75*   GLUCOSE RANDOM mg/dL 399*     Results from last 7 days   Lab Units 02/07/25  0625   INR  1.44*     Results from last 7 days   Lab Units 02/07/25  1508 02/07/25  1106 02/07/25  0949 02/07/25  0741 02/07/25  0618 02/07/25  0104 02/06/25  2130 02/06/25  1804 02/06/25  1142 02/06/25  0743 02/06/25  0648 02/06/25  0609   POC GLUCOSE mg/dl 116 172* 170* 164* 155* 179* 199* 172* 108 97 112 58*         Results from last 7 days   Lab Units 02/04/25  0839 02/03/25  0644 02/02/25  0525 02/01/25  0647   PROCALCITONIN ng/ml 13.13* 18.86* 17.97* 4.02*       Recent Cultures (last 7 days):           Imaging Results Review: I reviewed radiology reports from this admission including: CT head, CTA PE, CXR, MRI brain, MRA head, LL venous duplex, VBS.    Last 24 Hours Medication List:     Current Facility-Administered Medications:     acetaminophen (TYLENOL) rectal suppository 650 mg, Q4H PRN    Adult 3-in-1 TPN (custom base / custom electrolytes), Continuous TPN, Last Rate: 77.7 mL/hr at  02/06/25 2154    Adult 3-in-1 TPN (custom base / custom electrolytes), Continuous TPN    albumin human (FLEXBUMIN) 25 % injection 25 g, Once, Last Rate: Stopped (02/05/25 1636)    albumin human (FLEXBUMIN) 25 % injection 25 g, Once    artificial tear ophthalmic ointment, HS    ceFAZolin (ANCEF) IVPB (premix in dextrose) 1,000 mg 50 mL, Q8H, Last Rate: 1,000 mg (02/07/25 0951)    chlorhexidine (PERIDEX) 0.12 % oral rinse 15 mL, Q12H MARQUIS    dextrose 50 % IV solution 25 mL, Once    dorzolamide-timolol (COSOPT) 2-0.5 % ophthalmic solution 1 drop, BID    heparin (porcine) 25,000 units in 0.45% NaCl 250 mL infusion (premix), Titrated, Last Rate: 14 Units/kg/hr (02/06/25 1939)    heparin (porcine) injection 2,400 Units, Q6H PRN    heparin (porcine) injection 4,800 Units, Once    heparin (porcine) injection 4,800 Units, Q6H PRN    hydrALAZINE (APRESOLINE) injection 5 mg, Q6H PRN    insulin aspart protamine-insulin aspart (NovoLOG 70/30) 100 units/mL subcutaneous injection 10 Units, BID **AND** [DISCONTINUED] insulin aspart protamine-insulin aspart (NovoLOG 70/30) 100 units/mL subcutaneous injection 25 Units, HS    insulin lispro (HumALOG/ADMELOG) 100 units/mL subcutaneous injection 1-5 Units, Q6H MARQUIS **AND** Fingerstick Glucose (POCT), Q6H    Latanoprostene Bunod 0.024 % SOLN 1 drop, HS    metroNIDAZOLE (FLAGYL) IVPB (premix) 500 mg 100 mL, Q8H, Last Rate: 500 mg (02/07/25 1058)    polyethylene glycol (MIRALAX) packet 17 g, Daily    prednisoLONE acetate (PRED FORTE) 1 % ophthalmic suspension 1 drop, TID    scopolamine (TRANSDERM-SCOP) 1 mg/3 days TD 72 hr patch 1 patch, Q72H    [Held by provider] senna-docusate sodium (SENOKOT S) 8.6-50 mg per tablet 1 tablet, BID    tetracaine 0.5 % ophthalmic solution 2 drop, Once    Administrative Statements   Today, Patient Was Seen By: Mercedes Nunez DO  PGY-I  I have spent a total time of >30 minutes in caring for this patient on the day of the visit/encounter including Prognosis, Risks  and benefits of tx options, Instructions for management, Patient and family education, Importance of tx compliance, Risk factor reductions, Impressions, Counseling / Coordination of care, Documenting in the medical record, Reviewing / ordering tests, medicine, procedures  , Obtaining or reviewing history  , and Communicating with other healthcare professionals .    **Please Note: This note may have been constructed using a voice recognition system.**

## 2025-02-07 NOTE — ASSESSMENT & PLAN NOTE
Patient seen and evaluated by palliative care 1/23. Palliative care had an in-depth discussion with family regarding patient's GOC and overall prognosis. Family would NOT like to pursue a PEG tube at this time.   CODE STATUS changed from Level 2 to Level 3 DNR/DNI  Ongoing goals of care discussions with consideration for comfort measures only -- CM aware of family agreement to pursue hospice

## 2025-02-07 NOTE — ASSESSMENT & PLAN NOTE
Per wife, patient has some degree of dementia but is unclear of specifics. Patient oriented to self and place but unaware of year and month. Per wife, unsure if patient is completely aware of what is going on and is unsure if patient would truly want to accept intubation a second time    Plan  Neuropsych evaluation determined patient does NOT have capacity for MDM  Palliative on board. Appreciate recommendations. As of 1/23/2025, family made the decision to make patient's code status LEVEL 3 DNR/DNI.

## 2025-02-08 LAB
ALBUMIN SERPL BCG-MCNC: 2.1 G/DL (ref 3.5–5)
ALP SERPL-CCNC: 153 U/L (ref 34–104)
ALT SERPL W P-5'-P-CCNC: 8 U/L (ref 7–52)
ANION GAP SERPL CALCULATED.3IONS-SCNC: 6 MMOL/L (ref 4–13)
APTT PPP: 109 SECONDS (ref 23–34)
APTT PPP: 111 SECONDS (ref 23–34)
APTT PPP: 46 SECONDS (ref 23–34)
AST SERPL W P-5'-P-CCNC: 63 U/L (ref 13–39)
BILIRUB SERPL-MCNC: 0.9 MG/DL (ref 0.2–1)
BUN SERPL-MCNC: 64 MG/DL (ref 5–25)
CALCIUM ALBUM COR SERPL-MCNC: 9.5 MG/DL (ref 8.3–10.1)
CALCIUM SERPL-MCNC: 8 MG/DL (ref 8.4–10.2)
CHLORIDE SERPL-SCNC: 104 MMOL/L (ref 96–108)
CO2 SERPL-SCNC: 23 MMOL/L (ref 21–32)
CREAT SERPL-MCNC: 2.11 MG/DL (ref 0.6–1.3)
GFR SERPL CREATININE-BSD FRML MDRD: 26 ML/MIN/1.73SQ M
GLUCOSE SERPL-MCNC: 130 MG/DL (ref 65–140)
GLUCOSE SERPL-MCNC: 139 MG/DL (ref 65–140)
GLUCOSE SERPL-MCNC: 150 MG/DL (ref 65–140)
GLUCOSE SERPL-MCNC: 176 MG/DL (ref 65–140)
GLUCOSE SERPL-MCNC: 181 MG/DL (ref 65–140)
GLUCOSE SERPL-MCNC: 354 MG/DL (ref 65–140)
MAGNESIUM SERPL-MCNC: 2.6 MG/DL (ref 1.9–2.7)
POTASSIUM SERPL-SCNC: 4.7 MMOL/L (ref 3.5–5.3)
PROT SERPL-MCNC: 5.2 G/DL (ref 6.4–8.4)
SODIUM SERPL-SCNC: 133 MMOL/L (ref 135–147)

## 2025-02-08 PROCEDURE — 99232 SBSQ HOSP IP/OBS MODERATE 35: CPT | Performed by: INTERNAL MEDICINE

## 2025-02-08 PROCEDURE — 80053 COMPREHEN METABOLIC PANEL: CPT

## 2025-02-08 PROCEDURE — 82948 REAGENT STRIP/BLOOD GLUCOSE: CPT

## 2025-02-08 PROCEDURE — 85730 THROMBOPLASTIN TIME PARTIAL: CPT | Performed by: INTERNAL MEDICINE

## 2025-02-08 PROCEDURE — 83735 ASSAY OF MAGNESIUM: CPT

## 2025-02-08 RX ADMIN — METRONIDAZOLE 500 MG: 500 INJECTION, SOLUTION INTRAVENOUS at 09:09

## 2025-02-08 RX ADMIN — DORZOLAMIDE HYDROCHLORIDE AND TIMOLOL MALEATE 1 DROP: 20; 5 SOLUTION OPHTHALMIC at 09:07

## 2025-02-08 RX ADMIN — LATANOPROSTENE BUNOD 1 DROP: 0.24 SOLUTION/ DROPS OPHTHALMIC at 22:13

## 2025-02-08 RX ADMIN — INSULIN LISPRO 1 UNITS: 100 INJECTION, SOLUTION INTRAVENOUS; SUBCUTANEOUS at 18:39

## 2025-02-08 RX ADMIN — PREDNISOLONE ACETATE 1 DROP: 10 SUSPENSION/ DROPS OPHTHALMIC at 09:08

## 2025-02-08 RX ADMIN — DORZOLAMIDE HYDROCHLORIDE AND TIMOLOL MALEATE 1 DROP: 20; 5 SOLUTION OPHTHALMIC at 17:45

## 2025-02-08 RX ADMIN — PREDNISOLONE ACETATE 1 DROP: 10 SUSPENSION/ DROPS OPHTHALMIC at 22:12

## 2025-02-08 RX ADMIN — METRONIDAZOLE 500 MG: 500 INJECTION, SOLUTION INTRAVENOUS at 18:39

## 2025-02-08 RX ADMIN — CEFAZOLIN SODIUM 1000 MG: 1 SOLUTION INTRAVENOUS at 00:52

## 2025-02-08 RX ADMIN — CEFAZOLIN SODIUM 1000 MG: 1 SOLUTION INTRAVENOUS at 10:19

## 2025-02-08 RX ADMIN — MINERAL OIL, WHITE PETROLATUM: .03; .94 OINTMENT OPHTHALMIC at 22:12

## 2025-02-08 RX ADMIN — INSULIN ASPART 10 UNITS: 100 INJECTION, SUSPENSION SUBCUTANEOUS at 09:08

## 2025-02-08 RX ADMIN — CALCIUM GLUCONATE: 98 INJECTION, SOLUTION INTRAVENOUS at 22:10

## 2025-02-08 RX ADMIN — PREDNISOLONE ACETATE 1 DROP: 10 SUSPENSION/ DROPS OPHTHALMIC at 17:45

## 2025-02-08 RX ADMIN — INSULIN LISPRO 1 UNITS: 100 INJECTION, SOLUTION INTRAVENOUS; SUBCUTANEOUS at 06:14

## 2025-02-08 RX ADMIN — CEFAZOLIN SODIUM 1000 MG: 1 SOLUTION INTRAVENOUS at 17:45

## 2025-02-08 RX ADMIN — INSULIN ASPART 10 UNITS: 100 INJECTION, SUSPENSION SUBCUTANEOUS at 22:11

## 2025-02-08 RX ADMIN — HEPARIN SODIUM 2400 UNITS: 1000 INJECTION INTRAVENOUS; SUBCUTANEOUS at 18:38

## 2025-02-08 NOTE — PROGRESS NOTES
Progress Note - Hospitalist   Name: Jeffry Almanzar 89 y.o. male I MRN: [id number]  Unit/Bed#: S -01 I Date of Admission: 1/17/2025   Date of Service: 2/8/2025 I Hospital Day: 21    Assessment & Plan  Dysphagia  Had numerous discussions regarding goals of care with family and multidisciplinary team. Explained risks versus benefits of PICC line and subsequent TPN, versus pleasure feeds on comfort care. 1/27 pt's wife decided on obtaining PICC for patient and signed informed consent form. Process, risks, and outcomes were reviewed at length with the patient's wife at bedside, alongside Dr. Gtz. All questions/concerns answered. Pt's wife verbalizes understanding and wishes to proceed with PICC line.   1/28 - pt received PICC line and had TPN initiated in the evening  1/29 - concerns for yellow/green sputum, as well as hyperglycemia in the setting of TPN initiation, low grade fever, initiated empiric ABX as pt was a hard stick and unable to obtain blood cx  Lactic acid wnl   1/30 - 1x dose IV Lasix for congestion noted on CXR. COVID/FLU/RSV negative.  Initially failed swallow eval at 1/23, repeat eval done 2/2, patient seems to do a little better with purée by teaspoon however still has a risk of aspiration and may not take enough per speech therapist.  Wife is agreeable to starting diet and see what happens, if respiratory status worsens on PO diet then that would support a feeding tube.  MRSA nares negative  Sputum culture: 3+ growth of Staph aureus, 2+ growth of mixed respiratory idalia. ABX per susceptibilities.   Blood cx x2: no growth at 24h  Likely tracheobronchitis given clinical presentation and hx of respiratory secretions, frequent suctioning, hx of intubation, and prolonged hospitalization    Patient continues to refuse suctioning and oral feeds. Per review of hospice RN note and conversation with multidisciplinary team, pt's wife and daughter would like pt to come home on hospice next week however would  like to continue current plan until discharge. CM aware; anticipating discharge home with hospice.     Plan  Continue IV cefazolin 1g q8h considering culture sensitivities & MRSA negative.  Continue IV Flagyl 500 mg q8h   EKG Qtc 469 ms (per Pharmacy, this is acceptable)   TPN per RD  Scopolamine patch to manage secretions  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  (HCC)  Per RN, no bloody stools or bloody mucus in suction.    UA with 30-50 RBCs, urine dorita however not gross hematuria    Plan  Continue frequent Mcgee flushes  Type 2 diabetes mellitus with stage 4 chronic kidney disease, without long-term current use of insulin ([facility])  Lab Results   Component Value Date    HGBA1C 6.9 (H) 01/18/2025     Recent Labs     02/07/25  1730 02/07/25  2328 02/08/25  0607 02/08/25  1254   POCGLU 137 147* 176* 130     Plan  NPH insulin 10u BID. Will continue to monitor/adjust as necessary.   Continue sliding scale, algorithm 2  Hypoglycemia protocol   Cognitive impairment  Per wife, patient has some degree of dementia but is unclear of specifics. Patient oriented to self and place but unaware of year and month. Per wife, unsure if patient is completely aware of what is going on and is unsure if patient would truly want to accept intubation a second time    Plan  Neuropsych evaluation determined patient does NOT have capacity for MDM  Palliative on board. Appreciate recommendations. As of 1/23/2025, family made the decision to make patient's code status LEVEL 3 DNR/DNI.   Acute saddle pulmonary embolism with acute cor pulmonale (HCC)  Initially with cardiac arrest and cardiogenic shock, is now extubated off pressors. Was transferred  to med/surg 1/21.   MRI brain negative for CVA or concern for hemorrhage.      Continue heparin gtt.  SOFÍA (acute kidney injury) (HCC)  Suspect due to ATN in the setting of cardiogenic shock, cardiac arrest, hypotension and IV contrast exposure with autoregulatory dysfunction with ARB  Per  Nephrology:  Discontinued D5W 1/29 d/t resolution of hypernatremia and initiation of TPN  Continue to hold losartan and Farxiga    Plan  Strict I/Os, daily weights  Avoid nephrotoxins, NSAIDs, further IV contrast as able  Avoid hypotension.  Maintain MAP >65  Cardiac arrest (HCC)  Cardiac arrest in ED after CTA evaluating for pulmonary embolus.  Asystole for 2 minutes requiring CPR and intubation s/P extubation 1/21/2025  ECHO with EF of 65% in September, ECHO with left ventricular ejection fraction is 55% (1/20)    Plan  Continue heparin gtt  Benign 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 ([facility])  Lab Results   Component Value Date    EGFR 26 02/08/2025    EGFR 24 02/07/2025    EGFR 22 02/06/2025    CREATININE 2.11 (H) 02/08/2025    CREATININE 2.25 (H) 02/07/2025    CREATININE 2.41 (H) 02/06/2025   Holding Losartan and Farxiga per nephro due to elevated Cr   Hydralazine 5 mg q 6 hrs for BP >180/110   Initially allowed for permissive hypertension due to concern for stroke.  However, patient's MRI does not show concern for infarct.  Will aim for normotension  Palliative care encounter  Patient seen and evaluated by palliative care 1/23. Palliative care had an in-depth discussion with family regarding patient's GOC and overall prognosis. Family would NOT like to pursue a PEG tube at this time.   CODE STATUS changed from Level 2 to Level 3 DNR/DNI  Ongoing goals of care discussions with consideration for comfort measures only -- CM aware of family agreement to pursue hospice  Goals of care, counseling/discussion  Palliative on board. Appreciate recommendations   CKD (chronic kidney disease) stage 4, GFR 15-29 ml/min ([facility])  Lab Results   Component Value Date    EGFR 26 02/08/2025    EGFR 24 02/07/2025    EGFR 22 02/06/2025    CREATININE 2.11 (H) 02/08/2025    CREATININE 2.25 (H) 02/07/2025    CREATININE 2.41 (H) 02/06/2025   Per patient and patient's wife, would not be interested in dialysis   Nephro had  been consulted for management; signed off.  Severe protein-calorie malnutrition (HCC)  Malnutrition Findings:   Adult Malnutrition type: Acute illness  Adult Degree of Malnutrition: Other severe protein calorie malnutrition  Malnutrition Characteristics: Muscle loss, Inadequate energy, Weight loss, Fat loss              360 Statement: related to dysphagia with recommendation for NPO, pt with > 7 days of less than 50% energy intake compared to estimated needs, muscle, fat wasting, wasted buccal pads, temporalis, supraclavicular space, interosseous. Treatment. Enteral nutrition via feeding tube is indicated for nutrition support- wife is declining nutrition via NGT or PEG at this time. Ongoing goals of care discussion noted.  BMI Findings:        Body mass index is 22.05 kg/m².     Pt w PICC and TPN. Per RD, TPN as above under principal problem.     VTE Pharmacologic Prophylaxis: VTE Score: 6 High Risk (Score >/= 5) - Pharmacological DVT Prophylaxis Ordered: heparin drip. Sequential Compression Devices Ordered.    Mobility:   Basic Mobility Inpatient Raw Score: 6  JH-HLM Goal: 2: Bed activities/Dependent transfer  JH-HLM Achieved: 2: Bed activities/Dependent transfer  JH-HLM Goal NOT achieved. Continue with multidisciplinary rounding and encourage appropriate mobility to improve upon JH-HLM goals.    Patient Centered Rounds: I performed bedside rounds with nursing staff today.   Discussions with Specialists or Other Care Team Provider: Nutrition, Pharmacy    Education and Discussions with Family / Patient: Updated  (wife) at bedside.    Current Length of Stay: 21 day(s)  Current Patient Status: Inpatient   Certification Statement: The patient will continue to require additional inpatient hospital stay due to anticipated discharge home with hospice.  Discharge Plan: Anticipate discharge in >72 hrs to home with hospice.    Code Status: Level 3 - DNAR and DNI    Subjective   Pt seen and evaluated at bedside  this morning.  Per report, no acute events overnight.  He remains asleep throughout the encounter.  He is on room air.  Continues to be orally guarded, refuses suctioning.  Mcgee in place.     Objective :  Temp:  [99.1 °F (37.3 °C)-100.1 °F (37.8 °C)] 99.1 °F (37.3 °C)  HR:  [89-97] 97  BP: (104-158)/(69-88) 104/77  Resp:  [16-18] 18  SpO2:  [94 %-97 %] 96 %  O2 Device: None (Room air)    Body mass index is 22.05 kg/m².     Input and Output Summary (last 24 hours):     Intake/Output Summary (Last 24 hours) at 2/8/2025 1452  Last data filed at 2/8/2025 0613  Gross per 24 hour   Intake --   Output 1600 ml   Net -1600 ml       Physical Exam  Vitals reviewed.   Constitutional:       General: He is sleeping.      Appearance: He is cachectic. He is ill-appearing.      Interventions: Nasal cannula in place.   HENT:      Head: Normocephalic and atraumatic.      Right Ear: External ear normal.      Left Ear: External ear normal.      Mouth/Throat:      Mouth: Mucous membranes are dry.   Eyes:      General: No scleral icterus.     Extraocular Movements: Extraocular movements intact.   Cardiovascular:      Rate and Rhythm: Tachycardia present.      Pulses: Normal pulses.   Pulmonary:      Effort: No accessory muscle usage.      Breath sounds: Rales present.   Chest:      Chest wall: No tenderness.   Abdominal:      Tenderness: There is no abdominal tenderness. There is no guarding.   Skin:     General: Skin is warm.      Capillary Refill: Capillary refill takes 2 to 3 seconds.      Coloration: Skin is not jaundiced.   Neurological:      Motor: Weakness present.         Lines/Drains:  Lines/Drains/Airways       Active Status       Name Placement date Placement time Site Days    PICC Line 01/28/25 Right Brachial 01/28/25  0930  Brachial  11    Urethral Catheter 01/17/25  0000  --  22                  Central Line:  Goal for removal: Continuing for TPN.                Lab Results: I have reviewed the following results:   Results  from last 7 days   Lab Units 02/07/25  0625 02/03/25  1307 02/03/25  0755   WBC Thousand/uL 14.23*   < > 16.40*   HEMOGLOBIN g/dL 7.3*   < > 7.2*   HEMATOCRIT % 22.3*   < > 22.6*   PLATELETS Thousands/uL 185   < > 162   BANDS PCT %  --   --  2   LYMPHO PCT %  --   --  3*   MONO PCT %  --   --  2*   EOS PCT %  --   --  0    < > = values in this interval not displayed.     Results from last 7 days   Lab Units 02/08/25  0827   SODIUM mmol/L 133*   POTASSIUM mmol/L 4.7   CHLORIDE mmol/L 104   CO2 mmol/L 23   BUN mg/dL 64*   CREATININE mg/dL 2.11*   ANION GAP mmol/L 6   CALCIUM mg/dL 8.0*   ALBUMIN g/dL 2.1*   TOTAL BILIRUBIN mg/dL 0.90   ALK PHOS U/L 153*   ALT U/L 8   AST U/L 63*   GLUCOSE RANDOM mg/dL 354*     Results from last 7 days   Lab Units 02/07/25  0625   INR  1.44*     Results from last 7 days   Lab Units 02/08/25  1254 02/08/25  0607 02/07/25  2328 02/07/25  1730 02/07/25  1508 02/07/25  1106 02/07/25  0949 02/07/25  0741 02/07/25  0618 02/07/25  0104 02/06/25  2130 02/06/25  1804   POC GLUCOSE mg/dl 130 176* 147* 137 116 172* 170* 164* 155* 179* 199* 172*         Results from last 7 days   Lab Units 02/04/25  0839 02/03/25  0644 02/02/25  0525   PROCALCITONIN ng/ml 13.13* 18.86* 17.97*       Recent Cultures (last 7 days):           Imaging Results Review: I reviewed radiology reports from this admission including: CT head, CTA PE, CXR, MRI brain, MRA head, LL venous duplex, VBS.    Last 24 Hours Medication List:     Current Facility-Administered Medications:     acetaminophen (TYLENOL) rectal suppository 650 mg, Q4H PRN    Adult 3-in-1 TPN (custom base / custom electrolytes), Continuous TPN, Last Rate: 77.7 mL/hr at 02/07/25 2106    Adult 3-in-1 TPN (custom base / custom electrolytes), Continuous TPN    albumin human (FLEXBUMIN) 25 % injection 25 g, Once, Last Rate: Stopped (02/05/25 1636)    artificial tear ophthalmic ointment, HS    ceFAZolin (ANCEF) IVPB (premix in dextrose) 1,000 mg 50 mL, Q8H, Last  Rate: 1,000 mg (02/08/25 1019)    chlorhexidine (PERIDEX) 0.12 % oral rinse 15 mL, Q12H MARQUIS    dextrose 50 % IV solution 25 mL, Once    dorzolamide-timolol (COSOPT) 2-0.5 % ophthalmic solution 1 drop, BID    heparin (porcine) 25,000 units in 0.45% NaCl 250 mL infusion (premix), Titrated, Last Rate: 12 Units/kg/hr (02/08/25 0905)    heparin (porcine) injection 2,400 Units, Q6H PRN    heparin (porcine) injection 4,800 Units, Once    heparin (porcine) injection 4,800 Units, Q6H PRN    hydrALAZINE (APRESOLINE) injection 5 mg, Q6H PRN    insulin aspart protamine-insulin aspart (NovoLOG 70/30) 100 units/mL subcutaneous injection 10 Units, BID **AND** [DISCONTINUED] insulin aspart protamine-insulin aspart (NovoLOG 70/30) 100 units/mL subcutaneous injection 25 Units, HS    insulin lispro (HumALOG/ADMELOG) 100 units/mL subcutaneous injection 1-5 Units, Q6H MARQUIS **AND** Fingerstick Glucose (POCT), Q6H    Latanoprostene Bunod 0.024 % SOLN 1 drop, HS    metroNIDAZOLE (FLAGYL) IVPB (premix) 500 mg 100 mL, Q8H, Last Rate: 500 mg (02/08/25 0909)    polyethylene glycol (MIRALAX) packet 17 g, Daily    prednisoLONE acetate (PRED FORTE) 1 % ophthalmic suspension 1 drop, TID    scopolamine (TRANSDERM-SCOP) 1 mg/3 days TD 72 hr patch 1 patch, Q72H    [Held by provider] senna-docusate sodium (SENOKOT S) 8.6-50 mg per tablet 1 tablet, BID    tetracaine 0.5 % ophthalmic solution 2 drop, Once    Administrative Statements   Today, Patient Was Seen By: Mercedes Nunez DO  PGY-I  I have spent a total time of >30 minutes in caring for this patient on the day of the visit/encounter including Prognosis, Risks and benefits of tx options, Instructions for management, Patient and family education, Importance of tx compliance, Risk factor reductions, Impressions, Counseling / Coordination of care, Documenting in the medical record, Reviewing / ordering tests, medicine, procedures  , Obtaining or reviewing history  , and Communicating with other  healthcare professionals .    **Please Note: This note may have been constructed using a voice recognition system.**

## 2025-02-08 NOTE — ASSESSMENT & PLAN NOTE
Lab Results   Component Value Date    EGFR 26 02/08/2025    EGFR 24 02/07/2025    EGFR 22 02/06/2025    CREATININE 2.11 (H) 02/08/2025    CREATININE 2.25 (H) 02/07/2025    CREATININE 2.41 (H) 02/06/2025   Per patient and patient's wife, would not be interested in dialysis   Nephro had been consulted for management; signed off.

## 2025-02-08 NOTE — ASSESSMENT & PLAN NOTE
Had numerous discussions regarding goals of care with family and multidisciplinary team. Explained risks versus benefits of PICC line and subsequent TPN, versus pleasure feeds on comfort care. 1/27 pt's wife decided on obtaining PICC for patient and signed informed consent form. Process, risks, and outcomes were reviewed at length with the patient's wife at bedside, alongside Dr. Gtz. All questions/concerns answered. Pt's wife verbalizes understanding and wishes to proceed with PICC line.   1/28 - pt received PICC line and had TPN initiated in the evening  1/29 - concerns for yellow/green sputum, as well as hyperglycemia in the setting of TPN initiation, low grade fever, initiated empiric ABX as pt was a hard stick and unable to obtain blood cx  Lactic acid wnl   1/30 - 1x dose IV Lasix for congestion noted on CXR. COVID/FLU/RSV negative.  Initially failed swallow eval at 1/23, repeat eval done 2/2, patient seems to do a little better with purée by teaspoon however still has a risk of aspiration and may not take enough per speech therapist.  Wife is agreeable to starting diet and see what happens, if respiratory status worsens on PO diet then that would support a feeding tube.  MRSA nares negative  Sputum culture: 3+ growth of Staph aureus, 2+ growth of mixed respiratory idalia. ABX per susceptibilities.   Blood cx x2: no growth at 24h  Likely tracheobronchitis given clinical presentation and hx of respiratory secretions, frequent suctioning, hx of intubation, and prolonged hospitalization    Patient continues to refuse suctioning and oral feeds. Per review of hospice RN note and conversation with multidisciplinary team, pt's wife and daughter would like pt to come home on hospice next week however would like to continue current plan until discharge. CM aware; anticipating discharge home with hospice.     Plan  Continue IV cefazolin 1g q8h considering culture sensitivities & MRSA negative.  Continue IV Flagyl 500 mg  q8h   EKG Qtc 469 ms (per Pharmacy, this is acceptable)   TPN per RD  Scopolamine patch to manage secretions

## 2025-02-08 NOTE — ASSESSMENT & PLAN NOTE
Malnutrition Findings:   Adult Malnutrition type: Acute illness  Adult Degree of Malnutrition: Other severe protein calorie malnutrition  Malnutrition Characteristics: Muscle loss, Inadequate energy, Weight loss, Fat loss              360 Statement: related to dysphagia with recommendation for NPO, pt with > 7 days of less than 50% energy intake compared to estimated needs, muscle, fat wasting, wasted buccal pads, temporalis, supraclavicular space, interosseous. Treatment. Enteral nutrition via feeding tube is indicated for nutrition support- wife is declining nutrition via NGT or PEG at this time. Ongoing goals of care discussion noted.  BMI Findings:        Body mass index is 22.05 kg/m².     Pt w PICC and TPN. Per RD, TPN as above under principal problem.

## 2025-02-08 NOTE — ASSESSMENT & PLAN NOTE
Per RN, no bloody stools or bloody mucus in suction.    UA with 30-50 RBCs, urine dorita however not gross hematuria    Plan  Continue frequent Mcgee flushes

## 2025-02-08 NOTE — ASSESSMENT & PLAN NOTE
Lab Results   Component Value Date    HGBA1C 6.9 (H) 01/18/2025     Recent Labs     02/07/25  1730 02/07/25  2328 02/08/25  0607 02/08/25  1254   POCGLU 137 147* 176* 130     Plan  NPH insulin 10u BID. Will continue to monitor/adjust as necessary.   Continue sliding scale, algorithm 2  Hypoglycemia protocol

## 2025-02-08 NOTE — ASSESSMENT & PLAN NOTE
Lab Results   Component Value Date    EGFR 26 02/08/2025    EGFR 24 02/07/2025    EGFR 22 02/06/2025    CREATININE 2.11 (H) 02/08/2025    CREATININE 2.25 (H) 02/07/2025    CREATININE 2.41 (H) 02/06/2025   Holding Losartan and Farxiga per nephro due to elevated Cr   Hydralazine 5 mg q 6 hrs for BP >180/110   Initially allowed for permissive hypertension due to concern for stroke.  However, patient's MRI does not show concern for infarct.  Will aim for normotension

## 2025-02-09 LAB
ALBUMIN SERPL BCG-MCNC: 2.1 G/DL (ref 3.5–5)
ALBUMIN SERPL BCG-MCNC: 2.2 G/DL (ref 3.5–5)
ALP SERPL-CCNC: 133 U/L (ref 34–104)
ALP SERPL-CCNC: 140 U/L (ref 34–104)
ALT SERPL W P-5'-P-CCNC: 5 U/L (ref 7–52)
ALT SERPL W P-5'-P-CCNC: 5 U/L (ref 7–52)
ANION GAP SERPL CALCULATED.3IONS-SCNC: 4 MMOL/L (ref 4–13)
ANION GAP SERPL CALCULATED.3IONS-SCNC: 6 MMOL/L (ref 4–13)
ANISOCYTOSIS BLD QL SMEAR: PRESENT
ANISOCYTOSIS BLD QL SMEAR: PRESENT
APTT PPP: 110 SECONDS (ref 23–34)
APTT PPP: 57 SECONDS (ref 23–34)
APTT PPP: 59 SECONDS (ref 23–34)
AST SERPL W P-5'-P-CCNC: 40 U/L (ref 13–39)
AST SERPL W P-5'-P-CCNC: 40 U/L (ref 13–39)
BASOPHILS # BLD MANUAL: 0 THOUSAND/UL (ref 0–0.1)
BASOPHILS # BLD MANUAL: 0 THOUSAND/UL (ref 0–0.1)
BASOPHILS NFR MAR MANUAL: 0 % (ref 0–1)
BASOPHILS NFR MAR MANUAL: 0 % (ref 0–1)
BILIRUB SERPL-MCNC: 0.85 MG/DL (ref 0.2–1)
BILIRUB SERPL-MCNC: 0.89 MG/DL (ref 0.2–1)
BUN SERPL-MCNC: 61 MG/DL (ref 5–25)
BUN SERPL-MCNC: 63 MG/DL (ref 5–25)
CALCIUM ALBUM COR SERPL-MCNC: 9.5 MG/DL (ref 8.3–10.1)
CALCIUM ALBUM COR SERPL-MCNC: 9.6 MG/DL (ref 8.3–10.1)
CALCIUM SERPL-MCNC: 8 MG/DL (ref 8.4–10.2)
CALCIUM SERPL-MCNC: 8.2 MG/DL (ref 8.4–10.2)
CHLORIDE SERPL-SCNC: 104 MMOL/L (ref 96–108)
CHLORIDE SERPL-SCNC: 109 MMOL/L (ref 96–108)
CO2 SERPL-SCNC: 23 MMOL/L (ref 21–32)
CO2 SERPL-SCNC: 24 MMOL/L (ref 21–32)
CREAT SERPL-MCNC: 1.97 MG/DL (ref 0.6–1.3)
CREAT SERPL-MCNC: 2.19 MG/DL (ref 0.6–1.3)
EOSINOPHIL # BLD MANUAL: 0.43 THOUSAND/UL (ref 0–0.4)
EOSINOPHIL # BLD MANUAL: 0.6 THOUSAND/UL (ref 0–0.4)
EOSINOPHIL NFR BLD MANUAL: 3 % (ref 0–6)
EOSINOPHIL NFR BLD MANUAL: 4 % (ref 0–6)
ERYTHROCYTE [DISTWIDTH] IN BLOOD BY AUTOMATED COUNT: 17.4 % (ref 11.6–15.1)
ERYTHROCYTE [DISTWIDTH] IN BLOOD BY AUTOMATED COUNT: 17.8 % (ref 11.6–15.1)
GFR SERPL CREATININE-BSD FRML MDRD: 25 ML/MIN/1.73SQ M
GFR SERPL CREATININE-BSD FRML MDRD: 29 ML/MIN/1.73SQ M
GIANT PLATELETS BLD QL SMEAR: PRESENT
GLUCOSE SERPL-MCNC: 131 MG/DL (ref 65–140)
GLUCOSE SERPL-MCNC: 148 MG/DL (ref 65–140)
GLUCOSE SERPL-MCNC: 161 MG/DL (ref 65–140)
GLUCOSE SERPL-MCNC: 162 MG/DL (ref 65–140)
GLUCOSE SERPL-MCNC: 164 MG/DL (ref 65–140)
GLUCOSE SERPL-MCNC: 165 MG/DL (ref 65–140)
GLUCOSE SERPL-MCNC: 351 MG/DL (ref 65–140)
HCT VFR BLD AUTO: 21.5 % (ref 36.5–49.3)
HCT VFR BLD AUTO: 23.2 % (ref 36.5–49.3)
HGB BLD-MCNC: 6.8 G/DL (ref 12–17)
HGB BLD-MCNC: 7.5 G/DL (ref 12–17)
HYPERCHROMIA BLD QL SMEAR: PRESENT
HYPERCHROMIA BLD QL SMEAR: PRESENT
LYMPHOCYTES # BLD AUTO: 0.43 THOUSAND/UL (ref 0.6–4.47)
LYMPHOCYTES # BLD AUTO: 0.6 THOUSAND/UL (ref 0.6–4.47)
LYMPHOCYTES # BLD AUTO: 3 % (ref 14–44)
LYMPHOCYTES # BLD AUTO: 4 % (ref 14–44)
MCH RBC QN AUTO: 29.8 PG (ref 26.8–34.3)
MCH RBC QN AUTO: 29.9 PG (ref 26.8–34.3)
MCHC RBC AUTO-ENTMCNC: 31.6 G/DL (ref 31.4–37.4)
MCHC RBC AUTO-ENTMCNC: 32.3 G/DL (ref 31.4–37.4)
MCV RBC AUTO: 92 FL (ref 82–98)
MCV RBC AUTO: 94 FL (ref 82–98)
MONOCYTES # BLD AUTO: 0.57 THOUSAND/UL (ref 0–1.22)
MONOCYTES # BLD AUTO: 0.9 THOUSAND/UL (ref 0–1.22)
MONOCYTES NFR BLD: 4 % (ref 4–12)
MONOCYTES NFR BLD: 6 % (ref 4–12)
MYELOCYTE ABSOLUTE CT: 0.14 THOUSAND/UL (ref 0–0.1)
MYELOCYTE ABSOLUTE CT: 0.15 THOUSAND/UL (ref 0–0.1)
MYELOCYTES NFR BLD MANUAL: 1 % (ref 0–1)
MYELOCYTES NFR BLD MANUAL: 1 % (ref 0–1)
NEUTROPHILS # BLD MANUAL: 12.71 THOUSAND/UL (ref 1.85–7.62)
NEUTROPHILS # BLD MANUAL: 12.74 THOUSAND/UL (ref 1.85–7.62)
NEUTS BAND NFR BLD MANUAL: 1 % (ref 0–8)
NEUTS SEG NFR BLD AUTO: 84 % (ref 43–75)
NEUTS SEG NFR BLD AUTO: 89 % (ref 43–75)
OVALOCYTES BLD QL SMEAR: PRESENT
OVALOCYTES BLD QL SMEAR: PRESENT
PLATELET # BLD AUTO: 216 THOUSANDS/UL (ref 149–390)
PLATELET # BLD AUTO: 220 THOUSANDS/UL (ref 149–390)
PLATELET BLD QL SMEAR: ADEQUATE
PLATELET BLD QL SMEAR: ADEQUATE
PLATELET CLUMP BLD QL SMEAR: PRESENT
PMV BLD AUTO: 11.3 FL (ref 8.9–12.7)
PMV BLD AUTO: 12.2 FL (ref 8.9–12.7)
POIKILOCYTOSIS BLD QL SMEAR: PRESENT
POIKILOCYTOSIS BLD QL SMEAR: PRESENT
POLYCHROMASIA BLD QL SMEAR: PRESENT
POLYCHROMASIA BLD QL SMEAR: PRESENT
POTASSIUM SERPL-SCNC: 4.2 MMOL/L (ref 3.5–5.3)
POTASSIUM SERPL-SCNC: 4.8 MMOL/L (ref 3.5–5.3)
PROT SERPL-MCNC: 5.2 G/DL (ref 6.4–8.4)
PROT SERPL-MCNC: 5.6 G/DL (ref 6.4–8.4)
RBC # BLD AUTO: 2.28 MILLION/UL (ref 3.88–5.62)
RBC # BLD AUTO: 2.51 MILLION/UL (ref 3.88–5.62)
RBC MORPH BLD: PRESENT
RBC MORPH BLD: PRESENT
SODIUM SERPL-SCNC: 133 MMOL/L (ref 135–147)
SODIUM SERPL-SCNC: 137 MMOL/L (ref 135–147)
WBC # BLD AUTO: 14.31 THOUSAND/UL (ref 4.31–10.16)
WBC # BLD AUTO: 14.95 THOUSAND/UL (ref 4.31–10.16)

## 2025-02-09 PROCEDURE — 85730 THROMBOPLASTIN TIME PARTIAL: CPT | Performed by: INTERNAL MEDICINE

## 2025-02-09 PROCEDURE — 80053 COMPREHEN METABOLIC PANEL: CPT

## 2025-02-09 PROCEDURE — 85007 BL SMEAR W/DIFF WBC COUNT: CPT

## 2025-02-09 PROCEDURE — 82948 REAGENT STRIP/BLOOD GLUCOSE: CPT

## 2025-02-09 PROCEDURE — 85027 COMPLETE CBC AUTOMATED: CPT

## 2025-02-09 PROCEDURE — 99232 SBSQ HOSP IP/OBS MODERATE 35: CPT | Performed by: INTERNAL MEDICINE

## 2025-02-09 RX ORDER — ALBUMIN (HUMAN) 12.5 G/50ML
25 SOLUTION INTRAVENOUS ONCE
Status: DISCONTINUED | OUTPATIENT
Start: 2025-02-09 | End: 2025-02-09

## 2025-02-09 RX ADMIN — INSULIN ASPART 10 UNITS: 100 INJECTION, SUSPENSION SUBCUTANEOUS at 08:42

## 2025-02-09 RX ADMIN — CEFAZOLIN SODIUM 1000 MG: 1 SOLUTION INTRAVENOUS at 00:48

## 2025-02-09 RX ADMIN — INSULIN LISPRO 1 UNITS: 100 INJECTION, SOLUTION INTRAVENOUS; SUBCUTANEOUS at 18:26

## 2025-02-09 RX ADMIN — HEPARIN SODIUM 2400 UNITS: 1000 INJECTION INTRAVENOUS; SUBCUTANEOUS at 18:05

## 2025-02-09 RX ADMIN — HEPARIN SODIUM 2400 UNITS: 1000 INJECTION INTRAVENOUS; SUBCUTANEOUS at 10:03

## 2025-02-09 RX ADMIN — HEPARIN SODIUM 12 UNITS/KG/HR: 10000 INJECTION, SOLUTION INTRAVENOUS at 18:05

## 2025-02-09 RX ADMIN — METRONIDAZOLE 500 MG: 500 INJECTION, SOLUTION INTRAVENOUS at 02:03

## 2025-02-09 RX ADMIN — CEFAZOLIN SODIUM 1000 MG: 1 SOLUTION INTRAVENOUS at 08:36

## 2025-02-09 RX ADMIN — METRONIDAZOLE 500 MG: 500 INJECTION, SOLUTION INTRAVENOUS at 10:01

## 2025-02-09 RX ADMIN — MINERAL OIL, WHITE PETROLATUM: .03; .94 OINTMENT OPHTHALMIC at 21:52

## 2025-02-09 RX ADMIN — INSULIN ASPART 10 UNITS: 100 INJECTION, SUSPENSION SUBCUTANEOUS at 21:40

## 2025-02-09 RX ADMIN — PREDNISOLONE ACETATE 1 DROP: 10 SUSPENSION/ DROPS OPHTHALMIC at 21:41

## 2025-02-09 RX ADMIN — CALCIUM GLUCONATE: 98 INJECTION, SOLUTION INTRAVENOUS at 21:33

## 2025-02-09 NOTE — PROGRESS NOTES
Progress Note - Hospitalist   Name: Jeffry Almanzar 89 y.o. male I MRN: [id number]  Unit/Bed#: S -01 I Date of Admission: 1/17/2025   Date of Service: 2/9/2025 I Hospital Day: 22    Assessment & Plan  Dysphagia  Had numerous discussions regarding goals of care with family and multidisciplinary team. Explained risks versus benefits of PICC line and subsequent TPN, versus pleasure feeds on comfort care. 1/27 pt's wife decided on obtaining PICC for patient and signed informed consent form. Process, risks, and outcomes were reviewed at length with the patient's wife at bedside, alongside Dr. Gtz. All questions/concerns answered. Pt's wife verbalizes understanding and wishes to proceed with PICC line.   1/28 - pt received PICC line and had TPN initiated in the evening  1/29 - concerns for yellow/green sputum, as well as hyperglycemia in the setting of TPN initiation, low grade fever, initiated empiric ABX as pt was a hard stick and unable to obtain blood cx  Lactic acid wnl   1/30 - 1x dose IV Lasix for congestion noted on CXR. COVID/FLU/RSV negative.  Initially failed swallow eval at 1/23, repeat eval done 2/2, patient seems to do a little better with purée by teaspoon however still has a risk of aspiration and may not take enough per speech therapist.  Wife is agreeable to starting diet and see what happens, if respiratory status worsens on PO diet then that would support a feeding tube.  MRSA nares negative  Sputum culture: 3+ growth of Staph aureus, 2+ growth of mixed respiratory idalia. ABX per susceptibilities.   Blood cx x2: no growth at 24h  Likely tracheobronchitis given clinical presentation and hx of respiratory secretions, frequent suctioning, hx of intubation, and prolonged hospitalization    Patient continues to refuse suctioning and oral feeds. Per review of hospice RN note and conversation with multidisciplinary team, pt's wife and daughter would like pt to come home on hospice next week however would  like to continue current plan until discharge. CM aware; anticipating discharge home with hospice.     Plan  Continue IV cefazolin 1g q8h considering culture sensitivities & MRSA negative.  Continue IV Flagyl 500 mg q8h   EKG Qtc 469 ms (per Pharmacy, this is acceptable)   TPN per RD  Scopolamine patch to manage secretions  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  (HCC)  Per RN, no bloody stools or bloody mucus in suction.    UA with 30-50 RBCs, urine dorita however not gross hematuria    Plan  Continue frequent Mcgee flushes  Type 2 diabetes mellitus with stage 4 chronic kidney disease, without long-term current use of insulin ([facility])  Lab Results   Component Value Date    HGBA1C 6.9 (H) 01/18/2025     Recent Labs     02/08/25  2337 02/09/25  0617 02/09/25  0731 02/09/25  1109   POCGLU 139 148* 164* 131     Plan  NPH insulin 10u BID. Will continue to monitor/adjust as necessary.   Continue sliding scale, algorithm 2  Hypoglycemia protocol   Cognitive impairment  Per wife, patient has some degree of dementia but is unclear of specifics. Patient oriented to self and place but unaware of year and month. Per wife, unsure if patient is completely aware of what is going on and is unsure if patient would truly want to accept intubation a second time    Plan  Neuropsych evaluation determined patient does NOT have capacity for MDM  Palliative on board. Appreciate recommendations. As of 1/23/2025, family made the decision to make patient's code status LEVEL 3 DNR/DNI.   Acute saddle pulmonary embolism with acute cor pulmonale (HCC)  Initially with cardiac arrest and cardiogenic shock, is now extubated off pressors. Was transferred  to med/surg 1/21.   MRI brain negative for CVA or concern for hemorrhage.      Continue heparin gtt.  SOFÍA (acute kidney injury) (HCC)  Suspect due to ATN in the setting of cardiogenic shock, cardiac arrest, hypotension and IV contrast exposure with autoregulatory dysfunction with ARB  Per  Nephrology:  Discontinued D5W 1/29 d/t resolution of hypernatremia and initiation of TPN  Continue to hold losartan and Farxiga    Plan  Strict I/Os, daily weights  Avoid nephrotoxins, NSAIDs, further IV contrast as able  Avoid hypotension.  Maintain MAP >65  Cardiac arrest (HCC)  Cardiac arrest in ED after CTA evaluating for pulmonary embolus.  Asystole for 2 minutes requiring CPR and intubation s/P extubation 1/21/2025  ECHO with EF of 65% in September, ECHO with left ventricular ejection fraction is 55% (1/20)    Plan  Continue heparin gtt  Benign 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 ([facility])  Lab Results   Component Value Date    EGFR 25 02/09/2025    EGFR 29 02/09/2025    EGFR 26 02/08/2025    CREATININE 2.19 (H) 02/09/2025    CREATININE 1.97 (H) 02/09/2025    CREATININE 2.11 (H) 02/08/2025   Holding Losartan and Farxiga per nephro due to elevated Cr   Hydralazine 5 mg q 6 hrs for BP >180/110   Initially allowed for permissive hypertension due to concern for stroke.  However, patient's MRI does not show concern for infarct.  Will aim for normotension  Palliative care encounter  Patient seen and evaluated by palliative care 1/23. Palliative care had an in-depth discussion with family regarding patient's GOC and overall prognosis. Family would NOT like to pursue a PEG tube at this time.   CODE STATUS changed from Level 2 to Level 3 DNR/DNI  Ongoing goals of care discussions with consideration for comfort measures only -- CM aware of family agreement to pursue hospice  Goals of care, counseling/discussion  Palliative on board. Appreciate recommendations   CKD (chronic kidney disease) stage 4, GFR 15-29 ml/min ([facility])  Lab Results   Component Value Date    EGFR 25 02/09/2025    EGFR 29 02/09/2025    EGFR 26 02/08/2025    CREATININE 2.19 (H) 02/09/2025    CREATININE 1.97 (H) 02/09/2025    CREATININE 2.11 (H) 02/08/2025   Per patient and patient's wife, would not be interested in dialysis   Nephro had  been consulted for management; signed off.  Severe protein-calorie malnutrition (HCC)  Malnutrition Findings:   Adult Malnutrition type: Acute illness  Adult Degree of Malnutrition: Other severe protein calorie malnutrition  Malnutrition Characteristics: Muscle loss, Inadequate energy, Weight loss, Fat loss              360 Statement: related to dysphagia with recommendation for NPO, pt with > 7 days of less than 50% energy intake compared to estimated needs, muscle, fat wasting, wasted buccal pads, temporalis, supraclavicular space, interosseous. Treatment. Enteral nutrition via feeding tube is indicated for nutrition support- wife is declining nutrition via NGT or PEG at this time. Ongoing goals of care discussion noted.  BMI Findings:        Body mass index is 22.05 kg/m².     Pt w PICC and TPN. Per RD, TPN as above under principal problem.     VTE Pharmacologic Prophylaxis: VTE Score: 6 High Risk (Score >/= 5) - Pharmacological DVT Prophylaxis Ordered: heparin drip. Sequential Compression Devices Ordered.    Mobility:   Basic Mobility Inpatient Raw Score: 6  JH-HLM Goal: 2: Bed activities/Dependent transfer  JH-HLM Achieved: 2: Bed activities/Dependent transfer  JH-HLM Goal NOT achieved. Continue with multidisciplinary rounding and encourage appropriate mobility to improve upon JH-HLM goals.    Patient Centered Rounds: I performed bedside rounds with nursing staff today.   Discussions with Specialists or Other Care Team Provider: Palliative care    Education and Discussions with Family / Patient: Updated  (wife) at bedside.    Current Length of Stay: 22 day(s)  Current Patient Status: Inpatient   Certification Statement: The patient will continue to require additional inpatient hospital stay due to waiting for home with home hospice placement  Discharge Plan: Anticipate discharge in 24-48 hrs to home with home services.    Code Status: Level 3 - DNAR and DNI    Subjective   No significant overnight  events.  In the morning, patient's lab results were highly skewed because the blood was drawn on the same arm where fluid was running.  Repeated blood work was unremarkable compared to the baseline.    Objective :  Temp:  [98.1 °F (36.7 °C)-99.4 °F (37.4 °C)] 99.3 °F (37.4 °C)  HR:  [] 107  BP: (134-160)/(70-91) 160/91  Resp:  [15-16] 15  SpO2:  [91 %-95 %] 91 %  O2 Device: Nasal cannula  Nasal Cannula O2 Flow Rate (L/min):  [3 L/min] 3 L/min    Body mass index is 22.05 kg/m².     Input and Output Summary (last 24 hours):     Intake/Output Summary (Last 24 hours) at 2/9/2025 1312  Last data filed at 2/8/2025 2210  Gross per 24 hour   Intake 90 ml   Output 950 ml   Net -860 ml       Physical Exam  Vitals and nursing note reviewed.   Constitutional:       General: He is sleeping. He is not in acute distress.     Appearance: He is cachectic. He is ill-appearing. He is not toxic-appearing or diaphoretic.      Interventions: Nasal cannula in place.   HENT:      Head: Normocephalic and atraumatic.      Right Ear: External ear normal.      Left Ear: External ear normal.      Mouth/Throat:      Mouth: Mucous membranes are dry.   Eyes:      General: No scleral icterus.     Extraocular Movements: Extraocular movements intact.   Cardiovascular:      Rate and Rhythm: Regular rhythm. Tachycardia present.      Pulses: Normal pulses.      Heart sounds: No murmur heard.     No friction rub. No gallop.   Pulmonary:      Effort: No accessory muscle usage or respiratory distress.      Breath sounds: No stridor. Rales present. No wheezing or rhonchi.   Chest:      Chest wall: No tenderness.   Abdominal:      General: There is no distension.      Tenderness: There is no abdominal tenderness. There is no guarding.   Skin:     General: Skin is warm.      Capillary Refill: Capillary refill takes 2 to 3 seconds.      Coloration: Skin is not jaundiced.   Neurological:      Mental Status: He is disoriented.      Motor: Weakness present.            Lines/Drains:  Lines/Drains/Airways       Active Status       Name Placement date Placement time Site Days    PICC Line 01/28/25 Right Brachial 01/28/25  0930  Brachial  12    Urethral Catheter 01/17/25  0000  --  23                  Urinary Catheter:  Goal for removal: Voiding trial when ambulation improves         Central Line:  Goal for removal: Continuing for TPN.                Lab Results: I have reviewed the following results:   Results from last 7 days   Lab Units 02/09/25  0836   WBC Thousand/uL 14.95*   HEMOGLOBIN g/dL 7.5*   HEMATOCRIT % 23.2*   PLATELETS Thousands/uL 220   BANDS PCT % 1   LYMPHO PCT % 4*   MONO PCT % 6   EOS PCT % 4     Results from last 7 days   Lab Units 02/09/25  0836   SODIUM mmol/L 137   POTASSIUM mmol/L 4.2   CHLORIDE mmol/L 109*   CO2 mmol/L 24   BUN mg/dL 63*   CREATININE mg/dL 2.19*   ANION GAP mmol/L 4   CALCIUM mg/dL 8.2*   ALBUMIN g/dL 2.2*   TOTAL BILIRUBIN mg/dL 0.85   ALK PHOS U/L 140*   ALT U/L 5*   AST U/L 40*   GLUCOSE RANDOM mg/dL 161*     Results from last 7 days   Lab Units 02/07/25  0625   INR  1.44*     Results from last 7 days   Lab Units 02/09/25  1109 02/09/25  0731 02/09/25  0617 02/08/25  2337 02/08/25  2202 02/08/25  1813 02/08/25  1254 02/08/25  0607 02/07/25  2328 02/07/25  1730 02/07/25  1508 02/07/25  1106   POC GLUCOSE mg/dl 131 164* 148* 139 150* 181* 130 176* 147* 137 116 172*         Results from last 7 days   Lab Units 02/04/25  0839 02/03/25  0644   PROCALCITONIN ng/ml 13.13* 18.86*       Recent Cultures (last 7 days):         Imaging Results Review: I personally reviewed the following image studies/reports in PACS and discussed pertinent findings with Radiology: xray(s). My interpretation of the radiology images/reports is: Mild pulmonary vascular congestion, small bilateral pleural effusion.  Other Study Results Review: EKG was reviewed.     Last 24 Hours Medication List:     Current Facility-Administered Medications:     acetaminophen  (TYLENOL) rectal suppository 650 mg, Q4H PRN    Adult 3-in-1 TPN (custom base / custom electrolytes), Continuous TPN, Last Rate: 77.7 mL/hr at 02/08/25 2210    Adult 3-in-1 TPN (custom base / custom electrolytes), Continuous TPN    albumin human (FLEXBUMIN) 25 % injection 25 g, Once, Last Rate: Stopped (02/05/25 1636)    artificial tear ophthalmic ointment, HS    chlorhexidine (PERIDEX) 0.12 % oral rinse 15 mL, Q12H MRAQUIS    dextrose 50 % IV solution 25 mL, Once    dorzolamide-timolol (COSOPT) 2-0.5 % ophthalmic solution 1 drop, BID    heparin (porcine) 25,000 units in 0.45% NaCl 250 mL infusion (premix), Titrated, Last Rate: 10 Units/kg/hr (02/09/25 1004)    heparin (porcine) injection 2,400 Units, Q6H PRN    heparin (porcine) injection 4,800 Units, Once    heparin (porcine) injection 4,800 Units, Q6H PRN    hydrALAZINE (APRESOLINE) injection 5 mg, Q6H PRN    insulin aspart protamine-insulin aspart (NovoLOG 70/30) 100 units/mL subcutaneous injection 10 Units, BID **AND** [DISCONTINUED] insulin aspart protamine-insulin aspart (NovoLOG 70/30) 100 units/mL subcutaneous injection 25 Units, HS    insulin lispro (HumALOG/ADMELOG) 100 units/mL subcutaneous injection 1-5 Units, Q6H MARQUIS **AND** Fingerstick Glucose (POCT), Q6H    Latanoprostene Bunod 0.024 % SOLN 1 drop, HS    polyethylene glycol (MIRALAX) packet 17 g, Daily    prednisoLONE acetate (PRED FORTE) 1 % ophthalmic suspension 1 drop, TID    scopolamine (TRANSDERM-SCOP) 1 mg/3 days TD 72 hr patch 1 patch, Q72H    [Held by provider] senna-docusate sodium (SENOKOT S) 8.6-50 mg per tablet 1 tablet, BID    tetracaine 0.5 % ophthalmic solution 2 drop, Once    Administrative Statements   Today, Patient Was Seen By: Claudio Simpson MD  I have spent a total time of 40 minutes in caring for this patient on the day of the visit/encounter including Diagnostic results, Prognosis, Risks and benefits of tx options, Instructions for management, Patient and family education,  Importance of tx compliance, Risk factor reductions, Impressions, Counseling / Coordination of care, Documenting in the medical record, Reviewing / ordering tests, medicine, procedures  , Obtaining or reviewing history  , and Communicating with other healthcare professionals .    **Please Note: This note may have been constructed using a voice recognition system.**

## 2025-02-09 NOTE — ASSESSMENT & PLAN NOTE
MSSA in the sputum culture  Patient got treated with cefazolin and metronidazole 8 days in the for concern of aspiration pneumonia/tracheobronchitis

## 2025-02-09 NOTE — HOSPITAL COURSE
Jeffry Almanzar is a 89-year-old male with a past medical history of CKD stage IV, CVA, and bladder carcinoma who presented to the ED on 1/18/2025 via EMS following an episode of unresponsiveness at home.  In the ED, bedside ultrasound showed a large thrombus in the right atrium, and due to persistent altered mental status and respiratory distress, further imaging was ordered.  CT head without contrast did not show any acute intracranial hemorrhage or territorial infarction.  CTA chest PE study was ordered which revealed acute saddle pulmonary embolus with large clot burden as well as associated right heart strain.  Patient subsequently went into cardiac arrest, requiring intubation and transfer to the ICU.  He was also initiated on heparin drip.      In the ICU, he was noted to be progressively hypotensive requiring multiple pressors.  He also required 1 unit of PRBCs and had a TLC placed, after which he was stabilized and pressors were discontinued. On 1/20, patient was extubated. On 1/21, patient was noted to be hypertensive to the systolic 180s to 200s and reported left-sided tingling in his upper and lower extremity which resolved.  Given that patient had been on heparin gtt, CT head was obtained, which showed no overt bleeding however did show incidental left sided retinal hemorrhage.  For this ophthalmology had been consulted, and per their recommendation patient's topical medications for intraocular pressure were continued, and they felt that the vitreous hemorrhage is likely chronic and would warrant careful monitoring without acute intervention.  The critical care team had also reached out to neurology regarding this finding, and per their recommendations patient underwent MRI/MRA, both of which were unremarkable for acute CVA.    On 1/22, patient was transferred to stepdown level of care out of the ICU. Nephrology had been consulted for management of SOFÍA on CKD, as well as electrolyte abnormalities  including hypernatremia.  Palliative care had also been consulted to discuss goals of care with the patient and his family. Patient was also evaluated by neuropsychology to assess cognitive functioning and capacity to make informed medical decisions.  Per the results of the neuropsychological assessment, it was determined that the patient had diffuse cognitive dysfunction and lacked capacity to make fully informed medical decisions.     Patient was evaluated by speech language pathology, and per their evaluation patient was noted to have significant residual dysphagia, weak laryngeal excursion, and remained a high aspiration risk.  A video barium swallow was performed which showed moderate oral/severe pharyngeal dysphagia with weak swallow mechanics, overflow penetration and aspiration observed after swallow.  As a result, per recommendations, patient was made strictly NPO.  Patient did remain on D5W per nephrology, however still had malnutrition, which was a chronic concern however exacerbated by recent acute illness.  Ultimately patient was initiated on a scopolamine patch to manage secretions due to frequent suctioning and inability to manage own secretions.     Regarding ongoing goals of care discussion, on 1/23/2025 the family decided to make the patient's code status level 3 DNR/DNI.  Over the course of the hospitalization, patient's wife and family opted for TPN.  Thorough conversations regarding risks and complications were reviewed with the patient's wife at length.  However, patient's wife still opted for TPN, and as a result patient underwent PICC line placement on 1/28, with subsequent initiation of TPN.  NPH insulin was added on for coverage, and the dose was adjusted per glucose fluctuations.     Additionally, patient had been initiated on antibiotics for management of suspected tracheobronchitis, due to ongoing leukocytosis, fever, tachycardia, and largely unremarkable repeat chest x-ray.  A sputum  culture was collected, and antibiotics were de-escalated once cultures returned.     Ultimately, as patient's prognosis continued to remain guarded, without meaningful recovery, patient's family decided to bring patient home on hospice.  They had discussed this with the multidisciplinary team, including primary and palliative.  Per patient's wife, she wanted to continue all current treatment until the hospice material arrived at home.  The hospice RN reviewed the process with patient's wife and daughter.

## 2025-02-09 NOTE — ASSESSMENT & PLAN NOTE
Lab Results   Component Value Date    EGFR 25 02/09/2025    EGFR 29 02/09/2025    EGFR 26 02/08/2025    CREATININE 2.19 (H) 02/09/2025    CREATININE 1.97 (H) 02/09/2025    CREATININE 2.11 (H) 02/08/2025   Holding Losartan and Farxiga per nephro due to elevated Cr   Hydralazine 5 mg q 6 hrs for BP >180/110   Initially allowed for permissive hypertension due to concern for stroke.  However, patient's MRI does not show concern for infarct.  Will aim for normotension

## 2025-02-09 NOTE — ASSESSMENT & PLAN NOTE
Lab Results   Component Value Date    EGFR 25 02/09/2025    EGFR 29 02/09/2025    EGFR 26 02/08/2025    CREATININE 2.19 (H) 02/09/2025    CREATININE 1.97 (H) 02/09/2025    CREATININE 2.11 (H) 02/08/2025   Per patient and patient's wife, would not be interested in dialysis   Nephro had been consulted for management; signed off.

## 2025-02-09 NOTE — ASSESSMENT & PLAN NOTE
Pt on room air with labored breathing. Has rales in bilateral lung fields. Continues to have wet cough without sputum production.   \

## 2025-02-09 NOTE — ASSESSMENT & PLAN NOTE
Lab Results   Component Value Date    HGBA1C 6.9 (H) 01/18/2025     Recent Labs     02/08/25  2337 02/09/25  0617 02/09/25  0731 02/09/25  1109   POCGLU 139 148* 164* 131     Plan  NPH insulin 10u BID. Will continue to monitor/adjust as necessary.   Continue sliding scale, algorithm 2  Hypoglycemia protocol

## 2025-02-10 ENCOUNTER — HOME CARE VISIT (OUTPATIENT)
Dept: HOME HOSPICE | Facility: HOSPICE | Age: OVER 89
End: 2025-02-10
Payer: MEDICARE

## 2025-02-10 LAB
ALBUMIN SERPL BCG-MCNC: 2.2 G/DL (ref 3.5–5)
ALP SERPL-CCNC: 131 U/L (ref 34–104)
ALT SERPL W P-5'-P-CCNC: 4 U/L (ref 7–52)
ANION GAP SERPL CALCULATED.3IONS-SCNC: 5 MMOL/L (ref 4–13)
ANISOCYTOSIS BLD QL SMEAR: PRESENT
APTT PPP: 135 SECONDS (ref 23–34)
APTT PPP: 47 SECONDS (ref 23–34)
APTT PPP: 92 SECONDS (ref 23–34)
AST SERPL W P-5'-P-CCNC: 32 U/L (ref 13–39)
BASOPHILS # BLD MANUAL: 0 THOUSAND/UL (ref 0–0.1)
BASOPHILS NFR MAR MANUAL: 0 % (ref 0–1)
BILIRUB SERPL-MCNC: 0.83 MG/DL (ref 0.2–1)
BUN SERPL-MCNC: 62 MG/DL (ref 5–25)
CALCIUM ALBUM COR SERPL-MCNC: 9.7 MG/DL (ref 8.3–10.1)
CALCIUM SERPL-MCNC: 8.3 MG/DL (ref 8.4–10.2)
CHLORIDE SERPL-SCNC: 108 MMOL/L (ref 96–108)
CO2 SERPL-SCNC: 24 MMOL/L (ref 21–32)
CREAT SERPL-MCNC: 2.04 MG/DL (ref 0.6–1.3)
EOSINOPHIL # BLD MANUAL: 0.29 THOUSAND/UL (ref 0–0.4)
EOSINOPHIL NFR BLD MANUAL: 2 % (ref 0–6)
ERYTHROCYTE [DISTWIDTH] IN BLOOD BY AUTOMATED COUNT: 17.8 % (ref 11.6–15.1)
GFR SERPL CREATININE-BSD FRML MDRD: 27 ML/MIN/1.73SQ M
GIANT PLATELETS BLD QL SMEAR: PRESENT
GLUCOSE SERPL-MCNC: 113 MG/DL (ref 65–140)
GLUCOSE SERPL-MCNC: 118 MG/DL (ref 65–140)
GLUCOSE SERPL-MCNC: 129 MG/DL (ref 65–140)
GLUCOSE SERPL-MCNC: 135 MG/DL (ref 65–140)
GLUCOSE SERPL-MCNC: 146 MG/DL (ref 65–140)
GLUCOSE SERPL-MCNC: 153 MG/DL (ref 65–140)
HCT VFR BLD AUTO: 23.5 % (ref 36.5–49.3)
HGB BLD-MCNC: 7.5 G/DL (ref 12–17)
HYPERCHROMIA BLD QL SMEAR: PRESENT
LYMPHOCYTES # BLD AUTO: 1.17 THOUSAND/UL (ref 0.6–4.47)
LYMPHOCYTES # BLD AUTO: 8 % (ref 14–44)
MAGNESIUM SERPL-MCNC: 2.4 MG/DL (ref 1.9–2.7)
MCH RBC QN AUTO: 29.5 PG (ref 26.8–34.3)
MCHC RBC AUTO-ENTMCNC: 31.9 G/DL (ref 31.4–37.4)
MCV RBC AUTO: 93 FL (ref 82–98)
MONOCYTES # BLD AUTO: 0.58 THOUSAND/UL (ref 0–1.22)
MONOCYTES NFR BLD: 4 % (ref 4–12)
MYELOCYTE ABSOLUTE CT: 0.29 THOUSAND/UL (ref 0–0.1)
MYELOCYTES NFR BLD MANUAL: 2 % (ref 0–1)
NEUTROPHILS # BLD MANUAL: 12.24 THOUSAND/UL (ref 1.85–7.62)
NEUTS BAND NFR BLD MANUAL: 1 % (ref 0–8)
NEUTS SEG NFR BLD AUTO: 83 % (ref 43–75)
PHOSPHATE SERPL-MCNC: 2.5 MG/DL (ref 2.3–4.1)
PLATELET # BLD AUTO: 198 THOUSANDS/UL (ref 149–390)
PLATELET BLD QL SMEAR: ADEQUATE
PMV BLD AUTO: 11.2 FL (ref 8.9–12.7)
POLYCHROMASIA BLD QL SMEAR: PRESENT
POTASSIUM SERPL-SCNC: 4.3 MMOL/L (ref 3.5–5.3)
PROT SERPL-MCNC: 5.9 G/DL (ref 6.4–8.4)
RBC # BLD AUTO: 2.54 MILLION/UL (ref 3.88–5.62)
RBC MORPH BLD: PRESENT
SMUDGE CELLS BLD QL SMEAR: PRESENT
SODIUM SERPL-SCNC: 137 MMOL/L (ref 135–147)
WBC # BLD AUTO: 14.57 THOUSAND/UL (ref 4.31–10.16)

## 2025-02-10 PROCEDURE — 85027 COMPLETE CBC AUTOMATED: CPT

## 2025-02-10 PROCEDURE — 82948 REAGENT STRIP/BLOOD GLUCOSE: CPT

## 2025-02-10 PROCEDURE — 85730 THROMBOPLASTIN TIME PARTIAL: CPT | Performed by: INTERNAL MEDICINE

## 2025-02-10 PROCEDURE — 99232 SBSQ HOSP IP/OBS MODERATE 35: CPT | Performed by: INTERNAL MEDICINE

## 2025-02-10 PROCEDURE — 85007 BL SMEAR W/DIFF WBC COUNT: CPT

## 2025-02-10 PROCEDURE — 84100 ASSAY OF PHOSPHORUS: CPT

## 2025-02-10 PROCEDURE — 83735 ASSAY OF MAGNESIUM: CPT

## 2025-02-10 PROCEDURE — 80053 COMPREHEN METABOLIC PANEL: CPT

## 2025-02-10 RX ADMIN — CHLORHEXIDINE GLUCONATE 15 ML: 1.2 RINSE ORAL at 09:00

## 2025-02-10 RX ADMIN — INSULIN ASPART 10 UNITS: 100 INJECTION, SUSPENSION SUBCUTANEOUS at 09:23

## 2025-02-10 RX ADMIN — PREDNISOLONE ACETATE 1 DROP: 10 SUSPENSION/ DROPS OPHTHALMIC at 21:23

## 2025-02-10 RX ADMIN — HEPARIN SODIUM 2400 UNITS: 1000 INJECTION INTRAVENOUS; SUBCUTANEOUS at 11:50

## 2025-02-10 RX ADMIN — PREDNISOLONE ACETATE 1 DROP: 10 SUSPENSION/ DROPS OPHTHALMIC at 16:58

## 2025-02-10 RX ADMIN — CALCIUM GLUCONATE: 98 INJECTION, SOLUTION INTRAVENOUS at 21:16

## 2025-02-10 RX ADMIN — DORZOLAMIDE HYDROCHLORIDE AND TIMOLOL MALEATE 1 DROP: 20; 5 SOLUTION OPHTHALMIC at 19:02

## 2025-02-10 RX ADMIN — PREDNISOLONE ACETATE 1 DROP: 10 SUSPENSION/ DROPS OPHTHALMIC at 08:59

## 2025-02-10 RX ADMIN — MINERAL OIL, WHITE PETROLATUM: .03; .94 OINTMENT OPHTHALMIC at 21:38

## 2025-02-10 RX ADMIN — DORZOLAMIDE HYDROCHLORIDE AND TIMOLOL MALEATE 1 DROP: 20; 5 SOLUTION OPHTHALMIC at 08:59

## 2025-02-10 RX ADMIN — INSULIN ASPART 10 UNITS: 100 INJECTION, SUSPENSION SUBCUTANEOUS at 21:33

## 2025-02-10 RX ADMIN — SCOPOLAMINE 1 PATCH: 1.5 PATCH, EXTENDED RELEASE TRANSDERMAL at 06:08

## 2025-02-10 RX ADMIN — HEPARIN SODIUM 9 UNITS/KG/HR: 10000 INJECTION, SOLUTION INTRAVENOUS at 19:30

## 2025-02-10 NOTE — ASSESSMENT & PLAN NOTE
Lab Results   Component Value Date    EGFR 27 02/10/2025    EGFR 25 02/09/2025    EGFR 29 02/09/2025    CREATININE 2.04 (H) 02/10/2025    CREATININE 2.19 (H) 02/09/2025    CREATININE 1.97 (H) 02/09/2025     Plan:  Holding Losartan and Farxiga per nephro due to elevated Cr   Hydralazine 5 mg q 6 hrs for BP >180/110   Initially allowed for permissive hypertension due to concern for stroke.  However, patient's MRI does not show concern for infarct.  Will aim for normotension

## 2025-02-10 NOTE — ASSESSMENT & PLAN NOTE
Lab Results   Component Value Date    EGFR 27 02/10/2025    EGFR 25 02/09/2025    EGFR 29 02/09/2025    CREATININE 2.04 (H) 02/10/2025    CREATININE 2.19 (H) 02/09/2025    CREATININE 1.97 (H) 02/09/2025   Per patient and patient's wife, would not be interested in dialysis   Nephro had been consulted for management; signed off.

## 2025-02-10 NOTE — DISCHARGE SUMMARY
Discharge Summary - Hospitalist   Name: Jeffry Almanzar 89 y.o. male I MRN: [id number]  Unit/Bed#: S -01 I Date of Admission: 1/17/2025   Date of Service: 2/10/2025 I Hospital Day: 23     Assessment & Plan  Dysphagia  Had numerous discussions regarding goals of care with family and multidisciplinary team. Explained risks versus benefits of PICC line and subsequent TPN, versus pleasure feeds on comfort care. 1/27 pt's wife decided on obtaining PICC for patient and signed informed consent form.  Likely tracheobronchitis given clinical presentation and hx of respiratory secretions, frequent suctioning, hx of intubation, and prolonged hospitalization    Plan  Monitor off antibiotics  TPN per RD  Will plan to increase protein from 1 g/kg to 1.2 g/kg  Scopolamine patch to manage secretions  Patient continues to refuse suction  Patient's wife requesting that they be sent home with some form of suction to help manage secretions  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  (HCC)  Per RN, no bloody stools or bloody mucus in suction.    UA with 30-50 RBCs, urine dorita however not gross hematuria    Plan  Continue frequent Mcgee flushes  Type 2 diabetes mellitus with stage 4 chronic kidney disease, without long-term current use of insulin (HCC)  Lab Results   Component Value Date    HGBA1C 6.9 (H) 01/18/2025     Recent Labs     02/09/25  2149 02/10/25  0000 02/10/25  0606 02/10/25  1150   POCGLU 165* 113 146* 118     Plan  NPH insulin 10u BID. Will continue to monitor/adjust as necessary.   Continue sliding scale, algorithm 2  Hypoglycemia protocol   Cognitive impairment  Per wife, patient has some degree of dementia but is unclear of specifics. Patient oriented to self and place but unaware of year and month. Per wife, unsure if patient is completely aware of what is going on and is unsure if patient would truly want to accept intubation a second time    Plan  Neuropsych evaluation determined patient does NOT have capacity  for MDM  Palliative on board. Appreciate recommendations. As of 1/23/2025, family made the decision to make patient's code status LEVEL 3 DNR/DNI.   Acute saddle pulmonary embolism with acute cor pulmonale (HCC)  Initially with cardiac arrest and cardiogenic shock, is now extubated off pressors. Was transferred  to med/surg 1/21.   MRI brain negative for CVA or concern for hemorrhage.      Plan:  Continue heparin gtt.  SOFÍA (acute kidney injury) (HCC)  Suspect due to ATN in the setting of cardiogenic shock, cardiac arrest, hypotension and IV contrast exposure with autoregulatory dysfunction with ARB  Per Nephrology:  Discontinued D5W 1/29 d/t resolution of hypernatremia and initiation of TPN  Continue to hold losartan and Farxiga    Plan  Strict I/Os, daily weights  Avoid nephrotoxins, NSAIDs, further IV contrast as able  Avoid hypotension.  Maintain MAP >65  Cardiac arrest (HCC)  Cardiac arrest in ED after CTA evaluating for pulmonary embolus.  Asystole for 2 minutes requiring CPR and intubation s/P extubation 1/21/2025  ECHO with EF of 65% in September, ECHO with left ventricular ejection fraction is 55% (1/20)    Plan:  Continue heparin gtt  Benign 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 (HCC)  Lab Results   Component Value Date    EGFR 27 02/10/2025    EGFR 25 02/09/2025    EGFR 29 02/09/2025    CREATININE 2.04 (H) 02/10/2025    CREATININE 2.19 (H) 02/09/2025    CREATININE 1.97 (H) 02/09/2025     Plan:  Holding Losartan and Farxiga per nephro due to elevated Cr   Hydralazine 5 mg q 6 hrs for BP >180/110   Initially allowed for permissive hypertension due to concern for stroke.  However, patient's MRI does not show concern for infarct.  Will aim for normotension  Palliative care encounter  Patient continues to refuse suctioning and oral feeds. Per review of hospice RN note and conversation with multidisciplinary team, pt's wife and daughter would like pt to come home on hospice next week however  would like to continue current plan until discharge. CM aware; anticipating discharge home with hospice.   Patient seen and evaluated by palliative care 1/23. Palliative care had an in-depth discussion with family regarding patient's GOC and overall prognosis. Family would NOT like to pursue a PEG tube at this time.   CODE STATUS changed from Level 2 to Level 3 DNR/DNI    Plan:  Discharge to home hospice @ 11 AM on 2/11/25  Goals of care, counseling/discussion  Palliative on board. Appreciate recommendations   CKD (chronic kidney disease) stage 4, GFR 15-29 ml/min ([facility])  Lab Results   Component Value Date    EGFR 27 02/10/2025    EGFR 25 02/09/2025    EGFR 29 02/09/2025    CREATININE 2.04 (H) 02/10/2025    CREATININE 2.19 (H) 02/09/2025    CREATININE 1.97 (H) 02/09/2025   Per patient and patient's wife, would not be interested in dialysis   Nephro had been consulted for management; signed off.  Severe protein-calorie malnutrition (HCC)  Malnutrition Findings:   Adult Malnutrition type: Acute illness  Adult Degree of Malnutrition: Other severe protein calorie malnutrition  Malnutrition Characteristics: Muscle loss, Inadequate energy, Weight loss, Fat loss              360 Statement: related to dysphagia with recommendation for NPO, pt with > 7 days of less than 50% energy intake compared to estimated needs, muscle, fat wasting, wasted buccal pads, temporalis, supraclavicular space, interosseous. Treatment. Enteral nutrition via feeding tube is indicated for nutrition support- wife is declining nutrition via NGT or PEG at this time. Ongoing goals of care discussion noted.  BMI Findings:        Body mass index is 22.75 kg/m².     Pt w PICC and TPN. Per RD, TPN as above under principal problem.      Medical Problems       Resolved Problems  Date Reviewed: 2/3/2025          Resolved    Diabetic nephropathy associated with type 2 diabetes mellitus ([facility]) 1/27/2025     Resolved by  Tanner Rey MD    Shock ([facility]) 1/22/2025      Resolved by  Jamaica Jones DO    Hypokalemia 1/28/2025     Resolved by  Mercedes Nunez DO        Discharging Physician / Practitioner: Alli Blakely DO  PCP: Debbie Maloney MD  Admission Date:   Admission Orders (From admission, onward)       Ordered        01/18/25 0036  INPATIENT ADMISSION  Once            01/18/25 0038  Inpatient Admission  Once                          Discharge Date: 02/10/25    Consultations During Hospital Stay:  Palliative care, Nephrology, Ophthalmology, Behavioral health    Procedures Performed:   PICC line placement    Significant Findings / Test Results:   CTA Chest: Acute saddle pulmonary embolus with large clot burden. Associated CT findings of right heart strain.   MRI Brain: White matter changes suggestive of chronic microangiopathy. No acute intracranial pathology.     Incidental Findings:    Redemonstrated probable left retinal hemorrhage.   I reviewed the above mentioned incidental findings with the patient and/or family and they expressed understanding.    Test Results Pending at Discharge (will require follow up):   None     Outpatient Tests Requested:  None    Complications:  None    Reason for Admission: Pulmonary Embolism    Hospital Course:   Jeffry Almanzar is a 89 y.o. male patient who originally presented to the hospital on 1/17/2025 due to puk    Hospital Course:   Jeffry Almanzar is a 89-year-old male with a past medical history of CKD stage IV, CVA, and bladder carcinoma who presented to the ED on 1/18/2025 via EMS following an episode of unresponsiveness at home.  In the ED, bedside ultrasound showed a large thrombus in the right atrium, and due to persistent altered mental status and respiratory distress, further imaging was ordered.  CT head without contrast did not show any acute intracranial hemorrhage or territorial infarction.  CTA chest PE study was ordered which revealed acute saddle pulmonary embolus with large clot burden as well as associated right heart  strain.  Patient subsequently went into cardiac arrest, requiring intubation and transfer to the ICU.  He was also initiated on heparin drip.      In the ICU, he was noted to be progressively hypotensive requiring multiple pressors.  He also required 1 unit of PRBCs and had a TLC placed, after which he was stabilized and pressors were discontinued. On 1/20, patient was extubated. On 1/21, patient was noted to be hypertensive to the systolic 180s to 200s and reported left-sided tingling in his upper and lower extremity which resolved.  Given that patient had been on heparin gtt, CT head was obtained, which showed no overt bleeding however did show incidental left sided retinal hemorrhage.  For this ophthalmology had been consulted, and per their recommendation patient's topical medications for intraocular pressure were continued, and they felt that the vitreous hemorrhage is likely chronic and would warrant careful monitoring without acute intervention.  The critical care team had also reached out to neurology regarding this finding, and per their recommendations patient underwent MRI/MRA, both of which were unremarkable for acute CVA.    On 1/22, patient was transferred to stepdown level of care out of the ICU. Nephrology had been consulted for management of SOFÍA on CKD, as well as electrolyte abnormalities including hypernatremia.  Palliative care had also been consulted to discuss goals of care with the patient and his family. Patient was also evaluated by neuropsychology to assess cognitive functioning and capacity to make informed medical decisions.  Per the results of the neuropsychological assessment, it was determined that the patient had diffuse cognitive dysfunction and lacked capacity to make fully informed medical decisions.     Patient was evaluated by speech language pathology, and per their evaluation patient was noted to have significant residual dysphagia, weak laryngeal excursion, and remained a high  aspiration risk.  A video barium swallow was performed which showed moderate oral/severe pharyngeal dysphagia with weak swallow mechanics, overflow penetration and aspiration observed after swallow.  As a result, per recommendations, patient was made strictly NPO.  Patient did remain on D5W per nephrology, however still had malnutrition, which was a chronic concern however exacerbated by recent acute illness.  Ultimately patient was initiated on a scopolamine patch to manage secretions due to frequent suctioning and inability to manage own secretions.     Regarding ongoing goals of care discussion, on 1/23/2025 the family decided to make the patient's code status level 3 DNR/DNI.  Over the course of the hospitalization, patient's wife and family opted for TPN.  Thorough conversations regarding risks and complications were reviewed with the patient's wife at length.  However, patient's wife still opted for TPN, and as a result patient underwent PICC line placement on 1/28, with subsequent initiation of TPN.  NPH insulin was added on for coverage, and the dose was adjusted per glucose fluctuations.     Additionally, patient had been initiated on antibiotics for management of suspected tracheobronchitis, due to ongoing leukocytosis, fever, tachycardia, and largely unremarkable repeat chest x-ray.  A sputum culture was collected, and antibiotics were de-escalated once cultures returned.     Ultimately, as patient's prognosis continued to remain guarded, without meaningful recovery, patient's family decided to bring patient home on hospice.  They had discussed this with the multidisciplinary team, including primary and palliative.  Per patient's wife, she wanted to continue all current treatment until the hospice material arrived at home.  The hospice RN reviewed the process with patient's wife and daughter.      Please see above list of diagnoses and related plan for additional information.     Condition at Discharge:  "guarded    Discharge Day Visit / Exam:   Subjective:  Patient seen and examined at bedside, wife also present and is aware of hosprice discharge plan.  Vitals: Blood Pressure: 161/100 (02/10/25 0855)  Pulse: 101 (02/10/25 0855)  Temperature: 99.3 °F (37.4 °C) (02/10/25 0831)  Temp Source: Axillary (02/10/25 0830)  Respirations: 20 (02/10/25 0830)  Height: 5' 5\" (165.1 cm) (01/20/25 1330)  Weight - Scale: 62 kg (136 lb 11 oz) (02/10/25 0612)  SpO2: 91 % (02/10/25 0855)  Physical Exam     Discussion with Family: Updated  (wife) at bedside.    Discharge instructions/Information to patient and family:   See after visit summary for information provided to patient and family.      Provisions for Follow-Up Care:  See after visit summary for information related to follow-up care and any pertinent home health orders.      Mobility at time of Discharge:   Basic Mobility Inpatient Raw Score: 6  JH-HLM Goal: 2: Bed activities/Dependent transfer  JH-HLM Achieved: 2: Bed activities/Dependent transfer  HLM Goal achieved. Continue to encourage appropriate mobility.     Disposition:   Other: Home with Hospice Care    Planned Readmission: No    Discharge Medications:  See after visit summary for reconciled discharge medications provided to patient and/or family.      Administrative Statements       **Please Note: This note may have been constructed using a voice recognition system**  "

## 2025-02-10 NOTE — HOSPICE NOTE
Spoke with wife Antony via [facility]. Re-reviewed hospice services. Ordered the following DME from [facility] for delivery today: Hospital bed 2 sets half rails obt bsc suction machine O2 transport wc and gel cushion. Order #[id number]. She will  script for comfort med from pharmacy. Sent secure chat update and request for dc by 1100 tomorrow to [facility].

## 2025-02-10 NOTE — QUICK NOTE
2/10/2025 2:40 PM -  Jeffry Almanzar's chart and case were reviewed by ISRAEL Cantrell.  Mode of review included electronic chart check.    Plan for patient to be d/c tomorrow to home on hospice. Wife educated on comfort medications and hospice/hospice services. [facility], which spouse is aware.     Per review, symptoms remain controlled on current regimen and no changes are made at this time.  Please continue the regimen in place, and review our last note for details.  For dispo plan, please review Case Management notes.     For urgent issues or any questions/concerns, please notify on-call provider via Epic Secure Chat.  You may also call our answering service 24/7 at [phone].    ISRAEL Cantrell  Palliative and Supportive Care  Clinic/Answering Service: [phone]  You can find me on [employer]!

## 2025-02-10 NOTE — DISCHARGE INSTR - AVS FIRST PAGE
Dear Jeffry Almanzar,     It was our pleasure to care for you here at [facility].  It is our hope that we were always able to exceed the expected standards for your care during your stay.  You were hospitalized due to pulmonary embolism.  You were cared for on the 3rd floor by Alli Blakely DO under the service of No att. providers found with the [facility] who covers for your primary care physician (PCP), Debbie Maloney MD, while you were hospitalized.  If you have any questions or concerns related to this hospitalization, you may contact us at .  For follow up as well as any medication refills, we recommend that you follow up with your primary care physician.  A registered nurse will reach out to you by phone within a few days after your discharge to answer any additional questions that you may have after going home.  However, at this time we provide for you here, the most important instructions / recommendations at discharge:     Start taking the following medications -   Dulcolax 10 mg suppository as needed for constipation  Haldol oral concentration 0.25 mL every 4 hrs as needed for agitation  Ativan oral concentration 0.25 mL every 4 hrs as needed for anxiety or difficulty breathing  Oxycodone oral solution 2.5 to 5 mL every 4 hrs as needed for moderate to sever pain or difficulty breathing  Scopolamine transdermal patch every 72 hrs as needed for secretions  Stop taking the following medications -   Atorvastatin 40 mg  Brimonidine eye drops  Clopidogrel 75 mg   Dorzolamide HCL-Timolol Mal PF-0.5% eye drops  Farxiga 10 mg   Losartan 25 mg   Glucose test strips  Blood glucose monitoring supplies  Prednisolone acetate eye drops  B-12 1000 mcg  Vyzulta eye drops  Important follow up information -   Follow-up with outpatient hospice within 1 week of discharge  Please make an appointment with your primary care physician within 1 week of  discharge as soon as possible  Other Instructions -   Please contact outpatient hospice with any additional questions or concerns  Discontinue checking the patient's blood glucose  Please review this entire after visit summary as additional general instructions including medication list, appointments, activity, diet, any pertinent wound care, and other additional recommendations from your care team that may be provided for you.      Sincerely,     Alli Blakely, DO

## 2025-02-10 NOTE — ASSESSMENT & PLAN NOTE
Lab Results   Component Value Date    HGBA1C 6.9 (H) 01/18/2025     Recent Labs     02/09/25  1821 02/09/25  2149 02/10/25  0000 02/10/25  0606   POCGLU 162* 165* 113 146*     Plan  NPH insulin 10u BID. Will continue to monitor/adjust as necessary.   Continue sliding scale, algorithm 2  Hypoglycemia protocol

## 2025-02-10 NOTE — ASSESSMENT & PLAN NOTE
Malnutrition Findings:   Adult Malnutrition type: Acute illness  Adult Degree of Malnutrition: Other severe protein calorie malnutrition  Malnutrition Characteristics: Muscle loss, Inadequate energy, Weight loss, Fat loss              360 Statement: related to dysphagia with recommendation for NPO, pt with > 7 days of less than 50% energy intake compared to estimated needs, muscle, fat wasting, wasted buccal pads, temporalis, supraclavicular space, interosseous. Treatment. Enteral nutrition via feeding tube is indicated for nutrition support- wife is declining nutrition via NGT or PEG at this time. Ongoing goals of care discussion noted.  BMI Findings:        Body mass index is 22.75 kg/m².     Pt w PICC and TPN. Per RD, TPN as above under principal problem.

## 2025-02-10 NOTE — PROGRESS NOTES
Progress Note - Hospitalist   Name: Jeffry Almanzar 89 y.o. male I MRN: [id number]  Unit/Bed#: S -01 I Date of Admission: 1/17/2025   Date of Service: 2/10/2025 I Hospital Day: 23    Assessment & Plan  Dysphagia  Had numerous discussions regarding goals of care with family and multidisciplinary team. Explained risks versus benefits of PICC line and subsequent TPN, versus pleasure feeds on comfort care. 1/27 pt's wife decided on obtaining PICC for patient and signed informed consent form.  Likely tracheobronchitis given clinical presentation and hx of respiratory secretions, frequent suctioning, hx of intubation, and prolonged hospitalization    Plan  Monitor off antibiotics  TPN per RD  Will plan to increase protein from 1 g/kg to 1.2 g/kg  Scopolamine patch to manage secretions  Patient continues to refuse suction  Patient's wife requesting that they be sent home with some form of suction to help manage secretions  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  (HCC)  Per RN, no bloody stools or bloody mucus in suction.    UA with 30-50 RBCs, urine dorita however not gross hematuria    Plan  Continue frequent Mcgee flushes  Type 2 diabetes mellitus with stage 4 chronic kidney disease, without long-term current use of insulin (HCC)  Lab Results   Component Value Date    HGBA1C 6.9 (H) 01/18/2025     Recent Labs     02/09/25  1821 02/09/25  2149 02/10/25  0000 02/10/25  0606   POCGLU 162* 165* 113 146*     Plan  NPH insulin 10u BID. Will continue to monitor/adjust as necessary.   Continue sliding scale, algorithm 2  Hypoglycemia protocol   Cognitive impairment  Per wife, patient has some degree of dementia but is unclear of specifics. Patient oriented to self and place but unaware of year and month. Per wife, unsure if patient is completely aware of what is going on and is unsure if patient would truly want to accept intubation a second time    Plan  Neuropsych evaluation determined patient does NOT have capacity for  MDM  Palliative on board. Appreciate recommendations. As of 1/23/2025, family made the decision to make patient's code status LEVEL 3 DNR/DNI.   Acute saddle pulmonary embolism with acute cor pulmonale (HCC)  Initially with cardiac arrest and cardiogenic shock, is now extubated off pressors. Was transferred  to med/surg 1/21.   MRI brain negative for CVA or concern for hemorrhage.      Plan:  Continue heparin gtt.  SOFÍA (acute kidney injury) (HCC)  Suspect due to ATN in the setting of cardiogenic shock, cardiac arrest, hypotension and IV contrast exposure with autoregulatory dysfunction with ARB  Per Nephrology:  Discontinued D5W 1/29 d/t resolution of hypernatremia and initiation of TPN  Continue to hold losartan and Farxiga    Plan  Strict I/Os, daily weights  Avoid nephrotoxins, NSAIDs, further IV contrast as able  Avoid hypotension.  Maintain MAP >65  Cardiac arrest (HCC)  Cardiac arrest in ED after CTA evaluating for pulmonary embolus.  Asystole for 2 minutes requiring CPR and intubation s/P extubation 1/21/2025  ECHO with EF of 65% in September, ECHO with left ventricular ejection fraction is 55% (1/20)    Plan:  Continue heparin gtt  Benign 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 (HCC)  Lab Results   Component Value Date    EGFR 25 02/09/2025    EGFR 29 02/09/2025    EGFR 26 02/08/2025    CREATININE 2.19 (H) 02/09/2025    CREATININE 1.97 (H) 02/09/2025    CREATININE 2.11 (H) 02/08/2025     Plan:  Holding Losartan and Farxiga per nephro due to elevated Cr   Hydralazine 5 mg q 6 hrs for BP >180/110   Initially allowed for permissive hypertension due to concern for stroke.  However, patient's MRI does not show concern for infarct.  Will aim for normotension  Palliative care encounter  Patient continues to refuse suctioning and oral feeds. Per review of hospice RN note and conversation with multidisciplinary team, pt's wife and daughter would like pt to come home on hospice next week however would  like to continue current plan until discharge. CM aware; anticipating discharge home with hospice.   Patient seen and evaluated by palliative care 1/23. Palliative care had an in-depth discussion with family regarding patient's GOC and overall prognosis. Family would NOT like to pursue a PEG tube at this time.   CODE STATUS changed from Level 2 to Level 3 DNR/DNI    Plan:  Discharge to home hospice @ 11 AM on 2/11/25  Goals of care, counseling/discussion  Palliative on board. Appreciate recommendations   CKD (chronic kidney disease) stage 4, GFR 15-29 ml/min ([facility])  Lab Results   Component Value Date    EGFR 25 02/09/2025    EGFR 29 02/09/2025    EGFR 26 02/08/2025    CREATININE 2.19 (H) 02/09/2025    CREATININE 1.97 (H) 02/09/2025    CREATININE 2.11 (H) 02/08/2025   Per patient and patient's wife, would not be interested in dialysis   Nephro had been consulted for management; signed off.  Severe protein-calorie malnutrition (HCC)  Malnutrition Findings:   Adult Malnutrition type: Acute illness  Adult Degree of Malnutrition: Other severe protein calorie malnutrition  Malnutrition Characteristics: Muscle loss, Inadequate energy, Weight loss, Fat loss              360 Statement: related to dysphagia with recommendation for NPO, pt with > 7 days of less than 50% energy intake compared to estimated needs, muscle, fat wasting, wasted buccal pads, temporalis, supraclavicular space, interosseous. Treatment. Enteral nutrition via feeding tube is indicated for nutrition support- wife is declining nutrition via NGT or PEG at this time. Ongoing goals of care discussion noted.  BMI Findings:        Body mass index is 22.75 kg/m².     Pt w PICC and TPN. Per RD, TPN as above under principal problem.     VTE Pharmacologic Prophylaxis: VTE Score: 6 High Risk (Score >/= 5) - Pharmacological DVT Prophylaxis Ordered: heparin drip. Sequential Compression Devices Ordered.    Mobility:   Basic Mobility Inpatient Raw Score: 6  -[facility] Goal:  2: Bed activities/Dependent transfer  JH-HLM Achieved: 2: Bed activities/Dependent transfer  JH-HLM Goal achieved. Continue to encourage appropriate mobility.    Patient Centered Rounds: I performed bedside rounds with nursing staff today.   Discussions with Specialists or Other Care Team Provider: Palliative Care    Education and Discussions with Family / Patient: Updated  (wife) at bedside.    Current Length of Stay: 23 day(s)  Current Patient Status: Inpatient   Certification Statement: The patient will continue to require additional inpatient hospital stay due to home hospice pending  Discharge Plan: Anticipate discharge in 48-72 hrs to Home with home hospice    Code Status: Level 3 - DNAR and DNI    Subjective   Patient seen and examined at bedside, per nursing no events overnight wife confirms this.  States that she did attempt to suction him a few times throughout the night he was intermittently amenable to this.  On my exam only wincing away during attempts to auscultate no pooling secretions at this time.    Objective :  Temp:  [98.8 °F (37.1 °C)-99.4 °F (37.4 °C)] 99.4 °F (37.4 °C)  HR:  [100-107] 101  BP: (148-160)/(79-91) 152/83  Resp:  [20-22] 20  SpO2:  [91 %-95 %] 95 %  O2 Device: None (Room air)    Body mass index is 22.75 kg/m².     Input and Output Summary (last 24 hours):     Intake/Output Summary (Last 24 hours) at 2/10/2025 0634  Last data filed at 2/10/2025 0601  Gross per 24 hour   Intake 40 ml   Output 2300 ml   Net -2260 ml       Physical Exam  HENT:      Mouth/Throat:      Mouth: Mucous membranes are moist.      Comments: Minimal secretions  Eyes:      Pupils: Pupils are equal, round, and reactive to light.   Cardiovascular:      Rate and Rhythm: Normal rate and regular rhythm.   Pulmonary:      Effort: Pulmonary effort is normal. No respiratory distress.   Abdominal:      General: There is no distension.      Tenderness: There is no abdominal tenderness.   Neurological:       Comments: Patient not participating with orientation questions. Moving all four extremities to light stimuli.           Lines/Drains:  Lines/Drains/Airways       Active Status       Name Placement date Placement time Site Days    PICC Line 01/28/25 Right Brachial 01/28/25  0930  Brachial  12    Urethral Catheter 01/17/25  0000  --  24                  Urinary Catheter:  Goal for removal: N/A- Discharging with Mcgee         Central Line:  Goal for removal: Continuing for TPN.                Lab Results: I have reviewed the following results:   Results from last 7 days   Lab Units 02/09/25  0836   WBC Thousand/uL 14.95*   HEMOGLOBIN g/dL 7.5*   HEMATOCRIT % 23.2*   PLATELETS Thousands/uL 220   BANDS PCT % 1   LYMPHO PCT % 4*   MONO PCT % 6   EOS PCT % 4     Results from last 7 days   Lab Units 02/09/25  0836   SODIUM mmol/L 137   POTASSIUM mmol/L 4.2   CHLORIDE mmol/L 109*   CO2 mmol/L 24   BUN mg/dL 63*   CREATININE mg/dL 2.19*   ANION GAP mmol/L 4   CALCIUM mg/dL 8.2*   ALBUMIN g/dL 2.2*   TOTAL BILIRUBIN mg/dL 0.85   ALK PHOS U/L 140*   ALT U/L 5*   AST U/L 40*   GLUCOSE RANDOM mg/dL 161*     Results from last 7 days   Lab Units 02/07/25  0625   INR  1.44*     Results from last 7 days   Lab Units 02/10/25  0606 02/10/25  0000 02/09/25  2149 02/09/25  1821 02/09/25  1109 02/09/25  0731 02/09/25  0617 02/08/25  2337 02/08/25  2202 02/08/25  1813 02/08/25  1254 02/08/25  0607   POC GLUCOSE mg/dl 146* 113 165* 162* 131 164* 148* 139 150* 181* 130 176*         Results from last 7 days   Lab Units 02/04/25  0839 02/03/25  0644   PROCALCITONIN ng/ml 13.13* 18.86*       Recent Cultures (last 7 days):         XR chest portable  Result Date: 1/23/2025  Impression: Stable mild pulmonary vascular congestion with small pleural effusions. Workstation performed: [id number]     MRA head wo contrast  Result Date: 1/22/2025  Impression: No proximal large vessel occlusion or high-grade vascular stenosis within the confines of  unenhanced MRA brain. Workstation performed: [id number]     MRI brain wo contrast  Result Date: 1/22/2025  Impression: 1.  White matter changes suggestive of chronic microangiopathy.  No acute intracranial pathology. 2.  Redemonstrated probable left retinal hemorrhage. Follow-up per ophthalmology discretion. Workstation performed: [id number]     CT head wo contrast  Result Date: 1/21/2025  Impression: 1. Interval hyperdensity in the left globe, (series 2 image 19) not present on prior head CT 3 days ago, worrisome for a retinal hemorrhage. Differential diagnosis could include treatment related changes if there was a recent ophthalmologic procedure. Ophthalmologic assessment advised. 2. No acute intracranial hemorrhage, mass effect or edema. 3. Moderate, chronic microangiopathy. 4. Stable ventricular prominence likely on the basis of atrophy. Workstation performed: [id number]     XR chest portable ICU  Result Date: 1/21/2025  Impression: Limited rotated study. Low lung volumes with hypoventilatory changes. Suspected bilateral small pleural effusions. Interval extubation. Left IJ approach catheter now terminating in the azygos vein. The study was marked in EPIC for immediate notification. Workstation performed: [id number]     XR chest portable ICU  Result Date: 1/19/2025  Impression: No evidence of pneumothorax after left IJ central venous catheter placement with catheter tip projecting in the upper right atrial region. Bilateral pleural effusions. Groundglass opacities in the lungs are nonspecific, possibly nonsegmental atelectasis or pneumonitis/edema. Correlate with clinical scenario Workstation performed: [id number]     XR chest 1 view  Result Date: 1/18/2025  Impression: No acute process seen. Other findings as above. Correlation with pending CTA of the chest recommended. Workstation performed: [id number]     CTA chest pe study  Result Date: 1/18/2025  Impression: Acute saddle pulmonary embolus with large clot  "burden. Associated CT findings of right heart strain. The calculated ratio of right ventricular to left ventricular diameter (RV/LV ratio) is 1.2. There is a critical finding of acute PE with RV/LV ratio >0.9. Based on PERT algorithm recommendations:  \"  Order STAT biomarkers including troponin, NT-BNP or BNP  \"  Calculate PESI score: o  If PESI score falls in I-III, with negative biomarkers, please order a PERT priority ECHO. o  If PESI score falls in IV-V or with positive biomarkers, please order STAT ECHO and alert the [place] PERT via PAC at [phone]. * I personally telephoned this result to ISRAEL Giraldo on 1/18/2025 1:39 AM. Workstation performed: [id number]     CT head without contrast  Result Date: 1/18/2025  Impression: No acute intracranial hemorrhage or large territorial infarction. Moderate chronic microangiopathic change, noting that MRI would be more sensitive for the detection of subtle/early acute infarct. The study was marked in EPIC for immediate notification. Workstation performed: [id number]       No Chest XR results available for this patient.     Other Study Results Review: EKG was reviewed.     Last 24 Hours Medication List:     Current Facility-Administered Medications:     acetaminophen (TYLENOL) rectal suppository 650 mg, Q4H PRN    Adult 3-in-1 TPN (custom base / custom electrolytes), Continuous TPN, Last Rate: 77.7 mL/hr at 02/09/25 2133    artificial tear ophthalmic ointment, HS    chlorhexidine (PERIDEX) 0.12 % oral rinse 15 mL, Q12H MARQUIS    dextrose 50 % IV solution 25 mL, Once    dorzolamide-timolol (COSOPT) 2-0.5 % ophthalmic solution 1 drop, BID    heparin (porcine) 25,000 units in 0.45% NaCl 250 mL infusion (premix), Titrated, Last Rate: 9 Units/kg/hr (02/10/25 0204)    heparin (porcine) injection 2,400 Units, Q6H PRN    heparin (porcine) injection 4,800 Units, Once    heparin (porcine) injection 4,800 Units, Q6H PRN    hydrALAZINE (APRESOLINE) injection 5 mg, Q6H PRN    " insulin aspart protamine-insulin aspart (NovoLOG 70/30) 100 units/mL subcutaneous injection 10 Units, BID **AND** [DISCONTINUED] insulin aspart protamine-insulin aspart (NovoLOG 70/30) 100 units/mL subcutaneous injection 25 Units, HS    insulin lispro (HumALOG/ADMELOG) 100 units/mL subcutaneous injection 1-5 Units, Q6H MARQUIS **AND** Fingerstick Glucose (POCT), Q6H    Latanoprostene Bunod 0.024 % SOLN 1 drop, HS    polyethylene glycol (MIRALAX) packet 17 g, Daily    prednisoLONE acetate (PRED FORTE) 1 % ophthalmic suspension 1 drop, TID    scopolamine (TRANSDERM-SCOP) 1 mg/3 days TD 72 hr patch 1 patch, Q72H    [Held by provider] senna-docusate sodium (SENOKOT S) 8.6-50 mg per tablet 1 tablet, BID    tetracaine 0.5 % ophthalmic solution 2 drop, Once    Administrative Statements   Today, Patient Was Seen By: Alli Blakely DO      **Please Note: This note may have been constructed using a voice recognition system.**

## 2025-02-10 NOTE — ASSESSMENT & PLAN NOTE
Cardiac arrest in ED after CTA evaluating for pulmonary embolus.  Asystole for 2 minutes requiring CPR and intubation s/P extubation 1/21/2025  ECHO with EF of 65% in September, ECHO with left ventricular ejection fraction is 55% (1/20)    Plan:  Continue heparin gtt

## 2025-02-10 NOTE — ASSESSMENT & PLAN NOTE
Initially with cardiac arrest and cardiogenic shock, is now extubated off pressors. Was transferred  to med/surg 1/21.   MRI brain negative for CVA or concern for hemorrhage.      Plan:  Continue heparin gtt.

## 2025-02-10 NOTE — ASSESSMENT & PLAN NOTE
Had numerous discussions regarding goals of care with family and multidisciplinary team. Explained risks versus benefits of PICC line and subsequent TPN, versus pleasure feeds on comfort care. 1/27 pt's wife decided on obtaining PICC for patient and signed informed consent form.  Likely tracheobronchitis given clinical presentation and hx of respiratory secretions, frequent suctioning, hx of intubation, and prolonged hospitalization    Plan  Monitor off antibiotics  TPN per RD  Will plan to increase protein from 1 g/kg to 1.2 g/kg  Scopolamine patch to manage secretions  Patient continues to refuse suction  Patient's wife requesting that they be sent home with some form of suction to help manage secretions

## 2025-02-10 NOTE — ASSESSMENT & PLAN NOTE
Lab Results   Component Value Date    EGFR 25 02/09/2025    EGFR 29 02/09/2025    EGFR 26 02/08/2025    CREATININE 2.19 (H) 02/09/2025    CREATININE 1.97 (H) 02/09/2025    CREATININE 2.11 (H) 02/08/2025     Plan:  Holding Losartan and Farxiga per nephro due to elevated Cr   Hydralazine 5 mg q 6 hrs for BP >180/110   Initially allowed for permissive hypertension due to concern for stroke.  However, patient's MRI does not show concern for infarct.  Will aim for normotension

## 2025-02-10 NOTE — CASE MANAGEMENT
Case Management Discharge Planning Note    Patient name Jeffry Almanzar  Location S /S -01 MRN [id number]  : 1934 Date 2/10/2025       Current Admission Date: 2025  Current Admission Diagnosis:Acute saddle pulmonary embolism with acute cor pulmonale ([facility])   Patient Active Problem List    Diagnosis Date Noted Date Diagnosed    Tracheobronchitis 2025     Severe protein-calorie malnutrition ([facility]) 2025     Dysphagia 2025     Palliative care encounter 2025     Goals of care, counseling/discussion 2025     Hypoxia 2025     Cognitive impairment 2025     Retinal hemorrhage noted on examination of left eye 2025     Cardiac arrest ([facility]) 2025     Acute saddle pulmonary embolism with acute cor pulmonale ([facility]) 2025     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     CVA (cerebral vascular accident) ([facility]) 2024     SOFÍA (acute kidney injury) ([facility]) 2024     Dementia ([facility]) 2024     Type 2 diabetes mellitus with stage 4 chronic kidney disease, without long-term current use of insulin ([facility]) 10/24/2023     IgM lambda paraproteinemia 2023     Advanced care planning/counseling discussion 2023     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[facility]) 2023     CKD (chronic kidney disease) stage 4, GFR 15-29 ml/min ([facility]) 2022     Persistent proteinuria 2022     Benign 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 ([facility]) 02/15/2022     Spinal stenosis of lumbar region      DDD (degenerative disc disease), lumbar      Lumbar disc disease with radiculopathy      Neurogenic claudication 2019     Low back pain 2019     Retinal vein occlusion of left eye 2019     Malignant neoplasm of urinary bladder ([facility]) 2019     Vision loss of left eye 2019     Weight loss 2018     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  ([facility]) 2018     Iliotibial band syndrome of both  Pharmacy Vancomycin Initial Note  Date of Service May 26, 2020  Patient's  1943  77 year old, female    Indication: Sepsis    Current estimated CrCl = Estimated Creatinine Clearance: 23.2 mL/min (A) (based on SCr of 1.98 mg/dL (H)).    Creatinine for last 3 days  2020:  6:05 AM Creatinine 1.98 mg/dL    Recent Vancomycin Level(s) for last 3 days  No results found for requested labs within last 72 hours.      Vancomycin IV Administrations (past 72 hours)      No vancomycin orders with administrations in past 72 hours.                Nephrotoxins and other renal medications (From now, onward)    Start     Dose/Rate Route Frequency Ordered Stop    20 0800  vancomycin (VANCOCIN) 1,750 mg in sodium chloride 0.9 % 500 mL intermittent infusion      1,750 mg  over 2 Hours Intravenous EVERY 48 HOURS 20 0722      20 0723  vancomycin (VANCOCIN) 2,000 mg in sodium chloride 0.9 % 500 mL intermittent infusion      2,000 mg  over 2 Hours Intravenous ONCE 20 0722      20 0718  piperacillin-tazobactam (ZOSYN) infusion 3.375 g      3.375 g  over 1 Hours Intravenous ONCE 20 0717            Contrast Orders - past 72 hours (72h ago, onward)    None                Plan:  1.  Start vancomycin  2000mg x1 then 1750 mg IV q48h.   2.  Goal Trough Level: 15-20 mg/L   3.  Pharmacy will check trough levels as appropriate in 1-3 Days.    4. Serum creatinine levels will be ordered daily for the first week of therapy and at least twice weekly for subsequent weeks.    5. 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     Dyslipidemia 07/10/2012       LOS (days): 23  Geometric Mean LOS (GMLOS) (days): 4.9  Days to GMLOS:-18.5     OBJECTIVE:  Risk of Unplanned Readmission Score: 43.29         Current admission status: Inpatient   Preferred Pharmacy:   [facility] [address] [place] [address] [place] [id number] [address]  Phone: [phone] Fax: [phone]    [facility] [place] - [address]  [address] [place] [address]  Phone: [phone] Fax: [phone]    Primary Care Provider: Debbie Maloney MD    Primary Insurance: MEDICARE  Secondary Insurance: BLUE CROSS    DISCHARGE DETAILS:             CM contacted family/caregiver?: Yes             Contacts  Patient Contacts: Antony (wife)  Relationship to Patient:: Family  Contact Method: In Person  Reason/Outcome: Discharge Planning, Emergency Contact, Continuity of Care, Referral              Other Referral/Resources/Interventions Provided:  Interventions: Transportation  Referral Comments: Per  Hospice liaison - pt need for transport tomorrow morning at 1100 to home for hospice SOC. Transport referral placed to [facility], confirmed for 1100 via [facility] to home tomorrow. CM notified pt and wife at bedside,  hospice liaison via secure chat and SLIM in person of same.         Treatment Team Recommendation: Home, Hospice  Discharge Destination Plan:: Home, Hospice  Transport at Discharge : [facility]        Transported by (Company and Unit #): [facility]  ETA of Transport (Date): 02/11/25  ETA of Transport (Time): 1100

## 2025-02-10 NOTE — PLAN OF CARE
Problem: PAIN - ADULT  Goal: Verbalizes/displays adequate comfort level or baseline comfort level  Description: Interventions:  - Encourage patient to monitor pain and request assistance  - Assess pain using appropriate pain scale  - Administer analgesics based on type and severity of pain and evaluate response  - Implement non-pharmacological measures as appropriate and evaluate response  - Consider cultural and social influences on pain and pain management  - Notify physician/advanced practitioner if interventions unsuccessful or patient reports new pain  Outcome: Progressing     Problem: INFECTION - ADULT  Goal: Absence or prevention of progression during hospitalization  Description: INTERVENTIONS:  - Assess and monitor for signs and symptoms of infection  - Monitor lab/diagnostic results  - Monitor all insertion sites, i.e. indwelling lines, tubes, and drains  - Monitor endotracheal if appropriate and nasal secretions for changes in amount and color  - [place] appropriate cooling/warming therapies per order  - Administer medications as ordered  - Instruct and encourage patient and family to use good hand hygiene technique  - Identify and instruct in appropriate isolation precautions for identified infection/condition  Outcome: Progressing  Goal: Absence of fever/infection during neutropenic period  Description: INTERVENTIONS:  - Monitor WBC    Outcome: Progressing     Problem: SAFETY ADULT  Goal: Patient will remain free of falls  Description: INTERVENTIONS:  - Educate patient/family on patient safety including physical limitations  - Instruct patient to call for assistance with activity   - Consult OT/PT to assist with strengthening/mobility   - Keep Call bell within reach  - Keep bed low and locked with side rails adjusted as appropriate  - Keep care items and personal belongings within reach  - Initiate and maintain comfort rounds  - Make Fall Risk Sign visible to staff  - Offer Toileting every 2 Hours,  in advance of need  - Initiate/Maintain alarm  - Obtain necessary fall risk management equipment:   - Apply yellow socks and bracelet for high fall risk patients  - Consider moving patient to room near nurses station  Outcome: Progressing  Goal: Maintain or return to baseline ADL function  Description: INTERVENTIONS:  -  Assess patient's ability to carry out ADLs; assess patient's baseline for ADL function and identify physical deficits which impact ability to perform ADLs (bathing, care of mouth/teeth, toileting, grooming, dressing, etc.)  - Assess/evaluate cause of self-care deficits   - Assess range of motion  - Assess patient's mobility; develop plan if impaired  - Assess patient's need for assistive devices and provide as appropriate  - Encourage maximum independence but intervene and supervise when necessary  - Involve family in performance of ADLs  - Assess for home care needs following discharge   - Consider OT consult to assist with ADL evaluation and planning for discharge  - Provide patient education as appropriate  Outcome: Progressing  Goal: Maintains/Returns to pre admission functional level  Description: INTERVENTIONS:  - Perform AM-PAC 6 Click Basic Mobility/ Daily Activity assessment daily.  - Set and communicate daily mobility goal to care team and patient/family/caregiver.   - Collaborate with rehabilitation services on mobility goals if consulted  - Perform Range of Motion 2 times a day.  - Reposition patient every 2 hours.  - Dangle patient 2 times a day  - Stand patient 2 times a day  - Ambulate patient 2 times a day  - Out of bed to chair 2 times a day   - Out of bed for meals 2 times a day  - Out of bed for toileting  - Record patient progress and toleration of activity level   Outcome: Progressing     Problem: DISCHARGE PLANNING  Goal: Discharge to home or other facility with appropriate resources  Description: INTERVENTIONS:  - Identify barriers to discharge w/patient and caregiver  -  Arrange for needed discharge resources and transportation as appropriate  - Identify discharge learning needs (meds, wound care, etc.)  - Arrange for interpretive services to assist at discharge as needed  - Refer to Case Management Department for coordinating discharge planning if the patient needs post-hospital services based on physician/advanced practitioner order or complex needs related to functional status, cognitive ability, or social support system  Outcome: Progressing     Problem: Knowledge Deficit  Goal: Patient/family/caregiver demonstrates understanding of disease process, treatment plan, medications, and discharge instructions  Description: Complete learning assessment and assess knowledge base.  Interventions:  - Provide teaching at level of understanding  - Provide teaching via preferred learning methods  Outcome: Progressing     Problem: Nutrition/Hydration-ADULT  Goal: Nutrient/Hydration intake appropriate for improving, restoring or maintaining nutritional needs  Description: Monitor and assess patient's nutrition/hydration status for malnutrition. Collaborate with interdisciplinary team and initiate plan and interventions as ordered.  Monitor patient's weight and dietary intake as ordered or per policy. Utilize nutrition screening tool and intervene as necessary. Determine patient's food preferences and provide high-protein, high-caloric foods as appropriate.     INTERVENTIONS:  - Monitor oral intake, urinary output, labs, and treatment plans  - Assess nutrition and hydration status and recommend course of action  - Evaluate amount of meals eaten  - Assist patient with eating if necessary   - Allow adequate time for meals  - Recommend/ encourage appropriate diets, oral nutritional supplements, and vitamin/mineral supplements  - Order, calculate, and assess calorie counts as needed  - Recommend, monitor, and adjust tube feedings and TPN/PPN based on assessed needs  - Assess need for intravenous  fluids  - Provide specific nutrition/hydration education as appropriate  - Include patient/family/caregiver in decisions related to nutrition  Outcome: Progressing     Problem: Prexisting or High Potential for Compromised Skin Integrity  Goal: Skin integrity is maintained or improved  Description: INTERVENTIONS:  - Identify patients at risk for skin breakdown  - Assess and monitor skin integrity  - Assess and monitor nutrition and hydration status  - Monitor labs   - Assess for incontinence   - Turn and reposition patient  - Assist with mobility/ambulation  - Relieve pressure over bony prominences  - Avoid friction and shearing  - Provide appropriate hygiene as needed including keeping skin clean and dry  - Evaluate need for skin moisturizer/barrier cream  - Collaborate with interdisciplinary team   - Patient/family teaching  - Consider wound care consult   Outcome: Progressing

## 2025-02-10 NOTE — PLAN OF CARE
Problem: PAIN - ADULT  Goal: Verbalizes/displays adequate comfort level or baseline comfort level  Description: Interventions:  - Encourage patient to monitor pain and request assistance  - Assess pain using appropriate pain scale  - Administer analgesics based on type and severity of pain and evaluate response  - Implement non-pharmacological measures as appropriate and evaluate response  - Consider cultural and social influences on pain and pain management  - Notify physician/advanced practitioner if interventions unsuccessful or patient reports new pain  Outcome: Progressing     Problem: INFECTION - ADULT  Goal: Absence or prevention of progression during hospitalization  Description: INTERVENTIONS:  - Assess and monitor for signs and symptoms of infection  - Monitor lab/diagnostic results  - Monitor all insertion sites, i.e. indwelling lines, tubes, and drains  - Monitor endotracheal if appropriate and nasal secretions for changes in amount and color  - [place] appropriate cooling/warming therapies per order  - Administer medications as ordered  - Instruct and encourage patient and family to use good hand hygiene technique  - Identify and instruct in appropriate isolation precautions for identified infection/condition  Outcome: Progressing  Goal: Absence of fever/infection during neutropenic period  Description: INTERVENTIONS:  - Monitor WBC    Outcome: Progressing     Problem: SAFETY ADULT  Goal: Patient will remain free of falls  Description: INTERVENTIONS:  - Educate patient/family on patient safety including physical limitations  - Instruct patient to call for assistance with activity   - Consult OT/PT to assist with strengthening/mobility   - Keep Call bell within reach  - Keep bed low and locked with side rails adjusted as appropriate  - Keep care items and personal belongings within reach  - Initiate and maintain comfort rounds  - Make Fall Risk Sign visible to staff  - Offer Toileting every 2 Hours,  in advance of need  - Initiate/Maintain bed and chair alarm  - Obtain necessary fall risk management equipment:   - Apply yellow socks and bracelet for high fall risk patients  - Consider moving patient to room near nurses station  Outcome: Progressing  Goal: Maintain or return to baseline ADL function  Description: INTERVENTIONS:  -  Assess patient's ability to carry out ADLs; assess patient's baseline for ADL function and identify physical deficits which impact ability to perform ADLs (bathing, care of mouth/teeth, toileting, grooming, dressing, etc.)  - Assess/evaluate cause of self-care deficits   - Assess range of motion  - Assess patient's mobility; develop plan if impaired  - Assess patient's need for assistive devices and provide as appropriate  - Encourage maximum independence but intervene and supervise when necessary  - Involve family in performance of ADLs  - Assess for home care needs following discharge   - Consider OT consult to assist with ADL evaluation and planning for discharge  - Provide patient education as appropriate  Outcome: Progressing  Goal: Maintains/Returns to pre admission functional level  Description: INTERVENTIONS:  - Perform AM-PAC 6 Click Basic Mobility/ Daily Activity assessment daily.  - Set and communicate daily mobility goal to care team and patient/family/caregiver.   - Collaborate with rehabilitation services on mobility goals if consulted  - Perform Range of Motion 3 times a day.  - Reposition patient every 2 hours.  - Dangle patient 3 times a day  - Stand patient 3 times a day  - Ambulate patient 3 times a day  - Out of bed to chair 3 times a day   - Out of bed for meals 3 times a day  - Out of bed for toileting  - Record patient progress and toleration of activity level   Outcome: Progressing     Problem: DISCHARGE PLANNING  Goal: Discharge to home or other facility with appropriate resources  Description: INTERVENTIONS:  - Identify barriers to discharge w/patient and  caregiver  - Arrange for needed discharge resources and transportation as appropriate  - Identify discharge learning needs (meds, wound care, etc.)  - Arrange for interpretive services to assist at discharge as needed  - Refer to Case Management Department for coordinating discharge planning if the patient needs post-hospital services based on physician/advanced practitioner order or complex needs related to functional status, cognitive ability, or social support system  Outcome: Progressing     Problem: Knowledge Deficit  Goal: Patient/family/caregiver demonstrates understanding of disease process, treatment plan, medications, and discharge instructions  Description: Complete learning assessment and assess knowledge base.  Interventions:  - Provide teaching at level of understanding  - Provide teaching via preferred learning methods  Outcome: Progressing     Problem: Nutrition/Hydration-ADULT  Goal: Nutrient/Hydration intake appropriate for improving, restoring or maintaining nutritional needs  Description: Monitor and assess patient's nutrition/hydration status for malnutrition. Collaborate with interdisciplinary team and initiate plan and interventions as ordered.  Monitor patient's weight and dietary intake as ordered or per policy. Utilize nutrition screening tool and intervene as necessary. Determine patient's food preferences and provide high-protein, high-caloric foods as appropriate.     INTERVENTIONS:  - Monitor oral intake, urinary output, labs, and treatment plans  - Assess nutrition and hydration status and recommend course of action  - Evaluate amount of meals eaten  - Assist patient with eating if necessary   - Allow adequate time for meals  - Recommend/ encourage appropriate diets, oral nutritional supplements, and vitamin/mineral supplements  - Order, calculate, and assess calorie counts as needed  - Recommend, monitor, and adjust tube feedings and TPN/PPN based on assessed needs  - Assess need for  intravenous fluids  - Provide specific nutrition/hydration education as appropriate  - Include patient/family/caregiver in decisions related to nutrition  Outcome: Progressing     Problem: Prexisting or High Potential for Compromised Skin Integrity  Goal: Skin integrity is maintained or improved  Description: INTERVENTIONS:  - Identify patients at risk for skin breakdown  - Assess and monitor skin integrity  - Assess and monitor nutrition and hydration status  - Monitor labs   - Assess for incontinence   - Turn and reposition patient  - Assist with mobility/ambulation  - Relieve pressure over bony prominences  - Avoid friction and shearing  - Provide appropriate hygiene as needed including keeping skin clean and dry  - Evaluate need for skin moisturizer/barrier cream  - Collaborate with interdisciplinary team   - Patient/family teaching  - Consider wound care consult   Outcome: Progressing

## 2025-02-10 NOTE — ASSESSMENT & PLAN NOTE
Patient continues to refuse suctioning and oral feeds. Per review of hospice RN note and conversation with multidisciplinary team, pt's wife and daughter would like pt to come home on hospice next week however would like to continue current plan until discharge. CM aware; anticipating discharge home with hospice.   Patient seen and evaluated by palliative care 1/23. Palliative care had an in-depth discussion with family regarding patient's GOC and overall prognosis. Family would NOT like to pursue a PEG tube at this time.   CODE STATUS changed from Level 2 to Level 3 DNR/DNI    Plan:  Discharge to home hospice @ 11 AM on 2/11/25

## 2025-02-10 NOTE — OCCUPATIONAL THERAPY NOTE
Occupational Therapy Cancel Note     Patient Name: Jeffry Almanzar  Today's Date: 2/10/2025  Problem List  Principal Problem:    Acute saddle pulmonary embolism with acute cor pulmonale (HCC)  Active Problems:    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  (HCC)    Benign 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 (HCC)    CKD (chronic kidney disease) stage 4, GFR 15-29 ml/min (HCC)    Type 2 diabetes mellitus with stage 4 chronic kidney disease, without long-term current use of insulin (HCC)    SOFÍA (acute kidney injury) (HCC)    Lacunar cerebrovascular accident (CVA) (HCC)    Cardiac arrest (HCC)    Retinal hemorrhage noted on examination of left eye    Hypoxia    Cognitive impairment    Palliative care encounter    Goals of care, counseling/discussion    Dysphagia    Severe protein-calorie malnutrition (HCC)    Tracheobronchitis       02/10/25 [id number]   Note Type   Note Type Cancelled Session  (Monday 2/10/25)   Cancel Reasons Other  (plan for DC home [facility] (2/11/25) on hospice. Will cancel)       PABLO Mueller/CARROLL  [id number]  [id number]

## 2025-02-10 NOTE — ASSESSMENT & PLAN NOTE
Lab Results   Component Value Date    HGBA1C 6.9 (H) 01/18/2025     Recent Labs     02/09/25  2149 02/10/25  0000 02/10/25  0606 02/10/25  1150   POCGLU 165* 113 146* 118     Plan  NPH insulin 10u BID. Will continue to monitor/adjust as necessary.   Continue sliding scale, algorithm 2  Hypoglycemia protocol

## 2025-02-11 ENCOUNTER — HOME CARE VISIT (OUTPATIENT)
Dept: HOME HOSPICE | Facility: HOSPICE | Age: OVER 89
End: 2025-02-11
Payer: MEDICARE

## 2025-02-11 ENCOUNTER — HOME CARE VISIT (OUTPATIENT)
Dept: HOME HEALTH SERVICES | Facility: HOME HEALTHCARE | Age: OVER 89
End: 2025-02-11
Payer: MEDICARE

## 2025-02-11 VITALS
SYSTOLIC BLOOD PRESSURE: 156 MMHG | HEART RATE: 80 BPM | BODY MASS INDEX: 23.32 KG/M2 | WEIGHT: 140 LBS | HEIGHT: 65 IN | TEMPERATURE: 98.3 F | RESPIRATION RATE: 24 BRPM | DIASTOLIC BLOOD PRESSURE: 88 MMHG

## 2025-02-11 VITALS
DIASTOLIC BLOOD PRESSURE: 79 MMHG | OXYGEN SATURATION: 92 % | RESPIRATION RATE: 18 BRPM | SYSTOLIC BLOOD PRESSURE: 156 MMHG | HEART RATE: 93 BPM | TEMPERATURE: 99.3 F | BODY MASS INDEX: 23.43 KG/M2 | HEIGHT: 65 IN | WEIGHT: 140.65 LBS

## 2025-02-11 DIAGNOSIS — Z51.5 HOSPICE CARE PATIENT: Primary | ICD-10-CM

## 2025-02-11 LAB
APTT PPP: 89 SECONDS (ref 23–34)
GLUCOSE SERPL-MCNC: 128 MG/DL (ref 65–140)
GLUCOSE SERPL-MCNC: 133 MG/DL (ref 65–140)

## 2025-02-11 PROCEDURE — 99239 HOSP IP/OBS DSCHRG MGMT >30: CPT | Performed by: INTERNAL MEDICINE

## 2025-02-11 PROCEDURE — G0299 HHS/HOSPICE OF RN EA 15 MIN: HCPCS

## 2025-02-11 PROCEDURE — 82948 REAGENT STRIP/BLOOD GLUCOSE: CPT

## 2025-02-11 PROCEDURE — 85730 THROMBOPLASTIN TIME PARTIAL: CPT | Performed by: INTERNAL MEDICINE

## 2025-02-11 RX ORDER — SCOPOLAMINE 1 MG/3D
1 PATCH, EXTENDED RELEASE TRANSDERMAL
Qty: 10 PATCH | Refills: 0 | Status: SHIPPED | OUTPATIENT
Start: 2025-02-13 | End: 2025-02-11

## 2025-02-11 RX ORDER — ACETAMINOPHEN 650 MG/1
650 SUPPOSITORY RECTAL EVERY 8 HOURS PRN
Qty: 60 SUPPOSITORY | Refills: 0 | Status: CANCELLED | OUTPATIENT
Start: 2025-02-11

## 2025-02-11 RX ORDER — MORPHINE SULFATE 20 MG/ML
5 SOLUTION ORAL EVERY 4 HOURS PRN
Qty: 30 ML | Refills: 0 | Status: SHIPPED | OUTPATIENT
Start: 2025-02-11 | End: 2025-02-23

## 2025-02-11 RX ORDER — BISACODYL 10 MG
10 SUPPOSITORY, RECTAL RECTAL DAILY
Qty: 12 SUPPOSITORY | Refills: 0 | Status: SHIPPED | OUTPATIENT
Start: 2025-02-11 | End: 2025-02-23

## 2025-02-11 RX ADMIN — HEPARIN SODIUM 9 UNITS/KG/HR: 10000 INJECTION, SOLUTION INTRAVENOUS at 03:26

## 2025-02-11 NOTE — INCIDENTAL FINDINGS
"The following findings require follow up:  Radiographic finding   Finding: CT head without contrast: No acute intracranial hemorrhage or large territorial infarction. Moderate chronic microangiopathic change, noting that MRI would be more sensitive for the detection of subtle/early acute infarct., The study was marked in EPIC for immediate notification., Workstation performed: [id number]   CTA chest pe study: Acute saddle pulmonary embolus with large clot burden. Associated CT findings of right heart strain., The calculated ratio of right ventricular to left ventricular diameter (RV/LV ratio) is 1.2., There is a critical finding of acute PE with RV/LV ratio >0.9. Based on PERT algorithm recommendations:,  \"  Order STAT biomarkers including troponin, NT-BNP or BNP,  \"  Calculate PESI score:, o  If PESI score falls in I-III, with negative biomarkers, please order a PERT priority ECHO., o  If PESI score falls in IV-V or with positive biomarkers, please order STAT ECHO and alert the campus PERT via PAC at [phone]., * I personally telephoned this result to ISRAEL Giraldo on 1/18/2025 1:39 AM., Workstation performed: [id number]   XR chest portable ICU: Limited rotated study., Low lung volumes with hypoventilatory changes., Suspected bilateral small pleural effusions., Interval extubation., Left IJ approach catheter now terminating in the azygos vein., The study was marked in EPIC for immediate notification., Workstation performed: [id number]   CT head wo contrast: 1. Interval hyperdensity in the left globe, (series 2 image 19) not present on prior head CT 3 days ago, worrisome for a retinal hemorrhage. Differential diagnosis could include treatment related changes if there was a recent ophthalmologic procedure. , Ophthalmologic assessment advised., 2. No acute intracranial hemorrhage, mass effect or edema., 3. Moderate, chronic microangiopathy., 4. Stable ventricular prominence likely on the basis of " atrophy., Workstation performed: [id number]   Follow up required: yes   Follow up should be done within 4 week(s)    Please notify the following clinician to assist with the follow up:   Dr. mcarthur    Incidental finding results were discussed with the wife by Claudio Simpson MD on 02/11/25.   They expressed understanding and all questions answered.

## 2025-02-11 NOTE — TELEPHONE ENCOUNTER
2/11/2025 4:21 PM  [facility] home patient requests emergency fill until [facility] order arrives and SOC medications.  Filled electronically via Epic as per [place] State Law.    Requested Prescriptions     Signed Prescriptions Disp Refills    morphine sulfate, concentrate, 100 mg/5mL concentrated solution 30 mL 0     Sig: Take 0.25 mL (5 mg total) by mouth every 4 (four) hours as needed for severe pain (pain / SOB) Max Daily Amount: 30 mg     Authorizing Provider: VALERIE TAY    hyoscyamine (LEVSIN/SL) 0.125 mg SL tablet 12 tablet 0     Sig: Take 1 tablet (0.125 mg total) by mouth every 4 (four) hours as needed (terminal secretions)     Authorizing Provider: VALERIE TAY CRNP  [facility]  Hospice Answering Service: [phone]

## 2025-02-12 ENCOUNTER — HOME CARE VISIT (OUTPATIENT)
Dept: HOME HEALTH SERVICES | Facility: HOME HEALTHCARE | Age: OVER 89
End: 2025-02-12
Payer: MEDICARE

## 2025-02-12 ENCOUNTER — HOME CARE VISIT (OUTPATIENT)
Dept: HOME HOSPICE | Facility: HOSPICE | Age: OVER 89
End: 2025-02-12
Payer: MEDICARE

## 2025-02-12 VITALS — HEART RATE: 100 BPM | SYSTOLIC BLOOD PRESSURE: 160 MMHG | RESPIRATION RATE: 24 BRPM | DIASTOLIC BLOOD PRESSURE: 90 MMHG

## 2025-02-12 PROCEDURE — G0299 HHS/HOSPICE OF RN EA 15 MIN: HCPCS

## 2025-02-12 PROCEDURE — G0155 HHCP-SVS OF CSW,EA 15 MIN: HCPCS

## 2025-02-12 PROCEDURE — G0156 HHCP-SVS OF AIDE,EA 15 MIN: HCPCS

## 2025-02-13 ENCOUNTER — HOME CARE VISIT (OUTPATIENT)
Dept: HOME HEALTH SERVICES | Facility: HOME HEALTHCARE | Age: OVER 89
End: 2025-02-13
Payer: MEDICARE

## 2025-02-13 ENCOUNTER — HOME CARE VISIT (OUTPATIENT)
Dept: HOME HOSPICE | Facility: HOSPICE | Age: OVER 89
End: 2025-02-13
Payer: MEDICARE

## 2025-02-13 LAB
ATRIAL RATE: 99 BPM
P AXIS: 105 DEGREES
PR INTERVAL: 246 MS
QRS AXIS: 4 DEGREES
QRSD INTERVAL: 72 MS
QT INTERVAL: 400 MS
QTC INTERVAL: 514 MS
T WAVE AXIS: 30 DEGREES
VENTRICULAR RATE: 99 BPM

## 2025-02-13 PROCEDURE — G0299 HHS/HOSPICE OF RN EA 15 MIN: HCPCS

## 2025-02-13 PROCEDURE — G0156 HHCP-SVS OF AIDE,EA 15 MIN: HCPCS

## 2025-02-13 PROCEDURE — 93010 ELECTROCARDIOGRAM REPORT: CPT | Performed by: INTERNAL MEDICINE

## 2025-02-14 ENCOUNTER — HOME CARE VISIT (OUTPATIENT)
Dept: HOME HEALTH SERVICES | Facility: HOME HEALTHCARE | Age: OVER 89
End: 2025-02-14
Payer: MEDICARE

## 2025-02-14 VITALS — HEART RATE: 112 BPM | RESPIRATION RATE: 16 BRPM

## 2025-02-14 PROCEDURE — G0299 HHS/HOSPICE OF RN EA 15 MIN: HCPCS
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
## 2025-02-28 ENCOUNTER — HOME CARE VISIT (OUTPATIENT)
Dept: HOME HOSPICE | Facility: HOSPICE | Age: OVER 89
End: 2025-02-28
Payer: MEDICARE

## (undated) DEVICE — REM POLYHESIVE ADULT PATIENT RETURN ELECTRODE: Brand: VALLEYLAB

## (undated) DEVICE — BAG URINE DRAINAGE 2000ML ANTI RFLX LF

## (undated) DEVICE — GLOVE SRG BIOGEL ECLIPSE 7.5

## (undated) DEVICE — STERILE SURGICAL LUBRICANT,  TUBE: Brand: SURGILUBE

## (undated) DEVICE — SKIN MARKER DUAL TIP WITH RULER CAP, FLEXIBLE RULER AND LABELS: Brand: DEVON

## (undated) DEVICE — BASIC SINGLE BASIN 2-LF: Brand: MEDLINE INDUSTRIES, INC.

## (undated) DEVICE — PACK TUR

## (undated) DEVICE — UROCATCH BAG

## (undated) DEVICE — GLOVE INDICATOR PI UNDERGLOVE SZ 7.5 BLUE

## (undated) DEVICE — CATH FOLEY 18FR 5ML 2 WAY SILICONE ELASTIMER

## (undated) DEVICE — GUIDEWIRE STRGHT TIP 0.035 IN  SOLO PLUS

## (undated) DEVICE — Device: Brand: OLYMPUS

## (undated) DEVICE — SCD SEQUENTIAL COMPRESSION COMFORT SLEEVE MEDIUM KNEE LENGTH: Brand: KENDALL SCD